# Patient Record
Sex: MALE | Race: BLACK OR AFRICAN AMERICAN | NOT HISPANIC OR LATINO | Employment: OTHER | URBAN - METROPOLITAN AREA
[De-identification: names, ages, dates, MRNs, and addresses within clinical notes are randomized per-mention and may not be internally consistent; named-entity substitution may affect disease eponyms.]

---

## 2017-03-23 ENCOUNTER — LAB VISIT (OUTPATIENT)
Dept: LAB | Facility: HOSPITAL | Age: 62
End: 2017-03-23
Attending: FAMILY MEDICINE
Payer: MEDICARE

## 2017-03-23 ENCOUNTER — OFFICE VISIT (OUTPATIENT)
Dept: INTERNAL MEDICINE | Facility: CLINIC | Age: 62
End: 2017-03-23
Payer: MEDICARE

## 2017-03-23 VITALS — DIASTOLIC BLOOD PRESSURE: 66 MMHG | HEART RATE: 70 BPM | SYSTOLIC BLOOD PRESSURE: 105 MMHG

## 2017-03-23 DIAGNOSIS — R91.8 ABNORMAL CT SCAN, LUNG: ICD-10-CM

## 2017-03-23 DIAGNOSIS — D64.9 ANEMIA, UNSPECIFIED TYPE: ICD-10-CM

## 2017-03-23 DIAGNOSIS — R93.5 ABNORMAL CT OF THE ABDOMEN: ICD-10-CM

## 2017-03-23 DIAGNOSIS — Z00.00 PREVENTATIVE HEALTH CARE: ICD-10-CM

## 2017-03-23 DIAGNOSIS — D84.9 IMMUNOSUPPRESSION: Chronic | ICD-10-CM

## 2017-03-23 DIAGNOSIS — M33.20 POLYMYOSITIS: ICD-10-CM

## 2017-03-23 DIAGNOSIS — M33.20 POLYMYOSITIS: Primary | ICD-10-CM

## 2017-03-23 LAB
ALBUMIN SERPL BCP-MCNC: 4.2 G/DL
ALP SERPL-CCNC: 54 U/L
ALT SERPL W/O P-5'-P-CCNC: 12 U/L
ANION GAP SERPL CALC-SCNC: 11 MMOL/L
ANISOCYTOSIS BLD QL SMEAR: SLIGHT
AST SERPL-CCNC: 12 U/L
BASOPHILS NFR BLD: 0 %
BILIRUB SERPL-MCNC: 2.2 MG/DL
BUN SERPL-MCNC: 11 MG/DL
CALCIUM SERPL-MCNC: 9.1 MG/DL
CHLORIDE SERPL-SCNC: 106 MMOL/L
CHOLEST/HDLC SERPL: 4.6 {RATIO}
CO2 SERPL-SCNC: 25 MMOL/L
COMPLEXED PSA SERPL-MCNC: 0.37 NG/ML
CREAT SERPL-MCNC: 0.5 MG/DL
DACRYOCYTES BLD QL SMEAR: ABNORMAL
DIFFERENTIAL METHOD: ABNORMAL
EOSINOPHIL NFR BLD: 1 %
ERYTHROCYTE [DISTWIDTH] IN BLOOD BY AUTOMATED COUNT: 20.5 %
EST. GFR  (AFRICAN AMERICAN): >60 ML/MIN/1.73 M^2
EST. GFR  (NON AFRICAN AMERICAN): >60 ML/MIN/1.73 M^2
GLUCOSE SERPL-MCNC: 68 MG/DL
HCT VFR BLD AUTO: 33.2 %
HDL/CHOLESTEROL RATIO: 21.7 %
HDLC SERPL-MCNC: 138 MG/DL
HDLC SERPL-MCNC: 30 MG/DL
HGB BLD-MCNC: 10.9 G/DL
HYPOCHROMIA BLD QL SMEAR: ABNORMAL
LDLC SERPL CALC-MCNC: 75.4 MG/DL
LYMPHOCYTES NFR BLD: 36 %
MCH RBC QN AUTO: 21.2 PG
MCHC RBC AUTO-ENTMCNC: 32.8 %
MCV RBC AUTO: 65 FL
MONOCYTES NFR BLD: 3 %
NEUTROPHILS NFR BLD: 60 %
NONHDLC SERPL-MCNC: 108 MG/DL
NRBC BLD-RTO: ABNORMAL /100 WBC
OVALOCYTES BLD QL SMEAR: ABNORMAL
PLATELET # BLD AUTO: 142 K/UL
PLATELET BLD QL SMEAR: ABNORMAL
PMV BLD AUTO: ABNORMAL FL
POIKILOCYTOSIS BLD QL SMEAR: ABNORMAL
POLYCHROMASIA BLD QL SMEAR: ABNORMAL
POTASSIUM SERPL-SCNC: 4 MMOL/L
PROT SERPL-MCNC: 7.4 G/DL
RBC # BLD AUTO: 5.13 M/UL
SODIUM SERPL-SCNC: 142 MMOL/L
TARGETS BLD QL SMEAR: ABNORMAL
TRIGL SERPL-MCNC: 163 MG/DL
WBC # BLD AUTO: 8.45 K/UL
WBC NRBC COR # BLD: 7.68 K/UL

## 2017-03-23 PROCEDURE — 36415 COLL VENOUS BLD VENIPUNCTURE: CPT

## 2017-03-23 PROCEDURE — 84153 ASSAY OF PSA TOTAL: CPT

## 2017-03-23 PROCEDURE — 99215 OFFICE O/P EST HI 40 MIN: CPT | Mod: S$PBB,,, | Performed by: FAMILY MEDICINE

## 2017-03-23 PROCEDURE — 80053 COMPREHEN METABOLIC PANEL: CPT

## 2017-03-23 PROCEDURE — 80061 LIPID PANEL: CPT

## 2017-03-23 PROCEDURE — 99999 PR PBB SHADOW E&M-EST. PATIENT-LVL III: CPT | Mod: PBBFAC,,, | Performed by: FAMILY MEDICINE

## 2017-03-23 PROCEDURE — 85007 BL SMEAR W/DIFF WBC COUNT: CPT

## 2017-03-23 PROCEDURE — 85027 COMPLETE CBC AUTOMATED: CPT

## 2017-03-23 NOTE — MR AVS SNAPSHOT
[facility]  [address]  [facility] [address]  Phone: [phone]  Fax: [phone]                  Nura LAN Femi Krishna   3/23/2017 8:00 AM   Office Visit    Description:  Male : 1955   Provider:  Rickie Roe MD   Department:  [facility]           Diagnoses this Visit        Comments    Polymyositis    -  Primary     Immunosuppression         Abnormal CT scan, lung         Anemia, unspecified type         Preventative health care         Abnormal CT of the abdomen                To Do List           Future Appointments        Provider Department Dept Phone    4/10/2017 10:00 AM Nura Huston MD [facility] - Rheumatology [phone]      Goals (5 Years of Data)     None      Follow-Up and Disposition     Return in about 3 months (around 2017), or if symptoms worsen or fail to improve.    Follow-up and Disposition History      [facility]     [facility] -  Assistance  Registered nurses in the [facility] provide clinical advisement, health education, appointment booking, and other advisory services.  Call for this free service at [phone].             Medications           Message regarding Medications     Verify the changes and/or additions to your medication regime listed below are the same as discussed with your clinician today.  If any of these changes or additions are incorrect, please notify your healthcare provider.             Verify that the below list of medications is an accurate representation of the medications you are currently taking.  If none reported, the list may be blank. If incorrect, please contact your healthcare provider. Carry this list with you in case of emergency.           Current Medications     azathioprine (IMURAN) 50 mg Tab Take 3 tablets (150 mg total) by mouth once daily.    calcium-vitamin D3 (CALCIUM 500 + D) 500 mg(1,250mg) -200 unit per tablet Take 1 tablet by  mouth 2 (two) times daily with meals.    ergocalciferol (ERGOCALCIFEROL) 50,000 unit Cap Take 1 capsule (50,000 Units total) by mouth every 7 days.    imiquimod (ALDARA) 5 % cream AAA nightly    ketoconazole (NIZORAL) 2 % cream Apply topically 2 (two) times daily.    pantoprazole (PROTONIX) 20 MG tablet Take 1 tablet (20 mg total) by mouth once daily.    predniSONE (DELTASONE) 10 MG tablet Take 3 tablets (30 mg total) by mouth once daily.    senna-docusate 8.6-50 mg (PERICOLACE) 8.6-50 mg per tablet Take 1 tablet by mouth 2 (two) times daily.           Clinical Reference Information           Your Vitals Were     BP Pulse                105/66 70          Blood Pressure          Most Recent Value    BP  105/66      Allergies as of 3/23/2017     No Known Allergies      Immunizations Administered on Date of Encounter - 3/23/2017     None      Orders Placed During Today's Visit      Normal Orders This Visit    Ambulatory Referral to Hepatology     Ambulatory referral to Home Health     Ambulatory Referral to Pulmonology     Future Labs/Procedures Expected by Expires    CBC auto differential  3/23/2017 3/23/2018    Comprehensive metabolic panel  3/23/2017 3/23/2018    Lipid panel  3/23/2017 3/23/2018    PSA, Screening  3/23/2017 6/21/2017      Smoking Cessation     If you would like to quit smoking:   You may be eligible for free services if you are a [place] resident and started smoking cigarettes before September 1, 1988.  Call the Smoking Cessation Trust ([facility]) toll free at (165) 945-0507 or (974) 564-7535.   Call 1-800-QUIT-NOW if you do not meet the above criteria.            Language Assistance Services     ATTENTION: Language assistance services are available, free of charge. Please call [phone].      ATENCIÓN: Si habla español, tiene a oconnor disposición servicios gratuitos de asistencia lingüística. Llame al [phone].     CHÚ Ý: N?u b?n nói Ti?ng Vi?t, có các d?ch v? h? tr? ngôn ng? mi?n [facility]  shiva garcia?n. G?i s? [phone].         [facility] complies with applicable Federal civil rights laws and does not discriminate on the basis of race, color, national origin, age, disability, or sex.

## 2017-03-23 NOTE — PROGRESS NOTES
"Subjective:       Patient ID: Nura Kahn Jr. is a 61 y.o. male.    Chief Complaint: No chief complaint on file.  Nura Kahn Jr. 61 y.o. male is here for office visit to review care and physical exam, feels about baseline.  Has on and off LE weakness, not sure if immunosuppressive med working, doesn't use at times.  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 Last Rhum note plan:  "Given chronic inflammation on muscle biopsy, subjective improvement with prednisone- he will continue to need immunosuppression. He has failed imuran and we will start getting him ready to start cellcept (will need pulmonary appointment, GI appointment for vaccinations first). He got information on cellcept to read over. Paraseptal emphysema.  CT showed:    Stable 1.2 cm AP window lymph node. 4.  Hepatosplenomegaly with an enlarged portal vein and associated splenic collateral vessels at the splenic hilum suggesting sequela of portal venous hypertension.   Ill-defined ground glass density measuring 0.8 cm in the anterior segment of the right upper lobe       RTC after pulmonary/GI evaluation to start Cellcept.     Seen and d/w Dr. Huston      HPI  Review of Systems   Constitutional: Negative for activity change, appetite change, fatigue, fever and unexpected weight change.   HENT: Negative for congestion, hearing loss, postnasal drip and rhinorrhea.    Eyes: Negative for pain, discharge and visual disturbance.   Respiratory: Negative for cough, choking and shortness of breath.    Cardiovascular: Negative for chest pain, palpitations and leg swelling.   Gastrointestinal: Negative for abdominal pain, diarrhea and vomiting.   Genitourinary: Negative for dysuria, flank pain, hematuria and urgency.   Musculoskeletal: Negative for arthralgias, back pain, joint swelling and neck pain.   Skin: Negative for color change and rash.   Neurological: Positive for weakness. Negative for dizziness, " tremors, syncope, numbness and headaches.   Psychiatric/Behavioral: Negative for agitation and confusion. The patient is not hyperactive.        Objective:      Physical Exam   Constitutional: He is oriented to person, place, and time. He appears well-developed and well-nourished.   HENT:   Head: Normocephalic.   Eyes: EOM are normal. Pupils are equal, round, and reactive to light.   Neck: Normal range of motion. Neck supple. No thyromegaly present.   Cardiovascular: Normal rate.  Exam reveals no gallop and no friction rub.    No murmur heard.  Pulmonary/Chest: Effort normal. No respiratory distress. He has no wheezes.   Abdominal: Soft. Bowel sounds are normal. He exhibits no mass. There is no tenderness.   Musculoskeletal: He exhibits no edema or tenderness.   Lymphadenopathy:     He has no cervical adenopathy.   Neurological: He is alert and oriented to person, place, and time. He has normal reflexes. No cranial nerve deficit.   Skin: Skin is warm. No rash noted.   Psychiatric: He has a normal mood and affect. His behavior is normal.       Assessment:       No diagnosis found.    Plan:       Diagnoses and all orders for this visit:    Polymyositis  -     Comprehensive metabolic panel; Future  -     Ambulatory referral to Home Health    Immunosuppression  -     Comprehensive metabolic panel; Future  -     CBC auto differential; Future    Abnormal CT scan, lung  -     Ambulatory Referral to Pulmonology    Anemia, unspecified type  -     CBC auto differential; Future    Preventative health care  -     Comprehensive metabolic panel; Future  -     Lipid panel; Future  -     CBC auto differential; Future  -     PSA, Screening; Future    Abnormal CT of the abdomen  -     Ambulatory Referral to Hepatology

## 2017-04-19 ENCOUNTER — DOCUMENTATION ONLY (OUTPATIENT)
Dept: TRANSPLANT | Facility: CLINIC | Age: 62
End: 2017-04-19

## 2017-04-19 NOTE — NURSING
Pt records reviewed.   Pt will be referred to Hepatology.    Initial referral received  from the [facility]que.   Referring Provider/diagnosis  Rickie Roe MD        Enlarged liver, appt made by by central scheduling, pt missed appt. Please schedule in hepatology, last seen  2015   Referral letter sent to provider and patient.

## 2017-04-19 NOTE — LETTER
April 19, 2017    Nura Kahn  [address] [facility] [address]      Dear Nura Kahn:    Your doctor has referred you to the [facility]. You will be contacted by our office and an initial appointment will then be scheduled for you.    We look forward to seeing you soon. If you have any further questions, please contact us at [phone].       Sincerely,        [facility]   [facility] [place], [place] [id number] [phone]

## 2017-04-19 NOTE — LETTER
April 19, 2017    Rickie Roe MD  [address]  [place] [address]      Dear Dr. Roe    Patient: Nura Kahn Jr.   MR Number: [id number]   YOB: 1955     Thank you for the referral of Nura Kahn Jr. to the [facility]. An initial appointment will be scheduled for your patient with one of our Hepatologists.      Thank you again for your trust in our program.  If there is anything we can do for you or your staff, please feel free to contact us.        Sincerely,        [facility]  [facility] [place] [address]  [phone]

## 2017-05-08 ENCOUNTER — TELEPHONE (OUTPATIENT)
Dept: HEPATOLOGY | Facility: CLINIC | Age: 62
End: 2017-05-08

## 2017-05-08 NOTE — TELEPHONE ENCOUNTER
Spoke with pt's wife, Mitali Kahn,     Follow up appt reschedule. She also asked me to reschedule other appt that pt missed. I was able to give pt the next available. Explained to Mrs. Kahn, if her  need to be seen sooner, call MD's office. Pt's wife verbalized understanding. Appts letter mailed.

## 2017-05-08 NOTE — TELEPHONE ENCOUNTER
----- Message from Farrah Mcneal sent at 5/8/2017  1:32 PM CDT -----  Calling to reschedule the patient appt from today. Please call

## 2017-06-06 ENCOUNTER — HOSPITAL ENCOUNTER (EMERGENCY)
Facility: HOSPITAL | Age: 62
Discharge: HOME | End: 2017-06-06
Attending: EMERGENCY MEDICINE
Payer: MEDICARE

## 2017-06-06 VITALS
HEIGHT: 70 IN | DIASTOLIC BLOOD PRESSURE: 64 MMHG | OXYGEN SATURATION: 99 % | WEIGHT: 155 LBS | TEMPERATURE: 98 F | HEART RATE: 72 BPM | SYSTOLIC BLOOD PRESSURE: 132 MMHG | BODY MASS INDEX: 22.19 KG/M2 | RESPIRATION RATE: 18 BRPM
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
## 2017-06-06 DIAGNOSIS — S70.01XA CONTUSION OF RIGHT HIP, INITIAL ENCOUNTER: Primary | ICD-10-CM

## 2017-06-06 PROCEDURE — 99284 EMERGENCY DEPT VISIT MOD MDM: CPT | Mod: ,,, | Performed by: EMERGENCY MEDICINE

## 2017-06-06 PROCEDURE — 63600175 PHARM REV CODE 636 W HCPCS: Performed by: STUDENT IN AN ORGANIZED HEALTH CARE EDUCATION/TRAINING PROGRAM

## 2017-06-06 PROCEDURE — 99284 EMERGENCY DEPT VISIT MOD MDM: CPT | Mod: 25

## 2017-06-06 PROCEDURE — 96374 THER/PROPH/DIAG INJ IV PUSH: CPT

## 2017-06-06 RX ORDER — OXYCODONE AND ACETAMINOPHEN 5; 325 MG/1; MG/1
1 TABLET ORAL EVERY 6 HOURS PRN
Qty: 15 TABLET | Refills: 0 | Status: SHIPPED | OUTPATIENT
Start: 2017-06-06 | End: 2019-02-13

## 2017-06-06 RX ORDER — MORPHINE SULFATE 2 MG/ML
4 INJECTION, SOLUTION INTRAMUSCULAR; INTRAVENOUS
Status: COMPLETED | OUTPATIENT
Start: 2017-06-06 | End: 2017-06-06

## 2017-06-06 RX ORDER — PREDNISONE 10 MG/1
10 TABLET ORAL ONCE
Refills: 2 | COMMUNITY
Start: 2017-04-05 | End: 2017-09-20 | Stop reason: SDUPTHER

## 2017-06-06 RX ADMIN — MORPHINE SULFATE 4 MG: 2 INJECTION, SOLUTION INTRAMUSCULAR; INTRAVENOUS at 10:06

## 2017-06-06 NOTE — ED PROVIDER NOTES
Encounter Date: 6/6/2017    SCRIBE #1 NOTE: I, Norma Mcgee, am scribing for, and in the presence of,  Dr. Sanz . I have scribed the following portions of the note - the Resident attestation.       History     Chief Complaint   Patient presents with    Hip Pain     right hip pain after fall on Sunday     Review of patient's allergies indicates:  No Known Allergies  Mr. Kahn is a 61 year old male with a past medical history significant for polymyositis (on chronic immunosuppression), microcytic anemia, atrial fibrillation, DM, HTN, and depression who presents after having a mechanical fall. He reports that he fell trying to ascend a wooden ramp with his walker. He states that the ramp gave and he fell on concrete directly on his right hip. He was down for about 20-30 minutes before he was helped up. He states that at baseline he ambulates with a walker. However, since the fall he has been unable to ambulate 2/2 significant right hip pain. He denies loss of bowel or bladder function, paraesthesias, numbness, or tingling.             Past Medical History:   Diagnosis Date    Atrial fibrillation 3/2/2015    Depression     Microcytic anemia 9/25/2014    Neuromuscular disorder     Polymyositis 2009    Tobacco abuse      History reviewed. No pertinent surgical history.  Family History   Problem Relation Age of Onset    Diabetes Mother     Hypertension Mother     Diabetes Father     Hypertension Father     Heart attack Neg Hx     Heart disease Neg Hx     Melanoma Neg Hx      Social History   Substance Use Topics    Smoking status: Light Tobacco Smoker     Packs/day: 0.25     Years: 20.00     Types: Cigarettes    Smokeless tobacco: Never Used    Alcohol use No     Review of Systems   Constitutional: Negative for chills, fatigue, fever and unexpected weight change.   HENT: Negative for congestion, rhinorrhea, sneezing and sore throat.    Eyes: Negative for visual disturbance.   Respiratory: Negative  for cough, shortness of breath and wheezing.    Cardiovascular: Negative for chest pain and palpitations.   Gastrointestinal: Negative for abdominal pain, blood in stool, constipation, diarrhea, nausea and vomiting.   Endocrine: Negative for polyuria.   Genitourinary: Negative for decreased urine volume, difficulty urinating, dysuria, flank pain, frequency, hematuria and urgency.   Musculoskeletal: Positive for back pain and gait problem. Negative for neck pain and neck stiffness.   Skin: Negative for pallor, rash and wound.   Allergic/Immunologic: Negative for immunocompromised state.   Neurological: Negative for dizziness, tremors, syncope, weakness, light-headedness, numbness and headaches.   Hematological: Does not bruise/bleed easily.   Psychiatric/Behavioral: Negative for agitation and behavioral problems.       Physical Exam     Initial Vitals [06/06/17 0817]   BP Pulse Resp Temp SpO2   (!) 144/80 74 16 98.8 °F (37.1 °C) 100 %     Physical Exam    Nursing note and vitals reviewed.  Constitutional: He appears well-developed and well-nourished. He is not diaphoretic. No distress.   HENT:   Head: Normocephalic.   Eyes: Conjunctivae and EOM are normal. Pupils are equal, round, and reactive to light. No scleral icterus.   Neck: Normal range of motion. Neck supple. No JVD present.   Cardiovascular: Normal rate, normal heart sounds and intact distal pulses. An irregularly irregular rhythm present. Exam reveals no gallop and no friction rub.    No murmur heard.  Pulmonary/Chest: Breath sounds normal. No respiratory distress. He has no wheezes. He has no rhonchi. He has no rales. He exhibits no tenderness.   Abdominal: Soft. Bowel sounds are normal. He exhibits no distension and no mass. There is no tenderness. There is no rebound and no guarding.   Musculoskeletal:        Right hip: He exhibits decreased range of motion, tenderness and bony tenderness. He exhibits no deformity and no laceration.        Lumbar back:  He exhibits tenderness, bony tenderness and pain. He exhibits no laceration.   Neurological: He is alert and oriented to person, place, and time. He displays atrophy. He displays no tremor. No cranial nerve deficit or sensory deficit. He exhibits abnormal muscle tone.   Skin: Skin is warm and dry. No rash noted. No erythema. No pallor.   Psychiatric: He has a normal mood and affect. Thought content normal.         ED Course   Procedures  Labs Reviewed - No data to display          Medical Decision Making:   History:   Old Medical Records: I decided to obtain old medical records.  Initial Assessment:   Mr. Kahn is a 61 year old male w/ past medical history significant for polymyositis on chronic prednisone who presents after having a mechanical fall. He reports no pain at rest. He has significant pain on internal rotation of his right hip. Passive extension and flexion do not cause pain. No focal sensory deficits appreciated. He is tender to palpation along his right hip and lumbar spine. Significant concern for fracture given his history. Will order xray series for evaluation.    Differential Diagnosis:   Differential diagnosis include hip/lumbar fracture, muscular strain, disc herniation, femoral fracture, and progression of his polymyositis.   Clinical Tests:   Radiological Study: Ordered and Reviewed  ED Management:  He was seen at 0800.  His case was discussed with staff at 0830.  Xray's of his pelvis, femur, and lumbar spine were ordered.   Xray imaging without evidence of an acute fracture.   CT A/p bone study ordered for further evaluation given high suspicion of a fracture given his history.   CT A/p was negative for a fracture. Likely contusion. Rest/ice/pain management instructions given.   He was discharged home with instructions to follow up with his PCP. In addition, concern/return symptoms were discussed with the patient. He verbalized understanding.             Scribe Attestation:   Scribe #1: I  performed the above scribed service and the documentation accurately describes the services I performed. I attest to the accuracy of the note.    Attending Attestation:   Physician Attestation Statement for Resident:  As the supervising MD   Physician Attestation Statement: I have personally seen and examined this patient.   I agree with the above history. -: Patient fell on right side 2 days ago. On exam, no tenderness to palpation to lateral hip, pain with range of motion, external rotation tenderness of iliac crest, no tenderness to lumbar spine no step-offs or deformities. Plan per resident including XR of hip and lumbar spine.     Update: X-ray returned unremarkable, but given patient's age and persistent pain and difficulty walking, will order CT of lumbar spine and pelvis.    Update: Imaging of hip negative for fracture, noted soft tissue contusion. My overall impression is hip contusion. Pt feels improved, will continue percocet at home prn pain. Advised ice, rest. Using assistance to get around house, pt has resources at home and family who can assist. Return if any concerns.    As the supervising MD I agree with the above PE.    As the supervising MD I agree with the above treatment, course, plan, and disposition.          Physician Attestation for Scribe:  Physician Attestation Statement for Scribe #1: I, Dr. Sanz , reviewed documentation, as scribed by Norma Mgcee  in my presence, and it is both accurate and complete.                 ED Course     Clinical Impression:   The primary encounter diagnosis was Contusion of right hip, initial encounter. A diagnosis of Fall was also pertinent to this visit.          Kang Ortiz MD  Resident  06/06/17 8702

## 2017-06-06 NOTE — ED NOTES
Pt identifiers Nura Kahn Jr. was checked and is correct  LOC: The patient is awake, alert, aware of environment with an appropriate affect.   APPEARANCE: pt  in no acute distress pt fell transferring to car with walker.  Pt fell on right hip.   SKIN: Skin warm, dry and intact, normal skin turgor, moist mucus membranes  RESPIRATORY: Airway is open and patent, respirations are spontaneous, even and unlabored, normal effort and rate  CARDIAC: Normal rate and rhythm, no peripheral edema noted, capillary refill < 3 seconds, bilateral radial pulses 2+  ABDOMEN: Soft, nontender, nondistended. Bowel sounds present   NEUROLOGIC:  normal sensation in all extremities when touched with a finger.  Follows all commands appropriately  MUSCULOSKELETAL: pt has generalized weaknessto all extremities with hx of polymyositis.   Pt walks with walker.   Pain in right hip with palpation.

## 2017-06-08 ENCOUNTER — TELEPHONE (OUTPATIENT)
Dept: RHEUMATOLOGY | Facility: CLINIC | Age: 62
End: 2017-06-08

## 2017-06-08 DIAGNOSIS — Z79.624 ENCOUNTER FOR LONG TERM CURRENT USE OF AZATHIOPRINE: ICD-10-CM

## 2017-06-08 DIAGNOSIS — M33.20 POLYMYOSITIS: ICD-10-CM

## 2017-06-08 DIAGNOSIS — M33.20 POLYMYOSITIS: Primary | ICD-10-CM

## 2017-06-08 RX ORDER — AZATHIOPRINE 50 MG/1
150 TABLET ORAL DAILY
Qty: 90 TABLET | Refills: 0 | Status: SHIPPED | OUTPATIENT
Start: 2017-06-08 | End: 2017-07-24 | Stop reason: SDUPTHER

## 2017-06-08 NOTE — TELEPHONE ENCOUNTER
Please schedule standing labs for 6/23 and needs f/u appt with fellow and myself in next few weeks. ALEXSANDER

## 2017-06-13 ENCOUNTER — TELEPHONE (OUTPATIENT)
Dept: INTERNAL MEDICINE | Facility: CLINIC | Age: 62
End: 2017-06-13

## 2017-06-13 NOTE — TELEPHONE ENCOUNTER
----- Message from Oleg Fagan sent at 6/13/2017 10:58 AM CDT -----  Contact: self/ [phone] cell  Type: Rx    Name of medication(s): oxycodone-acetaminophen (PERCOCET) 5-325 mg per tablet    Is this a refill? New rx? New    Who prescribed medication? ER doctor    Pharmacy Name, Phone, & Location: [facility] pharmacy on file    Comments:  Pt would like to speak with someone in the office to discuss some symptoms he is having.  The pt fell on last week and was rushed to ER for hip pain.  He was released with some pain medications, but he is now out.  He would like to request that Dr. Roe send in some medication for the pain.  Please call and advise.    Thank you

## 2017-06-28 DIAGNOSIS — Z12.11 COLON CANCER SCREENING: ICD-10-CM

## 2017-07-24 ENCOUNTER — TELEPHONE (OUTPATIENT)
Dept: RHEUMATOLOGY | Facility: CLINIC | Age: 62
End: 2017-07-24

## 2017-07-24 DIAGNOSIS — M33.20 POLYMYOSITIS: ICD-10-CM

## 2017-07-24 RX ORDER — AZATHIOPRINE 50 MG/1
150 TABLET ORAL DAILY
Qty: 90 TABLET | Refills: 0 | Status: SHIPPED | OUTPATIENT
Start: 2017-07-24 | End: 2017-09-20 | Stop reason: SDUPTHER

## 2017-07-24 NOTE — TELEPHONE ENCOUNTER
Deb, please schedule overdue standing labs this week so I can refill azathioprine. Thanks ALEXSANDER

## 2017-08-03 ENCOUNTER — TELEPHONE (OUTPATIENT)
Dept: RHEUMATOLOGY | Facility: CLINIC | Age: 62
End: 2017-08-03

## 2017-08-03 DIAGNOSIS — R13.10 DYSPHAGIA, UNSPECIFIED TYPE: ICD-10-CM

## 2017-08-03 DIAGNOSIS — R91.1 PULMONARY NODULE: ICD-10-CM

## 2017-08-03 DIAGNOSIS — R91.8 GROUND GLASS OPACITY PRESENT ON IMAGING OF LUNG: ICD-10-CM

## 2017-08-03 DIAGNOSIS — M33.20 POLYMYOSITIS: Primary | ICD-10-CM

## 2017-08-03 NOTE — TELEPHONE ENCOUNTER
Patient again did not show for appt. Pre-charting as per below:        Results for RAQUEL EDWARDS JR. (MRN [id number]) as of 8/3/2017 07:47   Ref. Range 3/23/2017 09:23   WBC Latest Ref Range: 3.90 - 12.70 K/uL 8.45   WBC-Corrected for NRBC's Latest Ref Range: 3.90 - 12.70 K/uL 7.68   RBC Latest Ref Range: 4.60 - 6.20 M/uL 5.13   Hemoglobin Latest Ref Range: 14.0 - 18.0 g/dL 10.9 (L)   Hematocrit Latest Ref Range: 40.0 - 54.0 % 33.2 (L)   MCV Latest Ref Range: 82 - 98 fL 65 (L)   MCH Latest Ref Range: 27.0 - 31.0 pg 21.2 (L)   MCHC Latest Ref Range: 32.0 - 36.0 % 32.8   RDW Latest Ref Range: 11.5 - 14.5 % 20.5 (H)   Platelets Latest Ref Range: 150 - 350 K/uL 142 (L)   MPV Latest Ref Range: 9.2 - 12.9 fL SEE COMMENT   Gran% Latest Ref Range: 38.0 - 73.0 % 60.0   Lymph% Latest Ref Range: 18.0 - 48.0 % 36.0   Mono% Latest Ref Range: 4.0 - 15.0 % 3.0 (L)   Eosinophil% Latest Ref Range: 0.0 - 8.0 % 1.0   Basophil% Latest Ref Range: 0.0 - 1.9 % 0.0   nRBC Latest Ref Range: 0 /100 WBC 10@L=100 (A)   Ovalocytes Unknown Occasional   Aniso Unknown Slight   Poik Unknown Moderate   Poly Unknown Occasional   Hypo Unknown Moderate   Platelet Estimate Unknown Appears normal   Target Cells Unknown Marked   Tear Drop Cells Unknown Occasional   Sodium Latest Ref Range: 136 - 145 mmol/L 142   Potassium Latest Ref Range: 3.5 - 5.1 mmol/L 4.0   Chloride Latest Ref Range: 95 - 110 mmol/L 106   CO2 Latest Ref Range: 23 - 29 mmol/L 25   Anion Gap Latest Ref Range: 8 - 16 mmol/L 11   BUN, Bld Latest Ref Range: 6 - 20 mg/dL 11   Creatinine Latest Ref Range: 0.5 - 1.4 mg/dL 0.5   eGFR if non African American Latest Ref Range: >60 mL/min/1.73 m^2 >60.0   eGFR if African American Latest Ref Range: >60 mL/min/1.73 m^2 >60.0   Glucose Latest Ref Range: 70 - 110 mg/dL 68 (L)   Calcium Latest Ref Range: 8.7 - 10.5 mg/dL 9.1   Alkaline Phosphatase Latest Ref Range: 55 - 135 U/L 54 (L)   Total Protein Latest Ref Range: 6.0 - 8.4 g/dL 7.4   Albumin  Latest Ref Range: 3.5 - 5.2 g/dL 4.2   Total Bilirubin Latest Ref Range: 0.1 - 1.0 mg/dL 2.2 (H)   AST Latest Ref Range: 10 - 40 U/L 12   ALT Latest Ref Range: 10 - 44 U/L 12   Triglycerides Latest Ref Range: 30 - 150 mg/dL 163 (H)   Cholesterol Latest Ref Range: 120 - 199 mg/dL 138   HDL Latest Ref Range: 40 - 75 mg/dL 30 (L)   LDL Cholesterol Latest Ref Range: 63.0 - 159.0 mg/dL 75.4   Total Cholesterol/HDL Ratio Latest Ref Range: 2.0 - 5.0  4.6   PSA, SCREEN Latest Ref Range: 0.00 - 4.00 ng/mL 0.37       Results for RAQUEL EDWRADS JR. (MRN [id number]) as of 8/3/2017 07:47   Ref. Range 8/4/2014 10:08 10/2/2014 15:17 2/11/2015 11:05 4/8/2015 09:41 7/28/2015 09:01 3/3/2016 10:12 5/9/2016 15:03   CPK Latest Ref Range: 20 - 200 U/L 487 (H) 109 417 (H) 109 213 (H) 142 92   Results for RAQUEL EDWARDS JR. (MRN [id number]) as of 8/3/2017 07:47   Ref. Range 5/9/2016 15:03   Aldolase Latest Ref Range: 1.2 - 7.6 U/L 3.9     Results for RAQUEL EDWARDS JR. (MRN [id number]) as of 8/3/2017 07:47   Ref. Range 8/4/2014 10:08 2/11/2015 11:05 2/26/2015 09:24 3/3/2015 11:15 3/31/2015 09:10   Source Unknown    Test Not Performed    Hep B Core Total Ab Unknown Negative       Hepatitis B Surface Ag Unknown Negative       Alpha 2-Macroglobulins, Qn Latest Ref Range: 110 - 276 mg/dL     133   ALT (SGPT) Latest Ref Range: 0 - 55 IU/L     31   Apolipoprotein A-1 Latest Ref Range: 110 - 180 mg/dL     97 (L)   BILIRUBIN, TOTAL Latest Units: mg/dL     0.9   Fibrosis Score Latest Ref Range: 0.00 - 0.21      0.28 (H)   Fibrosis Stage Unknown     F1-Portal fibrosis   GGT Latest Ref Range: 0 - 65 IU/L     16   Haptoglobin Latest Ref Range: 34 - 200 mg/dL   65  100   HCV FibroSURE Results: Unknown     Result Below   Hepatitis C Ab Unknown Negative       Necroinflammat Activity Grade Unknown     A0-No activity   Necroinflammat Activity Score Latest Ref Range: 0.00 - 0.17      0.15   Specimen Type Unknown  PLASMA      Quantiferon Gold TB Latest Ref  Range: Negative  Negative       HIV 1/2 Ag/Ab Latest Ref Range: Negative  Negative       Interpretation Unknown    SEE BELOW            EMG 5/18/16:  Impression: TECHNICALLY DIFFICULT STUDY.   1 MODERATE ULNAR ENTRAPMENT AT THE ELBOW, PERHAPS ON A BACKGROUND OF A MILD POLYNEUROPATHY.   2 NEEDLE EXAM IS CONSISTENT WITH A CHRONIC ASYMMETRIC MYOPATHY WITH SECONDARY DENERVATION.   _____________________________   Miguel Schmidt III, MD [facility], please review MRI and let pt know results. As suspected, largely atrophic muscle but with mild residual activity which does require ongoing Rx. RJQ          PACS Images     Show images for MRI Lower Extremity Joint W WO Contrast Left   Reviewed By Iván Tucker MD on 5/15/2015 08:30   External Result Report     External Result Report   Narrative     Indication: Evaluate for active myositis    Comparison: None    Technique: Multiplanar, multi-sequence imaging of the left thigh was performed before and to the administration of 8 cc Gadavist IV contrast.    Findings:    There is severe atrophy with fatty replacement of the majority of the left thigh musculature involving the anterior, medial, and posterior compartments.  There is some preservation of the semimembranosus and biceps femoris musculature. The partially   visualized pelvic musculature appear somewhat atrophic as well. There is mild increased edema signal noted within the left iliopsoas muscle and remaining posterior compartment thigh musculature without associated enhancement which is nonspecific but   could reflect a mild degree of myositis.  Limited coronal images of the right thigh also demonstrate severe atrophy with fatty replacement of the majority of the musculature.  There is no evidence for soft tissue mass.  The osseous structures demonstrate   no evidence of diffuse marrow placement process or fracture.  Incidentally visualized pelvic structures are unremarkable.   Impression          Severe atrophy with fatty replacement of the thigh musculature bilaterally.  There is mild increased edema signal within the remaining thigh musculature and left iliopsoas muscle without associated enhancement which could reflect a mild degree of   residual myositis.  ______________________________________     Electronically signed by resident: Brandan Hess MD  Date: 04/22/15  Time: 14:52          As the supervising and teaching physician, I personally reviewed the images and resident's interpretation and I agree with the findings.          Electronically signed by: Yo Talbot MD  Date: 04/22/15  Time: 15:57     Encounter     View Encounter            Time of Procedure: 04/15/16 13:16:02  Accession # [id number]    Comparison: CT chest 02/19/2015, 08/07/2014    Technique:   The chest was surveyed from the apices through the costophrenic angles.  Data was reconstructed at 5-mm increments for contiguous 5-mm images in the axial, sagittal and coronal planes and post processed for extraction of 1.25-mm images at 10-mm increments and for 8 mm maximal-intensity (MIP) images at 2 mm increments confined to the axial plane.  No additional radiation was employed.      Findings:  Structures the base of the neck demonstrate a small calcified thyroid nodule within the left thyroid lobe.  There is left-sided arch with 3 branch vessels.  Aorta maintains normal caliber, contour and course with atherosclerotic calcification along its course.    Pulmonary arteries have normal caliber, contour and distribution.  There are four pulmonary veins.  Systemic and pulmonary veno atrial connections are concordant.      There is no pleural or pericardial fluid.  Enlarged AP window lymph node measures 1.2 cm in short axis, previously 1.3 cm on 02/19/2015 and 1.4 cm on 08/07/2014.    Esophagus maintains normal caliber and course.  I detect no bowel distension, free air, biliary dilatation.  There is hepatosplenomegaly with an enlarged  portal vein and associated splenic collateral vessels at the splenic hilum suggesting sequela of portal venous hypertension.  Osseous structures demonstrate degenerative change of the thoracic spine.    Tracheobronchial tree reveals no significant abnormality.    Lungs are symmetrically expanded.  There is paraseptal emphysema. We do not identify any evidence of interstitial lung disease such as NSIP or UIP.  There is a mobile calcification measuring 0.8 cm within the pleural space of doubtful clinical significance.  Previously this calcification was identified more medial and cephalad along the right lower lobe on 02/19/2015 but in a similar position to today when compared to the study from 08/07/2014.  There is an ill-defined ground glass density measuring 0.8 cm in the anterior segment of the right upper lobe (axial series 2 image 30).  This finding is nonspecific and may represent infection, inflammation, malignancy not excluded as adenocarcinoma can present similarly. Per Fleischner society guidelines for the management of sub-solid nodules, we recommend repeat noncontrast CT chest in 3 months (07/2016).   Impression         1.  No evidence of parenchymal lung abnormalities characteristic of polymyositis or interstitial lung disease such as NSIP or UIP. In light of this patient's history of chronic cough we recommend consultation with pulmonary medicine.    2. Paraseptal emphysema.    3.  Stable 1.2 cm AP window lymph node.  No new mediastinal or hilar lymph node enlargement.    4.  Hepatosplenomegaly with an enlarged portal vein and associated splenic collateral vessels at the splenic hilum suggesting sequela of portal venous hypertension.    5.  Cholelithiasis.    6.  Ill-defined ground glass density measuring 0.8 cm in the anterior segment of the right upper lobe (axial series 2 image 30).  This finding is nonspecific and may represent infection, inflammation, with malignancy not excluded as adenocarcinoma  can present similarly. Per Fleischner society guidelines for the management of sub-solid nodules, we recommend repeat noncontrast CT chest in 3 months (07/2016) to assess for stability.    This report has been flagged in the [facility] medical record.    ______________________________________     Electronically signed by resident: RAMAKRISHNA MEADOWS MD  Date: 04/15/16  Time: 15:05       ______________________________________     Electronically signed by: Melanie Ramirez MD  Date: 04/15/16  Time: 17:11              1. Polymyositis    2. Dysphagia, unspecified dysphagia    3. Abnormal CT scan, chest    4. Preventative health care    5. Immunosuppression    6. Medication monitoring encounter           PM- (by history) +weak Ku- today, he continues to have weakness proximal LE>UE. He had worsening subjective weakness when prednisone tapered from 20mg-->15mg so prednisone increased up to 30mg daily.   He had L thigh biopsy demonstrating chronic inflammation (unfortunately not enough sample to send for special stains).  He continues to have dysphagia with choking episodes- normal esophagram 4/22/2015. He is not taking PPI.   PFTs and HRCT without evidence of ILD 4/2016. PASP elevated >29 but with normal TAPSE. No respiratory symptoms. Cough resolved.    Osteopenia- with low risk frax which requires treatment if prednisone >7.5mg. We have been holding off due to his poor dentition and inability to get teeth pulled.      Overdue standing labs today   Continue prednisone 30mg daily, imuran 150mg daily    protonix daily   GI evaluation for dysphagia   Pulmonary evaluation for GGO, pulmonary nodule after repeat CT chest   Given chronic inflammation on muscle biopsy, subjective improvement with prednisone- he will continue to need immunosuppression. He has failed imuran and we will start getting him ready to start cellcept (will need pulmonary appointment, GI appointment ). He got information on cellcept to read over.    *Pneumovax   Will  discuss dentition and reclast again at next visit. Continue vitamin D 50K weekly.    RTC after pulmonary/GI evaluation to start mycophenolate   charting as per below:          Deb, please reschedule with me and fellow,  standing labs, appt, Gastro consult for dysphagia, Pulmonary consult to f/u pulmonary nodule. GGO after repeat HRCT chest. Thanks ALEXSANDER

## 2017-09-11 ENCOUNTER — TELEPHONE (OUTPATIENT)
Dept: RHEUMATOLOGY | Facility: CLINIC | Age: 62
End: 2017-09-11

## 2017-09-11 NOTE — TELEPHONE ENCOUNTER
I called the patient back and ask him to me back. I have been leaving messages for him he needs lab and f/u b-4 any reills has not been seen since may 2016

## 2017-09-12 ENCOUNTER — LAB VISIT (OUTPATIENT)
Dept: LAB | Facility: HOSPITAL | Age: 62
End: 2017-09-12
Attending: INTERNAL MEDICINE
Payer: MEDICARE

## 2017-09-12 DIAGNOSIS — Z79.624 ENCOUNTER FOR LONG TERM CURRENT USE OF AZATHIOPRINE: ICD-10-CM

## 2017-09-12 DIAGNOSIS — M33.20 POLYMYOSITIS: ICD-10-CM

## 2017-09-12 LAB
ALBUMIN SERPL BCP-MCNC: 3.8 G/DL
ALP SERPL-CCNC: 67 U/L
ALT SERPL W/O P-5'-P-CCNC: 7 U/L
ANION GAP SERPL CALC-SCNC: 9 MMOL/L
ANISOCYTOSIS BLD QL SMEAR: SLIGHT
AST SERPL-CCNC: 8 U/L
BASO STIPL BLD QL SMEAR: ABNORMAL
BASOPHILS NFR BLD: 3 %
BILIRUB SERPL-MCNC: 1.5 MG/DL
BUN SERPL-MCNC: 11 MG/DL
CALCIUM SERPL-MCNC: 8.9 MG/DL
CHLORIDE SERPL-SCNC: 108 MMOL/L
CK SERPL-CCNC: 73 U/L
CO2 SERPL-SCNC: 29 MMOL/L
CREAT SERPL-MCNC: 0.5 MG/DL
CRP SERPL-MCNC: 0.5 MG/L
DIFFERENTIAL METHOD: ABNORMAL
EOSINOPHIL NFR BLD: 0 %
ERYTHROCYTE [DISTWIDTH] IN BLOOD BY AUTOMATED COUNT: 21.4 %
ERYTHROCYTE [SEDIMENTATION RATE] IN BLOOD BY WESTERGREN METHOD: 0 MM/HR
EST. GFR  (AFRICAN AMERICAN): >60 ML/MIN/1.73 M^2
EST. GFR  (NON AFRICAN AMERICAN): >60 ML/MIN/1.73 M^2
GLUCOSE SERPL-MCNC: 85 MG/DL
HCT VFR BLD AUTO: 33.5 %
HGB BLD-MCNC: 11.2 G/DL
HYPOCHROMIA BLD QL SMEAR: ABNORMAL
LYMPHOCYTES NFR BLD: 41 %
MCH RBC QN AUTO: 20.9 PG
MCHC RBC AUTO-ENTMCNC: 33.4 G/DL
MCV RBC AUTO: 62 FL
MONOCYTES NFR BLD: 1 %
NEUTROPHILS NFR BLD: 55 %
NRBC BLD-RTO: ABNORMAL /100 WBC
OVALOCYTES BLD QL SMEAR: ABNORMAL
PLATELET # BLD AUTO: 141 K/UL
PLATELET BLD QL SMEAR: ABNORMAL
PMV BLD AUTO: ABNORMAL FL
POIKILOCYTOSIS BLD QL SMEAR: ABNORMAL
POLYCHROMASIA BLD QL SMEAR: ABNORMAL
POTASSIUM SERPL-SCNC: 3.8 MMOL/L
PROT SERPL-MCNC: 7.1 G/DL
RBC # BLD AUTO: 5.37 M/UL
SODIUM SERPL-SCNC: 146 MMOL/L
TARGETS BLD QL SMEAR: ABNORMAL
WBC # BLD AUTO: 7.18 K/UL
WBC NRBC COR # BLD: 5.84 K/UL

## 2017-09-12 PROCEDURE — 82550 ASSAY OF CK (CPK): CPT

## 2017-09-12 PROCEDURE — 86140 C-REACTIVE PROTEIN: CPT

## 2017-09-12 PROCEDURE — 36415 COLL VENOUS BLD VENIPUNCTURE: CPT

## 2017-09-12 PROCEDURE — 85651 RBC SED RATE NONAUTOMATED: CPT

## 2017-09-12 PROCEDURE — 85025 COMPLETE CBC W/AUTO DIFF WBC: CPT

## 2017-09-12 PROCEDURE — 82085 ASSAY OF ALDOLASE: CPT

## 2017-09-12 PROCEDURE — 80053 COMPREHEN METABOLIC PANEL: CPT

## 2017-09-13 ENCOUNTER — TELEPHONE (OUTPATIENT)
Dept: RHEUMATOLOGY | Facility: CLINIC | Age: 62
End: 2017-09-13

## 2017-09-13 DIAGNOSIS — D72.824 BASOPHILIA: Primary | ICD-10-CM

## 2017-09-13 DIAGNOSIS — D64.9 ANEMIA, UNSPECIFIED TYPE: ICD-10-CM

## 2017-09-13 DIAGNOSIS — D69.6 THROMBOCYTOPENIA: ICD-10-CM

## 2017-09-13 LAB — ALDOLASE SERPL-CCNC: 4.2 U/L

## 2017-09-13 NOTE — TELEPHONE ENCOUNTER
GI evaluation for dysphagia   Pulmonary evaluation for GGO, pulmonary nodule after repeat CT chest    Deb, please schedule the above prior to his upcoming visit with us. Thanks ALEXSANDER

## 2017-09-13 NOTE — TELEPHONE ENCOUNTER
Deb, please tell pt the cbc is very abnormal with basophils and nucleated red cells which likely indicate bone marrow disorder. Please schedule Hematology consult this week or early next. Thanks ALEXSANDER

## 2017-09-20 DIAGNOSIS — M33.20 POLYMYOSITIS: ICD-10-CM

## 2017-09-20 RX ORDER — AZATHIOPRINE 50 MG/1
150 TABLET ORAL DAILY
Qty: 90 TABLET | Refills: 0 | Status: SHIPPED | OUTPATIENT
Start: 2017-09-20 | End: 2017-10-27 | Stop reason: SDUPTHER

## 2017-09-20 RX ORDER — PREDNISONE 5 MG/1
10 TABLET ORAL DAILY
Qty: 60 TABLET | Refills: 1 | Status: SHIPPED | OUTPATIENT
Start: 2017-09-20 | End: 2017-12-05 | Stop reason: SDUPTHER

## 2017-10-27 DIAGNOSIS — M33.20 POLYMYOSITIS: ICD-10-CM

## 2017-10-27 RX ORDER — AZATHIOPRINE 50 MG/1
150 TABLET ORAL DAILY
Qty: 90 TABLET | Refills: 0 | Status: SHIPPED | OUTPATIENT
Start: 2017-10-27 | End: 2018-01-05 | Stop reason: SDUPTHER

## 2017-12-05 ENCOUNTER — TELEPHONE (OUTPATIENT)
Dept: RHEUMATOLOGY | Facility: CLINIC | Age: 62
End: 2017-12-05

## 2017-12-05 RX ORDER — PREDNISONE 5 MG/1
10 TABLET ORAL DAILY
Qty: 60 TABLET | Status: SHIPPED | OUTPATIENT
Start: 2017-12-05 | End: 2018-01-05 | Stop reason: SDUPTHER

## 2017-12-19 ENCOUNTER — TELEPHONE (OUTPATIENT)
Dept: HEMATOLOGY/ONCOLOGY | Facility: CLINIC | Age: 62
End: 2017-12-19

## 2017-12-21 ENCOUNTER — OFFICE VISIT (OUTPATIENT)
Dept: INTERNAL MEDICINE | Facility: CLINIC | Age: 62
End: 2017-12-21
Payer: MEDICARE

## 2017-12-21 ENCOUNTER — INITIAL CONSULT (OUTPATIENT)
Dept: HEMATOLOGY/ONCOLOGY | Facility: CLINIC | Age: 62
End: 2017-12-21
Payer: MEDICARE

## 2017-12-21 ENCOUNTER — LAB VISIT (OUTPATIENT)
Dept: LAB | Facility: HOSPITAL | Age: 62
End: 2017-12-21
Attending: INTERNAL MEDICINE
Payer: MEDICARE

## 2017-12-21 ENCOUNTER — TELEPHONE (OUTPATIENT)
Dept: RHEUMATOLOGY | Facility: CLINIC | Age: 62
End: 2017-12-21

## 2017-12-21 VITALS
HEART RATE: 80 BPM | SYSTOLIC BLOOD PRESSURE: 114 MMHG | WEIGHT: 154.31 LBS | HEIGHT: 70 IN | BODY MASS INDEX: 22.09 KG/M2 | DIASTOLIC BLOOD PRESSURE: 72 MMHG

## 2017-12-21 DIAGNOSIS — M33.20 POLYMYOSITIS: ICD-10-CM

## 2017-12-21 DIAGNOSIS — R91.8 ABNORMAL CT SCAN, LUNG: ICD-10-CM

## 2017-12-21 DIAGNOSIS — K76.6 PORTAL VENOUS HYPERTENSION: ICD-10-CM

## 2017-12-21 DIAGNOSIS — D58.2 HEMOGLOBIN C DISEASE: ICD-10-CM

## 2017-12-21 DIAGNOSIS — R16.1 SPLENOMEGALY: ICD-10-CM

## 2017-12-21 DIAGNOSIS — D69.6 THROMBOCYTOPENIA: Primary | ICD-10-CM

## 2017-12-21 DIAGNOSIS — D84.9 IMMUNOSUPPRESSION: Chronic | ICD-10-CM

## 2017-12-21 DIAGNOSIS — D56.8 BETA 0 THALASSEMIA: Primary | ICD-10-CM

## 2017-12-21 DIAGNOSIS — R05.9 COUGH: ICD-10-CM

## 2017-12-21 DIAGNOSIS — R93.2 ABNORMAL CT OF LIVER: ICD-10-CM

## 2017-12-21 LAB
ALBUMIN SERPL BCP-MCNC: 3.9 G/DL
ALP SERPL-CCNC: 67 U/L
ALT SERPL W/O P-5'-P-CCNC: 10 U/L
ANION GAP SERPL CALC-SCNC: 6 MMOL/L
AST SERPL-CCNC: 14 U/L
BASOPHILS # BLD AUTO: 0.02 K/UL
BASOPHILS NFR BLD: 0.4 %
BILIRUB SERPL-MCNC: 1.3 MG/DL
BUN SERPL-MCNC: 7 MG/DL
CALCIUM SERPL-MCNC: 9 MG/DL
CHLORIDE SERPL-SCNC: 106 MMOL/L
CK SERPL-CCNC: 318 U/L
CO2 SERPL-SCNC: 28 MMOL/L
CREAT SERPL-MCNC: 0.5 MG/DL
CRP SERPL-MCNC: 1.8 MG/L
DIFFERENTIAL METHOD: ABNORMAL
EOSINOPHIL # BLD AUTO: 0 K/UL
EOSINOPHIL NFR BLD: 0.9 %
ERYTHROCYTE [DISTWIDTH] IN BLOOD BY AUTOMATED COUNT: 20.3 %
ERYTHROCYTE [SEDIMENTATION RATE] IN BLOOD BY WESTERGREN METHOD: 0 MM/HR
EST. GFR  (AFRICAN AMERICAN): >60 ML/MIN/1.73 M^2
EST. GFR  (NON AFRICAN AMERICAN): >60 ML/MIN/1.73 M^2
GLUCOSE SERPL-MCNC: 90 MG/DL
HAPTOGLOB SERPL-MCNC: 91 MG/DL
HCT VFR BLD AUTO: 30.9 %
HGB BLD-MCNC: 10.4 G/DL
IMM GRANULOCYTES # BLD AUTO: 0.03 K/UL
IMM GRANULOCYTES NFR BLD AUTO: 0.7 %
LYMPHOCYTES # BLD AUTO: 0.6 K/UL
LYMPHOCYTES NFR BLD: 12.9 %
MCH RBC QN AUTO: 20.2 PG
MCHC RBC AUTO-ENTMCNC: 33.7 G/DL
MCV RBC AUTO: 60 FL
MONOCYTES # BLD AUTO: 0.2 K/UL
MONOCYTES NFR BLD: 4.8 %
NEUTROPHILS # BLD AUTO: 3.7 K/UL
NEUTROPHILS NFR BLD: 80.3 %
NRBC BLD-RTO: 5 /100 WBC
PLATELET # BLD AUTO: 162 K/UL
PMV BLD AUTO: ABNORMAL FL
POTASSIUM SERPL-SCNC: 4.1 MMOL/L
PROT SERPL-MCNC: 7.3 G/DL
RBC # BLD AUTO: 5.14 M/UL
RETICS/RBC NFR AUTO: 2 %
SODIUM SERPL-SCNC: 140 MMOL/L
WBC # BLD AUTO: 4.56 K/UL

## 2017-12-21 PROCEDURE — 85025 COMPLETE CBC W/AUTO DIFF WBC: CPT

## 2017-12-21 PROCEDURE — 85045 AUTOMATED RETICULOCYTE COUNT: CPT

## 2017-12-21 PROCEDURE — 99215 OFFICE O/P EST HI 40 MIN: CPT | Mod: S$PBB,,, | Performed by: INTERNAL MEDICINE

## 2017-12-21 PROCEDURE — 80053 COMPREHEN METABOLIC PANEL: CPT

## 2017-12-21 PROCEDURE — 99214 OFFICE O/P EST MOD 30 MIN: CPT | Mod: PBBFAC,27 | Performed by: FAMILY MEDICINE

## 2017-12-21 PROCEDURE — 99999 PR PBB SHADOW E&M-EST. PATIENT-LVL IV: CPT | Mod: PBBFAC,,, | Performed by: FAMILY MEDICINE

## 2017-12-21 PROCEDURE — 86140 C-REACTIVE PROTEIN: CPT

## 2017-12-21 PROCEDURE — 99215 OFFICE O/P EST HI 40 MIN: CPT | Mod: S$PBB,,, | Performed by: FAMILY MEDICINE

## 2017-12-21 PROCEDURE — 81450 HL NEO GSAP 5-50DNA/DNA&RNA: CPT

## 2017-12-21 PROCEDURE — 99213 OFFICE O/P EST LOW 20 MIN: CPT | Mod: PBBFAC | Performed by: INTERNAL MEDICINE

## 2017-12-21 PROCEDURE — 82085 ASSAY OF ALDOLASE: CPT

## 2017-12-21 PROCEDURE — 36415 COLL VENOUS BLD VENIPUNCTURE: CPT

## 2017-12-21 PROCEDURE — 99999 PR PBB SHADOW E&M-EST. PATIENT-LVL III: CPT | Mod: PBBFAC,,, | Performed by: INTERNAL MEDICINE

## 2017-12-21 PROCEDURE — 85651 RBC SED RATE NONAUTOMATED: CPT

## 2017-12-21 PROCEDURE — 82550 ASSAY OF CK (CPK): CPT

## 2017-12-21 PROCEDURE — 83010 ASSAY OF HAPTOGLOBIN QUANT: CPT

## 2017-12-21 RX ORDER — ALBUTEROL SULFATE 90 UG/1
2 AEROSOL, METERED RESPIRATORY (INHALATION) EVERY 6 HOURS PRN
Qty: 1 EACH | Refills: 11 | Status: SHIPPED | OUTPATIENT
Start: 2017-12-21 | End: 2019-02-27 | Stop reason: SDUPTHER

## 2017-12-21 RX ORDER — FERROUS SULFATE, DRIED 160(50) MG
1 TABLET, EXTENDED RELEASE ORAL 2 TIMES DAILY WITH MEALS
Status: ON HOLD | COMMUNITY
Start: 2017-12-21 | End: 2019-04-02

## 2017-12-21 NOTE — PROGRESS NOTES
Subjective:       Patient ID: Nura Kahn Jr. is a 62 y.o. male.    Chief Complaint: Cough  Nura Kahn Jr. 62 y.o. male is here for office visit to review care and physical exam, reports doing baseline with weakness progressing.  Seeing Rheum.  Has chronic cough, no pulm appt though discussed in past.  Has hepatic congestion noted with imaging in past as well.        HPI  Review of Systems   Constitutional: Negative for activity change, appetite change, fatigue, fever and unexpected weight change.   HENT: Negative for congestion, hearing loss, postnasal drip and rhinorrhea.    Eyes: Negative for pain, discharge and visual disturbance.   Respiratory: Positive for cough and shortness of breath. Negative for choking.    Cardiovascular: Negative for chest pain, palpitations and leg swelling.   Gastrointestinal: Negative for abdominal pain, diarrhea and vomiting.   Genitourinary: Negative for dysuria, flank pain, hematuria and urgency.   Musculoskeletal: Negative for arthralgias, back pain, joint swelling and neck pain.   Skin: Negative for color change and rash.   Neurological: Negative for dizziness, tremors, syncope, weakness, numbness and headaches.   Psychiatric/Behavioral: Negative for agitation and confusion. The patient is not hyperactive.        Objective:      Physical Exam   Constitutional: He is oriented to person, place, and time. He appears well-developed and well-nourished.   HENT:   Head: Normocephalic.   Eyes: EOM are normal. Pupils are equal, round, and reactive to light.   Neck: Normal range of motion. Neck supple. No thyromegaly present.   Cardiovascular: Normal rate.  Exam reveals no gallop and no friction rub.    No murmur heard.  Pulmonary/Chest: Effort normal. No respiratory distress. He has no wheezes.   Abdominal: Soft. Bowel sounds are normal. He exhibits no mass. There is no tenderness.   Musculoskeletal: He exhibits no edema or tenderness.   Lymphadenopathy:     He has no cervical  adenopathy.   Neurological: He is alert and oriented to person, place, and time. He has normal reflexes. No cranial nerve deficit.   Skin: Skin is warm. No rash noted.   Psychiatric: He has a normal mood and affect. His behavior is normal.       Assessment:       No diagnosis found.    Plan:       Nura was seen today for cough.    Diagnoses and all orders for this visit:    Thrombocytopenia  - seeing hematoogy  Splenomegaly  - chart reviewed, radiology reviewed  Portal venous hypertension  - See Hepatology  Abnormal CT scan, lung  -     Ambulatory Referral to Pulmonology    Abnormal CT of liver  -     Ambulatory Referral to Hepatology    Cough  -     albuterol 90 mcg/actuation inhaler; Inhale 2 puffs into the lungs every 6 (six) hours as needed for Wheezing.    Other orders  -     calcium-vitamin D3 (CALCIUM 500 + D) 500 mg(1,250mg) -200 unit per tablet; Take 1 tablet by mouth 2 (two) times daily with meals.

## 2017-12-21 NOTE — PROGRESS NOTES
HISTORY OF PRESENT ILLNESS:  Mr. Kahn is a 62-year-old man whom I saw in 2015   at the request of Dr. Nura Huston.  I evaluated him at that time for   abnormalities in his peripheral blood smear, chronic microcytic anemia and mild   chronic thrombocytosis.    The extensive hematologic evaluation eventually led to a diagnosis of HEMOGLOBIN C/   BETA-ZERO THALASSEMIA.  This was confirmed at the [facility].  He   has 90% hemoglobin C, 4.6% hemoglobin A2 and 5.4% hemoglobin F.  His peripheral   blood smear had microcytosis, many target cells and frequent nucleated red   cells.  Haptoglobin was normal and the reticulocyte count was 3%.  The typical   findings in hemoglobin C/beta-zero thalassemia are moderate microcytic anemia   and splenomegaly.    Because of the many nucleated red blood cells, I also performed a bone marrow   examination.  This revealed increased cellularity of 80% with erythroid   hyperplasia.  There was some dyserythropoiesis and there were a few hypolobated   megakaryocytes.  A reticulin stain showed some focal slightly increased   reticulin fibrosis.  The bone marrow cytogenetic study was normal, although 4 of   the 20 metaphases had loss of the Y chromosome, which is likely age related.    Mr. Kahn has polymyositis, which was diagnosed about 2009.  He was initially   treated with methotrexate and prednisone, but he does not recall any improvement   while taking those drugs and they were eventually discontinued because of   concern about liver disease.  He had imaging studies suggestive of cirrhosis   with portal hypertension.  A CT scan showed hepatosplenomegaly with prominent   collateral vessels around the spleen.  He was later seen in the Department of   Hepatology where it was concluded that he had no serious liver disease.  He also   had a very extensive evaluation for occult malignancies and none was found.    After he stopped methotrexate, he was placed on Imuran and is  currently taking   150 mg daily along with 10 mg of prednisone daily.  He is no longer losing   weight.  He is not having pain.  His strength has deteriorated since I saw him   in 2015.  He now uses a motorized chair to move about compared to the past when   he was able to walk slowly around his home.  He is noting some problems with   diminished strength in the muscles of his trunk.    He is not having fevers or sweats.  He occasionally has some mild nonproductive   cough.  He has some difficulty swallowing solid foods.    He is not drinking alcohol.  He has not smoked cigarettes since 2014.  He now   rarely smokes marijuana.  He previously did construction work.  He is    and is here today with his wife.    No family members have known hematologic diseases and no family members have had   malignancies.    ADDITIONAL PAST HISTORY, SYSTEM REVIEW, SOCIAL HISTORY AND FAMILY HISTORY:  Have   been reviewed with the patient and his wife and updated in the electronic   record.    PHYSICAL EXAMINATION:  GENERAL APPEARANCE:  A very weak man who can very slowly move from his motorized   chair to a table, which raises him with a hydraulic lift.  He has profound   weakness of his legs.  EYES:  No jaundice or pallor.  EARS:  Clear canals and membranes.  NOSE:  Clear nares.  SINUSES:  No tenderness.  MOUTH:  Very poor dentition with numerous missing and decayed teeth.  No mucosal   lesions.  NECK:  No masses or bruits.  No thyroid abnormalities.  Prominent submandibular   glands.  LYMPH NODES:  No enlarged cervical, axillary or inguinal nodes.  CHEST:  Clear to auscultation and percussion.  Normal respiratory effort.  HEART:  Regular rate and rhythm.  No murmur or gallop.  (When I saw him in 2015,   he had atrial fibrillation with a rapid response; he subsequently had a   cardioversion and atrial fibrillation has not recurred.)  ABDOMEN:  Soft without masses.  The spleen is palpable about 3-4 cm below the   left costal  margin.  EXTREMITIES:  1+ pretibial and ankle edema bilaterally.  Marked muscle atrophy   in the legs.  PERIPHERAL VASCULATURE:  Adequate pulses in the feet and ankles.  NEUROLOGIC:  He is oriented.  Memory is fair.  Marked weakness that is most   prominent in the lower extremities.    LABORATORY STUDIES:  The last CBC on 09/12/2017 included hemoglobin 11.2, MCV   62, platelets 141,000 and WBC 7180; 23 nucleated red cells per 100 white cells   were seen.  Sedimentation rate was 0.  Comprehensive metabolic profile was   normal with the exception of total bilirubin 1.5.  CPK was 73.    IMPRESSION:  1.  Hemoglobin C/beta-zero thalassemia.  2.  Polymyositis with progression over the last three years.  3.  History of atrial fibrillation.    RECOMMENDATIONS:  1.  I believe that the peripheral blood findings can be explained by his   hemoglobinopathy and probably some mild effect of Imuran.  Today, I will repeat   his CBC and obtain reticulocyte count, haptoglobin, MIGUEL-2 mutation and a next   generation sequencing for hematologic neoplasms.  2.  The patient would like to have his blood drawn for Dr. Huston today, so I   will also order aldolase, CK, C-reactive protein and sedimentation rate.  3.  Unless the genetic mutation evaluation today show some unexpected finding, I   do not think any additional hematologic evaluation is required.  I expect that   he will continue to have nucleated red cells, microcytosis, moderate anemia and   intermittent thrombocytopenia.    Mr. Kahn and Mrs. Kahn had many questions not only about his blood   disease, but also about a number of other medical issues and most of his 45-minute   visit today was devoted to counseling them about these matters.  Having seen today how weak he is, I better understand why he re-scheduled or  did not arrive for several earlier appointments.    ADDENDUM: Today, lab reports only 5 nRBCs and no increase in basophils.   No evidence of intravascular  seth      AWB/MARTINEZ  dd: 12/21/2017 13:55:46 (CST)  td: 12/22/2017 04:00:56 (CST)  Doc ID   #[id number]  Job ID #[id number]    CC: RAQUEL FUENTES M.D.

## 2017-12-21 NOTE — TELEPHONE ENCOUNTER
Deb, please schedule:     GI evaluation for dysphagia   Pulmonary evaluation for GGO, pulmonary nodule after repeat CT chest  F/u with fellow and myself, Krysta's former patient ALEXSANDER

## 2017-12-21 NOTE — LETTER
December 21, 2017      Nura Huston MD  [address]  [facility] [address]           [facility]ology On[facility]  [address]  [facility] [address]  Phone: [phone]          Patient: Nura Kahn Jr.   MR Number: [id number]   YOB: 1955   Date of Visit: 12/21/2017       Dear Dr. Nura Huston:    Thank you for referring Nura Kahn to me for evaluation. Attached you will find relevant portions of my assessment and plan of care.    If you have questions, please do not hesitate to call me. I look forward to following Nura Kahn along with you.    Sincerely,    Nick Palma Jr., MD    Enclosure  CC:  No Recipients    If you would like to receive this communication electronically, please contact [email or web address] or [phone] to request more information on [employer] access.    For providers and/or their staff who would like to refer a patient to [facility], please contact us through our one-stop-shop provider referral line, [facility] , at [phone].    If you feel you have received this communication in error or would no longer like to receive these types of communications, please e-mail [email or web address]

## 2017-12-22 ENCOUNTER — DOCUMENTATION ONLY (OUTPATIENT)
Dept: TRANSPLANT | Facility: CLINIC | Age: 62
End: 2017-12-22

## 2017-12-22 NOTE — NURSING
Pt records reviewed.   Pt will be referred to Hepatology.    Initial referral received  from the [facility]que.   Referring Provider/diagnosis  Rickie Roe MD    Portal venous hypertension  - See Hepatology      Referral letter sent to provider and patient.

## 2017-12-22 NOTE — LETTER
December 22, 2017    Nura Kahn  [address] [facility] [address]      Dear Nura Kahn:    Your doctor has referred you to the [facility]. You will be contacted by our office and an initial appointment will then be scheduled for you.    We look forward to seeing you soon. If you have any further questions, please contact us at [phone].       Sincerely,        [facility]   [facility] [place], [place] [id number] [phone]

## 2017-12-24 LAB — ALDOLASE SERPL-CCNC: 9.1 U/L

## 2017-12-26 ENCOUNTER — OUTPATIENT CASE MANAGEMENT (OUTPATIENT)
Dept: ADMINISTRATIVE | Facility: OTHER | Age: 62
End: 2017-12-26

## 2017-12-26 LAB
JAK2 P.V617F BLD/T QL: NORMAL
JAK2 V617F MUTATION DETECTION BLD RESULT: NORMAL

## 2017-12-26 NOTE — PROGRESS NOTES
Thank you for the referral.  Patient has been assigned to Eusebia Appiah LMSW  for low risk screening for Outpatient Case Management.     Reason for referral: Low risk     Polymyositis    Please contact OPCM at ext. [phone] with any questions.    Thank you,    Tawana Tijerina, SSC

## 2017-12-27 ENCOUNTER — OUTPATIENT CASE MANAGEMENT (OUTPATIENT)
Dept: ADMINISTRATIVE | Facility: OTHER | Age: 62
End: 2017-12-27

## 2017-12-27 ENCOUNTER — HOSPITAL ENCOUNTER (OUTPATIENT)
Dept: RADIOLOGY | Facility: HOSPITAL | Age: 62
Discharge: HOME | End: 2017-12-27
Attending: INTERNAL MEDICINE
Payer: MEDICARE

## 2017-12-27 DIAGNOSIS — M33.20 POLYMYOSITIS: ICD-10-CM

## 2017-12-27 DIAGNOSIS — R91.8 GROUND GLASS OPACITY PRESENT ON IMAGING OF LUNG: ICD-10-CM

## 2017-12-27 PROCEDURE — 71250 CT THORAX DX C-: CPT | Mod: TC

## 2017-12-27 PROCEDURE — 71250 CT THORAX DX C-: CPT | Mod: 26,GC,, | Performed by: RADIOLOGY

## 2017-12-27 NOTE — PROGRESS NOTES
This SW received a referral on the above patient.   Reason for referral: Low risk, Polymyositis  Name of the community resource that was provided: List of Affordable housing, [employer]-Dental Assistance, [address] [employer], Advance directive/living will forms, Senior Resource Guide  Resource given to: Patient via [place] mail/email.    This LMSW completed assessment with Pt. Pt reports living with his fiance and reports needing assistance with ADLs. Pt reports that his fiance assist him with dressing and personal care. Pt reports that he has required a lot of assistance recently due to his medical condition. Pt reports that he has also had financial difficulties and needs resources for affordable housing. Pt reports that he receives approximately 1200 each month and his rent is close to the amount he receives each month. Pt reports that he has applied for Medicaid several times and has been declined benefit. Patient did deny having outstanding bills with [facility]. Pt reports that he is in need of a hospital bed due to his worsening physical condition. Pt states that he did inform his PCP during his last office visit. LMSW advised patient that outpatient case management would not be able to write order for a hospital bed and his PCP would be need to put order for hospital bed, if he was in agreement that patient requiring this DME. SW advised patient that [facility] would send message to his PCP notifying him of Pt's request. Pt also reported needing dental coverage and not having ability to pay for dental services on his own. [facility] offered to provide patient with list of affordable housing, daughter of [employer] dental program, and a senior resource guide. Pt was agreeable to receiving these resources and requested affordable housing list be emailed [email or web address]) to him. Referral source notified.

## 2017-12-28 ENCOUNTER — TELEPHONE (OUTPATIENT)
Dept: RHEUMATOLOGY | Facility: CLINIC | Age: 62
End: 2017-12-28

## 2018-01-03 ENCOUNTER — TELEPHONE (OUTPATIENT)
Dept: HEPATOLOGY | Facility: CLINIC | Age: 63
End: 2018-01-03

## 2018-01-03 NOTE — TELEPHONE ENCOUNTER
MA called patient to schedule liver specialist patient accepted 2/5/18 mailed appt reminder to patient.ARUN

## 2018-01-03 NOTE — TELEPHONE ENCOUNTER
----- Message from Shy Zepeda sent at 12/22/2017 11:47 AM CST -----  Dr. Lucero's patient.  ----- Message -----  From: Eliza Price  Sent: 12/22/2017  10:44 AM  To: Hepatology Scheduling    Pt records reviewed.   Pt will be referred to Hepatology.    Initial referral received  from the workque.   Referring Provider/diagnosis  Rickie Roe MD    Portal venous hypertension  - See Hepatology      Referral letter sent to provider and patient.

## 2018-01-04 ENCOUNTER — TELEPHONE (OUTPATIENT)
Dept: HEMATOLOGY/ONCOLOGY | Facility: CLINIC | Age: 63
End: 2018-01-04

## 2018-01-04 ENCOUNTER — TELEPHONE (OUTPATIENT)
Dept: RHEUMATOLOGY | Facility: CLINIC | Age: 63
End: 2018-01-04

## 2018-01-04 VITALS
HEART RATE: 70 BPM | SYSTOLIC BLOOD PRESSURE: 112 MMHG | HEIGHT: 70 IN | DIASTOLIC BLOOD PRESSURE: 66 MMHG | BODY MASS INDEX: 22.13 KG/M2 | WEIGHT: 154.56 LBS

## 2018-01-04 NOTE — TELEPHONE ENCOUNTER
"Left message.  THIS IS REALLY COMPLICATED.    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    This makes his hematologic status much more complicated.   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 The distinction between these is whether one thinks a low blood count is or is not explained by any other diagnosis. Until today, I thought his anemia was explained by his hemoglobinopathy and his mild thrombocytopenia is intermittent.    His cbc should be monitored about every 4 months.  He does have an increased risk of developing an overt hematologic neoplasm (estimated by some as 1% per year).    I suspect that drugs such as Imuran may increase this risk, but the degree is unknown.  Dr. Huston will need to weight the risk benefit issue in regard to this, along with whether there is any other useful therapy for his polymyositis that does not alter DNA or RNA metabolism.  "

## 2018-01-05 DIAGNOSIS — M33.20 POLYMYOSITIS: ICD-10-CM

## 2018-01-05 RX ORDER — PREDNISONE 5 MG/1
10 TABLET ORAL DAILY
Qty: 60 TABLET | Refills: 2 | Status: SHIPPED | OUTPATIENT
Start: 2018-01-05 | End: 2018-06-20 | Stop reason: SDUPTHER

## 2018-01-05 RX ORDER — AZATHIOPRINE 50 MG/1
150 TABLET ORAL DAILY
Qty: 90 TABLET | Refills: 2 | Status: SHIPPED | OUTPATIENT
Start: 2018-01-05 | End: 2018-06-20 | Stop reason: SDUPTHER

## 2018-01-05 RX ORDER — PREDNISONE 5 MG/1
10 TABLET ORAL DAILY
Qty: 60 TABLET | OUTPATIENT
Start: 2018-01-05

## 2018-01-05 RX ORDER — PREDNISONE 5 MG/1
10 TABLET ORAL DAILY
Qty: 60 TABLET | Refills: 2 | Status: SHIPPED | OUTPATIENT
Start: 2018-01-05 | End: 2018-12-12 | Stop reason: SDUPTHER

## 2018-01-09 LAB
ANNOTATION COMMENT IMP: NORMAL
DX: NORMAL
NGS CLINCIAL TRIALS: NORMAL
NGS INTERPRETATION: NORMAL
NGS ONCOHEME PANEL GENE LIST: NORMAL
NGS PATHOGENIC MUTATIONS DETECTED: NORMAL
NGS REVIEWED BY:: NORMAL
NGS VARIANTS OF UNKNOWN SIGNIFICANCE: NORMAL
REF LAB TEST METHOD: NORMAL
SPECIMEN SOURCE: NORMAL
TEST PERFORMANCE INFO SPEC: NORMAL

## 2018-01-15 ENCOUNTER — HOSPITAL ENCOUNTER (EMERGENCY)
Facility: HOSPITAL | Age: 63
Discharge: HOME | End: 2018-01-15
Attending: EMERGENCY MEDICINE
Payer: MEDICARE

## 2018-01-15 VITALS
SYSTOLIC BLOOD PRESSURE: 132 MMHG | HEIGHT: 72 IN | TEMPERATURE: 101 F | RESPIRATION RATE: 20 BRPM | OXYGEN SATURATION: 94 % | WEIGHT: 170 LBS | HEART RATE: 85 BPM | BODY MASS INDEX: 23.03 KG/M2 | DIASTOLIC BLOOD PRESSURE: 67 MMHG

## 2018-01-15 DIAGNOSIS — R05.9 COUGH: ICD-10-CM

## 2018-01-15 DIAGNOSIS — J69.0 ASPIRATION PNEUMONIA OF RIGHT LOWER LOBE, UNSPECIFIED ASPIRATION PNEUMONIA TYPE: Primary | ICD-10-CM

## 2018-01-15 LAB
ALBUMIN SERPL BCP-MCNC: 3.8 G/DL
ALP SERPL-CCNC: 59 U/L
ALT SERPL W/O P-5'-P-CCNC: 13 U/L
ANION GAP SERPL CALC-SCNC: 7 MMOL/L
AST SERPL-CCNC: 21 U/L
BASOPHILS # BLD AUTO: 0.02 K/UL
BASOPHILS NFR BLD: 0.2 %
BILIRUB SERPL-MCNC: 1.8 MG/DL
BILIRUB UR QL STRIP: NEGATIVE
BUN SERPL-MCNC: 7 MG/DL
CALCIUM SERPL-MCNC: 8.7 MG/DL
CHLORIDE SERPL-SCNC: 105 MMOL/L
CLARITY UR REFRACT.AUTO: CLEAR
CO2 SERPL-SCNC: 23 MMOL/L
COLOR UR AUTO: YELLOW
CREAT SERPL-MCNC: 0.5 MG/DL
DIFFERENTIAL METHOD: ABNORMAL
EOSINOPHIL # BLD AUTO: 0 K/UL
EOSINOPHIL NFR BLD: 0.4 %
ERYTHROCYTE [DISTWIDTH] IN BLOOD BY AUTOMATED COUNT: 19.9 %
EST. GFR  (AFRICAN AMERICAN): >60 ML/MIN/1.73 M^2
EST. GFR  (NON AFRICAN AMERICAN): >60 ML/MIN/1.73 M^2
FLUAV AG SPEC QL IA: NEGATIVE
FLUBV AG SPEC QL IA: NEGATIVE
GLUCOSE SERPL-MCNC: 76 MG/DL
GLUCOSE UR QL STRIP: NEGATIVE
HCT VFR BLD AUTO: 28.6 %
HGB BLD-MCNC: 9.2 G/DL
HGB UR QL STRIP: NEGATIVE
IMM GRANULOCYTES # BLD AUTO: 0.06 K/UL
IMM GRANULOCYTES NFR BLD AUTO: 0.6 %
KETONES UR QL STRIP: NEGATIVE
LACTATE SERPL-SCNC: 1.1 MMOL/L
LEUKOCYTE ESTERASE UR QL STRIP: NEGATIVE
LYMPHOCYTES # BLD AUTO: 0.8 K/UL
LYMPHOCYTES NFR BLD: 7.6 %
MCH RBC QN AUTO: 20.1 PG
MCHC RBC AUTO-ENTMCNC: 32.2 G/DL
MCV RBC AUTO: 62 FL
MONOCYTES # BLD AUTO: 0.7 K/UL
MONOCYTES NFR BLD: 6.3 %
NEUTROPHILS # BLD AUTO: 9.1 K/UL
NEUTROPHILS NFR BLD: 84.9 %
NITRITE UR QL STRIP: NEGATIVE
NRBC BLD-RTO: 2 /100 WBC
PH UR STRIP: 8 [PH] (ref 5–8)
PLATELET # BLD AUTO: 122 K/UL
PMV BLD AUTO: ABNORMAL FL
POTASSIUM SERPL-SCNC: 4 MMOL/L
PROT SERPL-MCNC: 7.3 G/DL
PROT UR QL STRIP: NEGATIVE
RBC # BLD AUTO: 4.58 M/UL
SODIUM SERPL-SCNC: 135 MMOL/L
SP GR UR STRIP: 1.01 (ref 1–1.03)
SPECIMEN SOURCE: NORMAL
URN SPEC COLLECT METH UR: ABNORMAL
UROBILINOGEN UR STRIP-ACNC: ABNORMAL EU/DL
WBC # BLD AUTO: 10.72 K/UL

## 2018-01-15 PROCEDURE — 81003 URINALYSIS AUTO W/O SCOPE: CPT

## 2018-01-15 PROCEDURE — 87400 INFLUENZA A/B EACH AG IA: CPT | Mod: 59

## 2018-01-15 PROCEDURE — 25000003 PHARM REV CODE 250

## 2018-01-15 PROCEDURE — 87040 BLOOD CULTURE FOR BACTERIA: CPT | Mod: 59

## 2018-01-15 PROCEDURE — 96365 THER/PROPH/DIAG IV INF INIT: CPT

## 2018-01-15 PROCEDURE — 96361 HYDRATE IV INFUSION ADD-ON: CPT

## 2018-01-15 PROCEDURE — 85025 COMPLETE CBC W/AUTO DIFF WBC: CPT

## 2018-01-15 PROCEDURE — 80053 COMPREHEN METABOLIC PANEL: CPT

## 2018-01-15 PROCEDURE — 63600175 PHARM REV CODE 636 W HCPCS

## 2018-01-15 PROCEDURE — 99284 EMERGENCY DEPT VISIT MOD MDM: CPT | Mod: 25

## 2018-01-15 PROCEDURE — 83605 ASSAY OF LACTIC ACID: CPT

## 2018-01-15 RX ORDER — AMPICILLIN AND SULBACTAM 2; 1 G/1; G/1
3 INJECTION, POWDER, FOR SOLUTION INTRAMUSCULAR; INTRAVENOUS
Status: DISCONTINUED | OUTPATIENT
Start: 2018-01-15 | End: 2018-01-15

## 2018-01-15 RX ORDER — AMOXICILLIN AND CLAVULANATE POTASSIUM 875; 125 MG/1; MG/1
1 TABLET, FILM COATED ORAL 2 TIMES DAILY
Qty: 14 TABLET | Refills: 0 | Status: SHIPPED | OUTPATIENT
Start: 2018-01-15 | End: 2018-12-12 | Stop reason: ALTCHOICE

## 2018-01-15 RX ORDER — ACETAMINOPHEN 325 MG/1
650 TABLET ORAL ONCE
Status: COMPLETED | OUTPATIENT
Start: 2018-01-15 | End: 2018-01-15

## 2018-01-15 RX ADMIN — ACETAMINOPHEN 650 MG: 325 TABLET ORAL at 05:01

## 2018-01-15 RX ADMIN — SODIUM CHLORIDE 1000 ML: 0.9 INJECTION, SOLUTION INTRAVENOUS at 03:01

## 2018-01-15 RX ADMIN — SODIUM CHLORIDE 3 G: 9 INJECTION, SOLUTION INTRAVENOUS at 04:01

## 2018-01-15 NOTE — ED PROVIDER NOTES
"Encounter Date: 1/15/2018    SCRIBE #1 NOTE: I, Alli aY, am scribing for, and in the presence of,  Dr. Traore. I have scribed the following portions of the note - the Resident attestation.       History     Chief Complaint   Patient presents with    Influenza     flu symptoms started last night     Mr. Kahn is a 63 yo M with PMHx significant for polymyositis who presents to the ED with the CC of "flu symptoms" since last night. Patient reports that he had sudden onset of fever (100.7 F per EMS), chills, myalgias, abdominal cramping and non-productive cough. Patient has weakness 2/2 to his PM at baseline, but has felt an acute worsening of his fatigue and weakness since the onset of symptoms. Denies diarrhea, n/v, CP. Chronic dysphagia with frequent coughing/choking with eating 2/2 PM. Does not recall if he had seasonal flu shot. Denies sick contacts. Of note, patient takes Prednisone 10 mg qD for his PM.          Review of patient's allergies indicates:  No Known Allergies  Past Medical History:   Diagnosis Date    Atrial fibrillation 3/2/2015    Depression     Microcytic anemia 9/25/2014    Neuromuscular disorder     Polymyositis 2009    Tobacco abuse      History reviewed. No pertinent surgical history.  Family History   Problem Relation Age of Onset    Diabetes Mother     Hypertension Mother     Diabetes Father     Hypertension Father     Heart attack Neg Hx     Heart disease Neg Hx     Melanoma Neg Hx      Social History   Substance Use Topics    Smoking status: Light Tobacco Smoker     Packs/day: 0.25     Years: 20.00     Types: Cigarettes    Smokeless tobacco: Never Used    Alcohol use No     Review of Systems   Constitutional: Positive for activity change, appetite change, chills, fatigue and fever.   HENT: Positive for trouble swallowing. Negative for congestion and sore throat.    Respiratory: Positive for cough. Negative for shortness of breath.    Cardiovascular: Negative for " chest pain.   Gastrointestinal: Positive for abdominal pain and constipation. Negative for abdominal distention, diarrhea, nausea and vomiting.   Genitourinary: Negative for dysuria.   Musculoskeletal: Positive for myalgias.   Skin: Negative for rash.   Neurological: Positive for weakness and headaches.   Psychiatric/Behavioral: Positive for dysphoric mood.       Physical Exam     Initial Vitals [01/15/18 1335]   BP Pulse Resp Temp SpO2   (!) 143/81 86 16 (!) 100.7 °F (38.2 °C) 98 %      MAP       101.67         Physical Exam    Nursing note and vitals reviewed.  Constitutional: No distress.   Chronically ill-appearing, thin man.   HENT:   Head: Normocephalic and atraumatic.   Poor dentition.   Eyes: EOM are normal. Pupils are equal, round, and reactive to light. No scleral icterus.   Neck: Normal range of motion. Neck supple.   Cardiovascular: Normal rate, regular rhythm, normal heart sounds and intact distal pulses.   Pulmonary/Chest: Breath sounds normal. No respiratory distress.   Abdominal: Soft. He exhibits no distension. There is no tenderness.   Musculoskeletal: Normal range of motion. He exhibits no edema.   Neurological: He is alert and oriented to person, place, and time. No cranial nerve deficit.   Skin: Skin is warm and dry. Capillary refill takes less than 2 seconds.   Psychiatric: He has a normal mood and affect.         ED Course   Procedures  Labs Reviewed   CULTURE, BLOOD   CULTURE, BLOOD   CBC W/ AUTO DIFFERENTIAL   COMPREHENSIVE METABOLIC PANEL   LACTIC ACID, PLASMA   URINALYSIS   INFLUENZA A AND B ANTIGEN   POCT INFLUENZA A/B             Medical Decision Making:   ED Management:  Chronically ill, immunosuppressed man. Given 1 L bolus. Blood cx x2 drawn. CBC remarkable for mild anemia that appears chronic in nature. U/a not consistent with infection. Lactate WNL. Chem panel at patient's baseline. CXR with patchy consolidation over RLL, concerning for early infection. Dose of Unasyn given for  possible aspiration PNA, patient discharged with course of Augmentin and instructions to follow up with PCP within 1 week.            Scribe Attestation:   Scribe #1: I performed the above scribed service and the documentation accurately describes the services I performed. I attest to the accuracy of the note.    Attending Attestation:   Physician Attestation Statement for Resident:  As the supervising MD   Physician Attestation Statement: I have personally seen and examined this patient.   I agree with the above history. -: Pt presents with family complaining of body aches, fever, and abdominal cramping, onset this morning. Does not recall if he had a seasonal flu shot.     As the supervising MD I agree with the above PE.   -: Weak appearing. Alert. Mucous membranes are moist. Heart- regular rhythm. Lungs are clear. Abdomen is soft, no peritonitis. No bad rashes    As the supervising MD I agree with the above treatment, course, plan, and disposition.                    ED Course      Clinical Impression:   The encounter diagnosis was Cough.                           Bandar Vernon MD  Resident  01/16/18 7411

## 2018-01-15 NOTE — ED TRIAGE NOTES
"Pt arrives to ED via EMS with CC of flu symptoms. Pt states that the symptoms started about a week ago. Pt complains of weakness and nausea. Pt stated that he can "taste the fever in his mouth". Pt denies vomiting and diarrhea. Pt is alert and oriented and in no acute distress at this time.   "

## 2018-01-15 NOTE — ED NOTES
Pt. Resting in bed in NAD. RR e/u. Continuous BP, and O2 monitoring in progress. VS being monitoring continuously per MD orders. Pt. Offered bathroom assistance and denies need at this time. Explanation of care/wait provided. Bed in low, locked position with rails up and call bell in reach. Will continue to monitor.

## 2018-01-15 NOTE — ED NOTES
APPEARANCE: awake and alert in NAD.  SKIN: warm, dry and intact. No breakdown or bruising.  MUSCULOSKELETAL: Patient moving all extremities spontaneously, no obvious swelling or deformities noted. Ambulates with walker  RESPIRATORY: no shortness of breath. All breath sounds CTA bilaterally.  CARDIAC: heart tones normal. Regular rate and rhythm; 2+ distal pulses; no peripheral edema  ABDOMEN: S/ND/NT, normoactive bowel sounds present in all four quadrants. Normal stool pattern.  : voids spontaneously without difficulty.  Neurologic: AAO x 4; follows commands equal strength in all extremities;No numbness and tingling.

## 2018-01-20 LAB
BACTERIA BLD CULT: NORMAL
BACTERIA BLD CULT: NORMAL

## 2018-02-05 ENCOUNTER — TELEPHONE (OUTPATIENT)
Dept: HEPATOLOGY | Facility: CLINIC | Age: 63
End: 2018-02-05

## 2018-02-05 NOTE — TELEPHONE ENCOUNTER
MA called patient back cancel his appt today. He reschedule it on 2/14. Mailed appt reminder to patient.Travon

## 2018-02-05 NOTE — TELEPHONE ENCOUNTER
----- Message from Kaley Fairbanks sent at 2/5/2018  9:39 AM CST -----  Contact: Pt  Pt would like to reschedule his appt he missed today    Pt contact number [phone]  Thanks

## 2018-06-20 ENCOUNTER — TELEPHONE (OUTPATIENT)
Dept: RHEUMATOLOGY | Facility: CLINIC | Age: 63
End: 2018-06-20

## 2018-06-20 DIAGNOSIS — M33.20 POLYMYOSITIS: ICD-10-CM

## 2018-06-20 RX ORDER — PREDNISONE 5 MG/1
10 TABLET ORAL DAILY
Qty: 60 TABLET | Refills: 1 | Status: SHIPPED | OUTPATIENT
Start: 2018-06-20 | End: 2018-09-24 | Stop reason: SDUPTHER

## 2018-06-20 RX ORDER — AZATHIOPRINE 50 MG/1
150 TABLET ORAL DAILY
Qty: 90 TABLET | Refills: 0 | Status: SHIPPED | OUTPATIENT
Start: 2018-06-20 | End: 2018-08-11 | Stop reason: SDUPTHER

## 2018-06-20 NOTE — TELEPHONE ENCOUNTER
Please schedule way overdue standing labs this week so I can refill meds. Also needs f/u appt with second year fellow and myself in July,  former pt of [facility]. Thank youl. ALEXSANDER

## 2018-06-22 ENCOUNTER — TELEPHONE (OUTPATIENT)
Dept: RHEUMATOLOGY | Facility: CLINIC | Age: 63
End: 2018-06-22

## 2018-08-11 ENCOUNTER — TELEPHONE (OUTPATIENT)
Dept: RHEUMATOLOGY | Facility: CLINIC | Age: 63
End: 2018-08-11

## 2018-08-11 DIAGNOSIS — M33.20 POLYMYOSITIS: ICD-10-CM

## 2018-08-11 RX ORDER — AZATHIOPRINE 50 MG/1
150 TABLET ORAL DAILY
Qty: 90 TABLET | Refills: 0 | Status: SHIPPED | OUTPATIENT
Start: 2018-08-11 | End: 2018-12-13 | Stop reason: SDUPTHER

## 2018-08-11 NOTE — TELEPHONE ENCOUNTER
Please schedule overdue standing labs this week so I can refill azathioprine. Please schedule overdue f/u with me/fellow. Thanks. ALEXSANDER

## 2018-09-24 ENCOUNTER — TELEPHONE (OUTPATIENT)
Dept: RHEUMATOLOGY | Facility: CLINIC | Age: 63
End: 2018-09-24

## 2018-09-24 DIAGNOSIS — M33.20 POLYMYOSITIS: ICD-10-CM

## 2018-09-24 RX ORDER — AZATHIOPRINE 50 MG/1
150 TABLET ORAL DAILY
Qty: 90 TABLET | Refills: 0 | OUTPATIENT
Start: 2018-09-24

## 2018-09-24 RX ORDER — PREDNISONE 5 MG/1
10 TABLET ORAL DAILY
Qty: 60 TABLET | Refills: 1 | Status: SHIPPED | OUTPATIENT
Start: 2018-09-24 | End: 2019-02-19 | Stop reason: SDUPTHER

## 2018-09-24 NOTE — TELEPHONE ENCOUNTER
Deb, please schedule f/u appt in next several weeks with fellow and myself, and needs overdue standing labs this week. Thanks ALEXSANDER

## 2018-10-05 ENCOUNTER — TELEPHONE (OUTPATIENT)
Dept: RHEUMATOLOGY | Facility: CLINIC | Age: 63
End: 2018-10-05

## 2018-10-05 NOTE — TELEPHONE ENCOUNTER
Mr love does not answer his phone  I have left him 2 messages that he can not get his  Medication refill until has blood work. He no showed his lab appt and visit in august

## 2018-11-30 ENCOUNTER — LAB VISIT (OUTPATIENT)
Dept: LAB | Facility: HOSPITAL | Age: 63
End: 2018-11-30
Attending: INTERNAL MEDICINE
Payer: MEDICARE

## 2018-11-30 DIAGNOSIS — M33.20 POLYMYOSITIS: ICD-10-CM

## 2018-11-30 DIAGNOSIS — Z79.624 ENCOUNTER FOR LONG TERM CURRENT USE OF AZATHIOPRINE: ICD-10-CM

## 2018-11-30 LAB
ALBUMIN SERPL BCP-MCNC: 4.3 G/DL
ALP SERPL-CCNC: 59 U/L
ALT SERPL W/O P-5'-P-CCNC: 8 U/L
ANION GAP SERPL CALC-SCNC: 6 MMOL/L
AST SERPL-CCNC: 13 U/L
BASOPHILS # BLD AUTO: 0.03 K/UL
BASOPHILS NFR BLD: 0.6 %
BILIRUB SERPL-MCNC: 1.5 MG/DL
BUN SERPL-MCNC: 12 MG/DL
CALCIUM SERPL-MCNC: 9.1 MG/DL
CHLORIDE SERPL-SCNC: 104 MMOL/L
CK SERPL-CCNC: 174 U/L
CO2 SERPL-SCNC: 27 MMOL/L
CREAT SERPL-MCNC: 0.5 MG/DL
CRP SERPL-MCNC: 0.5 MG/L
DIFFERENTIAL METHOD: ABNORMAL
EOSINOPHIL # BLD AUTO: 0.1 K/UL
EOSINOPHIL NFR BLD: 1.6 %
ERYTHROCYTE [DISTWIDTH] IN BLOOD BY AUTOMATED COUNT: 20.7 %
ERYTHROCYTE [SEDIMENTATION RATE] IN BLOOD BY WESTERGREN METHOD: <2 MM/HR
EST. GFR  (AFRICAN AMERICAN): >60 ML/MIN/1.73 M^2
EST. GFR  (NON AFRICAN AMERICAN): >60 ML/MIN/1.73 M^2
GLUCOSE SERPL-MCNC: 79 MG/DL
HCT VFR BLD AUTO: 35.7 %
HGB BLD-MCNC: 11.7 G/DL
IMM GRANULOCYTES # BLD AUTO: 0.02 K/UL
IMM GRANULOCYTES NFR BLD AUTO: 0.4 %
LYMPHOCYTES # BLD AUTO: 2.1 K/UL
LYMPHOCYTES NFR BLD: 42.1 %
MCH RBC QN AUTO: 19.5 PG
MCHC RBC AUTO-ENTMCNC: 32.8 G/DL
MCV RBC AUTO: 60 FL
MONOCYTES # BLD AUTO: 0.3 K/UL
MONOCYTES NFR BLD: 5.8 %
NEUTROPHILS # BLD AUTO: 2.5 K/UL
NEUTROPHILS NFR BLD: 49.5 %
NRBC BLD-RTO: 2 /100 WBC
PLATELET # BLD AUTO: 116 K/UL
PMV BLD AUTO: ABNORMAL FL
POTASSIUM SERPL-SCNC: 4.1 MMOL/L
PROT SERPL-MCNC: 7.9 G/DL
RBC # BLD AUTO: 5.99 M/UL
SODIUM SERPL-SCNC: 137 MMOL/L
WBC # BLD AUTO: 5.01 K/UL

## 2018-11-30 PROCEDURE — 85025 COMPLETE CBC W/AUTO DIFF WBC: CPT

## 2018-11-30 PROCEDURE — 85652 RBC SED RATE AUTOMATED: CPT

## 2018-11-30 PROCEDURE — 82085 ASSAY OF ALDOLASE: CPT

## 2018-11-30 PROCEDURE — 86140 C-REACTIVE PROTEIN: CPT

## 2018-11-30 PROCEDURE — 80053 COMPREHEN METABOLIC PANEL: CPT

## 2018-11-30 PROCEDURE — 82550 ASSAY OF CK (CPK): CPT

## 2018-11-30 PROCEDURE — 36415 COLL VENOUS BLD VENIPUNCTURE: CPT

## 2018-12-01 ENCOUNTER — TELEPHONE (OUTPATIENT)
Dept: RHEUMATOLOGY | Facility: CLINIC | Age: 63
End: 2018-12-01

## 2018-12-01 LAB — ALDOLASE SERPL-CCNC: 2.6 U/L

## 2018-12-11 ENCOUNTER — TELEPHONE (OUTPATIENT)
Dept: RHEUMATOLOGY | Facility: CLINIC | Age: 63
End: 2018-12-11

## 2018-12-11 NOTE — TELEPHONE ENCOUNTER
LM for pt to call back about labs  ----- Message from Ofe Valente sent at 12/10/2018  1:13 PM CST -----  Contact: Self/ [phone]  Needs Advice    Reason for call: Patient would like a call back to ask questions about his lab work. Patient would also like to ask questions about his medications.         Communication Preference: Cell phone [phone].     Additional Information: Patient said he called a week ago but never got a call back.

## 2018-12-12 ENCOUNTER — OFFICE VISIT (OUTPATIENT)
Dept: INTERNAL MEDICINE | Facility: CLINIC | Age: 63
End: 2018-12-12
Payer: MEDICARE

## 2018-12-12 ENCOUNTER — IMMUNIZATION (OUTPATIENT)
Dept: INTERNAL MEDICINE | Facility: CLINIC | Age: 63
End: 2018-12-12
Payer: MEDICARE

## 2018-12-12 ENCOUNTER — CLINICAL SUPPORT (OUTPATIENT)
Dept: INTERNAL MEDICINE | Facility: CLINIC | Age: 63
End: 2018-12-12
Payer: MEDICARE

## 2018-12-12 ENCOUNTER — OFFICE VISIT (OUTPATIENT)
Dept: PODIATRY | Facility: CLINIC | Age: 63
End: 2018-12-12
Payer: MEDICARE

## 2018-12-12 ENCOUNTER — LAB VISIT (OUTPATIENT)
Dept: LAB | Facility: HOSPITAL | Age: 63
End: 2018-12-12
Payer: MEDICARE

## 2018-12-12 VITALS
DIASTOLIC BLOOD PRESSURE: 86 MMHG | WEIGHT: 170 LBS | BODY MASS INDEX: 23.03 KG/M2 | HEART RATE: 67 BPM | OXYGEN SATURATION: 97 % | TEMPERATURE: 98 F | SYSTOLIC BLOOD PRESSURE: 132 MMHG | HEIGHT: 72 IN

## 2018-12-12 VITALS
WEIGHT: 169 LBS | BODY MASS INDEX: 22.89 KG/M2 | SYSTOLIC BLOOD PRESSURE: 149 MMHG | HEART RATE: 64 BPM | DIASTOLIC BLOOD PRESSURE: 80 MMHG | HEIGHT: 72 IN

## 2018-12-12 DIAGNOSIS — Z12.5 SCREENING FOR PROSTATE CANCER: ICD-10-CM

## 2018-12-12 DIAGNOSIS — D69.6 THROMBOCYTOPENIA: ICD-10-CM

## 2018-12-12 DIAGNOSIS — M79.674 TOE PAIN, BILATERAL: ICD-10-CM

## 2018-12-12 DIAGNOSIS — Z79.899 HIGH RISK MEDICATION USE: ICD-10-CM

## 2018-12-12 DIAGNOSIS — Z23 NEED FOR 23-POLYVALENT PNEUMOCOCCAL POLYSACCHARIDE VACCINE: Primary | ICD-10-CM

## 2018-12-12 DIAGNOSIS — M79.674 PAIN IN TOES OF BOTH FEET: ICD-10-CM

## 2018-12-12 DIAGNOSIS — G82.20 PARAPARESIS: ICD-10-CM

## 2018-12-12 DIAGNOSIS — E55.9 VITAMIN D DEFICIENCY: ICD-10-CM

## 2018-12-12 DIAGNOSIS — K76.6 PORTAL VENOUS HYPERTENSION: ICD-10-CM

## 2018-12-12 DIAGNOSIS — M33.20 POLYMYOSITIS: ICD-10-CM

## 2018-12-12 DIAGNOSIS — B35.1 ONYCHOMYCOSIS DUE TO DERMATOPHYTE: Primary | ICD-10-CM

## 2018-12-12 DIAGNOSIS — D56.8 BETA 0 THALASSEMIA: ICD-10-CM

## 2018-12-12 DIAGNOSIS — D84.9 IMMUNOSUPPRESSION: ICD-10-CM

## 2018-12-12 DIAGNOSIS — Z12.11 SCREENING FOR COLON CANCER: ICD-10-CM

## 2018-12-12 DIAGNOSIS — L57.0 KERATOSIS: ICD-10-CM

## 2018-12-12 DIAGNOSIS — Z79.899 HIGH RISK MEDICATION USE: Primary | ICD-10-CM

## 2018-12-12 DIAGNOSIS — R63.4 WEIGHT LOSS: ICD-10-CM

## 2018-12-12 DIAGNOSIS — M79.675 PAIN IN TOES OF BOTH FEET: ICD-10-CM

## 2018-12-12 DIAGNOSIS — M79.675 TOE PAIN, BILATERAL: ICD-10-CM

## 2018-12-12 LAB
25(OH)D3+25(OH)D2 SERPL-MCNC: 9 NG/ML
BILIRUB UR QL STRIP: NEGATIVE
CHOLEST SERPL-MCNC: 129 MG/DL
CHOLEST/HDLC SERPL: 3.4 {RATIO}
CLARITY UR REFRACT.AUTO: CLEAR
COLOR UR AUTO: YELLOW
COMPLEXED PSA SERPL-MCNC: 0.8 NG/ML
GLUCOSE UR QL STRIP: NEGATIVE
HDLC SERPL-MCNC: 38 MG/DL
HDLC SERPL: 29.5 %
HGB UR QL STRIP: NEGATIVE
KETONES UR QL STRIP: NEGATIVE
LDLC SERPL CALC-MCNC: 74 MG/DL
LEUKOCYTE ESTERASE UR QL STRIP: NEGATIVE
NITRITE UR QL STRIP: NEGATIVE
NONHDLC SERPL-MCNC: 91 MG/DL
PH UR STRIP: 6 [PH] (ref 5–8)
PROT UR QL STRIP: NEGATIVE
SP GR UR STRIP: 1.01 (ref 1–1.03)
TRIGL SERPL-MCNC: 85 MG/DL
URN SPEC COLLECT METH UR: NORMAL

## 2018-12-12 PROCEDURE — 36415 COLL VENOUS BLD VENIPUNCTURE: CPT

## 2018-12-12 PROCEDURE — 99203 OFFICE O/P NEW LOW 30 MIN: CPT | Mod: S$PBB,25,, | Performed by: PODIATRIST

## 2018-12-12 PROCEDURE — 99999 PR PBB SHADOW E&M-EST. PATIENT-LVL V: CPT | Mod: PBBFAC,,, | Performed by: NURSE PRACTITIONER

## 2018-12-12 PROCEDURE — 90686 IIV4 VACC NO PRSV 0.5 ML IM: CPT | Mod: PBBFAC

## 2018-12-12 PROCEDURE — 11721 DEBRIDE NAIL 6 OR MORE: CPT | Mod: S$PBB,,, | Performed by: PODIATRIST

## 2018-12-12 PROCEDURE — 99213 OFFICE O/P EST LOW 20 MIN: CPT | Mod: PBBFAC,27,25 | Performed by: PODIATRIST

## 2018-12-12 PROCEDURE — 81003 URINALYSIS AUTO W/O SCOPE: CPT

## 2018-12-12 PROCEDURE — 80061 LIPID PANEL: CPT

## 2018-12-12 PROCEDURE — 90732 PPSV23 VACC 2 YRS+ SUBQ/IM: CPT | Mod: PBBFAC

## 2018-12-12 PROCEDURE — 99215 OFFICE O/P EST HI 40 MIN: CPT | Mod: PBBFAC | Performed by: NURSE PRACTITIONER

## 2018-12-12 PROCEDURE — 11721 DEBRIDE NAIL 6 OR MORE: CPT | Mod: PBBFAC | Performed by: PODIATRIST

## 2018-12-12 PROCEDURE — 84153 ASSAY OF PSA TOTAL: CPT

## 2018-12-12 PROCEDURE — 82306 VITAMIN D 25 HYDROXY: CPT

## 2018-12-12 PROCEDURE — 99999 PR PBB SHADOW E&M-EST. PATIENT-LVL III: CPT | Mod: PBBFAC,,, | Performed by: PODIATRIST

## 2018-12-12 PROCEDURE — 99214 OFFICE O/P EST MOD 30 MIN: CPT | Mod: S$PBB,,, | Performed by: NURSE PRACTITIONER

## 2018-12-12 RX ORDER — AMMONIUM LACTATE 12 G/100G
CREAM TOPICAL
Qty: 140 G | Refills: 11 | Status: SHIPPED | OUTPATIENT
Start: 2018-12-12 | End: 2021-04-16

## 2018-12-12 RX ORDER — CICLOPIROX 80 MG/ML
SOLUTION TOPICAL NIGHTLY
Qty: 6.6 ML | Refills: 11 | Status: ON HOLD | OUTPATIENT
Start: 2018-12-12 | End: 2019-04-02

## 2018-12-12 NOTE — PATIENT INSTRUCTIONS
Home Health will be contacting you to assist    Continue current medications    Labs and vaccines today        PLEASE CALL [phone] TO DISCUSS SCHEDULING YOUR COLONOSCOPY OR UPPER GI SCOPE (EGD).     They will also discuss the different cleanses to take the night before your colonoscopy, so that the physician can effectively view the lining of your colon.

## 2018-12-12 NOTE — LETTER
December 12, 2018      Nura Huston MD  [address] [facility] [address]           [facility] - Podiatry  [address]  [place] [address]  Phone: [phone]          Patient: Nura Kahn Jr.   MR Number: [id number]   YOB: 1955   Date of Visit: 12/12/2018       Dear Dr. Nura Huston:    Thank you for referring Nura Kahn to me for evaluation. Attached you will find relevant portions of my assessment and plan of care.    If you have questions, please do not hesitate to call me. I look forward to following Nura Kahn along with you.    Sincerely,    Zeb Rosa, DPGRACE    Enclosure  CC:  No Recipients    If you would like to receive this communication electronically, please contact [email or web address] or [phone] to request more information on [employer] access.    For providers and/or their staff who would like to refer a patient to [facility], please contact us through our one-stop-shop provider referral line, [facility] [place], at [phone].    If you feel you have received this communication in error or would no longer like to receive these types of communications, please e-mail [email or web address]

## 2018-12-12 NOTE — PROGRESS NOTES
Subjective:      Patient ID: Nura Kahn Jr. is a 62 y.o. male.    Chief Complaint: Nail Problem    Thick painful discolored misshapen toenails all ten toes exquisitely painful preventing sock and shoe wear.  Gradual onset, worsening over past several weeks, aggravated by , shoe gear, pressure.  No previous medical treatment.  OTC pain med not helping. Patient not ambulatory.     Review of Systems   Constitution: Negative for chills, diaphoresis, fever, malaise/fatigue and night sweats.   Cardiovascular: Negative for claudication, cyanosis, leg swelling and syncope.   Skin: Positive for nail changes. Negative for color change, dry skin, rash, suspicious lesions and unusual hair distribution.   Musculoskeletal: Positive for muscle weakness. Negative for falls, joint pain, joint swelling, muscle cramps and stiffness.   Gastrointestinal: Negative for constipation, diarrhea, nausea and vomiting.   Neurological: Positive for focal weakness. Negative for brief paralysis, disturbances in coordination, numbness, paresthesias, sensory change and tremors.           Objective:      Physical Exam   Constitutional: He is oriented to person, place, and time. He appears well-developed and well-nourished. He is cooperative.   Oriented to time, place, and person.   Cardiovascular:   Pulses:       Dorsalis pedis pulses are 1+ on the right side, and 1+ on the left side.        Posterior tibial pulses are 1+ on the right side, and 1+ on the left side.   Capillary fill time 3-5 seconds.  All toes warm to touch.      Negative lower extremity edema bilateral.    Negative elevational pallor and dependent rubor bilateral.     Musculoskeletal:   Normal angle, base, station of gait. Decreased stride length, early heel off, moderately propulsive toe off bilateral.    All ten toes without clubbing, cyanosis, or signs of ischemia.      No pain to palpation bilateral lower extremities.      Range of motion, stability, muscle strength, and  muscle tone are age and health appropriate diminished bilateral feet and legs.       Lymphadenopathy:   Negative lymphadenopathy bilateral popliteal fossa and tarsal tunnel.  Negative lymphangitic streaking bilateral foot/ankle bilateral.     Neurological: He is alert and oriented to person, place, and time. He has normal strength. He is not disoriented. He displays no atrophy and no tremor. No sensory deficit. He exhibits normal muscle tone.   Reflex Scores:       Patellar reflexes are 2+ on the right side and 2+ on the left side.       Achilles reflexes are 2+ on the right side and 2+ on the left side.    Negative tinel sign to percussion sural, superficial peroneal, deep peroneal, saphenous, and posterior tibial nerves right and left ankles and feet.     Skin: Skin is warm, dry and intact. No abrasion, no bruising, no burn, no ecchymosis, no laceration, no lesion, no petechiae and no rash noted. He is not diaphoretic. No cyanosis or erythema. No pallor. Nails show no clubbing.   Skin thin, atrophic, with marked thick keratosis from lack of skin slough right and left feet to ankle  without ulceration, drainage, pus, tracking, fluctuance, malodor, or cardinal signs infection.       Toenails 1st, 2nd, 3rd, 4th, 5th  bilateral are severely hypertrophic thickened 5-10 mm, dystrophic, discolored tanish brown with tan, gray crumbly subungual debris.  Exquisitely painful to distal nail plate pressure, without periungual skin abnormality of each.               Assessment:       Encounter Diagnoses   Name Primary?    Onychomycosis due to dermatophyte Yes    Toe pain, bilateral     Keratosis     Pain in toes of both feet          Plan:       Nura was seen today for nail problem.    Diagnoses and all orders for this visit:    Onychomycosis due to dermatophyte    Toe pain, bilateral    Keratosis    Pain in toes of both feet    Other orders  -     ciclopirox (PENLAC) 8 % Soln; Apply topically nightly.  -     ammonium  lactate 12 % Crea; Apply twice daily to affected parts both feet as needed.      I counseled the patient on his conditions, their implications and medical management.      - Shoe inspection. Patient instructed on proper foot hygeine. We discussed wearing proper shoe gear, daily foot inspections, never walking without protective shoe gear, never putting sharp instruments to feet, routine podiatric visits as needed.    Discussed conservative treatment with shoes of adequate dimensions, material, and style to alleviate symptoms and delay or prevent surgical intervention.    In my medical opinion this patient has obvious medical necessity for nail debridement by a medical professional to prevent marked physical harm from sequela and during debridement by non medical professional.  Not to do so in his condition would be both dangerous and inhumane.    - With patient's permission, nails were aggressively reduced and debrided x 10 to their soft tissue attachment mechanically and with electric , removing all offending nail and debris. Patient relates relief following the procedure. He will continue to monitor the areas daily, inspect his feet, wear protective shoe gear when ambulatory, moisturizer to maintain skin integrity and follow in this office p.r.n.          Follow-up if symptoms worsen or fail to improve.

## 2018-12-12 NOTE — PROGRESS NOTES
Subjective:       Patient ID: Nura Kahn Jr. is a 62 y.o. male.    Chief Complaint: Annual Exam    Disclaimer: This note has been generated using voice-recognition software. There may be typographical errors that have been missed during proof-reading  For patient of Dr. Daly is here for his annual exam and medical management of his chronic medical conditions.  Patient is asking for home health assistance as he needs help with his ADLs and family is not supportive          Review of Systems   Constitutional: Negative for activity change, appetite change, fatigue and fever.   Respiratory: Negative for cough.    Skin: Negative for rash.   Psychiatric/Behavioral: Negative for sleep disturbance.         Past Medical History:   Diagnosis Date    Atrial fibrillation 3/2/2015    Depression     Microcytic anemia 9/25/2014    Neuromuscular disorder     Polymyositis 2009    Tobacco abuse      Past Surgical History:   Procedure Laterality Date    BIOPSY-MUSCLE Left Thigh Left 4/14/2016    Performed by Jose Mosley MD at [facility] OR 2ND FLR    ESOPHAGOGASTRODUODENOSCOPY (EGD) N/A 7/1/2015    Performed by Codey Laurent MD at [facility] ENDO (4TH FLR)    MANOMETRY-ESOPHAGEAL N/A 7/1/2015    Performed by Codey Laurent MD at [facility] ENDO (4TH FLR)     Social History     Social History Narrative    Not on file     Family History   Problem Relation Age of Onset    Diabetes Mother     Hypertension Mother     Diabetes Father     Hypertension Father     Heart attack Neg Hx     Heart disease Neg Hx     Melanoma Neg Hx      Outpatient Encounter Medications as of 12/12/2018   Medication Sig Dispense Refill    albuterol 90 mcg/actuation inhaler Inhale 2 puffs into the lungs every 6 (six) hours as needed for Wheezing. 1 each 11    azaTHIOprine (IMURAN) 50 mg Tab Take 3 tablets (150 mg total) by mouth once daily. 90 tablet 0    calcium-vitamin D3 (CALCIUM 500 + D) 500 mg(1,250mg) -200 unit per tablet Take 1 tablet by mouth 2  "(two) times daily with meals.      ergocalciferol (ERGOCALCIFEROL) 50,000 unit Cap Take 1 capsule (50,000 Units total) by mouth every 7 days. 12 capsule 3    imiquimod (ALDARA) 5 % cream AAA nightly 12 packet 1    oxycodone-acetaminophen (PERCOCET) 5-325 mg per tablet Take 1 tablet by mouth every 6 (six) hours as needed for Pain. 15 tablet 0    predniSONE (DELTASONE) 5 MG tablet Take 2 tablets (10 mg total) by mouth once daily. 60 tablet 1    senna-docusate 8.6-50 mg (PERICOLACE) 8.6-50 mg per tablet Take 1 tablet by mouth 2 (two) times daily. 30 tablet 0    [DISCONTINUED] amoxicillin-clavulanate 875-125mg (AUGMENTIN) 875-125 mg per tablet Take 1 tablet by mouth 2 (two) times daily. 14 tablet 0    [DISCONTINUED] predniSONE (DELTASONE) 5 MG tablet Take 2 tablets (10 mg total) by mouth once daily. 60 tablet 2    ketoconazole (NIZORAL) 2 % cream Apply topically 2 (two) times daily. 60 g 2    pantoprazole (PROTONIX) 20 MG tablet Take 1 tablet (20 mg total) by mouth once daily. 30 tablet 11     No facility-administered encounter medications on file as of 12/12/2018.      Last 3 sets of Vitals  Vitals - 1 value per visit 12/21/2017 1/15/2018 12/12/2018   SYSTOLIC 114 132 132   DIASTOLIC 72 67 86   PULSE 80 85 67   TEMPERATURE - 100.7 97.7   RESPIRATIONS - 20 -   SPO2 - 94 97   Weight (lb) 154.32 170 169.97   Weight (kg) 70 77.111 77.1   HEIGHT 5' 10" 6' 0" 6' 0"   BODY MASS INDEX 22.14 23.06 23.05   VISIT REPORT - - -   Pain Score  2 - 0   Some recent data might be hidden         Objective:      Physical Exam   Constitutional: He is oriented to person, place, and time. He appears well-developed and well-nourished. No distress.   Uses power scooter to get around     HENT:   Head: Normocephalic and atraumatic.   Nose: Nose normal.   Mouth/Throat: Abnormal dentition. Dental caries present.   Eyes: Pupils are equal, round, and reactive to light.   Neck: Normal range of motion. Neck supple.   Cardiovascular: Normal " rate and regular rhythm.   Pulmonary/Chest: Effort normal and breath sounds normal. No stridor. No respiratory distress. He has no wheezes.   Abdominal: Soft.   Neurological: He is alert and oriented to person, place, and time.   Skin: Skin is warm and dry. Capillary refill takes less than 2 seconds. No rash noted. He is not diaphoretic. No erythema. No pallor.   Psychiatric: He has a normal mood and affect. His behavior is normal. Judgment and thought content normal.   Nursing note and vitals reviewed.          Lab Results   Component Value Date    WBC 5.01 11/30/2018    RBC 5.99 11/30/2018    HGB 11.7 (L) 11/30/2018    HCT 35.7 (L) 11/30/2018    MCV 60 (L) 11/30/2018    MCH 19.5 (L) 11/30/2018    MCHC 32.8 11/30/2018    RDW 20.7 (H) 11/30/2018     (L) 11/30/2018    MPV SEE COMMENT 11/30/2018    GRAN 2.5 11/30/2018    GRAN 49.5 11/30/2018    LYMPH 2.1 11/30/2018    LYMPH 42.1 11/30/2018    MONO 0.3 11/30/2018    MONO 5.8 11/30/2018    EOS 0.1 11/30/2018    BASO 0.03 11/30/2018    EOSINOPHIL 1.6 11/30/2018    BASOPHIL 0.6 11/30/2018     Lab Results   Component Value Date    WBC 5.01 11/30/2018    HGB 11.7 (L) 11/30/2018    HCT 35.7 (L) 11/30/2018     (L) 11/30/2018    CHOL 138 03/23/2017    TRIG 163 (H) 03/23/2017    HDL 30 (L) 03/23/2017    ALT 8 (L) 11/30/2018    AST 13 11/30/2018     11/30/2018    K 4.1 11/30/2018     11/30/2018    CREATININE 0.5 11/30/2018    BUN 12 11/30/2018    CO2 27 11/30/2018    TSH 0.542 08/04/2014    PSA 0.37 03/23/2017       Assessment:       1. High risk medication use    2. Portal venous hypertension    3. Weight loss    4. Screening for colon cancer    5. Screening for prostate cancer    6. Paraparesis    7. Polymyositis    8. Thrombocytopenia    9. Beta 0 thalassemia    10. Immunosuppression    11. Vitamin D deficiency        Plan:       Patient Counseling:  --Nutrition: Stressed importance of moderation in sodium/caffeine intake, saturated fat and  cholesterol, caloric balance, sufficient intake of fresh fruits, vegetables, fiber, calcium, iron, and 1 mg of folate supplement per day (for females capable of pregnancy).  --Exercise: Stressed the importance of regular exercise.   --Substance Abuse: Discussed cessation/primary prevention of tobacco, alcohol, or other drug use; driving or other dangerous activities under the influence; availability of treatment for abuse.   --Sexuality: Discussed sexually transmitted diseases, partner selection, use of condoms, avoidance of unintended pregnancy and contraceptive alternatives.   --Injury prevention: Discussed safety belts, safety helmets, smoke detector.  --Dental health: Discussed importance of regular tooth brushing, flossing, and dental visits.  --Immunizations reviewed.      Nura was seen today for annual exam.    Diagnoses and all orders for this visit:    High risk medication use  -     Lipid panel; Future  -     Urinalysis  -     Ambulatory referral to Home Health    Portal venous hypertension  Comments:  Followed by Hepatology    Orders:  -     Lipid panel; Future  -     Ambulatory referral to Home Health    Weight loss  Comments:  some weight regained weight stable      Screening for colon cancer  -     Case request GI: COLONOSCOPY    Screening for prostate cancer  -     PSA, Screening; Future    Paraparesis  Comments:  uses scooter followed by Rheumatology    Orders:  -     Ambulatory referral to Home Health    Polymyositis  Comments:  followed by Rheumatology    Orders:  -     Ambulatory referral to Home Health    Thrombocytopenia  Comments:  Followed by Heme/Onc    Orders:  -     Ambulatory referral to Home Health    Beta 0 thalassemia  Comments:  Followed by Hematology    Orders:  -     Ambulatory referral to Home Health    Immunosuppression  Comments:  Followed by Rheumatology    Orders:  -     Ambulatory referral to Home Health    Vitamin D deficiency  Comments:  on Vit D 50k units  weekly    Orders:  -     Vitamin D; Future  -     Ambulatory referral to Home Health      Patient Instructions   Home Health will be contacting you to assist    Continue current medications    Labs and vaccines today        PLEASE CALL [phone] TO DISCUSS SCHEDULING YOUR COLONOSCOPY OR UPPER GI SCOPE (EGD).     They will also discuss the different cleanses to take the night before your colonoscopy, so that the physician can effectively view the lining of your colon.

## 2018-12-13 ENCOUNTER — TELEPHONE (OUTPATIENT)
Dept: RHEUMATOLOGY | Facility: CLINIC | Age: 63
End: 2018-12-13

## 2018-12-13 ENCOUNTER — TELEPHONE (OUTPATIENT)
Dept: INTERNAL MEDICINE | Facility: CLINIC | Age: 63
End: 2018-12-13

## 2018-12-13 DIAGNOSIS — K76.6 PORTAL VENOUS HYPERTENSION: ICD-10-CM

## 2018-12-13 DIAGNOSIS — M33.20 POLYMYOSITIS: ICD-10-CM

## 2018-12-13 DIAGNOSIS — R13.10 DYSPHAGIA, UNSPECIFIED TYPE: ICD-10-CM

## 2018-12-13 DIAGNOSIS — R26.81 GAIT INSTABILITY: ICD-10-CM

## 2018-12-13 DIAGNOSIS — R42 DIZZINESS: Primary | ICD-10-CM

## 2018-12-13 DIAGNOSIS — D84.9 IMMUNOSUPPRESSION: Chronic | ICD-10-CM

## 2018-12-13 DIAGNOSIS — J69.0 ASPIRATION PNEUMONIA OF RIGHT LOWER LOBE, UNSPECIFIED ASPIRATION PNEUMONIA TYPE: Primary | ICD-10-CM

## 2018-12-13 RX ORDER — AZATHIOPRINE 50 MG/1
150 TABLET ORAL DAILY
Qty: 270 TABLET | Refills: 0 | Status: ON HOLD | OUTPATIENT
Start: 2018-12-13 | End: 2019-04-09 | Stop reason: HOSPADM

## 2018-12-13 NOTE — TELEPHONE ENCOUNTER
Called DON of [facility]  states that  home is infested and that the pt's hoem was infested with roaches and the environment was not safe for them to send in to the pt's home.  Called pt he does not have the financial means to get an    Social work consutl placed  D/W pt possible placement to assisted living

## 2018-12-13 NOTE — TELEPHONE ENCOUNTER
----- Message from Tierra Chambers sent at 12/13/2018  2:47 PM CST -----  Contact: Mary Daughter) [phone]  Patients daughter is requesting a call from the office. Patient is calling in regards to Home Health assessment.    Please advise, thanks

## 2018-12-13 NOTE — TELEPHONE ENCOUNTER
Dear JEANNE Silvestre and Dr Ambrosio,  On chart review, this patient has numerous healthcare disparities in addition to significant physical limitations. I placed a Palliative Care/Home-based primary care consult for this patient. The [facility] NPs are great about connecting home-bound patients with resources. He may be eligible for home-based primary care, which would provide him with an NP home visit every 3 months.    If you feel he needs HH, please attempt another agency who takes all payers. I suggest [facility] or [facility]. You will have to do this, since it requires a face-to-face.    I also placed an OPCM consult to case management to help patient with community resources. He seems to have limited access to resources.    Thank you for your work to care for this complex patient!    Thanks,  Jessenia Sharma MD/MPH  Internal Medicine  [facility]

## 2018-12-13 NOTE — TELEPHONE ENCOUNTER
----- Message from Beau Yuen sent at 12/13/2018 11:49 AM CST -----  Contact: H/H Nurse Raheel Fermin [phone]  H/H Nurse calling regarding the patient living condition and would like to update about the patient living situation.     Patient will not be admitted to home health due to unsanitary conditions and will not send any one else out to patient home, wanted to inform the NP    H/H Nurse Raheel Fermin [phone]    Please call an advise  Thank you

## 2018-12-14 NOTE — TELEPHONE ENCOUNTER
Spoke with daughter she does not seem willing to go over and help patient clean up his house.  Daughter states that he lives with people and lives in a container house.  Social work consutl placed for home welfare visit

## 2018-12-17 DIAGNOSIS — Z12.11 SPECIAL SCREENING FOR MALIGNANT NEOPLASMS, COLON: Primary | ICD-10-CM

## 2018-12-17 RX ORDER — SODIUM, POTASSIUM,MAG SULFATES 17.5-3.13G
1 SOLUTION, RECONSTITUTED, ORAL ORAL DAILY
Qty: 354 ML | Refills: 0 | Status: SHIPPED | OUTPATIENT
Start: 2018-12-17 | End: 2018-12-19

## 2018-12-18 ENCOUNTER — OUTPATIENT CASE MANAGEMENT (OUTPATIENT)
Dept: ADMINISTRATIVE | Facility: OTHER | Age: 63
End: 2018-12-18

## 2018-12-18 ENCOUNTER — TELEPHONE (OUTPATIENT)
Dept: INTERNAL MEDICINE | Facility: CLINIC | Age: 63
End: 2018-12-18

## 2018-12-18 NOTE — TELEPHONE ENCOUNTER
----- Message from Viky Garcia sent at 12/18/2018  9:40 AM CST -----  Contact: Pt  Pt called to speak to the nurse regarding his care and would like a call back.    Pt can be reached at [phone].    Thanks

## 2018-12-18 NOTE — PROGRESS NOTES
Thank you for the referral.  Patient has been assigned to Eusebia Izaguirre LMSW for low risk screening for Outpatient Case Management.     Reason for referral: Aspiration pneumonia of right lower lobe, unspecified aspiration pneumonia type  Immunosuppression  Polymyositis  Portal venous hypertension  Gait instability  Home-bound, wheelchair-bound patient with limited resources, numerous barriers to care and was refused services by [facility] due to unsanitary conditions. Needs assistance with food access, disease education, transportation, etc    Please contact [facility] at [phone] with any questions.    Thank you,    Tawana Tijerina, [facility]  Outpatient [facility] Mgmt.  [phone]

## 2018-12-20 ENCOUNTER — OUTPATIENT CASE MANAGEMENT (OUTPATIENT)
Dept: ADMINISTRATIVE | Facility: OTHER | Age: 63
End: 2018-12-20

## 2018-12-20 NOTE — PROGRESS NOTES
"This SW received a referral on the above patient.   Reason for referral: Low risk, Home-bound, wheelchair-bound patient with limited resources, numerous barriers to care and was refused services by [facility] due to unsanitary conditions. Needs assistance with food access, disease education, transportation, etc   Name of the community resource that was provided: Local Food Pantries, Affordable Housing in [facility], Senior Resource Guide, Medicaid Long Term Care Program  Resource given to: Patient via [place] mail     LMSW completed SW assessment with patient. Patient is known to [facility] and previous SW assessment was completed on 12/27/17. Patient lives with family and reports needing assistance with ADLs/IADLs. Patient reports his significant other assist with her personal care however he reports S/O needing support to care for him (information for LTC will be provided to patient). Patient reports having trouble affording housing and food cost. Patient reports receiving 18 dollars in SNAP benefits. Patient offered and accepted resources for food pantries. LMSW discussed with patient his housing challenges and informed patient that he will be provided affordable housing list for [place]. Patient reports he is currently wheelchair bound and needs therapy to increase his mobility. Patient reports [facility]  did come to his home to admit him for therapy services and later declined to provide services to him. Patient reports, it's because "I had some critters". Patient reports he had an  come to service his place and states infestation is under control. LMSW informed patient that it appears new orders for HH were sent to [facility] and SW will follow up with [facility] to ensure orders received. Patient verbalized understanding and stated no other concerns.     LMSW contacted [facility] and they reported orders for HH were not received. SW re-sent orders for HH to [facility] and confirmed with Becky " referral received. Per Becky they will contact patient today to schedule intake for tomorrow. LMSW reached out to Lizbeth Silvestre NP to provide update and left message with staff. Patient notified HH agency will be contacting him to scheduled intake. No other needs identified. Case closed. Referral source notified.

## 2018-12-24 ENCOUNTER — TELEPHONE (OUTPATIENT)
Dept: INTERNAL MEDICINE | Facility: CLINIC | Age: 63
End: 2018-12-24

## 2018-12-24 NOTE — TELEPHONE ENCOUNTER
----- Message from Mitali Neal sent at 12/21/2018  1:50 PM CST -----  Contact: [facility]/[phone]/ [phone]/fax  Type:Home Health(orders,updates,clarifications,etc)    Home Health Agency/Nurse: [facility]    Phone number: [phone]    Reason for call:    Comments: Informed Flori that patient will see a new PCP on February, but she request message to be seen to whoever saw patient last.   Flori stated that patient need a , speech therapy - because patient is having trouble swallowing. Patient also will need the following equipment - hospital bed, bed type commode, power wheelchair that also will stand, and sliding board. Please call and advise. Thank you

## 2018-12-27 ENCOUNTER — TELEPHONE (OUTPATIENT)
Dept: INTERNAL MEDICINE | Facility: CLINIC | Age: 63
End: 2018-12-27

## 2018-12-27 DIAGNOSIS — Z79.899 HIGH RISK MEDICATION USE: ICD-10-CM

## 2018-12-27 DIAGNOSIS — D58.2 HEMOGLOBIN C DISEASE: ICD-10-CM

## 2018-12-27 DIAGNOSIS — D56.1 BETA THALASSEMIA: ICD-10-CM

## 2018-12-27 DIAGNOSIS — Z72.0 TOBACCO ABUSE: ICD-10-CM

## 2018-12-27 DIAGNOSIS — K76.6 PORTAL VENOUS HYPERTENSION: ICD-10-CM

## 2018-12-27 DIAGNOSIS — D84.9 IMMUNOSUPPRESSION: Chronic | ICD-10-CM

## 2018-12-27 DIAGNOSIS — M85.80 OSTEOPENIA, UNSPECIFIED LOCATION: ICD-10-CM

## 2018-12-27 DIAGNOSIS — R59.0 THORACIC LYMPHADENOPATHY: ICD-10-CM

## 2018-12-27 DIAGNOSIS — R53.83 FATIGUE, UNSPECIFIED TYPE: ICD-10-CM

## 2018-12-27 DIAGNOSIS — J69.0 ASPIRATION PNEUMONIA OF RIGHT LOWER LOBE, UNSPECIFIED ASPIRATION PNEUMONIA TYPE: ICD-10-CM

## 2018-12-27 DIAGNOSIS — R93.2 ABNORMAL CT OF LIVER: Primary | ICD-10-CM

## 2018-12-27 DIAGNOSIS — R26.81 GAIT INSTABILITY: ICD-10-CM

## 2018-12-27 NOTE — TELEPHONE ENCOUNTER
Per chart review patient's  is Eusebia Rondon.     Pt will need face to face evaluation per Medicare rules for orders listed or await Lizbeth's return to place orders    I will route this to Dr. Sharma as well as referral has also been placed to [facility] for possible home base primary care.

## 2018-12-27 NOTE — TELEPHONE ENCOUNTER
----- Message from David Parra sent at 12/27/2018  9:06 AM CST -----  Contact: [phone]  [facility]/[phone]/ [phone]/fax  Type:Home Health(orders,updates,clarifications,etc)     Home Health Agency/Nurse: [facility]     Phone number: [phone]     Reason for call:     Comments: Informed Flori that patient will see a new PCP on February, but she request message to be seen to whoever saw patient last.   Flori stated that patient need a , speech therapy - because patient is having trouble swallowing. Patient also will need the following equipment - hospital bed, bed type commode, power wheelchair that also will stand, and sliding board. Please call and advise. Thank you

## 2019-01-03 ENCOUNTER — TELEPHONE (OUTPATIENT)
Dept: INTERNAL MEDICINE | Facility: CLINIC | Age: 64
End: 2019-01-03

## 2019-01-03 NOTE — TELEPHONE ENCOUNTER
I am not this patient's primary care physician.  I have never seen this patient.  He is not an established patient of mine.  I am not sure why I have been listed as his PCP; his establish care appointment with me is 2/14/19.  I will not place orders on patients unknown to me.  Once the patient has established with me, I will be happy to be involved in his management.

## 2019-01-03 NOTE — TELEPHONE ENCOUNTER
----- Message from Eusebia Rondon LMSW sent at 1/3/2019  8:58 AM CST -----  Hello:    This message is for MAURO Montero  I received incoming voice message from Helene Pena  nurse requesting verbal order for speech evaluation for patient. Patient having difficulty swallowing. Please contact Becky- [phone] to advise.     Thank you,     Eusebia Rondon LMSW  Outpatient Complex Care Management

## 2019-01-07 ENCOUNTER — TELEPHONE (OUTPATIENT)
Dept: INTERNAL MEDICINE | Facility: CLINIC | Age: 64
End: 2019-01-07

## 2019-01-07 DIAGNOSIS — R26.81 GAIT INSTABILITY: ICD-10-CM

## 2019-01-07 DIAGNOSIS — K76.6 PORTAL VENOUS HYPERTENSION: ICD-10-CM

## 2019-01-07 DIAGNOSIS — R13.10 DYSPHAGIA, UNSPECIFIED TYPE: ICD-10-CM

## 2019-01-07 DIAGNOSIS — J69.0 ASPIRATION PNEUMONIA OF RIGHT LOWER LOBE, UNSPECIFIED ASPIRATION PNEUMONIA TYPE: ICD-10-CM

## 2019-01-07 DIAGNOSIS — D69.6 THROMBOCYTOPENIA: Primary | ICD-10-CM

## 2019-01-07 DIAGNOSIS — Z78.9 IMPAIRED MOBILITY AND ACTIVITIES OF DAILY LIVING: ICD-10-CM

## 2019-01-07 DIAGNOSIS — D84.9 IMMUNOSUPPRESSION: Chronic | ICD-10-CM

## 2019-01-07 DIAGNOSIS — Z74.09 IMPAIRED MOBILITY AND ACTIVITIES OF DAILY LIVING: ICD-10-CM

## 2019-01-07 NOTE — TELEPHONE ENCOUNTER
----- Message from Silva Viera sent at 1/7/2019 12:52 PM CST -----  Contact:  [facility] [phone]  [facility] is calling on above patient, they have been trying for 2 weeks now to get this patient some help, he needs an Electric Bed, Electric Wheel chair and a bedside commode, Order needs to be sent over to [facility] asap. Please call Madai back at [phone] with some update.  Patient has been laying on a recliner.    Thank you

## 2019-01-07 NOTE — TELEPHONE ENCOUNTER
----- Message from Tierra Chambers sent at 1/7/2019  3:18 PM CST -----  Contact: Helene Aj [phone]  Madai is requesting an order for Speech Therapy for patient.    Please advise, thanks

## 2019-01-10 ENCOUNTER — TELEPHONE (OUTPATIENT)
Dept: INTERNAL MEDICINE | Facility: CLINIC | Age: 64
End: 2019-01-10

## 2019-01-10 DIAGNOSIS — R13.10 SWALLOWING DISORDER: Primary | ICD-10-CM

## 2019-01-10 NOTE — TELEPHONE ENCOUNTER
----- Message from Beau Yuen sent at 1/10/2019  1:07 PM CST -----  Contact: Vibha Anderson, [phone]  Type: Orders Request    What orders/ testing are being requested? Modified Swallow study    Is there a future appointment scheduled for the patient with PCP? No    When?    Comments: requesting a call back regarding placing the order.    Vibha Anderson, [phone]    Please call an advise  Thank you

## 2019-01-15 DIAGNOSIS — R26.81 GAIT INSTABILITY: ICD-10-CM

## 2019-01-15 DIAGNOSIS — R16.1 SPLENOMEGALY: ICD-10-CM

## 2019-01-15 DIAGNOSIS — J69.0 ASPIRATION PNEUMONIA OF RIGHT LOWER LOBE, UNSPECIFIED ASPIRATION PNEUMONIA TYPE: Primary | ICD-10-CM

## 2019-01-15 DIAGNOSIS — K76.6 PORTAL VENOUS HYPERTENSION: ICD-10-CM

## 2019-01-15 DIAGNOSIS — R05.9 COUGH: ICD-10-CM

## 2019-01-15 DIAGNOSIS — D69.6 THROMBOCYTOPENIA: ICD-10-CM

## 2019-01-15 DIAGNOSIS — R13.10 DYSPHAGIA, UNSPECIFIED TYPE: ICD-10-CM

## 2019-01-15 DIAGNOSIS — R42 DIZZINESS: ICD-10-CM

## 2019-01-15 DIAGNOSIS — M33.20 POLYMYOSITIS: ICD-10-CM

## 2019-01-21 ENCOUNTER — TELEPHONE (OUTPATIENT)
Dept: RADIOLOGY | Facility: HOSPITAL | Age: 64
End: 2019-01-21

## 2019-01-22 ENCOUNTER — TELEPHONE (OUTPATIENT)
Dept: INTERNAL MEDICINE | Facility: CLINIC | Age: 64
End: 2019-01-22

## 2019-01-22 NOTE — TELEPHONE ENCOUNTER
----- Message from Meenakshi Joyce sent at 1/22/2019  2:41 PM CST -----  Contact: [employer] [facility]/ [phone]  [facility] is calling to speak with someone in regards to the pt. She states that the pt has a missed speech therapy visit last. She also states that this is just for documentation.  Please advise.      Thanks

## 2019-01-25 ENCOUNTER — TELEPHONE (OUTPATIENT)
Dept: ADMINISTRATIVE | Facility: CLINIC | Age: 64
End: 2019-01-25

## 2019-01-25 NOTE — TELEPHONE ENCOUNTER
Home Health SOC 12/21/2018 - 02/18/2019 with [facility] ([place] - Dr. Mica Murcia. Patient received SN, PT, OT and MSW services.

## 2019-01-30 ENCOUNTER — TELEPHONE (OUTPATIENT)
Dept: ENDOSCOPY | Facility: HOSPITAL | Age: 64
End: 2019-01-30

## 2019-01-30 DIAGNOSIS — Z12.11 SPECIAL SCREENING FOR MALIGNANT NEOPLASMS, COLON: Primary | ICD-10-CM

## 2019-01-30 RX ORDER — SODIUM, POTASSIUM,MAG SULFATES 17.5-3.13G
SOLUTION, RECONSTITUTED, ORAL ORAL
Qty: 1 BOTTLE | Refills: 0 | Status: SHIPPED | OUTPATIENT
Start: 2019-01-30 | End: 2019-02-13

## 2019-01-31 ENCOUNTER — EXTERNAL CHRONIC CARE MANAGEMENT (OUTPATIENT)
Dept: PRIMARY CARE CLINIC | Facility: CLINIC | Age: 64
End: 2019-01-31
Payer: MEDICARE

## 2019-01-31 ENCOUNTER — TELEPHONE (OUTPATIENT)
Dept: INTERNAL MEDICINE | Facility: CLINIC | Age: 64
End: 2019-01-31

## 2019-01-31 PROCEDURE — 99490 PR CHRONIC CARE MGMT, 1ST 20 MIN: ICD-10-PCS | Mod: S$PBB,,, | Performed by: FAMILY MEDICINE

## 2019-01-31 PROCEDURE — 99490 CHRNC CARE MGMT STAFF 1ST 20: CPT | Mod: S$PBB,,, | Performed by: FAMILY MEDICINE

## 2019-01-31 PROCEDURE — 99490 CHRNC CARE MGMT STAFF 1ST 20: CPT | Mod: PBBFAC | Performed by: FAMILY MEDICINE

## 2019-01-31 NOTE — TELEPHONE ENCOUNTER
----- Message from Elayne Hopper sent at 1/31/2019 11:34 AM CST -----  Contact: PT  PT is calling requesting a nurse to call him back regarding a knot on his elbow  PT is wondering if he should come in asap or just wait.  PT said it feels sore and getting bigger. Feels like fluid is in it    Callback: [phone]

## 2019-02-01 NOTE — TELEPHONE ENCOUNTER
----- Message from David Parra sent at 2/1/2019  3:30 PM CST -----  Contact: Home health    Type: Home Health (orders, updates, clarifications, etc.)    Home Health Agency/Nurse: [facility]     Phone number: [phone]    Reason for call: want to change patient home health visit from this week to next week . Need an ok      Comments: please call and advise, Thanks

## 2019-02-04 ENCOUNTER — TELEPHONE (OUTPATIENT)
Dept: RADIOLOGY | Facility: HOSPITAL | Age: 64
End: 2019-02-04

## 2019-02-05 ENCOUNTER — TELEPHONE (OUTPATIENT)
Dept: INTERNAL MEDICINE | Facility: CLINIC | Age: 64
End: 2019-02-05

## 2019-02-05 NOTE — TELEPHONE ENCOUNTER
----- Message from Norma Sanon sent at 2/5/2019  1:24 PM CST -----  Contact: Becky nurse with Home Health  Says she wanted to make sure it is okay that Dr Ambrosio get the orders since the physician is different from Mica Murcia    Need to get a signature for the orders     Becky can be reached at [phone] thanks

## 2019-02-05 NOTE — TELEPHONE ENCOUNTER
Returned call to nurse Pena with home health.  Notified her that someone has listed me as this patient's primary care physician for several weeks although patient has never established care with me and has an appointment with me scheduled for 2/14/19 to establish care.  Have attempted to reach out to the patient to get him scheduled earlier which was declined by patient.  Notified nurse that I will not authorize orders on a patient that has not established care with myself.  To sign off on orders stating these services are medically necessary on a person I've never met would be medical fraud.

## 2019-02-13 ENCOUNTER — OFFICE VISIT (OUTPATIENT)
Dept: RHEUMATOLOGY | Facility: CLINIC | Age: 64
End: 2019-02-13
Payer: MEDICARE

## 2019-02-13 VITALS
HEART RATE: 72 BPM | DIASTOLIC BLOOD PRESSURE: 99 MMHG | HEIGHT: 71 IN | BODY MASS INDEX: 21.7 KG/M2 | SYSTOLIC BLOOD PRESSURE: 161 MMHG | WEIGHT: 155 LBS

## 2019-02-13 DIAGNOSIS — M33.20 POLYMYOSITIS: ICD-10-CM

## 2019-02-13 DIAGNOSIS — Z79.624 ENCOUNTER FOR LONG TERM CURRENT USE OF AZATHIOPRINE: Primary | ICD-10-CM

## 2019-02-13 PROCEDURE — 99999 PR PBB SHADOW E&M-EST. PATIENT-LVL III: ICD-10-PCS | Mod: PBBFAC,GC,, | Performed by: STUDENT IN AN ORGANIZED HEALTH CARE EDUCATION/TRAINING PROGRAM

## 2019-02-13 PROCEDURE — 99999 PR PBB SHADOW E&M-EST. PATIENT-LVL III: CPT | Mod: PBBFAC,GC,, | Performed by: STUDENT IN AN ORGANIZED HEALTH CARE EDUCATION/TRAINING PROGRAM

## 2019-02-13 PROCEDURE — 99214 PR OFFICE/OUTPT VISIT, EST, LEVL IV, 30-39 MIN: ICD-10-PCS | Mod: S$PBB,GC,, | Performed by: STUDENT IN AN ORGANIZED HEALTH CARE EDUCATION/TRAINING PROGRAM

## 2019-02-13 PROCEDURE — 99214 OFFICE O/P EST MOD 30 MIN: CPT | Mod: S$PBB,GC,, | Performed by: STUDENT IN AN ORGANIZED HEALTH CARE EDUCATION/TRAINING PROGRAM

## 2019-02-13 PROCEDURE — 99213 OFFICE O/P EST LOW 20 MIN: CPT | Mod: PBBFAC | Performed by: STUDENT IN AN ORGANIZED HEALTH CARE EDUCATION/TRAINING PROGRAM

## 2019-02-13 ASSESSMENT — ROUTINE ASSESSMENT OF PATIENT INDEX DATA (RAPID3)
TOTAL RAPID3 SCORE: 6
PATIENT GLOBAL ASSESSMENT SCORE: 10
PAIN SCORE: 0
AM STIFFNESS SCORE: 0, NO
FATIGUE SCORE: 10
PSYCHOLOGICAL DISTRESS SCORE: 9.9
MDHAQ FUNCTION SCORE: 2.4

## 2019-02-13 NOTE — PROGRESS NOTES
Subjective:       Patient ID: [id number] Kahn Jr. is a 63 y.o. male.    Chief Complaint: No chief complaint on file.  f/u on myositis     HPI   Mr. Kahn is a 63 year old male with past medical history of Polymyositis  diagnosed 2009 (+mildly positive ku, outside biopsy proven, CPK here elevated to 487, aldolase elevated to 10.2 ), osteopenia, vitamin D insufficiency, hemoglobin c/beta-zero thalassema, a fib, dysphagia, portal HTN, emphysema here for follow up for polymyositis after more than 2 years.     His last visit with us was 5/2016. At that time he was on prednisone 30mg daily and Imuran 150mg daily. He was to have GI evaluataion for dysphagia and pulm evaluation for GGO and plum nodules. It was after this that he was lost to follow up.     Per chart review it seems that he first established care here at [facility] in 8/2014. He was diagnosed in [id number] when he was feeling weak, falling. He was a  and was unable to use his legs. He had a biopsy in [id number] in [place] that per notes was consistent with polymyositis. He was on MTX in the past which was stopped due CT showing hepatosplenomegaly with portal venous hypertension and elevated T bili in 8/2014 He was at that time started on Imuran. He has been on Imuran and steroids since then.   In 2016, he was worked up again here at [facility]. MRI left lower ext. Showing Severe atrophy with fatty replacement of the thigh musculature bilaterally.  There is mild increased edema signal within the remaining thigh musculature and left iliopsoas muscle without associated enhancement which could reflect a mild degree of residual myositis.  EMG Left upper Ext 5/2016 was a technically difficult study. Had moderate ulnar entrapment at elbow, perhaps on a background of mild polyneuropathy. 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   Pathology of left thigh showing minute potion of muscle  "with end stage myopathic changes and focal chronic inflammation   Patient remained on Imuran and prednisone.     Current medication/Interval history.   Prednisone 10mg daily and Imuran 150mg daily. He states that he was out of Imuran for one mos. He also does not take his medications daily as "he does not feel like they are benefiting". He feels like he is wasting his money.     He feels like his weakness has gotten worse. Says he cant stand anymore (about 1 year ago he was able to stand himself). He has difficulty getting in and out of his scooter. He cannot hold things either. Difficulty getting dressed in morning. If he does not have help to get to the bathroom he at times urinates and defecates on himself. He is scared to eat cause then he has bowel movement, Since he needs help he tries not to go.      He feels like his swallowing is worse too. He feels like he gets food stuck and coughs/chokes while he eats. Even with water he coughs and chokes at times. Through chart review it seems he has had dysphagia dating back to 8/2014    His social situation is also worsening. He seems to need more help. He has "a wife" at home he states but according to him she is doing less to help him.     Regarding his other specialists:   Last saw Cards 3/2015: diagnosed with paroxysmal a fib, no treatment at that time, he did not follow up with them   Last saw heme in 12/2017:evaluated for microcytic acnemia and mild chronic thrombocytosis. 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 He was discharged from clinic at that time.   Last seen GI 6/2015: EGD/motility study ordered, does not seem like this was done, he did not follow up  Last seen hepatology 5/2015: At that time they found evidence of portal HTN on CT scan with no evidence of cirrhosis or PV thrombosis, Fibrosure with F1 stage (minimal fibrosis). He was to return to clinic PRN   Last seen Pulmonary 2/2015:"lung parenchymal is normal, no evidence of fibrosis and " "there is one mediastinal lymph node marginally enlarged. Non specific"    LABS: 12/2018: showing Vit D low at 9, UA neg 11/2018: sed rate <2, CRP 0.5, CMP with T bili 1.5, LFTs normal, GFR >60, CBC with mild microcytic anemia, plt 116, , aldolase 2.6,     Imaging:  CXR 1/2018:Patchy consolidation projected over the right lower lung zone, concerning for infection, correlation advised.      CT chest 12/2017:Peribronchial cuffing predominantly in the lower lung zones as well as retained secretions in the right middle lobe segmental bronchi consistent with bronchitis; such inflammation can occur with inhaled irritants or aspiration.  There is no evidence of interstitial lung disease such as NSIP or UIP.Upper lung zone predominant paraseptal emphysema and dispersed areas of centrilobular emphysema Stable 1.2 cm AP window lymph node.  Densely calcified granuloma in the posterobasal segment of the right lower lobe.Hepatosplenomegaly with associated splenic collateral vessels at the splenic hilum suggesting sequela of portal venous hypertension.    CT pelvis 6/2017: Obturator internus edema or hematoma. No acute fracture. Stable splenomegaly.    Esophogram 4/2015: Spontaneous gastroesophageal reflux.  Otherwise, unremarkable esophagram.    PET scan 8/2014:AP window lymph node SUV max less than 2.5.  This is most likely benign/reactive.  Surveillance is recommended at 3, 6, 12, and 24 months. Splenomegaly most likely from portal hypertension.          Review of Systems   Constitutional: Positive for chills. Negative for fever.   HENT: Positive for trouble swallowing. Negative for mouth sores.    Eyes: Negative for photophobia, pain and redness.   Respiratory: Negative for cough and shortness of breath.    Cardiovascular: Negative for chest pain and leg swelling.   Gastrointestinal: Negative for abdominal pain, constipation and diarrhea.   Endocrine: Negative for polydipsia and polyphagia.   Genitourinary: Negative for " dysuria.   Musculoskeletal: Negative for arthralgias, back pain and joint swelling.   Skin: Negative for rash.   Neurological: Negative for light-headedness and headaches.   Psychiatric/Behavioral: Negative for agitation.       Past Medical History:   Diagnosis Date    Atrial fibrillation 3/2/2015    Depression     Microcytic anemia 9/25/2014    Neuromuscular disorder     Polymyositis 2009    Tobacco abuse        Past Surgical History:   Procedure Laterality Date    BIOPSY-MUSCLE Left Thigh Left 4/14/2016    Performed by [facility] at [facility] OR 2ND FLR    ESOPHAGOGASTRODUODENOSCOPY (EGD) N/A 7/1/2015    Performed by Codey Laurent MD at [facility] ENDO (4TH FLR)    MANOMETRY-ESOPHAGEAL N/A 7/1/2015    Performed by Codey Laurent [facility] at [facility] ENDO (4TH FLR)       Family History   Problem Relation Age of Onset    Diabetes Mother     Hypertension Mother     Diabetes Father     Hypertension Father     Heart attack Neg Hx     Heart disease Neg Hx     Melanoma Neg Hx        Social History     Socioeconomic History    Marital status:      Spouse name: None    Number of children: None    Years of education: None    Highest education level: None   Social Needs    Financial resource strain: None    Food insecurity - worry: None    Food insecurity - inability: None    Transportation needs - medical: None    Transportation needs - non-medical: None   Occupational History    None   Tobacco Use    Smoking status: Former Smoker     Packs/day: 0.25     Years: 20.00     Pack years: 5.00     Types: Cigarettes    Smokeless tobacco: Never Used   Substance and Sexual Activity    Alcohol use: No     Alcohol/week: 0.0 oz    Drug use: Yes     Types: Marijuana     Comment: daily use    Sexual activity: None   Other Topics Concern    None   Social History Narrative    None       Current Outpatient Medications   Medication Sig Dispense Refill    ammonium lactate 12 % Crea Apply twice daily to affected parts  "both feet as needed. 140 g 11    azaTHIOprine (IMURAN) 50 mg Tab Take 3 tablets (150 mg total) by mouth once daily. 270 tablet 0    calcium-vitamin D3 (CALCIUM 500 + D) 500 mg(1,250mg) -200 unit per tablet Take 1 tablet by mouth 2 (two) times daily with meals.      ciclopirox (PENLAC) 8 % Soln Apply topically nightly. 6.6 mL 11    imiquimod (ALDARA) 5 % cream AAA nightly 12 packet 1    predniSONE (DELTASONE) 5 MG tablet Take 2 tablets (10 mg total) by mouth once daily. 60 tablet 1    albuterol 90 mcg/actuation inhaler Inhale 2 puffs into the lungs every 6 (six) hours as needed for Wheezing. 1 each 11    ketoconazole (NIZORAL) 2 % cream Apply topically 2 (two) times daily. 60 g 2    pantoprazole (PROTONIX) 20 MG tablet Take 1 tablet (20 mg total) by mouth once daily. 30 tablet 11     No current facility-administered medications for this visit.        Review of patient's allergies indicates:  No Known Allergies      Objective:   BP (!) 161/99   Pulse 72   Ht 5' 10.8" (1.798 m)   Wt 70.3 kg (155 lb)   BMI 21.74 kg/m²      Physical Exam   Constitutional: He is oriented to person, place, and time.   Thin man with poor dentition,    HENT:   Head: Normocephalic and atraumatic.   Mouth/Throat: Oropharynx is clear and moist. No oropharyngeal exudate.   Poor dentition    Eyes: Conjunctivae are normal. Pupils are equal, round, and reactive to light. No scleral icterus.   Neck: Normal range of motion. Neck supple.   Cardiovascular: Normal rate, regular rhythm and normal heart sounds.    No murmur heard.  Pulmonary/Chest: Effort normal and breath sounds normal. No respiratory distress. He has no wheezes.   Abdominal: Soft. There is no tenderness.       Right Side Rheumatological Exam     Muscle Strength (0-5 scale):  Neck Flexion:  4  Neck Extension: 4  Deltoid:  4.4  Biceps: 3/5   Triceps:  3  : 2/5   Iliopsoas: 3  Quadriceps:  3   Distal Lower Extremity: 5    Left Side Rheumatological Exam     Muscle Strength " (0-5 scale):  Neck Flexion:  4  Neck Extension: 4  Deltoid:  4.4  Biceps: 3/5   Triceps:  3  :  2/5   Iliopsoas: 3  Quadriceps:  3   Distal Lower Extremity: 5      Neurological: He is alert and oriented to person, place, and time.   Skin: Skin is dry. No rash noted.     Musculoskeletal: He exhibits no edema.   No large or small joint synovitis  Muscle atrophy            Assessment:       1. Encounter for long term current use of azathioprine    2. Polymyositis            Plan:       Mr. Kahn is a 63 year old male with past medical history of Polymyositis  diagnosed 2009 (+mildly positive ku, outside biopsy proven, CPK here elevated to 487, aldolase elevated to 10.2 ), osteopenia, vitamin D insufficiency, hemoglobin c/beta-zero thalassema, a fib, dysphagia, portal HTN, emphysema here for follow up for polymyositis after more than 2 years.       #Polymyositis   -diagnosed in 2009 with symptoms of feeling weak, falling and Biopsy in [place]  -Past treatments have been MTX which was discontinued due to hepatosplenomegaly, portal HTN, and elevated T bili   -Currently on Imuran 150mg daily and prednisone 10mg daily   -2016 he was reworked up with:   -MRI left lower ext. Showing Severe atrophy with fatty replacement of the thigh  musculature bilaterally.  There is mild increased edema signal within the remaining  thigh musculature and left iliopsoas muscle without associated enhancement which  could reflect a mild degree of residual myositis.   -EMG Left upper Ext 5/2016 was a technically difficult study. Had moderate ulnar  entrapment at elbow, perhaps on a background of mild polyneuropathy. Needle exam  consistent with a chronic asymmetry myopathy with secondary denervation.    -Pathology of left thigh showing minute potion of muscle with end stage myopathic  changes and focal chronic inflammation   -patient now with worsening strength and dysphagia   -we will directly admit patient for IVIG therapy        #Dysphagia   -he notes worsening dysphagia   -Esophogram 4/2015: Spontaneous gastroesophageal reflux.  Otherwise, unremarkable esophagram  -He was evaluated by GI in 2015 with plans for EGD which does not seem like it was done   -will need full workup while inpatient    #Portal HTN  -seen on imaging   -Last seen hepatology 5/2015: At that time they found evidence of portal HTN on CT scan with no evidence of cirrhosis or PV thrombosis, Fibrosure with F1 stage (minimal fibrosis).   -will likely need further workup    Due to the complex nature of this patients case, his worsening strength and dysphagia as well as his social/transportation problems we elect to admit patient to get further workup and start IVIG as inpatient. Patient will be directly admitted to hospital on 2/14/19. On arrival please:    -Consult Rheumatology (we will place IVIG orders after evaluation)  -please order CBC, BMP, LFTs, aldolase, CPK, ESR, CRP, Immunoglobulins, HMG CoA reducatase antibody and CN1A antibody (sendout test)  -please order Bilateral MRIs with and without contrast of b/l femurs and b/l humerus    -please order PT/OT and consult PM&R   -He will need swallow evaluation by speech and please consult GI for evaluation of dysphagia   -please consult hepatology for evaluation of hepatosplenomegaly and portal hypertension   -he will need nutrition consult  -Consult social work for assessment of needs.     Please let us know if you have any questions or concerns. Case discussed with Dr. Huston.   Admit office called and case discussion with staff held. Patient is to go to admit office on first floor after his PCP appt tomorrow.         Lisa Durham MD  Rheumatology PGY 4

## 2019-02-14 ENCOUNTER — TELEPHONE (OUTPATIENT)
Dept: RHEUMATOLOGY | Facility: CLINIC | Age: 64
End: 2019-02-14

## 2019-02-14 NOTE — TELEPHONE ENCOUNTER
Called patient. He missed his PCP appt this morning as he arrived late due to transportation issues. He went to admissions office and he states that they will call him when they have a bed available.

## 2019-02-19 RX ORDER — PREDNISONE 5 MG/1
10 TABLET ORAL DAILY
Qty: 60 TABLET | Refills: 1 | Status: SHIPPED | OUTPATIENT
Start: 2019-02-19 | End: 2020-07-07

## 2019-02-20 ENCOUNTER — TELEPHONE (OUTPATIENT)
Dept: RADIOLOGY | Facility: HOSPITAL | Age: 64
End: 2019-02-20

## 2019-02-21 ENCOUNTER — CLINICAL SUPPORT (OUTPATIENT)
Dept: SPEECH THERAPY | Facility: HOSPITAL | Age: 64
End: 2019-02-21
Payer: MEDICARE

## 2019-02-21 ENCOUNTER — PATIENT MESSAGE (OUTPATIENT)
Dept: INTERNAL MEDICINE | Facility: CLINIC | Age: 64
End: 2019-02-21

## 2019-02-21 ENCOUNTER — TELEPHONE (OUTPATIENT)
Dept: INTERNAL MEDICINE | Facility: CLINIC | Age: 64
End: 2019-02-21

## 2019-02-21 ENCOUNTER — HOSPITAL ENCOUNTER (OUTPATIENT)
Dept: RADIOLOGY | Facility: HOSPITAL | Age: 64
Discharge: HOME | End: 2019-02-21
Attending: NURSE PRACTITIONER
Payer: MEDICARE

## 2019-02-21 DIAGNOSIS — R13.10 SWALLOWING DISORDER: ICD-10-CM

## 2019-02-21 DIAGNOSIS — R13.12 OROPHARYNGEAL DYSPHAGIA: ICD-10-CM

## 2019-02-21 DIAGNOSIS — J69.0 ASPIRATION PNEUMONIA OF RIGHT LOWER LOBE, UNSPECIFIED ASPIRATION PNEUMONIA TYPE: ICD-10-CM

## 2019-02-21 PROCEDURE — 74230 X-RAY XM SWLNG FUNCJ C+: CPT | Mod: TC

## 2019-02-21 PROCEDURE — 92611 MOTION FLUOROSCOPY/SWALLOW: CPT | Mod: GN | Performed by: SPEECH-LANGUAGE PATHOLOGIST

## 2019-02-21 PROCEDURE — 74230 FL MODIFIED BARIUM SWALLOW SPEECH STUDY: ICD-10-PCS | Mod: 26,,, | Performed by: RADIOLOGY

## 2019-02-21 PROCEDURE — 74230 X-RAY XM SWLNG FUNCJ C+: CPT | Mod: 26,,, | Performed by: RADIOLOGY

## 2019-02-21 NOTE — PROGRESS NOTES
Referring provider: Lizbeth Silvestre  Reason for visit:  Modified barium swallow study (CPT 04778)    Report Summary   --Date: 02/21/2019  --Purpose: to evaluate anatomy and physiology of the oropharyngeal swallow, to determine effectiveness of rehabilitation strategies, and to determine diet consistency and intervention recommendations.   --Diet recommendations: recommend regular/soft diet as tolerated; thin liquids ok if taken in very small sips; crush all meds; recommend 1:1 assistance with eating/feeding    Subjective / History    Nura Kahn Jr. is a 63 y.o. male referred by Dr. Silvestre for Modified Barium Swallow Study (75399).  He is currently on a regular diet.  He reports a several year history of difficulty swallowing food, liquid, and pills.  He reports the problem comes and goes, and that sometimes he is able to eat/drink w/o issue.  He reports solids often feel stuck in his throat and take a while to go down.  Reports intermittent coughing with liquids.  No difficulty with small pills but reports difficulty swallowing capsules in particular.  +smoking.  Reports sensation of thick mucus in his throat.  Recently underwent swallowing therapy via home health.     Past Medical History:   Diagnosis Date    Atrial fibrillation 3/2/2015    Depression     Microcytic anemia 9/25/2014    Neuromuscular disorder     Polymyositis 2009    Tobacco abuse      Current Outpatient Medications on File Prior to Visit   Medication Sig Dispense Refill    albuterol 90 mcg/actuation inhaler Inhale 2 puffs into the lungs every 6 (six) hours as needed for Wheezing. 1 each 11    ammonium lactate 12 % Crea Apply twice daily to affected parts both feet as needed. 140 g 11    azaTHIOprine (IMURAN) 50 mg Tab Take 3 tablets (150 mg total) by mouth once daily. 270 tablet 0    calcium-vitamin D3 (CALCIUM 500 + D) 500 mg(1,250mg) -200 unit per tablet Take 1 tablet by mouth 2 (two) times daily with meals.      ciclopirox  (PENLAC) 8 % Soln Apply topically nightly. 6.6 mL 11    imiquimod (ALDARA) 5 % cream AAA nightly 12 packet 1    ketoconazole (NIZORAL) 2 % cream Apply topically 2 (two) times daily. 60 g 2    pantoprazole (PROTONIX) 20 MG tablet Take 1 tablet (20 mg total) by mouth once daily. 30 tablet 11    predniSONE (DELTASONE) 5 MG tablet Take 2 tablets (10 mg total) by mouth once daily. 60 tablet 1     No current facility-administered medications on file prior to visit.        Objective   Lateral videofluorographic views were obtained. The radiologist and speech pathologist were present for the entire procedure.     Consistencies assessed / method of administration  Thin liquid/4 mL - clinician administered  Thin liquid/4 mL - patient administered  Thin liquid/sequential cup sips - patient administered  Applesauce/tsp - clinician administered  Cracker - clinician administered  Barium tablet w/ water - clinician assisted    Oral phase  - Adequate lip closure  - Reduced tongue control during bolus hold  - Timely bolus preparation  - Adequate bolus transport/lingual motion  - Mild oral residue     Pharyngeal phase  - Delayed initiation: to pyriforms  - Adequate soft palate elevation  - Reduced laryngeal elevation and anterior hyoid excursion  - Reduced tongue base retraction  - Poor/intermittently absent epiglottic movement  - Reduced laryngeal vestibular closure: overt aspiration on large sips of thin liquids w/ head tilted back; intermittent penetration only with small sips taken with assistance from clinician in order to maintain neutral head posture  - Reduced pharyngeal stripping wave: poor pharyngeal squeeze  - Moderate-significant pharyngeal residue: valleculae, pyriform sinuses.  Eventually cleared with repeat swallows.  Pill residue in valleculae, eventually cleared with repeat sips of warm water.   - Reduced PE segment opening    Strategies/therapeutic interventions attempted  - Chin tuck, head turn R/L.  Strategies  were intermittently effective in decreasing/eliminating risk for pharyngeal residue.   - Small sips of thin liquids.  Strategies were effective in decreasing/eliminating risk for laryngeal penetration/aspiration.     Rosenbek Penetration-Aspiration Scale (Rosenbek et al 1996)  Best Trial: 1. Contrast does not enter airway.   Worst Trial: 7. Contrast passes glottis with visible residue despite patient response.     Assessment / Impressions   The results of this MBSS indicate that patient demonstrates moderate swallowing dysfunction c/b overt aspiration of large (+ cough response), continuous sips of thin liquids after the swallow, as well as residue of all consistencies due to overall pharyngeal weakness.  Prognosis for improvement of swallowing with therapy is guarded.      Recommendations / POC   -Diet: recommend regular/soft diet as tolerated; thin liquids ok if taken in very small sips; crush all meds  -Therapeutic technique: recommend chin tuck, effortful swallow, alternate solid with liquid wash and multiple swallows per bolus; also recommend assistance with eating to help with maintaining small bolus size.  Discussed this with pt and his daughter who was present for the procedure.    -Pt would benefit from continued home health dysphagia therapy in order to increase tongue base retraction, increase pharyngeal contraction, increase laryngeal excursion and airway closure and increase swallow safety by implementing therapeutic maneuvers; however, he was recently d/c from  SLP therapy.  -Contact clinician or referring provider for repeat MBSS if swallowing status changes or worsens  -Encouraged smoking cessation

## 2019-02-21 NOTE — TELEPHONE ENCOUNTER
Preferred Name:   Nura Hernandez Jr.   Male, 63 y.o., 1955  Phone:   [phone] ([phone]  PCP:   Socrates Ambrosio MD  Language:   English  Need Interp:   No  Allergies Last Reviewed:   02/21/19  Allergies:   No Known Allergies  Health Maintenance:   Due  FYI  General  Primary Ins.:   MEDICARE  MRN:   [id number]  Pt Comm Pref:   Patient Portal, Mail  Patient Portal:   Active  Next Appt:   02/27/2019  My Sticky Note:     Specialty Comments:    Fl Modified Barium Swallow Speech   Order: [id number]   Status:  Final result   Visible to patient:  No (Not Released) Dx:  Swallowing disorder   Details     Reading Physician Reading Date Result Priority   Charles Lozoya MD 2/21/2019    Nura Oliva MD 2/21/2019       Narrative     EXAMINATION:  FL MODIFIED BARIUM SWALLOW SPEECH STUDY    CLINICAL HISTORY:  Dysphagia, unspecified    TECHNIQUE:  Video fluoroscopic swallowing examination was performed in conjunction with the speech pathology department.  Barium containing liquid and/or solid food substances were used to assess swallowing.    Fluoroscopy Time: 4 min 38 sec    COMPARISON:  Fluoroscopic modified barium swallow 04/22/2015      Impression       Transit: Satisfactory oral transit time with small quantities of thin liquid.  Large quantities of thin liquid elicited poor nickolas-pharyngeal coordination with spillage to the vallecula and piriform sinuses.  Significant residual with solid and semi-solid food substance.    Penetration/Aspiration: Multiple episodes of penetration and aspiration with large quantities of thin liquid.  No penetration or aspiration with small volume of thin liquid or semi-solid/solid food substance.    Miscellaneous: N/A.    See speech pathology report for further details.    Electronically signed by resident: Nura Oliva  Date: 02/21/2019  Time: 15:01    Electronically signed by: Charles Lozoya MD  Date: 02/21/2019  Time: 15:28             Last Resulted: 02/21/19 15:28         Order Details       View Encounter       Lab and Collection Details       Routing       Result History               Result Image Hyperlink      Show images for Fl Modified Barium Swallow Speech   PACS Images for [employer] [place] Viewer (Includes [employer] Images)      Show images for Fl Modified Barium Swallow Speech   PACS Images for [employer] Impax Viewer      Show images for Fl Modified Barium Swallow Speech   Future Appointments     In 6 days MD Jerrod Wilson - Internal Medicine, [facility]   Recent Visits     Visits with Me      Provider Department Primary Dx   02/21/2019 HERNAN [facility] - Internal Medicine    Other Visits      Provider Department Primary Dx   02/21/2019 02/21/2019 Marlene Mora [facility]   02/20/2019 [facility] De [facility]    02/14/2019 MD Jerrod [facility] - Rheumatology

## 2019-02-27 ENCOUNTER — LAB VISIT (OUTPATIENT)
Dept: LAB | Facility: HOSPITAL | Age: 64
End: 2019-02-27
Payer: MEDICARE

## 2019-02-27 ENCOUNTER — OFFICE VISIT (OUTPATIENT)
Dept: INTERNAL MEDICINE | Facility: CLINIC | Age: 64
End: 2019-02-27
Payer: MEDICARE

## 2019-02-27 ENCOUNTER — TELEPHONE (OUTPATIENT)
Dept: INTERNAL MEDICINE | Facility: CLINIC | Age: 64
End: 2019-02-27

## 2019-02-27 VITALS
BODY MASS INDEX: 21.74 KG/M2 | SYSTOLIC BLOOD PRESSURE: 140 MMHG | TEMPERATURE: 98 F | HEIGHT: 71 IN | OXYGEN SATURATION: 97 % | DIASTOLIC BLOOD PRESSURE: 86 MMHG | HEART RATE: 67 BPM

## 2019-02-27 DIAGNOSIS — E55.9 VITAMIN D DEFICIENCY: ICD-10-CM

## 2019-02-27 DIAGNOSIS — R05.9 COUGH: ICD-10-CM

## 2019-02-27 DIAGNOSIS — D69.6 THROMBOCYTOPENIA: ICD-10-CM

## 2019-02-27 DIAGNOSIS — R53.81 DEBILITY: ICD-10-CM

## 2019-02-27 DIAGNOSIS — E55.9 VITAMIN D DEFICIENCY: Primary | ICD-10-CM

## 2019-02-27 DIAGNOSIS — F32.A DEPRESSION, UNSPECIFIED DEPRESSION TYPE: ICD-10-CM

## 2019-02-27 DIAGNOSIS — M70.21 OLECRANON BURSITIS OF RIGHT ELBOW: ICD-10-CM

## 2019-02-27 DIAGNOSIS — D56.1 BETA THALASSEMIA: ICD-10-CM

## 2019-02-27 DIAGNOSIS — R13.12 OROPHARYNGEAL DYSPHAGIA: ICD-10-CM

## 2019-02-27 DIAGNOSIS — I10 ESSENTIAL HYPERTENSION: ICD-10-CM

## 2019-02-27 DIAGNOSIS — Z72.0 TOBACCO ABUSE: ICD-10-CM

## 2019-02-27 DIAGNOSIS — M33.20 POLYMYOSITIS: ICD-10-CM

## 2019-02-27 DIAGNOSIS — I48.91 ATRIAL FIBRILLATION, UNSPECIFIED TYPE: ICD-10-CM

## 2019-02-27 PROBLEM — M70.20 OLECRANON BURSITIS: Status: ACTIVE | Noted: 2019-02-27

## 2019-02-27 LAB — 25(OH)D3+25(OH)D2 SERPL-MCNC: 8 NG/ML

## 2019-02-27 PROCEDURE — 36415 COLL VENOUS BLD VENIPUNCTURE: CPT

## 2019-02-27 PROCEDURE — 82306 VITAMIN D 25 HYDROXY: CPT

## 2019-02-27 PROCEDURE — 99214 PR OFFICE/OUTPT VISIT, EST, LEVL IV, 30-39 MIN: ICD-10-PCS | Mod: S$PBB,,, | Performed by: FAMILY MEDICINE

## 2019-02-27 PROCEDURE — 99999 PR PBB SHADOW E&M-EST. PATIENT-LVL IV: CPT | Mod: PBBFAC,,, | Performed by: FAMILY MEDICINE

## 2019-02-27 PROCEDURE — 99214 OFFICE O/P EST MOD 30 MIN: CPT | Mod: PBBFAC | Performed by: FAMILY MEDICINE

## 2019-02-27 PROCEDURE — 99214 OFFICE O/P EST MOD 30 MIN: CPT | Mod: S$PBB,,, | Performed by: FAMILY MEDICINE

## 2019-02-27 PROCEDURE — 99999 PR PBB SHADOW E&M-EST. PATIENT-LVL IV: ICD-10-PCS | Mod: PBBFAC,,, | Performed by: FAMILY MEDICINE

## 2019-02-27 RX ORDER — ALBUTEROL SULFATE 90 UG/1
2 AEROSOL, METERED RESPIRATORY (INHALATION) EVERY 6 HOURS PRN
Qty: 1 EACH | Refills: 11 | Status: SHIPPED | OUTPATIENT
Start: 2019-02-27 | End: 2020-02-27 | Stop reason: SDUPTHER

## 2019-02-27 RX ORDER — AMLODIPINE BESYLATE 5 MG/1
5 TABLET ORAL DAILY
Qty: 30 TABLET | Refills: 11 | Status: ON HOLD | OUTPATIENT
Start: 2019-02-27 | End: 2019-04-03 | Stop reason: SDUPTHER

## 2019-02-27 RX ORDER — ERGOCALCIFEROL 1.25 MG/1
50000 CAPSULE ORAL
Qty: 4 CAPSULE | Refills: 1 | Status: SHIPPED | OUTPATIENT
Start: 2019-02-27 | End: 2021-03-04

## 2019-02-27 NOTE — TELEPHONE ENCOUNTER
Vitamin D significantly low at 8.  Start ergocalciferol 50,000 units once weekly for 8 weeks, recheck vitamin D in 8 weeks.  Also want to check phosphorus and PTH; ordered.  Please notify patient.

## 2019-02-27 NOTE — PROGRESS NOTES
"Subjective:      Patient ID: Nura Kahn Jr. is a 63 y.o. male.    Chief Complaint: Establish Care      HPI:  Nura Kahn Jr. Is a 63 year old male with atrial fibrillation, Beta thalassemia, depression, dyslipidemia, dysphagia, hemoglobin C disease, history of aspiration pneumonia of the lung, microcytic anemia, osteopenia, polymyositis, portal venous hypertension, splenomegaly, thrombocytopenia, tobacco abuse, and vitamin D deficiency who presents to clinic today to establish care and requesting evaluation of a lump on his arm.    Subcutaneous mass, arm:  Per report from medical assistant, patient states he was previously evaluated in the emergency department for this and was told to not worry about it unless it was hurting him.  Located to right elbow.  Present for a couple of weeks.  Denies associated pain.  States the lesion is stable in size.  Does not take anything for this.  Patient tells me he has not been evaluated for this in the past.    Complains of a cough currently.  States his previous PCP Dr. Roe gave him a prescription for albuterol inhaler.  States a urbina got in his inhaler and he needs a refill.  Denies any recent fevers/chills.  Cough is largely non-productive.  Denies associated shortness of breath.  Denies associated wheezing.    A. Fib:  States this diagnosis does not sound familiar to him.  States one of his doctors told him his "heart was beating like a 16 year old."  Denies any history of being on medication for heart rate/rhythm or blood thinners.    Beta thalassemia; microcytic anemia:  Hemoglobin 11.7, MCV 60 11/30/18, improved from 9.2 one year prior.  Has seen hematology in the past.    Depression:  Endorses depression sometimes.  States he sometimes has thoughts of wanting to drive his scooter off the bridge; states these thoughts are serious but he does not plan on doing this.  Does not want to take medication for this.  States his depressive symptoms are not daily.  " "States his depression is mainly related to not being able to do the things he'd like to do.    Dyslipidemia:  Last lipid panel December 2018 with mildly low HDL otherwise normal.      Dysphagia with history of aspiration pneumonia:  States he had this evaluated with speech therapy last Thursday; was instructed to take sips and smaller bites.  Reports improvement with his symptoms subsequent to this teaching.  Denies any recent choking or vomiting.    Elevated blood pressure:  150/80 on initial check.  Elevated at 2 previous appointments with podiatry and rheumatology.    Marijuana use:  States he smokes marijuana twice daily.    Osteopenia with vitamin D deficiency:  Vitamin D of 9 noted 12/12/18.  Prescribed calcium-vitamin D3 500-200 mg-unit two times daily with meals.  States he takes this medication "when he can," does not take daily; has difficulty breaking the pills, has his wife break his tablets for him.    Polymyositis:  CK, CRP, ESR WNL 11/30/18.  Prescribed azathioprine 50 mg 3 tablets by mouth daily and prednisone 5 mg two tablets by mouth daily.  Seen by rheumatology for this; was planned to admit patient for IVIG earlier this month, do not see where this occurred.  States he did not go for this as he does not like hospitals.  Per phone note 2/14/19, Dr. Durham contacted the patient and the patient reported going to the admissions office and that he was waiting on a call from them about when a bed would be available.  States he has called his rheumatologists office last Monday for further recommendations since he did not go to the hospital; states he has not heard back from his rheumatologist.  Per chart review, do not see where patient called on last Monday.  States his symptoms have been stable lately.  Uses motorized scooter.  Was previously going to therapy for this but had transportation issues in the past and was not able to continue this; states his transportation issues shouldn't be a problem " anymore and would like to return to therapy.    Thrombocytopenia:  Platelets 116 11/30/18 from 122 one year prior.    Tobacco abuse:  States he quit smoking cigarettes 2-3 years ago.  States he smoked 1 PPD for approximately 1 year prior to quitting 2-3 years ago.  States he blacked out from chain smoking in the past.    Health Care Maintenance:  Influenza vaccination:  12/12/18  Last tetanus booster:  Does not recall.  Would like to get this today.  Pneumovax:  12/12/18  Prevnar:  5/9/16  Colon cancer screening:  Previously ordered by NP last year.  [facility] he rescheduled this as he did not want to do bowel prep.  [facility] he has done a FIT kit approximately 5 years ago.        Past Medical History:   Diagnosis Date    Atrial fibrillation 3/2/2015    Depression     Essential hypertension 2/27/2019    Microcytic anemia 9/25/2014    Neuromuscular disorder     Polymyositis 2009    Tobacco abuse        Past Surgical History:   Procedure Laterality Date    BIOPSY-MUSCLE Left Thigh Left 4/14/2016    [facility]formed [facility] 2ND FLR    ESOPHAGOGASTRODUODENOSCOPY (EGD) N/A 7/1/2015    Performed by Codey Laurent [facility] at [facility] ENDO (4TH FLR)    MANOMETRY-ESOPHAGEAL N/A 7/1/2015    Performed by [facility] at [facility] ENDO (4TH FLR)       Family History   Problem Relation Age of Onset    Diabetes Mother     Hypertension Mother     Diabetes Father     Hypertension Father     Heart attack Neg Hx     Heart disease Neg Hx     Melanoma Neg Hx        Social History     Socioeconomic History    Marital status:      Spouse name: None    Number of children: None    Years of education: None    Highest education level: None   Social Needs    Financial resource strain: None    Food insecurity - worry: None    Food insecurity - inability: None    Transportation needs - medical: None    Transportation needs - non-medical: None   Occupational History    None   Tobacco Use    Smoking status:  "Former Smoker     Packs/day: 0.25     Years: 20.00     Pack years: 5.00     Types: Cigarettes    Smokeless tobacco: Never Used   Substance and Sexual Activity    Alcohol use: No     Alcohol/week: 0.0 oz    Drug use: Yes     Types: Marijuana     Comment: daily use    Sexual activity: None   Other Topics Concern    None   Social History Narrative    None       Review of Systems   Constitutional: Negative for chills, fatigue and fever.   HENT: Negative for congestion, hearing loss, nosebleeds, rhinorrhea, sore throat and trouble swallowing.    Eyes: Negative for pain and visual disturbance.   Respiratory: Positive for cough. Negative for shortness of breath and wheezing.    Cardiovascular: Negative for chest pain and palpitations.   Gastrointestinal: Negative for abdominal distention, abdominal pain, constipation, diarrhea, nausea and vomiting.   Genitourinary: Negative for difficulty urinating, dysuria, frequency, hematuria and urgency.   Musculoskeletal: Negative for arthralgias, back pain and myalgias.   Skin: Negative for color change and rash.   Neurological: Positive for weakness. Negative for dizziness, syncope, speech difficulty, numbness and headaches.   Psychiatric/Behavioral: Positive for dysphoric mood. Negative for agitation, behavioral problems and confusion. The patient is not nervous/anxious.      Objective:     Vitals:    02/27/19 0919 02/27/19 0955   BP: (!) 150/90 (!) 140/86   BP Location: Right arm    Patient Position: Sitting    BP Method: Medium (Manual)    Pulse: 67    Temp: 98 °F (36.7 °C)    TempSrc: Oral    SpO2: 97%    Height: 5' 10.8" (1.798 m)        Physical Exam   Constitutional: He is cooperative. He appears ill. No distress.   HENT:   Head: Normocephalic and atraumatic.   Right Ear: Hearing and external ear normal.   Left Ear: Hearing and external ear normal.   Nose: Nose normal. No rhinorrhea. No epistaxis.   Mouth/Throat: Oropharynx is clear and moist and mucous membranes are " normal. Abnormal dentition.   Eyes: Conjunctivae, EOM and lids are normal. Pupils are equal, round, and reactive to light. Right eye exhibits no discharge. Left eye exhibits no discharge.   Neck: Trachea normal and normal range of motion. Neck supple. No tracheal deviation present.   Cardiovascular: Normal rate, regular rhythm and normal heart sounds. Exam reveals no gallop and no friction rub.   No murmur heard.  Pulmonary/Chest: Effort normal and breath sounds normal. No respiratory distress. He has no wheezes. He has no rales.   Abdominal: Soft. Bowel sounds are normal. He exhibits no distension. There is no tenderness. There is no rebound and no guarding.   Musculoskeletal: Normal range of motion. He exhibits no edema or deformity.        Right elbow: No tenderness found.   Soft mobile mass over right elbow consistent with olecranon bursitis, non tender, no overlying skin changes.   Lymphadenopathy:     He has no cervical adenopathy.   Neurological: He is alert. No cranial nerve deficit. He exhibits normal muscle tone.   Skin: Skin is warm and dry. No rash noted.   Psychiatric: He has a normal mood and affect. His speech is normal and behavior is normal. Judgment and thought content normal. Cognition and memory are normal.   Nursing note and vitals reviewed.     Assessment:      1. Atrial fibrillation, unspecified type    2. Beta thalassemia    3. Cough    4. Debility    5. Depression, unspecified depression type    6. Essential hypertension    7. Olecranon bursitis of right elbow    8. Oropharyngeal dysphagia    9. Polymyositis    10. Thrombocytopenia    11. Tobacco abuse    12. Vitamin D deficiency      Plan:   Nura was seen today for establish care.    Diagnoses and all orders for this visit:    Atrial fibrillation, unspecified type        -    Regular rate and rhythm on auscultation today.  Denies any palpitations.  Call/return to clinic for the development of any palpitations/chest pain.    Beta  thalassemia        -     Reviewed with patient.    Cough  -     Refilled albuterol (PROVENTIL/VENTOLIN HFA) 90 mcg/actuation inhaler; Inhale 2 puffs into the lungs every 6 (six) hours as needed for Wheezing or Shortness of Breath.  Notified patient that this medication may not help with cough particularly but can instead be used as needed for shortness of breath and wheezing.  Recommended OTC regular Mucinex PRN to aide with mucolysis and expectoration.  Call/return to clinic if no improvement or for any worsening.    Debility  -     Ambulatory Referral to Physical/Occupational Therapy at patient's request.    Depression, unspecified depression type  -     Ambulatory Referral to Psychology.  Offered medical therapy; refused by patient at this time.  Instructed patient to call/return to clinic for any worsening.    Essential hypertension  -     Start amLODIPine (NORVASC) 5 MG tablet; Take 1 tablet (5 mg total) by mouth once daily.  Counseled patient on potential adverse effects.  Return to clinic in 1-2 weeks for nursing blood pressure check.  Low sodium diet.    Olecranon bursitis of right elbow        -     Call/return to clinic for any worsening or development of associated symptoms, consider orthopedic referral at that time.    Oropharyngeal dysphagia        -     Improved; continue to monitor; return to clinic for any worsening/recurrence.    Polymyositis  -     Ambulatory Referral to Physical/Occupational Therapy  -     Follow up with rheumatology.  Continue current regimen at present.    Thrombocytopenia        -     Reviewed.    Tobacco abuse        -     States he has quit and denies passive smoke exposure but has strong odor of stale cigarette smoke.  Recommended avoidance of tobacco use.    Vitamin D deficiency  -     Vitamin D; Future

## 2019-02-27 NOTE — TELEPHONE ENCOUNTER
"Vidya Durham,    Mr. Kahn was able to establish care with me today.  He notified me that he did not go to the hospital to be admitted previously as he did not want to be hospitalized at that time.  He states today that he is amenable to hospitalization for treatment if his rheumatologist still felt this was necessary.  He stated he had called your office last Monday to discuss this, was told a message would be sent to you, and never heard back.  He was concerned he was "fired" from the rheumatology practice.  I looked in the chart and I don't see any documentation where he tried to call your office.  I also notified the patient that there was no documentation of him being "fired."  I recommended he give your office a call and told him I would send you a message to let you know of the above.      Socrates Ambrosio MD  "

## 2019-02-28 ENCOUNTER — TELEPHONE (OUTPATIENT)
Dept: INTERNAL MEDICINE | Facility: CLINIC | Age: 64
End: 2019-02-28

## 2019-02-28 NOTE — TELEPHONE ENCOUNTER
----- Message from Socrates Ambrosio MD sent at 2/27/2019  4:51 PM CST -----  Vitamin D significantly low at 8.  Start ergocalciferol 50,000 units once weekly for 8 weeks, recheck vitamin D in 8 weeks.  Also want to check phosphorus and PTH; ordered.  Please notify patient.

## 2019-03-13 ENCOUNTER — TELEPHONE (OUTPATIENT)
Dept: ENDOSCOPY | Facility: HOSPITAL | Age: 64
End: 2019-03-13

## 2019-03-13 ENCOUNTER — TELEPHONE (OUTPATIENT)
Dept: INTERNAL MEDICINE | Facility: CLINIC | Age: 64
End: 2019-03-13

## 2019-03-13 ENCOUNTER — CLINICAL SUPPORT (OUTPATIENT)
Dept: INTERNAL MEDICINE | Facility: CLINIC | Age: 64
End: 2019-03-13
Payer: MEDICARE

## 2019-03-13 NOTE — TELEPHONE ENCOUNTER
Dr. Ambrosio pt here for BP check. Pt stated that he did not take Amlodipine 5mg and also did not  from pharmacy. Pt denied headaches, dizziness, or blurred vision. He did state that he does have chest pain after smoking. This usually last a few minutes then resolves. BP was taken manually in right arm. Reading was 162/80 p72. Pt stated that he will not take bp med. Pt informed that this puts him at risk to have heart attack/stroke. He stated understanding. Advised pt that if he develops headache, dizziness, blurred vision, or chest pain he should report to the nearest ER. Pt understood but stated that he would not go. Dr. Huff advised.    Pt did agree to come in for a follow up visit with Dr. Huff on 3/19/19.

## 2019-03-19 ENCOUNTER — OFFICE VISIT (OUTPATIENT)
Dept: INTERNAL MEDICINE | Facility: CLINIC | Age: 64
End: 2019-03-19
Payer: MEDICARE

## 2019-03-19 VITALS
OXYGEN SATURATION: 99 % | HEIGHT: 71 IN | SYSTOLIC BLOOD PRESSURE: 162 MMHG | HEART RATE: 73 BPM | DIASTOLIC BLOOD PRESSURE: 82 MMHG | BODY MASS INDEX: 21.74 KG/M2 | TEMPERATURE: 98 F

## 2019-03-19 DIAGNOSIS — E55.9 VITAMIN D DEFICIENCY: ICD-10-CM

## 2019-03-19 DIAGNOSIS — M33.20 POLYMYOSITIS: ICD-10-CM

## 2019-03-19 DIAGNOSIS — I10 ESSENTIAL HYPERTENSION: Primary | ICD-10-CM

## 2019-03-19 PROCEDURE — 99213 OFFICE O/P EST LOW 20 MIN: CPT | Mod: PBBFAC | Performed by: FAMILY MEDICINE

## 2019-03-19 PROCEDURE — 99999 PR PBB SHADOW E&M-EST. PATIENT-LVL III: ICD-10-PCS | Mod: PBBFAC,,, | Performed by: FAMILY MEDICINE

## 2019-03-19 PROCEDURE — 99999 PR PBB SHADOW E&M-EST. PATIENT-LVL III: CPT | Mod: PBBFAC,,, | Performed by: FAMILY MEDICINE

## 2019-03-19 PROCEDURE — 99213 PR OFFICE/OUTPT VISIT, EST, LEVL III, 20-29 MIN: ICD-10-PCS | Mod: S$PBB,,, | Performed by: FAMILY MEDICINE

## 2019-03-19 PROCEDURE — 99213 OFFICE O/P EST LOW 20 MIN: CPT | Mod: S$PBB,,, | Performed by: FAMILY MEDICINE

## 2019-03-19 NOTE — PATIENT INSTRUCTIONS
Controlling High Blood Pressure  High blood pressure (hypertension) is often called the silent killer. This is because many people who have it dont know it. High blood pressure is defined as 140/90 mm Hg or higher. Know your blood pressure and remember to check it regularly. Doing so can save your life. Here are some things you can do to help control your blood pressure.    Choose heart-healthy foods  · Select low-salt, low-fat foods. Limit sodium intake to 2,400 mg per day or the amount suggested by your healthcare provider.  · Limit canned, dried, cured, packaged, and fast foods. These can contain a lot of salt.  · Eat 8 to 10 servings of fruits and vegetables every day.  · Choose lean meats, fish, or chicken.  · Eat whole-grain pasta, brown rice, and beans.  · Eat 2 to 3 servings of low-fat or fat-free dairy products.  · Ask your doctor about the DASH eating plan. This plan helps reduce blood pressure.  · When you go to a restaurant, ask that your meal be prepared with no added salt.  Maintain a healthy weight  · Ask your healthcare provider how many calories to eat a day. Then stick to that number.  · Ask your healthcare provider what weight range is healthiest for you. If you are overweight, a weight loss of only 3% to 5% of your body weight can help lower blood pressure. Generally, a good weight loss goal is to lose 10% of your body weight in a year.  · Limit snacks and sweets.  · Get regular exercise.  Get up and get active  · Choose activities you enjoy. Find ones you can do with friends or family. This includes bicycling, dancing, walking, and jogging.  · Park farther away from building entrances.  · Use stairs instead of the elevator.  · When you can, walk or bike instead of driving.  · [place] leaves, garden, or do household repairs.  · Be active at a moderate to vigorous level of physical activity for at least 40 minutes for a minimum of 3 to 4 days a week.   Manage stress  · Make time to relax and enjoy  life. Find time to laugh.  · Communicate your concerns with your loved ones and your healthcare provider.  · Visit with family and friends, and keep up with hobbies.  Limit alcohol and quit smoking  · Men should have no more than 2 drinks per day.  · Women should have no more than 1 drink per day.  · Talk with your healthcare provider about quitting smoking. Smoking significantly increases your risk for heart disease and stroke. Ask your healthcare provider about community smoking cessation programs and other options.  Medicines  If lifestyle changes arent enough, your healthcare provider may prescribe high blood pressure medicine. Take all medicines as prescribed. If you have any questions about your medicines, ask your healthcare provider before stopping or changing them.   Date Last Reviewed: 4/27/2016  © 6810-7074 [employer], [employer]. [address] [place] [address], [place] [address]. All rights reserved. This information is not intended as a substitute for professional medical care. Always follow your healthcare professional's instructions.

## 2019-03-19 NOTE — PROGRESS NOTES
Subjective:      Patient ID: Nura Kahn Jr. is a 63 y.o. male.    Chief Complaint: Follow-up      HPI:  Nura Kahn Jr. Is a 63 year old male with atrial fibrillation, Beta thalassemia, depression, dyslipidemia, dysphagia, hemoglobin C disease, history of aspiration pneumonia of the lung, microcytic anemia, osteopenia, polymyositis, portal venous hypertension, splenomegaly, thrombocytopenia, tobacco abuse, and vitamin D deficiency who presents to clinic today for follow up on hypertension.    HTN:  Seen 2/27/19 to establish care.  Noted to have elevated blood pressure 140/86 at that time.  Also noted to have elevated blood pressure at preceding two appointments with podiatry and rheumatology--149/80 and 161/99 respectively.  Amlodipine 5 mg by mouth daily prescribed at that time.  Patient did present for nursing blood pressure check 3/13/19; blood pressure again elevated.  Notified nursing that he did not start amlodipine and that he refused to start blood pressure medication at that time.  Was informed of risks of untreated hypertension including cardiovascular events such as stroke and heart attack and recommended to start the blood pressure medication as prescribed and schedule a follow up appointment.  Blood pressure significantly elevated today to 172/80 on initial check.  States he thought he had heard some blood pressure medications are related to toe amputations.  States he will start taking the medication tomorrow.  Denies associated head aches or vision changes.  States his wife doesn't use salt when cooking but otherwise does not monitor his sodium intake.      Also instructed to start supplemental vitamin D 50,000 units weekly due to vitamin D deficiency.  Has not started this.  Currently taking OTC Ca+Vit D.    Endorses generalized weakness.  Has not followed up with rheumatology since he declined hospitalization previously.      Past Medical History:   Diagnosis Date    Atrial fibrillation  3/2/2015    Depression     Essential hypertension 2/27/2019    Microcytic anemia 9/25/2014    Neuromuscular disorder     Polymyositis 2009    Tobacco abuse        Past Surgical History:   Procedure Laterality Date    BIOPSY-MUSCLE Left Thigh Left 4/14/2016    [facility]formed [facility] OR 2ND FLR    ESOPHAGOGASTRODUODENOSCOPY (EGD) N/A 7/1/2015    Performed by [facility]y Laurent [facility] at [facility] ENDO (4TH FLR)    MANOMETRY-ESOPHAGEAL N/A 7/1/2015    Performed by [facility] at [facility] ENDO (4TH FLR)       Family History   Problem Relation Age of Onset    Diabetes Mother     Hypertension Mother     Diabetes Father     Hypertension Father     Heart attack Neg Hx     Heart disease Neg Hx     Melanoma Neg Hx        Social History     Socioeconomic History    Marital status:      Spouse name: None    Number of children: None    Years of education: None    Highest education level: None   Social Needs    Financial resource strain: None    Food insecurity - worry: None    Food insecurity - inability: None    Transportation needs - medical: None    Transportation needs - non-medical: None   Occupational History    None   Tobacco Use    Smoking status: Former Smoker     Packs/day: 0.25     Years: 20.00     Pack years: 5.00     Types: Cigarettes    Smokeless tobacco: Never Used   Substance and Sexual Activity    Alcohol use: No     Alcohol/week: 0.0 oz    Drug use: Yes     Types: Marijuana     Comment: daily use    Sexual activity: None   Other Topics Concern    None   Social History Narrative    None       Review of Systems   Constitutional: Negative for chills, fatigue and fever.   HENT: Negative for congestion, hearing loss, nosebleeds, rhinorrhea, sore throat and trouble swallowing.    Eyes: Negative for pain and visual disturbance.   Respiratory: Negative for cough, shortness of breath and wheezing.    Cardiovascular: Negative for chest pain and palpitations.   Gastrointestinal:  "Negative for abdominal distention, abdominal pain, constipation, diarrhea, nausea and vomiting.   Genitourinary: Negative for difficulty urinating, dysuria, frequency, hematuria and urgency.   Musculoskeletal: Negative for arthralgias, back pain and myalgias.   Skin: Negative for color change and rash.   Neurological: Positive for weakness. Negative for dizziness, syncope, speech difficulty, numbness and headaches.   Psychiatric/Behavioral: Negative for agitation, behavioral problems and confusion. The patient is not nervous/anxious.      Objective:     Vitals:    03/19/19 0823 03/19/19 0842   BP: (!) 172/80 (!) 162/82   BP Location: Right arm    Patient Position: Sitting    BP Method: Medium (Manual)    Pulse: 73    Temp: 98.2 °F (36.8 °C)    TempSrc: Oral    SpO2: 99%    Height: 5' 10.8" (1.798 m)        Physical Exam   Constitutional: He is cooperative. No distress.   HENT:   Head: Normocephalic and atraumatic.   Right Ear: Hearing and external ear normal.   Left Ear: Hearing and external ear normal.   Nose: Nose normal. No rhinorrhea. No epistaxis.   Mouth/Throat: Oropharynx is clear and moist and mucous membranes are normal. Abnormal dentition.   Eyes: Conjunctivae, EOM and lids are normal. Pupils are equal, round, and reactive to light. Right eye exhibits no discharge. Left eye exhibits no discharge.   Neck: Trachea normal and normal range of motion. Neck supple. No tracheal deviation present.   Cardiovascular: Normal rate, regular rhythm and normal heart sounds. Exam reveals no gallop and no friction rub.   No murmur heard.  Pulmonary/Chest: Effort normal and breath sounds normal. No respiratory distress. He has no wheezes. He has no rales.   Abdominal: Soft. Bowel sounds are normal. He exhibits no distension. There is no tenderness. There is no rebound and no guarding.   Musculoskeletal: Normal range of motion. He exhibits no edema or deformity.        Right elbow: No tenderness found.   Lymphadenopathy:     " He has no cervical adenopathy.   Neurological: He is alert. No cranial nerve deficit. He exhibits normal muscle tone.   Skin: Skin is warm and dry. No rash noted.   Psychiatric: He has a normal mood and affect. His speech is normal and behavior is normal. Judgment and thought content normal. Cognition and memory are normal.   Nursing note and vitals reviewed.     Assessment:      1. Essential hypertension    2. Vitamin D deficiency    3. Polymyositis      Plan:   Nura was seen today for follow-up.    Diagnoses and all orders for this visit:    Essential hypertension        -     Notified patient that increased risk for toe amputations is not associated with amlodipine.  Blood pressure persistently above goal; start amlodipine 5 mg by mouth daily as previously prescribed and return to clinic in 1-2 weeks for nursing blood pressure check.  Recommended low sodium diet as well.  Hand out provided.    Vitamin D deficiency        -     Notified patient that his OTC vitamin D dosage will likely be insufficient to treat the degree of vitamin D deficiency he has.  Recommended patient start ergocalciferol as previously prescribed and return to clinic in 8 weeks after starting this to have vitamin D level rechecked.    Polymyositis        -     Recommended patient follow up with rheumatology.

## 2019-03-21 ENCOUNTER — OFFICE VISIT (OUTPATIENT)
Dept: PODIATRY | Facility: CLINIC | Age: 64
End: 2019-03-21
Payer: MEDICARE

## 2019-03-21 VITALS — BODY MASS INDEX: 22.19 KG/M2 | WEIGHT: 155 LBS | HEIGHT: 70 IN

## 2019-03-21 DIAGNOSIS — M79.674 TOE PAIN, BILATERAL: ICD-10-CM

## 2019-03-21 DIAGNOSIS — B35.1 ONYCHOMYCOSIS DUE TO DERMATOPHYTE: Primary | ICD-10-CM

## 2019-03-21 DIAGNOSIS — M79.675 TOE PAIN, BILATERAL: ICD-10-CM

## 2019-03-21 PROCEDURE — 11721 PR DEBRIDEMENT OF NAILS, 6 OR MORE: ICD-10-PCS | Mod: S$PBB,,, | Performed by: PODIATRIST

## 2019-03-21 PROCEDURE — 99213 OFFICE O/P EST LOW 20 MIN: CPT | Mod: PBBFAC | Performed by: PODIATRIST

## 2019-03-21 PROCEDURE — 11721 DEBRIDE NAIL 6 OR MORE: CPT | Mod: PBBFAC | Performed by: PODIATRIST

## 2019-03-21 PROCEDURE — 99499 UNLISTED E&M SERVICE: CPT | Mod: S$PBB,,, | Performed by: PODIATRIST

## 2019-03-21 PROCEDURE — 99999 PR PBB SHADOW E&M-EST. PATIENT-LVL III: ICD-10-PCS | Mod: PBBFAC,,, | Performed by: PODIATRIST

## 2019-03-21 PROCEDURE — 11721 DEBRIDE NAIL 6 OR MORE: CPT | Mod: S$PBB,,, | Performed by: PODIATRIST

## 2019-03-21 PROCEDURE — 99999 PR PBB SHADOW E&M-EST. PATIENT-LVL III: CPT | Mod: PBBFAC,,, | Performed by: PODIATRIST

## 2019-03-21 PROCEDURE — 99499 NO LOS: ICD-10-PCS | Mod: S$PBB,,, | Performed by: PODIATRIST

## 2019-03-21 NOTE — PROGRESS NOTES
Subjective:      Patient ID: Nura Kahn Jr. is a 63 y.o. male.    Chief Complaint: Nail Care    Thick painful discolored misshapen toenails all ten toes exquisitely painful preventing sock and shoe wear.  Gradual onset, worsening over past several weeks, aggravated by , shoe gear, pressure.  No previous medical treatment.  OTC pain med not helping. Patient not ambulatory.     Review of Systems   Constitution: Negative for chills, diaphoresis, fever, malaise/fatigue and night sweats.   Cardiovascular: Negative for claudication, cyanosis, leg swelling and syncope.   Skin: Positive for nail changes. Negative for color change, dry skin, rash, suspicious lesions and unusual hair distribution.   Musculoskeletal: Positive for muscle weakness. Negative for falls, joint pain, joint swelling, muscle cramps and stiffness.   Gastrointestinal: Negative for constipation, diarrhea, nausea and vomiting.   Neurological: Positive for focal weakness. Negative for brief paralysis, disturbances in coordination, numbness, paresthesias, sensory change and tremors.           Objective:      Physical Exam   Constitutional: He is oriented to person, place, and time. He appears well-developed and well-nourished. He is cooperative.   Oriented to time, place, and person.   Cardiovascular:   Pulses:       Dorsalis pedis pulses are 1+ on the right side, and 1+ on the left side.        Posterior tibial pulses are 1+ on the right side, and 1+ on the left side.   Capillary fill time 3-5 seconds.  All toes warm to touch.      Negative lower extremity edema bilateral.    Negative elevational pallor and dependent rubor bilateral.     Musculoskeletal:   Normal angle, base, station of gait. Decreased stride length, early heel off, moderately propulsive toe off bilateral.    All ten toes without clubbing, cyanosis, or signs of ischemia.      No pain to palpation bilateral lower extremities.      Range of motion, stability, muscle strength, and  muscle tone are age and health appropriate diminished bilateral feet and legs.       Lymphadenopathy:   Negative lymphadenopathy bilateral popliteal fossa and tarsal tunnel.  Negative lymphangitic streaking bilateral foot/ankle bilateral.     Neurological: He is alert and oriented to person, place, and time. He has normal strength. He is not disoriented. He displays no atrophy and no tremor. No sensory deficit. He exhibits normal muscle tone.   Reflex Scores:       Patellar reflexes are 2+ on the right side and 2+ on the left side.       Achilles reflexes are 2+ on the right side and 2+ on the left side.    Negative tinel sign to percussion sural, superficial peroneal, deep peroneal, saphenous, and posterior tibial nerves right and left ankles and feet.     Skin: Skin is warm, dry and intact. No abrasion, no bruising, no burn, no ecchymosis, no laceration, no lesion, no petechiae and no rash noted. He is not diaphoretic. No cyanosis or erythema. No pallor. Nails show no clubbing.   Skin thin, atrophic, with marked thick keratosis from lack of skin slough right and left feet to ankle  without ulceration, drainage, pus, tracking, fluctuance, malodor, or cardinal signs infection.       Toenails 1st, 2nd, 3rd, 4th, 5th  bilateral are severely hypertrophic thickened 5-10 mm, dystrophic, discolored tanish brown with tan, gray crumbly subungual debris.  Exquisitely painful to distal nail plate pressure, without periungual skin abnormality of each.               Assessment:       Encounter Diagnoses   Name Primary?    Onychomycosis due to dermatophyte Yes    Toe pain, bilateral          Plan:       Nura was seen today for nail care.    Diagnoses and all orders for this visit:    Onychomycosis due to dermatophyte    Toe pain, bilateral      I counseled the patient on his conditions, their implications and medical management.      - Shoe inspection. Patient instructed on proper foot hygeine. We discussed wearing proper  shoe gear, daily foot inspections, never walking without protective shoe gear, never putting sharp instruments to feet, routine podiatric visits as needed.    Discussed conservative treatment with shoes of adequate dimensions, material, and style to alleviate symptoms and delay or prevent surgical intervention.    In my medical opinion this patient has obvious medical necessity for nail debridement by a medical professional to prevent marked physical harm from sequela and during debridement by non medical professional.  Not to do so in his condition would be both dangerous and inhumane.    - With patient's permission, nails were aggressively reduced and debrided x 10 to their soft tissue attachment mechanically and with electric , removing all offending nail and debris. Patient relates relief following the procedure. He will continue to monitor the areas daily, inspect his feet, wear protective shoe gear when ambulatory, moisturizer to maintain skin integrity and follow in this office p.r.n.          Follow-up if symptoms worsen or fail to improve.

## 2019-03-28 ENCOUNTER — CLINICAL SUPPORT (OUTPATIENT)
Dept: REHABILITATION | Facility: OTHER | Age: 64
End: 2019-03-28
Payer: MEDICARE

## 2019-03-28 DIAGNOSIS — R53.81 DEBILITY: ICD-10-CM

## 2019-03-28 DIAGNOSIS — Z74.09 IMPAIRED FUNCTIONAL MOBILITY, BALANCE, GAIT, AND ENDURANCE: ICD-10-CM

## 2019-03-28 DIAGNOSIS — R29.898 WEAKNESS OF BOTH LOWER EXTREMITIES: Primary | ICD-10-CM

## 2019-03-28 DIAGNOSIS — R26.81 GAIT INSTABILITY: ICD-10-CM

## 2019-03-28 PROCEDURE — 97163 PT EVAL HIGH COMPLEX 45 MIN: CPT | Mod: PN

## 2019-03-28 NOTE — PLAN OF CARE
[facility]  Physical Therapy Initial Evaluation    Name: Nura Kahn Jr.  Clinic Number: [id number]    Therapy Diagnosis:   Encounter Diagnoses   Name Primary?    Impaired functional mobility, balance, gait, and endurance     Debility     Gait instability     Weakness of both lower extremities Yes     Physician: Socrates Ambrosio MD    Physician Orders: PT Eval and Treat   Medical Diagnosis:   M33.20 (ICD-10-CM) - Polymyositis  R53.81 (ICD-10-CM) - Debility  Evaluation Date: 3/28/2019  Authorization Period Expiration: 2/27/2020  Plan of Care Certification Period: 5/23/19  Visit # / Visits authorized: 1/ 1    Time In: 9:16 AM  Time Out: 9:55 AM  Total Billable Time: 39 minutes    Precautions: Atrial fibrllation, depression    Subjective   States he had therapy last at [facility] and walked on the treadmill in a harness. States he is to go to the hospital on Monday to try some medication for his neurological condition. States he sleeps in a recliner  Date of onset: Progressive weakness in Legs and arms since onset of disease in 2009  History of current condition - Nura is a 62 yo AAM referred to OP PT secondary debility.       Past Medical History:   Diagnosis Date    Atrial fibrillation 3/2/2015    Depression     Essential hypertension 2/27/2019    Microcytic anemia 9/25/2014    Neuromuscular disorder     Polymyositis 2009    Tobacco abuse      Nura Kahn Jr.  has no past surgical history on file.    Nura has a current medication list which includes the following prescription(s): albuterol, amlodipine, ammonium lactate, azathioprine, calcium-vitamin d3, ciclopirox, ergocalciferol, imiquimod, ketoconazole, pantoprazole, and prednisone.    Review of patient's allergies indicates:  No Known Allergies     Imaging:  No imaging noted    Prior Therapy: OP PT  Social History:  lives with his spouse  Occupation: disabled  Prior Level of Function: when he was in therapy,  "amb with a walker. I with dressing.  Current Level of Function: uses power scooter. "I need help with everything I do. Sleeps in a recliner. Has hospital bed.    Pain:  Current 5/10, worst 10/10, best 0/10   Location: bilateral back and hips   Description: stretching  Aggravating Factors: lying flat  Easing Factors: changing position    Pts goals: I want to try to walk a little bit and lay down flat.    Objective     Arrived to therapy in power scooter      Functional assessment:   - walking: not tested today  - sit to stand: max A   - scooter to mat: sliding with mod I due to increased time.    AROM  LE MMT  R  L    Hip flexion  1/5  1/5    Hip abduction  2-/5  2-/5    Hip extension  2+/5  2+/5    Hip ER  3-/5  3/5    Hip IR  1/5  2-/5    Knee extension  1/5  1/5    Knee flexion  1/5  1/5    Ankle dorsiflexion  4-/5  3/5    Ankle plantar flexion  5/5  5/5    Ankle inversion  3/5  3/5    Ankle eversion  3/5  3/5        Flexibility testing:  - hamstrings:         B: WNL  - gastrocnemius:   B: tight, R: neutral, L: neutral   - piriformis:            B: tight, decreased 50%  - quadriceps:         B: tight, decreased 50%  - hip adductors:    B: WNL  - hip flexors:         B: tight, decreased 50%  - IT bands:           B: tight, decreased 50%        CMS Impairment/Limitation/Restriction for FOTO Survey    Therapist reviewed FOTO scores for Nura Kahn  on 3/28/2019.   FOTO documents entered into retickr - see Media section.    Limitation Score: 84%  Category: Mobility    Current : CM = at least 80% but < 100% impaired limited or restricted  Goal: CL = least 60% but < 80% impaired, limited or restricted       TREATMENT   No treatment separate from evaluation.      Home Exercises Provided and Patient Education Provided     Education provided:   - Discussed the role of the PTA on the Rehab Team. Also discussed the use of the [facility] for communication.    Assessment   Nura is a 63 y.o. male referred to " outpatient Physical Therapy with a medical diagnosis of M33.20 (ICD-10-CM) - Polymyositis, R53.81 (ICD-10-CM) - Debility. Pt presents with decreased B LE strength contributing to decreased performance and safety with bed mobility, transfers, and gait. Will proceed with OP PT to maximize B LE strength to increase transfer performance and safety. WIll assess gait next visit and set gait goal.    Pt prognosis is Guarded.   Pt will benefit from skilled outpatient Physical Therapy to address the deficits stated above and in the chart below, provide pt/family education, and to maximize pt's level of independence.     Plan of care discussed with patient: Yes  Pt's spiritual, cultural and educational needs considered and patient is agreeable to the plan of care and goals as stated below:     Anticipated Barriers for therapy: Polymyositis, Neuromuscular disorder    Medical Necessity is demonstrated by the following  History  Co-morbidities and personal factors that may impact the plan of care Co-morbidities:   depression and polymyositis, Neuromuscular Disorder    Personal Factors:   coping style     high   Examination  Body Structures and Functions, activity limitations and participation restrictions that may impact the plan of care Body Regions:   lower extremities  upper extremities  trunk    Body Systems:    strength  gross coordinated movement  balance  gait  transfers  transitions    Participation Restrictions:   none    Activity limitations:   Learning and applying knowledge  no deficits    General Tasks and Commands  no deficits    Communication  no deficits    Mobility  lifting and carrying objects  fine hand use (grasping/picking up)  walking  moving around using equipment (WC)    Self care  washing oneself (bathing, drying, washing hands)  caring for body parts (brushing teeth, shaving, grooming)  toileting  dressing    Domestic Life  doing house work (cleaning house, washing dishes, laundry)  assisting  others    Interactions/Relationships  no deficits    Life Areas  no deficits    Community and Social Life  no deficits         high   Clinical Presentation unstable clinical presentation with unpredictable characteristics high   Decision Making/ Complexity Score: high     Goals:  Short Term Goals: 4 weeks   1. Independent with HEP.  2. Report decreased low back and B hip pain < or =  5/10 with adls such as lying on his back for 30 minutes.   3. Sit to stand with min A.      Long Term Goals: 8 weeks   4. Patient to sleep in his hospital bed instead of the recliner for the night.  5. Sit to stand with S  6. I with bed mobility.  7. Increased MMT for B LE by 1 muscle grade to promote proper pelvic stability to increase transfer safety.  8. Patient to achieve CL (at least 60% < 80% impaired, limited or restricted) level on the FOTO Outcomes Measurement System.    Plan   Certification Period/Plan of care expiration: 3/28/2019 to 5/23/19.    Outpatient Physical Therapy 2 times weekly for 8 weeks to include the following interventions: Gait Training, Neuromuscular Re-ed, Patient Education and Therapeutic Exercise.     Will assess gait next visit and set goals.    Kenny Thompson, PT

## 2019-03-29 ENCOUNTER — TELEPHONE (OUTPATIENT)
Dept: RHEUMATOLOGY | Facility: CLINIC | Age: 64
End: 2019-03-29

## 2019-03-29 NOTE — TELEPHONE ENCOUNTER
Called patient: He is willing to get admitted for further workup and treatment. Called for direct admission 4/1. Discussed with Hospitalist. Patient will be called on Monday on when to come to hospital.     For Further details please see clinic note from 2/13    Assessment and plan from that note as follows:    Due to the complex nature of this patients case, his worsening strength and dysphagia as well as his social/transportation problems we elect to admit patient to get further workup and start IVIG as inpatient. Patient will be directly admitted to hospital on 2/14/19. On arrival please:     -Consult Rheumatology (we will place IVIG orders after evaluation)  -please order CBC, BMP, LFTs, aldolase, CPK, ESR, CRP, Immunoglobulins, HMG CoA reducatase antibody and CN1A antibody (sendout test)  -please order Bilateral MRIs with and without contrast of b/l femurs and b/l humerus    -please order PT/OT and consult PM&R   -He will need swallow evaluation by speech and please consult GI for evaluation of dysphagia   -please consult hepatology for evaluation of hepatosplenomegaly and portal hypertension   -he will need nutrition consult  -Consult social work for assessment of needs.      Please let us know if you have any questions or concerns. Case discussed with Dr. Huston.   Admit office called and case discussion with staff held. Patient is to go to admit office on first floor after his PCP appt tomorrow.

## 2019-03-31 ENCOUNTER — EXTERNAL CHRONIC CARE MANAGEMENT (OUTPATIENT)
Dept: PRIMARY CARE CLINIC | Facility: CLINIC | Age: 64
DRG: 546 | End: 2019-03-31
Payer: MEDICARE

## 2019-03-31 PROCEDURE — 99490 PR CHRONIC CARE MGMT, 1ST 20 MIN: ICD-10-PCS | Mod: S$PBB,,, | Performed by: FAMILY MEDICINE

## 2019-03-31 PROCEDURE — 99490 CHRNC CARE MGMT STAFF 1ST 20: CPT | Mod: PBBFAC | Performed by: FAMILY MEDICINE

## 2019-03-31 PROCEDURE — 99490 CHRNC CARE MGMT STAFF 1ST 20: CPT | Mod: S$PBB,,, | Performed by: FAMILY MEDICINE

## 2019-04-01 ENCOUNTER — TELEPHONE (OUTPATIENT)
Dept: INTERNAL MEDICINE | Facility: CLINIC | Age: 64
End: 2019-04-01

## 2019-04-01 NOTE — TELEPHONE ENCOUNTER
----- Message from Ellie Lockhart sent at 4/1/2019 10:15 AM CDT -----  Contact: self  Pt called asking for advice about his appt on 4/2/19 and would like the nurse to call him back to see if this visit is truly needed so he can get his transportation set up properly.    Pt can be reached at [phone].

## 2019-04-02 ENCOUNTER — HOSPITAL ENCOUNTER (INPATIENT)
Facility: HOSPITAL | Age: 64
LOS: 8 days | Discharge: HOME HEALTH | DRG: 546 | End: 2019-04-10
Attending: INTERNAL MEDICINE | Admitting: INTERNAL MEDICINE
Payer: MEDICARE

## 2019-04-02 ENCOUNTER — CLINICAL SUPPORT (OUTPATIENT)
Dept: INTERNAL MEDICINE | Facility: CLINIC | Age: 64
End: 2019-04-02
Payer: MEDICARE

## 2019-04-02 ENCOUNTER — TELEPHONE (OUTPATIENT)
Dept: RHEUMATOLOGY | Facility: CLINIC | Age: 64
End: 2019-04-02

## 2019-04-02 ENCOUNTER — TELEPHONE (OUTPATIENT)
Dept: INTERNAL MEDICINE | Facility: CLINIC | Age: 64
End: 2019-04-02

## 2019-04-02 DIAGNOSIS — I48.91 ATRIAL FIBRILLATION: ICD-10-CM

## 2019-04-02 DIAGNOSIS — I10 ESSENTIAL HYPERTENSION: ICD-10-CM

## 2019-04-02 DIAGNOSIS — K76.6 PORTAL VENOUS HYPERTENSION: ICD-10-CM

## 2019-04-02 DIAGNOSIS — Z01.30 BLOOD PRESSURE CHECK: Primary | ICD-10-CM

## 2019-04-02 DIAGNOSIS — Z74.09 IMPAIRED FUNCTIONAL MOBILITY, BALANCE, GAIT, AND ENDURANCE: Chronic | ICD-10-CM

## 2019-04-02 DIAGNOSIS — R16.1 SPLENOMEGALY: ICD-10-CM

## 2019-04-02 DIAGNOSIS — K21.9 GASTROESOPHAGEAL REFLUX DISEASE WITHOUT ESOPHAGITIS: ICD-10-CM

## 2019-04-02 DIAGNOSIS — R13.12 OROPHARYNGEAL DYSPHAGIA: ICD-10-CM

## 2019-04-02 DIAGNOSIS — M33.20 POLYMYOSITIS: Primary | ICD-10-CM

## 2019-04-02 LAB
ALBUMIN SERPL BCP-MCNC: 4.2 G/DL (ref 3.5–5.2)
ALP SERPL-CCNC: 67 U/L (ref 55–135)
ALT SERPL W/O P-5'-P-CCNC: 7 U/L (ref 10–44)
ANION GAP SERPL CALC-SCNC: 8 MMOL/L (ref 8–16)
AST SERPL-CCNC: 9 U/L (ref 10–40)
BASOPHILS # BLD AUTO: 0.04 K/UL (ref 0–0.2)
BASOPHILS NFR BLD: 0.6 % (ref 0–1.9)
BILIRUB SERPL-MCNC: 1.8 MG/DL (ref 0.1–1)
BUN SERPL-MCNC: 11 MG/DL (ref 8–23)
CALCIUM SERPL-MCNC: 9.4 MG/DL (ref 8.7–10.5)
CHLORIDE SERPL-SCNC: 107 MMOL/L (ref 95–110)
CK SERPL-CCNC: 110 U/L (ref 20–200)
CO2 SERPL-SCNC: 25 MMOL/L (ref 23–29)
CREAT SERPL-MCNC: 0.5 MG/DL (ref 0.5–1.4)
CRP SERPL-MCNC: 0.3 MG/L (ref 0–8.2)
DIFFERENTIAL METHOD: ABNORMAL
EOSINOPHIL # BLD AUTO: 0.1 K/UL (ref 0–0.5)
EOSINOPHIL NFR BLD: 1.6 % (ref 0–8)
ERYTHROCYTE [DISTWIDTH] IN BLOOD BY AUTOMATED COUNT: 21.3 % (ref 11.5–14.5)
ERYTHROCYTE [SEDIMENTATION RATE] IN BLOOD BY WESTERGREN METHOD: <2 MM/HR (ref 0–23)
EST. GFR  (AFRICAN AMERICAN): >60 ML/MIN/1.73 M^2
EST. GFR  (NON AFRICAN AMERICAN): >60 ML/MIN/1.73 M^2
ESTIMATED AVG GLUCOSE: ABNORMAL MG/DL (ref 68–131)
GLUCOSE SERPL-MCNC: 75 MG/DL (ref 70–110)
HBA1C MFR BLD HPLC: <4 % (ref 4–5.6)
HCT VFR BLD AUTO: 35.8 % (ref 40–54)
HGB BLD-MCNC: 11.4 G/DL (ref 14–18)
IGA SERPL-MCNC: 210 MG/DL (ref 40–350)
IGG SERPL-MCNC: 1754 MG/DL (ref 650–1600)
IGM SERPL-MCNC: 62 MG/DL (ref 50–300)
IMM GRANULOCYTES # BLD AUTO: 0.05 K/UL (ref 0–0.04)
IMM GRANULOCYTES NFR BLD AUTO: 0.8 % (ref 0–0.5)
INR PPP: 1 (ref 0.8–1.2)
LYMPHOCYTES # BLD AUTO: 2.1 K/UL (ref 1–4.8)
LYMPHOCYTES NFR BLD: 34.5 % (ref 18–48)
MAGNESIUM SERPL-MCNC: 2.2 MG/DL (ref 1.6–2.6)
MCH RBC QN AUTO: 20 PG (ref 27–31)
MCHC RBC AUTO-ENTMCNC: 31.8 G/DL (ref 32–36)
MCV RBC AUTO: 63 FL (ref 82–98)
MONOCYTES # BLD AUTO: 0.5 K/UL (ref 0.3–1)
MONOCYTES NFR BLD: 7.3 % (ref 4–15)
NEUTROPHILS # BLD AUTO: 3.4 K/UL (ref 1.8–7.7)
NEUTROPHILS NFR BLD: 55.2 % (ref 38–73)
NRBC BLD-RTO: 5 /100 WBC
PHOSPHATE SERPL-MCNC: 3.1 MG/DL (ref 2.7–4.5)
PLATELET # BLD AUTO: 149 K/UL (ref 150–350)
PMV BLD AUTO: ABNORMAL FL (ref 9.2–12.9)
POTASSIUM SERPL-SCNC: 3.8 MMOL/L (ref 3.5–5.1)
PROT SERPL-MCNC: 8 G/DL (ref 6–8.4)
PROTHROMBIN TIME: 10.8 SEC (ref 9–12.5)
RBC # BLD AUTO: 5.69 M/UL (ref 4.6–6.2)
SODIUM SERPL-SCNC: 140 MMOL/L (ref 136–145)
TSH SERPL DL<=0.005 MIU/L-ACNC: 1.52 UIU/ML (ref 0.4–4)
WBC # BLD AUTO: 6.18 K/UL (ref 3.9–12.7)

## 2019-04-02 PROCEDURE — 36415 COLL VENOUS BLD VENIPUNCTURE: CPT

## 2019-04-02 PROCEDURE — 25000003 PHARM REV CODE 250: Performed by: HOSPITALIST

## 2019-04-02 PROCEDURE — 99223 1ST HOSP IP/OBS HIGH 75: CPT | Mod: AI,GC,, | Performed by: HOSPITALIST

## 2019-04-02 PROCEDURE — 84443 ASSAY THYROID STIM HORMONE: CPT

## 2019-04-02 PROCEDURE — 83735 ASSAY OF MAGNESIUM: CPT

## 2019-04-02 PROCEDURE — 25000003 PHARM REV CODE 250: Performed by: STUDENT IN AN ORGANIZED HEALTH CARE EDUCATION/TRAINING PROGRAM

## 2019-04-02 PROCEDURE — 84100 ASSAY OF PHOSPHORUS: CPT

## 2019-04-02 PROCEDURE — 83036 HEMOGLOBIN GLYCOSYLATED A1C: CPT

## 2019-04-02 PROCEDURE — 82085 ASSAY OF ALDOLASE: CPT

## 2019-04-02 PROCEDURE — 11000001 HC ACUTE MED/SURG PRIVATE ROOM

## 2019-04-02 PROCEDURE — 80053 COMPREHEN METABOLIC PANEL: CPT

## 2019-04-02 PROCEDURE — 83520 IMMUNOASSAY QUANT NOS NONAB: CPT

## 2019-04-02 PROCEDURE — 25000003 PHARM REV CODE 250: Performed by: INTERNAL MEDICINE

## 2019-04-02 PROCEDURE — 85610 PROTHROMBIN TIME: CPT

## 2019-04-02 PROCEDURE — 82550 ASSAY OF CK (CPK): CPT

## 2019-04-02 PROCEDURE — 83516 IMMUNOASSAY NONANTIBODY: CPT

## 2019-04-02 PROCEDURE — 82784 ASSAY IGA/IGD/IGG/IGM EACH: CPT | Mod: 59

## 2019-04-02 PROCEDURE — 85652 RBC SED RATE AUTOMATED: CPT

## 2019-04-02 PROCEDURE — 85025 COMPLETE CBC W/AUTO DIFF WBC: CPT

## 2019-04-02 PROCEDURE — 86140 C-REACTIVE PROTEIN: CPT

## 2019-04-02 PROCEDURE — 99223 PR INITIAL HOSPITAL CARE,LEVL III: ICD-10-PCS | Mod: AI,GC,, | Performed by: HOSPITALIST

## 2019-04-02 RX ORDER — RAMELTEON 8 MG/1
8 TABLET ORAL NIGHTLY PRN
Status: DISCONTINUED | OUTPATIENT
Start: 2019-04-02 | End: 2019-04-06

## 2019-04-02 RX ORDER — PREDNISONE 10 MG/1
10 TABLET ORAL DAILY
Status: DISCONTINUED | OUTPATIENT
Start: 2019-04-03 | End: 2019-04-10 | Stop reason: HOSPADM

## 2019-04-02 RX ORDER — GLUCAGON 1 MG
1 KIT INJECTION
Status: DISCONTINUED | OUTPATIENT
Start: 2019-04-02 | End: 2019-04-10 | Stop reason: HOSPADM

## 2019-04-02 RX ORDER — AMLODIPINE BESYLATE 5 MG/1
5 TABLET ORAL DAILY
Status: DISCONTINUED | OUTPATIENT
Start: 2019-04-03 | End: 2019-04-03

## 2019-04-02 RX ORDER — IBUPROFEN 200 MG
24 TABLET ORAL
Status: DISCONTINUED | OUTPATIENT
Start: 2019-04-02 | End: 2019-04-10 | Stop reason: HOSPADM

## 2019-04-02 RX ORDER — PANTOPRAZOLE SODIUM 40 MG/1
40 TABLET, DELAYED RELEASE ORAL DAILY
Status: DISCONTINUED | OUTPATIENT
Start: 2019-04-02 | End: 2019-04-05

## 2019-04-02 RX ORDER — ACETAMINOPHEN 325 MG/1
650 TABLET ORAL EVERY 4 HOURS PRN
Status: DISCONTINUED | OUTPATIENT
Start: 2019-04-02 | End: 2019-04-10 | Stop reason: HOSPADM

## 2019-04-02 RX ORDER — FERROUS SULFATE, DRIED 160(50) MG
1 TABLET, EXTENDED RELEASE ORAL 2 TIMES DAILY WITH MEALS
Status: DISCONTINUED | OUTPATIENT
Start: 2019-04-02 | End: 2019-04-05

## 2019-04-02 RX ORDER — ALBUTEROL SULFATE 90 UG/1
2 AEROSOL, METERED RESPIRATORY (INHALATION) EVERY 6 HOURS PRN
Status: DISCONTINUED | OUTPATIENT
Start: 2019-04-02 | End: 2019-04-10 | Stop reason: HOSPADM

## 2019-04-02 RX ORDER — AZATHIOPRINE 50 MG/1
150 TABLET ORAL DAILY
Status: DISCONTINUED | OUTPATIENT
Start: 2019-04-03 | End: 2019-04-08

## 2019-04-02 RX ORDER — IBUPROFEN 200 MG
16 TABLET ORAL
Status: DISCONTINUED | OUTPATIENT
Start: 2019-04-02 | End: 2019-04-10 | Stop reason: HOSPADM

## 2019-04-02 RX ORDER — SODIUM CHLORIDE 0.9 % (FLUSH) 0.9 %
10 SYRINGE (ML) INJECTION
Status: DISCONTINUED | OUTPATIENT
Start: 2019-04-02 | End: 2019-04-09

## 2019-04-02 RX ORDER — MICONAZOLE NITRATE 2 %
POWDER (GRAM) TOPICAL 2 TIMES DAILY
Status: DISCONTINUED | OUTPATIENT
Start: 2019-04-02 | End: 2019-04-10 | Stop reason: HOSPADM

## 2019-04-02 RX ORDER — ERGOCALCIFEROL 1.25 MG/1
50000 CAPSULE ORAL
Status: DISCONTINUED | OUTPATIENT
Start: 2019-04-03 | End: 2019-04-10 | Stop reason: HOSPADM

## 2019-04-02 RX ORDER — ONDANSETRON 8 MG/1
8 TABLET, ORALLY DISINTEGRATING ORAL EVERY 8 HOURS PRN
Status: DISCONTINUED | OUTPATIENT
Start: 2019-04-02 | End: 2019-04-10 | Stop reason: HOSPADM

## 2019-04-02 RX ADMIN — OYSTER SHELL CALCIUM WITH VITAMIN D 1 TABLET: 500; 200 TABLET, FILM COATED ORAL at 07:04

## 2019-04-02 RX ADMIN — RAMELTEON 8 MG: 8 TABLET, FILM COATED ORAL at 08:04

## 2019-04-02 RX ADMIN — MICONAZOLE NITRATE: 20 POWDER TOPICAL at 09:04

## 2019-04-02 RX ADMIN — PANTOPRAZOLE SODIUM 40 MG: 40 TABLET, DELAYED RELEASE ORAL at 07:04

## 2019-04-02 NOTE — NURSING
Notified Dr. Egan of patient's arrival to unit.  Stated that she will find covering team.  Noted IM2/Dr Patricia covering team.  Paged IM2, to notify of patient's arrival.

## 2019-04-02 NOTE — HPI
64yo M with PMH of polymyositis (dx 2009, +mildly +ku, outside biopsy proven with elevated CPK and aldolase), osteopenia, Vit D insufficiency, Hemoglobulin c/beta-zero thalaseema, A-fib, dysphagia, portal HTN, emphysema was a direct admit from rheumatology clinic.     He saw Dr. Durham and Dr. Huston in Feb 2019 - recommendation was for direct admission at that time due to the complex nature of his case with worsening strength and dysphagia.  Recommendation was to start IVIG on admission.  Patient did not go and after many attempts made by Dr. Durham, patient finally presented to the direct admission office yesterday.    Per Dr. Durham's note:    He last saw rheumatology on May 2016.  At that time, he was on prednisone 30mg and Imuran 150mg daily.  He was to have GI evaluation for dysphagia and pulmonary evaluation for GGO with pulmonary nodules.       Per chart review it seems that he first established care here at [facility] in 8/2014. He was diagnosed in [id number] when he was feeling weak, falling. He was a  and was unable to use his legs. He had a biopsy in [id number] in [place] that per notes was consistent with polymyositis. He was on MTX in the past which was stopped due CT showing hepatosplenomegaly with portal venous hypertension and elevated T bili in 8/2014 He was at that time started on Imuran. He has been on Imuran and steroids since then.  In 2016, he was worked up again here at [facility]. MRI of the left lower extremities showed Severe atrophy with fatty replacement of the thigh musculature bilaterally.  There is mild increased edema signal within the remaining thigh musculature and left iliopsoas muscle without associated enhancement which could reflect a mild degree of residual myositis.  EMG Left upper Ext 5/2016 was a technically difficult study. Had moderate ulnar entrapment at elbow, perhaps on a background of mild polyneuropathy. Needle exam consistent with a  "chronic asymmetry myopathy with secondary denervation. Pathology of left thigh showing minute potion of muscle with end stage myopathic changes and focal chronic inflammation   Patient remained on Imuran and prednisone.     In Feb 2019 - patient had been out of Imuran for ~1 month.  He also admits to medication non-compliance because he didn't think he was having any improvement.  He admits that his weakness has gotten worse - stating that he is unable to stand, difficulty with getting in and out of his scooter, normal ADLs (including going to the bathroom - resulting in urination/defecation on himself - resulting in decreased PO intake).  He also feels worsening of his dysphagia - where he gets food stuck and often chokes/coughs when he eats/drinks.      Wife works fulltime.  Daughter (10 yo).  Patient does not have social support/help for transportation to various appt.    Specialist updates:  - Cardiology - Last saw 3/2015 - diagnosed with PAF.  No treatment at that time.  No follow up since  - Hem - Last saw 12/2017 - Evaluated for microcytic anemia and mild chronic thrombocytosis.  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  Discharged from clinic   - GI - Last saw 6/2015 - EGD/motility study ordered - not performed.  No follow up   - Hepatology - Last saw 5/2015 - Found portal HTN on CT with no evidence of cirrhosis or PV thrombosis.  Fibrosure with F1 stage (minimial fibrosis).  RTC PRN (no follow up)  - Pulmonary - Last saw 2/2015 -" Lung parenchymal normal - no evidence of fibrosis and one mediastinal LN marginal enlarged" CT chest showed GGO (2016). Repeat CT chest (2017) - no mention of GGO     Imaging:  CXR 1/2018:Patchy consolidation projected over the right lower lung zone, concerning for infection, correlation advised.       CT chest 12/2017:Peribronchial cuffing predominantly in the lower lung zones as well as retained secretions in the right middle lobe segmental bronchi consistent with " bronchitis; such inflammation can occur with inhaled irritants or aspiration.  There is no evidence of interstitial lung disease such as NSIP or UIP.Upper lung zone predominant paraseptal emphysema and dispersed areas of centrilobular emphysema Stable 1.2 cm AP window lymph node.  Densely calcified granuloma in the posterobasal segment of the right lower lobe.Hepatosplenomegaly with associated splenic collateral vessels at the splenic hilum suggesting sequela of portal venous hypertension.     CT pelvis 6/2017: Obturator internus edema or hematoma. No acute fracture. Stable splenomegaly.     Esophogram 4/2015: Spontaneous gastroesophageal reflux.  Otherwise, unremarkable esophagram.     PET scan 8/2014:AP window lymph node SUV max less than 2.5.  This is most likely benign/reactive.  Surveillance is recommended at 3, 6, 12, and 24 months. Splenomegaly most likely from portal hypertension

## 2019-04-02 NOTE — SUBJECTIVE & OBJECTIVE
Past Medical History:   Diagnosis Date    Atrial fibrillation 3/2/2015    Depression     Essential hypertension 2/27/2019    Microcytic anemia 9/25/2014    Neuromuscular disorder     Polymyositis 2009    Tobacco abuse        Past Surgical History:   Procedure Laterality Date    BIOPSY-MUSCLE Left Thigh Left 4/14/2016    Performed by Jose [facility]ley [facility] at [facility] OR 2ND FLR    ESOPHAGOGASTRODUODENOSCOPY (EGD) N/A 7/1/2015    Performed by Codey Laurent MD at [facility] ENDO (4TH FLR)    MANOMETRY-ESOPHAGEAL N/A 7/1/2015    Performed by Codey Laurent MD at [facility] ENDO (4TH FLR)       Review of patient's allergies indicates:  No Known Allergies    No current facility-administered medications on file prior to encounter.      Current Outpatient Medications on File Prior to Encounter   Medication Sig    ammonium lactate 12 % Crea Apply twice daily to affected parts both feet as needed.    [DISCONTINUED] calcium-vitamin D3 (CALCIUM 500 + D) 500 mg(1,250mg) -200 unit per tablet Take 1 tablet by mouth 2 (two) times daily with meals.    [DISCONTINUED] ciclopirox (PENLAC) 8 % Soln Apply topically nightly.    [DISCONTINUED] imiquimod (ALDARA) 5 % cream AAA nightly    [DISCONTINUED] ketoconazole (NIZORAL) 2 % cream Apply topically 2 (two) times daily.    [DISCONTINUED] pantoprazole (PROTONIX) 20 MG tablet Take 1 tablet (20 mg total) by mouth once daily.     Family History     Problem Relation (Age of Onset)    Diabetes Mother, Father    Hypertension Mother, Father        Tobacco Use    Smoking status: Former Smoker     Packs/day: 0.25     Years: 20.00     Pack years: 5.00     Types: Cigarettes    Smokeless tobacco: Never Used   Substance and Sexual Activity    Alcohol use: No     Alcohol/week: 0.0 oz    Drug use: Yes     Types: Marijuana     Comment: daily use    Sexual activity: Not on file     Review of Systems   Constitutional: Positive for activity change (dec). Negative for appetite change, diaphoresis, fatigue and  fever.   HENT: Positive for trouble swallowing. Negative for congestion, sinus pressure and sinus pain.    Eyes: Negative for photophobia and pain.   Respiratory: Negative for choking, shortness of breath and wheezing.    Cardiovascular: Negative for chest pain, palpitations and leg swelling.   Gastrointestinal: Negative for abdominal pain, blood in stool, constipation, diarrhea, nausea and vomiting.   Genitourinary: Negative for dysuria, hematuria and urgency.   Musculoskeletal: Positive for gait problem. Negative for arthralgias, back pain and myalgias.   Neurological: Positive for weakness. Negative for dizziness, tremors, facial asymmetry, speech difficulty and headaches.   Psychiatric/Behavioral: Negative for agitation, behavioral problems, confusion and decreased concentration.     Objective:     Vital Signs (Most Recent):  Temp: 98.3 °F (36.8 °C) (04/02/19 1532)  Pulse: 67 (04/02/19 1532)  Resp: 15 (04/02/19 1532)  BP: (!) 145/87 (04/02/19 1532)  SpO2: 98 % (04/02/19 1532) Vital Signs (24h Range):  Temp:  [98.3 °F (36.8 °C)] 98.3 °F (36.8 °C)  Pulse:  [67] 67  Resp:  [15] 15  SpO2:  [98 %] 98 %  BP: (145)/(87) 145/87        There is no height or weight on file to calculate BMI.    Physical Exam   Constitutional: He is oriented to person, place, and time. He appears well-developed. No distress.   HENT:   Head: Normocephalic and atraumatic.   Mouth/Throat: No oropharyngeal exudate.   Eyes: Conjunctivae and EOM are normal. No scleral icterus.   Neck: Normal range of motion. Neck supple.   Cardiovascular: Normal rate, regular rhythm, normal heart sounds and intact distal pulses.   No murmur heard.  Pulmonary/Chest: Effort normal and breath sounds normal. No respiratory distress. He has no wheezes.   Abdominal: Soft. Bowel sounds are normal. He exhibits no distension. There is no tenderness. There is no guarding.   Musculoskeletal: He exhibits no tenderness.   Right Side Rheumatological Exam      Muscle Strength  (0-5 scale):  Neck Flexion:  4  Neck Extension: 4  Deltoid:  2  Biceps: 3  Triceps:  4  : 4  Iliopsoas: 3  Quadriceps:  4  Distal Lower Extremity: 2     Left Side Rheumatological Exam      Muscle Strength (0-5 scale):  Neck Flexion:  4  Neck Extension: 4  Deltoid:  3  Biceps: 4   Triceps:  4  :  4  Iliopsoas: 3  Quadriceps:  3   Distal Lower Extremity: 2     Lymphadenopathy:     He has no cervical adenopathy.   Neurological: He is alert and oriented to person, place, and time. No cranial nerve deficit. He exhibits abnormal muscle tone (decreased tone in all extremities).   Skin: Skin is warm and dry. No rash noted. He is not diaphoretic. No pallor.   Psychiatric: He has a normal mood and affect. His behavior is normal. Judgment and thought content normal.   Nursing note and vitals reviewed.        CRANIAL NERVES     CN III, IV, VI   Extraocular motions are normal.        Significant Labs:   CBC:   Recent Labs   Lab 04/02/19  1456   WBC 6.18   HGB 11.4*   HCT 35.8*   *     CMP:   Recent Labs   Lab 04/02/19  1456      K 3.8      CO2 25   GLU 75   BUN 11   CREATININE 0.5   CALCIUM 9.4   PROT 8.0   ALBUMIN 4.2   BILITOT 1.8*   ALKPHOS 67   AST 9*   ALT 7*   ANIONGAP 8   EGFRNONAA >60.0     Magnesium:   Recent Labs   Lab 04/02/19  1456   MG 2.2       Significant Imaging: I have reviewed all pertinent imaging results/findings within the past 24 hours.     Fl Modified Barium Swallow Speech  Narrative: EXAMINATION:  FL MODIFIED BARIUM SWALLOW SPEECH STUDY    CLINICAL HISTORY:  Dysphagia, unspecified    TECHNIQUE:  Video fluoroscopic swallowing examination was performed in conjunction with the speech pathology department.  Barium containing liquid and/or solid food substances were used to assess swallowing.    Fluoroscopy Time: 4 min 38 sec    COMPARISON:  Fluoroscopic modified barium swallow 04/22/2015  Impression: Transit: Satisfactory oral transit time with small quantities of thin liquid.   Large quantities of thin liquid elicited poor nickolas-pharyngeal coordination with spillage to the vallecula and piriform sinuses.  Significant residual with solid and semi-solid food substance.    Penetration/Aspiration: Multiple episodes of penetration and aspiration with large quantities of thin liquid.  No penetration or aspiration with small volume of thin liquid or semi-solid/solid food substance.    Miscellaneous: N/A.    See speech pathology report for further details.    Electronically signed by resident: Nura Oliva  Date:    02/21/2019  Time:    15:01    Electronically signed by: Charles Lozoya MD  Date:    02/21/2019  Time:    15:28

## 2019-04-02 NOTE — TELEPHONE ENCOUNTER
Called patient to tell him to go to direct admission this morning, discussed with hospitalist last night. Patient would have to come to direct admission office to start process. Discussed this with patient. He is currently at primary care office getting BP checked he will go to direct admit office after his visit.

## 2019-04-02 NOTE — LETTER
April 10, 2019         [address]  [facility] [address]  Phone: [phone]       Patient: Nura Kahn   YOB: 1955  Date of Visit: 04/10/2019    To Whom It May Concern:    Cee Kahn's wife was at [facility] on 4/3 - 4/4 and 4/15 - 4/16. She may return to work on 4/11/2019 with no restrictions. If you have any questions or concerns, or if I can be of further assistance, please do not hesitate to contact me.    Sincerely,    Behram Khan, MD

## 2019-04-02 NOTE — HPI
Mr. Kahn is a 63M with polymyositis (Dx 2009, Bx proven), osteopenia, vit D insufficiency, thalassemia, dysphagia, afib, HTN, emphysema who presents as a direct admit for workup of progressive weakness in his extremities. Workup from 2016 showed end-stage myopathic changes and focal chronic inflammation. He was last seen by rheumatology 2/13/2019 with plans for direct admit for IVIG therapy; however, patient did not present as it was Williamsons day. Today, he reports no major changes since February. Muscle weakness has been progressive for the past several years. He currently uses a scooter and requires increasing levels of assistance to move from recliner to scooter. As a result of his immobility, he wears a diaper as he is unable to make it to the restroom for bowel and urinary habits. His support system at home is lacking; he is sliding to move himself from chair to scooter. His significant other has not been supportive with increasing decline in his status. Dysphagia has been stable; patient reports that difficulty swallowing is associated with positioning. When he does have trouble, it usually feels like food is stuck at the top of his esophagus, not that it is stuck on the way down. Sometimes he chokes with water intake. Dysphagia is least notable when sitting upright. He reports no pain or aches in his muscles. Denies fevers, chills, joint pains, blood in his stool or urine. He endorses some depression regarding his condition and inability to partake in activities he enjoys such as fishing.

## 2019-04-03 LAB
ALBUMIN SERPL BCP-MCNC: 3.9 G/DL (ref 3.5–5.2)
ALDOLASE SERPL-CCNC: 6.3 U/L (ref 1.2–7.6)
ALP SERPL-CCNC: 61 U/L (ref 55–135)
ALT SERPL W/O P-5'-P-CCNC: 5 U/L (ref 10–44)
ANION GAP SERPL CALC-SCNC: 7 MMOL/L (ref 8–16)
ANISOCYTOSIS BLD QL SMEAR: ABNORMAL
AST SERPL-CCNC: 8 U/L (ref 10–40)
BASOPHILS # BLD AUTO: 0.04 K/UL (ref 0–0.2)
BASOPHILS NFR BLD: 0.7 % (ref 0–1.9)
BILIRUB SERPL-MCNC: 1.5 MG/DL (ref 0.1–1)
BILIRUB UR QL STRIP: NEGATIVE
BUN SERPL-MCNC: 12 MG/DL (ref 8–23)
CALCIUM SERPL-MCNC: 8.9 MG/DL (ref 8.7–10.5)
CHLORIDE SERPL-SCNC: 104 MMOL/L (ref 95–110)
CLARITY UR REFRACT.AUTO: CLEAR
CO2 SERPL-SCNC: 24 MMOL/L (ref 23–29)
COLOR UR AUTO: YELLOW
CREAT SERPL-MCNC: 0.4 MG/DL (ref 0.5–1.4)
DIFFERENTIAL METHOD: ABNORMAL
EOSINOPHIL # BLD AUTO: 0.1 K/UL (ref 0–0.5)
EOSINOPHIL NFR BLD: 1.8 % (ref 0–8)
ERYTHROCYTE [DISTWIDTH] IN BLOOD BY AUTOMATED COUNT: 20.9 % (ref 11.5–14.5)
EST. GFR  (AFRICAN AMERICAN): >60 ML/MIN/1.73 M^2
EST. GFR  (NON AFRICAN AMERICAN): >60 ML/MIN/1.73 M^2
GLUCOSE SERPL-MCNC: 80 MG/DL (ref 70–110)
GLUCOSE UR QL STRIP: NEGATIVE
HCT VFR BLD AUTO: 30.8 % (ref 40–54)
HGB BLD-MCNC: 10.1 G/DL (ref 14–18)
HGB UR QL STRIP: NEGATIVE
HYPOCHROMIA BLD QL SMEAR: ABNORMAL
IMM GRANULOCYTES # BLD AUTO: 0.03 K/UL (ref 0–0.04)
IMM GRANULOCYTES NFR BLD AUTO: 0.5 % (ref 0–0.5)
KETONES UR QL STRIP: NEGATIVE
LEUKOCYTE ESTERASE UR QL STRIP: NEGATIVE
LYMPHOCYTES # BLD AUTO: 1.7 K/UL (ref 1–4.8)
LYMPHOCYTES NFR BLD: 28.4 % (ref 18–48)
MAGNESIUM SERPL-MCNC: 2.1 MG/DL (ref 1.6–2.6)
MCH RBC QN AUTO: 20.4 PG (ref 27–31)
MCHC RBC AUTO-ENTMCNC: 32.8 G/DL (ref 32–36)
MCV RBC AUTO: 62 FL (ref 82–98)
MONOCYTES # BLD AUTO: 0.4 K/UL (ref 0.3–1)
MONOCYTES NFR BLD: 7 % (ref 4–15)
NEUTROPHILS # BLD AUTO: 3.8 K/UL (ref 1.8–7.7)
NEUTROPHILS NFR BLD: 61.6 % (ref 38–73)
NITRITE UR QL STRIP: NEGATIVE
NRBC BLD-RTO: 5 /100 WBC
PH UR STRIP: 7 [PH] (ref 5–8)
PHOSPHATE SERPL-MCNC: 3.7 MG/DL (ref 2.7–4.5)
PLATELET # BLD AUTO: 136 K/UL (ref 150–350)
PLATELET BLD QL SMEAR: ABNORMAL
PMV BLD AUTO: ABNORMAL FL (ref 9.2–12.9)
POCT GLUCOSE: 141 MG/DL (ref 70–110)
POCT GLUCOSE: 87 MG/DL (ref 70–110)
POCT GLUCOSE: 95 MG/DL (ref 70–110)
POIKILOCYTOSIS BLD QL SMEAR: SLIGHT
POLYCHROMASIA BLD QL SMEAR: ABNORMAL
POTASSIUM SERPL-SCNC: 3.8 MMOL/L (ref 3.5–5.1)
PROT SERPL-MCNC: 6.8 G/DL (ref 6–8.4)
PROT UR QL STRIP: NEGATIVE
RBC # BLD AUTO: 4.94 M/UL (ref 4.6–6.2)
SODIUM SERPL-SCNC: 135 MMOL/L (ref 136–145)
SP GR UR STRIP: 1.01 (ref 1–1.03)
TARGETS BLD QL SMEAR: ABNORMAL
URN SPEC COLLECT METH UR: NORMAL
WBC # BLD AUTO: 6.12 K/UL (ref 3.9–12.7)

## 2019-04-03 PROCEDURE — 25000242 PHARM REV CODE 250 ALT 637 W/ HCPCS: Performed by: INTERNAL MEDICINE

## 2019-04-03 PROCEDURE — 80053 COMPREHEN METABOLIC PANEL: CPT

## 2019-04-03 PROCEDURE — 63600175 PHARM REV CODE 636 W HCPCS: Performed by: STUDENT IN AN ORGANIZED HEALTH CARE EDUCATION/TRAINING PROGRAM

## 2019-04-03 PROCEDURE — 92610 EVALUATE SWALLOWING FUNCTION: CPT

## 2019-04-03 PROCEDURE — 81003 URINALYSIS AUTO W/O SCOPE: CPT

## 2019-04-03 PROCEDURE — 36415 COLL VENOUS BLD VENIPUNCTURE: CPT

## 2019-04-03 PROCEDURE — 63600175 PHARM REV CODE 636 W HCPCS: Performed by: HOSPITALIST

## 2019-04-03 PROCEDURE — 84100 ASSAY OF PHOSPHORUS: CPT

## 2019-04-03 PROCEDURE — 99223 PR INITIAL HOSPITAL CARE,LEVL III: ICD-10-PCS | Mod: GC,,, | Performed by: INTERNAL MEDICINE

## 2019-04-03 PROCEDURE — 25000003 PHARM REV CODE 250: Performed by: STUDENT IN AN ORGANIZED HEALTH CARE EDUCATION/TRAINING PROGRAM

## 2019-04-03 PROCEDURE — 25000003 PHARM REV CODE 250: Performed by: INTERNAL MEDICINE

## 2019-04-03 PROCEDURE — 99231 PR SUBSEQUENT HOSPITAL CARE,LEVL I: ICD-10-PCS | Mod: GC,,, | Performed by: HOSPITALIST

## 2019-04-03 PROCEDURE — 99223 1ST HOSP IP/OBS HIGH 75: CPT | Mod: GC,,, | Performed by: INTERNAL MEDICINE

## 2019-04-03 PROCEDURE — 11000001 HC ACUTE MED/SURG PRIVATE ROOM

## 2019-04-03 PROCEDURE — 85025 COMPLETE CBC W/AUTO DIFF WBC: CPT

## 2019-04-03 PROCEDURE — 97535 SELF CARE MNGMENT TRAINING: CPT

## 2019-04-03 PROCEDURE — 25000003 PHARM REV CODE 250: Performed by: HOSPITALIST

## 2019-04-03 PROCEDURE — 99231 SBSQ HOSP IP/OBS SF/LOW 25: CPT | Mod: GC,,, | Performed by: HOSPITALIST

## 2019-04-03 PROCEDURE — 83735 ASSAY OF MAGNESIUM: CPT

## 2019-04-03 RX ORDER — AMLODIPINE BESYLATE 5 MG/1
5 TABLET ORAL ONCE
Status: COMPLETED | OUTPATIENT
Start: 2019-04-03 | End: 2019-04-03

## 2019-04-03 RX ORDER — AMLODIPINE BESYLATE 10 MG/1
10 TABLET ORAL DAILY
Status: DISCONTINUED | OUTPATIENT
Start: 2019-04-04 | End: 2019-04-10 | Stop reason: HOSPADM

## 2019-04-03 RX ORDER — PANTOPRAZOLE SODIUM 20 MG/1
20 TABLET, DELAYED RELEASE ORAL DAILY
Qty: 30 TABLET | Refills: 11 | Status: SHIPPED | OUTPATIENT
Start: 2019-04-03 | End: 2019-04-05 | Stop reason: HOSPADM

## 2019-04-03 RX ORDER — GUAIFENESIN 100 MG/5ML
200 SOLUTION ORAL EVERY 4 HOURS PRN
Status: DISCONTINUED | OUTPATIENT
Start: 2019-04-03 | End: 2019-04-10 | Stop reason: HOSPADM

## 2019-04-03 RX ORDER — ENOXAPARIN SODIUM 100 MG/ML
40 INJECTION SUBCUTANEOUS EVERY 24 HOURS
Status: DISCONTINUED | OUTPATIENT
Start: 2019-04-03 | End: 2019-04-10 | Stop reason: HOSPADM

## 2019-04-03 RX ORDER — AMLODIPINE BESYLATE 5 MG/1
10 TABLET ORAL DAILY
Qty: 60 TABLET | Refills: 1 | Status: SHIPPED | OUTPATIENT
Start: 2019-04-03 | End: 2019-04-10 | Stop reason: SDUPTHER

## 2019-04-03 RX ADMIN — ENOXAPARIN SODIUM 40 MG: 100 INJECTION SUBCUTANEOUS at 05:04

## 2019-04-03 RX ADMIN — ALBUTEROL SULFATE 2 PUFF: 90 AEROSOL, METERED RESPIRATORY (INHALATION) at 02:04

## 2019-04-03 RX ADMIN — GUAIFENESIN 200 MG: 200 SOLUTION ORAL at 02:04

## 2019-04-03 RX ADMIN — AZATHIOPRINE 150 MG: 50 TABLET ORAL at 10:04

## 2019-04-03 RX ADMIN — AMLODIPINE BESYLATE 5 MG: 5 TABLET ORAL at 12:04

## 2019-04-03 RX ADMIN — MICONAZOLE NITRATE: 20 POWDER TOPICAL at 11:04

## 2019-04-03 RX ADMIN — TRAZODONE HYDROCHLORIDE 25 MG: 50 TABLET ORAL at 02:04

## 2019-04-03 RX ADMIN — PREDNISONE 10 MG: 10 TABLET ORAL at 10:04

## 2019-04-03 RX ADMIN — PANTOPRAZOLE SODIUM 40 MG: 40 TABLET, DELAYED RELEASE ORAL at 10:04

## 2019-04-03 RX ADMIN — ALBUTEROL SULFATE 2 PUFF: 90 AEROSOL, METERED RESPIRATORY (INHALATION) at 03:04

## 2019-04-03 RX ADMIN — AMLODIPINE BESYLATE 5 MG: 5 TABLET ORAL at 10:04

## 2019-04-03 RX ADMIN — OYSTER SHELL CALCIUM WITH VITAMIN D 1 TABLET: 500; 200 TABLET, FILM COATED ORAL at 05:04

## 2019-04-03 RX ADMIN — ERGOCALCIFEROL 50000 UNITS: 1.25 CAPSULE ORAL at 10:04

## 2019-04-03 RX ADMIN — MICONAZOLE NITRATE: 20 POWDER TOPICAL at 10:04

## 2019-04-03 NOTE — ASSESSMENT & PLAN NOTE
Currently on imuran 150mg daily and prednisone 10mg daily.  - Continue both at this point until rheumatology evaluation

## 2019-04-03 NOTE — ASSESSMENT & PLAN NOTE
Reports difficulty with both solids and liquids, but largely dependent on positioning of patient (e.g. Sitting up vs laying in recliner)  Sometimes experiences choking with water  - SLP eval and treat today  - Mech soft diet as tolerated for now - strict aspiration precautions   - Will consider GI eval for dysphagia after SLP eval  - Nutrition consult

## 2019-04-03 NOTE — ASSESSMENT & PLAN NOTE
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  Last full workup in 2016 with evidence of progression of disease  Direct admit for management by rheumatology with IVIG  Weakness in all extremities, uses scooter for mobilization  Does not have a good social support system at home - will require post acute care for this reason  Initial labs negative except IgG elevated at 1754  - Rheumatology consult - appreciate recs  - Defer IVIG orders for rheumatology after evaluation  - Continuing imuran and prednisone currently  - f/u initial labs HMG-CoA reductase antibody, CN1A Ab  - Bilateral MRIs w wo contrast of bilat femurs and bilat humerus (renal function stable)  - PT/OT and PM&R  - Discuss post-acute care options with patient

## 2019-04-03 NOTE — CONSULTS
"  [facility]  Adult Nutrition  Consult Note    SUMMARY     Recommendations    Recommendation/Intervention:   1. Order Boost Plus to aid in nutrient intake.   2. Continue current mechanical soft diet and provide assistance with meal set-up/feeding as needed.   3. RD following.    Goals: Intake >/=85% EEN/EPN  Nutrition Goal Status: new  Communication of RD Recs: (POC)    Reason for Assessment    Reason For Assessment: consult  Diagnosis: (polymyositis)  Relevant Medical History: polymyositis, vit D deficiency, dysphagia, HTN, emphysema    General Information Comments: Pt being seen by MD at time of visit. Diet advanced to mechanical soft per SLP. Pt with loss of mobility and sarcopenia due to polymyositis. Noted weight loss per chart review. Will follow up with [facility] and full malnutrition assessment.    Nutrition Discharge Planning: Adequate PO intake    Nutrition Risk Screen    Nutrition Risk Screen: dysphagia or difficulty swallowing(occasionally, aspiration precautions)    Nutrition/Diet History    Spiritual, Cultural Beliefs, Roman Catholic Practices, Values that Affect Care: no  Factors Affecting Nutritional Intake: difficulty/impaired swallowing    Anthropometrics    Temp: 97.6 °F (36.4 °C)  Height Method: Stated  Height: 5' 9" (175.3 cm)  Height (inches): 69 in  Weight Method: Bed Scale  Weight: 58 kg (127 lb 13.9 oz)  Weight (lb): 127.87 lb  Ideal Body Weight (IBW), Male: 160 lb  % Ideal Body Weight, Male (lb): 79.92 lb  BMI (Calculated): 18.9  BMI Grade: 18.5-24.9 - normal  Usual Body Weight (UBW), k.8 kg(2018 per chart review)  % Usual Body Weight: 75.68  % Weight Change From Usual Weight: -24.48 %       Lab/Procedures/Meds    Pertinent Labs Reviewed: reviewed  Pertinent Labs Comments: Na 135, T.Bili 1.5, HbA1c <4.0  Pertinent Medications Reviewed: reviewed  Pertinent Medications Comments: Ca/vit D3, ergocalciferol, pantoprazole, prednisone    Estimated/Assessed Needs    Weight Used " For Calorie Calculations: 58 kg (127 lb 13.9 oz)  Energy Calorie Requirements (kcal): 8765-4757  Energy Need Method: Kcal/kg(30-35)  Protein Requirements: 70-81 gm (1.2-1.4 gm/kg)  Weight Used For Protein Calculations: 58 kg (127 lb 13.9 oz)  Fluid Requirements (mL): per MD     RDA Method (mL): 1740       Nutrition Prescription Ordered    Current Diet Order: Mechanical soft    Evaluation of Received Nutrient/Fluid Intake    Comments: LBM 4/2  % Intake of Estimated Energy Needs: 0 - 25 %  % Meal Intake: Other: diet just advanced    Nutrition Risk    Level of Risk/Frequency of Follow-up: high     Assessment and Plan    Nutrition Problem  Unintended weight loss    Related to (etiology):   Dysphagia and polymyositis    Signs and Symptoms (as evidenced by):   24% wt loss x 4 months per chart review     Interventions/Recommendations (treatment strategy):  Commercial beverage    Nutrition Diagnosis Status:   New    Monitor and Evaluation    Food and Nutrient Intake: energy intake, food and beverage intake  Food and Nutrient Adminstration: diet order  Physical Activity and Function: nutrition-related ADLs and IADLs  Anthropometric Measurements: weight, weight change, body mass index  Biochemical Data, Medical Tests and Procedures: electrolyte and renal panel, gastrointestinal profile, glucose/endocrine profile, inflammatory profile  Nutrition-Focused Physical Findings: overall appearance     Nutrition Follow-Up    RD Follow-up?: Yes

## 2019-04-03 NOTE — H&P
[facility] Hospital Medicine  History & Physical    Patient Name: Nura Kahn Jr.  MRN: [id number]  Admission Date: 4/2/2019  Attending Physician: Óscar Patricia MD   Primary Care Provider: Socrates Ambrosio MD    [facility] Medicine Team: [facility] 2 Maria L Kelley Katarzynaraf Chambers MD     Patient information was obtained from patient, past medical records and ER records.     Subjective:     Principal Problem:Polymyositis    Chief Complaint: No chief complaint on file.       HPI: Mr. Kahn is a 63M with polymyositis (Dx 2009, Bx proven), osteopenia, vit D insufficiency, thalassemia, dysphagia, afib, HTN, emphysema who presents as a direct admit for workup of progressive weakness in his extremities. Workup from 2016 showed end-stage myopathic changes and focal chronic inflammation. He was last seen by rheumatology 2/13/2019 with plans for direct admit for IVIG therapy; however, patient did not present as it was Williamsons day. Today, he reports no major changes since February. Muscle weakness has been progressive for the past several years. He currently uses a scooter and requires increasing levels of assistance to move from recliner to scooter. As a result of his immobility, he wears a diaper as he is unable to make it to the restroom for bowel and urinary habits. His support system at home is lacking; he is sliding to move himself from chair to scooter. His significant other has not been supportive with increasing decline in his status. Dysphagia has been stable; patient reports that difficulty swallowing is associated with positioning. When he does have trouble, it usually feels like food is stuck at the top of his esophagus, not that it is stuck on the way down. Sometimes he chokes with water intake. Dysphagia is least notable when sitting upright. He reports no pain or aches in his muscles. Denies fevers, chills, joint pains, blood in his stool or urine. He endorses some depression regarding  his condition and inability to partake in activities he enjoys such as fishing.    Past Medical History:   Diagnosis Date    Atrial fibrillation 3/2/2015    Depression     Essential hypertension 2/27/2019    Microcytic anemia 9/25/2014    Neuromuscular disorder     Polymyositis 2009    Tobacco abuse        Past Surgical History:   Procedure Laterality Date    BIOPSY-MUSCLE Left Thigh Left 4/14/2016    Performed by Jose [facility]ley MD at [facility] OR 2ND FLR    ESOPHAGOGASTRODUODENOSCOPY (EGD) N/A 7/1/2015    Performed by Codey Laurent MD at [facility] ENDO (4TH FLR)    MANOMETRY-ESOPHAGEAL N/A 7/1/2015    Performed by Codey Laurent MD at [facility] ENDO (4TH FLR)       Review of patient's allergies indicates:  No Known Allergies    No current facility-administered medications on file prior to encounter.      Current Outpatient Medications on File Prior to Encounter   Medication Sig    ammonium lactate 12 % Crea Apply twice daily to affected parts both feet as needed.    [DISCONTINUED] calcium-vitamin D3 (CALCIUM 500 + D) 500 mg(1,250mg) -200 unit per tablet Take 1 tablet by mouth 2 (two) times daily with meals.    [DISCONTINUED] ciclopirox (PENLAC) 8 % Soln Apply topically nightly.    [DISCONTINUED] imiquimod (ALDARA) 5 % cream AAA nightly    [DISCONTINUED] ketoconazole (NIZORAL) 2 % cream Apply topically 2 (two) times daily.    [DISCONTINUED] pantoprazole (PROTONIX) 20 MG tablet Take 1 tablet (20 mg total) by mouth once daily.     Family History     Problem Relation (Age of Onset)    Diabetes Mother, Father    Hypertension Mother, Father        Tobacco Use    Smoking status: Former Smoker     Packs/day: 0.25     Years: 20.00     Pack years: 5.00     Types: Cigarettes    Smokeless tobacco: Never Used   Substance and Sexual Activity    Alcohol use: No     Alcohol/week: 0.0 oz    Drug use: Yes     Types: Marijuana     Comment: daily use    Sexual activity: Not on file     Review of Systems   Constitutional: Positive  for activity change (dec). Negative for appetite change, diaphoresis, fatigue and fever.   HENT: Positive for trouble swallowing. Negative for congestion, sinus pressure and sinus pain.    Eyes: Negative for photophobia and pain.   Respiratory: Negative for choking, shortness of breath and wheezing.    Cardiovascular: Negative for chest pain, palpitations and leg swelling.   Gastrointestinal: Negative for abdominal pain, blood in stool, constipation, diarrhea, nausea and vomiting.   Genitourinary: Negative for dysuria, hematuria and urgency.   Musculoskeletal: Positive for gait problem. Negative for arthralgias, back pain and myalgias.   Neurological: Positive for weakness. Negative for dizziness, tremors, facial asymmetry, speech difficulty and headaches.   Psychiatric/Behavioral: Negative for agitation, behavioral problems, confusion and decreased concentration.     Objective:     Vital Signs (Most Recent):  Temp: 98.3 °F (36.8 °C) (04/02/19 1532)  Pulse: 67 (04/02/19 1532)  Resp: 15 (04/02/19 1532)  BP: (!) 145/87 (04/02/19 1532)  SpO2: 98 % (04/02/19 1532) Vital Signs (24h Range):  Temp:  [98.3 °F (36.8 °C)] 98.3 °F (36.8 °C)  Pulse:  [67] 67  Resp:  [15] 15  SpO2:  [98 %] 98 %  BP: (145)/(87) 145/87        There is no height or weight on file to calculate BMI.    Physical Exam   Constitutional: He is oriented to person, place, and time. He appears well-developed. No distress.   HENT:   Head: Normocephalic and atraumatic.   Mouth/Throat: No oropharyngeal exudate.   Eyes: Conjunctivae and EOM are normal. No scleral icterus.   Neck: Normal range of motion. Neck supple.   Cardiovascular: Normal rate, regular rhythm, normal heart sounds and intact distal pulses.   No murmur heard.  Pulmonary/Chest: Effort normal and breath sounds normal. No respiratory distress. He has no wheezes.   Abdominal: Soft. Bowel sounds are normal. He exhibits no distension. There is no tenderness. There is no guarding.   Musculoskeletal:  He exhibits no tenderness.   Right Side Rheumatological Exam      Muscle Strength (0-5 scale):  Neck Flexion:  4  Neck Extension: 4  Deltoid:  2  Biceps: 3  Triceps:  4  : 4  Iliopsoas: 3  Quadriceps:  4  Distal Lower Extremity: 2     Left Side Rheumatological Exam      Muscle Strength (0-5 scale):  Neck Flexion:  4  Neck Extension: 4  Deltoid:  3  Biceps: 4   Triceps:  4  :  4  Iliopsoas: 3  Quadriceps:  3   Distal Lower Extremity: 2     Lymphadenopathy:     He has no cervical adenopathy.   Neurological: He is alert and oriented to person, place, and time. No cranial nerve deficit. He exhibits abnormal muscle tone (decreased tone in all extremities).   Skin: Skin is warm and dry. No rash noted. He is not diaphoretic. No pallor.   Psychiatric: He has a normal mood and affect. His behavior is normal. Judgment and thought content normal.   Nursing note and vitals reviewed.        CRANIAL NERVES     CN III, IV, VI   Extraocular motions are normal.        Significant Labs:   CBC:   Recent Labs   Lab 04/02/19  1456   WBC 6.18   HGB 11.4*   HCT 35.8*   *     CMP:   Recent Labs   Lab 04/02/19  1456      K 3.8      CO2 25   GLU 75   BUN 11   CREATININE 0.5   CALCIUM 9.4   PROT 8.0   ALBUMIN 4.2   BILITOT 1.8*   ALKPHOS 67   AST 9*   ALT 7*   ANIONGAP 8   EGFRNONAA >60.0     Magnesium:   Recent Labs   Lab 04/02/19  1456   MG 2.2       Significant Imaging: I have reviewed all pertinent imaging results/findings within the past 24 hours.     Fl Modified Barium Swallow Speech  Narrative: EXAMINATION:  FL MODIFIED BARIUM SWALLOW SPEECH STUDY    CLINICAL HISTORY:  Dysphagia, unspecified    TECHNIQUE:  Video fluoroscopic swallowing examination was performed in conjunction with the speech pathology department.  Barium containing liquid and/or solid food substances were used to assess swallowing.    Fluoroscopy Time: 4 min 38 sec    COMPARISON:  Fluoroscopic modified barium swallow 04/22/2015  Impression:  "Transit: Satisfactory oral transit time with small quantities of thin liquid.  Large quantities of thin liquid elicited poor nickolas-pharyngeal coordination with spillage to the vallecula and piriform sinuses.  Significant residual with solid and semi-solid food substance.    Penetration/Aspiration: Multiple episodes of penetration and aspiration with large quantities of thin liquid.  No penetration or aspiration with small volume of thin liquid or semi-solid/solid food substance.    Miscellaneous: N/A.    See speech pathology report for further details.    Electronically signed by resident: Nura Oliva  Date:    02/21/2019  Time:    15:01    Electronically signed by: Charles Lozoya MD  Date:    02/21/2019  Time:    15:28        Assessment/Plan:     * Polymyositis  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  Last full workup in 2016 with evidence of progression of disease  Direct admit for management by rheumatology with IVIG  Weakness in all extremities, uses scooter for mobilization  Does not have a good social support system at home  - Rheumatology consult - appreciate recs  - Defer IVIG orders for rheumatology after evaluation  - Continuing imuran and prednisone currently  - f/u initial labs CBC, BMP, LFTs, aldolase, CPK, ESR, CRP, Igs, HMG-CoA reductase antibody, CN1A Ab  - Bilateral MRIs w wo contrast of bilat femurs and bilat humerus (renal function stable)  - PT/OT and PM&R      Essential hypertension  - Continue norvasc 5mg daily  - Monitor BP      Hemoglobin C disease  - Monitor CBC - currently stable      Immunosuppression  Currently on imuran 150mg daily and prednisone 10mg daily.  - Continue both at this point until rheumatology evaluation      Atrial fibrillation  Currently rate and rhythm controlled      Osteopenia  Likely 2/2 long term prednisone treatment  - f/u MRI humerus and femur      Splenomegaly  - F/u US abdo      Weakness of both lower extremities  See plan for "polymyositis"      Vitamin D " insufficiency  - Continue ergocalciferol qweekly      Dysphagia  Reports difficulty with both solids and liquids, but largely dependent on positioning of patient (e.g. Sitting up vs laying in recliner)  Sometimes experiences choking with water  - SLP eval and treat tomorrow  - Mech soft diet as tolerated for now - strict aspiration precautions   - Will consider GI eval for dysphagia after SLP eval  - Nutrition consult        VTE Risk Mitigation (From admission, onward)        Ordered     Place sequential compression device  Until discontinued      04/02/19 4788             Maria L Chambers MD  Department of Hospital Medicine   [facility]

## 2019-04-03 NOTE — ASSESSMENT & PLAN NOTE
Mr. Kahn is a 63 year old male with past medical history of Polymyositis  diagnosed 2009 (+mildly positive ku, outside biopsy proven, CPK here elevated to 487, aldolase elevated to 10.2 ), osteopenia, vitamin D insufficiency, hemoglobin c/beta-zero thalassema, a fib, dysphagia, portal HTN, emphysema was a direct admission from rheumatology clinic for worsening generalized muscle weakness and dysphagia.  Patient has a history of non-compliance and was told to be a direct admit since Feb 13 but did not come until April 2 after many attempts/contact.      Previous imaging significant for:  CXR (12/2018) - patchy consolidation over RLL   CT chest (12/2017) - bronchitis of the lower lung zones in the RML (concern for inhaled irritant vs aspiration).  UL - centrilobular emphysema with stable 1.2cm LN.  +densely calcified granuloma in the posterobasal of the RLL.  +hepatosplenomegaly with questionable portal venous hypertension.      Labs at this admission:  Normal and stable CBC and CMP.  Normal CK/aldolase.  Normal inflammatory markers.  Elevated IgG (1754)    Plan:  - Pending HMG-CoA reducatse and CN1A antibody  - Pending MRI w and w/o contrast of all extremities  - Pending swallow evaluation - may need GI for further evaluation of dysphagia  - pending nutrition consult  - PT/OT assessment  - continue prednisone 10mg and Imuran 150mg at this time - consideration for IVIG (0.4g/kg/day x 5 days) followed by monthly 3-day course   - pending abdominal U/S - evaluation of hepatosplenomegaly and portal HTN (seen in CT chest 12/2017)  - repeat CT chest - evaluation of GGO seen 2015

## 2019-04-03 NOTE — ASSESSMENT & PLAN NOTE
Mr. Kahn is a 63 year old male with past medical history of Polymyositis  diagnosed 2009 (+mildly positive ku, outside biopsy proven, CPK here elevated to 487, aldolase elevated to 10.2 ), osteopenia, vitamin D insufficiency, hemoglobin c/beta-zero thalassema, a fib, dysphagia, portal HTN, emphysema was a direct admission from rheumatology clinic for worsening generalized muscle weakness and dysphagia.  Patient has a history of non-compliance and was told to be a direct admit since Feb 13 but did not come until April 2 after many attempts/contact.      Previous imaging significant for:  CXR (12/2018) - patchy consolidation over RLL   CT chest (12/2017) - bronchitis of the lower lung zones in the RML (concern for inhaled irritant vs aspiration).  UL - centrilobular emphysema with stable 1.2cm LN.  +densely calcified granuloma in the posterobasal of the RLL.  +hepatosplenomegaly with questionable portal venous hypertension.      Labs at this admission:  Normal and stable CBC and CMP.  Normal CK/aldolase.  Normal inflammatory markers.  Elevated IgG (1754)    Patient continues to have progressive dysphagia and worsening muscle weakness - suggestive of severe progressive polymyositis.  Patient needs to be workup with repeat imaging to look for muscle edema (if positive, repeat muscle biopsy).  Given severe polymyositis, IVIG is warranted at this time.      Patient will also benefit from PT/OT and rehab placement for muscle strengthening after completion of IVIG.     Plan:  - Pending HMG-CoA reducatse and CN1A antibody  - Pending MRI w and w/o contrast of all extremities - repeat muscle biopsy.  Primary has put in another order to get this done  - IVIG (0.4g/kg/day x 5 days) followed by monthly course.  Therapy order in.  Patient needs to be transfer to another floor to get this administer.  Primary aware and order for transfer in.   -  nutrition consult  - PT/OT assessment  - continue prednisone 10mg and Imuran 150mg  at this time  - pending abdominal U/S - evaluation of hepatosplenomegaly and portal HTN (seen in CT chest 12/2017)  - repeat CT chest - evaluation of GGO seen 2015 - no GGO seen   - recommendation for rehab consult - patient agreeable to rehab

## 2019-04-03 NOTE — PLAN OF CARE
Problem: Adult Inpatient Plan of Care  Goal: Plan of Care Review  Outcome: Ongoing (interventions implemented as appropriate)  Patient turned and repositioned q2h and PRN. Patient pain and safety monitored q 1-2 hrs this shift. Blood glucose monitored Qshift. Bedrest maintained. Bed locked and in lowest position. Bed side rails elevated x 2. Call light and personal belongings in reach. NPO since midnight, pending ultrasound of abd this AM. Will cont. to monitor.   04/03/19 0629   Plan of Care Review   Plan of Care Reviewed With patient

## 2019-04-03 NOTE — SUBJECTIVE & OBJECTIVE
Interval History: Patient resting comfortably in bed, concerned about his jewelry. He is not complaining of pain or body aches. Tolerated mechanical soft diet yesterday. Awaiting rheumatology directions.    Review of Systems   Constitutional: Positive for activity change (dec). Negative for appetite change, diaphoresis, fatigue and fever.   HENT: Negative for congestion, sinus pressure, sinus pain and trouble swallowing.    Eyes: Negative for photophobia and pain.   Respiratory: Negative for choking, shortness of breath and wheezing.    Cardiovascular: Negative for chest pain, palpitations and leg swelling.   Gastrointestinal: Negative for abdominal pain, blood in stool, constipation, diarrhea, nausea and vomiting.   Genitourinary: Negative for dysuria, hematuria and urgency.   Musculoskeletal: Positive for gait problem. Negative for arthralgias, back pain and myalgias.   Neurological: Positive for weakness. Negative for dizziness, tremors, facial asymmetry, speech difficulty and headaches.   Psychiatric/Behavioral: Negative for agitation, behavioral problems, confusion and decreased concentration.     Objective:     Vital Signs (Most Recent):  Temp: 97.6 °F (36.4 °C) (04/03/19 1102)  Pulse: 68 (04/03/19 1102)  Resp: 20 (04/03/19 1102)  BP: (!) 160/63 (04/03/19 1102)  SpO2: 96 % (04/03/19 1102) Vital Signs (24h Range):  Temp:  [97.6 °F (36.4 °C)-98.8 °F (37.1 °C)] 97.6 °F (36.4 °C)  Pulse:  [67-89] 68  Resp:  [15-20] 20  SpO2:  [95 %-99 %] 96 %  BP: (139-167)/(63-87) 160/63     Weight: 58 kg (127 lb 13.9 oz)  Body mass index is 18.88 kg/m².    Intake/Output Summary (Last 24 hours) at 4/3/2019 1331  Last data filed at 4/3/2019 1200  Gross per 24 hour   Intake --   Output 550 ml   Net -550 ml      Physical Exam   Constitutional: He is oriented to person, place, and time. He appears well-developed. No distress.   HENT:   Head: Normocephalic and atraumatic.   Mouth/Throat: No oropharyngeal exudate.   Eyes: Conjunctivae  and EOM are normal. No scleral icterus.   Neck: Normal range of motion. Neck supple.   Cardiovascular: Normal rate, regular rhythm, normal heart sounds and intact distal pulses.   No murmur heard.  Pulmonary/Chest: Effort normal and breath sounds normal. No respiratory distress. He has no wheezes.   Abdominal: Soft. Bowel sounds are normal. He exhibits no distension. There is no tenderness. There is no guarding.   Musculoskeletal: He exhibits no tenderness.   Right Side Rheumatological Exam      Muscle Strength (0-5 scale):  Neck Flexion:  4  Neck Extension: 4  Deltoid:  2  Biceps: 3  Triceps:  4  : 4  Iliopsoas: 3  Quadriceps:  4  Distal Lower Extremity: 2     Left Side Rheumatological Exam      Muscle Strength (0-5 scale):  Neck Flexion:  4  Neck Extension: 4  Deltoid:  3  Biceps: 4   Triceps:  4  :  4  Iliopsoas: 3  Quadriceps:  3   Distal Lower Extremity: 2     Lymphadenopathy:     He has no cervical adenopathy.   Neurological: He is alert and oriented to person, place, and time. No cranial nerve deficit. He exhibits abnormal muscle tone (decreased tone in all extremities).   Skin: Skin is warm and dry. No rash noted. He is not diaphoretic. No pallor.   Psychiatric: He has a normal mood and affect. His behavior is normal. Judgment and thought content normal.   Nursing note and vitals reviewed.      Significant Labs:   CBC:   Recent Labs   Lab 04/02/19  1456 04/03/19  0503   WBC 6.18 6.12   HGB 11.4* 10.1*   HCT 35.8* 30.8*   * 136*     CMP:   Recent Labs   Lab 04/02/19  1456 04/03/19  0503    135*   K 3.8 3.8    104   CO2 25 24   GLU 75 80   BUN 11 12   CREATININE 0.5 0.4*   CALCIUM 9.4 8.9   PROT 8.0 6.8   ALBUMIN 4.2 3.9   BILITOT 1.8* 1.5*   ALKPHOS 67 61   AST 9* 8*   ALT 7* 5*   ANIONGAP 8 7*   EGFRNONAA >60.0 >60.0     Magnesium:   Recent Labs   Lab 04/02/19  1456 04/03/19  0503   MG 2.2 2.1       Significant Imaging: I have reviewed all pertinent imaging results/findings within  the past 24 hours.     US Abdomen Limited with Doppler (xpd)  Narrative: EXAMINATION:  US ABDOMEN LIMITED WITH DOPPLER (XPD)    CLINICAL HISTORY:  hepatosplenomegaly;    TECHNIQUE:  Limited abdominal ultrasound with doppler.  Color and spectral Doppler were performed.    COMPARISON:  Ultrasound abdomen complete with Doppler 03/31/2015    FINDINGS:  The visualized portion of the pancreas is unremarkable.    The liver is normal in size measuring 16.4 cm.  The liver demonstrates homogeneous echotexture.  No focal hepatic lesions are seen.  HRI: 1.07.  There is no evidence of ascites.    Several gallstones in the gallbladder lumen measuring up to 0.9 cm.  The common duct is not dilated, measuring 2 mm.  The gallbladder wall is not thickened.  There is no sonographic Ruvalcaba sign.  No dilated intrahepatic radicles are seen.    The spleen is markedly enlarged in size measuring 20.1 x 7.8 cm with a homogeneous echotexture.  Splenule noted.    The main portal vein, right portal vein, left portal vein, middle hepatic vein, right hepatic vein, left hepatic vein, and IVC are patent with proper directional flow.  The main hepatic artery is patent. The umbilical vein is not patent.  The Celiac artery and superior mesenteric vein are not visualized.  Impression: Cholelithiasis with no evidence of cholecystitis.    Splenomegaly.    Electronically signed by resident: Ralph Olsen MD  Date:    04/03/2019  Time:    10:45    Electronically signed by: Kory Antoine MD  Date:    04/03/2019  Time:    11:42

## 2019-04-03 NOTE — ASSESSMENT & PLAN NOTE
Reports difficulty with both solids and liquids, but largely dependent on positioning of patient (e.g. Sitting up vs laying in recliner)  Sometimes experiences choking with water  - SLP eval and treat tomorrow  - Mech soft diet as tolerated for now - strict aspiration precautions   - Will consider GI eval for dysphagia after SLP eval  - Nutrition consult

## 2019-04-03 NOTE — ASSESSMENT & PLAN NOTE
"US abdo 4/3 shows "cholelithiasis with no evidence of cholecystitis. Splenomegaly."  Not currently causing complications      "

## 2019-04-03 NOTE — PT/OT/SLP EVAL
Speech-Language Pathology Evaluation  Bedside Swallow  Discharge Summary    Patient Name:  Nura Kahn Jr.   MRN:  [id number]  Admitting Diagnosis: Polymyositis    Recommendations:                 General Recommendations:  Follow-up not indicated  Diet recommendations:  Dental soft, Thin   Aspiration Precautions: Alternating bites/sips, Chin tuck, HOB to 90 degrees, Small bites/sips and Strict aspiration precautions   General Precautions: Standard, fall  Communication strategies:  none    History:     Past Medical History:   Diagnosis Date    Atrial fibrillation 3/2/2015    Depression     Essential hypertension 2/27/2019    Microcytic anemia 9/25/2014    Neuromuscular disorder     Polymyositis 2009    Tobacco abuse        Past Surgical History:   Procedure Laterality Date    BIOPSY-MUSCLE Left Thigh Left 4/14/2016    Performed by Jose Mosley MD at [facility] OR 2ND FLR    ESOPHAGOGASTRODUODENOSCOPY (EGD) N/A 7/1/2015    Performed by Codey Laurent MD at [facility] ENDO (4TH FLR)    MANOMETRY-ESOPHAGEAL N/A 7/1/2015    Performed by Codey Laurent MD at [facility] ENDO (4TH FLR)       Social History: Patient lives with wife    Modified Barium Swallow: 2/21/19   Assessment / Impressions   The results of this MBSS indicate that patient demonstrates moderate swallowing dysfunction c/b overt aspiration of large (+ cough response), continuous sips of thin liquids after the swallow, as well as residue of all consistencies due to overall pharyngeal weakness.  Prognosis for improvement of swallowing with therapy is guarded.       Recommendations / POC   -Diet: recommend regular/soft diet as tolerated; thin liquids ok if taken in very small sips; crush all meds  -Therapeutic technique: recommend chin tuck, effortful swallow, alternate solid with liquid wash and multiple swallows per bolus; also recommend assistance with eating to help with maintaining small bolus size.  Discussed this with pt and his daughter who was present for  "the procedure.    -Pt would benefit from continued home health dysphagia therapy in order to increase tongue base retraction, increase pharyngeal contraction, increase laryngeal excursion and airway closure and increase swallow safety by implementing therapeutic maneuvers; however, he was recently d/c from  SLP therapy.    Chest X-Rays: no recent    Prior diet: regular/soft & thin liquids despite scattered/minimal dentition    Subjective   Awake & alert. NSG reports pt tolerating current diet of dental soft solids (all of breakfast) & meds whole without difficulty    Pain/Comfort:  · Pain Rating 1: 0/10  · Pain Rating Post-Intervention 2: 0/10    Objective:     Oral Musculature Evaluation  · Oral Musculature: general weakness  · Dentition: scattered dentition  · Mucosal Quality: adequate  · Oral Labial Strength and Mobility: WFL  · Lingual Strength and Mobility: impaired strength  · Volitional Cough: adequate  · Volitional Swallow: adequate  · Voice Prior to PO Intake: clear    Bedside Swallow Eval:   Consistencies Assessed:  · Thin liquids cup sips x5  · Solids 1/2 maynor cracker     Oral Phase:   · WFL    Pharyngeal Phase:   · no overt clinical signs/symptoms of aspiration    Compensatory Strategies  · Chin tuck & alternating bites/sips     Pt reports dysphagia at baseline but that he's been doing well swallowing since he's been in the hospital. He reports that sometimes big pills get "stuck at the top of his throat but that it's not happening as much since he's been hospitalized "it only happened once last night & it was only for a minute." Pt reports that when he is "sitting up straight like this it's much easier & I'm all good swallowing." Pt reports that he uses the strategies of small bites & sips, tucking his chin when swallowing & alternating bites/sips & that this helps him when he needs it. He was able to recall & utilize all of these strategies IND'ly. Pt requested a list of swallowing exercises to " practice IND'ly. SLP provided list of exercises & reviewed each. SLP reviewed all recs, precautions & swallowing strategies. Pt & spouse verbalized understanding of all.     Assessment:     Nura Kahn Jr. is a 63 y.o. male with an SLP diagnosis of Dysphagia at baseline which pt is currently managing.  He presents with the ability to tolerate current recommended diet & to utilize all precautions & strategies IND'ly. Pt/spouse also aware to notify NSG if any new concerns regarding swallowing arise.     Goals:   Multidisciplinary Problems     SLP Goals        Problem: SLP Goal    Goal Priority Disciplines Outcome   SLP Goal     SLP                    Plan:     · Patient to be seen:      · Plan of Care expires:     · Plan of Care reviewed with:  patient, spouse   · SLP Follow-Up:  No       Discharge recommendations:  (tbd)     Time Tracking:     SLP Treatment Date:   04/03/19  Speech Start Time:  1146  Speech Stop Time:  1207     Speech Total Time (min):  21 min    Billable Minutes: Eval Swallow and Oral Function 13 and Seld Care/Home Management Training 8    Martha Castellanos CCC-SLP  04/03/2019

## 2019-04-03 NOTE — PLAN OF CARE
Problem: SLP Goal  Goal: SLP Goal  SLP Clinical Swallow Evaluation completed. See note for details.

## 2019-04-03 NOTE — CONSULTS
[facility]  Rheumatology  Consult Note    Patient Name: Nura Kahn Jr.  MRN: [id number]  Admission Date: 4/2/2019  Hospital Length of Stay: 1 days  Code Status: Full Code   Attending Provider: Óscar Patricia MD  Primary Care Physician: Socrates Ambrosio MD  Principal Problem:Polymyositis    Inpatient consult to Rheumatology  Consult performed by: Janessa Monae MD  Consult ordered by: Socrates Pimentel MD  Reason for consult: progressive polymyositis        Subjective:     HPI: 64yo M with PMH of polymyositis (dx 2009, +mildly +ku, outside biopsy proven with elevated CPK and aldolase), osteopenia, Vit D insufficiency, Hemoglobulin c/beta-zero thalaseema, A-fib, dysphagia, portal HTN, emphysema was a direct admit from rheumatology clinic.     He saw Dr. Durham and Dr. Huston in Feb 2019 - recommendation was for direct admission at that time due to the complex nature of his case with worsening strength and dysphagia.  Recommendation was to start IVIG on admission.  Patient did not go and after many attempts made by Dr. Durham, patient finally presented to the direct admission office yesterday.    Per Dr. Durham's note:    He last saw rheumatology on May 2016.  At that time, he was on prednisone 30mg and Imuran 150mg daily.  He was to have GI evaluation for dysphagia and pulmonary evaluation for GGO with pulmonary nodules.       Per chart review it seems that he first established care here at [facility] in 8/2014. He was diagnosed in [id number] when he was feeling weak, falling. He was a  and was unable to use his legs. He had a biopsy in [id number] in [place] that per notes was consistent with polymyositis. He was on MTX in the past which was stopped due CT showing hepatosplenomegaly with portal venous hypertension and elevated T bili in 8/2014 He was at that time started on Imuran. He has been on Imuran and steroids since then.  In 2016, he was worked  up again here at main campus. MRI of the left lower extremities showed Severe atrophy with fatty replacement of the thigh musculature bilaterally.  There is mild increased edema signal within the remaining thigh musculature and left iliopsoas muscle without associated enhancement which could reflect a mild degree of residual myositis.  EMG Left upper Ext 5/2016 was a technically difficult study. Had moderate ulnar entrapment at elbow, perhaps on a background of mild polyneuropathy. 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 Pathology of left thigh showing minute potion of muscle with end stage myopathic changes and focal chronic inflammation   Patient remained on Imuran and prednisone.     In Feb 2019 - patient had been out of Imuran for ~1 month.  He also admits to medication non-compliance because he didn't think he was having any improvement.  He admits that his weakness has gotten worse - stating that he is unable to stand, difficulty with getting in and out of his scooter, normal ADLs (including going to the bathroom - resulting in urination/defecation on himself - resulting in decreased PO intake).  He also feels worsening of his dysphagia - where he gets food stuck and often chokes/coughs when he eats/drinks.      Wife works fulltime.  Daughter (10 yo).  Patient does not have social support/help for transportation to various appt.    Specialist updates:  - Cardiology - Last saw 3/2015 - diagnosed with PAF.  No treatment at that time.  No follow up since  - Hem - Last saw 12/2017 - Evaluated for microcytic anemia and mild chronic thrombocytosis.  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  Discharged from clinic   - GI - Last saw 6/2015 - EGD/motility study ordered - not performed.  No follow up   - Hepatology - Last saw 5/2015 - Found portal HTN on CT with no evidence of cirrhosis or PV thrombosis.  Fibrosure with F1 stage (minimial fibrosis).  RTC PRN (no  "follow up)  - Pulmonary - Last saw 2/2015 -" Lung parenchymal normal - no evidence of fibrosis and one mediastinal LN marginal enlarged" CT chest showed GGO (2016). Repeat CT chest (2017) - no mention of GGO     Imaging:  CXR 1/2018:Patchy consolidation projected over the right lower lung zone, concerning for infection, correlation advised.       CT chest 12/2017:Peribronchial cuffing predominantly in the lower lung zones as well as retained secretions in the right middle lobe segmental bronchi consistent with bronchitis; such inflammation can occur with inhaled irritants or aspiration.  There is no evidence of interstitial lung disease such as NSIP or UIP.Upper lung zone predominant paraseptal emphysema and dispersed areas of centrilobular emphysema Stable 1.2 cm AP window lymph node.  Densely calcified granuloma in the posterobasal segment of the right lower lobe.Hepatosplenomegaly with associated splenic collateral vessels at the splenic hilum suggesting sequela of portal venous hypertension.     CT pelvis 6/2017: Obturator internus edema or hematoma. No acute fracture. Stable splenomegaly.     Esophogram 4/2015: Spontaneous gastroesophageal reflux.  Otherwise, unremarkable esophagram.     PET scan 8/2014:AP window lymph node SUV max less than 2.5.  This is most likely benign/reactive.  Surveillance is recommended at 3, 6, 12, and 24 months. Splenomegaly most likely from portal hypertension        No new subjective & objective note has been filed under this hospital service since the last note was generated.    Assessment/Plan:     * Polymyositis  Mr. Kahn is a 63 year old male with past medical history of Polymyositis  diagnosed 2009 (+mildly positive ku, outside biopsy proven, CPK here elevated to 487, aldolase elevated to 10.2 ), osteopenia, vitamin D insufficiency, hemoglobin c/beta-zero thalassema, a fib, dysphagia, portal HTN, emphysema was a direct admission from rheumatology clinic for worsening " generalized muscle weakness and dysphagia.  Patient has a history of non-compliance and was told to be a direct admit since Feb 13 but did not come until April 2 after many attempts/contact.      Previous imaging significant for:  CXR (12/2018) - patchy consolidation over RLL   CT chest (12/2017) - bronchitis of the lower lung zones in the RML (concern for inhaled irritant vs aspiration).  UL - centrilobular emphysema with stable 1.2cm LN.  +densely calcified granuloma in the posterobasal of the RLL.  +hepatosplenomegaly with questionable portal venous hypertension.      Labs at this admission:  Normal and stable CBC and CMP.  Normal CK/aldolase.  Normal inflammatory markers.  Elevated IgG (1754)    Patient continues to have progressive dysphagia and worsening muscle weakness - suggestive of severe progressive polymyositis.  Patient needs to be workup with repeat imaging to look for muscle edema (if positive, repeat muscle biopsy).  Given severe polymyositis, IVIG is warranted at this time.      Patient will also benefit from PT/OT and rehab placement for muscle strengthening after completion of IVIG.     Plan:  - Pending HMG-CoA reducatse and CN1A antibody  - Pending MRI w and w/o contrast of all extremities - if +edema, repeat muscle biopsy may be warranted   - Pending swallow evaluation - may need GI for further evaluation of dysphagia  - pending nutrition consult  - PT/OT assessment  - continue prednisone 10mg and Imuran 150mg at this time - consideration for IVIG (0.4g/kg/day x 5 days) followed by monthly 3-day course   - pending abdominal U/S - evaluation of hepatosplenomegaly and portal HTN (seen in CT chest 12/2017)  - repeat CT chest - evaluation of GGO seen 2015  - recommendation for rehab consult       Thank you for your consult. I will follow-up with patient. Please contact us if you have any additional questions.    Janessa Monae MD  Rheumatology  [facility]

## 2019-04-03 NOTE — SUBJECTIVE & OBJECTIVE
Past Medical History:   Diagnosis Date    Atrial fibrillation 3/2/2015    Depression     Essential hypertension 2/27/2019    Microcytic anemia 9/25/2014    Neuromuscular disorder     Polymyositis 2009    Tobacco abuse        Past Surgical History:   Procedure Laterality Date    BIOPSY-MUSCLE Left Thigh Left 4/14/2016    Performed by [facility] at [facility] OR 2ND FLR    ESOPHAGOGASTRODUODENOSCOPY (EGD) N/A 7/1/2015    Performed by Codey Laurent MD at [facility] ENDO (4TH FLR)    MANOMETRY-ESOPHAGEAL N/A 7/1/2015    Performed by Codey Laurent MD at [facility] ENDO (4TH FLR)       Immunization History   Administered Date(s) Administered    Influenza 10/06/2011    Influenza - Quadrivalent - PF 12/12/2018    Pneumococcal Conjugate - 13 Valent 05/09/2016    Pneumococcal Polysaccharide - 23 Valent 12/12/2018       Review of patient's allergies indicates:  No Known Allergies  Current Facility-Administered Medications   Medication Frequency    acetaminophen tablet 650 mg Q4H PRN    albuterol inhaler 2 puff Q6H PRN    amLODIPine tablet 5 mg Daily    azaTHIOprine tablet 150 mg Daily    calcium-vitamin D3 500 mg(1,250mg) -200 unit per tablet 1 tablet BID WM    dextrose 50% injection 12.5 g PRN    dextrose 50% injection 25 g PRN    ergocalciferol capsule 50,000 Units Q7 Days    glucagon (human recombinant) injection 1 mg PRN    glucose chewable tablet 16 g PRN    glucose chewable tablet 24 g PRN    guaifenesin 100 mg/5 ml syrup 200 mg Q4H PRN    miconazole NITRATE 2 % top powder BID    ondansetron disintegrating tablet 8 mg Q8H PRN    pantoprazole EC tablet 40 mg Daily    predniSONE tablet 10 mg Daily    ramelteon tablet 8 mg Nightly PRN    sodium chloride 0.9% flush 10 mL PRN    trazodone split tablet 25 mg Nightly PRN     Family History     Problem Relation (Age of Onset)    Diabetes Mother, Father    Hypertension Mother, Father        Tobacco Use    Smoking status: Former Smoker     Packs/day: 0.25      Years: 20.00     Pack years: 5.00     Types: Cigarettes    Smokeless tobacco: Never Used   Substance and Sexual Activity    Alcohol use: No     Alcohol/week: 0.0 oz    Drug use: Yes     Types: Marijuana     Comment: daily use    Sexual activity: Not on file     Review of Systems   Constitutional: Positive for activity change and fatigue. Negative for appetite change, diaphoresis, fever and unexpected weight change.   HENT: Negative for congestion, mouth sores and sinus pain.    Respiratory: Positive for cough. Negative for shortness of breath.    Cardiovascular: Negative for chest pain and leg swelling.   Gastrointestinal: Negative for abdominal pain, constipation, diarrhea, nausea and vomiting.   Genitourinary: Negative for dysuria.   Musculoskeletal: Negative for arthralgias, back pain, gait problem, joint swelling and myalgias.   Skin: Negative for color change and rash.   Neurological: Positive for weakness. Negative for dizziness, tremors, syncope, numbness and headaches.   Psychiatric/Behavioral: The patient is not nervous/anxious.      Objective:     Vital Signs (Most Recent):  Temp: 98.1 °F (36.7 °C) (04/03/19 0759)  Pulse: 69 (04/03/19 0759)  Resp: 16 (04/03/19 0759)  BP: (!) 167/84 (04/03/19 0759)  SpO2: 98 % (04/03/19 0759)  O2 Device (Oxygen Therapy): room air (04/03/19 0759) Vital Signs (24h Range):  Temp:  [97.8 °F (36.6 °C)-98.8 °F (37.1 °C)] 98.1 °F (36.7 °C)  Pulse:  [67-89] 69  Resp:  [15-20] 16  SpO2:  [95 %-99 %] 98 %  BP: (139-167)/(84-87) 167/84     Weight: 58 kg (127 lb 13.9 oz) (04/02/19 1758)  Body mass index is 18.88 kg/m².  Body surface area is 1.68 meters squared.      Intake/Output Summary (Last 24 hours) at 4/3/2019 0836  Last data filed at 4/3/2019 0200  Gross per 24 hour   Intake --   Output 350 ml   Net -350 ml       Physical Exam   Constitutional: He is oriented to person, place, and time and well-developed, well-nourished, and in no distress.   Severe muscle wasting and  deconditioning   HENT:   Head: Normocephalic and atraumatic.   Eyes: Conjunctivae and EOM are normal. Pupils are equal, round, and reactive to light.   Neck: Normal range of motion. Neck supple.   Cardiovascular: Normal rate and regular rhythm.    Pulmonary/Chest: Effort normal and breath sounds normal.   Abdominal: Soft. Bowel sounds are normal.   Neurological: He is alert and oriented to person, place, and time.   Decreased gait due to instability    Skin: Skin is warm and dry.     No rashes    Psychiatric: Mood, memory, affect and judgment normal.   Musculoskeletal: Normal range of motion. He exhibits no edema, tenderness or deformity.   Decreased muscle strength in all major muscle groups 2/5  ROM intact  Bilateral decreased  strength   Severe muscle wasting         Significant Labs:  Recent Results (from the past 48 hour(s))   Hemoglobin A1c    Collection Time: 04/02/19  2:56 PM   Result Value Ref Range    Hemoglobin A1C <4.0 (A) 4.0 - 5.6 %    Estimated Avg Glucose Unable to calculate 68 - 131 mg/dL   Aldolase    Collection Time: 04/02/19  2:56 PM   Result Value Ref Range    Aldolase 6.3 1.2 - 7.6 U/L   CK    Collection Time: 04/02/19  2:56 PM   Result Value Ref Range     20 - 200 U/L   Sedimentation rate    Collection Time: 04/02/19  2:56 PM   Result Value Ref Range    Sed Rate <2 0 - 23 mm/Hr   C-reactive protein    Collection Time: 04/02/19  2:56 PM   Result Value Ref Range    CRP 0.3 0.0 - 8.2 mg/L   Immunoglobulins (IgG, IgA, IgM) Quantitative    Collection Time: 04/02/19  2:56 PM   Result Value Ref Range    IgG - Serum 1754 (H) 650 - 1600 mg/dL    IgA 210 40 - 350 mg/dL    IgM 62 50 - 300 mg/dL   TSH    Collection Time: 04/02/19  2:56 PM   Result Value Ref Range    TSH 1.523 0.400 - 4.000 uIU/mL   PT/INR    Collection Time: 04/02/19  2:56 PM   Result Value Ref Range    Prothrombin Time 10.8 9.0 - 12.5 sec    INR 1.0 0.8 - 1.2   Comprehensive Metabolic Panel (CMP)    Collection Time: 04/02/19   2:56 PM   Result Value Ref Range    Sodium 140 136 - 145 mmol/L    Potassium 3.8 3.5 - 5.1 mmol/L    Chloride 107 95 - 110 mmol/L    CO2 25 23 - 29 mmol/L    Glucose 75 70 - 110 mg/dL    BUN, Bld 11 8 - 23 mg/dL    Creatinine 0.5 0.5 - 1.4 mg/dL    Calcium 9.4 8.7 - 10.5 mg/dL    Total Protein 8.0 6.0 - 8.4 g/dL    Albumin 4.2 3.5 - 5.2 g/dL    Total Bilirubin 1.8 (H) 0.1 - 1.0 mg/dL    Alkaline Phosphatase 67 55 - 135 U/L    AST 9 (L) 10 - 40 U/L    ALT 7 (L) 10 - 44 U/L    Anion Gap 8 8 - 16 mmol/L    eGFR if African American >60.0 >60 mL/min/1.73 m^2    eGFR if non African American >60.0 >60 mL/min/1.73 m^2   Magnesium    Collection Time: 04/02/19  2:56 PM   Result Value Ref Range    Magnesium 2.2 1.6 - 2.6 mg/dL   Phosphorus    Collection Time: 04/02/19  2:56 PM   Result Value Ref Range    Phosphorus 3.1 2.7 - 4.5 mg/dL   CBC auto differential    Collection Time: 04/02/19  2:56 PM   Result Value Ref Range    WBC 6.18 3.90 - 12.70 K/uL    RBC 5.69 4.60 - 6.20 M/uL    Hemoglobin 11.4 (L) 14.0 - 18.0 g/dL    Hematocrit 35.8 (L) 40.0 - 54.0 %    MCV 63 (L) 82 - 98 fL    MCH 20.0 (L) 27.0 - 31.0 pg    MCHC 31.8 (L) 32.0 - 36.0 g/dL    RDW 21.3 (H) 11.5 - 14.5 %    Platelets 149 (L) 150 - 350 K/uL    MPV SEE COMMENT 9.2 - 12.9 fL    Immature Granulocytes 0.8 (H) 0.0 - 0.5 %    Gran # (ANC) 3.4 1.8 - 7.7 K/uL    Immature Grans (Abs) 0.05 (H) 0.00 - 0.04 K/uL    Lymph # 2.1 1.0 - 4.8 K/uL    Mono # 0.5 0.3 - 1.0 K/uL    Eos # 0.1 0.0 - 0.5 K/uL    Baso # 0.04 0.00 - 0.20 K/uL    nRBC 5 (A) 0 /100 WBC    Gran% 55.2 38.0 - 73.0 %    Lymph% 34.5 18.0 - 48.0 %    Mono% 7.3 4.0 - 15.0 %    Eosinophil% 1.6 0.0 - 8.0 %    Basophil% 0.6 0.0 - 1.9 %    Differential Method Automated    POCT glucose    Collection Time: 04/03/19 12:47 AM   Result Value Ref Range    POCT Glucose 141 (H) 70 - 110 mg/dL   Comprehensive Metabolic Panel (CMP)    Collection Time: 04/03/19  5:03 AM   Result Value Ref Range    Sodium 135 (L) 136 - 145  mmol/L    Potassium 3.8 3.5 - 5.1 mmol/L    Chloride 104 95 - 110 mmol/L    CO2 24 23 - 29 mmol/L    Glucose 80 70 - 110 mg/dL    BUN, Bld 12 8 - 23 mg/dL    Creatinine 0.4 (L) 0.5 - 1.4 mg/dL    Calcium 8.9 8.7 - 10.5 mg/dL    Total Protein 6.8 6.0 - 8.4 g/dL    Albumin 3.9 3.5 - 5.2 g/dL    Total Bilirubin 1.5 (H) 0.1 - 1.0 mg/dL    Alkaline Phosphatase 61 55 - 135 U/L    AST 8 (L) 10 - 40 U/L    ALT 5 (L) 10 - 44 U/L    Anion Gap 7 (L) 8 - 16 mmol/L    eGFR if African American >60.0 >60 mL/min/1.73 m^2    eGFR if non African American >60.0 >60 mL/min/1.73 m^2   Magnesium    Collection Time: 04/03/19  5:03 AM   Result Value Ref Range    Magnesium 2.1 1.6 - 2.6 mg/dL   Phosphorus    Collection Time: 04/03/19  5:03 AM   Result Value Ref Range    Phosphorus 3.7 2.7 - 4.5 mg/dL   CBC auto differential    Collection Time: 04/03/19  5:03 AM   Result Value Ref Range    WBC 6.12 3.90 - 12.70 K/uL    RBC 4.94 4.60 - 6.20 M/uL    Hemoglobin 10.1 (L) 14.0 - 18.0 g/dL    Hematocrit 30.8 (L) 40.0 - 54.0 %    MCV 62 (L) 82 - 98 fL    MCH 20.4 (L) 27.0 - 31.0 pg    MCHC 32.8 32.0 - 36.0 g/dL    RDW 20.9 (H) 11.5 - 14.5 %    Platelets 136 (L) 150 - 350 K/uL    MPV SEE COMMENT 9.2 - 12.9 fL    Immature Granulocytes 0.5 0.0 - 0.5 %    Gran # (ANC) 3.8 1.8 - 7.7 K/uL    Immature Grans (Abs) 0.03 0.00 - 0.04 K/uL    Lymph # 1.7 1.0 - 4.8 K/uL    Mono # 0.4 0.3 - 1.0 K/uL    Eos # 0.1 0.0 - 0.5 K/uL    Baso # 0.04 0.00 - 0.20 K/uL    nRBC 5 (A) 0 /100 WBC    Gran% 61.6 38.0 - 73.0 %    Lymph% 28.4 18.0 - 48.0 %    Mono% 7.0 4.0 - 15.0 %    Eosinophil% 1.8 0.0 - 8.0 %    Basophil% 0.7 0.0 - 1.9 %    Platelet Estimate Decreased (A)     Aniso Moderate     Poik Slight     Poly Occasional     Hypo Occasional     Target Cells Moderate     Differential Method Automated    POCT glucose    Collection Time: 04/03/19  8:11 AM   Result Value Ref Range    POCT Glucose 87 70 - 110 mg/dL         Significant Imaging:  Imaging results within the past  24 hours have been reviewed.

## 2019-04-03 NOTE — PLAN OF CARE
On Discharge planning assessment patient is in bed, resting quietly,  Introduced myself and CM role in patients care plan, patient verbalized understanding.     Pt lives in a single story home with his daughter and wife, he uses [employer] for transportation. Patient denies HD, H/H and coumadin. Pt has a W/C, BSC, Power Chair, and Hospital Bed for home use. Verified Pts Address, Emergency Contact, Pharmacy and PCP.     Pt denies any concerns for this visit, CM will continue to follow. CM name and number placed on patients white board .        04/03/19 1119   Discharge Assessment   Assessment Type Discharge Planning Assessment   Confirmed/corrected address and phone number on facesheet? Yes   Assessment information obtained from? Patient   Expected Length of Stay (days) 3   Communicated expected length of stay with patient/caregiver yes   Prior to hospitilization cognitive status: Alert/Oriented   Prior to hospitalization functional status: Assistive Equipment   Current cognitive status: Alert/Oriented   Current Functional Status: Assistive Equipment   Lives With spouse   Is patient able to care for self after discharge? Unable to determine at this time (comments)   Who are your caregiver(s) and their phone number(s)? Mami Riddle-[phone]   Patient's perception of discharge disposition home health   Readmission Within the Last 30 Days no previous admission in last 30 days   Patient currently being followed by outpatient case management? No   Patient currently receives any other outside agency services? No   Equipment Currently Used at Home power chair;wheelchair;bedside commode;hospital bed   Do you have any problems affording any of your prescribed medications? No   Is the patient taking medications as prescribed? yes   Does the patient have transportation home? Yes   Transportation Anticipated public transportation   Does the patient receive services at the [facility]? No   Discharge Plan A Home  Health   Discharge Plan B Skilled Nursing Facility   DME Needed Upon Discharge  none   Patient/Family in Agreement with Plan yes     Socrates Ambrosio MD  [address] [facility] [id number] [phone]    Extended Emergency Contact Information  Primary Emergency Contact: FahadMami   [facility]  Home Phone: [phone]  Relation: Daughter  Secondary Emergency Contact: Mitali Mata   [place]  Mobile Phone: [phone]  Relation: Spouse      [facility] [place], [place] - [address] [facility]  [address] [facility] [address]  Phone: [phone] Fax: [phone]    [facility] [address] [place] [address] AT [address] [address]  Phone: [phone] Fax: [phone]

## 2019-04-03 NOTE — PROGRESS NOTES
[facility] Hospital Medicine  Progress Note    Patient Name: Nura Kahn Jr.  MRN: [id number]  Patient Class: IP- Inpatient   Admission Date: 4/2/2019  Length of Stay: 1 days  Attending Physician: Óscar Patricia MD  Primary Care Provider: Socrates Ambrosio MD    [facility] 2 Maria L Chambers MD    Subjective:     Principal Problem:Polymyositis    HPI:  Mr. Kahn is a 63M with polymyositis (Dx 2009, Bx proven), osteopenia, vit D insufficiency, thalassemia, dysphagia, afib, HTN, emphysema who presents as a direct admit for workup of progressive weakness in his extremities. Workup from 2016 showed end-stage myopathic changes and focal chronic inflammation. He was last seen by rheumatology 2/13/2019 with plans for direct admit for IVIG therapy; however, patient did not present as it was Williamsons day. Today, he reports no major changes since February. Muscle weakness has been progressive for the past several years. He currently uses a scooter and requires increasing levels of assistance to move from recliner to scooter. As a result of his immobility, he wears a diaper as he is unable to make it to the restroom for bowel and urinary habits. His support system at home is lacking; he is sliding to move himself from chair to scooter. His significant other has not been supportive with increasing decline in his status. Dysphagia has been stable; patient reports that difficulty swallowing is associated with positioning. When he does have trouble, it usually feels like food is stuck at the top of his esophagus, not that it is stuck on the way down. Sometimes he chokes with water intake. Dysphagia is least notable when sitting upright. He reports no pain or aches in his muscles. Denies fevers, chills, joint pains, blood in his stool or urine. He endorses some depression regarding his condition and inability to partake in activities he enjoys such as  fishing.    Hospital Course:  No notes on file    Interval History: Patient resting comfortably in bed, concerned about his jewelry. He is not complaining of pain or body aches. Tolerated mechanical soft diet yesterday. Awaiting rheumatology directions.    Review of Systems   Constitutional: Positive for activity change (dec). Negative for appetite change, diaphoresis, fatigue and fever.   HENT: Negative for congestion, sinus pressure, sinus pain and trouble swallowing.    Eyes: Negative for photophobia and pain.   Respiratory: Negative for choking, shortness of breath and wheezing.    Cardiovascular: Negative for chest pain, palpitations and leg swelling.   Gastrointestinal: Negative for abdominal pain, blood in stool, constipation, diarrhea, nausea and vomiting.   Genitourinary: Negative for dysuria, hematuria and urgency.   Musculoskeletal: Positive for gait problem. Negative for arthralgias, back pain and myalgias.   Neurological: Positive for weakness. Negative for dizziness, tremors, facial asymmetry, speech difficulty and headaches.   Psychiatric/Behavioral: Negative for agitation, behavioral problems, confusion and decreased concentration.     Objective:     Vital Signs (Most Recent):  Temp: 97.6 °F (36.4 °C) (04/03/19 1102)  Pulse: 68 (04/03/19 1102)  Resp: 20 (04/03/19 1102)  BP: (!) 160/63 (04/03/19 1102)  SpO2: 96 % (04/03/19 1102) Vital Signs (24h Range):  Temp:  [97.6 °F (36.4 °C)-98.8 °F (37.1 °C)] 97.6 °F (36.4 °C)  Pulse:  [67-89] 68  Resp:  [15-20] 20  SpO2:  [95 %-99 %] 96 %  BP: (139-167)/(63-87) 160/63     Weight: 58 kg (127 lb 13.9 oz)  Body mass index is 18.88 kg/m².    Intake/Output Summary (Last 24 hours) at 4/3/2019 1331  Last data filed at 4/3/2019 1200  Gross per 24 hour   Intake --   Output 550 ml   Net -550 ml      Physical Exam   Constitutional: He is oriented to person, place, and time. He appears well-developed. No distress.   HENT:   Head: Normocephalic and atraumatic.    Mouth/Throat: No oropharyngeal exudate.   Eyes: Conjunctivae and EOM are normal. No scleral icterus.   Neck: Normal range of motion. Neck supple.   Cardiovascular: Normal rate, regular rhythm, normal heart sounds and intact distal pulses.   No murmur heard.  Pulmonary/Chest: Effort normal and breath sounds normal. No respiratory distress. He has no wheezes.   Abdominal: Soft. Bowel sounds are normal. He exhibits no distension. There is no tenderness. There is no guarding.   Musculoskeletal: He exhibits no tenderness.   Right Side Rheumatological Exam      Muscle Strength (0-5 scale):  Neck Flexion:  4  Neck Extension: 4  Deltoid:  2  Biceps: 3  Triceps:  4  : 4  Iliopsoas: 3  Quadriceps:  4  Distal Lower Extremity: 2     Left Side Rheumatological Exam      Muscle Strength (0-5 scale):  Neck Flexion:  4  Neck Extension: 4  Deltoid:  3  Biceps: 4   Triceps:  4  :  4  Iliopsoas: 3  Quadriceps:  3   Distal Lower Extremity: 2     Lymphadenopathy:     He has no cervical adenopathy.   Neurological: He is alert and oriented to person, place, and time. No cranial nerve deficit. He exhibits abnormal muscle tone (decreased tone in all extremities).   Skin: Skin is warm and dry. No rash noted. He is not diaphoretic. No pallor.   Psychiatric: He has a normal mood and affect. His behavior is normal. Judgment and thought content normal.   Nursing note and vitals reviewed.      Significant Labs:   CBC:   Recent Labs   Lab 04/02/19  1456 04/03/19  0503   WBC 6.18 6.12   HGB 11.4* 10.1*   HCT 35.8* 30.8*   * 136*     CMP:   Recent Labs   Lab 04/02/19  1456 04/03/19  0503    135*   K 3.8 3.8    104   CO2 25 24   GLU 75 80   BUN 11 12   CREATININE 0.5 0.4*   CALCIUM 9.4 8.9   PROT 8.0 6.8   ALBUMIN 4.2 3.9   BILITOT 1.8* 1.5*   ALKPHOS 67 61   AST 9* 8*   ALT 7* 5*   ANIONGAP 8 7*   EGFRNONAA >60.0 >60.0     Magnesium:   Recent Labs   Lab 04/02/19  1456 04/03/19  0503   MG 2.2 2.1       Significant Imaging:  I have reviewed all pertinent imaging results/findings within the past 24 hours.     US Abdomen Limited with Doppler (xpd)  Narrative: EXAMINATION:  US ABDOMEN LIMITED WITH DOPPLER (XPD)    CLINICAL HISTORY:  hepatosplenomegaly;    TECHNIQUE:  Limited abdominal ultrasound with doppler.  Color and spectral Doppler were performed.    COMPARISON:  Ultrasound abdomen complete with Doppler 03/31/2015    FINDINGS:  The visualized portion of the pancreas is unremarkable.    The liver is normal in size measuring 16.4 cm.  The liver demonstrates homogeneous echotexture.  No focal hepatic lesions are seen.  HRI: 1.07.  There is no evidence of ascites.    Several gallstones in the gallbladder lumen measuring up to 0.9 cm.  The common duct is not dilated, measuring 2 mm.  The gallbladder wall is not thickened.  There is no sonographic Ruvalcaba sign.  No dilated intrahepatic radicles are seen.    The spleen is markedly enlarged in size measuring 20.1 x 7.8 cm with a homogeneous echotexture.  Splenule noted.    The main portal vein, right portal vein, left portal vein, middle hepatic vein, right hepatic vein, left hepatic vein, and IVC are patent with proper directional flow.  The main hepatic artery is patent. The umbilical vein is not patent.  The Celiac artery and superior mesenteric vein are not visualized.  Impression: Cholelithiasis with no evidence of cholecystitis.    Splenomegaly.    Electronically signed by resident: Ralph Olsen MD  Date:    04/03/2019  Time:    10:45    Electronically signed by: Kory Antoine MD  Date:    04/03/2019  Time:    11:42        Assessment/Plan:      * Polymyositis  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  Last full workup in 2016 with evidence of progression of disease  Direct admit for management by rheumatology with IVIG  Weakness in all extremities, uses scooter for mobilization  Does not have a good social support system at home - will require post acute care for this  "reason  Initial labs negative except IgG elevated at 1754  - Rheumatology consult - appreciate recs  - Defer IVIG orders for rheumatology after evaluation  - Continuing imuran and prednisone currently  - f/u initial labs HMG-CoA reductase antibody, CN1A Ab  - Bilateral MRIs w wo contrast of bilat femurs and bilat humerus (renal function stable)  - PT/OT and PM&R  - Discuss post-acute care options with patient      Essential hypertension  - Continue norvasc 5mg daily  - Monitor BP      Hemoglobin C disease  - Monitor CBC - currently stable      Immunosuppression  Currently on imuran 150mg daily and prednisone 10mg daily.  - Continue both at this point until rheumatology evaluation      Atrial fibrillation  Currently rate and rhythm controlled      Osteopenia  Likely 2/2 long term prednisone treatment  - f/u MRI femur      Splenomegaly  US abdo 4/3 shows "cholelithiasis with no evidence of cholecystitis. Splenomegaly."  Not currently causing complications        Weakness of both lower extremities  See plan for "polymyositis"      Vitamin D insufficiency  - Continue ergocalciferol qweekly      Dysphagia  Reports difficulty with both solids and liquids, but largely dependent on positioning of patient (e.g. Sitting up vs laying in recliner)  Sometimes experiences choking with water  - SLP eval and treat today  - [facility] soft diet as tolerated for now - strict aspiration precautions   - Will consider GI eval for dysphagia after SLP eval  - Nutrition consult        VTE Risk Mitigation (From admission, onward)        Ordered     enoxaparin injection 40 mg  Daily      04/03/19 1258     Place sequential compression device  Until discontinued      04/02/19 1408              Maria L Chambers MD  Department of Hospital Medicine   [facility]  "

## 2019-04-03 NOTE — ASSESSMENT & PLAN NOTE
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  Last full workup in 2016 with evidence of progression of disease  Direct admit for management by rheumatology with IVIG  Weakness in all extremities, uses scooter for mobilization  Does not have a good social support system at home  - Rheumatology consult - appreciate recs  - Defer IVIG orders for rheumatology after evaluation  - Continuing imuran and prednisone currently  - f/u initial labs CBC, BMP, LFTs, aldolase, CPK, ESR, CRP, Igs, HMG-CoA reductase antibody, CN1A Ab  - Bilateral MRIs w wo contrast of bilat femurs and bilat humerus (renal function stable)  - PT/OT and PM&R

## 2019-04-03 NOTE — PLAN OF CARE
Problem: Adult Inpatient Plan of Care  Goal: Plan of Care Review  Outcome: Ongoing (interventions implemented as appropriate)  POC reviewed with patient. VSS. Accucheck Qshift. Tolerating diet. Assisting with repositioning throughout shift. Patient remains free from falls and trauma. Call light in reach. WCTM.

## 2019-04-04 PROBLEM — Z74.09 IMPAIRED FUNCTIONAL MOBILITY AND ENDURANCE: Status: ACTIVE | Noted: 2019-04-04

## 2019-04-04 LAB
ALBUMIN SERPL BCP-MCNC: 4.1 G/DL (ref 3.5–5.2)
ALP SERPL-CCNC: 62 U/L (ref 55–135)
ALT SERPL W/O P-5'-P-CCNC: 6 U/L (ref 10–44)
ANION GAP SERPL CALC-SCNC: 8 MMOL/L (ref 8–16)
AST SERPL-CCNC: 8 U/L (ref 10–40)
BASOPHILS # BLD AUTO: 0.02 K/UL (ref 0–0.2)
BASOPHILS NFR BLD: 0.3 % (ref 0–1.9)
BILIRUB SERPL-MCNC: 1.8 MG/DL (ref 0.1–1)
BUN SERPL-MCNC: 11 MG/DL (ref 8–23)
CALCIUM SERPL-MCNC: 9.3 MG/DL (ref 8.7–10.5)
CHLORIDE SERPL-SCNC: 105 MMOL/L (ref 95–110)
CO2 SERPL-SCNC: 24 MMOL/L (ref 23–29)
CREAT SERPL-MCNC: 0.5 MG/DL (ref 0.5–1.4)
DIFFERENTIAL METHOD: ABNORMAL
EOSINOPHIL # BLD AUTO: 0.1 K/UL (ref 0–0.5)
EOSINOPHIL NFR BLD: 1.3 % (ref 0–8)
ERYTHROCYTE [DISTWIDTH] IN BLOOD BY AUTOMATED COUNT: 21 % (ref 11.5–14.5)
EST. GFR  (AFRICAN AMERICAN): >60 ML/MIN/1.73 M^2
EST. GFR  (NON AFRICAN AMERICAN): >60 ML/MIN/1.73 M^2
GLUCOSE SERPL-MCNC: 79 MG/DL (ref 70–110)
HCT VFR BLD AUTO: 31.8 % (ref 40–54)
HGB BLD-MCNC: 10.5 G/DL (ref 14–18)
IMM GRANULOCYTES # BLD AUTO: 0.04 K/UL (ref 0–0.04)
IMM GRANULOCYTES NFR BLD AUTO: 0.7 % (ref 0–0.5)
LYMPHOCYTES # BLD AUTO: 1.7 K/UL (ref 1–4.8)
LYMPHOCYTES NFR BLD: 28.5 % (ref 18–48)
MAGNESIUM SERPL-MCNC: 2.2 MG/DL (ref 1.6–2.6)
MCH RBC QN AUTO: 20.3 PG (ref 27–31)
MCHC RBC AUTO-ENTMCNC: 33 G/DL (ref 32–36)
MCV RBC AUTO: 62 FL (ref 82–98)
MONOCYTES # BLD AUTO: 0.4 K/UL (ref 0.3–1)
MONOCYTES NFR BLD: 7.2 % (ref 4–15)
NEUTROPHILS # BLD AUTO: 3.7 K/UL (ref 1.8–7.7)
NEUTROPHILS NFR BLD: 62 % (ref 38–73)
NRBC BLD-RTO: 5 /100 WBC
PHOSPHATE SERPL-MCNC: 3.6 MG/DL (ref 2.7–4.5)
PLATELET # BLD AUTO: 144 K/UL (ref 150–350)
PMV BLD AUTO: ABNORMAL FL (ref 9.2–12.9)
POCT GLUCOSE: 95 MG/DL (ref 70–110)
POCT GLUCOSE: 96 MG/DL (ref 70–110)
POTASSIUM SERPL-SCNC: 3.6 MMOL/L (ref 3.5–5.1)
PROT SERPL-MCNC: 7.4 G/DL (ref 6–8.4)
RBC # BLD AUTO: 5.17 M/UL (ref 4.6–6.2)
SODIUM SERPL-SCNC: 137 MMOL/L (ref 136–145)
WBC # BLD AUTO: 5.94 K/UL (ref 3.9–12.7)

## 2019-04-04 PROCEDURE — 99222 1ST HOSP IP/OBS MODERATE 55: CPT | Mod: ,,, | Performed by: NURSE PRACTITIONER

## 2019-04-04 PROCEDURE — 63600175 PHARM REV CODE 636 W HCPCS: Performed by: STUDENT IN AN ORGANIZED HEALTH CARE EDUCATION/TRAINING PROGRAM

## 2019-04-04 PROCEDURE — 93005 ELECTROCARDIOGRAM TRACING: CPT

## 2019-04-04 PROCEDURE — 99231 SBSQ HOSP IP/OBS SF/LOW 25: CPT | Mod: GC,,, | Performed by: HOSPITALIST

## 2019-04-04 PROCEDURE — 93010 ELECTROCARDIOGRAM REPORT: CPT | Mod: ,,, | Performed by: INTERNAL MEDICINE

## 2019-04-04 PROCEDURE — 99222 PR INITIAL HOSPITAL CARE,LEVL II: ICD-10-PCS | Mod: ,,, | Performed by: NURSE PRACTITIONER

## 2019-04-04 PROCEDURE — 11000001 HC ACUTE MED/SURG PRIVATE ROOM

## 2019-04-04 PROCEDURE — 97165 OT EVAL LOW COMPLEX 30 MIN: CPT

## 2019-04-04 PROCEDURE — 25000003 PHARM REV CODE 250: Performed by: HOSPITALIST

## 2019-04-04 PROCEDURE — 93010 EKG 12-LEAD: ICD-10-PCS | Mod: ,,, | Performed by: INTERNAL MEDICINE

## 2019-04-04 PROCEDURE — 63600175 PHARM REV CODE 636 W HCPCS: Performed by: HOSPITALIST

## 2019-04-04 PROCEDURE — 36415 COLL VENOUS BLD VENIPUNCTURE: CPT

## 2019-04-04 PROCEDURE — 84100 ASSAY OF PHOSPHORUS: CPT

## 2019-04-04 PROCEDURE — 97530 THERAPEUTIC ACTIVITIES: CPT

## 2019-04-04 PROCEDURE — 83735 ASSAY OF MAGNESIUM: CPT

## 2019-04-04 PROCEDURE — 80053 COMPREHEN METABOLIC PANEL: CPT

## 2019-04-04 PROCEDURE — 25000003 PHARM REV CODE 250: Performed by: INTERNAL MEDICINE

## 2019-04-04 PROCEDURE — 97161 PT EVAL LOW COMPLEX 20 MIN: CPT

## 2019-04-04 PROCEDURE — 85025 COMPLETE CBC W/AUTO DIFF WBC: CPT

## 2019-04-04 PROCEDURE — 99231 PR SUBSEQUENT HOSPITAL CARE,LEVL I: ICD-10-PCS | Mod: GC,,, | Performed by: HOSPITALIST

## 2019-04-04 RX ORDER — AMOXICILLIN 250 MG
1 CAPSULE ORAL DAILY
Status: DISCONTINUED | OUTPATIENT
Start: 2019-04-04 | End: 2019-04-10 | Stop reason: HOSPADM

## 2019-04-04 RX ADMIN — PREDNISONE 10 MG: 10 TABLET ORAL at 10:04

## 2019-04-04 RX ADMIN — AMLODIPINE BESYLATE 10 MG: 10 TABLET ORAL at 10:04

## 2019-04-04 RX ADMIN — STANDARDIZED SENNA CONCENTRATE AND DOCUSATE SODIUM 1 TABLET: 8.6; 5 TABLET, FILM COATED ORAL at 10:04

## 2019-04-04 RX ADMIN — MICONAZOLE NITRATE: 20 POWDER TOPICAL at 09:04

## 2019-04-04 RX ADMIN — OYSTER SHELL CALCIUM WITH VITAMIN D 1 TABLET: 500; 200 TABLET, FILM COATED ORAL at 04:04

## 2019-04-04 RX ADMIN — PANTOPRAZOLE SODIUM 40 MG: 40 TABLET, DELAYED RELEASE ORAL at 10:04

## 2019-04-04 RX ADMIN — OYSTER SHELL CALCIUM WITH VITAMIN D 1 TABLET: 500; 200 TABLET, FILM COATED ORAL at 10:04

## 2019-04-04 RX ADMIN — AZATHIOPRINE 150 MG: 50 TABLET ORAL at 10:04

## 2019-04-04 RX ADMIN — MICONAZOLE NITRATE: 20 POWDER TOPICAL at 10:04

## 2019-04-04 RX ADMIN — ENOXAPARIN SODIUM 40 MG: 100 INJECTION SUBCUTANEOUS at 04:04

## 2019-04-04 NOTE — PT/OT/SLP EVAL
Occupational Therapy   Evaluation    Name: Nura Kahn Jr.  MRN: [id number]  Admitting Diagnosis:  Polymyositis      Recommendations:     Discharge Recommendations: nursing facility, skilled  Discharge Equipment Recommendations:  none  Barriers to discharge:  Decreased caregiver support    Assessment:     Nura Kahn Jr. is a 63 y.o. male with a medical diagnosis of Polymyositis.  He presents with deficits in all ADL's & transfers.  Pt is at high fall risk. Performance deficits affecting function: weakness, impaired endurance, impaired self care skills, impaired balance, impaired functional mobilty, decreased upper extremity function, decreased lower extremity function, decreased coordination.      Rehab Prognosis: Fair; patient would benefit from acute skilled OT services to address these deficits and reach maximum level of function.       Plan:     Patient to be seen 3 x/week to address the above listed problems via self-care/home management, therapeutic activities, therapeutic exercises  · Plan of Care Expires: 05/04/19  · Plan of Care Reviewed with: patient    Subjective     Chief Complaint: weakness  Patient/Family Comments/goals: to get stronger    Occupational Profile:  Living Environment & PLOF: Pt resides with spouse in 1 story house with no steps.  Pt sleeps in recliner.  Pt has assistance from spouse for wash-offs while seated & requires (A) with all ADL's and standing transfers.  Pt is able to perform scooting transfers from recliner to w/c without (A).  Pt reported that his mobility has been worse in the last year.  Pt did report that at times he is able to don pull over shirts without (A) & is independent with grooming & eating.  Equipment Used at Home:  (w/c, scooter, drop-arm 3 in 1 commode, hospital bed, old RW)  Assistance upon Discharge: decreased due to pt's spouse works & pt is home alone during day    Pain/Comfort:  · Pain Rating 1: 0/10  · Pain Rating Post-Intervention 1:  0/10    Patients cultural, spiritual, Shinto conflicts given the current situation: no    Objective:     Communicated with: RN prior to session.  Patient found supine with telemetry upon OT entry to room.    General Precautions: Standard, fall, aspiration     Occupational Performance:    Bed Mobility:    · Patient completed Supine to Sit with stand by assistance    Functional Mobility/Transfers:  · Patient completed Bed <> Chair Transfer using Stand Pivot technique with maximal assistance with no assistive device    Activities of Daily Living:  · Upper Body Dressing: maximal assistance seated EOB  · Lower Body Dressing: total assistance donning socks seated EOB    Cognitive/Visual Perceptual:  Cognitive/Psychosocial Skills:     -       Oriented to: Person, Place, Time and Situation   -       Follows Commands/attention:Follows multistep  commands  -       Communication: clear/fluent  -       Memory: No Deficits noted  -       Safety awareness/insight to disability: impaired   -       Mood/Affect/Coping skills/emotional control: Appropriate to situation    Physical Exam:  Postural examination/scapula alignment:    -       Rounded shoulders  -       Forward head  -       Posterior pelvic tilt  Sensation:    -       Intact  Dominant hand:    -       left  Upper Extremity Range of Motion:     -       Right Upper Extremity: decreased in shoulder flexion & elbow flexion, all others WFL however noted compensatory movements & substitutions throughout   -       Left Upper Extremity: decreased in shoulder flexion & elbow flexion, all others WFL however noted compensatory movements & substitutions throughout  Upper Extremity Strength:  BUE 2/5 proximally, 3-/5 elbow flexion   Strength: BUE WFL    AMPAC 6 Click ADL:  AMPAC Total Score: 13    Treatment & Education:  Provided education regarding role of OT, POC, & discharge recommendations with pt verbalizing understanding.  Pt had no further questions & when asked whether  there were any concerns pt reported none.      Education:    Patient left seated on bedside commode with RN in agreement to assist pt off when pt finished with all lines intact, call button in reach, RN notified and white board updated.    GOALS:   Multidisciplinary Problems     Occupational Therapy Goals        Problem: Occupational Therapy Goal    Goal Priority Disciplines Outcome Interventions   Occupational Therapy Goal     OT, PT/OT Ongoing (interventions implemented as appropriate)    Description:  Goals to be met by: 4/15     Patient will increase functional independence with ADLs by performing:    UE Dressing with Moderate Assistance.  LE Dressing with Moderate Assistance.  Grooming while seated with Set-up Assistance.  Toileting from bedside commode with Moderate Assistance for hygiene and clothing management.   Rolling to Bilateral with Modified [place].   Supine to sit with Supervision.  Stand pivot transfers with Moderate Assistance.                      History:     Past Medical History:   Diagnosis Date    Atrial fibrillation 3/2/2015    Depression     Essential hypertension 2/27/2019    Microcytic anemia 9/25/2014    Neuromuscular disorder     Polymyositis 2009    Tobacco abuse        Past Surgical History:   Procedure Laterality Date    BIOPSY-MUSCLE Left Thigh Left 4/14/2016    Performed by [facility] at [facility] 2ND FLR    ESOPHAGOGASTRODUODENOSCOPY (EGD) N/A 7/1/2015    Performed by Codey Laurent MD at [facility] ENDO (4TH FLR)    MANOMETRY-ESOPHAGEAL N/A 7/1/2015    Performed by Codey Laurent MD at [facility] ENDO (4TH FLR)       Time Tracking:     OT Date of Treatment: 04/04/19  OT Start Time: 0944  OT Stop Time: 1010  OT Total Time (min): 26 min    Billable Minutes:Evaluation 26    DOMINGO Borrero  4/4/2019

## 2019-04-04 NOTE — SUBJECTIVE & OBJECTIVE
Past Medical History:   Diagnosis Date    Atrial fibrillation 3/2/2015    Depression     Essential hypertension 2/27/2019    Microcytic anemia 9/25/2014    Neuromuscular disorder     Polymyositis 2009    Tobacco abuse      Past Surgical History:   Procedure Laterality Date    BIOPSY-MUSCLE Left Thigh Left 4/14/2016    [facility] 2ND FLR    ESOPHAGOGASTRODUODENOSCOPY (EGD) N/A 7/1/2015    Performed by Codey Laurent MD at [facility] ENDO (4TH FLR)    MANOMETRY-ESOPHAGEAL N/A 7/1/2015    Performed by [facility]y Laurent MD at [facility] ENDO (4TH FLR)     Review of patient's allergies indicates:  No Known Allergies    Scheduled Medications:    amLODIPine  10 mg Oral Daily    azaTHIOprine  150 mg Oral Daily    calcium-vitamin D3  1 tablet Oral BID WM    enoxaparin  40 mg Subcutaneous Daily    ergocalciferol  50,000 Units Oral Q7 Days    miconazole NITRATE 2 %   Topical (Top) BID    pantoprazole  40 mg Oral Daily    predniSONE  10 mg Oral Daily    senna-docusate 8.6-50 mg  1 tablet Oral Daily       PRN Medications: acetaminophen, albuterol, dextrose 50%, dextrose 50%, glucagon (human recombinant), glucose, glucose, guaifenesin 100 mg/5 ml, ondansetron, ramelteon, sodium chloride 0.9%    Family History     Problem Relation (Age of Onset)    Diabetes Mother, Father    Hypertension Mother, Father        Tobacco Use    Smoking status: Former Smoker     Packs/day: 0.25     Years: 20.00     Pack years: 5.00     Types: Cigarettes    Smokeless tobacco: Never Used   Substance and Sexual Activity    Alcohol use: No     Alcohol/week: 0.0 oz    Drug use: Yes     Types: Marijuana     Comment: daily use    Sexual activity: Not on file     Review of Systems   Constitutional: Positive for activity change and fatigue.   HENT: Positive for trouble swallowing. Negative for voice change.    Eyes: Negative for photophobia and visual disturbance.   Respiratory: Negative for cough and shortness of breath.     Cardiovascular: Negative for chest pain and palpitations.   Gastrointestinal: Negative for abdominal distention, nausea and vomiting.   Genitourinary: Negative for difficulty urinating and flank pain.   Musculoskeletal: Positive for gait problem and myalgias.   Skin: Negative for color change and rash.   Neurological: Positive for weakness. Negative for speech difficulty and numbness.   Psychiatric/Behavioral: Negative for agitation and confusion.     Objective:     Vital Signs (Most Recent):  Temp: 98.3 °F (36.8 °C) (04/04/19 0333)  Pulse: 69 (04/04/19 0333)  Resp: (!) 21 (04/04/19 0333)  BP: (!) 141/79 (04/04/19 0333)  SpO2: 97 % (04/04/19 0333)    Vital Signs (24h Range):  Temp:  [97.1 °F (36.2 °C)-98.3 °F (36.8 °C)] 98.3 °F (36.8 °C)  Pulse:  [64-78] 69  Resp:  [14-21] 21  SpO2:  [95 %-97 %] 97 %  BP: (132-160)/(63-86) 141/79     Body mass index is 18.88 kg/m².    Physical Exam   Constitutional: He is oriented to person, place, and time. He appears well-developed and well-nourished.   Generalized muscle atrophy noted    HENT:   Head: Normocephalic and atraumatic.   Eyes: Right eye exhibits no discharge. Left eye exhibits no discharge.   Neck: Neck supple.   Cardiovascular: Normal rate and intact distal pulses.   Pulmonary/Chest: Effort normal. No respiratory distress.   Abdominal: Soft. He exhibits no distension. There is no tenderness.   Musculoskeletal: He exhibits no edema or deformity.   Generalized muscle atrophy noted   RUE: 4/5.  LUE: 4/5.  RLE: 2/5.  LLE: 2/5.         Neurological: He is alert and oriented to person, place, and time. No sensory deficit. He exhibits abnormal muscle tone.   Skin: Skin is warm and dry.   Psychiatric: He has a normal mood and affect. His behavior is normal.     NEUROLOGICAL EXAMINATION:     MENTAL STATUS   Oriented to person, place, and time.       Diagnostic Results:   Labs: Reviewed  ECG: Reviewed  X-Ray: Reviewed  US: Reviewed

## 2019-04-04 NOTE — HOSPITAL COURSE
04/03/2019: Passed bedside swallow evaluation.  SLP recommending dental soft diet and thin liquids. SLP diagnosis of Dysphagia at baseline which pt is currently managing. PT/OT evals pending.

## 2019-04-04 NOTE — HPI
Nura Kahn is a 63-year-old male with PMHx of polymyositis (dx 2009), chronic dysphagia, osteopenia, Vit D insufficiency, Hemoglobulin c/beta-zero thalaseema, A-fib, portal HTN, gallstones, & emphysema. Patient has a history of non-compliance and was told to be a direct admit from Rheum clinic due to the complex nature of his polymyositis with worsening strength and dysphagia since Feb 13 but did not come until April 2 after many attempts/contact.  Patient admitted to [facility] on 4/2.  MRI of all extremities, CT chest (  if +edema, repeat muscle biopsy may be warranted) and Abd US (eval for hepatosplenomegaly and portal HTN) are pending. Receiving prednisone 10mg and Imuran 150mg at this time - consideration for IVIG (0.4g/kg/day x 5 days) followed by monthly 3-day course per Rheum.    Functional History: Patient lives in [place] with wife in a single story home with no steps to enter.  Prior to admission, (A) with ADLs from wife except for eating and grooming.  Requires (A) for scooter trx and s/p to toilet recently with significant weakness. DME: scooter.

## 2019-04-04 NOTE — CONSULTS
[facility]  Physical Medicine & Rehab  Consult Note    Patient Name: Nura Kahn Jr.  MRN: [id number]  Admission Date: 4/2/2019  Hospital Length of Stay: 2 days  Attending Physician: Óscar Patricia MD     Inpatient consult to Physical Medicine & Rehabilitation  Consult performed by: Amada Delvalle NP  Consult requested by:  Óscar Patricia MD    Reason for Consult:  assess rehabilitation needs  Consults  Subjective:     Principal Problem: Polymyositis     HPI: Nura Kahn is a 63-year-old male with PMHx of polymyositis (dx 2009), chronic dysphagia, osteopenia, Vit D insufficiency, Hemoglobulin c/beta-zero thalaseema, A-fib, portal HTN, gallstones, & emphysema. Patient has a history of non-compliance and was told to be a direct admit from Rheum clinic due to the complex nature of his polymyositis with worsening strength and dysphagia since Feb 13 but did not come until April 2 after many attempts/contact.   Patient admitted to [facility] on 4/2.  MRI of all extremities, CT chest (  if +edema, repeat muscle biopsy may be warranted) and Abd US (eval for hepatosplenomegaly and portal HTN) are pending. Receiving prednisone 10mg and Imuran 150mg at this time - consideration for IVIG (0.4g/kg/day x 5 days) followed by monthly 3-day course per Rheum.    Functional History: Patient lives in [place] with wife in a single story home with no steps to enter.  Prior to admission, (A) with ADLs from wife except for eating and grooming.  Requires (A) for scooter trx and s/p to toilet recently with significant weakness. DME: scooter.     Hospital Course: 04/03/2019: Passed bedside swallow evaluation.  SLP recommending dental soft diet and thin liquids. SLP diagnosis of Dysphagia at baseline which pt is currently managing. PT/OT evals pending.     Past Medical History:   Diagnosis Date    Atrial fibrillation 3/2/2015    Depression     Essential hypertension 2/27/2019    Microcytic anemia 9/25/2014     Neuromuscular disorder     Polymyositis 2009    Tobacco abuse      Past Surgical History:   Procedure Laterality Date    BIOPSY-MUSCLE Left Thigh Left 4/14/2016    Performed by Jose [facility]ley [facility] at [facility] OR 2ND FLR    ESOPHAGOGASTRODUODENOSCOPY (EGD) N/A 7/1/2015    Performed by Codey Laurent MD at [facility] ENDO (4TH FLR)    MANOMETRY-ESOPHAGEAL N/A 7/1/2015    Performed by Codey Laurent MD at [facility] ENDO (4TH FLR)     Review of patient's allergies indicates:  No Known Allergies    Scheduled Medications:    amLODIPine  10 mg Oral Daily    azaTHIOprine  150 mg Oral Daily    calcium-vitamin D3  1 tablet Oral BID WM    enoxaparin  40 mg Subcutaneous Daily    ergocalciferol  50,000 Units Oral Q7 Days    miconazole NITRATE 2 %   Topical (Top) BID    pantoprazole  40 mg Oral Daily    predniSONE  10 mg Oral Daily    senna-docusate 8.6-50 mg  1 tablet Oral Daily       PRN Medications: acetaminophen, albuterol, dextrose 50%, dextrose 50%, glucagon (human recombinant), glucose, glucose, guaifenesin 100 mg/5 ml, ondansetron, ramelteon, sodium chloride 0.9%    Family History     Problem Relation (Age of Onset)    Diabetes Mother, Father    Hypertension Mother, Father        Tobacco Use    Smoking status: Former Smoker     Packs/day: 0.25     Years: 20.00     Pack years: 5.00     Types: Cigarettes    Smokeless tobacco: Never Used   Substance and Sexual Activity    Alcohol use: No     Alcohol/week: 0.0 oz    Drug use: Yes     Types: Marijuana     Comment: daily use    Sexual activity: Not on file     Review of Systems   Constitutional: Positive for activity change and fatigue.   HENT: Positive for trouble swallowing. Negative for voice change.    Eyes: Negative for photophobia and visual disturbance.   Respiratory: Negative for cough and shortness of breath.    Cardiovascular: Negative for chest pain and palpitations.   Gastrointestinal: Negative for abdominal distention, nausea and vomiting.   Genitourinary: Negative  for difficulty urinating and flank pain.   Musculoskeletal: Positive for gait problem and myalgias.   Skin: Negative for color change and rash.   Neurological: Positive for weakness. Negative for speech difficulty and numbness.   Psychiatric/Behavioral: Negative for agitation and confusion.     Objective:     Vital Signs (Most Recent):  Temp: 98.3 °F (36.8 °C) (04/04/19 0333)  Pulse: 69 (04/04/19 0333)  Resp: (!) 21 (04/04/19 0333)  BP: (!) 141/79 (04/04/19 0333)  SpO2: 97 % (04/04/19 0333)    Vital Signs (24h Range):  Temp:  [97.1 °F (36.2 °C)-98.3 °F (36.8 °C)] 98.3 °F (36.8 °C)  Pulse:  [64-78] 69  Resp:  [14-21] 21  SpO2:  [95 %-97 %] 97 %  BP: (132-160)/(63-86) 141/79     Body mass index is 18.88 kg/m².    Physical Exam   Constitutional: He is oriented to person, place, and time. He appears in no distress.  Generalized muscle atrophy noted    HENT:   Head: Normocephalic and atraumatic.   Eyes: Right eye exhibits no discharge. Left eye exhibits no discharge.   Neck: Neck supple.   Cardiovascular: Normal rate and intact distal pulses.   Pulmonary/Chest: Effort normal. No respiratory distress.   Abdominal: Soft. He exhibits no distension. There is no tenderness.   Musculoskeletal: He exhibits no edema or deformity.   Generalized muscle atrophy noted   RUE: 4/5.  LUE: 4/5.  RLE: 2/5.  LLE: 2/5.  Neurological: He is alert and oriented to person, place, and time. No sensory deficit. He exhibits abnormal muscle tone.   Skin: Skin is warm and dry.   Psychiatric: He has a normal mood and affect. His behavior is normal.       Diagnostic Results:   Labs: Reviewed  ECG: Reviewed  X-Ray: Reviewed  US: Reviewed    Assessment/Plan:     * Polymyositis  -Followed by Rheum otp, dx in 2009  -admitted from clinic 2/2 worsening muscle weakness and dysphagia  -MRI of all extremities, CT chest, and Abd US are pending  -Receiving prednisone 10mg and Imuran 150mg at this time with consideration for IVIG (0.4g/kg/day x 5 days) followed  by monthly 3-day course per Rheum      Impaired functional mobility and endurance  -2/2 polymyositis     Recommendations  -  Encourage mobility, OOB in chair, and early ambulation as appropriate  -  PT/OT evaluate and treat  -  Pain management  -  DVT prophylaxis  -  Monitor for and prevent skin breakdown and pressure ulcers  · Early mobility, repositioning/weight shifting every 20-30 minutes when sitting, turn patient every 2 hours, proper mattress/overlay and chair cushioning, pressure relief/heel protector boots        Splenomegaly  -Abd US pending    Quadriparesis (muscle weakness)  -PT/OT following     Oropharyngeal dysphagia  -SLP following  -passed for dental soft and thin 4/3/19    Work up in progress. Therapy evaluations pending. Will follow for progress and discuss with rehab team for post acute care/rehab recommendation.      Thank you for your consult.     Amada Delvalle NP  Department of Physical Medicine & Rehab  [facility]

## 2019-04-04 NOTE — CONSULTS
Inpatient consult to Physical Medicine Rehab  Consult performed by: Amada Delvalle NP  Consult ordered by: Socrates Pimentel MD  Reason for consult: assess rehab needs        Reviewed patient history and current admission.  Rehab team following.  Full consult to follow.    CHRISTOPHER Robin, FNP-C  Physical Medicine & Rehabilitation   04/04/2019  Spectralink: [id number]

## 2019-04-04 NOTE — PLAN OF CARE
Problem: Occupational Therapy Goal  Goal: Occupational Therapy Goal  Goals to be met by: 4/15     Patient will increase functional independence with ADLs by performing:    UE Dressing with Moderate Assistance.  LE Dressing with Moderate Assistance.  Grooming while seated with Set-up Assistance.  Toileting from bedside commode with Moderate Assistance for hygiene and clothing management.   Rolling to Bilateral with Modified [place].   Supine to sit with Supervision.  Stand pivot transfers with Moderate Assistance.    Outcome: Ongoing (interventions implemented as appropriate)  OT eval completed.

## 2019-04-04 NOTE — ASSESSMENT & PLAN NOTE
-Followed by Rheum otpadam in 2009  -admitted from clinic 2/2 worsening muscle weakness and dysphagia  -MRI of all extremities, CT chest, and Abd US are pending  -Receiving prednisone 10mg and Imuran 150mg at this time with consideration for IVIG (0.4g/kg/day x 5 days) followed by monthly 3-day course per Rheum

## 2019-04-04 NOTE — ASSESSMENT & PLAN NOTE
-2/2 polymyositis     Recommendations  -  Encourage mobility, OOB in chair, and early ambulation as appropriate  -  PT/OT evaluate and treat  -  Pain management  -  DVT prophylaxis  -  Monitor for and prevent skin breakdown and pressure ulcers  · Early mobility, repositioning/weight shifting every 20-30 minutes when sitting, turn patient every 2 hours, proper mattress/overlay and chair cushioning, pressure relief/heel protector boots

## 2019-04-04 NOTE — PROGRESS NOTES
[facility] Hospital Medicine  Progress Note    Patient Name: Nura Kahn Jr.  MRN: [id number]  Patient Class: IP- Inpatient   Admission Date: 4/2/2019   Length of Stay: 2 days  Attending Physician: Óscar Patricia MD  Primary Care Provider: Socrates Ambrosio MD    [facility] Medicine Team: [facility] HOSP MED 2 Behram Khan, MD    Subjective:     Principal Problem:Polymyositis    HPI:  Mr. Kahn is a 63M with polymyositis (Dx 2009, Bx proven), osteopenia, vit D insufficiency, thalassemia, dysphagia, afib, HTN, emphysema who presents as a direct admit for workup of progressive weakness in his extremities. Workup from 2016 showed end-stage myopathic changes and focal chronic inflammation. He was last seen by rheumatology 2/13/2019 with plans for direct admit for IVIG therapy; however, patient did not present as it was Williamsons day. Today, he reports no major changes since February. Muscle weakness has been progressive for the past several years. He currently uses a scooter and requires increasing levels of assistance to move from recliner to scooter. As a result of his immobility, he wears a diaper as he is unable to make it to the restroom for bowel and urinary habits. His support system at home is lacking; he is sliding to move himself from chair to scooter. His significant other has not been supportive with increasing decline in his status. Dysphagia has been stable; patient reports that difficulty swallowing is associated with positioning. When he does have trouble, it usually feels like food is stuck at the top of his esophagus, not that it is stuck on the way down. Sometimes he chokes with water intake. Dysphagia is least notable when sitting upright. He reports no pain or aches in his muscles. Denies fevers, chills, joint pains, blood in his stool or urine. He endorses some depression regarding his condition and inability to partake in activities he enjoys such as fishing.    Hospital  Course:  No notes on file    Interval History: Pt doing well. Tolerated increase in amlodipine overnight.     Review of Systems   Constitutional: Positive for activity change (dec). Negative for appetite change, diaphoresis, fatigue and fever.   HENT: Negative for congestion, sinus pressure, sinus pain and trouble swallowing.    Eyes: Negative for photophobia and pain.   Respiratory: Negative for choking, shortness of breath and wheezing.    Cardiovascular: Negative for chest pain, palpitations and leg swelling.   Gastrointestinal: Negative for abdominal pain, blood in stool, constipation, diarrhea, nausea and vomiting.   Genitourinary: Negative for dysuria, hematuria and urgency.   Musculoskeletal: Positive for gait problem. Negative for arthralgias, back pain and myalgias.   Neurological: Positive for weakness. Negative for dizziness, tremors, facial asymmetry, speech difficulty and headaches.   Psychiatric/Behavioral: Negative for agitation, behavioral problems, confusion and decreased concentration.     Objective:     Vital Signs (Most Recent):  Temp: 98.2 °F (36.8 °C) (04/04/19 1050)  Pulse: 74 (04/04/19 1050)  Resp: 20 (04/04/19 1050)  BP: (!) 143/92 (04/04/19 1050)  SpO2: 95 % (04/04/19 1050) Vital Signs (24h Range):  Temp:  [98 °F (36.7 °C)-98.3 °F (36.8 °C)] 98.2 °F (36.8 °C)  Pulse:  [64-77] 74  Resp:  [14-21] 20  SpO2:  [95 %-97 %] 95 %  BP: (141-158)/(73-92) 143/92     Weight: 58 kg (127 lb 13.9 oz)  Body mass index is 18.88 kg/m².    Intake/Output Summary (Last 24 hours) at 4/4/2019 1544  Last data filed at 4/4/2019 1453  Gross per 24 hour   Intake --   Output 1525 ml   Net -1525 ml      Physical Exam   Constitutional: He is oriented to person, place, and time. He appears well-developed. No distress.   HENT:   Head: Normocephalic and atraumatic.   Mouth/Throat: No oropharyngeal exudate.   Eyes: Conjunctivae and EOM are normal. No scleral icterus.   Neck: Normal range of motion. Neck supple.    Cardiovascular: Normal rate, regular rhythm, normal heart sounds and intact distal pulses.   No murmur heard.  Pulmonary/Chest: Effort normal and breath sounds normal. No respiratory distress. He has no wheezes.   Abdominal: Soft. Bowel sounds are normal. He exhibits no distension. There is no tenderness. There is no guarding.   Musculoskeletal: He exhibits no tenderness.   Right Side Rheumatological Exam      Muscle Strength (0-5 scale):  Neck Flexion:  4  Neck Extension: 4  Deltoid:  2  Biceps: 3  Triceps:  4  : 4  Iliopsoas: 3  Quadriceps:  4  Distal Lower Extremity: 2     Left Side Rheumatological Exam      Muscle Strength (0-5 scale):  Neck Flexion:  4  Neck Extension: 4  Deltoid:  3  Biceps: 4   Triceps:  4  :  4  Iliopsoas: 3  Quadriceps:  3   Distal Lower Extremity: 2     Lymphadenopathy:     He has no cervical adenopathy.   Neurological: He is alert and oriented to person, place, and time. No cranial nerve deficit. He exhibits abnormal muscle tone (decreased tone in all extremities).   Skin: Skin is warm and dry. No rash noted. He is not diaphoretic. No pallor.   Psychiatric: He has a normal mood and affect. His behavior is normal. Judgment and thought content normal.   Nursing note and vitals reviewed.      Significant Labs:   CBC:   Recent Labs   Lab 04/03/19  0503 04/04/19  0658   WBC 6.12 5.94   HGB 10.1* 10.5*   HCT 30.8* 31.8*   * 144*     CMP:   Recent Labs   Lab 04/03/19  0503 04/04/19  0658   * 137   K 3.8 3.6    105   CO2 24 24   GLU 80 79   BUN 12 11   CREATININE 0.4* 0.5   CALCIUM 8.9 9.3   PROT 6.8 7.4   ALBUMIN 3.9 4.1   BILITOT 1.5* 1.8*   ALKPHOS 61 62   AST 8* 8*   ALT 5* 6*   ANIONGAP 7* 8   EGFRNONAA >60.0 >60.0     Magnesium:   Recent Labs   Lab 04/03/19  0503 04/04/19  0658   MG 2.1 2.2       Significant Imaging: I have reviewed all pertinent imaging results/findings within the past 24 hours.     CT Chest Without Contrast  Narrative: EXAMINATION:  CT CHEST  WITHOUT CONTRAST    CLINICAL HISTORY:  myositis, dysphagia    TECHNIQUE:  Low dose axial images, sagittal and coronal reformations were obtained from the thoracic inlet to the lung bases. Contrast was not administered.    COMPARISON:  12/27/2017.    FINDINGS:  Evaluation is limited by extensive streak artifact due to the patient's arms overlying the field of view.    Stable densely calcified left thyroid lobe nodule.  Remainder of the base of neck is unremarkable.    Thoracic soft tissues demonstrate diffuse muscular atrophy.    Left-sided aortic arch with mild calcific atherosclerosis.  Thoracic aorta is normal in course and caliber.    Heart is normal in size without pericardial effusion.    No mediastinal or hilar adenopathy.    Airways are patent.    Centrilobular and paraseptal emphysema with an upper lung zone predominance.  Mild peribronchial cuffing.  Right lung base demonstrates bandlike opacification consistent with subsegmental atelectasis versus pulmonary scarring.  Within this area of the right lower lobe there is mild tubular bronchiectasis with several distal bronchi demonstrate retained secretions.  Minor subsegmental atelectasis versus pulmonary scarring of the right left lung base.  No large focal consolidation.  No pleural effusion or pneumothorax.    Stable splenomegaly.  Impression: 1. Mild peribronchial cuffing which may be seen with bronchitis.  Mild tubular bronchiectasis of the right lower lobe with several distal bronchi demonstrating retained secretions, finding may be seen with small airways infectious/noninfectious inflammation or aspiration.  2. Centrilobular and paraseptal emphysema with an upper lung zone predominance.  3. Diffuse muscular atrophy.  4. Splenomegaly.    Electronically signed by resident: Eduardo You  Date:    04/03/2019  Time:    20:46    Electronically signed by: Edilson Murillo MD  Date:    04/03/2019  Time:    21:19  US Abdomen Limited with Doppler  (xpd)  Narrative: EXAMINATION:  US ABDOMEN LIMITED WITH DOPPLER (XPD)    CLINICAL HISTORY:  hepatosplenomegaly;    TECHNIQUE:  Limited abdominal ultrasound with doppler.  Color and spectral Doppler were performed.    COMPARISON:  Ultrasound abdomen complete with Doppler 03/31/2015    FINDINGS:  The visualized portion of the pancreas is unremarkable.    The liver is normal in size measuring 16.4 cm.  The liver demonstrates homogeneous echotexture.  No focal hepatic lesions are seen.  HRI: 1.07.  There is no evidence of ascites.    Several gallstones in the gallbladder lumen measuring up to 0.9 cm.  The common duct is not dilated, measuring 2 mm.  The gallbladder wall is not thickened.  There is no sonographic [place] sign.  No dilated intrahepatic radicles are seen.    The spleen is markedly enlarged in size measuring 20.1 x 7.8 cm with a homogeneous echotexture.  Splenule noted.    The main portal vein, right portal vein, left portal vein, middle hepatic vein, right hepatic vein, left hepatic vein, and IVC are patent with proper directional flow.  The main hepatic artery is patent. The umbilical vein is not patent.  The Celiac artery and superior mesenteric vein are not visualized.  Impression: Cholelithiasis with no evidence of cholecystitis.    Splenomegaly.    Electronically signed by resident: Ralph Olsen MD  Date:    04/03/2019  Time:    10:45    Electronically signed by: Kory Antoine MD  Date:    04/03/2019  Time:    11:42        Assessment/Plan:      * Polymyositis  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  Last full workup in 2016 with evidence of progression of disease  Direct admit for management by rheumatology with IVIG  Weakness in all extremities, uses scooter for mobilization  Does not have a good social support system at home - will require post acute care for this reason  Initial labs negative except IgG elevated at 1754  - Rheumatology consult - appreciate recs  - Defer IVIG orders for  "rheumatology after evaluation  - Continuing imuran and prednisone currently  - f/u initial labs HMG-CoA reductase antibody, CN1A Ab  - Bilateral MRIs w wo contrast of bilat femurs and bilat humerus (renal function stable)  - PT/OT and PM&R  - Discuss post-acute care options with patient      Immunosuppression  Currently on imuran 150mg daily and prednisone 10mg daily.  - Continue both at this point until rheumatology evaluation      Oropharyngeal dysphagia  Reports difficulty with both solids and liquids, but largely dependent on positioning of patient (e.g. Sitting up vs laying in recliner)  Sometimes experiences choking with water  - SLP eval and treat today  - [facility] soft diet as tolerated for now - strict aspiration precautions   - Will consider GI eval for dysphagia after SLP eval  - Nutrition consult      Essential hypertension  - Continue norvasc 5mg daily  - Monitor BP      Hemoglobin C disease  - Monitor CBC - currently stable      Atrial fibrillation  Currently rate and rhythm controlled      Osteopenia  Likely 2/2 long term prednisone treatment  - f/u MRI femur      Splenomegaly  US abdo 4/3 shows "cholelithiasis with no evidence of cholecystitis. Splenomegaly."  Not currently causing complications        Vitamin D deficiency  - Continue ergocalciferol qweekly      Quadriparesis (muscle weakness)  See plan for "polymyositis"        VTE Risk Mitigation (From admission, onward)        Ordered     enoxaparin injection 40 mg  Daily      04/03/19 1258     Place sequential compression device  Until discontinued      04/02/19 1408              Behram Khan, MD  Department of Hospital Medicine   [facility]  "

## 2019-04-04 NOTE — PHARMACY MED REC
"Admission Medication Reconciliation - Pharmacy Consult Note    The home medication history was taken by .Edilia Branham, Pharmacy Technician.    Based on information gathered and subsequent review by the clinical pharmacist, the items below may need attention.     You may go to "Admission" then "Reconcile Home Medications" tabs to review and/or act upon these items.     Potentially problematic discrepancies with current MAR    o Patient IS NOT taking the following which was ordered upon admit  o Calcium/Vit D supplement  o Pantoprazole    Potential issues to be addressed PRIOR TO DISCHARGE  o Reasonable to resume low dose PPI for GI protection while on chronic steroids, but consider d/c    Please address this information as you see fit.  Feel free to contact us if you have any questions or require assistance.    Seun Garibay, Pharm.D., BCPS  [id number]                    .    .            "

## 2019-04-04 NOTE — PLAN OF CARE
Problem: Physical Therapy Goal  Goal: Physical Therapy Goal  Goals to be met by: 2019     Patient will increase functional independence with mobility by performin. Supine to sit with Modified [place]  2. Sit to supine with Modified [place]  3. Sit to stand transfer with supervision  4. Bed to chair transfer with supervision    Outcome: Ongoing (interventions implemented as appropriate)  Eval completed and POC established    Sam Coy PT,DPT  2019

## 2019-04-04 NOTE — PLAN OF CARE
04/04/19 1430   Post-Acute Status   Post-Acute Authorization Placement   Post-Acute Placement Status Referrals Sent     Pt with SNF recommendations, Referrals initiated per pt preference.

## 2019-04-04 NOTE — PT/OT/SLP EVAL
Physical Therapy Evaluation    Patient Name:  Nura Kahn Jr.   MRN:  [id number]    Recommendations:     Discharge Recommendations:  nursing facility, skilled   Discharge Equipment Recommendations: none   Barriers to discharge: Inaccessible home and Decreased caregiver support    Assessment:     Nura Kahn Jr. is a 63 y.o. male admitted with a medical diagnosis of Polymyositis.  He presents with the following impairments/functional limitations:  weakness, gait instability, impaired balance, impaired endurance, impaired self care skills, impaired functional mobilty, decreased coordination, decreased lower extremity function, decreased upper extremity function, decreased safety awareness.  Pt demo decreased functional mobility secondary to weakness.  Pt using compensatory muscles for all movements on this date d/t BLE/UE atrophy and weakness.  Performed bed mobility c SBA and transfer c max A.  Pt demo BLE weakness c proximal muscles > distal muscles (B hip flex 1/5, B knee flex 1+/5, B knee ext 1/5, B ankle DF 1/5, B ankle PF 4/5).  Pt demo fair sitting balance requiring S and increased use of BUE for support.  Pt c 1x episode of near fall while standing from BSC to assist RN c vera-hygiene d/t BSC sliding from under pt.  PT and RN able to catch pt prior to hitting floor and safely transferred back to bed.  Upon inspection of BSC - all 4 legs did not have rubber stoppers attached therefore increasing likelihood of BSC slipping when in use.  Pt and RN educated on changing out BSC or performing voiding/BM c urinal/bedpan for safety of pt and staff.  Pt very motivated to improve and would benefit from continued skilled acute PT 3x/wk to improve functional mobility.  Recommending pt receive PT services in SNF setting following d/c from hospital once medically cleared.      Rehab Prognosis: Good; patient would benefit from acute skilled PT services to address these deficits and reach maximum level of function.   "  Recent Surgery: Procedure(s) (LRB):  COLONOSCOPY (N/A)      Plan:     During this hospitalization, patient to be seen 3 x/week to address the identified rehab impairments via gait training, therapeutic activities, therapeutic exercises, neuromuscular re-education and progress toward the following goals:    · Plan of Care Expires:  04/29/19    Subjective     Chief Complaint: weakness  Patient/Family Comments/goals: "I'm ready to throw some punches at this weakness." "It slipped back on me." - referring to BSC during transfer  Pain/Comfort:  · Pain Rating 1: 0/10    Patients cultural, spiritual, Rastafarian conflicts given the current situation: no    Living Environment:  Pt lives c wife in [facility] c [facility]STE.  PTA no falls.  Reports sleeping in recliner and last therapy session ~1year ago.  Wife works - pt home alone majority of day  Prior to admission, patients level of function was requiring assistance c ADLs and transfer to/from scooter.  Equipment used at home: wheelchair, hospital bed, walker, rolling, bedside commode, power chair.  DME owned (not currently used): none.  Upon discharge, patient will have assistance from wife (limited).    Objective:     Communicated with RN and OT prior to session.  Patient found HOB elevated with peripheral IV, telemetry  upon PT entry to room.    General Precautions: Standard, fall   Orthopedic Precautions:N/A   Braces: N/A     Exams:  · Cognitive Exam:  Patient is oriented to Person, Place, Time and Situation  · RLE ROM: WFL  · LLE ROM: WFL  · BLE strength:  · B hip flex 1/5   · B knee flex 1+/5  · B knee ext 1/5  · B ankle DF 1/5  · B ankle PF 4/5    Functional Mobility:  · Bed Mobility:     · Rolling Right: stand by assistance  · Scooting: stand by assistance  · Supine to Sit: stand by assistance  · Sit to Supine: minimum assistance for BLE support  · Transfers:     · Sit to Stand:  maximal assistance with hand-held assist  · Unable to complete full stand d/t BLE weakness  · Demo " excessing B hip and knee flexion  · Unsteady in standing requiring max-total A for balance  · Bed to BSC: maximal assistance with  hand-held assist  using  Squat Pivot  · Balance: sitting (S)      Therapeutic Activities and Exercises:  Pt educated on: PT role/POC; safety c mobility; benefits of OOB activities; performing therex; d/c recs - v/u  -Sat EOB x5mins  -Sit<>Stand 2x from bed in lowest position  -Transfer to BSC for voiding - RN notified and call button in range    [phone]: RN request therapy's assistance for vera-hygiene and transfer from BSC  -Pt able to transfer off BSC c min A to assist RN c vera-hygiene  -BSC slid from under pt and pt c near fall  -PT and RN able to catch pt prior to hitting ground and safely transferred back to bed  -Assisted pt back in bed for hygiene and educated on safer to perform in bed  -Ensure pt and RN safe prior to exiting room    AM-PAC 6 CLICK MOBILITY  Total Score:12     Patient left left sidelying with all lines intact, call button in reach and RN present.    GOALS:   Multidisciplinary Problems     Physical Therapy Goals        Problem: Physical Therapy Goal    Goal Priority Disciplines Outcome Goal Variances Interventions   Physical Therapy Goal     PT, PT/OT Ongoing (interventions implemented as appropriate)     Description:  Goals to be met by: 2019     Patient will increase functional independence with mobility by performin. Supine to sit with Modified [place]  2. Sit to supine with Modified [place]  3. Sit to stand transfer with supervision  4. Bed to chair transfer with supervision                      History:     Past Medical History:   Diagnosis Date    Atrial fibrillation 3/2/2015    Depression     Essential hypertension 2019    Microcytic anemia 2014    Neuromuscular disorder     Polymyositis 2009    Tobacco abuse        Past Surgical History:   Procedure Laterality Date    BIOPSY-MUSCLE Left Thigh Left 2016     Performed by Jose [facility]ley [facility] at [facility] OR 2ND FLR    ESOPHAGOGASTRODUODENOSCOPY (EGD) N/A 7/1/2015    Performed by Codey Laurent MD at [facility] ENDO (4TH FLR)    MANOMETRY-ESOPHAGEAL N/A 7/1/2015    Performed by Codey Laurent MD at [facility] ENDO (4TH FLR)       Time Tracking:     PT Received On: 04/04/19  PT Start Time: 0943     PT Stop Time: 1010  PT Total Time (min): 27 min + [phone] (10 min)    Billable Minutes: Evaluation 15 min and Therapeutic Activity 10 min      Sam Coy, PT  04/04/2019

## 2019-04-04 NOTE — SUBJECTIVE & OBJECTIVE
Interval History: Pt doing well. Tolerated increase in amlodipine overnight.     Review of Systems   Constitutional: Positive for activity change (dec). Negative for appetite change, diaphoresis, fatigue and fever.   HENT: Negative for congestion, sinus pressure, sinus pain and trouble swallowing.    Eyes: Negative for photophobia and pain.   Respiratory: Negative for choking, shortness of breath and wheezing.    Cardiovascular: Negative for chest pain, palpitations and leg swelling.   Gastrointestinal: Negative for abdominal pain, blood in stool, constipation, diarrhea, nausea and vomiting.   Genitourinary: Negative for dysuria, hematuria and urgency.   Musculoskeletal: Positive for gait problem. Negative for arthralgias, back pain and myalgias.   Neurological: Positive for weakness. Negative for dizziness, tremors, facial asymmetry, speech difficulty and headaches.   Psychiatric/Behavioral: Negative for agitation, behavioral problems, confusion and decreased concentration.     Objective:     Vital Signs (Most Recent):  Temp: 98.2 °F (36.8 °C) (04/04/19 1050)  Pulse: 74 (04/04/19 1050)  Resp: 20 (04/04/19 1050)  BP: (!) 143/92 (04/04/19 1050)  SpO2: 95 % (04/04/19 1050) Vital Signs (24h Range):  Temp:  [98 °F (36.7 °C)-98.3 °F (36.8 °C)] 98.2 °F (36.8 °C)  Pulse:  [64-77] 74  Resp:  [14-21] 20  SpO2:  [95 %-97 %] 95 %  BP: (141-158)/(73-92) 143/92     Weight: 58 kg (127 lb 13.9 oz)  Body mass index is 18.88 kg/m².    Intake/Output Summary (Last 24 hours) at 4/4/2019 1544  Last data filed at 4/4/2019 1453  Gross per 24 hour   Intake --   Output 1525 ml   Net -1525 ml      Physical Exam   Constitutional: He is oriented to person, place, and time. He appears well-developed. No distress.   HENT:   Head: Normocephalic and atraumatic.   Mouth/Throat: No oropharyngeal exudate.   Eyes: Conjunctivae and EOM are normal. No scleral icterus.   Neck: Normal range of motion. Neck supple.   Cardiovascular: Normal rate, regular  rhythm, normal heart sounds and intact distal pulses.   No murmur heard.  Pulmonary/Chest: Effort normal and breath sounds normal. No respiratory distress. He has no wheezes.   Abdominal: Soft. Bowel sounds are normal. He exhibits no distension. There is no tenderness. There is no guarding.   Musculoskeletal: He exhibits no tenderness.   Right Side Rheumatological Exam      Muscle Strength (0-5 scale):  Neck Flexion:  4  Neck Extension: 4  Deltoid:  2  Biceps: 3  Triceps:  4  : 4  Iliopsoas: 3  Quadriceps:  4  Distal Lower Extremity: 2     Left Side Rheumatological Exam      Muscle Strength (0-5 scale):  Neck Flexion:  4  Neck Extension: 4  Deltoid:  3  Biceps: 4   Triceps:  4  :  4  Iliopsoas: 3  Quadriceps:  3   Distal Lower Extremity: 2     Lymphadenopathy:     He has no cervical adenopathy.   Neurological: He is alert and oriented to person, place, and time. No cranial nerve deficit. He exhibits abnormal muscle tone (decreased tone in all extremities).   Skin: Skin is warm and dry. No rash noted. He is not diaphoretic. No pallor.   Psychiatric: He has a normal mood and affect. His behavior is normal. Judgment and thought content normal.   Nursing note and vitals reviewed.      Significant Labs:   CBC:   Recent Labs   Lab 04/03/19  0503 04/04/19  0658   WBC 6.12 5.94   HGB 10.1* 10.5*   HCT 30.8* 31.8*   * 144*     CMP:   Recent Labs   Lab 04/03/19  0503 04/04/19  0658   * 137   K 3.8 3.6    105   CO2 24 24   GLU 80 79   BUN 12 11   CREATININE 0.4* 0.5   CALCIUM 8.9 9.3   PROT 6.8 7.4   ALBUMIN 3.9 4.1   BILITOT 1.5* 1.8*   ALKPHOS 61 62   AST 8* 8*   ALT 5* 6*   ANIONGAP 7* 8   EGFRNONAA >60.0 >60.0     Magnesium:   Recent Labs   Lab 04/03/19  0503 04/04/19  0658   MG 2.1 2.2       Significant Imaging: I have reviewed all pertinent imaging results/findings within the past 24 hours.     CT Chest Without Contrast  Narrative: EXAMINATION:  CT CHEST WITHOUT CONTRAST    CLINICAL  HISTORY:  myositis, dysphagia    TECHNIQUE:  Low dose axial images, sagittal and coronal reformations were obtained from the thoracic inlet to the lung bases. Contrast was not administered.    COMPARISON:  12/27/2017.    FINDINGS:  Evaluation is limited by extensive streak artifact due to the patient's arms overlying the field of view.    Stable densely calcified left thyroid lobe nodule.  Remainder of the base of neck is unremarkable.    Thoracic soft tissues demonstrate diffuse muscular atrophy.    Left-sided aortic arch with mild calcific atherosclerosis.  Thoracic aorta is normal in course and caliber.    Heart is normal in size without pericardial effusion.    No mediastinal or hilar adenopathy.    Airways are patent.    Centrilobular and paraseptal emphysema with an upper lung zone predominance.  Mild peribronchial cuffing.  Right lung base demonstrates bandlike opacification consistent with subsegmental atelectasis versus pulmonary scarring.  Within this area of the right lower lobe there is mild tubular bronchiectasis with several distal bronchi demonstrate retained secretions.  Minor subsegmental atelectasis versus pulmonary scarring of the right left lung base.  No large focal consolidation.  No pleural effusion or pneumothorax.    Stable splenomegaly.  Impression: 1. Mild peribronchial cuffing which may be seen with bronchitis.  Mild tubular bronchiectasis of the right lower lobe with several distal bronchi demonstrating retained secretions, finding may be seen with small airways infectious/noninfectious inflammation or aspiration.  2. Centrilobular and paraseptal emphysema with an upper lung zone predominance.  3. Diffuse muscular atrophy.  4. Splenomegaly.    Electronically signed by resident: Eduardo You  Date:    04/03/2019  Time:    20:46    Electronically signed by: Edilson Murillo MD  Date:    04/03/2019  Time:    21:19  US Abdomen Limited with Doppler (xpd)  Narrative: EXAMINATION:  US ABDOMEN  LIMITED WITH DOPPLER (XPD)    CLINICAL HISTORY:  hepatosplenomegaly;    TECHNIQUE:  Limited abdominal ultrasound with doppler.  Color and spectral Doppler were performed.    COMPARISON:  Ultrasound abdomen complete with Doppler 03/31/2015    FINDINGS:  The visualized portion of the pancreas is unremarkable.    The liver is normal in size measuring 16.4 cm.  The liver demonstrates homogeneous echotexture.  No focal hepatic lesions are seen.  HRI: 1.07.  There is no evidence of ascites.    Several gallstones in the gallbladder lumen measuring up to 0.9 cm.  The common duct is not dilated, measuring 2 mm.  The gallbladder wall is not thickened.  There is no sonographic Ruvalcaba sign.  No dilated intrahepatic radicles are seen.    The spleen is markedly enlarged in size measuring 20.1 x 7.8 cm with a homogeneous echotexture.  Splenule noted.    The main portal vein, right portal vein, left portal vein, middle hepatic vein, right hepatic vein, left hepatic vein, and IVC are patent with proper directional flow.  The main hepatic artery is patent. The umbilical vein is not patent.  The Celiac artery and superior mesenteric vein are not visualized.  Impression: Cholelithiasis with no evidence of cholecystitis.    Splenomegaly.    Electronically signed by resident: Ralph Olsen MD  Date:    04/03/2019  Time:    10:45    Electronically signed by: Kory Antoine MD  Date:    04/03/2019  Time:    11:42

## 2019-04-05 LAB
25(OH)D3+25(OH)D2 SERPL-MCNC: 15 NG/ML (ref 30–96)
ALBUMIN SERPL BCP-MCNC: 4.1 G/DL (ref 3.5–5.2)
ALP SERPL-CCNC: 65 U/L (ref 55–135)
ALT SERPL W/O P-5'-P-CCNC: 5 U/L (ref 10–44)
ANION GAP SERPL CALC-SCNC: 11 MMOL/L (ref 8–16)
AST SERPL-CCNC: 8 U/L (ref 10–40)
BASOPHILS # BLD AUTO: 0.03 K/UL (ref 0–0.2)
BASOPHILS NFR BLD: 0.6 % (ref 0–1.9)
BILIRUB SERPL-MCNC: 1.8 MG/DL (ref 0.1–1)
BUN SERPL-MCNC: 18 MG/DL (ref 8–23)
CALCIUM SERPL-MCNC: 9.2 MG/DL (ref 8.7–10.5)
CHLORIDE SERPL-SCNC: 106 MMOL/L (ref 95–110)
CO2 SERPL-SCNC: 20 MMOL/L (ref 23–29)
CREAT SERPL-MCNC: 0.4 MG/DL (ref 0.5–1.4)
DIFFERENTIAL METHOD: ABNORMAL
EOSINOPHIL # BLD AUTO: 0.1 K/UL (ref 0–0.5)
EOSINOPHIL NFR BLD: 1.2 % (ref 0–8)
ERYTHROCYTE [DISTWIDTH] IN BLOOD BY AUTOMATED COUNT: 20.7 % (ref 11.5–14.5)
EST. GFR  (AFRICAN AMERICAN): >60 ML/MIN/1.73 M^2
EST. GFR  (NON AFRICAN AMERICAN): >60 ML/MIN/1.73 M^2
GLUCOSE SERPL-MCNC: 71 MG/DL (ref 70–110)
HCT VFR BLD AUTO: 32 % (ref 40–54)
HGB BLD-MCNC: 10.6 G/DL (ref 14–18)
HMGCR ANTIBODY: <3 UNITS (ref 0–19)
IMM GRANULOCYTES # BLD AUTO: 0.03 K/UL (ref 0–0.04)
IMM GRANULOCYTES NFR BLD AUTO: 0.6 % (ref 0–0.5)
LYMPHOCYTES # BLD AUTO: 1.7 K/UL (ref 1–4.8)
LYMPHOCYTES NFR BLD: 34.3 % (ref 18–48)
MAGNESIUM SERPL-MCNC: 2.2 MG/DL (ref 1.6–2.6)
MCH RBC QN AUTO: 20.3 PG (ref 27–31)
MCHC RBC AUTO-ENTMCNC: 33.1 G/DL (ref 32–36)
MCV RBC AUTO: 61 FL (ref 82–98)
MONOCYTES # BLD AUTO: 0.5 K/UL (ref 0.3–1)
MONOCYTES NFR BLD: 9 % (ref 4–15)
NEUTROPHILS # BLD AUTO: 2.7 K/UL (ref 1.8–7.7)
NEUTROPHILS NFR BLD: 54.3 % (ref 38–73)
NRBC BLD-RTO: 5 /100 WBC
PHOSPHATE SERPL-MCNC: 3.5 MG/DL (ref 2.7–4.5)
PLATELET # BLD AUTO: 137 K/UL (ref 150–350)
PMV BLD AUTO: ABNORMAL FL (ref 9.2–12.9)
POCT GLUCOSE: 156 MG/DL (ref 70–110)
POTASSIUM SERPL-SCNC: 3.8 MMOL/L (ref 3.5–5.1)
PROT SERPL-MCNC: 7.4 G/DL (ref 6–8.4)
RBC # BLD AUTO: 5.23 M/UL (ref 4.6–6.2)
SODIUM SERPL-SCNC: 137 MMOL/L (ref 136–145)
WBC # BLD AUTO: 4.99 K/UL (ref 3.9–12.7)

## 2019-04-05 PROCEDURE — 25000003 PHARM REV CODE 250: Performed by: HOSPITALIST

## 2019-04-05 PROCEDURE — 63600175 PHARM REV CODE 636 W HCPCS: Performed by: HOSPITALIST

## 2019-04-05 PROCEDURE — 99231 PR SUBSEQUENT HOSPITAL CARE,LEVL I: ICD-10-PCS | Mod: GC,,, | Performed by: HOSPITALIST

## 2019-04-05 PROCEDURE — 63600175 PHARM REV CODE 636 W HCPCS: Performed by: STUDENT IN AN ORGANIZED HEALTH CARE EDUCATION/TRAINING PROGRAM

## 2019-04-05 PROCEDURE — 99233 SBSQ HOSP IP/OBS HIGH 50: CPT | Mod: GC,,, | Performed by: INTERNAL MEDICINE

## 2019-04-05 PROCEDURE — 25000003 PHARM REV CODE 250: Performed by: INTERNAL MEDICINE

## 2019-04-05 PROCEDURE — 82306 VITAMIN D 25 HYDROXY: CPT

## 2019-04-05 PROCEDURE — 99233 PR SUBSEQUENT HOSPITAL CARE,LEVL III: ICD-10-PCS | Mod: GC,,, | Performed by: INTERNAL MEDICINE

## 2019-04-05 PROCEDURE — 99231 SBSQ HOSP IP/OBS SF/LOW 25: CPT | Mod: GC,,, | Performed by: HOSPITALIST

## 2019-04-05 PROCEDURE — 80053 COMPREHEN METABOLIC PANEL: CPT

## 2019-04-05 PROCEDURE — 84100 ASSAY OF PHOSPHORUS: CPT

## 2019-04-05 PROCEDURE — 85025 COMPLETE CBC W/AUTO DIFF WBC: CPT

## 2019-04-05 PROCEDURE — 20600001 HC STEP DOWN PRIVATE ROOM

## 2019-04-05 PROCEDURE — 36415 COLL VENOUS BLD VENIPUNCTURE: CPT

## 2019-04-05 PROCEDURE — 25000003 PHARM REV CODE 250: Performed by: STUDENT IN AN ORGANIZED HEALTH CARE EDUCATION/TRAINING PROGRAM

## 2019-04-05 PROCEDURE — 83735 ASSAY OF MAGNESIUM: CPT

## 2019-04-05 RX ORDER — FAMOTIDINE 20 MG/1
20 TABLET, FILM COATED ORAL 2 TIMES DAILY
Status: DISCONTINUED | OUTPATIENT
Start: 2019-04-06 | End: 2019-04-10 | Stop reason: HOSPADM

## 2019-04-05 RX ADMIN — MICONAZOLE NITRATE: 20 POWDER TOPICAL at 08:04

## 2019-04-05 RX ADMIN — OYSTER SHELL CALCIUM WITH VITAMIN D 1 TABLET: 500; 200 TABLET, FILM COATED ORAL at 10:04

## 2019-04-05 RX ADMIN — AMLODIPINE BESYLATE 10 MG: 10 TABLET ORAL at 10:04

## 2019-04-05 RX ADMIN — MICONAZOLE NITRATE: 20 POWDER TOPICAL at 10:04

## 2019-04-05 RX ADMIN — ENOXAPARIN SODIUM 40 MG: 100 INJECTION SUBCUTANEOUS at 04:04

## 2019-04-05 RX ADMIN — AZATHIOPRINE 150 MG: 50 TABLET ORAL at 10:04

## 2019-04-05 RX ADMIN — STANDARDIZED SENNA CONCENTRATE AND DOCUSATE SODIUM 1 TABLET: 8.6; 5 TABLET, FILM COATED ORAL at 10:04

## 2019-04-05 RX ADMIN — PANTOPRAZOLE SODIUM 40 MG: 40 TABLET, DELAYED RELEASE ORAL at 10:04

## 2019-04-05 RX ADMIN — RAMELTEON 8 MG: 8 TABLET, FILM COATED ORAL at 11:04

## 2019-04-05 RX ADMIN — PREDNISONE 10 MG: 10 TABLET ORAL at 10:04

## 2019-04-05 NOTE — PLAN OF CARE
Problem: Adult Inpatient Plan of Care  Goal: Plan of Care Review  Pt free of falls and injury. Pt AAOx4. Miconazole to groin and skin folds applied, blood glucose WNL,bed low, breaks locked, SR up x2, call light within reach, will continue to monitor.

## 2019-04-05 NOTE — PROGRESS NOTES
[facility]  Rheumatology  Progress Note    Patient Name: Nura Kahn Jr.  MRN: [id number]  Admission Date: 4/2/2019  Hospital Length of Stay: 3 days  Code Status: Full Code   Attending Provider: Óscar Patricia MD  Primary Care Physician: Socrates Ambrosio MD  Principal Problem: Polymyositis    Subjective:     HPI: 64yo M with PMH of polymyositis (dx 2009, +mildly +ku, outside biopsy proven with elevated CPK and aldolase), osteopenia, Vit D insufficiency, Hemoglobulin c/beta-zero thalaseema, A-fib, dysphagia, portal HTN, emphysema was a direct admit from rheumatology clinic.     He saw Dr. Durham and Dr. Huston in Feb 2019 - recommendation was for direct admission at that time due to the complex nature of his case with worsening strength and dysphagia.  Recommendation was to start IVIG on admission.  Patient did not go and after many attempts made by Dr. Durham, patient finally presented to the direct admission office yesterday.    Per Dr. Durham's note:    He last saw rheumatology on May 2016.  At that time, he was on prednisone 30mg and Imuran 150mg daily.  He was to have GI evaluation for dysphagia and pulmonary evaluation for GGO with pulmonary nodules.       Per chart review it seems that he first established care here at [facility] in 8/2014. He was diagnosed in [id number] when he was feeling weak, falling. He was a  and was unable to use his legs. He had a biopsy in [id number] in [place] that per notes was consistent with polymyositis. He was on MTX in the past which was stopped due CT showing hepatosplenomegaly with portal venous hypertension and elevated T bili in 8/2014 He was at that time started on Imuran. He has been on Imuran and steroids since then.  In 2016, he was worked up again here at [facility]. MRI of the left lower extremities showed Severe atrophy with fatty replacement of the thigh musculature bilaterally.  There is mild increased edema  "signal within the remaining thigh musculature and left iliopsoas muscle without associated enhancement which could reflect a mild degree of residual myositis.  EMG Left upper Ext 5/2016 was a technically difficult study. Had moderate ulnar entrapment at elbow, perhaps on a background of mild polyneuropathy. 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 Pathology of left thigh showing minute potion of muscle with end stage myopathic changes and focal chronic inflammation   Patient remained on Imuran and prednisone.     In Feb 2019 - patient had been out of Imuran for ~1 month.  He also admits to medication non-compliance because he didn't think he was having any improvement.  He admits that his weakness has gotten worse - stating that he is unable to stand, difficulty with getting in and out of his scooter, normal ADLs (including going to the bathroom - resulting in urination/defecation on himself - resulting in decreased PO intake).  He also feels worsening of his dysphagia - where he gets food stuck and often chokes/coughs when he eats/drinks.      Wife works fulltime.  Daughter (10 yo).  Patient does not have social support/help for transportation to various appt.    Specialist updates:  - Cardiology - Last saw 3/2015 - diagnosed with PAF.  No treatment at that time.  No follow up since  - Hem - Last saw 12/2017 - Evaluated for microcytic anemia and mild chronic thrombocytosis.  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  Discharged from clinic   - GI - Last saw 6/2015 - EGD/motility study ordered - not performed.  No follow up   - Hepatology - Last saw 5/2015 - Found portal HTN on CT with no evidence of cirrhosis or PV thrombosis.  Fibrosure with F1 stage (minimial fibrosis).  RTC PRN (no follow up)  - Pulmonary - Last saw 2/2015 -" Lung parenchymal normal - no evidence of fibrosis and one mediastinal LN marginal enlarged" CT chest showed GGO (2016). Repeat CT chest " (2017) - no mention of GGO     Imaging:  CXR 1/2018:Patchy consolidation projected over the right lower lung zone, concerning for infection, correlation advised.       CT chest 12/2017:Peribronchial cuffing predominantly in the lower lung zones as well as retained secretions in the right middle lobe segmental bronchi consistent with bronchitis; such inflammation can occur with inhaled irritants or aspiration.  There is no evidence of interstitial lung disease such as NSIP or UIP.Upper lung zone predominant paraseptal emphysema and dispersed areas of centrilobular emphysema Stable 1.2 cm AP window lymph node.  Densely calcified granuloma in the posterobasal segment of the right lower lobe.Hepatosplenomegaly with associated splenic collateral vessels at the splenic hilum suggesting sequela of portal venous hypertension.     CT pelvis 6/2017: Obturator internus edema or hematoma. No acute fracture. Stable splenomegaly.     Esophogram 4/2015: Spontaneous gastroesophageal reflux.  Otherwise, unremarkable esophagram.     PET scan 8/2014:AP window lymph node SUV max less than 2.5.  This is most likely benign/reactive.  Surveillance is recommended at 3, 6, 12, and 24 months. Splenomegaly most likely from portal hypertension        Interval History: Patient seen resting comfortably in bed.  States that he is getting stronger - able to get up from bed to portable potty at bedside with mild assistance.  He is tolerating soft/small food without difficulty.  Working with PT/OT/ST everyday.     Current Facility-Administered Medications   Medication Frequency    acetaminophen tablet 650 mg Q4H PRN    albuterol inhaler 2 puff Q6H PRN    amLODIPine tablet 10 mg Daily    azaTHIOprine tablet 150 mg Daily    dextrose 50% injection 12.5 g PRN    dextrose 50% injection 25 g PRN    enoxaparin injection 40 mg Daily    ergocalciferol capsule 50,000 Units Q7 Days    [START ON 4/6/2019] famotidine tablet 20 mg BID    glucagon (human  recombinant) injection 1 mg PRN    glucose chewable tablet 16 g PRN    glucose chewable tablet 24 g PRN    guaifenesin 100 mg/5 ml syrup 200 mg Q4H PRN    miconazole NITRATE 2 % top powder BID    ondansetron disintegrating tablet 8 mg Q8H PRN    predniSONE tablet 10 mg Daily    ramelteon tablet 8 mg Nightly PRN    senna-docusate 8.6-50 mg per tablet 1 tablet Daily    sodium chloride 0.9% flush 10 mL PRN     Objective:     Vital Signs (Most Recent):  Temp: 96.9 °F (36.1 °C) (04/05/19 1648)  Pulse: 78 (04/05/19 1648)  Resp: 16 (04/05/19 1648)  BP: (!) 152/88 (04/05/19 1648)  SpO2: 95 % (04/05/19 1648)  O2 Device (Oxygen Therapy): room air (04/05/19 1648) Vital Signs (24h Range):  Temp:  [96.9 °F (36.1 °C)-98.2 °F (36.8 °C)] 96.9 °F (36.1 °C)  Pulse:  [74-88] 78  Resp:  [16-23] 16  SpO2:  [95 %-99 %] 95 %  BP: (140-159)/(80-91) 152/88     Weight: 58 kg (127 lb 13.9 oz) (04/02/19 1758)  Body mass index is 18.88 kg/m².  Body surface area is 1.68 meters squared.      Intake/Output Summary (Last 24 hours) at 4/5/2019 7065  Last data filed at 4/5/2019 1053  Gross per 24 hour   Intake --   Output 150 ml   Net -150 ml       Physical Exam   Constitutional: He is oriented to person, place, and time and well-developed, well-nourished, and in no distress.   Severe muscle wasting and deconditioning   HENT:   Head: Normocephalic and atraumatic.   Eyes: Conjunctivae and EOM are normal. Pupils are equal, round, and reactive to light.   Neck: Normal range of motion. Neck supple.   Cardiovascular: Normal rate and regular rhythm.    Pulmonary/Chest: Effort normal and breath sounds normal.   Abdominal: Soft. Bowel sounds are normal.   Neurological: He is alert and oriented to person, place, and time.   Decreased gait due to instability    Skin: Skin is warm and dry.     No rashes    Psychiatric: Mood, memory, affect and judgment normal.   Musculoskeletal: Normal range of motion. He exhibits no edema, tenderness or deformity.    Decreased muscle strength in all major muscle groups 2/5  ROM intact  Bilateral decreased  strength   Severe muscle wasting         Significant Labs:  Recent Results (from the past 48 hour(s))   POCT glucose    Collection Time: 04/03/19 11:17 PM   Result Value Ref Range    POCT Glucose 95 70 - 110 mg/dL   Comprehensive Metabolic Panel (CMP)    Collection Time: 04/04/19  6:58 AM   Result Value Ref Range    Sodium 137 136 - 145 mmol/L    Potassium 3.6 3.5 - 5.1 mmol/L    Chloride 105 95 - 110 mmol/L    CO2 24 23 - 29 mmol/L    Glucose 79 70 - 110 mg/dL    BUN, Bld 11 8 - 23 mg/dL    Creatinine 0.5 0.5 - 1.4 mg/dL    Calcium 9.3 8.7 - 10.5 mg/dL    Total Protein 7.4 6.0 - 8.4 g/dL    Albumin 4.1 3.5 - 5.2 g/dL    Total Bilirubin 1.8 (H) 0.1 - 1.0 mg/dL    Alkaline Phosphatase 62 55 - 135 U/L    AST 8 (L) 10 - 40 U/L    ALT 6 (L) 10 - 44 U/L    Anion Gap 8 8 - 16 mmol/L    eGFR if African American >60.0 >60 mL/min/1.73 m^2    eGFR if non African American >60.0 >60 mL/min/1.73 m^2   Magnesium    Collection Time: 04/04/19  6:58 AM   Result Value Ref Range    Magnesium 2.2 1.6 - 2.6 mg/dL   Phosphorus    Collection Time: 04/04/19  6:58 AM   Result Value Ref Range    Phosphorus 3.6 2.7 - 4.5 mg/dL   CBC auto differential    Collection Time: 04/04/19  6:58 AM   Result Value Ref Range    WBC 5.94 3.90 - 12.70 K/uL    RBC 5.17 4.60 - 6.20 M/uL    Hemoglobin 10.5 (L) 14.0 - 18.0 g/dL    Hematocrit 31.8 (L) 40.0 - 54.0 %    MCV 62 (L) 82 - 98 fL    MCH 20.3 (L) 27.0 - 31.0 pg    MCHC 33.0 32.0 - 36.0 g/dL    RDW 21.0 (H) 11.5 - 14.5 %    Platelets 144 (L) 150 - 350 K/uL    MPV SEE COMMENT 9.2 - 12.9 fL    Immature Granulocytes 0.7 (H) 0.0 - 0.5 %    Gran # (ANC) 3.7 1.8 - 7.7 K/uL    Immature Grans (Abs) 0.04 0.00 - 0.04 K/uL    Lymph # 1.7 1.0 - 4.8 K/uL    Mono # 0.4 0.3 - 1.0 K/uL    Eos # 0.1 0.0 - 0.5 K/uL    Baso # 0.02 0.00 - 0.20 K/uL    nRBC 5 (A) 0 /100 WBC    Gran% 62.0 38.0 - 73.0 %    Lymph% 28.5 18.0 - 48.0 %     Mono% 7.2 4.0 - 15.0 %    Eosinophil% 1.3 0.0 - 8.0 %    Basophil% 0.3 0.0 - 1.9 %    Differential Method Automated    POCT glucose    Collection Time: 04/04/19  3:11 PM   Result Value Ref Range    POCT Glucose 96 70 - 110 mg/dL   POCT glucose    Collection Time: 04/04/19  9:23 PM   Result Value Ref Range    POCT Glucose 95 70 - 110 mg/dL   Comprehensive Metabolic Panel (CMP)    Collection Time: 04/05/19  4:45 AM   Result Value Ref Range    Sodium 137 136 - 145 mmol/L    Potassium 3.8 3.5 - 5.1 mmol/L    Chloride 106 95 - 110 mmol/L    CO2 20 (L) 23 - 29 mmol/L    Glucose 71 70 - 110 mg/dL    BUN, Bld 18 8 - 23 mg/dL    Creatinine 0.4 (L) 0.5 - 1.4 mg/dL    Calcium 9.2 8.7 - 10.5 mg/dL    Total Protein 7.4 6.0 - 8.4 g/dL    Albumin 4.1 3.5 - 5.2 g/dL    Total Bilirubin 1.8 (H) 0.1 - 1.0 mg/dL    Alkaline Phosphatase 65 55 - 135 U/L    AST 8 (L) 10 - 40 U/L    ALT 5 (L) 10 - 44 U/L    Anion Gap 11 8 - 16 mmol/L    eGFR if African American >60.0 >60 mL/min/1.73 m^2    eGFR if non African American >60.0 >60 mL/min/1.73 m^2   Magnesium    Collection Time: 04/05/19  4:45 AM   Result Value Ref Range    Magnesium 2.2 1.6 - 2.6 mg/dL   Phosphorus    Collection Time: 04/05/19  4:45 AM   Result Value Ref Range    Phosphorus 3.5 2.7 - 4.5 mg/dL   CBC auto differential    Collection Time: 04/05/19  4:45 AM   Result Value Ref Range    WBC 4.99 3.90 - 12.70 K/uL    RBC 5.23 4.60 - 6.20 M/uL    Hemoglobin 10.6 (L) 14.0 - 18.0 g/dL    Hematocrit 32.0 (L) 40.0 - 54.0 %    MCV 61 (L) 82 - 98 fL    MCH 20.3 (L) 27.0 - 31.0 pg    MCHC 33.1 32.0 - 36.0 g/dL    RDW 20.7 (H) 11.5 - 14.5 %    Platelets 137 (L) 150 - 350 K/uL    MPV SEE COMMENT 9.2 - 12.9 fL    Immature Granulocytes 0.6 (H) 0.0 - 0.5 %    Gran # (ANC) 2.7 1.8 - 7.7 K/uL    Immature Grans (Abs) 0.03 0.00 - 0.04 K/uL    Lymph # 1.7 1.0 - 4.8 K/uL    Mono # 0.5 0.3 - 1.0 K/uL    Eos # 0.1 0.0 - 0.5 K/uL    Baso # 0.03 0.00 - 0.20 K/uL    nRBC 5 (A) 0 /100 WBC    Gran% 54.3  38.0 - 73.0 %    Lymph% 34.3 18.0 - 48.0 %    Mono% 9.0 4.0 - 15.0 %    Eosinophil% 1.2 0.0 - 8.0 %    Basophil% 0.6 0.0 - 1.9 %    Differential Method Automated    Vitamin D    Collection Time: 04/05/19 12:59 PM   Result Value Ref Range    Vit D, 25-Hydroxy 15 (L) 30 - 96 ng/mL         Significant Imaging:  Imaging results within the past 24 hours have been reviewed.    Assessment/Plan:     * Polymyositis  Mr. Kahn is a 63 year old male with past medical history of Polymyositis  diagnosed 2009 (+mildly positive ku, outside biopsy proven, CPK here elevated to 487, aldolase elevated to 10.2 ), osteopenia, vitamin D insufficiency, hemoglobin c/beta-zero thalassema, a fib, dysphagia, portal HTN, emphysema was a direct admission from rheumatology clinic for worsening generalized muscle weakness and dysphagia.  Patient has a history of non-compliance and was told to be a direct admit since Feb 13 but did not come until April 2 after many attempts/contact.      Previous imaging significant for:  CXR (12/2018) - patchy consolidation over RLL   CT chest (12/2017) - bronchitis of the lower lung zones in the RML (concern for inhaled irritant vs aspiration).  UL - centrilobular emphysema with stable 1.2cm LN.  +densely calcified granuloma in the posterobasal of the RLL.  +hepatosplenomegaly with questionable portal venous hypertension.      Labs at this admission:  Normal and stable CBC and CMP.  Normal CK/aldolase.  Normal inflammatory markers.  Elevated IgG (1754)    Patient continues to have progressive dysphagia and worsening muscle weakness - suggestive of severe progressive polymyositis.  Patient needs to be workup with repeat imaging to look for muscle edema (if positive, repeat muscle biopsy).  Given severe polymyositis, IVIG is warranted at this time.      Patient will also benefit from PT/OT and rehab placement for muscle strengthening after completion of IVIG.     Plan:  - Pending HMG-CoA reducatse and CN1A  antibody  - Pending MRI w and w/o contrast of all extremities - repeat muscle biopsy.  Primary has put in another order to get this done  - IVIG (0.4g/kg/day x 5 days) followed by monthly course.  Therapy order in.  Patient needs to be transfer to another floor to get this administer.  Primary aware and order for transfer in.   -  nutrition consult  - PT/OT assessment  - continue prednisone 10mg and Imuran 150mg at this time  - pending abdominal U/S - evaluation of hepatosplenomegaly and portal HTN (seen in CT chest 12/2017)  - repeat CT chest - evaluation of GGO seen 2015 - no GGO seen   - recommendation for rehab consult - patient agreeable to rehab           Janessa Monae MD  Rheumatology  [facility]

## 2019-04-05 NOTE — ASSESSMENT & PLAN NOTE
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  Last full workup in 2016 with evidence of progression of disease  Direct admit for management by rheumatology with IVIG  Weakness in all extremities, uses scooter for mobilization  Does not have a good social support system at home - will require post acute care for this reason  Initial labs negative except IgG elevated at 1754  - Rheumatology consult - appreciate recs  - Plan to start IVIG today - needs appropriate room  - Continuing imuran and prednisone currently  - f/u initial labs HMG-CoA reductase antibody, CN1A Ab  - Bilateral MRIs w wo contrast of bilat femurs (renal function stable) - bilat humerus imaging one day later  - PT/OT and PM&R  - Current plan for SNF on DC

## 2019-04-05 NOTE — PROGRESS NOTES
"Cardiac monitor was rechecked with new battery and everything connected and connected right but pt telemetry is still not showing up. Tech at telemetry department notified to get a new box and she said "there's no box available at the moment and she will send a box up when she get one" also asking me to get charge nurse to trouble shoot the current telemetry box.  "

## 2019-04-05 NOTE — PLAN OF CARE
Problem: Adult Inpatient Plan of Care  Goal: Plan of Care Review  Outcome: Ongoing (interventions implemented as appropriate)  Pt free of fall this shift. No skin breakdown noted. Pt verbalized no pain. No s/s of infection noted. Pt aaox4. Afebrile. Vss. Pt transferred to 8R floor.

## 2019-04-05 NOTE — PLAN OF CARE
Problem: Adult Inpatient Plan of Care  Goal: Plan of Care Review  Outcome: Ongoing (interventions implemented as appropriate)  Pt free of fall this shift. No skin breakdown noted. Pt verbalized no pain. No s/s of infection noted. Pt aaox4. Afebrile. Vss. Will continue to monitor pt.

## 2019-04-05 NOTE — SUBJECTIVE & OBJECTIVE
Interval History: Patient resting comfortably in bed. Got up to use bedside commode with some assistance this am. Feels good. Plan to transfer bed to start IVIG today.    Review of Systems   Constitutional: Positive for activity change (dec). Negative for appetite change, diaphoresis, fatigue and fever.   HENT: Negative for congestion, sinus pressure, sinus pain and trouble swallowing.    Eyes: Negative for photophobia and pain.   Respiratory: Negative for choking, shortness of breath and wheezing.    Cardiovascular: Negative for chest pain, palpitations and leg swelling.   Gastrointestinal: Negative for abdominal pain, blood in stool, constipation, diarrhea, nausea and vomiting.   Genitourinary: Negative for dysuria, hematuria and urgency.   Musculoskeletal: Positive for gait problem. Negative for arthralgias, back pain and myalgias.   Neurological: Positive for weakness. Negative for dizziness, tremors, facial asymmetry, speech difficulty and headaches.   Psychiatric/Behavioral: Negative for agitation, behavioral problems, confusion and decreased concentration.     Objective:     Vital Signs (Most Recent):  Temp: 98.1 °F (36.7 °C) (04/05/19 1306)  Pulse: 76 (04/05/19 1306)  Resp: 16 (04/05/19 1306)  BP: (!) 147/91 (04/05/19 1306)  SpO2: 96 % (04/05/19 1306) Vital Signs (24h Range):  Temp:  [97.5 °F (36.4 °C)-98.2 °F (36.8 °C)] 98.1 °F (36.7 °C)  Pulse:  [74-88] 76  Resp:  [16-23] 16  SpO2:  [96 %-99 %] 96 %  BP: (140-159)/(80-91) 147/91     Weight: 58 kg (127 lb 13.9 oz)  Body mass index is 18.88 kg/m².    Intake/Output Summary (Last 24 hours) at 4/5/2019 1334  Last data filed at 4/5/2019 1053  Gross per 24 hour   Intake --   Output 400 ml   Net -400 ml      Physical Exam   Constitutional: He is oriented to person, place, and time. He appears well-developed. No distress.   HENT:   Head: Normocephalic and atraumatic.   Mouth/Throat: No oropharyngeal exudate.   Eyes: Conjunctivae and EOM are normal. No scleral  icterus.   Neck: Normal range of motion. Neck supple.   Cardiovascular: Normal rate, regular rhythm, normal heart sounds and intact distal pulses.   No murmur heard.  Pulmonary/Chest: Effort normal and breath sounds normal. No respiratory distress. He has no wheezes.   Abdominal: Soft. Bowel sounds are normal. He exhibits no distension. There is no tenderness. There is no guarding.   Musculoskeletal: He exhibits no tenderness.   Right Side Rheumatological Exam      Muscle Strength (0-5 scale):  Neck Flexion:  4  Neck Extension: 4  Deltoid:  2  Biceps: 3  Triceps:  4  : 4  Iliopsoas: 3  Quadriceps:  4  Distal Lower Extremity: 2     Left Side Rheumatological Exam      Muscle Strength (0-5 scale):  Neck Flexion:  4  Neck Extension: 4  Deltoid:  3  Biceps: 4   Triceps:  4  :  4  Iliopsoas: 3  Quadriceps:  3   Distal Lower Extremity: 2     Lymphadenopathy:     He has no cervical adenopathy.   Neurological: He is alert and oriented to person, place, and time. No cranial nerve deficit. He exhibits abnormal muscle tone (decreased tone in all extremities).   Skin: Skin is warm and dry. No rash noted. He is not diaphoretic. No pallor.   Psychiatric: He has a normal mood and affect. His behavior is normal. Judgment and thought content normal.   Nursing note and vitals reviewed.      Significant Labs:   CBC:   Recent Labs   Lab 04/04/19 0658 04/05/19  0445   WBC 5.94 4.99   HGB 10.5* 10.6*   HCT 31.8* 32.0*   * 137*     CMP:   Recent Labs   Lab 04/04/19 0658 04/05/19  0445    137   K 3.6 3.8    106   CO2 24 20*   GLU 79 71   BUN 11 18   CREATININE 0.5 0.4*   CALCIUM 9.3 9.2   PROT 7.4 7.4   ALBUMIN 4.1 4.1   BILITOT 1.8* 1.8*   ALKPHOS 62 65   AST 8* 8*   ALT 6* 5*   ANIONGAP 8 11   EGFRNONAA >60.0 >60.0     Magnesium:   Recent Labs   Lab 04/04/19 0658 04/05/19  0445   MG 2.2 2.2       Significant Imaging: I have reviewed all pertinent imaging results/findings within the past 24 hours.     CT Chest  Without Contrast  Narrative: EXAMINATION:  CT CHEST WITHOUT CONTRAST    CLINICAL HISTORY:  myositis, dysphagia    TECHNIQUE:  Low dose axial images, sagittal and coronal reformations were obtained from the thoracic inlet to the lung bases. Contrast was not administered.    COMPARISON:  12/27/2017.    FINDINGS:  Evaluation is limited by extensive streak artifact due to the patient's arms overlying the field of view.    Stable densely calcified left thyroid lobe nodule.  Remainder of the base of neck is unremarkable.    Thoracic soft tissues demonstrate diffuse muscular atrophy.    Left-sided aortic arch with mild calcific atherosclerosis.  Thoracic aorta is normal in course and caliber.    Heart is normal in size without pericardial effusion.    No mediastinal or hilar adenopathy.    Airways are patent.    Centrilobular and paraseptal emphysema with an upper lung zone predominance.  Mild peribronchial cuffing.  Right lung base demonstrates bandlike opacification consistent with subsegmental atelectasis versus pulmonary scarring.  Within this area of the right lower lobe there is mild tubular bronchiectasis with several distal bronchi demonstrate retained secretions.  Minor subsegmental atelectasis versus pulmonary scarring of the right left lung base.  No large focal consolidation.  No pleural effusion or pneumothorax.    Stable splenomegaly.  Impression: 1. Mild peribronchial cuffing which may be seen with bronchitis.  Mild tubular bronchiectasis of the right lower lobe with several distal bronchi demonstrating retained secretions, finding may be seen with small airways infectious/noninfectious inflammation or aspiration.  2. Centrilobular and paraseptal emphysema with an upper lung zone predominance.  3. Diffuse muscular atrophy.  4. Splenomegaly.    Electronically signed by resident: Eduardo You  Date:    04/03/2019  Time:    20:46    Electronically signed by: Edilson Murillo  MD  Date:    04/03/2019  Time:    21:19  US Abdomen Limited with Doppler (xpd)  Narrative: EXAMINATION:  US ABDOMEN LIMITED WITH DOPPLER (XPD)    CLINICAL HISTORY:  hepatosplenomegaly;    TECHNIQUE:  Limited abdominal ultrasound with doppler.  Color and spectral Doppler were performed.    COMPARISON:  Ultrasound abdomen complete with Doppler 03/31/2015    FINDINGS:  The visualized portion of the pancreas is unremarkable.    The liver is normal in size measuring 16.4 cm.  The liver demonstrates homogeneous echotexture.  No focal hepatic lesions are seen.  HRI: 1.07.  There is no evidence of ascites.    Several gallstones in the gallbladder lumen measuring up to 0.9 cm.  The common duct is not dilated, measuring 2 mm.  The gallbladder wall is not thickened.  There is no sonographic Ruvalcaba sign.  No dilated intrahepatic radicles are seen.    The spleen is markedly enlarged in size measuring 20.1 x 7.8 cm with a homogeneous echotexture.  Splenule noted.    The main portal vein, right portal vein, left portal vein, middle hepatic vein, right hepatic vein, left hepatic vein, and IVC are patent with proper directional flow.  The main hepatic artery is patent. The umbilical vein is not patent.  The Celiac artery and superior mesenteric vein are not visualized.  Impression: Cholelithiasis with no evidence of cholecystitis.    Splenomegaly.    Electronically signed by resident: Ralph Olsen MD  Date:    04/03/2019  Time:    10:45    Electronically signed by: Kory Antoine MD  Date:    04/03/2019  Time:    11:42

## 2019-04-05 NOTE — PROGRESS NOTES
[facility] Hospital Medicine  Progress Note    Patient Name: Nura Kahn Jr.  MRN: [id number]  Patient Class: IP- Inpatient   Admission Date: 4/2/2019  Length of Stay: 3 days  Attending Physician: Óscar Patricia MD  Primary Care Provider: Socrates Ambrosio MD    [facility] Medicine Team: [facility] 2 Maria L Chambers MD    Subjective:     Principal Problem:Polymyositis    HPI:  Mr. Kahn is a 63M with polymyositis (Dx 2009, Bx proven), osteopenia, vit D insufficiency, thalassemia, dysphagia, afib, HTN, emphysema who presents as a direct admit for workup of progressive weakness in his extremities. Workup from 2016 showed end-stage myopathic changes and focal chronic inflammation. He was last seen by rheumatology 2/13/2019 with plans for direct admit for IVIG therapy; however, patient did not present as it was Williamsons day. Today, he reports no major changes since February. Muscle weakness has been progressive for the past several years. He currently uses a scooter and requires increasing levels of assistance to move from recliner to scooter. As a result of his immobility, he wears a diaper as he is unable to make it to the restroom for bowel and urinary habits. His support system at home is lacking; he is sliding to move himself from chair to scooter. His significant other has not been supportive with increasing decline in his status. Dysphagia has been stable; patient reports that difficulty swallowing is associated with positioning. When he does have trouble, it usually feels like food is stuck at the top of his esophagus, not that it is stuck on the way down. Sometimes he chokes with water intake. Dysphagia is least notable when sitting upright. He reports no pain or aches in his muscles. Denies fevers, chills, joint pains, blood in his stool or urine. He endorses some depression regarding his condition and inability to partake in activities he enjoys such as  sawyer.    Hospital Course:  No notes on file    Interval History: Patient resting comfortably in bed. Got up to use bedside commode with some assistance this am. Feels good. Plan to transfer bed to start IVIG today.    Review of Systems   Constitutional: Positive for activity change (dec). Negative for appetite change, diaphoresis, fatigue and fever.   HENT: Negative for congestion, sinus pressure, sinus pain and trouble swallowing.    Eyes: Negative for photophobia and pain.   Respiratory: Negative for choking, shortness of breath and wheezing.    Cardiovascular: Negative for chest pain, palpitations and leg swelling.   Gastrointestinal: Negative for abdominal pain, blood in stool, constipation, diarrhea, nausea and vomiting.   Genitourinary: Negative for dysuria, hematuria and urgency.   Musculoskeletal: Positive for gait problem. Negative for arthralgias, back pain and myalgias.   Neurological: Positive for weakness. Negative for dizziness, tremors, facial asymmetry, speech difficulty and headaches.   Psychiatric/Behavioral: Negative for agitation, behavioral problems, confusion and decreased concentration.     Objective:     Vital Signs (Most Recent):  Temp: 98.1 °F (36.7 °C) (04/05/19 1306)  Pulse: 76 (04/05/19 1306)  Resp: 16 (04/05/19 1306)  BP: (!) 147/91 (04/05/19 1306)  SpO2: 96 % (04/05/19 1306) Vital Signs (24h Range):  Temp:  [97.5 °F (36.4 °C)-98.2 °F (36.8 °C)] 98.1 °F (36.7 °C)  Pulse:  [74-88] 76  Resp:  [16-23] 16  SpO2:  [96 %-99 %] 96 %  BP: (140-159)/(80-91) 147/91     Weight: 58 kg (127 lb 13.9 oz)  Body mass index is 18.88 kg/m².    Intake/Output Summary (Last 24 hours) at 4/5/2019 1334  Last data filed at 4/5/2019 1053  Gross per 24 hour   Intake --   Output 400 ml   Net -400 ml      Physical Exam   Constitutional: He is oriented to person, place, and time. He appears well-developed. No distress.   HENT:   Head: Normocephalic and atraumatic.   Mouth/Throat: No oropharyngeal exudate.   Eyes:  Conjunctivae and EOM are normal. No scleral icterus.   Neck: Normal range of motion. Neck supple.   Cardiovascular: Normal rate, regular rhythm, normal heart sounds and intact distal pulses.   No murmur heard.  Pulmonary/Chest: Effort normal and breath sounds normal. No respiratory distress. He has no wheezes.   Abdominal: Soft. Bowel sounds are normal. He exhibits no distension. There is no tenderness. There is no guarding.   Musculoskeletal: He exhibits no tenderness.   Right Side Rheumatological Exam      Muscle Strength (0-5 scale):  Neck Flexion:  4  Neck Extension: 4  Deltoid:  2  Biceps: 3  Triceps:  4  : 4  Iliopsoas: 3  Quadriceps:  4  Distal Lower Extremity: 2     Left Side Rheumatological Exam      Muscle Strength (0-5 scale):  Neck Flexion:  4  Neck Extension: 4  Deltoid:  3  Biceps: 4   Triceps:  4  :  4  Iliopsoas: 3  Quadriceps:  3   Distal Lower Extremity: 2     Lymphadenopathy:     He has no cervical adenopathy.   Neurological: He is alert and oriented to person, place, and time. No cranial nerve deficit. He exhibits abnormal muscle tone (decreased tone in all extremities).   Skin: Skin is warm and dry. No rash noted. He is not diaphoretic. No pallor.   Psychiatric: He has a normal mood and affect. His behavior is normal. Judgment and thought content normal.   Nursing note and vitals reviewed.      Significant Labs:   CBC:   Recent Labs   Lab 04/04/19  0658 04/05/19  0445   WBC 5.94 4.99   HGB 10.5* 10.6*   HCT 31.8* 32.0*   * 137*     CMP:   Recent Labs   Lab 04/04/19  0658 04/05/19  0445    137   K 3.6 3.8    106   CO2 24 20*   GLU 79 71   BUN 11 18   CREATININE 0.5 0.4*   CALCIUM 9.3 9.2   PROT 7.4 7.4   ALBUMIN 4.1 4.1   BILITOT 1.8* 1.8*   ALKPHOS 62 65   AST 8* 8*   ALT 6* 5*   ANIONGAP 8 11   EGFRNONAA >60.0 >60.0     Magnesium:   Recent Labs   Lab 04/04/19  0658 04/05/19  0445   MG 2.2 2.2       Significant Imaging: I have reviewed all pertinent imaging  results/findings within the past 24 hours.     CT Chest Without Contrast  Narrative: EXAMINATION:  CT CHEST WITHOUT CONTRAST    CLINICAL HISTORY:  myositis, dysphagia    TECHNIQUE:  Low dose axial images, sagittal and coronal reformations were obtained from the thoracic inlet to the lung bases. Contrast was not administered.    COMPARISON:  12/27/2017.    FINDINGS:  Evaluation is limited by extensive streak artifact due to the patient's arms overlying the field of view.    Stable densely calcified left thyroid lobe nodule.  Remainder of the base of neck is unremarkable.    Thoracic soft tissues demonstrate diffuse muscular atrophy.    Left-sided aortic arch with mild calcific atherosclerosis.  Thoracic aorta is normal in course and caliber.    Heart is normal in size without pericardial effusion.    No mediastinal or hilar adenopathy.    Airways are patent.    Centrilobular and paraseptal emphysema with an upper lung zone predominance.  Mild peribronchial cuffing.  Right lung base demonstrates bandlike opacification consistent with subsegmental atelectasis versus pulmonary scarring.  Within this area of the right lower lobe there is mild tubular bronchiectasis with several distal bronchi demonstrate retained secretions.  Minor subsegmental atelectasis versus pulmonary scarring of the right left lung base.  No large focal consolidation.  No pleural effusion or pneumothorax.    Stable splenomegaly.  Impression: 1. Mild peribronchial cuffing which may be seen with bronchitis.  Mild tubular bronchiectasis of the right lower lobe with several distal bronchi demonstrating retained secretions, finding may be seen with small airways infectious/noninfectious inflammation or aspiration.  2. Centrilobular and paraseptal emphysema with an upper lung zone predominance.  3. Diffuse muscular atrophy.  4. Splenomegaly.    Electronically signed by resident: Eduardo You  Date:    04/03/2019  Time:    20:46    Electronically signed  by: Edilson Murillo MD  Date:    04/03/2019  Time:    21:19  US Abdomen Limited with Doppler (xpd)  Narrative: EXAMINATION:  US ABDOMEN LIMITED WITH DOPPLER (XPD)    CLINICAL HISTORY:  hepatosplenomegaly;    TECHNIQUE:  Limited abdominal ultrasound with doppler.  Color and spectral Doppler were performed.    COMPARISON:  Ultrasound abdomen complete with Doppler 03/31/2015    FINDINGS:  The visualized portion of the pancreas is unremarkable.    The liver is normal in size measuring 16.4 cm.  The liver demonstrates homogeneous echotexture.  No focal hepatic lesions are seen.  HRI: 1.07.  There is no evidence of ascites.    Several gallstones in the gallbladder lumen measuring up to 0.9 cm.  The common duct is not dilated, measuring 2 mm.  The gallbladder wall is not thickened.  There is no sonographic Ruvalcaba sign.  No dilated intrahepatic radicles are seen.    The spleen is markedly enlarged in size measuring 20.1 x 7.8 cm with a homogeneous echotexture.  Splenule noted.    The main portal vein, right portal vein, left portal vein, middle hepatic vein, right hepatic vein, left hepatic vein, and IVC are patent with proper directional flow.  The main hepatic artery is patent. The umbilical vein is not patent.  The Celiac artery and superior mesenteric vein are not visualized.  Impression: Cholelithiasis with no evidence of cholecystitis.    Splenomegaly.    Electronically signed by resident: Ralph Olsen MD  Date:    04/03/2019  Time:    10:45    Electronically signed by: Kory Antoine MD  Date:    04/03/2019  Time:    11:42        Assessment/Plan:      * Polymyositis  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  Last full workup in 2016 with evidence of progression of disease  Direct admit for management by rheumatology with IVIG  Weakness in all extremities, uses scooter for mobilization  Does not have a good social support system at home - will require post acute care for this reason  Initial labs negative  "except IgG elevated at 1754  - Rheumatology consult - appreciate recs  - Plan to start IVIG today - needs appropriate room  - Continuing imuran and prednisone currently  - f/u initial labs HMG-CoA reductase antibody, CN1A Ab  - Bilateral MRIs w wo contrast of bilat femurs (renal function stable) - bilat humerus imaging one day later  - PT/OT and PM&R  - Current plan for SNF on DC      Essential hypertension  - Continue norvasc 10mg daily  - Monitor BP      Hemoglobin C disease  - Monitor CBC - currently stable      Immunosuppression  Currently on imuran 150mg daily and prednisone 10mg daily.  - Continue both meds      Atrial fibrillation  Currently rate and rhythm controlled      Osteopenia  Likely 2/2 long term prednisone treatment  - f/u MRI femur      Splenomegaly  US abdo 4/3 shows "cholelithiasis with no evidence of cholecystitis. Splenomegaly."  Not currently causing complications        Quadriparesis (muscle weakness)  See plan for "polymyositis"      Vitamin D deficiency  - Continue ergocalciferol qweekly      Oropharyngeal dysphagia  Reports difficulty with both solids and liquids, but largely dependent on positioning of patient (e.g. Sitting up vs laying in recliner)  Sometimes experiences choking with water  - Per SLP recs - soft diet with thin liquids  - Will consider GI consult if dysphagia worsens  - Nutrition consult - add boost plus        VTE Risk Mitigation (From admission, onward)        Ordered     enoxaparin injection 40 mg  Daily      04/03/19 1258     Place sequential compression device  Until discontinued      04/02/19 1408              Maria L Chambers MD  Department of Hospital Medicine   [facility]  "

## 2019-04-05 NOTE — ASSESSMENT & PLAN NOTE
Reports difficulty with both solids and liquids, but largely dependent on positioning of patient (e.g. Sitting up vs laying in recliner)  Sometimes experiences choking with water  - Per SLP recs - soft diet with thin liquids  - Will consider GI consult if dysphagia worsens  - Nutrition consult - add boost plus

## 2019-04-05 NOTE — SUBJECTIVE & OBJECTIVE
Interval History: Patient seen resting comfortably in bed.  States that he is getting stronger - able to get up from bed to portable potty at bedside with mild assistance.  He is tolerating soft/small food without difficulty.  Working with PT/OT/ST everyday.     Current Facility-Administered Medications   Medication Frequency    acetaminophen tablet 650 mg Q4H PRN    albuterol inhaler 2 puff Q6H PRN    amLODIPine tablet 10 mg Daily    azaTHIOprine tablet 150 mg Daily    dextrose 50% injection 12.5 g PRN    dextrose 50% injection 25 g PRN    enoxaparin injection 40 mg Daily    ergocalciferol capsule 50,000 Units Q7 Days    [START ON 4/6/2019] famotidine tablet 20 mg BID    glucagon (human recombinant) injection 1 mg PRN    glucose chewable tablet 16 g PRN    glucose chewable tablet 24 g PRN    guaifenesin 100 mg/5 ml syrup 200 mg Q4H PRN    miconazole NITRATE 2 % top powder BID    ondansetron disintegrating tablet 8 mg Q8H PRN    predniSONE tablet 10 mg Daily    ramelteon tablet 8 mg Nightly PRN    senna-docusate 8.6-50 mg per tablet 1 tablet Daily    sodium chloride 0.9% flush 10 mL PRN     Objective:     Vital Signs (Most Recent):  Temp: 96.9 °F (36.1 °C) (04/05/19 1648)  Pulse: 78 (04/05/19 1648)  Resp: 16 (04/05/19 1648)  BP: (!) 152/88 (04/05/19 1648)  SpO2: 95 % (04/05/19 1648)  O2 Device (Oxygen Therapy): room air (04/05/19 1648) Vital Signs (24h Range):  Temp:  [96.9 °F (36.1 °C)-98.2 °F (36.8 °C)] 96.9 °F (36.1 °C)  Pulse:  [74-88] 78  Resp:  [16-23] 16  SpO2:  [95 %-99 %] 95 %  BP: (140-159)/(80-91) 152/88     Weight: 58 kg (127 lb 13.9 oz) (04/02/19 1758)  Body mass index is 18.88 kg/m².  Body surface area is 1.68 meters squared.      Intake/Output Summary (Last 24 hours) at 4/5/2019 175  Last data filed at 4/5/2019 1053  Gross per 24 hour   Intake --   Output 150 ml   Net -150 ml       Physical Exam   Constitutional: He is oriented to person, place, and time and well-developed,  well-nourished, and in no distress.   Severe muscle wasting and deconditioning   HENT:   Head: Normocephalic and atraumatic.   Eyes: Conjunctivae and EOM are normal. Pupils are equal, round, and reactive to light.   Neck: Normal range of motion. Neck supple.   Cardiovascular: Normal rate and regular rhythm.    Pulmonary/Chest: Effort normal and breath sounds normal.   Abdominal: Soft. Bowel sounds are normal.   Neurological: He is alert and oriented to person, place, and time.   Decreased gait due to instability    Skin: Skin is warm and dry.     No rashes    Psychiatric: Mood, memory, affect and judgment normal.   Musculoskeletal: Normal range of motion. He exhibits no edema, tenderness or deformity.   Decreased muscle strength in all major muscle groups 2/5  ROM intact  Bilateral decreased  strength   Severe muscle wasting         Significant Labs:  Recent Results (from the past 48 hour(s))   POCT glucose    Collection Time: 04/03/19 11:17 PM   Result Value Ref Range    POCT Glucose 95 70 - 110 mg/dL   Comprehensive Metabolic Panel (CMP)    Collection Time: 04/04/19  6:58 AM   Result Value Ref Range    Sodium 137 136 - 145 mmol/L    Potassium 3.6 3.5 - 5.1 mmol/L    Chloride 105 95 - 110 mmol/L    CO2 24 23 - 29 mmol/L    Glucose 79 70 - 110 mg/dL    BUN, Bld 11 8 - 23 mg/dL    Creatinine 0.5 0.5 - 1.4 mg/dL    Calcium 9.3 8.7 - 10.5 mg/dL    Total Protein 7.4 6.0 - 8.4 g/dL    Albumin 4.1 3.5 - 5.2 g/dL    Total Bilirubin 1.8 (H) 0.1 - 1.0 mg/dL    Alkaline Phosphatase 62 55 - 135 U/L    AST 8 (L) 10 - 40 U/L    ALT 6 (L) 10 - 44 U/L    Anion Gap 8 8 - 16 mmol/L    eGFR if African American >60.0 >60 mL/min/1.73 m^2    eGFR if non African American >60.0 >60 mL/min/1.73 m^2   Magnesium    Collection Time: 04/04/19  6:58 AM   Result Value Ref Range    Magnesium 2.2 1.6 - 2.6 mg/dL   Phosphorus    Collection Time: 04/04/19  6:58 AM   Result Value Ref Range    Phosphorus 3.6 2.7 - 4.5 mg/dL   CBC auto differential     Collection Time: 04/04/19  6:58 AM   Result Value Ref Range    WBC 5.94 3.90 - 12.70 K/uL    RBC 5.17 4.60 - 6.20 M/uL    Hemoglobin 10.5 (L) 14.0 - 18.0 g/dL    Hematocrit 31.8 (L) 40.0 - 54.0 %    MCV 62 (L) 82 - 98 fL    MCH 20.3 (L) 27.0 - 31.0 pg    MCHC 33.0 32.0 - 36.0 g/dL    RDW 21.0 (H) 11.5 - 14.5 %    Platelets 144 (L) 150 - 350 K/uL    MPV SEE COMMENT 9.2 - 12.9 fL    Immature Granulocytes 0.7 (H) 0.0 - 0.5 %    Gran # (ANC) 3.7 1.8 - 7.7 K/uL    Immature Grans (Abs) 0.04 0.00 - 0.04 K/uL    Lymph # 1.7 1.0 - 4.8 K/uL    Mono # 0.4 0.3 - 1.0 K/uL    Eos # 0.1 0.0 - 0.5 K/uL    Baso # 0.02 0.00 - 0.20 K/uL    nRBC 5 (A) 0 /100 WBC    Gran% 62.0 38.0 - 73.0 %    Lymph% 28.5 18.0 - 48.0 %    Mono% 7.2 4.0 - 15.0 %    Eosinophil% 1.3 0.0 - 8.0 %    Basophil% 0.3 0.0 - 1.9 %    Differential Method Automated    POCT glucose    Collection Time: 04/04/19  3:11 PM   Result Value Ref Range    POCT Glucose 96 70 - 110 mg/dL   POCT glucose    Collection Time: 04/04/19  9:23 PM   Result Value Ref Range    POCT Glucose 95 70 - 110 mg/dL   Comprehensive Metabolic Panel (CMP)    Collection Time: 04/05/19  4:45 AM   Result Value Ref Range    Sodium 137 136 - 145 mmol/L    Potassium 3.8 3.5 - 5.1 mmol/L    Chloride 106 95 - 110 mmol/L    CO2 20 (L) 23 - 29 mmol/L    Glucose 71 70 - 110 mg/dL    BUN, Bld 18 8 - 23 mg/dL    Creatinine 0.4 (L) 0.5 - 1.4 mg/dL    Calcium 9.2 8.7 - 10.5 mg/dL    Total Protein 7.4 6.0 - 8.4 g/dL    Albumin 4.1 3.5 - 5.2 g/dL    Total Bilirubin 1.8 (H) 0.1 - 1.0 mg/dL    Alkaline Phosphatase 65 55 - 135 U/L    AST 8 (L) 10 - 40 U/L    ALT 5 (L) 10 - 44 U/L    Anion Gap 11 8 - 16 mmol/L    eGFR if African American >60.0 >60 mL/min/1.73 m^2    eGFR if non African American >60.0 >60 mL/min/1.73 m^2   Magnesium    Collection Time: 04/05/19  4:45 AM   Result Value Ref Range    Magnesium 2.2 1.6 - 2.6 mg/dL   Phosphorus    Collection Time: 04/05/19  4:45 AM   Result Value Ref Range    Phosphorus  3.5 2.7 - 4.5 mg/dL   CBC auto differential    Collection Time: 04/05/19  4:45 AM   Result Value Ref Range    WBC 4.99 3.90 - 12.70 K/uL    RBC 5.23 4.60 - 6.20 M/uL    Hemoglobin 10.6 (L) 14.0 - 18.0 g/dL    Hematocrit 32.0 (L) 40.0 - 54.0 %    MCV 61 (L) 82 - 98 fL    MCH 20.3 (L) 27.0 - 31.0 pg    MCHC 33.1 32.0 - 36.0 g/dL    RDW 20.7 (H) 11.5 - 14.5 %    Platelets 137 (L) 150 - 350 K/uL    MPV SEE COMMENT 9.2 - 12.9 fL    Immature Granulocytes 0.6 (H) 0.0 - 0.5 %    Gran # (ANC) 2.7 1.8 - 7.7 K/uL    Immature Grans (Abs) 0.03 0.00 - 0.04 K/uL    Lymph # 1.7 1.0 - 4.8 K/uL    Mono # 0.5 0.3 - 1.0 K/uL    Eos # 0.1 0.0 - 0.5 K/uL    Baso # 0.03 0.00 - 0.20 K/uL    nRBC 5 (A) 0 /100 WBC    Gran% 54.3 38.0 - 73.0 %    Lymph% 34.3 18.0 - 48.0 %    Mono% 9.0 4.0 - 15.0 %    Eosinophil% 1.2 0.0 - 8.0 %    Basophil% 0.6 0.0 - 1.9 %    Differential Method Automated    Vitamin D    Collection Time: 04/05/19 12:59 PM   Result Value Ref Range    Vit D, 25-Hydroxy 15 (L) 30 - 96 ng/mL         Significant Imaging:  Imaging results within the past 24 hours have been reviewed.

## 2019-04-06 LAB
ALBUMIN SERPL BCP-MCNC: 4.3 G/DL (ref 3.5–5.2)
ALP SERPL-CCNC: 60 U/L (ref 55–135)
ALT SERPL W/O P-5'-P-CCNC: 5 U/L (ref 10–44)
ANION GAP SERPL CALC-SCNC: 9 MMOL/L (ref 8–16)
AST SERPL-CCNC: 9 U/L (ref 10–40)
BASOPHILS # BLD AUTO: 0.02 K/UL (ref 0–0.2)
BASOPHILS NFR BLD: 0.4 % (ref 0–1.9)
BILIRUB SERPL-MCNC: 1.8 MG/DL (ref 0.1–1)
BUN SERPL-MCNC: 18 MG/DL (ref 8–23)
CALCIUM SERPL-MCNC: 9.2 MG/DL (ref 8.7–10.5)
CHLORIDE SERPL-SCNC: 103 MMOL/L (ref 95–110)
CO2 SERPL-SCNC: 24 MMOL/L (ref 23–29)
CREAT SERPL-MCNC: 0.5 MG/DL (ref 0.5–1.4)
DIFFERENTIAL METHOD: ABNORMAL
EOSINOPHIL # BLD AUTO: 0.1 K/UL (ref 0–0.5)
EOSINOPHIL NFR BLD: 1.7 % (ref 0–8)
ERYTHROCYTE [DISTWIDTH] IN BLOOD BY AUTOMATED COUNT: 20.5 % (ref 11.5–14.5)
EST. GFR  (AFRICAN AMERICAN): >60 ML/MIN/1.73 M^2
EST. GFR  (NON AFRICAN AMERICAN): >60 ML/MIN/1.73 M^2
GLUCOSE SERPL-MCNC: 75 MG/DL (ref 70–110)
HCT VFR BLD AUTO: 32 % (ref 40–54)
HGB BLD-MCNC: 10.4 G/DL (ref 14–18)
IMM GRANULOCYTES # BLD AUTO: 0.03 K/UL (ref 0–0.04)
IMM GRANULOCYTES NFR BLD AUTO: 0.6 % (ref 0–0.5)
LYMPHOCYTES # BLD AUTO: 1.7 K/UL (ref 1–4.8)
LYMPHOCYTES NFR BLD: 31.6 % (ref 18–48)
MAGNESIUM SERPL-MCNC: 2.2 MG/DL (ref 1.6–2.6)
MCH RBC QN AUTO: 20 PG (ref 27–31)
MCHC RBC AUTO-ENTMCNC: 32.5 G/DL (ref 32–36)
MCV RBC AUTO: 62 FL (ref 82–98)
MONOCYTES # BLD AUTO: 0.5 K/UL (ref 0.3–1)
MONOCYTES NFR BLD: 8.6 % (ref 4–15)
NEUTROPHILS # BLD AUTO: 3 K/UL (ref 1.8–7.7)
NEUTROPHILS NFR BLD: 57.1 % (ref 38–73)
NRBC BLD-RTO: 4 /100 WBC
PHOSPHATE SERPL-MCNC: 3.5 MG/DL (ref 2.7–4.5)
PLATELET # BLD AUTO: 137 K/UL (ref 150–350)
PMV BLD AUTO: ABNORMAL FL (ref 9.2–12.9)
POTASSIUM SERPL-SCNC: 3.8 MMOL/L (ref 3.5–5.1)
PROT SERPL-MCNC: 7.6 G/DL (ref 6–8.4)
RBC # BLD AUTO: 5.19 M/UL (ref 4.6–6.2)
SODIUM SERPL-SCNC: 136 MMOL/L (ref 136–145)
WBC # BLD AUTO: 5.32 K/UL (ref 3.9–12.7)

## 2019-04-06 PROCEDURE — 63600175 PHARM REV CODE 636 W HCPCS: Performed by: STUDENT IN AN ORGANIZED HEALTH CARE EDUCATION/TRAINING PROGRAM

## 2019-04-06 PROCEDURE — 84100 ASSAY OF PHOSPHORUS: CPT

## 2019-04-06 PROCEDURE — A9585 GADOBUTROL INJECTION: HCPCS | Performed by: HOSPITALIST

## 2019-04-06 PROCEDURE — 63600175 PHARM REV CODE 636 W HCPCS: Performed by: HOSPITALIST

## 2019-04-06 PROCEDURE — 83735 ASSAY OF MAGNESIUM: CPT

## 2019-04-06 PROCEDURE — S0028 INJECTION, FAMOTIDINE, 20 MG: HCPCS | Performed by: INTERNAL MEDICINE

## 2019-04-06 PROCEDURE — 25000003 PHARM REV CODE 250: Performed by: INTERNAL MEDICINE

## 2019-04-06 PROCEDURE — 80053 COMPREHEN METABOLIC PANEL: CPT

## 2019-04-06 PROCEDURE — 99231 SBSQ HOSP IP/OBS SF/LOW 25: CPT | Mod: GC,,, | Performed by: HOSPITALIST

## 2019-04-06 PROCEDURE — 63600175 PHARM REV CODE 636 W HCPCS: Mod: JG | Performed by: INTERNAL MEDICINE

## 2019-04-06 PROCEDURE — 25500020 PHARM REV CODE 255: Performed by: HOSPITALIST

## 2019-04-06 PROCEDURE — 20600001 HC STEP DOWN PRIVATE ROOM

## 2019-04-06 PROCEDURE — 99231 PR SUBSEQUENT HOSPITAL CARE,LEVL I: ICD-10-PCS | Mod: GC,,, | Performed by: HOSPITALIST

## 2019-04-06 PROCEDURE — 36415 COLL VENOUS BLD VENIPUNCTURE: CPT

## 2019-04-06 PROCEDURE — 25000003 PHARM REV CODE 250: Performed by: HOSPITALIST

## 2019-04-06 PROCEDURE — 85025 COMPLETE CBC W/AUTO DIFF WBC: CPT

## 2019-04-06 RX ORDER — SODIUM CHLORIDE 0.9 % (FLUSH) 0.9 %
10 SYRINGE (ML) INJECTION
Status: DISCONTINUED | OUTPATIENT
Start: 2019-04-06 | End: 2019-04-10 | Stop reason: HOSPADM

## 2019-04-06 RX ORDER — HEPARIN 100 UNIT/ML
500 SYRINGE INTRAVENOUS
Status: DISCONTINUED | OUTPATIENT
Start: 2019-04-06 | End: 2019-04-10 | Stop reason: HOSPADM

## 2019-04-06 RX ORDER — HEPARIN 100 UNIT/ML
500 SYRINGE INTRAVENOUS
Status: CANCELLED | OUTPATIENT
Start: 2019-05-01

## 2019-04-06 RX ORDER — DIPHENHYDRAMINE HYDROCHLORIDE 50 MG/ML
50 INJECTION INTRAMUSCULAR; INTRAVENOUS
Status: COMPLETED | OUTPATIENT
Start: 2019-04-06 | End: 2019-04-06

## 2019-04-06 RX ORDER — FAMOTIDINE 10 MG/ML
20 INJECTION INTRAVENOUS
Status: COMPLETED | OUTPATIENT
Start: 2019-04-06 | End: 2019-04-06

## 2019-04-06 RX ORDER — SODIUM CHLORIDE 0.9 % (FLUSH) 0.9 %
10 SYRINGE (ML) INJECTION
Status: CANCELLED | OUTPATIENT
Start: 2019-05-01

## 2019-04-06 RX ORDER — ACETAMINOPHEN 325 MG/1
650 TABLET ORAL
Status: CANCELLED | OUTPATIENT
Start: 2019-05-01

## 2019-04-06 RX ORDER — FAMOTIDINE 10 MG/ML
20 INJECTION INTRAVENOUS
Status: CANCELLED | OUTPATIENT
Start: 2019-05-01

## 2019-04-06 RX ORDER — GADOBUTROL 604.72 MG/ML
6 INJECTION INTRAVENOUS
Status: COMPLETED | OUTPATIENT
Start: 2019-04-06 | End: 2019-04-06

## 2019-04-06 RX ORDER — ACETAMINOPHEN 325 MG/1
650 TABLET ORAL
Status: COMPLETED | OUTPATIENT
Start: 2019-04-06 | End: 2019-04-06

## 2019-04-06 RX ORDER — ZOLPIDEM TARTRATE 5 MG/1
5 TABLET ORAL NIGHTLY PRN
Status: DISCONTINUED | OUTPATIENT
Start: 2019-04-06 | End: 2019-04-09

## 2019-04-06 RX ADMIN — FAMOTIDINE 20 MG: 20 TABLET ORAL at 08:04

## 2019-04-06 RX ADMIN — ACETAMINOPHEN 650 MG: 325 TABLET ORAL at 10:04

## 2019-04-06 RX ADMIN — AZATHIOPRINE 150 MG: 50 TABLET ORAL at 08:04

## 2019-04-06 RX ADMIN — MICONAZOLE NITRATE: 20 POWDER TOPICAL at 10:04

## 2019-04-06 RX ADMIN — HUMAN IMMUNOGLOBULIN G 25 G: 20 LIQUID INTRAVENOUS at 12:04

## 2019-04-06 RX ADMIN — STANDARDIZED SENNA CONCENTRATE AND DOCUSATE SODIUM 1 TABLET: 8.6; 5 TABLET, FILM COATED ORAL at 08:04

## 2019-04-06 RX ADMIN — ENOXAPARIN SODIUM 40 MG: 100 INJECTION SUBCUTANEOUS at 07:04

## 2019-04-06 RX ADMIN — MICONAZOLE NITRATE: 20 POWDER TOPICAL at 08:04

## 2019-04-06 RX ADMIN — ZOLPIDEM TARTRATE 5 MG: 5 TABLET ORAL at 10:04

## 2019-04-06 RX ADMIN — FAMOTIDINE 20 MG: 10 INJECTION, SOLUTION INTRAVENOUS at 10:04

## 2019-04-06 RX ADMIN — AMLODIPINE BESYLATE 10 MG: 10 TABLET ORAL at 08:04

## 2019-04-06 RX ADMIN — PREDNISONE 10 MG: 10 TABLET ORAL at 08:04

## 2019-04-06 RX ADMIN — GADOBUTROL 6 ML: 604.72 INJECTION INTRAVENOUS at 06:04

## 2019-04-06 RX ADMIN — DIPHENHYDRAMINE HYDROCHLORIDE 50 MG: 50 INJECTION, SOLUTION INTRAMUSCULAR; INTRAVENOUS at 10:04

## 2019-04-06 NOTE — PROGRESS NOTES
[facility] Hospital Medicine  Progress Note    Patient Name: Nura Kahn Jr.  MRN: [id number]  Patient Class: IP- Inpatient   Admission Date: 4/2/2019  Length of Stay: 4 days  Attending Physician: Óscar Patricia MD  Primary Care Provider: Socrates Ambrosio MD    [facility] Medicine Team: [facility] 2 Maria L Chambers MD    Subjective:     Principal Problem:Polymyositis    HPI:  Mr. Kahn is a 63M with polymyositis (Dx 2009, Bx proven), osteopenia, vit D insufficiency, thalassemia, dysphagia, afib, HTN, emphysema who presents as a direct admit for workup of progressive weakness in his extremities. Workup from 2016 showed end-stage myopathic changes and focal chronic inflammation. He was last seen by rheumatology 2/13/2019 with plans for direct admit for IVIG therapy; however, patient did not present as it was Williamsons day. Today, he reports no major changes since February. Muscle weakness has been progressive for the past several years. He currently uses a scooter and requires increasing levels of assistance to move from recliner to scooter. As a result of his immobility, he wears a diaper as he is unable to make it to the restroom for bowel and urinary habits. His support system at home is lacking; he is sliding to move himself from chair to scooter. His significant other has not been supportive with increasing decline in his status. Dysphagia has been stable; patient reports that difficulty swallowing is associated with positioning. When he does have trouble, it usually feels like food is stuck at the top of his esophagus, not that it is stuck on the way down. Sometimes he chokes with water intake. Dysphagia is least notable when sitting upright. He reports no pain or aches in his muscles. Denies fevers, chills, joint pains, blood in his stool or urine. He endorses some depression regarding his condition and inability to partake in activities he enjoys such as  fishing.    Hospital Course:  Patient admitted to hospital medicine for initiation of IVIG. He has agreed to be placed in SNF after completion of therapy 2/2 poor social support at home. Rheumatology has placed IVIG orders. MRI femurs pending.     Interval History: Patient resting comfortably in bed this morning, transferred to step-down bed for initiation of IVIG. No complaints at this time.    Review of Systems   Constitutional: Positive for activity change (dec). Negative for appetite change, diaphoresis, fatigue and fever.   HENT: Negative for congestion, sinus pressure, sinus pain and trouble swallowing.    Eyes: Negative for photophobia and pain.   Respiratory: Negative for choking, shortness of breath and wheezing.    Cardiovascular: Negative for chest pain, palpitations and leg swelling.   Gastrointestinal: Negative for abdominal pain, blood in stool, constipation, diarrhea, nausea and vomiting.   Genitourinary: Negative for dysuria, hematuria and urgency.   Musculoskeletal: Positive for gait problem. Negative for arthralgias, back pain and myalgias.   Neurological: Positive for weakness. Negative for dizziness, tremors, facial asymmetry, speech difficulty and headaches.   Psychiatric/Behavioral: Negative for agitation, behavioral problems, confusion and decreased concentration.     Objective:     Vital Signs (Most Recent):  Temp: 98.4 °F (36.9 °C) (04/06/19 1341)  Pulse: 75 (04/06/19 1341)  Resp: 16 (04/06/19 1341)  BP: (!) 150/85 (04/06/19 1341)  SpO2: 97 % (04/06/19 1341) Vital Signs (24h Range):  Temp:  [96.9 °F (36.1 °C)-98.4 °F (36.9 °C)] 98.4 °F (36.9 °C)  Pulse:  [64-91] 75  Resp:  [16-18] 16  SpO2:  [94 %-100 %] 97 %  BP: (132-170)/(81-93) 150/85     Weight: 58.6 kg (129 lb 1.6 oz)  Body mass index is 19.06 kg/m².    Intake/Output Summary (Last 24 hours) at 4/6/2019 1504  Last data filed at 4/6/2019 1100  Gross per 24 hour   Intake --   Output 2155 ml   Net -2155 ml      Physical Exam    Constitutional: He is oriented to person, place, and time. He appears well-developed. No distress.   HENT:   Head: Normocephalic and atraumatic.   Mouth/Throat: No oropharyngeal exudate.   Eyes: Conjunctivae and EOM are normal. No scleral icterus.   Neck: Normal range of motion. Neck supple.   Cardiovascular: Normal rate, regular rhythm, normal heart sounds and intact distal pulses.   No murmur heard.  Pulmonary/Chest: Effort normal and breath sounds normal. No respiratory distress. He has no wheezes.   Abdominal: Soft. Bowel sounds are normal. He exhibits no distension. There is no tenderness. There is no guarding.   Musculoskeletal: He exhibits no tenderness.   Right Side Rheumatological Exam      Muscle Strength (0-5 scale):  Neck Flexion:  4  Neck Extension: 4  Deltoid:  2  Biceps: 3  Triceps:  4  : 4  Iliopsoas: 3  Quadriceps:  4  Distal Lower Extremity: 2     Left Side Rheumatological Exam      Muscle Strength (0-5 scale):  Neck Flexion:  4  Neck Extension: 4  Deltoid:  3  Biceps: 4   Triceps:  4  :  4  Iliopsoas: 3  Quadriceps:  3   Distal Lower Extremity: 2     Lymphadenopathy:     He has no cervical adenopathy.   Neurological: He is alert and oriented to person, place, and time. No cranial nerve deficit. He exhibits abnormal muscle tone (decreased tone in all extremities).   Skin: Skin is warm and dry. No rash noted. He is not diaphoretic. No pallor.   Psychiatric: He has a normal mood and affect. His behavior is normal. Judgment and thought content normal.   Nursing note and vitals reviewed.      Significant Labs:   CBC:   Recent Labs   Lab 04/05/19  0445 04/06/19  0523   WBC 4.99 5.32   HGB 10.6* 10.4*   HCT 32.0* 32.0*   * 137*     CMP:   Recent Labs   Lab 04/05/19 0445 04/06/19  0523    136   K 3.8 3.8    103   CO2 20* 24   GLU 71 75   BUN 18 18   CREATININE 0.4* 0.5   CALCIUM 9.2 9.2   PROT 7.4 7.6   ALBUMIN 4.1 4.3   BILITOT 1.8* 1.8*   ALKPHOS 65 60   AST 8* 9*   ALT 5*  "5*   ANIONGAP 11 9   EGFRNONAA >60.0 >60.0     Magnesium:   Recent Labs   Lab 04/05/19  0445 04/06/19  0523   MG 2.2 2.2       Significant Imaging: I have reviewed all pertinent imaging results/findings within the past 24 hours.    Assessment/Plan:      * Polymyositis  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  Last full workup in 2016 with evidence of progression of disease  Direct admit for management by rheumatology with IVIG  Weakness in all extremities, uses scooter for mobilization  Does not have a good social support system at home - will require post acute care for this reason  Initial labs negative except IgG elevated at 1754  - Rheumatology consult - appreciate recs  - Plan to start IVIG today - pharmacy and rheum assisting with orders  - Continuing imuran and prednisone currently  - f/u initial labs HMG-CoA reductase antibody, CN1A Ab  - Bilateral MRIs w wo contrast of bilat femurs (renal function stable) - bilat humerus imaging one day later  - PT/OT and PM&R  - Current plan for SNF on DC      Essential hypertension  - Continue norvasc 10mg daily  - Monitor BP      Hemoglobin C disease  - Monitor CBC - currently stable      Immunosuppression  Currently on imuran 150mg daily and prednisone 10mg daily.  - Continue both meds      Atrial fibrillation  Currently rate and rhythm controlled      Osteopenia  Likely 2/2 long term prednisone treatment  - f/u MRI femur      Splenomegaly  US abdo 4/3 shows "cholelithiasis with no evidence of cholecystitis. Splenomegaly."  Not currently causing complications        Quadriparesis (muscle weakness)  See plan for "polymyositis"      Vitamin D deficiency  - Continue ergocalciferol qweekly      Oropharyngeal dysphagia  Reports difficulty with both solids and liquids, but largely dependent on positioning of patient (e.g. Sitting up vs laying in recliner)  Sometimes experiences choking with water  - Per SLP recs - soft diet with thin liquids  - Will consider GI consult if " dysphagia worsens  - Nutrition consult - add boost plus        VTE Risk Mitigation (From admission, onward)        Ordered     heparin, porcine (PF) 100 unit/mL injection flush 500 Units  As needed (PRN)      04/06/19 0654     enoxaparin injection 40 mg  Daily      04/03/19 1258     Place sequential compression device  Until discontinued      04/02/19 1408              Maria L Chambers MD  Department of Hospital Medicine   [facility]

## 2019-04-06 NOTE — PLAN OF CARE
Problem: Adult Inpatient Plan of Care  Goal: Plan of Care Review  Outcome: Ongoing (interventions implemented as appropriate)     04/06/19 0967   Plan of Care Review   Plan of Care Reviewed With patient       Pt in MRI, IVIG initiated, pt tolerated well, see flowsheets for VS, pt denies pain, BSC at bedside per pt request, voiding per urinal without difficulty, IOs per flowsheet, POC reviewed, will continue to monitor.

## 2019-04-06 NOTE — ASSESSMENT & PLAN NOTE
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  Last full workup in 2016 with evidence of progression of disease  Direct admit for management by rheumatology with IVIG  Weakness in all extremities, uses scooter for mobilization  Does not have a good social support system at home - will require post acute care for this reason  Initial labs negative except IgG elevated at 1754  - Rheumatology consult - appreciate recs  - Plan to start IVIG today - pharmacy and rheum assisting with orders  - Continuing imuran and prednisone currently  - f/u initial labs HMG-CoA reductase antibody, CN1A Ab  - Bilateral MRIs w wo contrast of bilat femurs (renal function stable) - bilat humerus imaging one day later  - PT/OT and PM&R  - Current plan for SNF on DC

## 2019-04-06 NOTE — PLAN OF CARE
Problem: Adult Inpatient Plan of Care  Goal: Plan of Care Review  Outcome: Ongoing (interventions implemented as appropriate)  Pt AAOx4, VSS, free of falls. CBG completed during shift. Miconazole powder applied to perineal area. Aspiration and fall precautions maintained. Pt turned with +1 assist. No movements to LE noted. Denies pain or discomfort at this time. Frequent rounding maintained. WCTM.

## 2019-04-06 NOTE — SUBJECTIVE & OBJECTIVE
Interval History: Patient resting comfortably in bed this morning, transferred to step-down bed for initiation of IVIG. No complaints at this time.    Review of Systems   Constitutional: Positive for activity change (dec). Negative for appetite change, diaphoresis, fatigue and fever.   HENT: Negative for congestion, sinus pressure, sinus pain and trouble swallowing.    Eyes: Negative for photophobia and pain.   Respiratory: Negative for choking, shortness of breath and wheezing.    Cardiovascular: Negative for chest pain, palpitations and leg swelling.   Gastrointestinal: Negative for abdominal pain, blood in stool, constipation, diarrhea, nausea and vomiting.   Genitourinary: Negative for dysuria, hematuria and urgency.   Musculoskeletal: Positive for gait problem. Negative for arthralgias, back pain and myalgias.   Neurological: Positive for weakness. Negative for dizziness, tremors, facial asymmetry, speech difficulty and headaches.   Psychiatric/Behavioral: Negative for agitation, behavioral problems, confusion and decreased concentration.     Objective:     Vital Signs (Most Recent):  Temp: 98.4 °F (36.9 °C) (04/06/19 1341)  Pulse: 75 (04/06/19 1341)  Resp: 16 (04/06/19 1341)  BP: (!) 150/85 (04/06/19 1341)  SpO2: 97 % (04/06/19 1341) Vital Signs (24h Range):  Temp:  [96.9 °F (36.1 °C)-98.4 °F (36.9 °C)] 98.4 °F (36.9 °C)  Pulse:  [64-91] 75  Resp:  [16-18] 16  SpO2:  [94 %-100 %] 97 %  BP: (132-170)/(81-93) 150/85     Weight: 58.6 kg (129 lb 1.6 oz)  Body mass index is 19.06 kg/m².    Intake/Output Summary (Last 24 hours) at 4/6/2019 1504  Last data filed at 4/6/2019 1100  Gross per 24 hour   Intake --   Output 2155 ml   Net -2155 ml      Physical Exam   Constitutional: He is oriented to person, place, and time. He appears well-developed. No distress.   HENT:   Head: Normocephalic and atraumatic.   Mouth/Throat: No oropharyngeal exudate.   Eyes: Conjunctivae and EOM are normal. No scleral icterus.   Neck:  Normal range of motion. Neck supple.   Cardiovascular: Normal rate, regular rhythm, normal heart sounds and intact distal pulses.   No murmur heard.  Pulmonary/Chest: Effort normal and breath sounds normal. No respiratory distress. He has no wheezes.   Abdominal: Soft. Bowel sounds are normal. He exhibits no distension. There is no tenderness. There is no guarding.   Musculoskeletal: He exhibits no tenderness.   Right Side Rheumatological Exam      Muscle Strength (0-5 scale):  Neck Flexion:  4  Neck Extension: 4  Deltoid:  2  Biceps: 3  Triceps:  4  : 4  Iliopsoas: 3  Quadriceps:  4  Distal Lower Extremity: 2     Left Side Rheumatological Exam      Muscle Strength (0-5 scale):  Neck Flexion:  4  Neck Extension: 4  Deltoid:  3  Biceps: 4   Triceps:  4  :  4  Iliopsoas: 3  Quadriceps:  3   Distal Lower Extremity: 2     Lymphadenopathy:     He has no cervical adenopathy.   Neurological: He is alert and oriented to person, place, and time. No cranial nerve deficit. He exhibits abnormal muscle tone (decreased tone in all extremities).   Skin: Skin is warm and dry. No rash noted. He is not diaphoretic. No pallor.   Psychiatric: He has a normal mood and affect. His behavior is normal. Judgment and thought content normal.   Nursing note and vitals reviewed.      Significant Labs:   CBC:   Recent Labs   Lab 04/05/19 0445 04/06/19  0523   WBC 4.99 5.32   HGB 10.6* 10.4*   HCT 32.0* 32.0*   * 137*     CMP:   Recent Labs   Lab 04/05/19 0445 04/06/19  0523    136   K 3.8 3.8    103   CO2 20* 24   GLU 71 75   BUN 18 18   CREATININE 0.4* 0.5   CALCIUM 9.2 9.2   PROT 7.4 7.6   ALBUMIN 4.1 4.3   BILITOT 1.8* 1.8*   ALKPHOS 65 60   AST 8* 9*   ALT 5* 5*   ANIONGAP 11 9   EGFRNONAA >60.0 >60.0     Magnesium:   Recent Labs   Lab 04/05/19 0445 04/06/19  0523   MG 2.2 2.2       Significant Imaging: I have reviewed all pertinent imaging results/findings within the past 24 hours.

## 2019-04-06 NOTE — HOSPITAL COURSE
Patient admitted to hospital medicine for initiation of IVIG. He has agreed to be placed in SNF after completion of therapy 2/2 poor social support at home. Rheumatology has placed IVIG orders. MRI femur and humerus with signs of diffuse atrophy consistent with disease process. Patient tolerating IVIG well with improvement in strength after first two doses. Tolerated remaining 3 doses fine. Discharged after 5/5 IVIG treatments with overall improvement. Patient refused SNF placement and was discharged home with home health.

## 2019-04-07 LAB
ALBUMIN SERPL BCP-MCNC: 4.2 G/DL (ref 3.5–5.2)
ALP SERPL-CCNC: 59 U/L (ref 55–135)
ALT SERPL W/O P-5'-P-CCNC: 6 U/L (ref 10–44)
ANION GAP SERPL CALC-SCNC: 10 MMOL/L (ref 8–16)
AST SERPL-CCNC: 10 U/L (ref 10–40)
BASOPHILS # BLD AUTO: 0.03 K/UL (ref 0–0.2)
BASOPHILS NFR BLD: 0.6 % (ref 0–1.9)
BILIRUB SERPL-MCNC: 1.9 MG/DL (ref 0.1–1)
BUN SERPL-MCNC: 19 MG/DL (ref 8–23)
CALCIUM SERPL-MCNC: 9.2 MG/DL (ref 8.7–10.5)
CHLORIDE SERPL-SCNC: 104 MMOL/L (ref 95–110)
CO2 SERPL-SCNC: 22 MMOL/L (ref 23–29)
CREAT SERPL-MCNC: 0.5 MG/DL (ref 0.5–1.4)
DIFFERENTIAL METHOD: ABNORMAL
EOSINOPHIL # BLD AUTO: 0.1 K/UL (ref 0–0.5)
EOSINOPHIL NFR BLD: 1 % (ref 0–8)
ERYTHROCYTE [DISTWIDTH] IN BLOOD BY AUTOMATED COUNT: 20.6 % (ref 11.5–14.5)
EST. GFR  (AFRICAN AMERICAN): >60 ML/MIN/1.73 M^2
EST. GFR  (NON AFRICAN AMERICAN): >60 ML/MIN/1.73 M^2
GLUCOSE SERPL-MCNC: 60 MG/DL (ref 70–110)
HCT VFR BLD AUTO: 32.3 % (ref 40–54)
HGB BLD-MCNC: 10.6 G/DL (ref 14–18)
IMM GRANULOCYTES # BLD AUTO: 0.03 K/UL (ref 0–0.04)
IMM GRANULOCYTES NFR BLD AUTO: 0.6 % (ref 0–0.5)
LYMPHOCYTES # BLD AUTO: 1.7 K/UL (ref 1–4.8)
LYMPHOCYTES NFR BLD: 34.7 % (ref 18–48)
MAGNESIUM SERPL-MCNC: 2.4 MG/DL (ref 1.6–2.6)
MCH RBC QN AUTO: 20.2 PG (ref 27–31)
MCHC RBC AUTO-ENTMCNC: 32.8 G/DL (ref 32–36)
MCV RBC AUTO: 62 FL (ref 82–98)
MONOCYTES # BLD AUTO: 0.4 K/UL (ref 0.3–1)
MONOCYTES NFR BLD: 8.6 % (ref 4–15)
NEUTROPHILS # BLD AUTO: 2.7 K/UL (ref 1.8–7.7)
NEUTROPHILS NFR BLD: 54.5 % (ref 38–73)
NRBC BLD-RTO: 4 /100 WBC
PHOSPHATE SERPL-MCNC: 3.3 MG/DL (ref 2.7–4.5)
PLATELET # BLD AUTO: 136 K/UL (ref 150–350)
PMV BLD AUTO: ABNORMAL FL (ref 9.2–12.9)
POTASSIUM SERPL-SCNC: 3.8 MMOL/L (ref 3.5–5.1)
PROT SERPL-MCNC: 8.1 G/DL (ref 6–8.4)
RBC # BLD AUTO: 5.25 M/UL (ref 4.6–6.2)
SODIUM SERPL-SCNC: 136 MMOL/L (ref 136–145)
WBC # BLD AUTO: 4.9 K/UL (ref 3.9–12.7)

## 2019-04-07 PROCEDURE — 63600175 PHARM REV CODE 636 W HCPCS: Mod: JG | Performed by: INTERNAL MEDICINE

## 2019-04-07 PROCEDURE — 25000003 PHARM REV CODE 250: Performed by: HOSPITALIST

## 2019-04-07 PROCEDURE — 80053 COMPREHEN METABOLIC PANEL: CPT

## 2019-04-07 PROCEDURE — 63600175 PHARM REV CODE 636 W HCPCS: Performed by: HOSPITALIST

## 2019-04-07 PROCEDURE — 83735 ASSAY OF MAGNESIUM: CPT

## 2019-04-07 PROCEDURE — 20600001 HC STEP DOWN PRIVATE ROOM

## 2019-04-07 PROCEDURE — 99232 SBSQ HOSP IP/OBS MODERATE 35: CPT | Mod: GC,,, | Performed by: HOSPITALIST

## 2019-04-07 PROCEDURE — 99232 PR SUBSEQUENT HOSPITAL CARE,LEVL II: ICD-10-PCS | Mod: GC,,, | Performed by: HOSPITALIST

## 2019-04-07 PROCEDURE — 25000003 PHARM REV CODE 250: Performed by: INTERNAL MEDICINE

## 2019-04-07 PROCEDURE — 84100 ASSAY OF PHOSPHORUS: CPT

## 2019-04-07 PROCEDURE — 63600175 PHARM REV CODE 636 W HCPCS: Performed by: STUDENT IN AN ORGANIZED HEALTH CARE EDUCATION/TRAINING PROGRAM

## 2019-04-07 PROCEDURE — 85025 COMPLETE CBC W/AUTO DIFF WBC: CPT

## 2019-04-07 PROCEDURE — 36415 COLL VENOUS BLD VENIPUNCTURE: CPT

## 2019-04-07 RX ORDER — LABETALOL 100 MG/1
100 TABLET, FILM COATED ORAL EVERY 12 HOURS
Status: DISCONTINUED | OUTPATIENT
Start: 2019-04-07 | End: 2019-04-08

## 2019-04-07 RX ADMIN — MICONAZOLE NITRATE: 20 POWDER TOPICAL at 09:04

## 2019-04-07 RX ADMIN — HUMAN IMMUNOGLOBULIN G 25 G: 20 LIQUID INTRAVENOUS at 09:04

## 2019-04-07 RX ADMIN — PREDNISONE 10 MG: 10 TABLET ORAL at 09:04

## 2019-04-07 RX ADMIN — ZOLPIDEM TARTRATE 5 MG: 5 TABLET ORAL at 09:04

## 2019-04-07 RX ADMIN — FAMOTIDINE 20 MG: 20 TABLET ORAL at 09:04

## 2019-04-07 RX ADMIN — LABETALOL HYDROCHLORIDE 100 MG: 100 TABLET, FILM COATED ORAL at 05:04

## 2019-04-07 RX ADMIN — STANDARDIZED SENNA CONCENTRATE AND DOCUSATE SODIUM 1 TABLET: 8.6; 5 TABLET, FILM COATED ORAL at 09:04

## 2019-04-07 RX ADMIN — AMLODIPINE BESYLATE 10 MG: 10 TABLET ORAL at 09:04

## 2019-04-07 RX ADMIN — ENOXAPARIN SODIUM 40 MG: 100 INJECTION SUBCUTANEOUS at 05:04

## 2019-04-07 RX ADMIN — AZATHIOPRINE 150 MG: 50 TABLET ORAL at 09:04

## 2019-04-07 NOTE — PLAN OF CARE
Problem: Adult Inpatient Plan of Care  Goal: Plan of Care Review  Outcome: Ongoing (interventions implemented as appropriate)     04/07/19 3013   Plan of Care Review   Plan of Care Reviewed With patient       Pt resting in bed, AOx 4 BLE weakness stable, pt reports increased mobility of back and BUE, 2/5 IVIG dose given, pt tolerated well, pt hypertensive today, MDs updated, new orders received, MRI BUE completed, POC reviewed, pt denies pain or further needs at this time, will continue to monitor.

## 2019-04-07 NOTE — PROGRESS NOTES
[facility] Hospital Medicine  Progress Note    Patient Name: Nura Kahn Jr.  MRN: [id number]  Patient Class: IP- Inpatient   Admission Date: 4/2/2019  Length of Stay: 5 days  Attending Physician: Óscar Patricia MD  Primary Care Provider: Socrates Ambrosio MD    [facility] Medicine Team: [facility] HOSP MED 2 Maria L Chambers MD    Subjective:     Principal Problem:Polymyositis    HPI:  Mr. Kahn is a 63M with polymyositis (Dx 2009, Bx proven), osteopenia, vit D insufficiency, thalassemia, dysphagia, afib, HTN, emphysema who presents as a direct admit for workup of progressive weakness in his extremities. Workup from 2016 showed end-stage myopathic changes and focal chronic inflammation. He was last seen by rheumatology 2/13/2019 with plans for direct admit for IVIG therapy; however, patient did not present as it was Williamsons day. Today, he reports no major changes since February. Muscle weakness has been progressive for the past several years. He currently uses a scooter and requires increasing levels of assistance to move from recliner to scooter. As a result of his immobility, he wears a diaper as he is unable to make it to the restroom for bowel and urinary habits. His support system at home is lacking; he is sliding to move himself from chair to scooter. His significant other has not been supportive with increasing decline in his status. Dysphagia has been stable; patient reports that difficulty swallowing is associated with positioning. When he does have trouble, it usually feels like food is stuck at the top of his esophagus, not that it is stuck on the way down. Sometimes he chokes with water intake. Dysphagia is least notable when sitting upright. He reports no pain or aches in his muscles. Denies fevers, chills, joint pains, blood in his stool or urine. He endorses some depression regarding his condition and inability to partake in activities he enjoys such as  sawyer.    Hospital Course:  Patient admitted to hospital medicine for initiation of IVIG. He has agreed to be placed in SNF after completion of therapy 2/2 poor social support at home. Rheumatology has placed IVIG orders. MRI femurs pending.     Interval History: Patient resting comfortably in bed, remarks feeling improved after first dose of IVIG. Tolerating treatment well. MRI bilateral femur completed. No complaints.    Review of Systems   Constitutional: Positive for activity change (dec). Negative for appetite change, diaphoresis, fatigue and fever.   HENT: Negative for congestion, sinus pressure, sinus pain and trouble swallowing.    Eyes: Negative for photophobia and pain.   Respiratory: Negative for choking, shortness of breath and wheezing.    Cardiovascular: Negative for chest pain, palpitations and leg swelling.   Gastrointestinal: Negative for abdominal pain, blood in stool, constipation, diarrhea, nausea and vomiting.   Genitourinary: Negative for dysuria, hematuria and urgency.   Musculoskeletal: Positive for gait problem. Negative for arthralgias, back pain and myalgias.   Neurological: Positive for weakness (improved). Negative for dizziness, tremors, facial asymmetry, speech difficulty and headaches.   Psychiatric/Behavioral: Negative for agitation, behavioral problems, confusion and decreased concentration.     Objective:     Vital Signs (Most Recent):  Temp: 97.9 °F (36.6 °C) (04/07/19 1704)  Pulse: 75 (04/07/19 1704)  Resp: 16 (04/07/19 1704)  BP: (!) 172/91 (04/07/19 1704)  SpO2: 98 % (04/07/19 1704) Vital Signs (24h Range):  Temp:  [97.5 °F (36.4 °C)-99.2 °F (37.3 °C)] 97.9 °F (36.6 °C)  Pulse:  [69-88] 75  Resp:  [16-18] 16  SpO2:  [98 %-100 %] 98 %  BP: (134-179)/(79-99) 172/91     Weight: 59.6 kg (131 lb 8 oz)  Body mass index is 19.42 kg/m².    Intake/Output Summary (Last 24 hours) at 4/7/2019 1906  Last data filed at 4/7/2019 0400  Gross per 24 hour   Intake --   Output 550 ml   Net -550  ml      Physical Exam   Constitutional: He is oriented to person, place, and time. He appears well-developed. No distress.   HENT:   Head: Normocephalic and atraumatic.   Mouth/Throat: No oropharyngeal exudate.   Eyes: Conjunctivae and EOM are normal. No scleral icterus.   Neck: Normal range of motion. Neck supple.   Cardiovascular: Normal rate, regular rhythm, normal heart sounds and intact distal pulses.   No murmur heard.  Pulmonary/Chest: Effort normal and breath sounds normal. No respiratory distress. He has no wheezes.   Abdominal: Soft. Bowel sounds are normal. He exhibits no distension. There is no tenderness. There is no guarding.   Musculoskeletal: He exhibits no tenderness.   Right Side Rheumatological Exam      Muscle Strength (0-5 scale):  Neck Flexion:  4  Neck Extension: 4  Deltoid:  2  Biceps: 3  Triceps:  4  : 4  Iliopsoas: 3  Quadriceps:  4  Distal Lower Extremity: 2     Left Side Rheumatological Exam      Muscle Strength (0-5 scale):  Neck Flexion:  4  Neck Extension: 4  Deltoid:  3  Biceps: 4   Triceps:  4  :  4  Iliopsoas: 3  Quadriceps:  3   Distal Lower Extremity: 2     Lymphadenopathy:     He has no cervical adenopathy.   Neurological: He is alert and oriented to person, place, and time. No cranial nerve deficit. He exhibits abnormal muscle tone (decreased tone in all extremities).   Skin: Skin is warm and dry. No rash noted. He is not diaphoretic. No pallor.   Psychiatric: He has a normal mood and affect. His behavior is normal. Judgment and thought content normal.   Nursing note and vitals reviewed.      Significant Labs:   CBC:   Recent Labs   Lab 04/06/19  0523 04/07/19 0418   WBC 5.32 4.90   HGB 10.4* 10.6*   HCT 32.0* 32.3*   * 136*     CMP:   Recent Labs   Lab 04/06/19  0523 04/07/19  0418    136   K 3.8 3.8    104   CO2 24 22*   GLU 75 60*   BUN 18 19   CREATININE 0.5 0.5   CALCIUM 9.2 9.2   PROT 7.6 8.1   ALBUMIN 4.3 4.2   BILITOT 1.8* 1.9*   ALKPHOS 60 59    AST 9* 10   ALT 5* 6*   ANIONGAP 9 10   EGFRNONAA >60.0 >60.0     Magnesium:   Recent Labs   Lab 04/06/19  0523 04/07/19  0418   MG 2.2 2.4       Significant Imaging: I have reviewed all pertinent imaging results/findings within the past 24 hours.     MRI Femur W WO Contrast Right  Narrative: EXAMINATION:  MRI FEMUR W WO CONTRAST LEFT; MRI FEMUR W WO CONTRAST RIGHT    CLINICAL HISTORY:  myositis;    TECHNIQUE:  Multiplanar, multisequence imaging of the right and left femur were performed before and after the administration of 6 cc gadolinium based IV contrast    COMPARISON:  MRI 04/22/2015    FINDINGS:  There is advanced atrophy and fatty replacement of the bilateral thigh musculature involving all compartments.  There is mild atrophy of the visualized proximal pelvic musculature as well.  There is mild edema again identified within the bilateral obturator musculature as well as the residual posterior compartment musculature.  This is most pronounced within the right biceps femoris muscle.  No associated postcontrast enhancement is appreciated.    There is no evidence of soft tissue mass.  Osseous structures demonstrate mild heterogeneity of the visualized bone marrow which may relate to red marrow reconversion.  There are small simple appearing bilateral knee joint effusions.  The bilateral femoral heads are well seated within the acetabula without evidence of osteonecrosis.  Limited views of the pelvis demonstrate no acute abnormalities.  Impression: 1.  Redemonstration of advanced diffuse atrophy and fatty replacement of the bilateral thigh musculature involving all compartments.  There is mild residual edema signal present within the bilateral obturator musculature and residual posterior compartment musculature, similar to prior MRI examination.    2.  Mild heterogeneity of the visualized bone marrow signal which may relate to red marrow reconversion.  Clinical correlation advised.    Electronically signed  by: Brandan Hess MD  Date:    04/06/2019  Time:    23:51  MRI Femur W WO Contrast Left  Narrative: EXAMINATION:  MRI FEMUR W WO CONTRAST LEFT; MRI FEMUR W WO CONTRAST RIGHT    CLINICAL HISTORY:  myositis;    TECHNIQUE:  Multiplanar, multisequence imaging of the right and left femur were performed before and after the administration of 6 cc gadolinium based IV contrast    COMPARISON:  MRI 04/22/2015    FINDINGS:  There is advanced atrophy and fatty replacement of the bilateral thigh musculature involving all compartments.  There is mild atrophy of the visualized proximal pelvic musculature as well.  There is mild edema again identified within the bilateral obturator musculature as well as the residual posterior compartment musculature.  This is most pronounced within the right biceps femoris muscle.  No associated postcontrast enhancement is appreciated.    There is no evidence of soft tissue mass.  Osseous structures demonstrate mild heterogeneity of the visualized bone marrow which may relate to red marrow reconversion.  There are small simple appearing bilateral knee joint effusions.  The bilateral femoral heads are well seated within the acetabula without evidence of osteonecrosis.  Limited views of the pelvis demonstrate no acute abnormalities.  Impression: 1.  Redemonstration of advanced diffuse atrophy and fatty replacement of the bilateral thigh musculature involving all compartments.  There is mild residual edema signal present within the bilateral obturator musculature and residual posterior compartment musculature, similar to prior MRI examination.    2.  Mild heterogeneity of the visualized bone marrow signal which may relate to red marrow reconversion.  Clinical correlation advised.    Electronically signed by: Brandan Hess MD  Date:    04/06/2019  Time:    23:51        Assessment/Plan:      * Polymyositis  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  Last full workup in 2016 with evidence of  "progression of disease  Direct admit for management by rheumatology with IVIG  Weakness in all extremities, uses scooter for mobilization  Does not have a good social support system at home - will require post acute care for this reason  Initial labs negative except IgG elevated at 1754  MRI femur do not show progression of disease compared to prior study 2016  - Rheumatology consult - appreciate recs  - s/p initiation of IVIG treatment - day 2 of 5 today  - Continuing imuran and prednisone currently  - f/u initial labs HMG-CoA reductase antibody, CN1A Ab  - Bilateral MRIs w wo contrast of bilat femurs (renal function stable) - bilat humerus imaging one day later  - PT/OT and PM&R  - Current plan for SNF on DC      Essential hypertension  - Continue norvasc 10mg daily  - Monitor BP      Hemoglobin C disease  - Monitor CBC - currently stable      Immunosuppression  Currently on imuran 150mg daily and prednisone 10mg daily.  - Continue both meds      Atrial fibrillation  Currently rate and rhythm controlled      Osteopenia  Likely 2/2 long term prednisone treatment  - f/u MRI femur      Splenomegaly  US abdo 4/3 shows "cholelithiasis with no evidence of cholecystitis. Splenomegaly."  Not currently causing complications        Quadriparesis (muscle weakness)  See plan for "polymyositis"      Vitamin D deficiency  - Continue ergocalciferol qweekly      Oropharyngeal dysphagia  Reports difficulty with both solids and liquids, but largely dependent on positioning of patient (e.g. Sitting up vs laying in recliner)  Sometimes experiences choking with water  - Per SLP recs - soft diet with thin liquids  - Will consider GI consult if dysphagia worsens  - Nutrition consult - add boost plus        VTE Risk Mitigation (From admission, onward)        Ordered     heparin, porcine (PF) 100 unit/mL injection flush 500 Units  As needed (PRN)      04/06/19 0654     enoxaparin injection 40 mg  Daily      04/03/19 1258     Place " sequential compression device  Until discontinued      04/02/19 1522              Maria L Chambers MD  Department of Hospital Medicine   [facility]

## 2019-04-07 NOTE — ASSESSMENT & PLAN NOTE
Mr. Kahn is a 63 year old male with past medical history of Polymyositis  diagnosed 2009 (+mildly positive ku, outside biopsy proven, CPK here elevated to 487, aldolase elevated to 10.2 ), osteopenia, vitamin D insufficiency, hemoglobin c/beta-zero thalassema, a fib, dysphagia, portal HTN, emphysema was a direct admission from rheumatology clinic for worsening generalized muscle weakness and dysphagia.  Patient has a history of non-compliance and was told to be a direct admit since Feb 13 but did not come until April 2 after many attempts/contact.      CT chest (12/2017) -   1. Mild peribronchial cuffing which may be seen with bronchitis.  Mild tubular bronchiectasis of the right lower lobe with several distal bronchi demonstrating retained secretions, finding may be seen with small airways infectious/noninfectious inflammation or aspiration.  2. Centrilobular and paraseptal emphysema with an upper lung zone predominance.  3. Diffuse muscular atrophy.  4. Splenomegaly.    Labs at this admission:  Normal and stable CBC and CMP.  Normal CK/aldolase.  Normal inflammatory markers.  Elevated IgG (1754). HMGCoA ab negative. Vit D: 15.    Patient continues to have progressive dysphagia and worsening muscle weakness - suggestive of severe progressive polymyositis.  Patient needs to be workup with repeat imaging to look for muscle edema (if positive, repeat muscle biopsy).  Given severe polymyositis, IVIG is warranted at this time.      MRI femurs 4/6/19:  1.  Redemonstration of advanced diffuse atrophy and fatty replacement of the bilateral thigh musculature involving all compartments.  There is mild residual edema signal present within the bilateral obturator musculature and residual posterior compartment musculature, similar to prior MRI examination.  2.  Mild heterogeneity of the visualized bone marrow signal which may relate to red marrow reconversion.  Clinical correlation advised.  Patient will also benefit from  PT/OT and rehab placement for muscle strengthening after completion of IVIG.     MRI humerus b/l 4/7:  Significant diffuse atrophy and fatty replacement of the bilateral proximal upper extremity musculature, including the rotator cuff musculature, right greater than left, in this patient with reported history of polymyositis.  There is some degree of sparing of the bilateral deltoid and triceps musculature with greater residual muscle bulk on the left compared to the right.  There is mild residual edema present within the remaining musculature, most pronounced within the triceps.    4/8/19: patient with improvement in strength involving deltoids,  strength, and distal lower extremities. Also reports improvement in swallowing. Per SLP- soft diet with thin liquids.     Plan:  - Pending CN1A antibody  - consider repeat muscle biopsy.  - IVIG (0.4g/kg/day x 5 days) followed by monthly course. Day 3/5 today.   - nutrition consult   - daily PT/OT  - continue prednisone 10mg and Imuran 150mg at this time  - continue Vit D2 50,000 units weekly  - continue H2 blocker  - pending abdominal U/S - evaluation of hepatosplenomegaly and portal HTN (seen in CT chest 12/2017)  - recommendation for rehab consult - patient agreeable to rehab   - DEXA as outpatient

## 2019-04-07 NOTE — ASSESSMENT & PLAN NOTE
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  Last full workup in 2016 with evidence of progression of disease  Direct admit for management by rheumatology with IVIG  Weakness in all extremities, uses scooter for mobilization  Does not have a good social support system at home - will require post acute care for this reason  Initial labs negative except IgG elevated at 1754  MRI femur do not show progression of disease compared to prior study 2016  - Rheumatology consult - appreciate recs  - s/p initiation of IVIG treatment - day 2 of 5 today  - Continuing imuran and prednisone currently  - f/u initial labs HMG-CoA reductase antibody, CN1A Ab  - Bilateral MRIs w wo contrast of bilat femurs (renal function stable) - bilat humerus imaging one day later  - PT/OT and PM&R  - Current plan for SNF on DC

## 2019-04-07 NOTE — PLAN OF CARE
Problem: Adult Inpatient Plan of Care  Goal: Plan of Care Review  Outcome: Ongoing (interventions implemented as appropriate)  VSS. A/Ox4.  No complaints of pain.  Patient was able to flex ankles tonight.  Patient stated he was not able to do that before.  No acute events overnight. Safety maintained.  All questions answered. Patient updated on plan of care.  Will continue to monitor.

## 2019-04-08 LAB
ALBUMIN SERPL BCP-MCNC: 4.1 G/DL (ref 3.5–5.2)
ALP SERPL-CCNC: 57 U/L (ref 55–135)
ALT SERPL W/O P-5'-P-CCNC: 9 U/L (ref 10–44)
ANION GAP SERPL CALC-SCNC: 8 MMOL/L (ref 8–16)
AST SERPL-CCNC: 10 U/L (ref 10–40)
BASOPHILS # BLD AUTO: 0.02 K/UL (ref 0–0.2)
BASOPHILS NFR BLD: 0.5 % (ref 0–1.9)
BILIRUB SERPL-MCNC: 1.5 MG/DL (ref 0.1–1)
BUN SERPL-MCNC: 21 MG/DL (ref 8–23)
CALCIUM SERPL-MCNC: 9 MG/DL (ref 8.7–10.5)
CHLORIDE SERPL-SCNC: 104 MMOL/L (ref 95–110)
CO2 SERPL-SCNC: 21 MMOL/L (ref 23–29)
CREAT SERPL-MCNC: 0.5 MG/DL (ref 0.5–1.4)
DIFFERENTIAL METHOD: ABNORMAL
EOSINOPHIL # BLD AUTO: 0 K/UL (ref 0–0.5)
EOSINOPHIL NFR BLD: 0.9 % (ref 0–8)
ERYTHROCYTE [DISTWIDTH] IN BLOOD BY AUTOMATED COUNT: 20.6 % (ref 11.5–14.5)
EST. GFR  (AFRICAN AMERICAN): >60 ML/MIN/1.73 M^2
EST. GFR  (NON AFRICAN AMERICAN): >60 ML/MIN/1.73 M^2
GLUCOSE SERPL-MCNC: 72 MG/DL (ref 70–110)
HBV CORE AB SERPL QL IA: POSITIVE
HBV SURFACE AB SER-ACNC: POSITIVE M[IU]/ML
HBV SURFACE AG SERPL QL IA: NEGATIVE
HCT VFR BLD AUTO: 30.9 % (ref 40–54)
HCV AB SERPL QL IA: NEGATIVE
HEPATITIS A ANTIBODY, IGG: POSITIVE
HGB BLD-MCNC: 9.9 G/DL (ref 14–18)
HIV 1+2 AB+HIV1 P24 AG SERPL QL IA: NEGATIVE
IMM GRANULOCYTES # BLD AUTO: 0.02 K/UL (ref 0–0.04)
IMM GRANULOCYTES NFR BLD AUTO: 0.5 % (ref 0–0.5)
LYMPHOCYTES # BLD AUTO: 1.7 K/UL (ref 1–4.8)
LYMPHOCYTES NFR BLD: 40.6 % (ref 18–48)
MAGNESIUM SERPL-MCNC: 2.3 MG/DL (ref 1.6–2.6)
MCH RBC QN AUTO: 20.1 PG (ref 27–31)
MCHC RBC AUTO-ENTMCNC: 32 G/DL (ref 32–36)
MCV RBC AUTO: 63 FL (ref 82–98)
MONOCYTES # BLD AUTO: 0.4 K/UL (ref 0.3–1)
MONOCYTES NFR BLD: 9 % (ref 4–15)
NEUTROPHILS # BLD AUTO: 2.1 K/UL (ref 1.8–7.7)
NEUTROPHILS NFR BLD: 48.5 % (ref 38–73)
NRBC BLD-RTO: 3 /100 WBC
PHOSPHATE SERPL-MCNC: 3.4 MG/DL (ref 2.7–4.5)
PLATELET # BLD AUTO: 135 K/UL (ref 150–350)
PMV BLD AUTO: ABNORMAL FL (ref 9.2–12.9)
POCT GLUCOSE: 132 MG/DL (ref 70–110)
POCT GLUCOSE: 81 MG/DL (ref 70–110)
POCT GLUCOSE: 98 MG/DL (ref 70–110)
POTASSIUM SERPL-SCNC: 3.9 MMOL/L (ref 3.5–5.1)
PROT SERPL-MCNC: 8.3 G/DL (ref 6–8.4)
RBC # BLD AUTO: 4.93 M/UL (ref 4.6–6.2)
SODIUM SERPL-SCNC: 133 MMOL/L (ref 136–145)
WBC # BLD AUTO: 4.24 K/UL (ref 3.9–12.7)

## 2019-04-08 PROCEDURE — 63600175 PHARM REV CODE 636 W HCPCS: Performed by: HOSPITALIST

## 2019-04-08 PROCEDURE — 63600175 PHARM REV CODE 636 W HCPCS: Performed by: STUDENT IN AN ORGANIZED HEALTH CARE EDUCATION/TRAINING PROGRAM

## 2019-04-08 PROCEDURE — 86790 VIRUS ANTIBODY NOS: CPT

## 2019-04-08 PROCEDURE — 25000003 PHARM REV CODE 250: Performed by: HOSPITALIST

## 2019-04-08 PROCEDURE — 86703 HIV-1/HIV-2 1 RESULT ANTBDY: CPT

## 2019-04-08 PROCEDURE — 99232 PR SUBSEQUENT HOSPITAL CARE,LEVL II: ICD-10-PCS | Mod: GC,,, | Performed by: INTERNAL MEDICINE

## 2019-04-08 PROCEDURE — 86803 HEPATITIS C AB TEST: CPT

## 2019-04-08 PROCEDURE — 86787 VARICELLA-ZOSTER ANTIBODY: CPT

## 2019-04-08 PROCEDURE — 86704 HEP B CORE ANTIBODY TOTAL: CPT

## 2019-04-08 PROCEDURE — 86592 SYPHILIS TEST NON-TREP QUAL: CPT

## 2019-04-08 PROCEDURE — 83735 ASSAY OF MAGNESIUM: CPT

## 2019-04-08 PROCEDURE — 36415 COLL VENOUS BLD VENIPUNCTURE: CPT

## 2019-04-08 PROCEDURE — 99232 SBSQ HOSP IP/OBS MODERATE 35: CPT | Mod: GC,,, | Performed by: HOSPITALIST

## 2019-04-08 PROCEDURE — 85025 COMPLETE CBC W/AUTO DIFF WBC: CPT

## 2019-04-08 PROCEDURE — 97110 THERAPEUTIC EXERCISES: CPT

## 2019-04-08 PROCEDURE — 63600175 PHARM REV CODE 636 W HCPCS: Mod: JG | Performed by: INTERNAL MEDICINE

## 2019-04-08 PROCEDURE — 86706 HEP B SURFACE ANTIBODY: CPT

## 2019-04-08 PROCEDURE — 99232 PR SUBSEQUENT HOSPITAL CARE,LEVL II: ICD-10-PCS | Mod: GC,,, | Performed by: HOSPITALIST

## 2019-04-08 PROCEDURE — 84100 ASSAY OF PHOSPHORUS: CPT

## 2019-04-08 PROCEDURE — 25000003 PHARM REV CODE 250: Performed by: STUDENT IN AN ORGANIZED HEALTH CARE EDUCATION/TRAINING PROGRAM

## 2019-04-08 PROCEDURE — 87340 HEPATITIS B SURFACE AG IA: CPT

## 2019-04-08 PROCEDURE — 80053 COMPREHEN METABOLIC PANEL: CPT

## 2019-04-08 PROCEDURE — 25000003 PHARM REV CODE 250: Performed by: INTERNAL MEDICINE

## 2019-04-08 PROCEDURE — 86480 TB TEST CELL IMMUN MEASURE: CPT

## 2019-04-08 PROCEDURE — 20600001 HC STEP DOWN PRIVATE ROOM

## 2019-04-08 PROCEDURE — 86682 HELMINTH ANTIBODY: CPT

## 2019-04-08 PROCEDURE — 99232 SBSQ HOSP IP/OBS MODERATE 35: CPT | Mod: GC,,, | Performed by: INTERNAL MEDICINE

## 2019-04-08 RX ORDER — LABETALOL 100 MG/1
100 TABLET, FILM COATED ORAL EVERY 8 HOURS
Status: DISCONTINUED | OUTPATIENT
Start: 2019-04-08 | End: 2019-04-10 | Stop reason: HOSPADM

## 2019-04-08 RX ORDER — DIPHENHYDRAMINE HCL 25 MG
50 CAPSULE ORAL DAILY
Status: COMPLETED | OUTPATIENT
Start: 2019-04-09 | End: 2019-04-10

## 2019-04-08 RX ORDER — ACETAMINOPHEN 325 MG/1
650 TABLET ORAL DAILY
Status: COMPLETED | OUTPATIENT
Start: 2019-04-09 | End: 2019-04-10

## 2019-04-08 RX ORDER — MYCOPHENOLATE MOFETIL 250 MG/1
500 CAPSULE ORAL 2 TIMES DAILY
Status: DISCONTINUED | OUTPATIENT
Start: 2019-04-09 | End: 2019-04-10 | Stop reason: HOSPADM

## 2019-04-08 RX ADMIN — FAMOTIDINE 20 MG: 20 TABLET ORAL at 08:04

## 2019-04-08 RX ADMIN — HUMAN IMMUNOGLOBULIN G 25 G: 20 LIQUID INTRAVENOUS at 09:04

## 2019-04-08 RX ADMIN — LABETALOL HYDROCHLORIDE 100 MG: 100 TABLET, FILM COATED ORAL at 03:04

## 2019-04-08 RX ADMIN — AMLODIPINE BESYLATE 10 MG: 10 TABLET ORAL at 09:04

## 2019-04-08 RX ADMIN — MICONAZOLE NITRATE: 20 POWDER TOPICAL at 08:04

## 2019-04-08 RX ADMIN — LABETALOL HYDROCHLORIDE 100 MG: 100 TABLET, FILM COATED ORAL at 09:04

## 2019-04-08 RX ADMIN — FAMOTIDINE 20 MG: 20 TABLET ORAL at 09:04

## 2019-04-08 RX ADMIN — ENOXAPARIN SODIUM 40 MG: 100 INJECTION SUBCUTANEOUS at 05:04

## 2019-04-08 RX ADMIN — AZATHIOPRINE 150 MG: 50 TABLET ORAL at 09:04

## 2019-04-08 RX ADMIN — PREDNISONE 10 MG: 10 TABLET ORAL at 09:04

## 2019-04-08 RX ADMIN — SODIUM CHLORIDE: 0.9 INJECTION, SOLUTION INTRAVENOUS at 09:04

## 2019-04-08 RX ADMIN — ZOLPIDEM TARTRATE 5 MG: 5 TABLET ORAL at 11:04

## 2019-04-08 RX ADMIN — MICONAZOLE NITRATE: 20 POWDER TOPICAL at 09:04

## 2019-04-08 RX ADMIN — STANDARDIZED SENNA CONCENTRATE AND DOCUSATE SODIUM 1 TABLET: 8.6; 5 TABLET, FILM COATED ORAL at 09:04

## 2019-04-08 RX ADMIN — LABETALOL HYDROCHLORIDE 100 MG: 100 TABLET, FILM COATED ORAL at 10:04

## 2019-04-08 NOTE — ASSESSMENT & PLAN NOTE
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  Last full workup in 2016 with evidence of progression of disease  Direct admit for management by rheumatology with IVIG  Weakness in all extremities, uses scooter for mobilization  Does not have a good social support system at home - will require post acute care for this reason  Initial labs negative except IgG elevated at 1754  MRI femur do not show progression of disease compared to prior study 2016; MRI humerus with signs of diffuse atrophy and fatty replacement  - Rheumatology consulted - appreciate recs and guidance of therapy  - s/p initiation of IVIG treatment - day 3 of 5 today  - Continuing imuran and prednisone currently  - f/u initial labs HMG-CoA reductase antibody, CN1A Ab  - PT/OT and PM&R  - Current plan for SNF on DC

## 2019-04-08 NOTE — PLAN OF CARE
Problem: Adult Inpatient Plan of Care  Goal: Plan of Care Review      Recommendations    Recommendation/Intervention:     1. Continue current mechanical soft diet and provide assistance with meal set-up/feeding as needed.    Pt with good appetite and intake.   3. RD following.    Goals: Intake >/=85% EEN/EPN  Nutrition Goal Status: goal met

## 2019-04-08 NOTE — PLAN OF CARE
Problem: Occupational Therapy Goal  Goal: Occupational Therapy Goal  Goals to be met by: 4/15     Patient will increase functional independence with ADLs by performing:    UE Dressing with Moderate Assistance.  LE Dressing with Moderate Assistance.  Grooming while seated with Set-up Assistance.  Toileting from bedside commode with Moderate Assistance for hygiene and clothing management.   Rolling to Bilateral with Modified [place].   Supine to sit with Supervision.  Stand pivot transfers with Moderate Assistance.     Outcome: Ongoing (interventions implemented as appropriate)  Goals remain appropriate

## 2019-04-08 NOTE — SUBJECTIVE & OBJECTIVE
Interval History: Patient resting comfortably in bed, remarks feeling continued improvement with IVIG treatment. MRI humerus complete. No acute events overnight. No complaints from patient.    Review of Systems   Constitutional: Positive for activity change (dec). Negative for appetite change, diaphoresis, fatigue and fever.   HENT: Negative for congestion, sinus pressure, sinus pain and trouble swallowing.    Eyes: Negative for photophobia and pain.   Respiratory: Negative for choking, shortness of breath and wheezing.    Cardiovascular: Negative for chest pain, palpitations and leg swelling.   Gastrointestinal: Negative for abdominal pain, blood in stool, constipation, diarrhea, nausea and vomiting.   Genitourinary: Negative for dysuria, hematuria and urgency.   Musculoskeletal: Positive for gait problem. Negative for arthralgias, back pain and myalgias.   Neurological: Positive for weakness (improved). Negative for dizziness, tremors, facial asymmetry, speech difficulty and headaches.   Psychiatric/Behavioral: Negative for agitation, behavioral problems, confusion and decreased concentration.     Objective:     Vital Signs (Most Recent):  Temp: 97.9 °F (36.6 °C) (04/07/19 1704)  Pulse: 75 (04/07/19 1704)  Resp: 16 (04/07/19 1704)  BP: (!) 172/91 (04/07/19 1704)  SpO2: 98 % (04/07/19 1704) Vital Signs (24h Range):  Temp:  [97.5 °F (36.4 °C)-99.2 °F (37.3 °C)] 97.9 °F (36.6 °C)  Pulse:  [69-88] 75  Resp:  [16-18] 16  SpO2:  [98 %-100 %] 98 %  BP: (134-179)/(79-99) 172/91     Weight: 59.6 kg (131 lb 8 oz)  Body mass index is 19.42 kg/m².    Intake/Output Summary (Last 24 hours) at 4/7/2019 1906  Last data filed at 4/7/2019 0400  Gross per 24 hour   Intake --   Output 550 ml   Net -550 ml      Physical Exam   Constitutional: He is oriented to person, place, and time. He appears well-developed. No distress.   HENT:   Head: Normocephalic and atraumatic.   Mouth/Throat: No oropharyngeal exudate.   Eyes: Conjunctivae and  EOM are normal. No scleral icterus.   Neck: Normal range of motion. Neck supple.   Cardiovascular: Normal rate, regular rhythm, normal heart sounds and intact distal pulses.   No murmur heard.  Pulmonary/Chest: Effort normal and breath sounds normal. No respiratory distress. He has no wheezes.   Abdominal: Soft. Bowel sounds are normal. He exhibits no distension. There is no tenderness. There is no guarding.   Musculoskeletal: He exhibits no tenderness.   Right Side Rheumatological Exam      Muscle Strength (0-5 scale):  Neck Flexion:  4  Neck Extension: 4  Deltoid:  3  Biceps: 4  Triceps:  4  : 4  Iliopsoas: 3  Quadriceps:  4  Distal Lower Extremity: 2     Left Side Rheumatological Exam      Muscle Strength (0-5 scale):  Neck Flexion:  4  Neck Extension: 4  Deltoid:  5  Biceps: 4   Triceps:  4  :  4  Iliopsoas: 3  Quadriceps:  3   Distal Lower Extremity: 2     Lymphadenopathy:     He has no cervical adenopathy.   Neurological: He is alert and oriented to person, place, and time. No cranial nerve deficit. He exhibits abnormal muscle tone (decreased tone in all extremities).   Skin: Skin is warm and dry. No rash noted. He is not diaphoretic. No pallor.   Psychiatric: He has a normal mood and affect. His behavior is normal. Judgment and thought content normal.   Nursing note and vitals reviewed.      Significant Labs:   CBC:   Recent Labs   Lab 04/07/19  0418 04/08/19  0516   WBC 4.90 4.24   HGB 10.6* 9.9*   HCT 32.3* 30.9*   * 135*     CMP:   Recent Labs   Lab 04/07/19 0418 04/08/19  0516    133*   K 3.8 3.9    104   CO2 22* 21*   GLU 60* 72   BUN 19 21   CREATININE 0.5 0.5   CALCIUM 9.2 9.0   PROT 8.1 8.3   ALBUMIN 4.2 4.1   BILITOT 1.9* 1.5*   ALKPHOS 59 57   AST 10 10   ALT 6* 9*   ANIONGAP 10 8   EGFRNONAA >60.0 >60.0     Magnesium:   Recent Labs   Lab 04/07/19 0418 04/08/19  0516   MG 2.4 2.3       Significant Imaging: I have reviewed all pertinent imaging results/findings within the  past 24 hours.     MRI Humerus Without Contrast Left  Narrative: EXAMINATION:  MRI HUMERUS WITHOUT CONTRAST LEFT; MRI HUMERUS WITHOUT CONTRAST RIGHT    CLINICAL HISTORY:  myositis;; polymyositis;    TECHNIQUE:  Multiplanar, multisequence imaging of the right and left humerus was performed without IV contrast.    COMPARISON:  None    FINDINGS:  There is significant diffuse atrophy and fatty replacement of the bilateral proximal upper extremity musculature.  There is some degree of sparing of the bilateral deltoid musculature and triceps musculature, with greater residual muscle bulk on the left compared to the right.  There is mild faint edema present within the residual triceps musculature and posterior deltoid musculature.    The osseous structures demonstrate mild diffuse heterogeneity of the visualized bone marrow signal.  No evidence of acute fracture.  Evaluation of the rotator cuff tendons are limited by large field of view.  No focal fluid collections appreciated within the soft tissues.  Impression: 1.  Significant diffuse atrophy and fatty replacement of the bilateral proximal upper extremity musculature, including the rotator cuff musculature, right greater than left, in this patient with reported history of polymyositis.  There is some degree of sparing of the bilateral deltoid and triceps musculature with greater residual muscle bulk on the left compared to the right.  There is mild residual edema present within the remaining musculature, most pronounced within the triceps.    2.  Mild diffuse heterogeneity of the visualized bone marrow signal which is nonspecific and may relate to red marrow reconversion.  Clinical correlation advised.    Electronically signed by: Brandan Hess MD  Date:    04/08/2019  Time:    01:07  MRI Humerus Without Contrast Right  Narrative: EXAMINATION:  MRI HUMERUS WITHOUT CONTRAST LEFT; MRI HUMERUS WITHOUT CONTRAST RIGHT    CLINICAL HISTORY:  myositis;;  polymyositis;    TECHNIQUE:  Multiplanar, multisequence imaging of the right and left humerus was performed without IV contrast.    COMPARISON:  None    FINDINGS:  There is significant diffuse atrophy and fatty replacement of the bilateral proximal upper extremity musculature.  There is some degree of sparing of the bilateral deltoid musculature and triceps musculature, with greater residual muscle bulk on the left compared to the right.  There is mild faint edema present within the residual triceps musculature and posterior deltoid musculature.    The osseous structures demonstrate mild diffuse heterogeneity of the visualized bone marrow signal.  No evidence of acute fracture.  Evaluation of the rotator cuff tendons are limited by large field of view.  No focal fluid collections appreciated within the soft tissues.  Impression: 1.  Significant diffuse atrophy and fatty replacement of the bilateral proximal upper extremity musculature, including the rotator cuff musculature, right greater than left, in this patient with reported history of polymyositis.  There is some degree of sparing of the bilateral deltoid and triceps musculature with greater residual muscle bulk on the left compared to the right.  There is mild residual edema present within the remaining musculature, most pronounced within the triceps.    2.  Mild diffuse heterogeneity of the visualized bone marrow signal which is nonspecific and may relate to red marrow reconversion.  Clinical correlation advised.    Electronically signed by: Brandan Hess MD  Date:    04/08/2019  Time:    01:07

## 2019-04-08 NOTE — PROGRESS NOTES
[facility] Hospital Medicine  Progress Note    Patient Name: Nura Kahn Jr.  MRN: [id number]  Patient Class: IP- Inpatient   Admission Date: 4/2/2019  Length of Stay: 6 days  Attending Physician: Óscar Patricia MD  Primary Care Provider: Socrates Ambrosio MD    [facility]: [facility] MED 2 Maria L Chambers MD    Subjective:     Principal Problem:Polymyositis    HPI:  Mr. Kahn is a 63M with polymyositis (Dx 2009, Bx proven), osteopenia, vit D insufficiency, thalassemia, dysphagia, afib, HTN, emphysema who presents as a direct admit for workup of progressive weakness in his extremities. Workup from 2016 showed end-stage myopathic changes and focal chronic inflammation. He was last seen by rheumatology 2/13/2019 with plans for direct admit for IVIG therapy; however, patient did not present as it was Williamsons day. Today, he reports no major changes since February. Muscle weakness has been progressive for the past several years. He currently uses a scooter and requires increasing levels of assistance to move from recliner to scooter. As a result of his immobility, he wears a diaper as he is unable to make it to the restroom for bowel and urinary habits. His support system at home is lacking; he is sliding to move himself from chair to scooter. His significant other has not been supportive with increasing decline in his status. Dysphagia has been stable; patient reports that difficulty swallowing is associated with positioning. When he does have trouble, it usually feels like food is stuck at the top of his esophagus, not that it is stuck on the way down. Sometimes he chokes with water intake. Dysphagia is least notable when sitting upright. He reports no pain or aches in his muscles. Denies fevers, chills, joint pains, blood in his stool or urine. He endorses some depression regarding his condition and inability to partake in activities he enjoys such as  sawyer.    Hospital Course:  Patient admitted to hospital medicine for initiation of IVIG. He has agreed to be placed in SNF after completion of therapy 2/2 poor social support at home. Rheumatology has placed IVIG orders. MRI femur and humerus with signs of diffuse atrophy consistent with disease process. Patient tolerating IVIG well with improvement in strength after first two doses.     Interval History: Patient resting comfortably in bed, remarks feeling continued improvement with IVIG treatment. MRI humerus complete. No acute events overnight. No complaints from patient.    Review of Systems   Constitutional: Positive for activity change (dec). Negative for appetite change, diaphoresis, fatigue and fever.   HENT: Negative for congestion, sinus pressure, sinus pain and trouble swallowing.    Eyes: Negative for photophobia and pain.   Respiratory: Negative for choking, shortness of breath and wheezing.    Cardiovascular: Negative for chest pain, palpitations and leg swelling.   Gastrointestinal: Negative for abdominal pain, blood in stool, constipation, diarrhea, nausea and vomiting.   Genitourinary: Negative for dysuria, hematuria and urgency.   Musculoskeletal: Positive for gait problem. Negative for arthralgias, back pain and myalgias.   Neurological: Positive for weakness (improved). Negative for dizziness, tremors, facial asymmetry, speech difficulty and headaches.   Psychiatric/Behavioral: Negative for agitation, behavioral problems, confusion and decreased concentration.     Objective:     Vital Signs (Most Recent):  Temp: 97.9 °F (36.6 °C) (04/07/19 1704)  Pulse: 75 (04/07/19 1704)  Resp: 16 (04/07/19 1704)  BP: (!) 172/91 (04/07/19 1704)  SpO2: 98 % (04/07/19 1704) Vital Signs (24h Range):  Temp:  [97.5 °F (36.4 °C)-99.2 °F (37.3 °C)] 97.9 °F (36.6 °C)  Pulse:  [69-88] 75  Resp:  [16-18] 16  SpO2:  [98 %-100 %] 98 %  BP: (134-179)/(79-99) 172/91     Weight: 59.6 kg (131 lb 8 oz)  Body mass index is  19.42 kg/m².    Intake/Output Summary (Last 24 hours) at 4/7/2019 1906  Last data filed at 4/7/2019 0400  Gross per 24 hour   Intake --   Output 550 ml   Net -550 ml      Physical Exam   Constitutional: He is oriented to person, place, and time. He appears well-developed. No distress.   HENT:   Head: Normocephalic and atraumatic.   Mouth/Throat: No oropharyngeal exudate.   Eyes: Conjunctivae and EOM are normal. No scleral icterus.   Neck: Normal range of motion. Neck supple.   Cardiovascular: Normal rate, regular rhythm, normal heart sounds and intact distal pulses.   No murmur heard.  Pulmonary/Chest: Effort normal and breath sounds normal. No respiratory distress. He has no wheezes.   Abdominal: Soft. Bowel sounds are normal. He exhibits no distension. There is no tenderness. There is no guarding.   Musculoskeletal: He exhibits no tenderness.   Right Side Rheumatological Exam      Muscle Strength (0-5 scale):  Neck Flexion:  4  Neck Extension: 4  Deltoid:  3  Biceps: 4  Triceps:  4  : 4  Iliopsoas: 3  Quadriceps:  4  Distal Lower Extremity: 2     Left Side Rheumatological Exam      Muscle Strength (0-5 scale):  Neck Flexion:  4  Neck Extension: 4  Deltoid:  5  Biceps: 4   Triceps:  4  :  4  Iliopsoas: 3  Quadriceps:  3   Distal Lower Extremity: 2     Lymphadenopathy:     He has no cervical adenopathy.   Neurological: He is alert and oriented to person, place, and time. No cranial nerve deficit. He exhibits abnormal muscle tone (decreased tone in all extremities).   Skin: Skin is warm and dry. No rash noted. He is not diaphoretic. No pallor.   Psychiatric: He has a normal mood and affect. His behavior is normal. Judgment and thought content normal.   Nursing note and vitals reviewed.      Significant Labs:   CBC:   Recent Labs   Lab 04/07/19  0418 04/08/19  0516   WBC 4.90 4.24   HGB 10.6* 9.9*   HCT 32.3* 30.9*   * 135*     CMP:   Recent Labs   Lab 04/07/19 0418 04/08/19 0516    133*   K 3.8  3.9    104   CO2 22* 21*   GLU 60* 72   BUN 19 21   CREATININE 0.5 0.5   CALCIUM 9.2 9.0   PROT 8.1 8.3   ALBUMIN 4.2 4.1   BILITOT 1.9* 1.5*   ALKPHOS 59 57   AST 10 10   ALT 6* 9*   ANIONGAP 10 8   EGFRNONAA >60.0 >60.0     Magnesium:   Recent Labs   Lab 04/07/19  0418 04/08/19  0516   MG 2.4 2.3       Significant Imaging: I have reviewed all pertinent imaging results/findings within the past 24 hours.     MRI Humerus Without Contrast Left  Narrative: EXAMINATION:  MRI HUMERUS WITHOUT CONTRAST LEFT; MRI HUMERUS WITHOUT CONTRAST RIGHT    CLINICAL HISTORY:  myositis;; polymyositis;    TECHNIQUE:  Multiplanar, multisequence imaging of the right and left humerus was performed without IV contrast.    COMPARISON:  None    FINDINGS:  There is significant diffuse atrophy and fatty replacement of the bilateral proximal upper extremity musculature.  There is some degree of sparing of the bilateral deltoid musculature and triceps musculature, with greater residual muscle bulk on the left compared to the right.  There is mild faint edema present within the residual triceps musculature and posterior deltoid musculature.    The osseous structures demonstrate mild diffuse heterogeneity of the visualized bone marrow signal.  No evidence of acute fracture.  Evaluation of the rotator cuff tendons are limited by large field of view.  No focal fluid collections appreciated within the soft tissues.  Impression: 1.  Significant diffuse atrophy and fatty replacement of the bilateral proximal upper extremity musculature, including the rotator cuff musculature, right greater than left, in this patient with reported history of polymyositis.  There is some degree of sparing of the bilateral deltoid and triceps musculature with greater residual muscle bulk on the left compared to the right.  There is mild residual edema present within the remaining musculature, most pronounced within the triceps.    2.  Mild diffuse heterogeneity of  the visualized bone marrow signal which is nonspecific and may relate to red marrow reconversion.  Clinical correlation advised.    Electronically signed by: Brandan Hess MD  Date:    04/08/2019  Time:    01:07  MRI Humerus Without Contrast Right  Narrative: EXAMINATION:  MRI HUMERUS WITHOUT CONTRAST LEFT; MRI HUMERUS WITHOUT CONTRAST RIGHT    CLINICAL HISTORY:  myositis;; polymyositis;    TECHNIQUE:  Multiplanar, multisequence imaging of the right and left humerus was performed without IV contrast.    COMPARISON:  None    FINDINGS:  There is significant diffuse atrophy and fatty replacement of the bilateral proximal upper extremity musculature.  There is some degree of sparing of the bilateral deltoid musculature and triceps musculature, with greater residual muscle bulk on the left compared to the right.  There is mild faint edema present within the residual triceps musculature and posterior deltoid musculature.    The osseous structures demonstrate mild diffuse heterogeneity of the visualized bone marrow signal.  No evidence of acute fracture.  Evaluation of the rotator cuff tendons are limited by large field of view.  No focal fluid collections appreciated within the soft tissues.  Impression: 1.  Significant diffuse atrophy and fatty replacement of the bilateral proximal upper extremity musculature, including the rotator cuff musculature, right greater than left, in this patient with reported history of polymyositis.  There is some degree of sparing of the bilateral deltoid and triceps musculature with greater residual muscle bulk on the left compared to the right.  There is mild residual edema present within the remaining musculature, most pronounced within the triceps.    2.  Mild diffuse heterogeneity of the visualized bone marrow signal which is nonspecific and may relate to red marrow reconversion.  Clinical correlation advised.    Electronically signed by: Brandan Hess  "MD  Date:    04/08/2019  Time:    01:07        Assessment/Plan:      * Polymyositis  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  Last full workup in 2016 with evidence of progression of disease  Direct admit for management by rheumatology with IVIG  Weakness in all extremities, uses scooter for mobilization  Does not have a good social support system at home - will require post acute care for this reason  Initial labs negative except IgG elevated at 1754  MRI femur do not show progression of disease compared to prior study 2016; MRI humerus with signs of diffuse atrophy and fatty replacement  - Rheumatology consulted - appreciate recs and guidance of therapy  - s/p initiation of IVIG treatment - day 3 of 5 today  - Continuing imuran and prednisone currently  - f/u initial labs HMG-CoA reductase antibody, CN1A Ab  - PT/OT and PM&R  - Current plan for SNF on DC      Essential hypertension  - Continue norvasc 10mg daily  - Monitor BP      Hemoglobin C disease  - Monitor CBC - currently stable      Immunosuppression  Currently on imuran 150mg daily and prednisone 10mg daily.  - Continue both meds      Atrial fibrillation  Currently rate and rhythm controlled      Osteopenia  Likely 2/2 long term prednisone treatment  Stable currently      Splenomegaly  US abdo 4/3 shows "cholelithiasis with no evidence of cholecystitis. Splenomegaly."  Not currently causing complications        Quadriparesis (muscle weakness)  See plan for "polymyositis"      Vitamin D deficiency  - Continue ergocalciferol qweekly      Oropharyngeal dysphagia  Reports difficulty with both solids and liquids, but largely dependent on positioning of patient (e.g. Sitting up vs laying in recliner)  Sometimes experiences choking with water  - Per SLP recs - soft diet with thin liquids  - Will consider GI consult if dysphagia worsens  - Nutrition consult - add boost plus        VTE Risk Mitigation (From admission, onward)        Ordered     heparin, porcine " (PF) 100 unit/mL injection flush 500 Units  As needed (PRN)      04/06/19 0654     enoxaparin injection 40 mg  Daily      04/03/19 1258     Place sequential compression device  Until discontinued      04/02/19 1408              Maria L Chambers MD  Department of Hospital Medicine   [facility]

## 2019-04-08 NOTE — PLAN OF CARE
Problem: Adult Inpatient Plan of Care  Goal: Plan of Care Review  Outcome: Ongoing (interventions implemented as appropriate)  POC review with patient. AAOX4. VVS. Address questions and concerns. CBG completed during this shift. Aspiration and fall precautions maintained. Safety maintained. Urinal at bedside. Patient turn 1 person asst. Bed locked. Call light in reach. No c/o of discomfort/pain. WCTM

## 2019-04-08 NOTE — PROGRESS NOTES
[facility]  Rheumatology  Progress Note    Patient Name: Nura Kahn Jr.  MRN: [id number]  Admission Date: 4/2/2019  Hospital Length of Stay: 6 days  Code Status: Full Code   Attending Provider: Óscar Patricia MD  Primary Care Physician: Socrates Ambrosio MD  Principal Problem: Polymyositis    Subjective:     HPI: 62yo M with PMH of polymyositis (dx 2009, +mildly +Ku, outside biopsy proven with elevated CPK and aldolase), osteopenia, Vit D insufficiency, Hemoglobulin c/beta-zero thalaseema, A-fib, dysphagia, portal HTN, emphysema was a direct admit from rheumatology clinic.     He saw Dr. Durham and Dr. Huston in Feb 2019 - recommendation was for direct admission at that time due to the complex nature of his case with worsening strength and dysphagia.  Recommendation was to start IVIG on admission.  Patient did not go and after many attempts made by Dr. Durham, patient finally presented to the direct admission office yesterday.    Per Dr. Durham's note:    He last saw rheumatology on May 2016.  At that time, he was on prednisone 30mg and Imuran 150mg daily.  He was to have GI evaluation for dysphagia and pulmonary evaluation for GGO with pulmonary nodules.       Per chart review it seems that he first established care here at [facility] Rheumatology in 8/2014. He was diagnosed in [id number] when he was feeling weak, falling. He was a  and was unable to use his legs. He had a biopsy in [id number] in [place] that per notes was consistent with polymyositis. He was on MTX in the past which was stopped due CT showing hepatosplenomegaly with portal venous hypertension and elevated T bili in 8/2014 He was at that time started on Imuran. He has been on Imuran and steroids since then.  In 2016, he was worked up again here at [facility]. MRI of the left lower extremities showed Severe atrophy with fatty replacement of the thigh musculature bilaterally.  There is mild increased edema  "signal within the remaining thigh musculature and left iliopsoas muscle without associated enhancement which could reflect a mild degree of residual myositis.  EMG Left upper Ext 5/2016 was a technically difficult study. Had moderate ulnar entrapment at elbow, perhaps on a background of mild polyneuropathy. 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 Pathology of left thigh showing minute potion of muscle with end stage myopathic changes and focal chronic inflammation   Patient remained on Imuran and prednisone.     In Feb 2019 - patient had been out of Imuran for ~1 month.  He also admits to medication non-compliance because he didn't think he was having any improvement.  He admits that his weakness has gotten worse - stating that he is unable to stand, difficulty with getting in and out of his scooter, normal ADLs (including going to the bathroom - resulting in urination/defecation on himself - resulting in decreased PO intake).  He also feels worsening of his dysphagia - where he gets food stuck and often chokes/coughs when he eats/drinks.      Wife works fulltime.  Daughter (10 yo).  Patient does not have social support/help for transportation to various appt.    Specialist updates:  - Cardiology - Last saw 3/2015 - diagnosed with PAF.  No treatment at that time.  No follow up since  - Hem - Last saw 12/2017 - Evaluated for microcytic anemia and mild chronic thrombocytosis.  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  Discharged from clinic   - GI - Last saw 6/2015 - EGD/motility study ordered - not performed.  No follow up   - Hepatology - Last saw 5/2015 - Found portal HTN on CT with no evidence of cirrhosis or PV thrombosis.  Fibrosure with F1 stage (minimial fibrosis).  RTC PRN (no follow up)  - Pulmonary - Last saw 2/2015 -" Lung parenchymal normal - no evidence of fibrosis and one mediastinal LN marginal enlarged" CT chest showed GGO (2016). Repeat CT chest " (2017) - no mention of GGO     Imaging:  CXR 1/2018:Patchy consolidation projected over the right lower lung zone, concerning for infection, correlation advised.       CT chest 12/2017:Peribronchial cuffing predominantly in the lower lung zones as well as retained secretions in the right middle lobe segmental bronchi consistent with bronchitis; such inflammation can occur with inhaled irritants or aspiration.  There is no evidence of interstitial lung disease such as NSIP or UIP.Upper lung zone predominant paraseptal emphysema and dispersed areas of centrilobular emphysema Stable 1.2 cm AP window lymph node.  Densely calcified granuloma in the posterobasal segment of the right lower lobe.Hepatosplenomegaly with associated splenic collateral vessels at the splenic hilum suggesting sequela of portal venous hypertension.     CT pelvis 6/2017: Obturator internus edema or hematoma. No acute fracture. Stable splenomegaly.     Esophogram 4/2015: Spontaneous gastroesophageal reflux.  Otherwise, unremarkable esophagram.     PET scan 8/2014:AP window lymph node SUV max less than 2.5.  This is most likely benign/reactive.  Surveillance is recommended at 3, 6, 12, and 24 months. Splenomegaly most likely from portal hypertension        Interval History: Patient feels that weakness in bilateral arms has significantly improved since starting IVIG. Feels that he can raise his arms above his head which he was not able to do two days ago. Also able to move feet up and down which is new. Swallowing has also improved. Today is day 3/5 of IVIG. No fevers, chills, chest pain, vision changes, skin rashes.     Current Facility-Administered Medications   Medication Frequency    acetaminophen tablet 650 mg Q4H PRN    albuterol inhaler 2 puff Q6H PRN    alteplase injection 2 mg PRN    amLODIPine tablet 10 mg Daily    azaTHIOprine tablet 150 mg Daily    dextrose 50% injection 12.5 g PRN    dextrose 50% injection 25 g PRN    enoxaparin  injection 40 mg Daily    ergocalciferol capsule 50,000 Units Q7 Days    famotidine tablet 20 mg BID    glucagon (human recombinant) injection 1 mg PRN    glucose chewable tablet 16 g PRN    glucose chewable tablet 24 g PRN    guaifenesin 100 mg/5 ml syrup 200 mg Q4H PRN    heparin, porcine (PF) 100 unit/mL injection flush 500 Units PRN    Immune Globulin G (IGG)-PRO-IGA 10 % injection (Privigen) 10 % injection 25 g Daily    labetalol tablet 100 mg Q12H    miconazole NITRATE 2 % top powder BID    ondansetron disintegrating tablet 8 mg Q8H PRN    predniSONE tablet 10 mg Daily    senna-docusate 8.6-50 mg per tablet 1 tablet Daily    sodium chloride 0.9% flush 10 mL PRN    sodium chloride 0.9% flush 10 mL PRN    zolpidem tablet 5 mg Nightly PRN     Objective:     Vital Signs (Most Recent):  Temp: 98.2 °F (36.8 °C) (04/08/19 0947)  Pulse: 72 (04/08/19 0947)  Resp: 16 (04/08/19 0947)  BP: (!) 142/79 (04/08/19 0947)  SpO2: 99 % (04/08/19 0947)  O2 Device (Oxygen Therapy): room air (04/08/19 0931) Vital Signs (24h Range):  Temp:  [97.5 °F (36.4 °C)-98.5 °F (36.9 °C)] 98.2 °F (36.8 °C)  Pulse:  [72-81] 72  Resp:  [16-18] 16  SpO2:  [97 %-100 %] 99 %  BP: (142-182)/(79-99) 142/79     Weight: 59.6 kg (131 lb 6.3 oz) (04/08/19 0400)  Body mass index is 19.4 kg/m².  Body surface area is 1.7 meters squared.      Intake/Output Summary (Last 24 hours) at 4/8/2019 0953  Last data filed at 4/8/2019 0600  Gross per 24 hour   Intake 120 ml   Output 1150 ml   Net -1030 ml       Physical Exam   Constitutional: He is oriented to person, place, and time and well-developed, well-nourished, and in no distress.   HENT:   Head: Normocephalic and atraumatic.   Eyes: Conjunctivae and EOM are normal. Pupils are equal, round, and reactive to light.   Neck: Normal range of motion. Neck supple.   Cardiovascular: Normal rate and regular rhythm.    Pulmonary/Chest: Effort normal and breath sounds normal.   Abdominal: Soft. Bowel sounds  are normal.       Right Side Rheumatological Exam     Muscle Strength (0-5 scale):  Deltoid:  4.6  Biceps: 3/5   Triceps:  3  : 4.6/5   Iliopsoas: 1  Quadriceps:  1   Distal Lower Extremity: 4.6    Left Side Rheumatological Exam     Muscle Strength (0-5 scale):  Deltoid:  4.6  Biceps: 3/5   Triceps:  3  :  4.6/5   Iliopsoas: 1  Quadriceps:  1   Distal Lower Extremity: 4.6      Neurological: He is alert and oriented to person, place, and time.   Skin: Skin is warm and dry. No erythema.     Psychiatric: Affect normal.   Musculoskeletal:   Diffuse atrophy of all muscle groups in upper and lower extremities          Significant Labs:  Results for RAQUEL EDWARDS RIKY TENORIO (MRN [id number]) as of 4/8/2019 10:02   Ref. Range 4/8/2019 05:16   WBC Latest Ref Range: 3.90 - 12.70 K/uL 4.24   RBC Latest Ref Range: 4.60 - 6.20 M/uL 4.93   Hemoglobin Latest Ref Range: 14.0 - 18.0 g/dL 9.9 (L)   Hematocrit Latest Ref Range: 40.0 - 54.0 % 30.9 (L)   MCV Latest Ref Range: 82 - 98 fL 63 (L)   MCH Latest Ref Range: 27.0 - 31.0 pg 20.1 (L)   MCHC Latest Ref Range: 32.0 - 36.0 g/dL 32.0   RDW Latest Ref Range: 11.5 - 14.5 % 20.6 (H)   Platelets Latest Ref Range: 150 - 350 K/uL 135 (L)   MPV Latest Ref Range: 9.2 - 12.9 fL SEE COMMENT   Gran% Latest Ref Range: 38.0 - 73.0 % 48.5   Gran # (ANC) Latest Ref Range: 1.8 - 7.7 K/uL 2.1   Lymph% Latest Ref Range: 18.0 - 48.0 % 40.6   Lymph # Latest Ref Range: 1.0 - 4.8 K/uL 1.7   Mono% Latest Ref Range: 4.0 - 15.0 % 9.0   Mono # Latest Ref Range: 0.3 - 1.0 K/uL 0.4   Eosinophil% Latest Ref Range: 0.0 - 8.0 % 0.9   Eos # Latest Ref Range: 0.0 - 0.5 K/uL 0.0   Basophil% Latest Ref Range: 0.0 - 1.9 % 0.5   Baso # Latest Ref Range: 0.00 - 0.20 K/uL 0.02   nRBC Latest Ref Range: 0 /100 WBC 3 (A)   Differential Method Unknown Automated   Immature Grans (Abs) Latest Ref Range: 0.00 - 0.04 K/uL 0.02   Immature Granulocytes Latest Ref Range: 0.0 - 0.5 % 0.5   Results for RAQUEL EDWARDS JR. (MRN  [id number]) as of 4/8/2019 10:02   Ref. Range 4/8/2019 05:16   Sodium Latest Ref Range: 136 - 145 mmol/L 133 (L)   Potassium Latest Ref Range: 3.5 - 5.1 mmol/L 3.9   Chloride Latest Ref Range: 95 - 110 mmol/L 104   CO2 Latest Ref Range: 23 - 29 mmol/L 21 (L)   Anion Gap Latest Ref Range: 8 - 16 mmol/L 8   BUN, Bld Latest Ref Range: 8 - 23 mg/dL 21   Creatinine Latest Ref Range: 0.5 - 1.4 mg/dL 0.5   eGFR if non African American Latest Ref Range: >60 mL/min/1.73 m^2 >60.0   eGFR if African American Latest Ref Range: >60 mL/min/1.73 m^2 >60.0   Glucose Latest Ref Range: 70 - 110 mg/dL 72   Calcium Latest Ref Range: 8.7 - 10.5 mg/dL 9.0   Phosphorus Latest Ref Range: 2.7 - 4.5 mg/dL 3.4   Magnesium Latest Ref Range: 1.6 - 2.6 mg/dL 2.3   Alkaline Phosphatase Latest Ref Range: 55 - 135 U/L 57   Total Protein Latest Ref Range: 6.0 - 8.4 g/dL 8.3   Albumin Latest Ref Range: 3.5 - 5.2 g/dL 4.1   Total Bilirubin Latest Ref Range: 0.1 - 1.0 mg/dL 1.5 (H)   AST Latest Ref Range: 10 - 40 U/L 10   ALT Latest Ref Range: 10 - 44 U/L 9 (L)   Results for RAQUEL EDWARDS JR. (MRN [id number]) as of 4/8/2019 10:02   Ref. Range 8/4/2014 10:08   CRISTINA Screen Latest Ref Range: Negative <1:160  Negative <1:160   Results for RAQUEL EDWARDS JR. (MRN [id number]) as of 4/8/2019 10:02   Ref. Range 7/28/2015 09:16   Anti-Belen-1 Antibody Latest Ref Range: <20 Units <20   Anti-PM/Scl Ab Latest Ref Range: <20 Units <20   Anti-SS-A 52 kD Ab, IgG Latest Ref Range: <20 Units <20   Anti-U1-RNP  Ab Latest Ref Range: <20 Units <20   EJ Latest Ref Range: Negative  Negative   Fibrillarin (U3 RNP) Latest Ref Range: Negative  Negative   Ku Latest Ref Range: Negative  Weak Positive (A)   MDA-5 (P140) (CADM-140) Latest Ref Range: <20 Units <20   MI-2 Latest Ref Range: Negative  Negative   NXP-2 (P140) Latest Ref Range: <20 Units <20   OJ Latest Ref Range: Negative  Negative   PL-12 Latest Ref Range: Negative  Negative   PL-7 Latest Ref Range: Negative  Negative   SRP  Latest Ref Range: Negative  Negative   TIF1 GAMMA (P155/140) Latest Ref Range: <20 Units <20   U2 snRNP Latest Ref Range: Negative  Negative   Results for RAQUEL EDWARDS JR. (MRN [id number]) as of 4/8/2019 10:02   Ref. Range 4/2/2019 14:56   HMGCR Antibody Latest Ref Range: 0 - 19 Units <3     Results for RAQUEL EDWARDS JR. (MRN [id number]) as of 4/8/2019 10:02   Ref. Range 4/3/2019 08:43   Specimen UA Unknown Urine, Clean Catch   Color, UA Latest Ref Range: Yellow, Straw, Nella  Yellow   pH, UA Latest Ref Range: 5.0 - 8.0  7.0   Specific Gravity, UA Latest Ref Range: 1.005 - 1.030  1.015   Appearance, UA Latest Ref Range: Clear  Clear   Protein, UA Latest Ref Range: Negative  Negative   Glucose, UA Latest Ref Range: Negative  Negative   Ketones, UA Latest Ref Range: Negative  Negative   Occult Blood UA Latest Ref Range: Negative  Negative   Nitrite, UA Latest Ref Range: Negative  Negative   Bilirubin (UA) Latest Ref Range: Negative  Negative   Leukocytes, UA Latest Ref Range: Negative  Negative     Left thigh muscle biopsy 2016:  FINAL PATHOLOGIC DIAGNOSIS  [facility] DIAGNOSIS:  MUSCLE, LEFT VASTUS LATERALIS, BIOPSY:  MINUTE PORTION OF MUSCLE WITH END-STAGE MYOPATHIC CHANGES AND FOCAL CHRONIC  INFLAMMATION (SEE NOTE).    Significant Imaging:  CT Chest without contrast 4/3/19:  Impression       1. Mild peribronchial cuffing which may be seen with bronchitis.  Mild tubular bronchiectasis of the right lower lobe with several distal bronchi demonstrating retained secretions, finding may be seen with small airways infectious/noninfectious inflammation or aspiration.  2. Centrilobular and paraseptal emphysema with an upper lung zone predominance.  3. Diffuse muscular atrophy.  4. Splenomegaly.     MRI femurs b/l 4/6/19:  Impression       1.  Redemonstration of advanced diffuse atrophy and fatty replacement of the bilateral thigh musculature involving all compartments.  There is mild residual edema signal  present within the bilateral obturator musculature and residual posterior compartment musculature, similar to prior MRI examination.    2.  Mild heterogeneity of the visualized bone marrow signal which may relate to red marrow reconversion.  Clinical correlation advised.     MRI humerus w/ and w/o contrast 4/7/19:  Impression       1.  Significant diffuse atrophy and fatty replacement of the bilateral proximal upper extremity musculature, including the rotator cuff musculature, right greater than left, in this patient with reported history of polymyositis.  There is some degree of sparing of the bilateral deltoid and triceps musculature with greater residual muscle bulk on the left compared to the right.  There is mild residual edema present within the remaining musculature, most pronounced within the triceps.    2.  Mild diffuse heterogeneity of the visualized bone marrow signal which is nonspecific and may relate to red marrow reconversion.  Clinical correlation advised.         Assessment/Plan:     * Polymyositis  Mr. Kahn is a 63 year old male with past medical history of Polymyositis  diagnosed 2009 (+mildly positive ku, outside biopsy proven, CPK here elevated to 487, aldolase elevated to 10.2 ), osteopenia, vitamin D insufficiency, hemoglobin c/beta-zero thalassema, a fib, dysphagia, portal HTN, emphysema was a direct admission from rheumatology clinic for worsening generalized muscle weakness and dysphagia.  Patient has a history of non-compliance and was told to be a direct admit since Feb 13 but did not come until April 2 after many attempts/contact.      CT chest (12/2017) -   1. Mild peribronchial cuffing which may be seen with bronchitis.  Mild tubular bronchiectasis of the right lower lobe with several distal bronchi demonstrating retained secretions, finding may be seen with small airways infectious/noninfectious inflammation or aspiration.  2. Centrilobular and paraseptal emphysema with an upper  lung zone predominance.  3. Diffuse muscular atrophy.  4. Splenomegaly.    Labs at this admission:  Normal and stable CBC and CMP.  Normal CK/aldolase.  Normal inflammatory markers.  Elevated IgG (1754). HMGCoA ab negative. Vit D: 15.    Patient continues to have progressive dysphagia and worsening muscle weakness - suggestive of severe progressive polymyositis.  Patient needs to be workup with repeat imaging to look for muscle edema (if positive, repeat muscle biopsy).  Given severe polymyositis, IVIG is warranted at this time.      MRI femurs 4/6/19:  1.  Redemonstration of advanced diffuse atrophy and fatty replacement of the bilateral thigh musculature involving all compartments.  There is mild residual edema signal present within the bilateral obturator musculature and residual posterior compartment musculature, similar to prior MRI examination.  2.  Mild heterogeneity of the visualized bone marrow signal which may relate to red marrow reconversion.  Clinical correlation advised.  Patient will also benefit from PT/OT and rehab placement for muscle strengthening after completion of IVIG.     MRI humerus b/l 4/7:  Significant diffuse atrophy and fatty replacement of the bilateral proximal upper extremity musculature, including the rotator cuff musculature, right greater than left, in this patient with reported history of polymyositis.  There is some degree of sparing of the bilateral deltoid and triceps musculature with greater residual muscle bulk on the left compared to the right.  There is mild residual edema present within the remaining musculature, most pronounced within the triceps.    4/8/19: patient with improvement in strength involving deltoids,  strength, and distal lower extremities. Also reports improvement in swallowing. Per SLP- soft diet with thin liquids.     Plan:  - Pending cN1A antibody  - IVIG (0.4g/kg/day x 5 days) followed by monthly course. Day 3/5 today.   - would stop Azathioprine at  this time and start on Mycophenolate 500 mg twice daily. Will give ACR handout.  - F/u Hepatology for HBcAB+ HBsAB+ (was negative in 2014) with portal HTN.  - gastro eval for dysphagia  - pulmonary eval for emphysema, bronchiectasis  - will check anemia labs  - nutrition consult   - daily PT/OT  - continue prednisone 10mg and Imuran 150mg at this time  - continue Vit D2 50,000 units weekly  - continue H2 blocker  - plan on IVIg monthly maintenance.   - recommendation for rehab consult - patient agreeable to rehab   - DEXA as outpatient            Agnes Trivedi MD  Rheumatology  [facility]        I  Have personally take the history and examined the patient and agree with fellow's note as stated above.    Pt with long standing Ku+ polymyositis dating to 2009 with MRI of muscles 2016 and 2019 showing marked atrophy and slight residual inflammation. Muscle biopsy Last muscle biopsy 4/14/16 with end stage myopathy and chronic focal inflammation. CK now normal    Await cN1A  Day #3/5 of IVIg 400mg/kg/day  Needs f/u with Hepatology for HBcAB+ HBsAB+ (previously negative 2014) with portal hypertension  F/u Pulmonary for emphysema, bronchiectasis  Gastro to evaluate dysphagia  Continue prednisone 10mg daily  Stop azathioprine  Trial of mycophenolate starting 500mg twice daily ACR handout  Anemia labs  Cont PT  Will plan monthly IVIg maintenance.  Cont PPI  DXA as outpatient  Consult PM & R for Rehab/SNF

## 2019-04-08 NOTE — PT/OT/SLP PROGRESS
Physical Therapy      Patient Name:  Nura Kahn Jr.   MRN:  [id number]    Patient not seen today secondary to (pt. receiving IVIG and working with OT in AM and nursing starting a new IV in PM). Will follow-up tomorrow.    Dani Muñoz, PT   4/8/2019

## 2019-04-08 NOTE — SUBJECTIVE & OBJECTIVE
Interval History: Patient feels that weakness in bilateral arms has significantly improved since starting IVIG. Feels that he can raise his arms above his head which he was not able to do two days ago. Also able to move feet up and down which is new. Swallowing has also improved. Today is day 3/5 of IVIG. No fevers, chills, chest pain, vision changes, skin rashes.     Current Facility-Administered Medications   Medication Frequency    acetaminophen tablet 650 mg Q4H PRN    albuterol inhaler 2 puff Q6H PRN    alteplase injection 2 mg PRN    amLODIPine tablet 10 mg Daily    azaTHIOprine tablet 150 mg Daily    dextrose 50% injection 12.5 g PRN    dextrose 50% injection 25 g PRN    enoxaparin injection 40 mg Daily    ergocalciferol capsule 50,000 Units Q7 Days    famotidine tablet 20 mg BID    glucagon (human recombinant) injection 1 mg PRN    glucose chewable tablet 16 g PRN    glucose chewable tablet 24 g PRN    guaifenesin 100 mg/5 ml syrup 200 mg Q4H PRN    heparin, porcine (PF) 100 unit/mL injection flush 500 Units PRN    Immune Globulin G (IGG)-PRO-IGA 10 % injection (Privigen) 10 % injection 25 g Daily    labetalol tablet 100 mg Q12H    miconazole NITRATE 2 % top powder BID    ondansetron disintegrating tablet 8 mg Q8H PRN    predniSONE tablet 10 mg Daily    senna-docusate 8.6-50 mg per tablet 1 tablet Daily    sodium chloride 0.9% flush 10 mL PRN    sodium chloride 0.9% flush 10 mL PRN    zolpidem tablet 5 mg Nightly PRN     Objective:     Vital Signs (Most Recent):  Temp: 98.2 °F (36.8 °C) (04/08/19 0947)  Pulse: 72 (04/08/19 0947)  Resp: 16 (04/08/19 0947)  BP: (!) 142/79 (04/08/19 0947)  SpO2: 99 % (04/08/19 0947)  O2 Device (Oxygen Therapy): room air (04/08/19 0931) Vital Signs (24h Range):  Temp:  [97.5 °F (36.4 °C)-98.5 °F (36.9 °C)] 98.2 °F (36.8 °C)  Pulse:  [72-81] 72  Resp:  [16-18] 16  SpO2:  [97 %-100 %] 99 %  BP: (142-182)/(79-99) 142/79     Weight: 59.6 kg (131 lb 6.3 oz)  (04/08/19 0400)  Body mass index is 19.4 kg/m².  Body surface area is 1.7 meters squared.      Intake/Output Summary (Last 24 hours) at 4/8/2019 0953  Last data filed at 4/8/2019 0600  Gross per 24 hour   Intake 120 ml   Output 1150 ml   Net -1030 ml       Physical Exam   Constitutional: He is oriented to person, place, and time and well-developed, well-nourished, and in no distress.   HENT:   Head: Normocephalic and atraumatic.   Eyes: Conjunctivae and EOM are normal. Pupils are equal, round, and reactive to light.   Neck: Normal range of motion. Neck supple.   Cardiovascular: Normal rate and regular rhythm.    Pulmonary/Chest: Effort normal and breath sounds normal.   Abdominal: Soft. Bowel sounds are normal.       Right Side Rheumatological Exam     Muscle Strength (0-5 scale):  Deltoid:  4.6  Biceps: 3/5   Triceps:  3  : 4.6/5   Iliopsoas: 1  Quadriceps:  1   Distal Lower Extremity: 4.6    Left Side Rheumatological Exam     Muscle Strength (0-5 scale):  Deltoid:  4.6  Biceps: 3/5   Triceps:  3  :  4.6/5   Iliopsoas: 1  Quadriceps:  1   Distal Lower Extremity: 4.6      Neurological: He is alert and oriented to person, place, and time.   Skin: Skin is warm and dry. No erythema.     Psychiatric: Affect normal.   Musculoskeletal:   Diffuse atrophy of all muscle groups in upper and lower extremities          Significant Labs:  Results for RAQUEL EDWARDS JR. (MRN [id number]) as of 4/8/2019 10:02   Ref. Range 4/8/2019 05:16   WBC Latest Ref Range: 3.90 - 12.70 K/uL 4.24   RBC Latest Ref Range: 4.60 - 6.20 M/uL 4.93   Hemoglobin Latest Ref Range: 14.0 - 18.0 g/dL 9.9 (L)   Hematocrit Latest Ref Range: 40.0 - 54.0 % 30.9 (L)   MCV Latest Ref Range: 82 - 98 fL 63 (L)   MCH Latest Ref Range: 27.0 - 31.0 pg 20.1 (L)   MCHC Latest Ref Range: 32.0 - 36.0 g/dL 32.0   RDW Latest Ref Range: 11.5 - 14.5 % 20.6 (H)   Platelets Latest Ref Range: 150 - 350 K/uL 135 (L)   MPV Latest Ref Range: 9.2 - 12.9 fL SEE COMMENT    Gran% Latest Ref Range: 38.0 - 73.0 % 48.5   Gran # (ANC) Latest Ref Range: 1.8 - 7.7 K/uL 2.1   Lymph% Latest Ref Range: 18.0 - 48.0 % 40.6   Lymph # Latest Ref Range: 1.0 - 4.8 K/uL 1.7   Mono% Latest Ref Range: 4.0 - 15.0 % 9.0   Mono # Latest Ref Range: 0.3 - 1.0 K/uL 0.4   Eosinophil% Latest Ref Range: 0.0 - 8.0 % 0.9   Eos # Latest Ref Range: 0.0 - 0.5 K/uL 0.0   Basophil% Latest Ref Range: 0.0 - 1.9 % 0.5   Baso # Latest Ref Range: 0.00 - 0.20 K/uL 0.02   nRBC Latest Ref Range: 0 /100 WBC 3 (A)   Differential Method Unknown Automated   Immature Grans (Abs) Latest Ref Range: 0.00 - 0.04 K/uL 0.02   Immature Granulocytes Latest Ref Range: 0.0 - 0.5 % 0.5   Results for RAQUEL EDWARDS JR. (MRN [id number]) as of 4/8/2019 10:02   Ref. Range 4/8/2019 05:16   Sodium Latest Ref Range: 136 - 145 mmol/L 133 (L)   Potassium Latest Ref Range: 3.5 - 5.1 mmol/L 3.9   Chloride Latest Ref Range: 95 - 110 mmol/L 104   CO2 Latest Ref Range: 23 - 29 mmol/L 21 (L)   Anion Gap Latest Ref Range: 8 - 16 mmol/L 8   BUN, Bld Latest Ref Range: 8 - 23 mg/dL 21   Creatinine Latest Ref Range: 0.5 - 1.4 mg/dL 0.5   eGFR if non African American Latest Ref Range: >60 mL/min/1.73 m^2 >60.0   eGFR if African American Latest Ref Range: >60 mL/min/1.73 m^2 >60.0   Glucose Latest Ref Range: 70 - 110 mg/dL 72   Calcium Latest Ref Range: 8.7 - 10.5 mg/dL 9.0   Phosphorus Latest Ref Range: 2.7 - 4.5 mg/dL 3.4   Magnesium Latest Ref Range: 1.6 - 2.6 mg/dL 2.3   Alkaline Phosphatase Latest Ref Range: 55 - 135 U/L 57   Total Protein Latest Ref Range: 6.0 - 8.4 g/dL 8.3   Albumin Latest Ref Range: 3.5 - 5.2 g/dL 4.1   Total Bilirubin Latest Ref Range: 0.1 - 1.0 mg/dL 1.5 (H)   AST Latest Ref Range: 10 - 40 U/L 10   ALT Latest Ref Range: 10 - 44 U/L 9 (L)   Results for RAQUEL EDWARDS JR. (MRN [id number]) as of 4/8/2019 10:02   Ref. Range 8/4/2014 10:08   CRISTINA Screen Latest Ref Range: Negative <1:160  Negative <1:160   Results for RAQUEL EDWARDS JR.  (MRN [id number]) as of 4/8/2019 10:02   Ref. Range 7/28/2015 09:16   Anti-Belen-1 Antibody Latest Ref Range: <20 Units <20   Anti-PM/Scl Ab Latest Ref Range: <20 Units <20   Anti-SS-A 52 kD Ab, IgG Latest Ref Range: <20 Units <20   Anti-U1-RNP  Ab Latest Ref Range: <20 Units <20   EJ Latest Ref Range: Negative  Negative   Fibrillarin (U3 RNP) Latest Ref Range: Negative  Negative   Ku Latest Ref Range: Negative  Weak Positive (A)   MDA-5 (P140) (CADM-140) Latest Ref Range: <20 Units <20   MI-2 Latest Ref Range: Negative  Negative   NXP-2 (P140) Latest Ref Range: <20 Units <20   OJ Latest Ref Range: Negative  Negative   PL-12 Latest Ref Range: Negative  Negative   PL-7 Latest Ref Range: Negative  Negative   SRP Latest Ref Range: Negative  Negative   TIF1 GAMMA (P155/140) Latest Ref Range: <20 Units <20   U2 snRNP Latest Ref Range: Negative  Negative   Results for RAQUEL EDWARDS JR. (MRN [id number]) as of 4/8/2019 10:02   Ref. Range 4/2/2019 14:56   HMGCR Antibody Latest Ref Range: 0 - 19 Units <3     Results for RAQUEL EDWARDS JR. (MRN [id number]) as of 4/8/2019 10:02   Ref. Range 4/3/2019 08:43   Specimen UA Unknown Urine, Clean Catch   Color, UA Latest Ref Range: Yellow, Straw, Nella  Yellow   pH, UA Latest Ref Range: 5.0 - 8.0  7.0   Specific Gravity, UA Latest Ref Range: 1.005 - 1.030  1.015   Appearance, UA Latest Ref Range: Clear  Clear   Protein, UA Latest Ref Range: Negative  Negative   Glucose, UA Latest Ref Range: Negative  Negative   Ketones, UA Latest Ref Range: Negative  Negative   Occult Blood UA Latest Ref Range: Negative  Negative   Nitrite, UA Latest Ref Range: Negative  Negative   Bilirubin (UA) Latest Ref Range: Negative  Negative   Leukocytes, UA Latest Ref Range: Negative  Negative     Left thigh muscle biopsy 2016:  FINAL PATHOLOGIC DIAGNOSIS  [facility] DIAGNOSIS:  MUSCLE, LEFT VASTUS LATERALIS, BIOPSY:  MINUTE PORTION OF MUSCLE WITH END-STAGE MYOPATHIC CHANGES AND FOCAL  CHRONIC  INFLAMMATION (SEE NOTE).    Significant Imaging:  CT Chest without contrast 4/3/19:  Impression       1. Mild peribronchial cuffing which may be seen with bronchitis.  Mild tubular bronchiectasis of the right lower lobe with several distal bronchi demonstrating retained secretions, finding may be seen with small airways infectious/noninfectious inflammation or aspiration.  2. Centrilobular and paraseptal emphysema with an upper lung zone predominance.  3. Diffuse muscular atrophy.  4. Splenomegaly.     MRI femurs b/l 4/6/19:  Impression       1.  Redemonstration of advanced diffuse atrophy and fatty replacement of the bilateral thigh musculature involving all compartments.  There is mild residual edema signal present within the bilateral obturator musculature and residual posterior compartment musculature, similar to prior MRI examination.    2.  Mild heterogeneity of the visualized bone marrow signal which may relate to red marrow reconversion.  Clinical correlation advised.     MRI humerus w/ and w/o contrast 4/7/19:  Impression       1.  Significant diffuse atrophy and fatty replacement of the bilateral proximal upper extremity musculature, including the rotator cuff musculature, right greater than left, in this patient with reported history of polymyositis.  There is some degree of sparing of the bilateral deltoid and triceps musculature with greater residual muscle bulk on the left compared to the right.  There is mild residual edema present within the remaining musculature, most pronounced within the triceps.    2.  Mild diffuse heterogeneity of the visualized bone marrow signal which is nonspecific and may relate to red marrow reconversion.  Clinical correlation advised.

## 2019-04-08 NOTE — PLAN OF CARE
PALMIRA completed the LOCET via phone. PALMIRA faxed [facility] to obtain the 142 for NH admission.    PALMIRA sent additional referrals to [facility].     Trey Pena LMSW  [facility]  [phone]

## 2019-04-08 NOTE — PT/OT/SLP PROGRESS
Occupational Therapy   Treatment    Name: Nura Kahn Jr.  MRN: [id number]  Admitting Diagnosis:  Polymyositis       Recommendations:     Discharge Recommendations: nursing facility, skilled  Discharge Equipment Recommendations:  none  Barriers to discharge:  Decreased caregiver support    Assessment:     Nura Kahn Jr. is a 63 y.o. male with a medical diagnosis of Polymyositis.  He presents with good participation and motivation.  Limited session on this date due to receiving transfusion at time of session. Performance deficits affecting function are weakness, impaired endurance, impaired self care skills, impaired functional mobilty, impaired balance, decreased coordination, decreased upper extremity function, decreased lower extremity function, abnormal tone, impaired coordination, impaired fine motor.     Rehab Prognosis:  Fair; patient would benefit from acute skilled OT services to address these deficits and reach maximum level of function.       Plan:     Patient to be seen 3 x/week to address the above listed problems via self-care/home management, therapeutic activities, therapeutic exercises, neuromuscular re-education  · Plan of Care Expires: 05/04/19  · Plan of Care Reviewed with: patient    Subjective     Pain/Comfort:  · Pain Rating 1: 0/10  · Pain Rating Post-Intervention 1: 0/10    Objective:     Communicated with: RN prior to session.  Patient found supine with telemetry, peripheral IV upon OT entry to room.  RN approved in bed therex only on this date due to pt receiving transfusion at time of session.  Pt in agreement with POC for today's session.    General Precautions: Standard, aspiration, fall     Occupational Performance:     AMPAC 6 Click ADL: 13    Treatment & Education:  Provided pt with green theraputty for strengthening of , fine motor coordination, & pinch for BUE.  Provided verbal instruction on therex to perform with theraputty with pt verbalizing understanding.  Pt  performed AROM for shoulder flexion for RUE x 10 reps & LUE x 16 reps, chest press AROM LUE 16 reps & AAROM RUE 10 reps, & elbow flexion BUE AAROM x 10 reps each while supine.  Pt with elevated BP into 180's systolic during session therefore stopped at therex performed.  Pt with not symptomatic during session & RN approved therex.  Pt had no further questions & when asked whether there were any concerns pt reported none.    Patient left supine with all lines intact, call button in reach, RN notified and white board updated.Education:      GOALS:   Multidisciplinary Problems     Occupational Therapy Goals        Problem: Occupational Therapy Goal    Goal Priority Disciplines Outcome Interventions   Occupational Therapy Goal     OT, PT/OT Ongoing (interventions implemented as appropriate)    Description:  Goals to be met by: 4/15     Patient will increase functional independence with ADLs by performing:    UE Dressing with Moderate Assistance.  LE Dressing with Moderate Assistance.  Grooming while seated with Set-up Assistance.  Toileting from bedside commode with Moderate Assistance for hygiene and clothing management.   Rolling to Bilateral with Modified [place].   Supine to sit with Supervision.  Stand pivot transfers with Moderate Assistance.                      Time Tracking:     OT Date of Treatment: 04/08/19  OT Start Time: 0956  OT Stop Time: 1024  OT Total Time (min): 28 min    Billable Minutes:Therapeutic Exercise 24    DOMINGO Borrero  4/8/2019

## 2019-04-08 NOTE — PROGRESS NOTES
"[facility]  Adult Nutrition  Progress Note    SUMMARY       Recommendations    Recommendation/Intervention:     1. Continue current mechanical soft diet and provide assistance with meal set-up/feeding as needed.    Pt with good appetite and intake.   3. RD following.    Goals: Intake >/=85% EEN/EPN  Nutrition Goal Status: goal met  Communication of RD Recs: (POC)    Reason for Assessment    Reason For Assessment: RD follow-up  Diagnosis: (polymyositis)  Relevant Medical History: polymyositis, vit D deficiency, dysphagia, HTN, emphysema  Interdisciplinary Rounds: did not attend  General Information Comments: Pt reports good appetite and eating 100% of meals. Denies N/V/D. C/o constipation 2/2 medications. Reports normal appetite PTA. Unsure of UBW 2/2 unable to stand. Noted wt loss per chart review, but unable to verify. NFPE completed. Pt with muscle/fat wasting 2/2 polymyositis. RD does not feel that pt meets malnutrition criteria at this time 2/2 good appetite and intake now and PTA. Will continue to monitor wt.   Nutrition Discharge Planning: Adequate PO intake    Nutrition Risk Screen    Nutrition Risk Screen: dysphagia or difficulty swallowing    Nutrition/Diet History    Spiritual, Cultural Beliefs, Faith Practices, Values that Affect Care: no  Factors Affecting Nutritional Intake: None identified at this time    Anthropometrics    Temp: 98.8 °F (37.1 °C)  Height Method: Stated  Height: 5' 9" (175.3 cm)  Height (inches): 69 in  Weight Method: Bed Scale  Weight: 59.6 kg (131 lb 6.3 oz)  Weight (lb): 131.4 lb  Ideal Body Weight (IBW), Male: 160 lb  % Ideal Body Weight, Male (lb): 79.92 lb  BMI (Calculated): 18.9  BMI Grade: 18.5-24.9 - normal  Usual Body Weight (UBW), k.8 kg(2018 per chart review)  % Usual Body Weight: 75.68  % Weight Change From Usual Weight: -24.48 %     Lab/Procedures/Meds    Pertinent Labs Reviewed: reviewed  Pertinent Labs Comments: Na 133, T bili 1.5, ALT 9 "   Pertinent Medications Reviewed: reviewed  Pertinent Medications Comments: acetaminophen, amlodipine, ergocalciferol, labetalol, prednisone, docusate    Estimated/Assessed Needs    Weight Used For Calorie Calculations: 58 kg (127 lb 13.9 oz)  Energy Calorie Requirements (kcal): 1740-2030  Energy Need Method: Kcal/kg(30-35)  Protein Requirements: 70-81 gm (1.2-1.4 gm/kg)  Weight Used For Protein Calculations: 58 kg (127 lb 13.9 oz)  Fluid Requirements (mL): per MD     RDA Method (mL): 1740    Nutrition Prescription Ordered    Current Diet Order: Mechanical soft    Evaluation of Received Nutrient/Fluid Intake    I/O: -1L x 24 hrs, -6.4L since admit  Comments: LBM 4/7  Tolerance: tolerating  % Intake of Estimated Energy Needs: 75 - 100 %  % Meal Intake: 75 - 100 %    Nutrition Risk    Level of Risk/Frequency of Follow-up: low(f/u 1 x wk)     Assessment and Plan     Nutrition Problem  Unintended weight loss     Related to (etiology):   Dysphagia and polymyositis     Signs and Symptoms (as evidenced by):   24% wt loss x 4 months per chart review      Interventions/Recommendations (treatment strategy):  Commercial beverage     Nutrition Diagnosis Status:   Continues     Monitor and Evaluation    Food and Nutrient Intake: energy intake, food and beverage intake  Food and Nutrient Adminstration: diet order  Physical Activity and Function: nutrition-related ADLs and IADLs  Anthropometric Measurements: weight, weight change, body mass index  Biochemical Data, Medical Tests and Procedures: electrolyte and renal panel, gastrointestinal profile, glucose/endocrine profile, inflammatory profile  Nutrition-Focused Physical Findings: overall appearance     Nutrition Follow-Up    RD Follow-up?: Yes

## 2019-04-09 LAB
ALBUMIN SERPL BCP-MCNC: 4 G/DL (ref 3.5–5.2)
ALP SERPL-CCNC: 57 U/L (ref 55–135)
ALT SERPL W/O P-5'-P-CCNC: 10 U/L (ref 10–44)
ANION GAP SERPL CALC-SCNC: 7 MMOL/L (ref 8–16)
AST SERPL-CCNC: 10 U/L (ref 10–40)
BASOPHILS # BLD AUTO: 0.02 K/UL (ref 0–0.2)
BASOPHILS NFR BLD: 0.5 % (ref 0–1.9)
BILIRUB SERPL-MCNC: 1.5 MG/DL (ref 0.1–1)
BUN SERPL-MCNC: 20 MG/DL (ref 8–23)
CALCIUM SERPL-MCNC: 9 MG/DL (ref 8.7–10.5)
CHLORIDE SERPL-SCNC: 105 MMOL/L (ref 95–110)
CO2 SERPL-SCNC: 25 MMOL/L (ref 23–29)
CREAT SERPL-MCNC: 0.5 MG/DL (ref 0.5–1.4)
DIFFERENTIAL METHOD: ABNORMAL
EOSINOPHIL # BLD AUTO: 0 K/UL (ref 0–0.5)
EOSINOPHIL NFR BLD: 0.7 % (ref 0–8)
ERYTHROCYTE [DISTWIDTH] IN BLOOD BY AUTOMATED COUNT: 20.7 % (ref 11.5–14.5)
EST. GFR  (AFRICAN AMERICAN): >60 ML/MIN/1.73 M^2
EST. GFR  (NON AFRICAN AMERICAN): >60 ML/MIN/1.73 M^2
FERRITIN SERPL-MCNC: 876 NG/ML (ref 20–300)
FOLATE SERPL-MCNC: 6.8 NG/ML (ref 4–24)
GLUCOSE SERPL-MCNC: 69 MG/DL (ref 70–110)
HCT VFR BLD AUTO: 30.5 % (ref 40–54)
HGB BLD-MCNC: 9.8 G/DL (ref 14–18)
IMM GRANULOCYTES # BLD AUTO: 0.02 K/UL (ref 0–0.04)
IMM GRANULOCYTES NFR BLD AUTO: 0.5 % (ref 0–0.5)
IRON SERPL-MCNC: 113 UG/DL (ref 45–160)
LYMPHOCYTES # BLD AUTO: 1.8 K/UL (ref 1–4.8)
LYMPHOCYTES NFR BLD: 42.7 % (ref 18–48)
M TB IFN-G CD4+ BCKGRND COR BLD-ACNC: 0 IU/ML
MAGNESIUM SERPL-MCNC: 2.3 MG/DL (ref 1.6–2.6)
MCH RBC QN AUTO: 20 PG (ref 27–31)
MCHC RBC AUTO-ENTMCNC: 32.1 G/DL (ref 32–36)
MCV RBC AUTO: 62 FL (ref 82–98)
MITOGEN IGNF BCKGRD COR BLD-ACNC: 6.93 IU/ML
MITOGEN IGNF BCKGRD COR BLD-ACNC: NEGATIVE [IU]/ML
MONOCYTES # BLD AUTO: 0.4 K/UL (ref 0.3–1)
MONOCYTES NFR BLD: 8.4 % (ref 4–15)
NEUTROPHILS # BLD AUTO: 2 K/UL (ref 1.8–7.7)
NEUTROPHILS NFR BLD: 47.2 % (ref 38–73)
NIL: 0.01 IU/ML
NRBC BLD-RTO: 4 /100 WBC
PHOSPHATE SERPL-MCNC: 3.4 MG/DL (ref 2.7–4.5)
PLATELET # BLD AUTO: 140 K/UL (ref 150–350)
PMV BLD AUTO: ABNORMAL FL (ref 9.2–12.9)
POCT GLUCOSE: 84 MG/DL (ref 70–110)
POCT GLUCOSE: 98 MG/DL (ref 70–110)
POTASSIUM SERPL-SCNC: 3.8 MMOL/L (ref 3.5–5.1)
PROT SERPL-MCNC: 8.4 G/DL (ref 6–8.4)
RBC # BLD AUTO: 4.89 M/UL (ref 4.6–6.2)
RPR SER QL: NORMAL
SATURATED IRON: 49 % (ref 20–50)
SODIUM SERPL-SCNC: 137 MMOL/L (ref 136–145)
TB2 - NIL: 0 IU/ML
TOTAL IRON BINDING CAPACITY: 232 UG/DL (ref 250–450)
TRANSFERRIN SERPL-MCNC: 157 MG/DL (ref 200–375)
TRANSFERRIN SERPL-MCNC: 157 MG/DL (ref 200–375)
VIT B12 SERPL-MCNC: 225 PG/ML (ref 210–950)
WBC # BLD AUTO: 4.29 K/UL (ref 3.9–12.7)

## 2019-04-09 PROCEDURE — 84100 ASSAY OF PHOSPHORUS: CPT

## 2019-04-09 PROCEDURE — 99232 SBSQ HOSP IP/OBS MODERATE 35: CPT | Mod: GC,,, | Performed by: HOSPITALIST

## 2019-04-09 PROCEDURE — 25000003 PHARM REV CODE 250: Performed by: INTERNAL MEDICINE

## 2019-04-09 PROCEDURE — 83735 ASSAY OF MAGNESIUM: CPT

## 2019-04-09 PROCEDURE — 99232 PR SUBSEQUENT HOSPITAL CARE,LEVL II: ICD-10-PCS | Mod: GC,,, | Performed by: HOSPITALIST

## 2019-04-09 PROCEDURE — 63600175 PHARM REV CODE 636 W HCPCS: Performed by: HOSPITALIST

## 2019-04-09 PROCEDURE — 82746 ASSAY OF FOLIC ACID SERUM: CPT

## 2019-04-09 PROCEDURE — 82607 VITAMIN B-12: CPT

## 2019-04-09 PROCEDURE — 82728 ASSAY OF FERRITIN: CPT

## 2019-04-09 PROCEDURE — 83540 ASSAY OF IRON: CPT

## 2019-04-09 PROCEDURE — 85025 COMPLETE CBC W/AUTO DIFF WBC: CPT

## 2019-04-09 PROCEDURE — 63600175 PHARM REV CODE 636 W HCPCS: Performed by: STUDENT IN AN ORGANIZED HEALTH CARE EDUCATION/TRAINING PROGRAM

## 2019-04-09 PROCEDURE — 25000003 PHARM REV CODE 250: Performed by: HOSPITALIST

## 2019-04-09 PROCEDURE — 25000003 PHARM REV CODE 250: Performed by: STUDENT IN AN ORGANIZED HEALTH CARE EDUCATION/TRAINING PROGRAM

## 2019-04-09 PROCEDURE — 99231 PR SUBSEQUENT HOSPITAL CARE,LEVL I: ICD-10-PCS | Mod: GC,,, | Performed by: INTERNAL MEDICINE

## 2019-04-09 PROCEDURE — 97530 THERAPEUTIC ACTIVITIES: CPT

## 2019-04-09 PROCEDURE — 20600001 HC STEP DOWN PRIVATE ROOM

## 2019-04-09 PROCEDURE — 63600175 PHARM REV CODE 636 W HCPCS: Mod: JG | Performed by: INTERNAL MEDICINE

## 2019-04-09 PROCEDURE — 80053 COMPREHEN METABOLIC PANEL: CPT

## 2019-04-09 PROCEDURE — 36415 COLL VENOUS BLD VENIPUNCTURE: CPT

## 2019-04-09 PROCEDURE — 99231 SBSQ HOSP IP/OBS SF/LOW 25: CPT | Mod: GC,,, | Performed by: INTERNAL MEDICINE

## 2019-04-09 RX ORDER — MYCOPHENOLATE MOFETIL 250 MG/1
500 CAPSULE ORAL 2 TIMES DAILY
Qty: 120 CAPSULE | Refills: 11 | Status: SHIPPED | OUTPATIENT
Start: 2019-04-09 | End: 2019-04-10

## 2019-04-09 RX ORDER — FAMOTIDINE 20 MG/1
20 TABLET, FILM COATED ORAL 2 TIMES DAILY
Qty: 60 TABLET | Refills: 11 | Status: SHIPPED | OUTPATIENT
Start: 2019-04-09 | End: 2019-04-10

## 2019-04-09 RX ORDER — HYDROCHLOROTHIAZIDE 12.5 MG/1
12.5 TABLET ORAL DAILY
Status: DISCONTINUED | OUTPATIENT
Start: 2019-04-09 | End: 2019-04-10 | Stop reason: HOSPADM

## 2019-04-09 RX ORDER — RAMELTEON 8 MG/1
8 TABLET ORAL NIGHTLY PRN
Status: DISCONTINUED | OUTPATIENT
Start: 2019-04-09 | End: 2019-04-10 | Stop reason: HOSPADM

## 2019-04-09 RX ADMIN — MICONAZOLE NITRATE: 20 POWDER TOPICAL at 08:04

## 2019-04-09 RX ADMIN — MYCOPHENOLATE MOFETIL 500 MG: 250 CAPSULE ORAL at 08:04

## 2019-04-09 RX ADMIN — AMLODIPINE BESYLATE 10 MG: 10 TABLET ORAL at 08:04

## 2019-04-09 RX ADMIN — STANDARDIZED SENNA CONCENTRATE AND DOCUSATE SODIUM 1 TABLET: 8.6; 5 TABLET, FILM COATED ORAL at 08:04

## 2019-04-09 RX ADMIN — PREDNISONE 10 MG: 10 TABLET ORAL at 08:04

## 2019-04-09 RX ADMIN — HUMAN IMMUNOGLOBULIN G 25 G: 20 LIQUID INTRAVENOUS at 09:04

## 2019-04-09 RX ADMIN — ACETAMINOPHEN 650 MG: 325 TABLET ORAL at 08:04

## 2019-04-09 RX ADMIN — FAMOTIDINE 20 MG: 20 TABLET ORAL at 08:04

## 2019-04-09 RX ADMIN — DIPHENHYDRAMINE HYDROCHLORIDE 50 MG: 25 CAPSULE ORAL at 08:04

## 2019-04-09 RX ADMIN — ENOXAPARIN SODIUM 40 MG: 100 INJECTION SUBCUTANEOUS at 04:04

## 2019-04-09 RX ADMIN — LABETALOL HYDROCHLORIDE 100 MG: 100 TABLET, FILM COATED ORAL at 09:04

## 2019-04-09 RX ADMIN — LABETALOL HYDROCHLORIDE 100 MG: 100 TABLET, FILM COATED ORAL at 01:04

## 2019-04-09 RX ADMIN — LABETALOL HYDROCHLORIDE 100 MG: 100 TABLET, FILM COATED ORAL at 06:04

## 2019-04-09 RX ADMIN — HYDROCHLOROTHIAZIDE 12.5 MG: 12.5 TABLET ORAL at 02:04

## 2019-04-09 NOTE — PLAN OF CARE
Problem: Adult Inpatient Plan of Care  Goal: Plan of Care Review  Outcome: Ongoing (interventions implemented as appropriate)         04/09/19 1812   Plan of Care Review   Plan of Care Reviewed With patient;spouse   Pt remains free from falls/injury. No acute events during shift. VSS, NAD. Bed in lowest position, wheels locked, side rails up times 2, call light w/in reach, bed alarm on. Room clear of obstacles.  No new skin breakdown noticed. Pt repositioned frequently. Pressure points protected. Pt remains afibrile during shift. No other S&S of infection noticed. Will continue to monitor.

## 2019-04-09 NOTE — PLAN OF CARE
Problem: Adult Inpatient Plan of Care  Goal: Plan of Care Review  Outcome: Ongoing (interventions implemented as appropriate)     04/08/19 1920   Plan of Care Review   Plan of Care Reviewed With patient     Pt remains free from falls/injury. No acute events during shift. VSS, NAD. Bed in lowest position, wheels locked, side rails up times 2, call light w/in reach, bed alarm on. Room clear of obstacles.  No new skin breakdown noticed. Pt repositioned frequently. Pt remains afibrile during shift. No other S&S of infection noticed. Will continue to monitor.

## 2019-04-09 NOTE — PROGRESS NOTES
[facility] Hospital Medicine  Progress Note    Patient Name: Nura Kahn Jr.  MRN: [id number]  Patient Class: IP- Inpatient   Admission Date: 4/2/2019  Length of Stay: 7 days  Attending Physician: Óscar Patricia MD  Primary Care Provider: Socrates Ambrosio MD    [facility] Medicine Team: [facility] 2 Behram Khan, MD    Subjective:     Principal Problem:Polymyositis    HPI:  Mr. Kahn is a 63M with polymyositis (Dx 2009, Bx proven), osteopenia, vit D insufficiency, thalassemia, dysphagia, afib, HTN, emphysema who presents as a direct admit for workup of progressive weakness in his extremities. Workup from 2016 showed end-stage myopathic changes and focal chronic inflammation. He was last seen by rheumatology 2/13/2019 with plans for direct admit for IVIG therapy; however, patient did not present as it was Williamsons day. Today, he reports no major changes since February. Muscle weakness has been progressive for the past several years. He currently uses a scooter and requires increasing levels of assistance to move from recliner to scooter. As a result of his immobility, he wears a diaper as he is unable to make it to the restroom for bowel and urinary habits. His support system at home is lacking; he is sliding to move himself from chair to scooter. His significant other has not been supportive with increasing decline in his status. Dysphagia has been stable; patient reports that difficulty swallowing is associated with positioning. When he does have trouble, it usually feels like food is stuck at the top of his esophagus, not that it is stuck on the way down. Sometimes he chokes with water intake. Dysphagia is least notable when sitting upright. He reports no pain or aches in his muscles. Denies fevers, chills, joint pains, blood in his stool or urine. He endorses some depression regarding his condition and inability to partake in activities he enjoys such as fishing.    Hospital  Course:  Patient admitted to hospital medicine for initiation of IVIG. He has agreed to be placed in SNF after completion of therapy 2/2 poor social support at home. Rheumatology has placed IVIG orders. MRI femur and humerus with signs of diffuse atrophy consistent with disease process. Patient tolerating IVIG well with improvement in strength after first two doses.     COMPLETED 4/5 DAYS OF IVIG    Interval History: Pt doing well.     Review of Systems   Constitutional: Positive for activity change (dec). Negative for appetite change, diaphoresis, fatigue and fever.   HENT: Negative for congestion, sinus pressure, sinus pain and trouble swallowing.    Eyes: Negative for photophobia and pain.   Respiratory: Negative for choking, shortness of breath and wheezing.    Cardiovascular: Negative for chest pain, palpitations and leg swelling.   Gastrointestinal: Negative for abdominal pain, blood in stool, constipation, diarrhea, nausea and vomiting.   Genitourinary: Negative for dysuria, hematuria and urgency.   Musculoskeletal: Positive for gait problem. Negative for arthralgias, back pain and myalgias.   Neurological: Positive for weakness. Negative for dizziness, tremors, facial asymmetry, speech difficulty and headaches.   Psychiatric/Behavioral: Negative for agitation, behavioral problems, confusion and decreased concentration.     Objective:     Vital Signs (Most Recent):  Temp: 98 °F (36.7 °C) (04/09/19 1607)  Pulse: 76 (04/09/19 1607)  Resp: 16 (04/09/19 1227)  BP: (!) 153/81 (04/09/19 1607)  SpO2: 97 % (04/09/19 1607) Vital Signs (24h Range):  Temp:  [97.5 °F (36.4 °C)-98.3 °F (36.8 °C)] 98 °F (36.7 °C)  Pulse:  [66-84] 76  Resp:  [16-18] 16  SpO2:  [94 %-100 %] 97 %  BP: (125-179)/(77-91) 153/81     Weight: 63 kg (138 lb 14.2 oz)  Body mass index is 20.51 kg/m².    Intake/Output Summary (Last 24 hours) at 4/9/2019 0524  Last data filed at 4/9/2019 1340  Gross per 24 hour   Intake 300 ml   Output 2400 ml   Net  -2100 ml      Physical Exam   Constitutional: He is oriented to person, place, and time. He appears well-developed. No distress.   HENT:   Head: Normocephalic and atraumatic.   Mouth/Throat: No oropharyngeal exudate.   Eyes: Conjunctivae and EOM are normal. No scleral icterus.   Neck: Normal range of motion. Neck supple.   Cardiovascular: Normal rate, regular rhythm, normal heart sounds and intact distal pulses.   No murmur heard.  Pulmonary/Chest: Effort normal and breath sounds normal. No respiratory distress. He has no wheezes.   Abdominal: Soft. Bowel sounds are normal. He exhibits no distension. There is no tenderness. There is no guarding.   Musculoskeletal: He exhibits no tenderness.   Right Side Rheumatological Exam      Muscle Strength (0-5 scale):  Neck Flexion:  4  Neck Extension: 4  Deltoid:  2  Biceps: 3  Triceps:  4  : 4  Iliopsoas: 3  Quadriceps:  4  Distal Lower Extremity: 2     Left Side Rheumatological Exam      Muscle Strength (0-5 scale):  Neck Flexion:  4  Neck Extension: 4  Deltoid:  3  Biceps: 4   Triceps:  4  :  4  Iliopsoas: 3  Quadriceps:  3   Distal Lower Extremity: 2     Lymphadenopathy:     He has no cervical adenopathy.   Neurological: He is alert and oriented to person, place, and time. No cranial nerve deficit. He exhibits abnormal muscle tone (decreased tone in all extremities).   Skin: Skin is warm and dry. No rash noted. He is not diaphoretic. No pallor.   Psychiatric: He has a normal mood and affect. His behavior is normal. Judgment and thought content normal.   Nursing note and vitals reviewed.      Significant Labs:   CBC:   Recent Labs   Lab 04/08/19  0516 04/09/19  0527   WBC 4.24 4.29   HGB 9.9* 9.8*   HCT 30.9* 30.5*   * 140*     CMP:   Recent Labs   Lab 04/08/19 0516 04/09/19  0527   * 137   K 3.9 3.8    105   CO2 21* 25   GLU 72 69*   BUN 21 20   CREATININE 0.5 0.5   CALCIUM 9.0 9.0   PROT 8.3 8.4   ALBUMIN 4.1 4.0   BILITOT 1.5* 1.5*   ALKPHOS  57 57   AST 10 10   ALT 9* 10   ANIONGAP 8 7*   EGFRNONAA >60.0 >60.0     Magnesium:   Recent Labs   Lab 04/08/19  0516 04/09/19  0527   MG 2.3 2.3       Significant Imaging: I have reviewed all pertinent imaging results/findings within the past 24 hours.     MRI Humerus Without Contrast Left  Narrative: EXAMINATION:  MRI HUMERUS WITHOUT CONTRAST LEFT; MRI HUMERUS WITHOUT CONTRAST RIGHT    CLINICAL HISTORY:  myositis;; polymyositis;    TECHNIQUE:  Multiplanar, multisequence imaging of the right and left humerus was performed without IV contrast.    COMPARISON:  None    FINDINGS:  There is significant diffuse atrophy and fatty replacement of the bilateral proximal upper extremity musculature.  There is some degree of sparing of the bilateral deltoid musculature and triceps musculature, with greater residual muscle bulk on the left compared to the right.  There is mild faint edema present within the residual triceps musculature and posterior deltoid musculature.    The osseous structures demonstrate mild diffuse heterogeneity of the visualized bone marrow signal.  No evidence of acute fracture.  Evaluation of the rotator cuff tendons are limited by large field of view.  No focal fluid collections appreciated within the soft tissues.  Impression: 1.  Significant diffuse atrophy and fatty replacement of the bilateral proximal upper extremity musculature, including the rotator cuff musculature, right greater than left, in this patient with reported history of polymyositis.  There is some degree of sparing of the bilateral deltoid and triceps musculature with greater residual muscle bulk on the left compared to the right.  There is mild residual edema present within the remaining musculature, most pronounced within the triceps.    2.  Mild diffuse heterogeneity of the visualized bone marrow signal which is nonspecific and may relate to red marrow reconversion.  Clinical correlation advised.    Electronically signed  by: Brandan Hess MD  Date:    04/08/2019  Time:    01:07  MRI Humerus Without Contrast Right  Narrative: EXAMINATION:  MRI HUMERUS WITHOUT CONTRAST LEFT; MRI HUMERUS WITHOUT CONTRAST RIGHT    CLINICAL HISTORY:  myositis;; polymyositis;    TECHNIQUE:  Multiplanar, multisequence imaging of the right and left humerus was performed without IV contrast.    COMPARISON:  None    FINDINGS:  There is significant diffuse atrophy and fatty replacement of the bilateral proximal upper extremity musculature.  There is some degree of sparing of the bilateral deltoid musculature and triceps musculature, with greater residual muscle bulk on the left compared to the right.  There is mild faint edema present within the residual triceps musculature and posterior deltoid musculature.    The osseous structures demonstrate mild diffuse heterogeneity of the visualized bone marrow signal.  No evidence of acute fracture.  Evaluation of the rotator cuff tendons are limited by large field of view.  No focal fluid collections appreciated within the soft tissues.  Impression: 1.  Significant diffuse atrophy and fatty replacement of the bilateral proximal upper extremity musculature, including the rotator cuff musculature, right greater than left, in this patient with reported history of polymyositis.  There is some degree of sparing of the bilateral deltoid and triceps musculature with greater residual muscle bulk on the left compared to the right.  There is mild residual edema present within the remaining musculature, most pronounced within the triceps.    2.  Mild diffuse heterogeneity of the visualized bone marrow signal which is nonspecific and may relate to red marrow reconversion.  Clinical correlation advised.    Electronically signed by: Brandan Hess MD  Date:    04/08/2019  Time:    01:07        Assessment/Plan:      * Polymyositis  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  Last full workup in 2016 with evidence of progression  "of disease  Direct admit for management by rheumatology with IVIG  Weakness in all extremities, uses scooter for mobilization  Does not have a good social support system at home - will require post acute care for this reason  Initial labs negative except IgG elevated at 1754  MRI femur do not show progression of disease compared to prior study 2016; MRI humerus with signs of diffuse atrophy and fatty replacement  - Rheumatology consulted - appreciate recs and guidance of therapy  - s/p initiation of IVIG treatment - day 3 of 5 today  - Continuing imuran and prednisone currently  - f/u initial labs HMG-CoA reductase antibody, CN1A Ab  - PT/OT and PM&R  - Current plan for SNF on DC      Immunosuppression  Currently on imuran 150mg daily and prednisone 10mg daily.  - Continue both meds      Oropharyngeal dysphagia  Reports difficulty with both solids and liquids, but largely dependent on positioning of patient (e.g. Sitting up vs laying in recliner)  Sometimes experiences choking with water  - Per SLP recs - soft diet with thin liquids  - Will consider GI consult if dysphagia worsens  - Nutrition consult - add boost plus      Essential hypertension  - Continue norvasc 10mg daily  - Monitor BP      Hemoglobin C disease  - Monitor CBC - currently stable      Atrial fibrillation  Currently rate and rhythm controlled      Osteopenia  Likely 2/2 long term prednisone treatment  Stable currently      Splenomegaly  US abdo 4/3 shows "cholelithiasis with no evidence of cholecystitis. Splenomegaly."  Not currently causing complications        Vitamin D deficiency  - Continue ergocalciferol qweekly      Quadriparesis (muscle weakness)  See plan for "polymyositis"        VTE Risk Mitigation (From admission, onward)        Ordered     heparin, porcine (PF) 100 unit/mL injection flush 500 Units  As needed (PRN)      04/06/19 0654     enoxaparin injection 40 mg  Daily      04/03/19 1258     Place sequential compression device  Until " discontinued      04/02/19 1408              Behram Khan, MD  Department of Hospital Medicine   [facility]

## 2019-04-09 NOTE — PT/OT/SLP PROGRESS
Physical Therapy Treatment    Patient Name:  Nura Kahn Jr.   MRN:  [id number]    Recommendations:     Discharge Recommendations:  nursing facility, skilled   Discharge Equipment Recommendations: none   Barriers to discharge: None    Assessment:     Nura Kahn Jr. is a 63 y.o. male admitted with a medical diagnosis of Polymyositis.  He presents with the following impairments/functional limitations:  weakness, impaired endurance, impaired functional mobilty, gait instability, impaired balance, decreased lower extremity function, abnormal tone, decreased coordination, decreased upper extremity function, impaired fine motor Pt. cooperative and tolerated treatment well. Pt. progressing with mobility.    Rehab Prognosis: Fair; patient would benefit from acute skilled PT services to address these deficits and reach maximum level of function.    Recent Surgery: Procedure(s) (LRB):  COLONOSCOPY (N/A)      Plan:     During this hospitalization, patient to be seen 3 x/week to address the identified rehab impairments via gait training, therapeutic activities, therapeutic exercises, neuromuscular re-education and progress toward the following goals:    · Plan of Care Expires:  04/29/19    Subjective     Chief Complaint: weakness  Patient/Family Comments/goals: to get stronger  Pain/Comfort:  · Pain Rating 1: 0/10  · Pain Rating Post-Intervention 1: 0/10      Objective:     Communicated with nursing prior to session.  Patient found supine with peripheral IV, telemetry upon PT entry to room.     General Precautions: Standard, aspiration, fall   Orthopedic Precautions:N/A   Braces:       Functional Mobility:  · Bed Mobility:     · Rolling Right: minimum assistance  · Scooting: minimum assistance  · Supine to Sit: minimum assistance  · Transfers:     · Bed to Chair: moderate assistance with  hand-held assist  using  Stand Pivot  · Balance: fair-poor sitting and poor standing      AM-PAC 6 CLICK MOBILITY  Turning over in  bed (including adjusting bedclothes, sheets and blankets)?: 3  Sitting down on and standing up from a chair with arms (e.g., wheelchair, bedside commode, etc.): 2  Moving from lying on back to sitting on the side of the bed?: 3  Moving to and from a bed to a chair (including a wheelchair)?: 2  Need to walk in hospital room?: 1  Climbing 3-5 steps with a railing?: 1  Basic Mobility Total Score: 12       Therapeutic Activities and Exercises:   Pt. Positioned for comfort in chair and instructed on LE therex.    Patient left up in chair with all lines intact and call button in reach..    GOALS:   Multidisciplinary Problems     Physical Therapy Goals        Problem: Physical Therapy Goal    Goal Priority Disciplines Outcome Goal Variances Interventions   Physical Therapy Goal     PT, PT/OT Ongoing (interventions implemented as appropriate)     Description:  Goals to be met by: 2019     Patient will increase functional independence with mobility by performin. Supine to sit with Modified [place]  2. Sit to supine with Modified [place]  3. Sit to stand transfer with supervision  4. Bed to chair transfer with supervision                      Time Tracking:     PT Received On: 19  PT Start Time: 1357     PT Stop Time: 1407  PT Total Time (min): 10 min     Billable Minutes: Therapeutic Activity 10    Treatment Type: Treatment  PT/PTA: PT           Dani Muñoz, PT  2019

## 2019-04-09 NOTE — SUBJECTIVE & OBJECTIVE
Interval History: Pt doing well.     Review of Systems   Constitutional: Positive for activity change (dec). Negative for appetite change, diaphoresis, fatigue and fever.   HENT: Negative for congestion, sinus pressure, sinus pain and trouble swallowing.    Eyes: Negative for photophobia and pain.   Respiratory: Negative for choking, shortness of breath and wheezing.    Cardiovascular: Negative for chest pain, palpitations and leg swelling.   Gastrointestinal: Negative for abdominal pain, blood in stool, constipation, diarrhea, nausea and vomiting.   Genitourinary: Negative for dysuria, hematuria and urgency.   Musculoskeletal: Positive for gait problem. Negative for arthralgias, back pain and myalgias.   Neurological: Positive for weakness. Negative for dizziness, tremors, facial asymmetry, speech difficulty and headaches.   Psychiatric/Behavioral: Negative for agitation, behavioral problems, confusion and decreased concentration.     Objective:     Vital Signs (Most Recent):  Temp: 98 °F (36.7 °C) (04/09/19 1607)  Pulse: 76 (04/09/19 1607)  Resp: 16 (04/09/19 1227)  BP: (!) 153/81 (04/09/19 1607)  SpO2: 97 % (04/09/19 1607) Vital Signs (24h Range):  Temp:  [97.5 °F (36.4 °C)-98.3 °F (36.8 °C)] 98 °F (36.7 °C)  Pulse:  [66-84] 76  Resp:  [16-18] 16  SpO2:  [94 %-100 %] 97 %  BP: (125-179)/(77-91) 153/81     Weight: 63 kg (138 lb 14.2 oz)  Body mass index is 20.51 kg/m².    Intake/Output Summary (Last 24 hours) at 4/9/2019 5418  Last data filed at 4/9/2019 1340  Gross per 24 hour   Intake 300 ml   Output 2400 ml   Net -2100 ml      Physical Exam   Constitutional: He is oriented to person, place, and time. He appears well-developed. No distress.   HENT:   Head: Normocephalic and atraumatic.   Mouth/Throat: No oropharyngeal exudate.   Eyes: Conjunctivae and EOM are normal. No scleral icterus.   Neck: Normal range of motion. Neck supple.   Cardiovascular: Normal rate, regular rhythm, normal heart sounds and intact  distal pulses.   No murmur heard.  Pulmonary/Chest: Effort normal and breath sounds normal. No respiratory distress. He has no wheezes.   Abdominal: Soft. Bowel sounds are normal. He exhibits no distension. There is no tenderness. There is no guarding.   Musculoskeletal: He exhibits no tenderness.   Right Side Rheumatological Exam      Muscle Strength (0-5 scale):  Neck Flexion:  4  Neck Extension: 4  Deltoid:  2  Biceps: 3  Triceps:  4  : 4  Iliopsoas: 3  Quadriceps:  4  Distal Lower Extremity: 2     Left Side Rheumatological Exam      Muscle Strength (0-5 scale):  Neck Flexion:  4  Neck Extension: 4  Deltoid:  3  Biceps: 4   Triceps:  4  :  4  Iliopsoas: 3  Quadriceps:  3   Distal Lower Extremity: 2     Lymphadenopathy:     He has no cervical adenopathy.   Neurological: He is alert and oriented to person, place, and time. No cranial nerve deficit. He exhibits abnormal muscle tone (decreased tone in all extremities).   Skin: Skin is warm and dry. No rash noted. He is not diaphoretic. No pallor.   Psychiatric: He has a normal mood and affect. His behavior is normal. Judgment and thought content normal.   Nursing note and vitals reviewed.      Significant Labs:   CBC:   Recent Labs   Lab 04/08/19  0516 04/09/19 0527   WBC 4.24 4.29   HGB 9.9* 9.8*   HCT 30.9* 30.5*   * 140*     CMP:   Recent Labs   Lab 04/08/19 0516 04/09/19  0527   * 137   K 3.9 3.8    105   CO2 21* 25   GLU 72 69*   BUN 21 20   CREATININE 0.5 0.5   CALCIUM 9.0 9.0   PROT 8.3 8.4   ALBUMIN 4.1 4.0   BILITOT 1.5* 1.5*   ALKPHOS 57 57   AST 10 10   ALT 9* 10   ANIONGAP 8 7*   EGFRNONAA >60.0 >60.0     Magnesium:   Recent Labs   Lab 04/08/19 0516 04/09/19  0527   MG 2.3 2.3       Significant Imaging: I have reviewed all pertinent imaging results/findings within the past 24 hours.     MRI Humerus Without Contrast Left  Narrative: EXAMINATION:  MRI HUMERUS WITHOUT CONTRAST LEFT; MRI HUMERUS WITHOUT CONTRAST RIGHT    CLINICAL  HISTORY:  myositis;; polymyositis;    TECHNIQUE:  Multiplanar, multisequence imaging of the right and left humerus was performed without IV contrast.    COMPARISON:  None    FINDINGS:  There is significant diffuse atrophy and fatty replacement of the bilateral proximal upper extremity musculature.  There is some degree of sparing of the bilateral deltoid musculature and triceps musculature, with greater residual muscle bulk on the left compared to the right.  There is mild faint edema present within the residual triceps musculature and posterior deltoid musculature.    The osseous structures demonstrate mild diffuse heterogeneity of the visualized bone marrow signal.  No evidence of acute fracture.  Evaluation of the rotator cuff tendons are limited by large field of view.  No focal fluid collections appreciated within the soft tissues.  Impression: 1.  Significant diffuse atrophy and fatty replacement of the bilateral proximal upper extremity musculature, including the rotator cuff musculature, right greater than left, in this patient with reported history of polymyositis.  There is some degree of sparing of the bilateral deltoid and triceps musculature with greater residual muscle bulk on the left compared to the right.  There is mild residual edema present within the remaining musculature, most pronounced within the triceps.    2.  Mild diffuse heterogeneity of the visualized bone marrow signal which is nonspecific and may relate to red marrow reconversion.  Clinical correlation advised.    Electronically signed by: Brandan Hess MD  Date:    04/08/2019  Time:    01:07  MRI Humerus Without Contrast Right  Narrative: EXAMINATION:  MRI HUMERUS WITHOUT CONTRAST LEFT; MRI HUMERUS WITHOUT CONTRAST RIGHT    CLINICAL HISTORY:  myositis;; polymyositis;    TECHNIQUE:  Multiplanar, multisequence imaging of the right and left humerus was performed without IV contrast.    COMPARISON:  None    FINDINGS:  There is  significant diffuse atrophy and fatty replacement of the bilateral proximal upper extremity musculature.  There is some degree of sparing of the bilateral deltoid musculature and triceps musculature, with greater residual muscle bulk on the left compared to the right.  There is mild faint edema present within the residual triceps musculature and posterior deltoid musculature.    The osseous structures demonstrate mild diffuse heterogeneity of the visualized bone marrow signal.  No evidence of acute fracture.  Evaluation of the rotator cuff tendons are limited by large field of view.  No focal fluid collections appreciated within the soft tissues.  Impression: 1.  Significant diffuse atrophy and fatty replacement of the bilateral proximal upper extremity musculature, including the rotator cuff musculature, right greater than left, in this patient with reported history of polymyositis.  There is some degree of sparing of the bilateral deltoid and triceps musculature with greater residual muscle bulk on the left compared to the right.  There is mild residual edema present within the remaining musculature, most pronounced within the triceps.    2.  Mild diffuse heterogeneity of the visualized bone marrow signal which is nonspecific and may relate to red marrow reconversion.  Clinical correlation advised.    Electronically signed by: Brandan Hess MD  Date:    04/08/2019  Time:    01:07

## 2019-04-09 NOTE — PLAN OF CARE
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]  Yes     Priority: 7     Atrial fibrillation [I48.91]  Yes     Priority: 7     Splenomegaly [R16.1]  Yes     Priority: 7     Osteopenia [M85.80]  Yes     Priority: 7     Vitamin D deficiency [E55.9]  Yes     Priority: 10     Quadriparesis (muscle weakness) [G82.50]  Yes     Priority: 25 - Low     Chronic    Impaired functional mobility and endurance [Z74.09]  Unknown    Thrombocytopenia [D69.6]  Yes    Thoracic lymphadenopathy [R59.0]  Yes      Resolved Hospital Problems    Diagnosis Date Resolved POA    Impaired functional mobility, balance, gait, and endurance [Z74.09] 04/02/2019 Yes     Chronic       Patient is homebound due to:  Polymyositis    Allergies:Review of patient's allergies indicates:  No Known Allergies    Vitals:          Every shift (Skilled Nursing patients)    Diet:  Dysphagia diet    Acitivities:      - Up in a chair each morning as tolerated   - Ambulate with assistance to bathroom   - Scheduled walks once each shift (every 8 hours)   - May ambulate independently   - May use walker, cane, or self-propelled wheelchair   - Weight bearing: as tolerated     LABS:  Per facility protocol   CMP, CBC each month for 3 months   PT-INR each week for 1 month then monthly   Pre-albumin each month for 3 months   Digoxin level in 1 month and every 6 months   TSH every  year   Dilantin level in 1 month and every 3 months   Tegretol level in 1 month and every 3 months    Phenobarbital level in 1 month and every 3 months    Nursing Precautions:     - Aspiration precautions:             - Total assistance with meals            -  Upright 90 degrees befor during and after meals             -  Suction at bedside          - Fall precautions per nursing home protocol   - Seizure precaution per longterm protocol   - Decubitus precautions:        -  for positioning   - Pressure reducing foam mattress   - Turn patient every two hours. Use wedge pillows to anchor patient    CONSULTS:      Physical Therapy to evaluate and treat     Occupational Therapy to evaluate and treat     Speech Therapy  to evaluate and treat     Nutrition to evaluate and recommend diet      Medications: Discontinue all previous medication orders, if any. See new list below.      Nura Kahn Jr.   Home Medication Instructions AURELIANO:[id number]    Printed on:04/09/19 1432   Medication Information                      albuterol (PROVENTIL/VENTOLIN HFA) 90 mcg/actuation inhaler  Inhale 2 puffs into the lungs every 6 (six) hours as needed for Wheezing or Shortness of Breath.             amLODIPine (NORVASC) 5 MG tablet  Take 2 tablets (10 mg total) by mouth once daily.             ammonium lactate 12 % Crea  Apply twice daily to affected parts both feet as needed.             ergocalciferol (ERGOCALCIFEROL) 50,000 unit Cap  Take 1 capsule (50,000 Units total) by mouth every 7 days.             famotidine (PEPCID) 20 MG tablet  Take 1 tablet (20 mg total) by mouth 2 (two) times daily.             mycophenolate (CELLCEPT) 250 mg Cap  Take 2 capsules (500 mg total) by mouth 2 (two) times daily.             predniSONE (DELTASONE) 5 MG tablet  Take 2 tablets (10 mg total) by mouth once daily.                       _________________________________  Behram Khan, MD  04/09/2019

## 2019-04-09 NOTE — PLAN OF CARE
[facility]    FACILITY TRANSFER ORDERS      Patient Name: Nura Kahn Jr.  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]  Yes     Priority: 7     Atrial fibrillation [I48.91]  Yes     Priority: 7     Splenomegaly [R16.1]  Yes     Priority: 7     Osteopenia [M85.80]  Yes     Priority: 7     Vitamin D deficiency [E55.9]  Yes     Priority: 10     Quadriparesis (muscle weakness) [G82.50]  Yes     Priority: 25 - Low     Chronic    Impaired functional mobility and endurance [Z74.09]  Unknown    Thrombocytopenia [D69.6]  Yes    Thoracic lymphadenopathy [R59.0]  Yes      Resolved Hospital Problems    Diagnosis Date Resolved POA    Impaired functional mobility, balance, gait, and endurance [Z74.09] 04/02/2019 Yes     Chronic       Allergies:Review of patient's allergies indicates:  No Known Allergies    Diet: regular diet    Activities: Activity as tolerated    Nursing: Pt requires physical therapy and lab draws.      Labs: CBC and CMP Once     CONSULTS:    Physical Therapy to evaluate and treat.  and Occupational Therapy to evaluate and treat.    MISCELLANEOUS CARE:  n/a    WOUND CARE ORDERS  None    Medications: Review discharge medications with patient and family and provide education.      Current Discharge Medication List      START taking these medications    Details   famotidine (PEPCID) 20 MG tablet Take 1 tablet (20 mg total) by mouth 2 (two) times  daily.  Qty: 60 tablet, Refills: 11      mycophenolate (CELLCEPT) 250 mg Cap Take 2 capsules (500 mg total) by mouth 2 (two) times daily.  Qty: 120 capsule, Refills: 11         CONTINUE these medications which have CHANGED    Details   amLODIPine (NORVASC) 5 MG tablet Take 2 tablets (10 mg total) by mouth once daily.  Qty: 60 tablet, Refills: 1    Associated Diagnoses: Essential hypertension         CONTINUE these medications which have NOT CHANGED    Details   albuterol (PROVENTIL/VENTOLIN HFA) 90 mcg/actuation inhaler Inhale 2 puffs into the lungs every 6 (six) hours as needed for Wheezing or Shortness of Breath.  Qty: 1 each, Refills: 11    Associated Diagnoses: Cough      ammonium lactate 12 % Crea Apply twice daily to affected parts both feet as needed.  Qty: 140 g, Refills: 11      ergocalciferol (ERGOCALCIFEROL) 50,000 unit Cap Take 1 capsule (50,000 Units total) by mouth every 7 days.  Qty: 4 capsule, Refills: 1    Associated Diagnoses: Vitamin D deficiency      predniSONE (DELTASONE) 5 MG tablet Take 2 tablets (10 mg total) by mouth once daily.  Qty: 60 tablet, Refills: 1         STOP taking these medications       azaTHIOprine (IMURAN) 50 mg Tab Comments:   Reason for Stopping:                    _________________________________  Behram Khan, MD  04/09/2019

## 2019-04-09 NOTE — PROGRESS NOTES
[facility]  Rheumatology  Progress Note    Patient Name: Nura Kahn Jr.  MRN: [id number]  Admission Date: 4/2/2019  Hospital Length of Stay: 7 days  Code Status: Full Code   Attending Provider: Óscar Patricia MD  Primary Care Physician: Socrates Ambrosio MD  Principal Problem: Polymyositis    Subjective:     HPI: 64yo M with PMH of polymyositis (dx 2009, +mildly +ku, outside biopsy proven with elevated CPK and aldolase), osteopenia, Vit D insufficiency, Hemoglobulin c/beta-zero thalaseema, A-fib, dysphagia, portal HTN, emphysema was a direct admit from rheumatology clinic.     He saw Dr. Durham and Dr. Huston in Feb 2019 - recommendation was for direct admission at that time due to the complex nature of his case with worsening strength and dysphagia.  Recommendation was to start IVIG on admission.  Patient did not go and after many attempts made by Dr. Durham, patient finally presented to the direct admission office yesterday.    Per Dr. Durham's note:    He last saw rheumatology on May 2016.  At that time, he was on prednisone 30mg and Imuran 150mg daily.  He was to have GI evaluation for dysphagia and pulmonary evaluation for GGO with pulmonary nodules.       Per chart review it seems that he first established care here at [facility] Rheumatology in 8/2014. He was diagnosed in [id number] when he was feeling weak, falling. He was a  and was unable to use his legs. He had a biopsy in [id number] in [place] that per notes was consistent with polymyositis. He was on MTX in the past which was stopped due CT showing hepatosplenomegaly with portal venous hypertension and elevated T bili in 8/2014 He was at that time started on Imuran. He has been on Imuran and steroids since then.  In 2016, he was worked up again here at [facility]. MRI of the left lower extremities showed Severe atrophy with fatty replacement of the thigh musculature bilaterally.  There is mild increased edema  "signal within the remaining thigh musculature and left iliopsoas muscle without associated enhancement which could reflect a mild degree of residual myositis.  EMG Left upper Ext 5/2016 was a technically difficult study. Had moderate ulnar entrapment at elbow, perhaps on a background of mild polyneuropathy. 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 Pathology of left thigh showing minute potion of muscle with end stage myopathic changes and focal chronic inflammation   Patient remained on Imuran and prednisone.     In Feb 2019 - patient had been out of Imuran for ~1 month.  He also admits to medication non-compliance because he didn't think he was having any improvement.  He admits that his weakness has gotten worse - stating that he is unable to stand, difficulty with getting in and out of his scooter, normal ADLs (including going to the bathroom - resulting in urination/defecation on himself - resulting in decreased PO intake).  He also feels worsening of his dysphagia - where he gets food stuck and often chokes/coughs when he eats/drinks.      Wife works fulltime.  Daughter (10 yo).  Patient does not have social support/help for transportation to various appt.    Specialist updates:  - Cardiology - Last saw 3/2015 - diagnosed with PAF.  No treatment at that time.  No follow up since  - Hem - Last saw 12/2017 - Evaluated for microcytic anemia and mild chronic thrombocytosis.  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  Discharged from clinic   - GI - Last saw 6/2015 - EGD/motility study ordered - not performed.  No follow up   - Hepatology - Last saw 5/2015 - Found portal HTN on CT with no evidence of cirrhosis or PV thrombosis.  Fibrosure with F1 stage (minimial fibrosis).  RTC PRN (no follow up)  - Pulmonary - Last saw 2/2015 -" Lung parenchymal normal - no evidence of fibrosis and one mediastinal LN marginal enlarged" CT chest showed GGO (2016). Repeat CT chest " (2017) - no mention of GGO     Imaging:  CXR 1/2018:Patchy consolidation projected over the right lower lung zone, concerning for infection, correlation advised.       CT chest 12/2017:Peribronchial cuffing predominantly in the lower lung zones as well as retained secretions in the right middle lobe segmental bronchi consistent with bronchitis; such inflammation can occur with inhaled irritants or aspiration.  There is no evidence of interstitial lung disease such as NSIP or UIP.Upper lung zone predominant paraseptal emphysema and dispersed areas of centrilobular emphysema Stable 1.2 cm AP window lymph node.  Densely calcified granuloma in the posterobasal segment of the right lower lobe.Hepatosplenomegaly with associated splenic collateral vessels at the splenic hilum suggesting sequela of portal venous hypertension.     CT pelvis 6/2017: Obturator internus edema or hematoma. No acute fracture. Stable splenomegaly.     Esophogram 4/2015: Spontaneous gastroesophageal reflux.  Otherwise, unremarkable esophagram.     PET scan 8/2014:AP window lymph node SUV max less than 2.5.  This is most likely benign/reactive.  Surveillance is recommended at 3, 6, 12, and 24 months. Splenomegaly most likely from portal hypertension        Interval History: Patient feels that weakness in bilateral arms has significantly improved since starting IVIG. Feels that he can raise his arms above his head which he was not able to do two days ago. Also able to move feet up and down which is new. Swallowing has also improved. Today is day 4/5 of IVIG. No fevers, chills, chest pain, vision changes, skin rashes.     Current Facility-Administered Medications   Medication Frequency    acetaminophen tablet 650 mg Q4H PRN    acetaminophen tablet 650 mg Daily    albuterol inhaler 2 puff Q6H PRN    alteplase injection 2 mg PRN    amLODIPine tablet 10 mg Daily    dextrose 50% injection 12.5 g PRN    dextrose 50% injection 25 g PRN     diphenhydrAMINE capsule 50 mg Daily    enoxaparin injection 40 mg Daily    ergocalciferol capsule 50,000 Units Q7 Days    famotidine tablet 20 mg BID    glucagon (human recombinant) injection 1 mg PRN    glucose chewable tablet 16 g PRN    glucose chewable tablet 24 g PRN    guaifenesin 100 mg/5 ml syrup 200 mg Q4H PRN    heparin, porcine (PF) 100 unit/mL injection flush 500 Units PRN    Immune Globulin G (IGG)-PRO-IGA 10 % injection (Privigen) 10 % injection 25 g Daily    labetalol tablet 100 mg Q8H    miconazole NITRATE 2 % top powder BID    mycophenolate capsule 500 mg BID    ondansetron disintegrating tablet 8 mg Q8H PRN    predniSONE tablet 10 mg Daily    senna-docusate 8.6-50 mg per tablet 1 tablet Daily    sodium chloride 0.9% flush 10 mL PRN    sodium chloride 0.9% flush 10 mL PRN    zolpidem tablet 5 mg Nightly PRN     Objective:     Vital Signs (Most Recent):  Temp: 97.9 °F (36.6 °C) (04/09/19 1031)  Pulse: 67 (04/09/19 1031)  Resp: 16 (04/09/19 1031)  BP: (!) 179/87 (04/09/19 1031)  SpO2: 98 % (04/09/19 1031)  O2 Device (Oxygen Therapy): room air (04/09/19 1031) Vital Signs (24h Range):  Temp:  [97.5 °F (36.4 °C)-98.8 °F (37.1 °C)] 97.9 °F (36.6 °C)  Pulse:  [67-84] 67  Resp:  [16-18] 16  SpO2:  [95 %-100 %] 98 %  BP: (125-180)/(77-94) 179/87     Weight: 63 kg (138 lb 14.2 oz) (04/09/19 0607)  Body mass index is 20.51 kg/m².  Body surface area is 1.75 meters squared.      Intake/Output Summary (Last 24 hours) at 4/9/2019 1041  Last data filed at 4/9/2019 0600  Gross per 24 hour   Intake 300 ml   Output 1900 ml   Net -1600 ml       Physical Exam   Constitutional: He is oriented to person, place, and time and well-developed, well-nourished, and in no distress.   HENT:   Head: Normocephalic and atraumatic.   Eyes: Conjunctivae and EOM are normal. Pupils are equal, round, and reactive to light.   Neck: Normal range of motion. Neck supple.   Cardiovascular: Normal rate and regular rhythm.     Pulmonary/Chest: Effort normal and breath sounds normal.   Abdominal: Soft. Bowel sounds are normal.       Right Side Rheumatological Exam     Muscle Strength (0-5 scale):  Deltoid:  4.6  Biceps: 3/5   Triceps:  3  : 4.6/5   Iliopsoas: 1  Quadriceps:  1   Distal Lower Extremity: 4.6    Left Side Rheumatological Exam     Muscle Strength (0-5 scale):  Deltoid:  4.6  Biceps: 3/5   Triceps:  3  :  4.6/5   Iliopsoas: 1  Quadriceps:  1   Distal Lower Extremity: 4.6      Neurological: He is alert and oriented to person, place, and time.   Skin: Skin is warm and dry. No erythema.     Psychiatric: Affect normal.   Musculoskeletal:   Diffuse atrophy of all muscle groups in upper and lower extremities          Significant Labs:  Results for RAQUEL EDWARDS  (MRN [id number]) as of 4/9/2019 10:42   Ref. Range 4/9/2019 05:27   WBC Latest Ref Range: 3.90 - 12.70 K/uL 4.29   RBC Latest Ref Range: 4.60 - 6.20 M/uL 4.89   Hemoglobin Latest Ref Range: 14.0 - 18.0 g/dL 9.8 (L)   Hematocrit Latest Ref Range: 40.0 - 54.0 % 30.5 (L)   MCV Latest Ref Range: 82 - 98 fL 62 (L)   MCH Latest Ref Range: 27.0 - 31.0 pg 20.0 (L)   MCHC Latest Ref Range: 32.0 - 36.0 g/dL 32.1   RDW Latest Ref Range: 11.5 - 14.5 % 20.7 (H)   Platelets Latest Ref Range: 150 - 350 K/uL 140 (L)   MPV Latest Ref Range: 9.2 - 12.9 fL SEE COMMENT   Gran% Latest Ref Range: 38.0 - 73.0 % 47.2   Gran # (ANC) Latest Ref Range: 1.8 - 7.7 K/uL 2.0   Lymph% Latest Ref Range: 18.0 - 48.0 % 42.7   Lymph # Latest Ref Range: 1.0 - 4.8 K/uL 1.8   Mono% Latest Ref Range: 4.0 - 15.0 % 8.4   Mono # Latest Ref Range: 0.3 - 1.0 K/uL 0.4   Eosinophil% Latest Ref Range: 0.0 - 8.0 % 0.7   Eos # Latest Ref Range: 0.0 - 0.5 K/uL 0.0   Basophil% Latest Ref Range: 0.0 - 1.9 % 0.5   Baso # Latest Ref Range: 0.00 - 0.20 K/uL 0.02   nRBC Latest Ref Range: 0 /100 WBC 4 (A)   Differential Method Unknown Automated   Immature Grans (Abs) Latest Ref Range: 0.00 - 0.04 K/uL 0.02   Immature  Granulocytes Latest Ref Range: 0.0 - 0.5 % 0.5   Iron Latest Ref Range: 45 - 160 ug/dL 113   TIBC Latest Ref Range: 250 - 450 ug/dL 232 (L)   Saturated Iron Latest Ref Range: 20 - 50 % 49   Transferrin Latest Ref Range: 200 - 375 mg/dL 157 (L)   Ferritin Latest Ref Range: 20.0 - 300.0 ng/mL 876 (H)   Folate Latest Ref Range: 4.0 - 24.0 ng/mL 6.8   Vitamin B-12 Latest Ref Range: 210 - 950 pg/mL 225   Results for RAQUEL EDWARDS JR. (MRN [id number]) as of 4/9/2019 10:42   Ref. Range 4/9/2019 05:27   Sodium Latest Ref Range: 136 - 145 mmol/L 137   Potassium Latest Ref Range: 3.5 - 5.1 mmol/L 3.8   Chloride Latest Ref Range: 95 - 110 mmol/L 105   CO2 Latest Ref Range: 23 - 29 mmol/L 25   Anion Gap Latest Ref Range: 8 - 16 mmol/L 7 (L)   BUN, Bld Latest Ref Range: 8 - 23 mg/dL 20   Creatinine Latest Ref Range: 0.5 - 1.4 mg/dL 0.5   eGFR if non African American Latest Ref Range: >60 mL/min/1.73 m^2 >60.0   eGFR if African American Latest Ref Range: >60 mL/min/1.73 m^2 >60.0   Glucose Latest Ref Range: 70 - 110 mg/dL 69 (L)   Calcium Latest Ref Range: 8.7 - 10.5 mg/dL 9.0   Phosphorus Latest Ref Range: 2.7 - 4.5 mg/dL 3.4   Magnesium Latest Ref Range: 1.6 - 2.6 mg/dL 2.3   Alkaline Phosphatase Latest Ref Range: 55 - 135 U/L 57   Total Protein Latest Ref Range: 6.0 - 8.4 g/dL 8.4   Albumin Latest Ref Range: 3.5 - 5.2 g/dL 4.0   Total Bilirubin Latest Ref Range: 0.1 - 1.0 mg/dL 1.5 (H)   AST Latest Ref Range: 10 - 40 U/L 10   ALT Latest Ref Range: 10 - 44 U/L 10   Vitamin B-12 Latest Ref Range: 210 - 950 pg/mL 225       Results for RAQUEL EDWARDS JR. (MRN [id number]) as of 4/8/2019 10:02   Ref. Range 8/4/2014 10:08   CRISTINA Screen Latest Ref Range: Negative <1:160  Negative <1:160   Results for RAQUEL EDWARDS JR. (MRN [id number]) as of 4/8/2019 10:02   Ref. Range 7/28/2015 09:16   Anti-Belen-1 Antibody Latest Ref Range: <20 Units <20   Anti-PM/Scl Ab Latest Ref Range: <20 Units <20   Anti-SS-A 52 kD Ab, IgG Latest Ref Range: <20  Units <20   Anti-U1-RNP  Ab Latest Ref Range: <20 Units <20   EJ Latest Ref Range: Negative  Negative   Fibrillarin (U3 RNP) Latest Ref Range: Negative  Negative   Ku Latest Ref Range: Negative  Weak Positive (A)   MDA-5 (P140) (CADM-140) Latest Ref Range: <20 Units <20   MI-2 Latest Ref Range: Negative  Negative   NXP-2 (P140) Latest Ref Range: <20 Units <20   OJ Latest Ref Range: Negative  Negative   PL-12 Latest Ref Range: Negative  Negative   PL-7 Latest Ref Range: Negative  Negative   SRP Latest Ref Range: Negative  Negative   TIF1 GAMMA (P155/140) Latest Ref Range: <20 Units <20   U2 snRNP Latest Ref Range: Negative  Negative   Results for RAQUEL EDWARDS JR. (MRN [id number]) as of 4/8/2019 10:02   Ref. Range 4/2/2019 14:56   HMGCR Antibody Latest Ref Range: 0 - 19 Units <3     Results for RAQUEL EDWARDS JR. (MRN [id number]) as of 4/8/2019 10:02   Ref. Range 4/3/2019 08:43   Specimen UA Unknown Urine, Clean Catch   Color, UA Latest Ref Range: Yellow, Straw, Nella  Yellow   pH, UA Latest Ref Range: 5.0 - 8.0  7.0   Specific Gravity, UA Latest Ref Range: 1.005 - 1.030  1.015   Appearance, UA Latest Ref Range: Clear  Clear   Protein, UA Latest Ref Range: Negative  Negative   Glucose, UA Latest Ref Range: Negative  Negative   Ketones, UA Latest Ref Range: Negative  Negative   Occult Blood UA Latest Ref Range: Negative  Negative   Nitrite, UA Latest Ref Range: Negative  Negative   Bilirubin (UA) Latest Ref Range: Negative  Negative   Leukocytes, UA Latest Ref Range: Negative  Negative     Left thigh muscle biopsy 2016:  FINAL PATHOLOGIC DIAGNOSIS  [facility] DIAGNOSIS:  MUSCLE, LEFT VASTUS LATERALIS, BIOPSY:  MINUTE PORTION OF MUSCLE WITH END-STAGE MYOPATHIC CHANGES AND FOCAL CHRONIC  INFLAMMATION (SEE NOTE).    Significant Imaging:  CT Chest without contrast 4/3/19:  Impression       1. Mild peribronchial cuffing which may be seen with bronchitis.  Mild tubular bronchiectasis of the right lower lobe  with several distal bronchi demonstrating retained secretions, finding may be seen with small airways infectious/noninfectious inflammation or aspiration.  2. Centrilobular and paraseptal emphysema with an upper lung zone predominance.  3. Diffuse muscular atrophy.  4. Splenomegaly.     MRI femurs b/l 4/6/19:  Impression       1.  Redemonstration of advanced diffuse atrophy and fatty replacement of the bilateral thigh musculature involving all compartments.  There is mild residual edema signal present within the bilateral obturator musculature and residual posterior compartment musculature, similar to prior MRI examination.    2.  Mild heterogeneity of the visualized bone marrow signal which may relate to red marrow reconversion.  Clinical correlation advised.     MRI humerus w/ and w/o contrast 4/7/19:  Impression       1.  Significant diffuse atrophy and fatty replacement of the bilateral proximal upper extremity musculature, including the rotator cuff musculature, right greater than left, in this patient with reported history of polymyositis.  There is some degree of sparing of the bilateral deltoid and triceps musculature with greater residual muscle bulk on the left compared to the right.  There is mild residual edema present within the remaining musculature, most pronounced within the triceps.    2.  Mild diffuse heterogeneity of the visualized bone marrow signal which is nonspecific and may relate to red marrow reconversion.  Clinical correlation advised.         Assessment/Plan:     * Polymyositis  Mr. Kahn is a 63 year old male with past medical history of Polymyositis  diagnosed 2009 (+mildly positive ku, outside biopsy proven, CPK here elevated to 487, aldolase elevated to 10.2 ), osteopenia, vitamin D insufficiency, hemoglobin c/beta-zero thalassema, a fib, dysphagia, portal HTN, emphysema was a direct admission from rheumatology clinic for worsening generalized muscle weakness and dysphagia.  Patient  has a history of non-compliance and was told to be a direct admit since Feb 13 but did not come until April 2 after many attempts/contact.      CT chest (12/2017) -   1. Mild peribronchial cuffing which may be seen with bronchitis.  Mild tubular bronchiectasis of the right lower lobe with several distal bronchi demonstrating retained secretions, finding may be seen with small airways infectious/noninfectious inflammation or aspiration.  2. Centrilobular and paraseptal emphysema with an upper lung zone predominance.  3. Diffuse muscular atrophy.  4. Splenomegaly.    Labs at this admission:  Normal and stable CBC and CMP.  Normal CK/aldolase.  Normal inflammatory markers.  Elevated IgG (1754). HMGCoA ab negative. Vit D: 15.    Patient continues to have progressive dysphagia and worsening muscle weakness - suggestive of severe progressive polymyositis.  Patient needs to be workup with repeat imaging to look for muscle edema (if positive, repeat muscle biopsy).  Given severe polymyositis, IVIG is warranted at this time.      MRI femurs 4/6/19:  1.  Redemonstration of advanced diffuse atrophy and fatty replacement of the bilateral thigh musculature involving all compartments.  There is mild residual edema signal present within the bilateral obturator musculature and residual posterior compartment musculature, similar to prior MRI examination.  2.  Mild heterogeneity of the visualized bone marrow signal which may relate to red marrow reconversion.  Clinical correlation advised.  Patient will also benefit from PT/OT and rehab placement for muscle strengthening after completion of IVIG.     MRI humerus b/l 4/7:  Significant diffuse atrophy and fatty replacement of the bilateral proximal upper extremity musculature, including the rotator cuff musculature, right greater than left, in this patient with reported history of polymyositis.  There is some degree of sparing of the bilateral deltoid and triceps musculature with  greater residual muscle bulk on the left compared to the right.  There is mild residual edema present within the remaining musculature, most pronounced within the triceps.    4/8/19: patient with improvement in strength involving deltoids,  strength, and distal lower extremities. Also reports improvement in swallowing. Per SLP- soft diet with thin liquids.   4/9/19: patient accepted at rehab. Started on cellcept yesterday, stopped azathioprine.     Plan:  - Pending cN1A antibody  - IVIG (0.4g/kg/day x 5 days) followed by monthly course. Day 4/5 today.   - continue Cellcept 500 mg BID  X 1 week. Please increase dose after one week to Cellcept 1000 mg twice daily. He will need to have CBC weekly x 4 and CMP monthly x 1.   - okay to f/u with Hepatology, GI and Pulmonary as outpatient. Recommend appointments for these specialities be made prior to discharge.    - continue prednisone 10 mg daily   - will check anemia labs  - nutrition consult   - daily PT/OT  - continue Vit D2 50,000 units weekly  - continue H2 blocker  - plan on IVIg monthly maintenance.   - recommendation for rehab consult - patient agreeable to rehab   - DEXA as outpatient   - will f/u with Dr. Huston and Dr. Herminio Trivedi MD  Rheumatology  [facility]      I  Have personally take the history and examined the patient and agree with fellow's note as stated above. Day #4/5 IVIg today. Started mycophenolate 500mg twice daily in place of azathioprine. Continues with PT    [facility]  Will need cbc weekly x4, cmp monthly x1  Plan to increase mycophenolate to 1000mg twice daily after one week and continue that dose  Will need monthly IVIg 1g/kg once a month  Cont prednisone 10mg daily  Will need Hepatology/ Pulmonology as outpatient  DXA as outpatient  Cont vitamin D2 50,000 units once a week

## 2019-04-09 NOTE — PLAN OF CARE
Pt has been accepted at [facility]. Pt needs to provided voided check to [facility] admissions tomorrow before he can transfer to facility. SW will continue to follow.    Trey Pena, DANIEL  [facility]  [id number]

## 2019-04-09 NOTE — PLAN OF CARE
Problem: Physical Therapy Goal  Goal: Physical Therapy Goal  Goals to be met by: 2019     Patient will increase functional independence with mobility by performin. Supine to sit with Modified [place]  2. Sit to supine with Modified [place]  3. Sit to stand transfer with supervision  4. Bed to chair transfer with supervision     Outcome: Ongoing (interventions implemented as appropriate)  Pt. Progressing towards goals

## 2019-04-09 NOTE — SUBJECTIVE & OBJECTIVE
Interval History: Patient feels that weakness in bilateral arms has significantly improved since starting IVIG. Feels that he can raise his arms above his head which he was not able to do two days ago. Also able to move feet up and down which is new. Swallowing has also improved. Today is day 4/5 of IVIG. No fevers, chills, chest pain, vision changes, skin rashes.     Current Facility-Administered Medications   Medication Frequency    acetaminophen tablet 650 mg Q4H PRN    acetaminophen tablet 650 mg Daily    albuterol inhaler 2 puff Q6H PRN    alteplase injection 2 mg PRN    amLODIPine tablet 10 mg Daily    dextrose 50% injection 12.5 g PRN    dextrose 50% injection 25 g PRN    diphenhydrAMINE capsule 50 mg Daily    enoxaparin injection 40 mg Daily    ergocalciferol capsule 50,000 Units Q7 Days    famotidine tablet 20 mg BID    glucagon (human recombinant) injection 1 mg PRN    glucose chewable tablet 16 g PRN    glucose chewable tablet 24 g PRN    guaifenesin 100 mg/5 ml syrup 200 mg Q4H PRN    heparin, porcine (PF) 100 unit/mL injection flush 500 Units PRN    Immune Globulin G (IGG)-PRO-IGA 10 % injection (Privigen) 10 % injection 25 g Daily    labetalol tablet 100 mg Q8H    miconazole NITRATE 2 % top powder BID    mycophenolate capsule 500 mg BID    ondansetron disintegrating tablet 8 mg Q8H PRN    predniSONE tablet 10 mg Daily    senna-docusate 8.6-50 mg per tablet 1 tablet Daily    sodium chloride 0.9% flush 10 mL PRN    sodium chloride 0.9% flush 10 mL PRN    zolpidem tablet 5 mg Nightly PRN     Objective:     Vital Signs (Most Recent):  Temp: 97.9 °F (36.6 °C) (04/09/19 1031)  Pulse: 67 (04/09/19 1031)  Resp: 16 (04/09/19 1031)  BP: (!) 179/87 (04/09/19 1031)  SpO2: 98 % (04/09/19 1031)  O2 Device (Oxygen Therapy): room air (04/09/19 1031) Vital Signs (24h Range):  Temp:  [97.5 °F (36.4 °C)-98.8 °F (37.1 °C)] 97.9 °F (36.6 °C)  Pulse:  [67-84] 67  Resp:  [16-18] 16  SpO2:  [95 %-100  %] 98 %  BP: (125-180)/(77-94) 179/87     Weight: 63 kg (138 lb 14.2 oz) (04/09/19 0607)  Body mass index is 20.51 kg/m².  Body surface area is 1.75 meters squared.      Intake/Output Summary (Last 24 hours) at 4/9/2019 1041  Last data filed at 4/9/2019 0600  Gross per 24 hour   Intake 300 ml   Output 1900 ml   Net -1600 ml       Physical Exam   Constitutional: He is oriented to person, place, and time and well-developed, well-nourished, and in no distress.   HENT:   Head: Normocephalic and atraumatic.   Eyes: Conjunctivae and EOM are normal. Pupils are equal, round, and reactive to light.   Neck: Normal range of motion. Neck supple.   Cardiovascular: Normal rate and regular rhythm.    Pulmonary/Chest: Effort normal and breath sounds normal.   Abdominal: Soft. Bowel sounds are normal.       Right Side Rheumatological Exam     Muscle Strength (0-5 scale):  Deltoid:  4.6  Biceps: 3/5   Triceps:  3  : 4.6/5   Iliopsoas: 1  Quadriceps:  1   Distal Lower Extremity: 4.6    Left Side Rheumatological Exam     Muscle Strength (0-5 scale):  Deltoid:  4.6  Biceps: 3/5   Triceps:  3  :  4.6/5   Iliopsoas: 1  Quadriceps:  1   Distal Lower Extremity: 4.6      Neurological: He is alert and oriented to person, place, and time.   Skin: Skin is warm and dry. No erythema.     Psychiatric: Affect normal.   Musculoskeletal:   Diffuse atrophy of all muscle groups in upper and lower extremities          Significant Labs:  Results for RAQUEL EDWARDS JR. (MRN [id number]) as of 4/9/2019 10:42   Ref. Range 4/9/2019 05:27   WBC Latest Ref Range: 3.90 - 12.70 K/uL 4.29   RBC Latest Ref Range: 4.60 - 6.20 M/uL 4.89   Hemoglobin Latest Ref Range: 14.0 - 18.0 g/dL 9.8 (L)   Hematocrit Latest Ref Range: 40.0 - 54.0 % 30.5 (L)   MCV Latest Ref Range: 82 - 98 fL 62 (L)   MCH Latest Ref Range: 27.0 - 31.0 pg 20.0 (L)   MCHC Latest Ref Range: 32.0 - 36.0 g/dL 32.1   RDW Latest Ref Range: 11.5 - 14.5 % 20.7 (H)   Platelets Latest Ref Range: 150  - 350 K/uL 140 (L)   MPV Latest Ref Range: 9.2 - 12.9 fL SEE COMMENT   Gran% Latest Ref Range: 38.0 - 73.0 % 47.2   Gran # (ANC) Latest Ref Range: 1.8 - 7.7 K/uL 2.0   Lymph% Latest Ref Range: 18.0 - 48.0 % 42.7   Lymph # Latest Ref Range: 1.0 - 4.8 K/uL 1.8   Mono% Latest Ref Range: 4.0 - 15.0 % 8.4   Mono # Latest Ref Range: 0.3 - 1.0 K/uL 0.4   Eosinophil% Latest Ref Range: 0.0 - 8.0 % 0.7   Eos # Latest Ref Range: 0.0 - 0.5 K/uL 0.0   Basophil% Latest Ref Range: 0.0 - 1.9 % 0.5   Baso # Latest Ref Range: 0.00 - 0.20 K/uL 0.02   nRBC Latest Ref Range: 0 /100 WBC 4 (A)   Differential Method Unknown Automated   Immature Grans (Abs) Latest Ref Range: 0.00 - 0.04 K/uL 0.02   Immature Granulocytes Latest Ref Range: 0.0 - 0.5 % 0.5   Iron Latest Ref Range: 45 - 160 ug/dL 113   TIBC Latest Ref Range: 250 - 450 ug/dL 232 (L)   Saturated Iron Latest Ref Range: 20 - 50 % 49   Transferrin Latest Ref Range: 200 - 375 mg/dL 157 (L)   Ferritin Latest Ref Range: 20.0 - 300.0 ng/mL 876 (H)   Folate Latest Ref Range: 4.0 - 24.0 ng/mL 6.8   Vitamin B-12 Latest Ref Range: 210 - 950 pg/mL 225   Results for RAQUEL EDWARDS JR. (MRN [id number]) as of 4/9/2019 10:42   Ref. Range 4/9/2019 05:27   Sodium Latest Ref Range: 136 - 145 mmol/L 137   Potassium Latest Ref Range: 3.5 - 5.1 mmol/L 3.8   Chloride Latest Ref Range: 95 - 110 mmol/L 105   CO2 Latest Ref Range: 23 - 29 mmol/L 25   Anion Gap Latest Ref Range: 8 - 16 mmol/L 7 (L)   BUN, Bld Latest Ref Range: 8 - 23 mg/dL 20   Creatinine Latest Ref Range: 0.5 - 1.4 mg/dL 0.5   eGFR if non African American Latest Ref Range: >60 mL/min/1.73 m^2 >60.0   eGFR if African American Latest Ref Range: >60 mL/min/1.73 m^2 >60.0   Glucose Latest Ref Range: 70 - 110 mg/dL 69 (L)   Calcium Latest Ref Range: 8.7 - 10.5 mg/dL 9.0   Phosphorus Latest Ref Range: 2.7 - 4.5 mg/dL 3.4   Magnesium Latest Ref Range: 1.6 - 2.6 mg/dL 2.3   Alkaline Phosphatase Latest Ref Range: 55 - 135 U/L 57   Total Protein  Latest Ref Range: 6.0 - 8.4 g/dL 8.4   Albumin Latest Ref Range: 3.5 - 5.2 g/dL 4.0   Total Bilirubin Latest Ref Range: 0.1 - 1.0 mg/dL 1.5 (H)   AST Latest Ref Range: 10 - 40 U/L 10   ALT Latest Ref Range: 10 - 44 U/L 10   Vitamin B-12 Latest Ref Range: 210 - 950 pg/mL 225       Results for RAQUEL EDWARDS JRNas (MRN [id number]) as of 4/8/2019 10:02   Ref. Range 8/4/2014 10:08   CRISTINA Screen Latest Ref Range: Negative <1:160  Negative <1:160   Results for JERYRRAQUEL JRNas (MRN [id number]) as of 4/8/2019 10:02   Ref. Range 7/28/2015 09:16   Anti-Belen-1 Antibody Latest Ref Range: <20 Units <20   Anti-PM/Scl Ab Latest Ref Range: <20 Units <20   Anti-SS-A 52 kD Ab, IgG Latest Ref Range: <20 Units <20   Anti-U1-RNP  Ab Latest Ref Range: <20 Units <20   EJ Latest Ref Range: Negative  Negative   Fibrillarin (U3 RNP) Latest Ref Range: Negative  Negative   Ku Latest Ref Range: Negative  Weak Positive (A)   MDA-5 (P140) (CADM-140) Latest Ref Range: <20 Units <20   MI-2 Latest Ref Range: Negative  Negative   NXP-2 (P140) Latest Ref Range: <20 Units <20   OJ Latest Ref Range: Negative  Negative   PL-12 Latest Ref Range: Negative  Negative   PL-7 Latest Ref Range: Negative  Negative   SRP Latest Ref Range: Negative  Negative   TIF1 GAMMA (P155/140) Latest Ref Range: <20 Units <20   U2 snRNP Latest Ref Range: Negative  Negative   Results for JERRY RAQUEL RIKY TENORIO (MRN [id number]) as of 4/8/2019 10:02   Ref. Range 4/2/2019 14:56   HMGCR Antibody Latest Ref Range: 0 - 19 Units <3     Results for JERRYRAQUEL RIKY TENORIO (MRN [id number]) as of 4/8/2019 10:02   Ref. Range 4/3/2019 08:43   Specimen UA Unknown Urine, Clean Catch   Color, UA Latest Ref Range: Yellow, Straw, Nella  Yellow   pH, UA Latest Ref Range: 5.0 - 8.0  7.0   Specific Gravity, UA Latest Ref Range: 1.005 - 1.030  1.015   Appearance, UA Latest Ref Range: Clear  Clear   Protein, UA Latest Ref Range: Negative  Negative   Glucose, UA Latest Ref Range: Negative  Negative   Ketones,  UA Latest Ref Range: Negative  Negative   Occult Blood UA Latest Ref Range: Negative  Negative   Nitrite, UA Latest Ref Range: Negative  Negative   Bilirubin (UA) Latest Ref Range: Negative  Negative   Leukocytes, UA Latest Ref Range: Negative  Negative     Left thigh muscle biopsy 2016:  FINAL PATHOLOGIC DIAGNOSIS  [facility] DIAGNOSIS:  MUSCLE, LEFT VASTUS LATERALIS, BIOPSY:  MINUTE PORTION OF MUSCLE WITH END-STAGE MYOPATHIC CHANGES AND FOCAL CHRONIC  INFLAMMATION (SEE NOTE).    Significant Imaging:  CT Chest without contrast 4/3/19:  Impression       1. Mild peribronchial cuffing which may be seen with bronchitis.  Mild tubular bronchiectasis of the right lower lobe with several distal bronchi demonstrating retained secretions, finding may be seen with small airways infectious/noninfectious inflammation or aspiration.  2. Centrilobular and paraseptal emphysema with an upper lung zone predominance.  3. Diffuse muscular atrophy.  4. Splenomegaly.     MRI femurs b/l 4/6/19:  Impression       1.  Redemonstration of advanced diffuse atrophy and fatty replacement of the bilateral thigh musculature involving all compartments.  There is mild residual edema signal present within the bilateral obturator musculature and residual posterior compartment musculature, similar to prior MRI examination.    2.  Mild heterogeneity of the visualized bone marrow signal which may relate to red marrow reconversion.  Clinical correlation advised.     MRI humerus w/ and w/o contrast 4/7/19:  Impression       1.  Significant diffuse atrophy and fatty replacement of the bilateral proximal upper extremity musculature, including the rotator cuff musculature, right greater than left, in this patient with reported history of polymyositis.  There is some degree of sparing of the bilateral deltoid and triceps musculature with greater residual muscle bulk on the left compared to the right.  There is mild residual edema present within  the remaining musculature, most pronounced within the triceps.    2.  Mild diffuse heterogeneity of the visualized bone marrow signal which is nonspecific and may relate to red marrow reconversion.  Clinical correlation advised.

## 2019-04-09 NOTE — ASSESSMENT & PLAN NOTE
Mr. Kahn is a 63 year old male with past medical history of Polymyositis  diagnosed 2009 (+mildly positive ku, outside biopsy proven, CPK here elevated to 487, aldolase elevated to 10.2 ), osteopenia, vitamin D insufficiency, hemoglobin c/beta-zero thalassema, a fib, dysphagia, portal HTN, emphysema was a direct admission from rheumatology clinic for worsening generalized muscle weakness and dysphagia.  Patient has a history of non-compliance and was told to be a direct admit since Feb 13 but did not come until April 2 after many attempts/contact.      CT chest (12/2017) -   1. Mild peribronchial cuffing which may be seen with bronchitis.  Mild tubular bronchiectasis of the right lower lobe with several distal bronchi demonstrating retained secretions, finding may be seen with small airways infectious/noninfectious inflammation or aspiration.  2. Centrilobular and paraseptal emphysema with an upper lung zone predominance.  3. Diffuse muscular atrophy.  4. Splenomegaly.    Labs at this admission:  Normal and stable CBC and CMP.  Normal CK/aldolase.  Normal inflammatory markers.  Elevated IgG (1754). HMGCoA ab negative. Vit D: 15.    Patient continues to have progressive dysphagia and worsening muscle weakness - suggestive of severe progressive polymyositis.  Patient needs to be workup with repeat imaging to look for muscle edema (if positive, repeat muscle biopsy).  Given severe polymyositis, IVIG is warranted at this time.      MRI femurs 4/6/19:  1.  Redemonstration of advanced diffuse atrophy and fatty replacement of the bilateral thigh musculature involving all compartments.  There is mild residual edema signal present within the bilateral obturator musculature and residual posterior compartment musculature, similar to prior MRI examination.  2.  Mild heterogeneity of the visualized bone marrow signal which may relate to red marrow reconversion.  Clinical correlation advised.  Patient will also benefit from  PT/OT and rehab placement for muscle strengthening after completion of IVIG.     MRI humerus b/l 4/7:  Significant diffuse atrophy and fatty replacement of the bilateral proximal upper extremity musculature, including the rotator cuff musculature, right greater than left, in this patient with reported history of polymyositis.  There is some degree of sparing of the bilateral deltoid and triceps musculature with greater residual muscle bulk on the left compared to the right.  There is mild residual edema present within the remaining musculature, most pronounced within the triceps.    4/8/19: patient with improvement in strength involving deltoids,  strength, and distal lower extremities. Also reports improvement in swallowing. Per SLP- soft diet with thin liquids.   4/9/19: patient accepted at rehab    Plan:  - Pending cN1A antibody  - IVIG (0.4g/kg/day x 5 days) followed by monthly course. Day 4/5 today.   - would stop Azathioprine at this time and start on Mycophenolate 500 mg twice daily. Will give ACR handout.  - okay to f/u with Hepatology, GI and Pulmonary as outpatient. Recommend appointments for these specialities be made prior to discharge.  - will check anemia labs  - nutrition consult   - daily PT/OT  - continue prednisone 10 mg daily   - continue Vit D2 50,000 units weekly  - continue H2 blocker  - plan on IVIg monthly maintenance.   - recommendation for rehab consult - patient agreeable to rehab   - DEXA as outpatient

## 2019-04-10 VITALS
DIASTOLIC BLOOD PRESSURE: 83 MMHG | TEMPERATURE: 98 F | HEIGHT: 69 IN | WEIGHT: 138.88 LBS | BODY MASS INDEX: 20.57 KG/M2 | RESPIRATION RATE: 16 BRPM | SYSTOLIC BLOOD PRESSURE: 164 MMHG | OXYGEN SATURATION: 100 % | HEART RATE: 77 BPM

## 2019-04-10 LAB
ALBUMIN SERPL BCP-MCNC: 4 G/DL (ref 3.5–5.2)
ALP SERPL-CCNC: 56 U/L (ref 55–135)
ALT SERPL W/O P-5'-P-CCNC: 12 U/L (ref 10–44)
ANION GAP SERPL CALC-SCNC: 7 MMOL/L (ref 8–16)
AST SERPL-CCNC: 12 U/L (ref 10–40)
BASOPHILS # BLD AUTO: 0.01 K/UL (ref 0–0.2)
BASOPHILS NFR BLD: 0.2 % (ref 0–1.9)
BILIRUB SERPL-MCNC: 1.4 MG/DL (ref 0.1–1)
BUN SERPL-MCNC: 18 MG/DL (ref 8–23)
CALCIUM SERPL-MCNC: 9.2 MG/DL (ref 8.7–10.5)
CHLORIDE SERPL-SCNC: 102 MMOL/L (ref 95–110)
CO2 SERPL-SCNC: 25 MMOL/L (ref 23–29)
CREAT SERPL-MCNC: 0.4 MG/DL (ref 0.5–1.4)
DIFFERENTIAL METHOD: ABNORMAL
EOSINOPHIL # BLD AUTO: 0 K/UL (ref 0–0.5)
EOSINOPHIL NFR BLD: 0.5 % (ref 0–8)
ERYTHROCYTE [DISTWIDTH] IN BLOOD BY AUTOMATED COUNT: 20.4 % (ref 11.5–14.5)
EST. GFR  (AFRICAN AMERICAN): >60 ML/MIN/1.73 M^2
EST. GFR  (NON AFRICAN AMERICAN): >60 ML/MIN/1.73 M^2
GLUCOSE SERPL-MCNC: 68 MG/DL (ref 70–110)
HCT VFR BLD AUTO: 28.8 % (ref 40–54)
HGB BLD-MCNC: 9.4 G/DL (ref 14–18)
IMM GRANULOCYTES # BLD AUTO: 0.01 K/UL (ref 0–0.04)
IMM GRANULOCYTES NFR BLD AUTO: 0.2 % (ref 0–0.5)
LYMPHOCYTES # BLD AUTO: 1.8 K/UL (ref 1–4.8)
LYMPHOCYTES NFR BLD: 44.4 % (ref 18–48)
MAGNESIUM SERPL-MCNC: 2.2 MG/DL (ref 1.6–2.6)
MCH RBC QN AUTO: 19.9 PG (ref 27–31)
MCHC RBC AUTO-ENTMCNC: 32.6 G/DL (ref 32–36)
MCV RBC AUTO: 61 FL (ref 82–98)
MONOCYTES # BLD AUTO: 0.4 K/UL (ref 0.3–1)
MONOCYTES NFR BLD: 9 % (ref 4–15)
NEUTROPHILS # BLD AUTO: 1.9 K/UL (ref 1.8–7.7)
NEUTROPHILS NFR BLD: 45.7 % (ref 38–73)
NRBC BLD-RTO: 5 /100 WBC
PHOSPHATE SERPL-MCNC: 3.3 MG/DL (ref 2.7–4.5)
PLATELET # BLD AUTO: 138 K/UL (ref 150–350)
PMV BLD AUTO: ABNORMAL FL (ref 9.2–12.9)
POCT GLUCOSE: 150 MG/DL (ref 70–110)
POTASSIUM SERPL-SCNC: 3.7 MMOL/L (ref 3.5–5.1)
PROT SERPL-MCNC: 8.5 G/DL (ref 6–8.4)
RBC # BLD AUTO: 4.73 M/UL (ref 4.6–6.2)
SODIUM SERPL-SCNC: 134 MMOL/L (ref 136–145)
STRONGYLOIDES ANTIBODY IGG: NEGATIVE
VARICELLA INTERPRETATION: POSITIVE
VARICELLA ZOSTER IGG: 2.91 ISR (ref 0–0.9)
WBC # BLD AUTO: 4.1 K/UL (ref 3.9–12.7)

## 2019-04-10 PROCEDURE — 63600175 PHARM REV CODE 636 W HCPCS: Performed by: STUDENT IN AN ORGANIZED HEALTH CARE EDUCATION/TRAINING PROGRAM

## 2019-04-10 PROCEDURE — 99239 HOSP IP/OBS DSCHRG MGMT >30: CPT | Mod: GC,,, | Performed by: HOSPITALIST

## 2019-04-10 PROCEDURE — 82784 ASSAY IGA/IGD/IGG/IGM EACH: CPT

## 2019-04-10 PROCEDURE — 97110 THERAPEUTIC EXERCISES: CPT

## 2019-04-10 PROCEDURE — 63600175 PHARM REV CODE 636 W HCPCS: Mod: JG | Performed by: INTERNAL MEDICINE

## 2019-04-10 PROCEDURE — 99231 SBSQ HOSP IP/OBS SF/LOW 25: CPT | Mod: GC,,, | Performed by: INTERNAL MEDICINE

## 2019-04-10 PROCEDURE — 83735 ASSAY OF MAGNESIUM: CPT

## 2019-04-10 PROCEDURE — 99231 PR SUBSEQUENT HOSPITAL CARE,LEVL I: ICD-10-PCS | Mod: GC,,, | Performed by: INTERNAL MEDICINE

## 2019-04-10 PROCEDURE — 36415 COLL VENOUS BLD VENIPUNCTURE: CPT

## 2019-04-10 PROCEDURE — 25000003 PHARM REV CODE 250: Performed by: STUDENT IN AN ORGANIZED HEALTH CARE EDUCATION/TRAINING PROGRAM

## 2019-04-10 PROCEDURE — 80053 COMPREHEN METABOLIC PANEL: CPT

## 2019-04-10 PROCEDURE — 97530 THERAPEUTIC ACTIVITIES: CPT

## 2019-04-10 PROCEDURE — 85025 COMPLETE CBC W/AUTO DIFF WBC: CPT

## 2019-04-10 PROCEDURE — 63600175 PHARM REV CODE 636 W HCPCS: Performed by: HOSPITALIST

## 2019-04-10 PROCEDURE — 84100 ASSAY OF PHOSPHORUS: CPT

## 2019-04-10 PROCEDURE — 25000003 PHARM REV CODE 250: Performed by: INTERNAL MEDICINE

## 2019-04-10 PROCEDURE — 99239 PR HOSPITAL DISCHARGE DAY,>30 MIN: ICD-10-PCS | Mod: GC,,, | Performed by: HOSPITALIST

## 2019-04-10 PROCEDURE — 25000003 PHARM REV CODE 250: Performed by: HOSPITALIST

## 2019-04-10 RX ORDER — FAMOTIDINE 20 MG/1
20 TABLET, FILM COATED ORAL 2 TIMES DAILY
Qty: 60 TABLET | Refills: 11
Start: 2019-04-10 | End: 2020-07-07

## 2019-04-10 RX ORDER — LABETALOL 100 MG/1
100 TABLET, FILM COATED ORAL EVERY 12 HOURS
Qty: 60 TABLET | Refills: 2
Start: 2019-04-10 | End: 2019-08-07 | Stop reason: SDUPTHER

## 2019-04-10 RX ORDER — MYCOPHENOLATE MOFETIL 250 MG/1
CAPSULE ORAL
Qty: 500 CAPSULE | Refills: 0
Start: 2019-04-10 | End: 2019-04-18 | Stop reason: SDUPTHER

## 2019-04-10 RX ORDER — AMLODIPINE BESYLATE 5 MG/1
10 TABLET ORAL DAILY
Qty: 60 TABLET | Refills: 1
Start: 2019-04-10 | End: 2019-08-07 | Stop reason: SDUPTHER

## 2019-04-10 RX ADMIN — LABETALOL HYDROCHLORIDE 100 MG: 100 TABLET, FILM COATED ORAL at 01:04

## 2019-04-10 RX ADMIN — STANDARDIZED SENNA CONCENTRATE AND DOCUSATE SODIUM 1 TABLET: 8.6; 5 TABLET, FILM COATED ORAL at 09:04

## 2019-04-10 RX ADMIN — MICONAZOLE NITRATE: 20 POWDER TOPICAL at 09:04

## 2019-04-10 RX ADMIN — AMLODIPINE BESYLATE 10 MG: 10 TABLET ORAL at 09:04

## 2019-04-10 RX ADMIN — FAMOTIDINE 20 MG: 20 TABLET ORAL at 09:04

## 2019-04-10 RX ADMIN — PREDNISONE 10 MG: 10 TABLET ORAL at 09:04

## 2019-04-10 RX ADMIN — HYDROCHLOROTHIAZIDE 12.5 MG: 12.5 TABLET ORAL at 09:04

## 2019-04-10 RX ADMIN — HUMAN IMMUNOGLOBULIN G 25 G: 20 LIQUID INTRAVENOUS at 09:04

## 2019-04-10 RX ADMIN — MYCOPHENOLATE MOFETIL 500 MG: 250 CAPSULE ORAL at 09:04

## 2019-04-10 RX ADMIN — DIPHENHYDRAMINE HYDROCHLORIDE 50 MG: 25 CAPSULE ORAL at 09:04

## 2019-04-10 RX ADMIN — LABETALOL HYDROCHLORIDE 100 MG: 100 TABLET, FILM COATED ORAL at 05:04

## 2019-04-10 RX ADMIN — ERGOCALCIFEROL 50000 UNITS: 1.25 CAPSULE ORAL at 09:04

## 2019-04-10 RX ADMIN — ACETAMINOPHEN 650 MG: 325 TABLET ORAL at 09:04

## 2019-04-10 NOTE — PLAN OF CARE
04/10/19 1609   Final Note   Assessment Type Final Discharge Note   Anticipated Discharge Disposition Home-Health   What phone number can be called within the next 1-3 days to see how you are doing after discharge? [phone]   Hospital Follow Up  Appt(s) scheduled? Yes   Discharge plans and expectations educations in teach back method with documentation complete? Yes   Right Care Referral Info   Post Acute Recommendation Home-care   Referral Type Home Health    Facility Name [facility]      Future Appointments   Date Time Provider Department Center   4/22/2019  2:00 PM Jesse Bosch MD [facility]   5/27/2019  9:30 AM Socrates Ambrosio MD [facility]

## 2019-04-10 NOTE — PLAN OF CARE
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]  Yes    Immunosuppression [D89.9]  Yes     Chronic    Thrombocytopenia [D69.6]  Yes    Atrial fibrillation [I48.91]  Yes    Splenomegaly [R16.1]  Yes    Osteopenia [M85.80]  Yes    Quadriparesis (muscle weakness) [G82.50]  Yes     Chronic    Vitamin D deficiency [E55.9]  Yes    Thoracic lymphadenopathy [R59.0]  Yes    Oropharyngeal dysphagia [R13.12]  Yes      Resolved Hospital Problems    Diagnosis Date Resolved POA    Impaired functional mobility, balance, gait, and endurance [Z74.09] 04/02/2019 Yes     Chronic       Patient is homebound due to:  Polymyositis    Allergies:Review of patient's allergies indicates:  No Known Allergies    Vitals:      Every shift (Skilled Nursing patients)    Diet:   Supplement:  1 can every three times a day with meals                         Type:  House          Acitivities:     - Up in a chair each morning as tolerated   - Ambulate with assistance to bathroom   - May use walker, cane, or self-propelled wheelchair    LABS: CBC every week   CMP every month       Nursing Precautions:    - Aspiration precautions:             - Total assistance with meals            -  Upright 90 degrees befor during and after meals             -  Suction at bedside          - Fall precautions per nursing home protocol   - Seizure precaution per assisted protocol   - Decubitus precautions:        -  for positioning   - Pressure reducing foam  mattress   - Turn patient every two hours. Use wedge pillows to anchor patient    CONSULTS:     Physical Therapy to evaluate and treat     Occupational Therapy to evaluate and treat     Speech Therapy  to evaluate and treat     Nutrition to evaluate and recommend diet    MISCELLANEOUS CARE:      Routine Skin for Bedridden Patients:  Apply moisture barrier cream to all    skin folds and wet areas in perineal area daily and after baths and                           all bowel movements.        Medications: Discontinue all previous medication orders, if any. See new list below.     Nura Kahn Jr.   Home Medication Instructions AURELIANO:[id number]    Printed on:04/10/19 1119   Medication Information                      albuterol (PROVENTIL/VENTOLIN HFA) 90 mcg/actuation inhaler  Inhale 2 puffs into the lungs every 6 (six) hours as needed for Wheezing or Shortness of Breath.             amLODIPine (NORVASC) 5 MG tablet  Take 2 tablets (10 mg total) by mouth once daily.             ammonium lactate 12 % Crea  Apply twice daily to affected parts both feet as needed.             ergocalciferol (ERGOCALCIFEROL) 50,000 unit Cap  Take 1 capsule (50,000 Units total) by mouth every 7 days.             famotidine (PEPCID) 20 MG tablet  Take 1 tablet (20 mg total) by mouth 2 (two) times daily.             labetalol (NORMODYNE) 100 MG tablet  Take 1 tablet (100 mg total) by mouth every 12 (twelve) hours.             mycophenolate (CELLCEPT) 250 mg Cap  Take 2 capsules (500 mg total) by mouth 2 (two) times daily for 5 days, THEN 4 capsules (1,000 mg total) 2 (two) times daily.             predniSONE (DELTASONE) 5 MG tablet  Take 2 tablets (10 mg total) by mouth once daily.                       _________________________________  Maria L Chambers MD  04/10/2019

## 2019-04-10 NOTE — ASSESSMENT & PLAN NOTE
Mr. Kahn is a 63 year old male with past medical history of Polymyositis  diagnosed 2009 (+mildly positive ku, outside biopsy proven, CPK here elevated to 487, aldolase elevated to 10.2 ), osteopenia, vitamin D insufficiency, hemoglobin c/beta-zero thalassema, a fib, dysphagia, portal HTN, emphysema was a direct admission from rheumatology clinic for worsening generalized muscle weakness and dysphagia.  Patient has a history of non-compliance and was told to be a direct admit since Feb 13 but did not come until April 2 after many attempts/contact.      CT chest (12/2017) -   1. Mild peribronchial cuffing which may be seen with bronchitis.  Mild tubular bronchiectasis of the right lower lobe with several distal bronchi demonstrating retained secretions, finding may be seen with small airways infectious/noninfectious inflammation or aspiration.  2. Centrilobular and paraseptal emphysema with an upper lung zone predominance.  3. Diffuse muscular atrophy.  4. Splenomegaly.    Labs at this admission:  Normal and stable CBC and CMP.  Normal CK/aldolase.  Normal inflammatory markers.  Elevated IgG (1754). HMGCoA ab negative. Vit D: 15.    Patient continues to have progressive dysphagia and worsening muscle weakness - suggestive of severe progressive polymyositis.  Patient needs to be workup with repeat imaging to look for muscle edema (if positive, repeat muscle biopsy).  Given severe polymyositis, IVIG is warranted at this time.      MRI femurs 4/6/19:  1.  Redemonstration of advanced diffuse atrophy and fatty replacement of the bilateral thigh musculature involving all compartments.  There is mild residual edema signal present within the bilateral obturator musculature and residual posterior compartment musculature, similar to prior MRI examination.  2.  Mild heterogeneity of the visualized bone marrow signal which may relate to red marrow reconversion.  Clinical correlation advised.  Patient will also benefit from  PT/OT and rehab placement for muscle strengthening after completion of IVIG.     MRI humerus b/l 4/7:  Significant diffuse atrophy and fatty replacement of the bilateral proximal upper extremity musculature, including the rotator cuff musculature, right greater than left, in this patient with reported history of polymyositis.  There is some degree of sparing of the bilateral deltoid and triceps musculature with greater residual muscle bulk on the left compared to the right.  There is mild residual edema present within the remaining musculature, most pronounced within the triceps.    4/8/19: patient with improvement in strength involving deltoids,  strength, and distal lower extremities. Also reports improvement in swallowing. Per SLP- soft diet with thin liquids.   4/9/19: patient accepted at rehab. Started on cellcept yesterday, stopped azathioprine.      Plan:  - Pending cN1A antibody  - IVIG (0.4g/kg/day x 5 days) followed by monthly course. Day 5/5 today.   - continue Cellcept 500 mg BID  X 1 week. Please increase dose after one week to Cellcept 1000 mg twice daily. He will need to have CBC weekly x 4 and CMP monthly x 1.   - okay to f/u with Hepatology, GI and Pulmonary as outpatient. Recommend appointments for these specialities be made prior to discharge.             - continue prednisone 10 mg daily   - nutrition consult   - daily PT/OT  - continue Vit D2 50,000 units weekly  - continue H2 blocker   - plan on IVIg monthly maintenance.   - DEXA as outpatient   - will f/u with Dr. Huston and Dr. Durham in Rheum clinic on 5/8/19 at 1:30PM

## 2019-04-10 NOTE — DISCHARGE SUMMARY
[facility] Hospital Medicine  Discharge Summary      Patient Name: Nura Kahn Jr.  MRN: [id number]  Admission Date: 4/2/2019  Hospital Length of Stay: 8 days  Discharge Date and Time:  04/10/2019 6:02 PM  Attending Physician: Annita att. providers found   Discharging Provider: Maria L Chambers MD  Primary Care Provider: Socrates Ambrosio MD  [facility] Medicine Team: [facility] HOSP MED 2 Maria L Chambers MD    HPI:   Mr. Kahn is a 63M with polymyositis (Dx 2009, Bx proven), osteopenia, vit D insufficiency, thalassemia, dysphagia, afib, HTN, emphysema who presents as a direct admit for workup of progressive weakness in his extremities. Workup from 2016 showed end-stage myopathic changes and focal chronic inflammation. He was last seen by rheumatology 2/13/2019 with plans for direct admit for IVIG therapy; however, patient did not present as it was Williamsons day. Today, he reports no major changes since February. Muscle weakness has been progressive for the past several years. He currently uses a scooter and requires increasing levels of assistance to move from recliner to scooter. As a result of his immobility, he wears a diaper as he is unable to make it to the restroom for bowel and urinary habits. His support system at home is lacking; he is sliding to move himself from chair to scooter. His significant other has not been supportive with increasing decline in his status. Dysphagia has been stable; patient reports that difficulty swallowing is associated with positioning. When he does have trouble, it usually feels like food is stuck at the top of his esophagus, not that it is stuck on the way down. Sometimes he chokes with water intake. Dysphagia is least notable when sitting upright. He reports no pain or aches in his muscles. Denies fevers, chills, joint pains, blood in his stool or urine. He endorses some depression regarding his condition and inability to partake in activities he  enjoys such as fishing.    Procedure(s) (LRB):  COLONOSCOPY (N/A)      Hospital Course:   Patient admitted to hospital medicine for initiation of IVIG. He has agreed to be placed in SNF after completion of therapy 2/2 poor social support at home. Rheumatology has placed IVIG orders. MRI femur and humerus with signs of diffuse atrophy consistent with disease process. Patient tolerating IVIG well with improvement in strength after first two doses. Tolerated remaining 3 doses fine. Discharged after 5/5 IVIG treatments with overall improvement. Patient refused SNF placement and was discharged home with home health.     Physical Exam   Constitutional: He is oriented to person, place, and time. He appears well-developed. No distress.   HENT:   Head: Normocephalic and atraumatic.   Mouth/Throat: No oropharyngeal exudate.   Eyes: Conjunctivae and EOM are normal. No scleral icterus.   Neck: Normal range of motion. Neck supple.   Cardiovascular: Normal rate, regular rhythm, normal heart sounds and intact distal pulses.   No murmur heard.  Pulmonary/Chest: Effort normal and breath sounds normal. No respiratory distress. He has no wheezes.   Abdominal: Soft. Bowel sounds are normal. He exhibits no distension. There is no tenderness. There is no guarding.   Musculoskeletal: He exhibits no tenderness.   Right Side Rheumatological Exam      Muscle Strength (0-5 scale):  Neck Flexion:  4  Neck Extension: 4  Deltoid:  2  Biceps: 3  Triceps:  4  : 4  Iliopsoas: 3  Quadriceps:  4  Distal Lower Extremity: 2     Left Side Rheumatological Exam      Muscle Strength (0-5 scale):  Neck Flexion:  4  Neck Extension: 4  Deltoid:  3  Biceps: 4   Triceps:  4  :  4  Iliopsoas: 3  Quadriceps:  3   Distal Lower Extremity: 2     Lymphadenopathy:     He has no cervical adenopathy.   Neurological: He is alert and oriented to person, place, and time. No cranial nerve deficit. He exhibits abnormal muscle tone (decreased tone in all extremities).    Skin: Skin is warm and dry. No rash noted. He is not diaphoretic. No pallor.   Psychiatric: He has a normal mood and affect. His behavior is normal. Judgment and thought content normal.   Nursing note and vitals reviewed.        Consults:   Consults (From admission, onward)        Status Ordering Provider     Inpatient consult to Physical Medicine Rehab  Once     Provider:  (Not yet assigned)    Completed BRYCE GAYTAN     Inpatient consult to Registered Dietitian/Nutritionist  Once     Provider:  (Not yet assigned)    Completed BRYCE GAYTAN     Inpatient consult to Rheumatology  Once     Provider:  (Not yet assigned)    Completed BRYCE GAYTAN     Inpatient consult to Social Work  Once     Provider:  (Not yet assigned)    Acknowledged BRYCE GAYTAN     Inpatient consult to Spiritual Care  Once     Provider:  (Not yet assigned)    Completed PARI CARBALLO [facility]     Inpatient consult to Spiritual Care  Once     Provider:  (Not yet assigned)    Completed TAE BISWAS          * Polymyositis  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  Last full workup in 2016 with evidence of progression of disease  Direct admit for management by rheumatology with IVIG  Weakness in all extremities, uses scooter for mobilization  Does not have a good social support system at home - will require post acute care for this reason  Initial labs negative except IgG elevated at 1754  MRI femur do not show progression of disease compared to prior study 2016; MRI humerus with signs of diffuse atrophy and fatty replacement  - Rheumatology consulted - appreciate recs and guidance of therapy  - s/p initiation of IVIG treatment - day 3 of 5 today  - Continuing imuran and prednisone currently  - f/u initial labs HMG-CoA reductase antibody, CN1A Ab  - PT/OT and PM&R  - Completed 5/5 IVIG treatments      Essential hypertension  - Continue norvasc 10mg daily  - Monitor BP      Hemoglobin C disease  -  "Monitor CBC - currently stable      Immunosuppression  Currently on imuran 150mg daily and prednisone 10mg daily.  - Continue both meds      Atrial fibrillation  Currently rate and rhythm controlled      Osteopenia  Likely 2/2 long term prednisone treatment  Stable currently      Splenomegaly  US abdo 4/3 shows "cholelithiasis with no evidence of cholecystitis. Splenomegaly."  Not currently causing complications        Quadriparesis (muscle weakness)  See plan for "polymyositis"      Vitamin D deficiency  - Continue ergocalciferol qweekly      Oropharyngeal dysphagia  Reports difficulty with both solids and liquids, but largely dependent on positioning of patient (e.g. Sitting up vs laying in recliner)  Sometimes experiences choking with water  - Per SLP recs - soft diet with thin liquids  - Will consider GI consult if dysphagia worsens  - Nutrition consult - add boost plus        Final Active Diagnoses:    Diagnosis Date Noted POA    PRINCIPAL PROBLEM:  Polymyositis [M33.20] 08/01/2014 Yes     Chronic    Impaired functional mobility and endurance [Z74.09] 04/04/2019 Unknown    Essential hypertension [I10] 02/27/2019 Yes    Hemoglobin C disease [D58.2] 12/21/2017 Yes    Immunosuppression [D89.9] 03/23/2016 Yes     Chronic    Thrombocytopenia [D69.6] 03/03/2015 Yes    Atrial fibrillation [I48.91] 03/02/2015 Yes    Splenomegaly [R16.1] 02/11/2015 Yes    Osteopenia [M85.80] 02/11/2015 Yes    Quadriparesis (muscle weakness) [G82.50] 09/25/2014 Yes     Chronic    Vitamin D deficiency [E55.9] 09/25/2014 Yes    Thoracic lymphadenopathy [R59.0] 09/25/2014 Yes    Oropharyngeal dysphagia [R13.12] 08/01/2014 Yes      Problems Resolved During this Admission:    Diagnosis Date Noted Date Resolved POA    Impaired functional mobility, balance, gait, and endurance [Z74.09] 03/28/2019 04/02/2019 Yes     Chronic       Discharged Condition: good    Disposition: Skilled Nursing Facility    Follow Up:  Follow-up " Information     Socrates Ambrosio MD In 1 week.    Specialty:  Family Medicine  Contact information:  [address] [facility] [facility] [id number] [phone]             Nura Huston MD In 3 days.    Specialty:  Rheumatology  Contact information:  [address] [facility] [id number] [phone]             [facility] - Gastroenterology.    Specialty:  Gastroenterology  Contact information:  [address] [facility] [id number] [phone]  Additional information:  [facility] - 4th Floor           [facility] - Rheumatology.    Specialty:  Rheumatology  Contact information:  [address] [address] [facility] [id number] [phone]  Additional information:  [facility] - 5th Floor           [facility].    Specialty:  Speech Therapy  Contact information:  [address] [facility] [id number] [phone]  Additional information:  [facility] [place] - 4th Floor               Patient Instructions:      MRI Femur W WO Contrast Left   Standing Status: Future Standing Exp. Date: 04/03/20     Order Specific Question Answer Comments   Does the patient have a pacemaker or a defibrilator? No    Does the patient have a cerebral aneurysm or surgical clip, pump, nerve or brain stimulator, middle or inner ear prosthesis, or other metal implant or  been injured by a metal object(i.e. bullet, bb, shrapnel)? No    Is the patient claustrophobic? No    Will the patient require sedation? No    Does the patient have any of the following conditions? Diabetes, History of Renal Disease or Hypertension requiring medical therapy? No    May the Radiologist modify the order per protocol to meet the clinical needs of the patient? Yes    Is this part of a Research Study? No    Recist criteria? No    Will this service be billed to a Worker's Comp policy? No    Does the patient have on a skin patch for medication with aluminized backing? No      MRI Femur W WO Contrast Right   Standing Status:  Future Standing Exp. Date: 04/03/20     Order Specific Question Answer Comments   Does the patient have a pacemaker or a defibrilator? No    Does the patient have a cerebral aneurysm or surgical clip, pump, nerve or brain stimulator, middle or inner ear prosthesis, or other metal implant or  been injured by a metal object(i.e. bullet, bb, shrapnel)? No    Is the patient claustrophobic? No    Will the patient require sedation? No    Does the patient have any of the following conditions? Diabetes, History of Renal Disease or Hypertension requiring medical therapy? No    May the Radiologist modify the order per protocol to meet the clinical needs of the patient? Yes    Is this part of a Research Study? No    Recist criteria? No    Will this service be billed to a Worker's Comp policy? No    Does the patient have on a skin patch for medication with aluminized backing? No      CT Chest Without Contrast   Standing Status: Future Standing Exp. Date: 04/03/20     Order Specific Question Answer Comments   May the Radiologist modify the order per protocol to meet the clinical needs of the patient? Yes      CBC auto differential   Standing Status: Standing Number of Occurrences: 8 Standing Exp. Date: 06/08/20     Comprehensive metabolic panel   Standing Status: Standing Number of Occurrences: 2 Standing Exp. Date: 06/08/20     Ambulatory consult to Gastroenterology   Referral Priority: Routine Referral Type: Consultation   Referral Reason: Specialty Services Required   Requested Specialty: Gastroenterology   Number of Visits Requested: 1     Ambulatory Referral to Rheumatology   Referral Priority: Routine Referral Type: Consultation   Referral Reason: Specialty Services Required   Requested Specialty: Rheumatology   Number of Visits Requested: 1     Ambulatory Referral to Pulmonology   Referral Priority: Routine Referral Type: Consultation   Referral Reason: Specialty Services Required   Requested Specialty: Pulmonary Disease    Number of Visits Requested: 1     Ambulatory consult to Gastroenterology   Referral Priority: Routine Referral Type: Consultation   Referral Reason: Specialty Services Required   Requested Specialty: Gastroenterology   Number of Visits Requested: 1     Diet Adult Regular     Notify your health care provider if you experience any of the following:  temperature >100.4     Notify your health care provider if you experience any of the following:  persistent nausea and vomiting or diarrhea     Notify your health care provider if you experience any of the following:  severe uncontrolled pain     Notify your health care provider if you experience any of the following:  difficulty breathing or increased cough     Notify your health care provider if you experience any of the following:  severe persistent headache     Notify your health care provider if you experience any of the following:  worsening rash     Notify your health care provider if you experience any of the following:  persistent dizziness, light-headedness, or visual disturbances     Notify your health care provider if you experience any of the following:  increased confusion or weakness     SLP eval of swallow & oral function   Standing Status: Future Standing Exp. Date: 04/03/20     Activity as tolerated       Significant Diagnostic Studies: Labs:   CMP   Recent Labs   Lab 04/09/19  0527 04/10/19  0353    134*   K 3.8 3.7    102   CO2 25 25   GLU 69* 68*   BUN 20 18   CREATININE 0.5 0.4*   CALCIUM 9.0 9.2   PROT 8.4 8.5*   ALBUMIN 4.0 4.0   BILITOT 1.5* 1.4*   ALKPHOS 57 56   AST 10 12   ALT 10 12   ANIONGAP 7* 7*   ESTGFRAFRICA >60.0 >60.0   EGFRNONAA >60.0 >60.0    and CBC   Recent Labs   Lab 04/09/19  0527 04/10/19  0353   WBC 4.29 4.10   HGB 9.8* 9.4*   HCT 30.5* 28.8*   * 138*     MRI Humerus Without Contrast Left  Narrative: EXAMINATION:  MRI HUMERUS WITHOUT CONTRAST LEFT; MRI HUMERUS WITHOUT CONTRAST RIGHT    CLINICAL  HISTORY:  myositis;; polymyositis;    TECHNIQUE:  Multiplanar, multisequence imaging of the right and left humerus was performed without IV contrast.    COMPARISON:  None    FINDINGS:  There is significant diffuse atrophy and fatty replacement of the bilateral proximal upper extremity musculature.  There is some degree of sparing of the bilateral deltoid musculature and triceps musculature, with greater residual muscle bulk on the left compared to the right.  There is mild faint edema present within the residual triceps musculature and posterior deltoid musculature.    The osseous structures demonstrate mild diffuse heterogeneity of the visualized bone marrow signal.  No evidence of acute fracture.  Evaluation of the rotator cuff tendons are limited by large field of view.  No focal fluid collections appreciated within the soft tissues.  Impression: 1.  Significant diffuse atrophy and fatty replacement of the bilateral proximal upper extremity musculature, including the rotator cuff musculature, right greater than left, in this patient with reported history of polymyositis.  There is some degree of sparing of the bilateral deltoid and triceps musculature with greater residual muscle bulk on the left compared to the right.  There is mild residual edema present within the remaining musculature, most pronounced within the triceps.    2.  Mild diffuse heterogeneity of the visualized bone marrow signal which is nonspecific and may relate to red marrow reconversion.  Clinical correlation advised.    Electronically signed by: Brandan Hess MD  Date:    04/08/2019  Time:    01:07  MRI Humerus Without Contrast Right  Narrative: EXAMINATION:  MRI HUMERUS WITHOUT CONTRAST LEFT; MRI HUMERUS WITHOUT CONTRAST RIGHT    CLINICAL HISTORY:  myositis;; polymyositis;    TECHNIQUE:  Multiplanar, multisequence imaging of the right and left humerus was performed without IV contrast.    COMPARISON:  None    FINDINGS:  There is  significant diffuse atrophy and fatty replacement of the bilateral proximal upper extremity musculature.  There is some degree of sparing of the bilateral deltoid musculature and triceps musculature, with greater residual muscle bulk on the left compared to the right.  There is mild faint edema present within the residual triceps musculature and posterior deltoid musculature.    The osseous structures demonstrate mild diffuse heterogeneity of the visualized bone marrow signal.  No evidence of acute fracture.  Evaluation of the rotator cuff tendons are limited by large field of view.  No focal fluid collections appreciated within the soft tissues.  Impression: 1.  Significant diffuse atrophy and fatty replacement of the bilateral proximal upper extremity musculature, including the rotator cuff musculature, right greater than left, in this patient with reported history of polymyositis.  There is some degree of sparing of the bilateral deltoid and triceps musculature with greater residual muscle bulk on the left compared to the right.  There is mild residual edema present within the remaining musculature, most pronounced within the triceps.    2.  Mild diffuse heterogeneity of the visualized bone marrow signal which is nonspecific and may relate to red marrow reconversion.  Clinical correlation advised.    Electronically signed by: Brandan Hess MD  Date:    04/08/2019  Time:    01:07        Pending Diagnostic Studies:     Procedure Component Value Units Date/Time    Anti-IBM Antibody (Anti-cN1A) [[id number]] Collected:  04/02/19 1456    Order Status:  Sent Lab Status:  In process Updated:  04/02/19 1500    Specimen:  Blood     Narrative:       Collection has been rescheduled by TS2 at 04/02/2019 14:20 Reason: pt   just admitted . doesn't have arm band on. notified olinda Newman  Collection has been rescheduled by TS2 at 04/02/2019 14:20 Reason: pt   just assigned to room    Immunoglobulin D,(IGD), serum [[id number]]  Collected:  04/10/19 0353    Order Status:  Sent Lab Status:  In process Updated:  04/10/19 0501    Specimen:  Blood     Strongyloides IgG Antibodies [[id number]] Collected:  04/08/19 1237    Order Status:  Sent Lab Status:  In process Updated:  04/08/19 1314    Specimen:  Blood          Medications:  Reconciled Home Medications:      Medication List      START taking these medications    amLODIPine 5 MG tablet  Commonly known as:  NORVASC  Take 2 tablets (10 mg total) by mouth once daily.     famotidine 20 MG tablet  Commonly known as:  PEPCID  Take 1 tablet (20 mg total) by mouth 2 (two) times daily.     labetalol 100 MG tablet  Commonly known as:  NORMODYNE  Take 1 tablet (100 mg total) by mouth every 12 (twelve) hours.     mycophenolate 250 mg Cap  Commonly known as:  CELLCEPT  Take 2 capsules (500 mg total) by mouth 2 (two) times daily for 5 days, THEN 4 capsules (1,000 mg total) 2 (two) times daily.  Start taking on:  4/10/2019        CHANGE how you take these medications    ergocalciferol 50,000 unit Cap  Commonly known as:  ERGOCALCIFEROL  Take 1 capsule (50,000 Units total) by mouth every 7 days.  What changed:  additional instructions        CONTINUE taking these medications    albuterol 90 mcg/actuation inhaler  Commonly known as:  PROVENTIL/VENTOLIN HFA  Inhale 2 puffs into the lungs every 6 (six) hours as needed for Wheezing or Shortness of Breath.     ammonium lactate 12 % Crea  Apply twice daily to affected parts both feet as needed.     predniSONE 5 MG tablet  Commonly known as:  DELTASONE  Take 2 tablets (10 mg total) by mouth once daily.        STOP taking these medications    azaTHIOprine 50 mg Tab  Commonly known as:  IMURAN     pantoprazole 20 MG tablet  Commonly known as:  PROTONIX            Indwelling Lines/Drains at time of discharge:   Lines/Drains/Airways          None          Time spent on the discharge of patient: 40 minutes  Patient was seen and examined on the date of discharge and  determined to be suitable for discharge.         Maria L Chambers MD  Department of Hospital Medicine  [facility]

## 2019-04-10 NOTE — NURSING
Discharge instructions given. IV discontinued. Family at bedside. Pt has decided to go home.  at bedside speaking with patient.

## 2019-04-10 NOTE — PT/OT/SLP PROGRESS
Physical Therapy      Patient Name:  Nura Kahn Jr.   MRN:  [id number]    Patient not seen today secondary to (pt. seen by OT in AM and awaiting hospital discharge home in PM). Will follow-up tomorrow, if not discharged today.    Dani Muñoz, PT   4/10/2019

## 2019-04-10 NOTE — PLAN OF CARE
Problem: Occupational Therapy Goal  Goal: Occupational Therapy Goal  Goals to be met by: 4/15     Patient will increase functional independence with ADLs by performing:    UE Dressing with Moderate Assistance.  LE Dressing with Moderate Assistance.  Grooming while seated with Set-up Assistance.  Toileting from bedside commode with Moderate Assistance for hygiene and clothing management.   Rolling to Bilateral with Modified [place].   Supine to sit with Supervision.  Stand pivot transfers with Moderate Assistance.     Outcome: Ongoing (interventions implemented as appropriate)  Goals remain appropriate

## 2019-04-10 NOTE — PLAN OF CARE
Problem: Adult Inpatient Plan of Care  Goal: Plan of Care Review  Outcome: Ongoing (interventions implemented as appropriate)  Patient AAOx4, calm and  cooperative. No acute event overnight. IVIG #5 treatment to be continued today. No complaints of  Pain during shift. No falls and injuries overnight. VSS, will continue to monitor.     Problem: Fall Injury Risk  Goal: Absence of Fall and Fall-Related Injury  Outcome: Ongoing (interventions implemented as appropriate)  Intervention: Identify and Manage Contributors to Fall Injury Risk     04/06/19 2207 04/10/19 0337   Manage Acute Allergic Reaction   Medication Review/Management  --  medications reviewed   Identify and Manage Contributors to Fall Injury Risk   Self-Care Promotion independence encouraged;BADL personal objects within reach  --

## 2019-04-10 NOTE — PT/OT/SLP PROGRESS
Occupational Therapy   Treatment    Name: Nura Kahn Jr.  MRN: [id number]  Admitting Diagnosis:  Polymyositis       Recommendations:     Discharge Recommendations: nursing facility, skilled  Discharge Equipment Recommendations:  none  Barriers to discharge:  Decreased caregiver support    Assessment:     Nura Kahn Jr. is a 63 y.o. male with a medical diagnosis of Polymyositis.  He presents with fair participation and motivation. Pt continues to be at risk for falls. Performance deficits affecting function are weakness, impaired endurance, impaired self care skills, impaired functional mobilty, impaired balance, decreased upper extremity function, decreased lower extremity function, decreased coordination, abnormal tone, impaired coordination, impaired fine motor.     Rehab Prognosis:  Fair; patient would benefit from acute skilled OT services to address these deficits and reach maximum level of function.       Plan:     Patient to be seen 3 x/week to address the above listed problems via self-care/home management, therapeutic activities, therapeutic exercises  · Plan of Care Expires: 05/04/19  · Plan of Care Reviewed with: patient    Subjective     Pain/Comfort:  · Pain Rating 1: 0/10  · Pain Rating Post-Intervention 1: 0/10    Objective:     Communicated with: RN prior to session.  Patient found supine with telemetry upon OT entry to room.  No family present in room    General Precautions: Standard, fall, aspiration     Occupational Performance:     Bed Mobility:    · Patient completed Scooting/Bridging with CGA scooting forward on EOB; SBA scooting up HOB while supine  · Patient completed Supine to Sit with moderate assistance  · Patient completed Sit to Supine with moderate assistance     Functional Mobility/Transfers:  · Patient completed Sit <> squatTransfer with minimum assistance  with  no assistive device x 2 trials from EOB; provided education on placement of BLE and potential risk if performed  without (A) due to feet too far forward of body during transfers pt verbalized understanding (pt noted to lock legs into extension for transfers).    [facility] 6 Click ADL: 13    Treatment & Education:  Pt performed AROM exercises with BUE for shoulder flexion 10 reps RUE & 12 reps LUE, A/AAROM RUE chest press x 10 reps, AROM LUE chest press x 12 reps, & AAROM BUE elbow flexion x 10 reps each while seated EOB.  SBA with postural control while seated EOB.  Pt had no further questions & when asked whether there were any concerns pt reported none.      Patient left supine with all lines intact, call button in reach, bed alarm on, RN notified and white board updated.Education:      GOALS:   Multidisciplinary Problems     Occupational Therapy Goals        Problem: Occupational Therapy Goal    Goal Priority Disciplines Outcome Interventions   Occupational Therapy Goal     OT, PT/OT Ongoing (interventions implemented as appropriate)    Description:  Goals to be met by: 4/15     Patient will increase functional independence with ADLs by performing:    UE Dressing with Moderate Assistance.  LE Dressing with Moderate Assistance.  Grooming while seated with Set-up Assistance.  Toileting from bedside commode with Moderate Assistance for hygiene and clothing management.   Rolling to Bilateral with Modified [place].   Supine to sit with Supervision.  Stand pivot transfers with Moderate Assistance.                      Time Tracking:     OT Date of Treatment: 04/10/19  OT Start Time: 0910  OT Stop Time: 0933  OT Total Time (min): 23 min    Billable Minutes:Therapeutic Activity 10  Therapeutic Exercise 13    DOMINGO Borrero  4/10/2019

## 2019-04-10 NOTE — SUBJECTIVE & OBJECTIVE
Interval History: Continues to have improvements in strength. Today is day 5/5 of IVIG infusion. Will be discharged to rehab today. No fevers, chills, chest pain, vision changes, skin rashes.     Current Facility-Administered Medications   Medication Frequency    acetaminophen tablet 650 mg Q4H PRN    albuterol inhaler 2 puff Q6H PRN    alteplase injection 2 mg PRN    amLODIPine tablet 10 mg Daily    dextrose 50% injection 12.5 g PRN    dextrose 50% injection 25 g PRN    enoxaparin injection 40 mg Daily    ergocalciferol capsule 50,000 Units Q7 Days    famotidine tablet 20 mg BID    glucagon (human recombinant) injection 1 mg PRN    glucose chewable tablet 16 g PRN    glucose chewable tablet 24 g PRN    guaifenesin 100 mg/5 ml syrup 200 mg Q4H PRN    heparin, porcine (PF) 100 unit/mL injection flush 500 Units PRN    hydroCHLOROthiazide tablet 12.5 mg Daily    Immune Globulin G (IGG)-PRO-IGA 10 % injection (Privigen) 10 % injection 25 g Daily    labetalol tablet 100 mg Q8H    miconazole NITRATE 2 % top powder BID    mycophenolate capsule 500 mg BID    ondansetron disintegrating tablet 8 mg Q8H PRN    predniSONE tablet 10 mg Daily    ramelteon tablet 8 mg Nightly PRN    senna-docusate 8.6-50 mg per tablet 1 tablet Daily    sodium chloride 0.9% flush 10 mL PRN     Objective:     Vital Signs (Most Recent):  Temp: 98 °F (36.7 °C) (04/10/19 1240)  Pulse: 77 (04/10/19 1240)  Resp: 16 (04/10/19 1240)  BP: (!) 164/83 (04/10/19 1240)  SpO2: 100 % (04/10/19 1240)  O2 Device (Oxygen Therapy): room air (04/10/19 1240) Vital Signs (24h Range):  Temp:  [97.2 °F (36.2 °C)-98.4 °F (36.9 °C)] 98 °F (36.7 °C)  Pulse:  [64-81] 77  Resp:  [16-20] 16  SpO2:  [97 %-100 %] 100 %  BP: (125-167)/(77-93) 164/83     Weight: 63 kg (138 lb 14.2 oz) (04/09/19 0607)  Body mass index is 20.51 kg/m².  Body surface area is 1.75 meters squared.      Intake/Output Summary (Last 24 hours) at 4/10/2019 1324  Last data filed at  4/10/2019 1145  Gross per 24 hour   Intake --   Output 2245 ml   Net -2245 ml       Physical Exam   Constitutional: He is oriented to person, place, and time and well-developed, well-nourished, and in no distress.   HENT:   Head: Normocephalic and atraumatic.   Eyes: Conjunctivae and EOM are normal. Pupils are equal, round, and reactive to light.   Neck: Normal range of motion. Neck supple.   Cardiovascular: Normal rate and regular rhythm.    Pulmonary/Chest: Effort normal and breath sounds normal.   Abdominal: Soft. Bowel sounds are normal.       Right Side Rheumatological Exam     Muscle Strength (0-5 scale):  Deltoid:  4.6  Biceps: 3/5   Triceps:  3  : 4.6/5   Iliopsoas: 1  Quadriceps:  1   Distal Lower Extremity: 4.6    Left Side Rheumatological Exam     Muscle Strength (0-5 scale):  Deltoid:  4.6  Biceps: 3/5   Triceps:  3  :  4.6/5   Iliopsoas: 1  Quadriceps:  1   Distal Lower Extremity: 4.6      Neurological: He is alert and oriented to person, place, and time.   Skin: Skin is warm and dry. No erythema.     Psychiatric: Affect normal.   Musculoskeletal:   Diffuse atrophy of all muscle groups in upper and lower extremities          Significant Labs:  Results for RAQUEL EDWARDS JR. (MRN [id number]) as of 4/10/2019 13:30   Ref. Range 4/10/2019 03:53   WBC Latest Ref Range: 3.90 - 12.70 K/uL 4.10   RBC Latest Ref Range: 4.60 - 6.20 M/uL 4.73   Hemoglobin Latest Ref Range: 14.0 - 18.0 g/dL 9.4 (L)   Hematocrit Latest Ref Range: 40.0 - 54.0 % 28.8 (L)   MCV Latest Ref Range: 82 - 98 fL 61 (L)   MCH Latest Ref Range: 27.0 - 31.0 pg 19.9 (L)   MCHC Latest Ref Range: 32.0 - 36.0 g/dL 32.6   RDW Latest Ref Range: 11.5 - 14.5 % 20.4 (H)   Platelets Latest Ref Range: 150 - 350 K/uL 138 (L)   MPV Latest Ref Range: 9.2 - 12.9 fL SEE COMMENT   Gran% Latest Ref Range: 38.0 - 73.0 % 45.7   Gran # (ANC) Latest Ref Range: 1.8 - 7.7 K/uL 1.9   Lymph% Latest Ref Range: 18.0 - 48.0 % 44.4   Lymph # Latest Ref Range: 1.0 -  4.8 K/uL 1.8   Mono% Latest Ref Range: 4.0 - 15.0 % 9.0   Mono # Latest Ref Range: 0.3 - 1.0 K/uL 0.4   Eosinophil% Latest Ref Range: 0.0 - 8.0 % 0.5   Eos # Latest Ref Range: 0.0 - 0.5 K/uL 0.0   Basophil% Latest Ref Range: 0.0 - 1.9 % 0.2   Baso # Latest Ref Range: 0.00 - 0.20 K/uL 0.01   nRBC Latest Ref Range: 0 /100 WBC 5 (A)   Differential Method Unknown Automated   Immature Grans (Abs) Latest Ref Range: 0.00 - 0.04 K/uL 0.01   Immature Granulocytes Latest Ref Range: 0.0 - 0.5 % 0.2   Results for RAQUEL EDWARDS JR. (MRN [id number]) as of 4/10/2019 13:30   Ref. Range 4/10/2019 03:53   Sodium Latest Ref Range: 136 - 145 mmol/L 134 (L)   Potassium Latest Ref Range: 3.5 - 5.1 mmol/L 3.7   Chloride Latest Ref Range: 95 - 110 mmol/L 102   CO2 Latest Ref Range: 23 - 29 mmol/L 25   Anion Gap Latest Ref Range: 8 - 16 mmol/L 7 (L)   BUN, Bld Latest Ref Range: 8 - 23 mg/dL 18   Creatinine Latest Ref Range: 0.5 - 1.4 mg/dL 0.4 (L)   eGFR if non African American Latest Ref Range: >60 mL/min/1.73 m^2 >60.0   eGFR if African American Latest Ref Range: >60 mL/min/1.73 m^2 >60.0   Glucose Latest Ref Range: 70 - 110 mg/dL 68 (L)   Calcium Latest Ref Range: 8.7 - 10.5 mg/dL 9.2   Phosphorus Latest Ref Range: 2.7 - 4.5 mg/dL 3.3   Magnesium Latest Ref Range: 1.6 - 2.6 mg/dL 2.2   Alkaline Phosphatase Latest Ref Range: 55 - 135 U/L 56   Total Protein Latest Ref Range: 6.0 - 8.4 g/dL 8.5 (H)   Albumin Latest Ref Range: 3.5 - 5.2 g/dL 4.0   Total Bilirubin Latest Ref Range: 0.1 - 1.0 mg/dL 1.4 (H)   AST Latest Ref Range: 10 - 40 U/L 12   ALT Latest Ref Range: 10 - 44 U/L 12       Vitamin B-12 Latest Ref Range: 210 - 950 pg/mL 225       Results for RAQUEL EDWARDS JR. (MRN [id number]) as of 4/8/2019 10:02   Ref. Range 8/4/2014 10:08   CRISTINA Screen Latest Ref Range: Negative <1:160  Negative <1:160   Results for RAQUEL EDWARDS JR. (MRN [id number]) as of 4/8/2019 10:02   Ref. Range 7/28/2015 09:16   Anti-Belen-1 Antibody Latest Ref Range: <20  Units <20   Anti-PM/Scl Ab Latest Ref Range: <20 Units <20   Anti-SS-A 52 kD Ab, IgG Latest Ref Range: <20 Units <20   Anti-U1-RNP  Ab Latest Ref Range: <20 Units <20   EJ Latest Ref Range: Negative  Negative   Fibrillarin (U3 RNP) Latest Ref Range: Negative  Negative   Ku Latest Ref Range: Negative  Weak Positive (A)   MDA-5 (P140) (CADM-140) Latest Ref Range: <20 Units <20   MI-2 Latest Ref Range: Negative  Negative   NXP-2 (P140) Latest Ref Range: <20 Units <20   OJ Latest Ref Range: Negative  Negative   PL-12 Latest Ref Range: Negative  Negative   PL-7 Latest Ref Range: Negative  Negative   SRP Latest Ref Range: Negative  Negative   TIF1 GAMMA (P155/140) Latest Ref Range: <20 Units <20   U2 snRNP Latest Ref Range: Negative  Negative   Results for RAQUEL EDWARDS JR. (MRN [id number]) as of 4/8/2019 10:02   Ref. Range 4/2/2019 14:56   HMGCR Antibody Latest Ref Range: 0 - 19 Units <3     Results for RAQUEL EDWARDS JR. (MRN [id number]) as of 4/8/2019 10:02   Ref. Range 4/3/2019 08:43   Specimen UA Unknown Urine, Clean Catch   Color, UA Latest Ref Range: Yellow, Straw, Nella  Yellow   pH, UA Latest Ref Range: 5.0 - 8.0  7.0   Specific Gravity, UA Latest Ref Range: 1.005 - 1.030  1.015   Appearance, UA Latest Ref Range: Clear  Clear   Protein, UA Latest Ref Range: Negative  Negative   Glucose, UA Latest Ref Range: Negative  Negative   Ketones, UA Latest Ref Range: Negative  Negative   Occult Blood UA Latest Ref Range: Negative  Negative   Nitrite, UA Latest Ref Range: Negative  Negative   Bilirubin (UA) Latest Ref Range: Negative  Negative   Leukocytes, UA Latest Ref Range: Negative  Negative     Left thigh muscle biopsy 2016:  FINAL PATHOLOGIC DIAGNOSIS  [facility] DIAGNOSIS:  MUSCLE, LEFT VASTUS LATERALIS, BIOPSY:  MINUTE PORTION OF MUSCLE WITH END-STAGE MYOPATHIC CHANGES AND FOCAL CHRONIC  INFLAMMATION (SEE NOTE).    Significant Imaging:  CT Chest without contrast 4/3/19:  Impression       1. Mild  peribronchial cuffing which may be seen with bronchitis.  Mild tubular bronchiectasis of the right lower lobe with several distal bronchi demonstrating retained secretions, finding may be seen with small airways infectious/noninfectious inflammation or aspiration.  2. Centrilobular and paraseptal emphysema with an upper lung zone predominance.  3. Diffuse muscular atrophy.  4. Splenomegaly.     MRI femurs b/l 4/6/19:  Impression       1.  Redemonstration of advanced diffuse atrophy and fatty replacement of the bilateral thigh musculature involving all compartments.  There is mild residual edema signal present within the bilateral obturator musculature and residual posterior compartment musculature, similar to prior MRI examination.    2.  Mild heterogeneity of the visualized bone marrow signal which may relate to red marrow reconversion.  Clinical correlation advised.     MRI humerus w/ and w/o contrast 4/7/19:  Impression       1.  Significant diffuse atrophy and fatty replacement of the bilateral proximal upper extremity musculature, including the rotator cuff musculature, right greater than left, in this patient with reported history of polymyositis.  There is some degree of sparing of the bilateral deltoid and triceps musculature with greater residual muscle bulk on the left compared to the right.  There is mild residual edema present within the remaining musculature, most pronounced within the triceps.    2.  Mild diffuse heterogeneity of the visualized bone marrow signal which is nonspecific and may relate to red marrow reconversion.  Clinical correlation advised.

## 2019-04-10 NOTE — PLAN OF CARE
[facility]    HOME HEALTH ORDERS  FACE TO FACE ENCOUNTER    Patient Name: Nura Kahn Jr.  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]  Yes     Priority: 7     Atrial fibrillation [I48.91]  Yes     Priority: 7     Splenomegaly [R16.1]  Yes     Priority: 7     Osteopenia [M85.80]  Yes     Priority: 7     Vitamin D deficiency [E55.9]  Yes     Priority: 10     Quadriparesis (muscle weakness) [G82.50]  Yes     Priority: 25 - Low     Chronic    Impaired functional mobility and endurance [Z74.09]  Unknown    Thrombocytopenia [D69.6]  Yes    Thoracic lymphadenopathy [R59.0]  Yes      Resolved Hospital Problems    Diagnosis Date Resolved POA    Impaired functional mobility, balance, gait, and endurance [Z74.09] 04/02/2019 Yes     Chronic       Future Appointments   Date Time Provider Department Center   4/22/2019  2:00 PM Jesse Bosch MD [facility]   5/27/2019  9:30 AM Socrates Ambrosio MD [facility] PC[facility]     Follow-up Information     Socrates Ambrosio MD In 1 week.    Specialty:  Family Medicine  Contact information:  [address]  [facility] [address]  [phone]             Nura Huston MD In 3 days.    Specialty:  Rheumatology  Contact information:  [address]  [facility] [address]  [phone]             Jerrod Vietjassi - Gastroenterology.    Specialty:  Gastroenterology  Contact information:  [address]  [facility]  [address] [phone]  Additional information:  [facility] - 4th Floor           Jerrod Barrera - Rheumatology.    Specialty:  Rheumatology  Contact information:  [address]  [facility] [address] [phone]  Additional information:  [facility] - 5th Floor           [facility].    Specialty:  Speech Therapy  Contact information:  [address]  [facility] [address] [phone]  Additional information:  [facility] - 4th Floor                   I have seen and examined this patient face to face today. My clinical findings that support the need for the home health skilled services and home bound status are the following:  Weakness/numbness causing balance and gait disturbance due to Polymyositis making it taxing to leave home.    Allergies:Review of patient's allergies indicates:  No Known Allergies    Diet: cardiac diet    Activities: activity as tolerated    Nursing:   SN to complete comprehensive assessment including routine vital signs. Instruct on disease process and s/s of complications to report to MD. Review/verify medication list sent home with the patient at time of discharge  and instruct patient/caregiver as needed. Frequency may be adjusted depending on start of care date.    Notify MD if SBP > 160 or < 90; DBP > 90 or < 50; HR > 120 or < 50; Temp > 101;       CONSULTS:    Physical Therapy to evaluate and treat. Evaluate for home safety and equipment needs; Establish/upgrade home exercise program. Perform / instruct on therapeutic exercises, gait training, transfer training, and Range of Motion.  Occupational Therapy to evaluate and treat. Evaluate home environment for safety and equipment needs. Perform/Instruct on transfers, ADL training, ROM, and therapeutic exercises.    MISCELLANEOUS CARE:  n/a    WOUND CARE ORDERS  n/a      Medications: Review discharge medications with patient and family and provide education.      Current Discharge Medication List       START taking these medications    Details   famotidine (PEPCID) 20 MG tablet Take 1 tablet (20 mg total) by mouth 2 (two) times daily.  Qty: 60 tablet, Refills: 11      labetalol (NORMODYNE) 100 MG tablet Take 1 tablet (100 mg total) by mouth every 12 (twelve) hours.  Qty: 60 tablet, Refills: 2      mycophenolate (CELLCEPT) 250 mg Cap Take 2 capsules (500 mg total) by mouth 2 (two) times daily for 5 days, THEN 4 capsules (1,000 mg total) 2 (two) times daily.  Qty: 500 capsule, Refills: 0         CONTINUE these medications which have CHANGED    Details   amLODIPine (NORVASC) 5 MG tablet Take 2 tablets (10 mg total) by mouth once daily.  Qty: 60 tablet, Refills: 1    Associated Diagnoses: Essential hypertension         CONTINUE these medications which have NOT CHANGED    Details   albuterol (PROVENTIL/VENTOLIN HFA) 90 mcg/actuation inhaler Inhale 2 puffs into the lungs every 6 (six) hours as needed for Wheezing or Shortness of Breath.  Qty: 1 each, Refills: 11    Associated Diagnoses: Cough      ammonium lactate 12 % Crea Apply twice daily to affected parts both feet as needed.  Qty: 140 g, Refills: 11      ergocalciferol (ERGOCALCIFEROL) 50,000 unit Cap Take 1 capsule (50,000 Units total) by mouth every 7 days.  Qty: 4 capsule, Refills: 1    Associated Diagnoses: Vitamin D deficiency      predniSONE (DELTASONE) 5 MG tablet Take 2 tablets (10 mg total) by mouth once daily.  Qty: 60 tablet, Refills: 1         STOP taking these medications       azaTHIOprine (IMURAN) 50 mg Tab Comments:   Reason for Stopping:               I certify that this patient is confined to his home and needs intermittent skilled nursing care, physical therapy and occupational therapy.

## 2019-04-11 ENCOUNTER — TELEPHONE (OUTPATIENT)
Dept: PRIMARY CARE CLINIC | Facility: CLINIC | Age: 64
End: 2019-04-11

## 2019-04-11 NOTE — TELEPHONE ENCOUNTER
Brenda from [facility] called to say pt refused Home Health. Please, advise. Thanks.

## 2019-04-11 NOTE — TELEPHONE ENCOUNTER
Noted.  Was ordered after recent hospital discharge.  If patient does not want home health that is his right.  Would recommend hospital follow up appointment to discuss further.

## 2019-04-12 ENCOUNTER — TELEPHONE (OUTPATIENT)
Dept: RHEUMATOLOGY | Facility: CLINIC | Age: 64
End: 2019-04-12

## 2019-04-12 DIAGNOSIS — M33.20 POLYMYOSITIS: Primary | ICD-10-CM

## 2019-04-12 RX ORDER — ACETAMINOPHEN 325 MG/1
650 TABLET ORAL
Status: CANCELLED | OUTPATIENT
Start: 2019-05-01

## 2019-04-12 RX ORDER — FAMOTIDINE 10 MG/ML
20 INJECTION INTRAVENOUS
Status: CANCELLED | OUTPATIENT
Start: 2019-05-01

## 2019-04-12 RX ORDER — HEPARIN 100 UNIT/ML
500 SYRINGE INTRAVENOUS
Status: CANCELLED | OUTPATIENT
Start: 2019-05-01

## 2019-04-12 RX ORDER — SODIUM CHLORIDE 0.9 % (FLUSH) 0.9 %
10 SYRINGE (ML) INJECTION
Status: CANCELLED | OUTPATIENT
Start: 2019-05-01

## 2019-04-12 NOTE — PT/OT/SLP DISCHARGE
Physical Therapy Discharge Summary    Name: Nura Kahn Jr.  MRN: [id number]   Principal Problem: Polymyositis     Patient Discharged from acute Physical Therapy on 4/10/2019.  Please refer to prior PT noted date on 2019 for functional status.     Assessment:     Patient has not met goals.    Objective:     GOALS:   Multidisciplinary Problems     Physical Therapy Goals        Problem: Physical Therapy Goal    Goal Priority Disciplines Outcome Goal Variances Interventions   Physical Therapy Goal     PT, PT/OT Ongoing (interventions implemented as appropriate)     Description:  Goals to be met by: 2019     Patient will increase functional independence with mobility by performin. Supine to sit with Modified [place]  2. Sit to supine with Modified [place]  3. Sit to stand transfer with supervision  4. Bed to chair transfer with supervision                      Reasons for Discontinuation of Therapy Services  Transfer to alternate level of care.      Plan:     Patient Discharged to: Home with Home Health Service.    Dani Muñoz, PT  2019

## 2019-04-13 LAB — IGD SER-MCNC: 2 MG/DL

## 2019-04-18 ENCOUNTER — TELEPHONE (OUTPATIENT)
Dept: RHEUMATOLOGY | Facility: CLINIC | Age: 64
End: 2019-04-18

## 2019-04-18 ENCOUNTER — DOCUMENTATION ONLY (OUTPATIENT)
Dept: TRANSPLANT | Facility: CLINIC | Age: 64
End: 2019-04-18

## 2019-04-18 ENCOUNTER — LAB VISIT (OUTPATIENT)
Dept: LAB | Facility: HOSPITAL | Age: 64
End: 2019-04-18
Payer: MEDICARE

## 2019-04-18 DIAGNOSIS — R16.0 HEPATOMEGALY: ICD-10-CM

## 2019-04-18 DIAGNOSIS — E80.6 BILIRUBINEMIA: Primary | ICD-10-CM

## 2019-04-18 DIAGNOSIS — M33.20 POLYMYOSITIS: ICD-10-CM

## 2019-04-18 LAB
ALBUMIN SERPL BCP-MCNC: 4.4 G/DL (ref 3.5–5.2)
ALP SERPL-CCNC: 67 U/L (ref 55–135)
ALT SERPL W/O P-5'-P-CCNC: 9 U/L (ref 10–44)
ANION GAP SERPL CALC-SCNC: 8 MMOL/L (ref 8–16)
ANTI-IBM ANTIBODY (ANTI-CN1A): NORMAL
AST SERPL-CCNC: 9 U/L (ref 10–40)
BASOPHILS # BLD AUTO: 0.01 K/UL (ref 0–0.2)
BASOPHILS NFR BLD: 0.2 % (ref 0–1.9)
BILIRUB SERPL-MCNC: 1.5 MG/DL (ref 0.1–1)
BUN SERPL-MCNC: 9 MG/DL (ref 8–23)
CALCIUM SERPL-MCNC: 9.6 MG/DL (ref 8.7–10.5)
CHLORIDE SERPL-SCNC: 103 MMOL/L (ref 95–110)
CO2 SERPL-SCNC: 27 MMOL/L (ref 23–29)
CREAT SERPL-MCNC: 0.5 MG/DL (ref 0.5–1.4)
DIFFERENTIAL METHOD: ABNORMAL
EOSINOPHIL # BLD AUTO: 0 K/UL (ref 0–0.5)
EOSINOPHIL NFR BLD: 0 % (ref 0–8)
ERYTHROCYTE [DISTWIDTH] IN BLOOD BY AUTOMATED COUNT: 21.5 % (ref 11.5–14.5)
EST. GFR  (AFRICAN AMERICAN): >60 ML/MIN/1.73 M^2
EST. GFR  (NON AFRICAN AMERICAN): >60 ML/MIN/1.73 M^2
GLUCOSE SERPL-MCNC: 133 MG/DL (ref 70–110)
HCT VFR BLD AUTO: 34.9 % (ref 40–54)
HGB BLD-MCNC: 11.2 G/DL (ref 14–18)
IMM GRANULOCYTES # BLD AUTO: 0.05 K/UL (ref 0–0.04)
IMM GRANULOCYTES NFR BLD AUTO: 0.9 % (ref 0–0.5)
LYMPHOCYTES # BLD AUTO: 0.6 K/UL (ref 1–4.8)
LYMPHOCYTES NFR BLD: 11.1 % (ref 18–48)
MCH RBC QN AUTO: 19.6 PG (ref 27–31)
MCHC RBC AUTO-ENTMCNC: 32.1 G/DL (ref 32–36)
MCV RBC AUTO: 61 FL (ref 82–98)
MONOCYTES # BLD AUTO: 0.1 K/UL (ref 0.3–1)
MONOCYTES NFR BLD: 2.5 % (ref 4–15)
NEUTROPHILS # BLD AUTO: 4.8 K/UL (ref 1.8–7.7)
NEUTROPHILS NFR BLD: 85.3 % (ref 38–73)
NRBC BLD-RTO: 6 /100 WBC
PLATELET # BLD AUTO: 168 K/UL (ref 150–350)
PMV BLD AUTO: ABNORMAL FL (ref 9.2–12.9)
POTASSIUM SERPL-SCNC: 3.8 MMOL/L (ref 3.5–5.1)
PROT SERPL-MCNC: 8.8 G/DL (ref 6–8.4)
RBC # BLD AUTO: 5.7 M/UL (ref 4.6–6.2)
SODIUM SERPL-SCNC: 138 MMOL/L (ref 136–145)
WBC # BLD AUTO: 5.59 K/UL (ref 3.9–12.7)

## 2019-04-18 PROCEDURE — 36415 COLL VENOUS BLD VENIPUNCTURE: CPT

## 2019-04-18 PROCEDURE — 80053 COMPREHEN METABOLIC PANEL: CPT

## 2019-04-18 PROCEDURE — 85025 COMPLETE CBC W/AUTO DIFF WBC: CPT

## 2019-04-18 RX ORDER — MYCOPHENOLATE MOFETIL 250 MG/1
CAPSULE ORAL
Qty: 500 CAPSULE | Refills: 0 | Status: SHIPPED | OUTPATIENT
Start: 2019-04-18 | End: 2019-06-24 | Stop reason: SDUPTHER

## 2019-04-18 NOTE — TELEPHONE ENCOUNTER
Pleases schedule f/u in Hepatology with Dr. Lucero for hepatosplenomegaly, hyperbilirubinemia. Thanks ALEXSANDER

## 2019-04-18 NOTE — TELEPHONE ENCOUNTER
----- Message from Linda Mccann sent at 4/18/2019 12:48 PM CDT -----  Contact: Self   Pt stated that the cancer medication prescribed is not covered by the insurance and needs to     be changed to something that is covered.     Contact pt @[phone]

## 2019-04-18 NOTE — NURSING
Pt records reviewed.   Pt will be referred to Hepatology.  Bilirubinemia  Hepatomegaly  Initial referral received  from the [facility]que.   Referring Provider/diagnosis  Nura Huston MD      Referral letter sent to patient.

## 2019-04-18 NOTE — LETTER
April 18, 2019    Nura Kahn  [address] [facility] [address]      Dear Nura Kahn:    Your doctor has referred you to the [facility]. We are sending this letter to remind you to make an appointment with us to complete the referral process.     Please call us at [phone] to schedule an appointment. We look forward to seeing you soon.     If you received a call and have been scheduled, please disregard this letter.       Sincerely,        [facility]   [facility] [place], [place] [id number] [phone]

## 2019-04-22 ENCOUNTER — TELEPHONE (OUTPATIENT)
Dept: PHARMACY | Facility: CLINIC | Age: 64
End: 2019-04-22

## 2019-04-22 ENCOUNTER — TELEPHONE (OUTPATIENT)
Dept: INTERNAL MEDICINE | Facility: CLINIC | Age: 64
End: 2019-04-22

## 2019-04-22 ENCOUNTER — TELEPHONE (OUTPATIENT)
Dept: RHEUMATOLOGY | Facility: CLINIC | Age: 64
End: 2019-04-22

## 2019-04-22 ENCOUNTER — TELEPHONE (OUTPATIENT)
Dept: RHEUMATOLOGY | Facility: HOSPITAL | Age: 64
End: 2019-04-22

## 2019-04-22 ENCOUNTER — TELEPHONE (OUTPATIENT)
Dept: PRIMARY CARE CLINIC | Facility: CLINIC | Age: 64
End: 2019-04-22

## 2019-04-22 DIAGNOSIS — M33.20 POLYMYOSITIS: Primary | Chronic | ICD-10-CM

## 2019-04-22 DIAGNOSIS — Z74.09 IMPAIRED FUNCTIONAL MOBILITY AND ENDURANCE: ICD-10-CM

## 2019-04-22 DIAGNOSIS — R53.81 DEBILITY: ICD-10-CM

## 2019-04-22 DIAGNOSIS — R26.81 GAIT INSTABILITY: Chronic | ICD-10-CM

## 2019-04-22 DIAGNOSIS — R63.4 WEIGHT LOSS: ICD-10-CM

## 2019-04-22 NOTE — TELEPHONE ENCOUNTER
Called patient. He states that he has not received the cellcept. Called specialty pharmacy they are still processing the medication. They will try to put it an urgent note in the chart.
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
## 2019-04-23 NOTE — TELEPHONE ENCOUNTER
DOCUMENTATION ONLY:  Prior Authorization for Mycophenolate submitted to patient's insurance company.

## 2019-04-23 NOTE — TELEPHONE ENCOUNTER
Prior authorization denied by patients insurance - off label use.    Appeal faxed as urgent with supporting documents to [phone]. Will then need to look into assistance if not approved.

## 2019-04-29 ENCOUNTER — TELEPHONE (OUTPATIENT)
Dept: HEPATOLOGY | Facility: CLINIC | Age: 64
End: 2019-04-29

## 2019-04-29 ENCOUNTER — LAB VISIT (OUTPATIENT)
Dept: LAB | Facility: HOSPITAL | Age: 64
End: 2019-04-29
Payer: MEDICARE

## 2019-04-29 DIAGNOSIS — M33.20 POLYMYOSITIS: ICD-10-CM

## 2019-04-29 LAB
ALBUMIN SERPL BCP-MCNC: 4 G/DL (ref 3.5–5.2)
ALP SERPL-CCNC: 59 U/L (ref 55–135)
ALT SERPL W/O P-5'-P-CCNC: 6 U/L (ref 10–44)
ANION GAP SERPL CALC-SCNC: 7 MMOL/L (ref 8–16)
AST SERPL-CCNC: 8 U/L (ref 10–40)
BASOPHILS # BLD AUTO: 0.02 K/UL (ref 0–0.2)
BASOPHILS NFR BLD: 0.4 % (ref 0–1.9)
BILIRUB SERPL-MCNC: 1.7 MG/DL (ref 0.1–1)
BUN SERPL-MCNC: 15 MG/DL (ref 8–23)
CALCIUM SERPL-MCNC: 9 MG/DL (ref 8.7–10.5)
CHLORIDE SERPL-SCNC: 107 MMOL/L (ref 95–110)
CO2 SERPL-SCNC: 25 MMOL/L (ref 23–29)
CREAT SERPL-MCNC: 0.5 MG/DL (ref 0.5–1.4)
DIFFERENTIAL METHOD: ABNORMAL
EOSINOPHIL # BLD AUTO: 0.1 K/UL (ref 0–0.5)
EOSINOPHIL NFR BLD: 1.2 % (ref 0–8)
ERYTHROCYTE [DISTWIDTH] IN BLOOD BY AUTOMATED COUNT: 21 % (ref 11.5–14.5)
EST. GFR  (AFRICAN AMERICAN): >60 ML/MIN/1.73 M^2
EST. GFR  (NON AFRICAN AMERICAN): >60 ML/MIN/1.73 M^2
GLUCOSE SERPL-MCNC: 89 MG/DL (ref 70–110)
HCT VFR BLD AUTO: 31.3 % (ref 40–54)
HGB BLD-MCNC: 9.9 G/DL (ref 14–18)
IMM GRANULOCYTES # BLD AUTO: 0.03 K/UL (ref 0–0.04)
IMM GRANULOCYTES NFR BLD AUTO: 0.6 % (ref 0–0.5)
LYMPHOCYTES # BLD AUTO: 1.5 K/UL (ref 1–4.8)
LYMPHOCYTES NFR BLD: 30.7 % (ref 18–48)
MCH RBC QN AUTO: 20 PG (ref 27–31)
MCHC RBC AUTO-ENTMCNC: 31.6 G/DL (ref 32–36)
MCV RBC AUTO: 63 FL (ref 82–98)
MONOCYTES # BLD AUTO: 0.3 K/UL (ref 0.3–1)
MONOCYTES NFR BLD: 6.6 % (ref 4–15)
NEUTROPHILS # BLD AUTO: 3 K/UL (ref 1.8–7.7)
NEUTROPHILS NFR BLD: 60.5 % (ref 38–73)
NRBC BLD-RTO: 5 /100 WBC
PLATELET # BLD AUTO: 156 K/UL (ref 150–350)
PMV BLD AUTO: ABNORMAL FL (ref 9.2–12.9)
POTASSIUM SERPL-SCNC: 3.4 MMOL/L (ref 3.5–5.1)
PROT SERPL-MCNC: 7.5 G/DL (ref 6–8.4)
RBC # BLD AUTO: 4.95 M/UL (ref 4.6–6.2)
SODIUM SERPL-SCNC: 139 MMOL/L (ref 136–145)
WBC # BLD AUTO: 4.98 K/UL (ref 3.9–12.7)

## 2019-04-29 PROCEDURE — 80053 COMPREHEN METABOLIC PANEL: CPT

## 2019-04-29 PROCEDURE — 36415 COLL VENOUS BLD VENIPUNCTURE: CPT

## 2019-04-29 PROCEDURE — 85025 COMPLETE CBC W/AUTO DIFF WBC: CPT

## 2019-04-29 NOTE — TELEPHONE ENCOUNTER
MA called patient to schedule his appt to see liver specialist again. He was last seen by Dr. Lucero back on 2015. He accepted 5/30 to see Dr. Palma. Offered him sooner appt but he said he have trasnportation problem he cannot come in in the afternoon with LINDA's.     Mailed appt reminder to patient.

## 2019-04-29 NOTE — TELEPHONE ENCOUNTER
Patient informed that all insurance options have been exhausted in attempt to have Cellcept covered. Patient agrees to proceed with free drug application through [facility] for financial assistance. Patient gives permission for application to be completed and submitted on his behalf. Will continue to follow up.

## 2019-04-30 ENCOUNTER — EXTERNAL CHRONIC CARE MANAGEMENT (OUTPATIENT)
Dept: PRIMARY CARE CLINIC | Facility: CLINIC | Age: 64
End: 2019-04-30
Payer: MEDICARE

## 2019-04-30 PROCEDURE — 99490 PR CHRONIC CARE MGMT, 1ST 20 MIN: ICD-10-PCS | Mod: S$PBB,,, | Performed by: FAMILY MEDICINE

## 2019-04-30 PROCEDURE — 99490 CHRNC CARE MGMT STAFF 1ST 20: CPT | Mod: S$PBB,,, | Performed by: FAMILY MEDICINE

## 2019-04-30 PROCEDURE — 99490 CHRNC CARE MGMT STAFF 1ST 20: CPT | Mod: PBBFAC | Performed by: FAMILY MEDICINE

## 2019-05-01 ENCOUNTER — TELEPHONE (OUTPATIENT)
Dept: INTERNAL MEDICINE | Facility: CLINIC | Age: 64
End: 2019-05-01

## 2019-05-01 DIAGNOSIS — Z74.09 IMPAIRED FUNCTIONAL MOBILITY AND ENDURANCE: ICD-10-CM

## 2019-05-01 DIAGNOSIS — R26.81 GAIT INSTABILITY: Chronic | ICD-10-CM

## 2019-05-01 DIAGNOSIS — M33.20 POLYMYOSITIS: Chronic | ICD-10-CM

## 2019-05-01 DIAGNOSIS — R53.81 DEBILITY: ICD-10-CM

## 2019-05-01 NOTE — TELEPHONE ENCOUNTER
Placed 2nd order for home health and referred to case management; please process and notify patient once complete.

## 2019-05-02 ENCOUNTER — OUTPATIENT CASE MANAGEMENT (OUTPATIENT)
Dept: ADMINISTRATIVE | Facility: OTHER | Age: 64
End: 2019-05-02

## 2019-05-02 NOTE — PROGRESS NOTES
This SW received a referral on the above patient.   Reason for referral: Low risk, Quadriparesis (muscle weakness), Polymyositis, Gait instability, Debility,  Impaired functional mobility and endurance  Name of the community resource that was provided: Local Food Pantries, Affordable Housing in [facility], Senior Resource Guide, Medicaid Long Term Care Program, [employer] 2-1-1  Resource given to: Patient via [place] mail     SW contacted Pt regarding referral placed by PCP. Patient is well known to [facility] and previous assessments were completed last year. Pt confirms information from previous assessment on 12/20/18 is still correct. Pt denies receiving resources mailed to him by  in December. Pt reports he does not recall previous conversations were . Pt reports he continues to have difficulty with finances and reports needing resources for food. Pt does not qualify for SNAP benefits. SW offered to re-send resources for food and affordable housing. Pt reports he needs help around at home. Pt reports he cannot afford private hire caregivers.  [facility] informed Pt about Medicaid LTC Program and encouraged Pt to complete application for LTC services. Pt verbalized understanding and stated no other needs. Pt advised to contact [facility] directly if resources not received in the mail within 7 days. Case closed. Referral source notified.

## 2019-05-06 ENCOUNTER — TELEPHONE (OUTPATIENT)
Dept: INFECTIOUS DISEASES | Facility: CLINIC | Age: 64
End: 2019-05-06

## 2019-05-06 NOTE — TELEPHONE ENCOUNTER
Called patient to schedule initial appointment for [facility] . No answer. Left message on voice mail. Will contact patient again.

## 2019-05-08 ENCOUNTER — OFFICE VISIT (OUTPATIENT)
Dept: RHEUMATOLOGY | Facility: CLINIC | Age: 64
End: 2019-05-08
Payer: MEDICARE

## 2019-05-08 VITALS
SYSTOLIC BLOOD PRESSURE: 141 MMHG | HEIGHT: 69 IN | BODY MASS INDEX: 20.51 KG/M2 | DIASTOLIC BLOOD PRESSURE: 92 MMHG | HEART RATE: 76 BPM

## 2019-05-08 DIAGNOSIS — M33.20 POLYMYOSITIS: Primary | ICD-10-CM

## 2019-05-08 PROCEDURE — 99213 OFFICE O/P EST LOW 20 MIN: CPT | Mod: PBBFAC | Performed by: STUDENT IN AN ORGANIZED HEALTH CARE EDUCATION/TRAINING PROGRAM

## 2019-05-08 PROCEDURE — 99999 PR PBB SHADOW E&M-EST. PATIENT-LVL III: CPT | Mod: PBBFAC,GC,, | Performed by: STUDENT IN AN ORGANIZED HEALTH CARE EDUCATION/TRAINING PROGRAM

## 2019-05-08 PROCEDURE — 99213 OFFICE O/P EST LOW 20 MIN: CPT | Mod: S$PBB,GC,, | Performed by: STUDENT IN AN ORGANIZED HEALTH CARE EDUCATION/TRAINING PROGRAM

## 2019-05-08 PROCEDURE — 99213 PR OFFICE/OUTPT VISIT, EST, LEVL III, 20-29 MIN: ICD-10-PCS | Mod: S$PBB,GC,, | Performed by: STUDENT IN AN ORGANIZED HEALTH CARE EDUCATION/TRAINING PROGRAM

## 2019-05-08 PROCEDURE — 99999 PR PBB SHADOW E&M-EST. PATIENT-LVL III: ICD-10-PCS | Mod: PBBFAC,GC,, | Performed by: STUDENT IN AN ORGANIZED HEALTH CARE EDUCATION/TRAINING PROGRAM

## 2019-05-08 RX ORDER — PREDNISONE 10 MG/1
30 TABLET ORAL DAILY
Qty: 180 TABLET | Refills: 0 | Status: SHIPPED | OUTPATIENT
Start: 2019-05-08 | End: 2019-07-07

## 2019-05-08 RX ORDER — ERGOCALCIFEROL 1.25 MG/1
50000 CAPSULE ORAL
Qty: 12 CAPSULE | Refills: 0 | Status: SHIPPED | OUTPATIENT
Start: 2019-05-08 | End: 2020-07-07

## 2019-05-08 ASSESSMENT — ROUTINE ASSESSMENT OF PATIENT INDEX DATA (RAPID3)
AM STIFFNESS SCORE: 0, NO
TOTAL RAPID3 SCORE: 5.33
PATIENT GLOBAL ASSESSMENT SCORE: 9
PAIN SCORE: 0
MDHAQ FUNCTION SCORE: 2.1
PSYCHOLOGICAL DISTRESS SCORE: 4.4
FATIGUE SCORE: 0

## 2019-05-08 NOTE — PROGRESS NOTES
Subjective:       Patient ID: Nura Kahn Jr. is a 63 y.o. male.    Chief Complaint: Disease Management (Pt would like to be admitted)    HPI   64yo M with PMH of polymyositis (dx 2009, +mildly +ku, outside biopsy proven with elevated CPK and aldolase), osteopenia, Vit D insufficiency, Hemoglobulin c/beta-zero thalaseema, A-fib, dysphagia, portal HTN, emphysema here for follow up of polymyositis.     Per chart review it seems that he first established care here at [facility] in 8/2014. He was diagnosed in [id number] when he was feeling weak, falling. He was a  and was unable to use his legs. He had a biopsy in [id number] in [place] that per notes was consistent with polymyositis. He was on MTX in the past which was stopped due CT showing hepatosplenomegaly with portal venous hypertension and elevated T bili in 8/2014 He was at that time started on Imuran. He has been on Imuran and steroids since then.  In 2016, he was worked up again here at [facility]. MRI of the left lower extremities showed Severe atrophy with fatty replacement of the thigh musculature bilaterally.  There is mild increased edema signal within the remaining thigh musculature and left iliopsoas muscle without associated enhancement which could reflect a mild degree of residual myositis.  EMG Left upper Ext 5/2016 was a technically difficult study. Had moderate ulnar entrapment at elbow, perhaps on a background of mild polyneuropathy. 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 Pathology of left thigh showing minute potion of muscle with end stage myopathic changes and focal chronic inflammation   Patient remained on Imuran and prednisone.      In Feb 2019 - patient had been out of Imuran for ~1 month.  He also admits to medication non-compliance because he didn't think he was having any improvement.  He admits that his weakness has gotten worse - stating that he is  "unable to stand, difficulty with getting in and out of his scooter, normal ADLs (including going to the bathroom - resulting in urination/defecation on himself - resulting in decreased PO intake).  He also feels worsening of his dysphagia - where he gets food stuck and often chokes/coughs when he eats/drinks.       Wife works fulltime.  Daughter (10 yo).  Patient does not have social support/help for transportation to various appt.     Specialist updates:  - Cardiology - Last saw 3/2015 - diagnosed with PAF.  No treatment at that time.  No follow up since  - Hem - Last saw 12/2017 - Evaluated for microcytic anemia and mild chronic thrombocytosis.  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  Discharged from clinic   - GI - Last saw 6/2015 - EGD/motility study ordered - not performed.  No follow up   - Hepatology - Last saw 5/2015 - Found portal HTN on CT with no evidence of cirrhosis or PV thrombosis.  Fibrosure with F1 stage (minimial fibrosis).  RTC PRN (no follow up)  - Pulmonary - Last saw 2/2015 -" Lung parenchymal normal - no evidence of fibrosis and one mediastinal LN marginal enlarged" CT chest showed GGO (2016). Repeat CT chest (2017) - no mention of GGO      Imaging:  CXR 1/2018:Patchy consolidation projected over the right lower lung zone, concerning for infection, correlation advised.       CT chest 12/2017:Peribronchial cuffing predominantly in the lower lung zones as well as retained secretions in the right middle lobe segmental bronchi consistent with bronchitis; such inflammation can occur with inhaled irritants or aspiration.  There is no evidence of interstitial lung disease such as NSIP or UIP.Upper lung zone predominant paraseptal emphysema and dispersed areas of centrilobular emphysema Stable 1.2 cm AP window lymph node.  Densely calcified granuloma in the posterobasal segment of the right lower lobe.Hepatosplenomegaly with associated splenic collateral vessels at the splenic hilum " suggesting sequela of portal venous hypertension.     CT pelvis 6/2017: Obturator internus edema or hematoma. No acute fracture. Stable splenomegaly.     Esophogram 4/2015: Spontaneous gastroesophageal reflux.  Otherwise, unremarkable esophagram.     PET scan 8/2014:AP window lymph node SUV max less than 2.5.  This is most likely benign/reactive.  Surveillance is recommended at 3, 6, 12, and 24 months. Splenomegaly most likely from portal hypertension    He was admitted in 4/2019 worsening generalized muscle weakness and dysphagia. Labs on admission showed Normal and stable CBC and CMP.  Normal CK/aldolase.  Normal inflammatory markers.  Elevated IgG (1754). HMGCoA ab negative. Vit D: 15.    MRI femurs 4/6/19:  1.  Redemonstration of advanced diffuse atrophy and fatty replacement of the bilateral thigh musculature involving all compartments.  There is mild residual edema signal present within the bilateral obturator musculature and residual posterior compartment musculature, similar to prior MRI examination.  2.  Mild heterogeneity of the visualized bone marrow signal which may relate to red marrow reconversion.  Clinical correlation advised.  Patient will also benefit from PT/OT and rehab placement for muscle strengthening after completion of IVIG.      MRI humerus b/l 4/7:  Significant diffuse atrophy and fatty replacement of the bilateral proximal upper extremity musculature, including the rotator cuff musculature, right greater than left, in this patient with reported history of polymyositis.  There is some degree of sparing of the bilateral deltoid and triceps musculature with greater residual muscle bulk on the left compared to the right.  There is mild residual edema present within the remaining musculature, most pronounced within the triceps.    During that hospitalization he received IVIG x 5 days, He was started on cellcept 1g BID and prednisone 10mg daily.       Interval history:  Back at home. He is not  "eating well. Is eating meat bread peanu butter.   Usually he is eating one meal today. Ocasionally the sandwiches get stuck in back of throat.     Has not had physical therapy since he left.   Vit D 50,000 misplaced by people at home  Currently on Prednisone 10mg   Feels weaker in upper and lower ext     Review of Systems   Constitutional: Positive for activity change and fatigue. Negative for appetite change and fever.   HENT: Negative for mouth sores.         Negative hair loss    Eyes: Negative for pain and redness.   Respiratory: Negative for chest tightness and shortness of breath.    Cardiovascular: Negative for chest pain and leg swelling.   Gastrointestinal: Negative for abdominal pain, blood in stool, constipation and diarrhea.   Endocrine: Negative for cold intolerance.   Genitourinary: Negative for dysuria.   Musculoskeletal: Negative for arthralgias, back pain and neck stiffness.   Skin: Negative for rash.   Neurological: Negative for numbness and headaches.         Objective:   BP (!) 141/92   Pulse 76   Ht 5' 9" (1.753 m)   BMI 20.51 kg/m²      Physical Exam   Constitutional: He is oriented to person, place, and time.   Thin man with poor dentition,    HENT:   Head: Normocephalic and atraumatic.   Mouth/Throat: Oropharynx is clear and moist. No oropharyngeal exudate.   Poor dentition    Eyes: Conjunctivae are normal. Pupils are equal, round, and reactive to light. No scleral icterus.   Neck: Normal range of motion. Neck supple.   Cardiovascular: Normal rate, regular rhythm and normal heart sounds.    No murmur heard.  Pulmonary/Chest: Effort normal and breath sounds normal. No respiratory distress. He has no wheezes.   Abdominal: Soft. There is no tenderness.       Right Side Rheumatological Exam     Muscle Strength (0-5 scale):  Neck Flexion:  4  Neck Extension: 4  Deltoid:  4.4  Biceps: 3/5   Triceps:  3  : 2/5   Iliopsoas: 3  Quadriceps:  3   Distal Lower Extremity: 5    Left Side " Rheumatological Exam     Muscle Strength (0-5 scale):  Neck Flexion:  4  Neck Extension: 4  Deltoid:  4.4  Biceps: 3/5   Triceps:  3  :  2/5   Iliopsoas: 3  Quadriceps:  3   Distal Lower Extremity: 5      Neurological: He is alert and oriented to person, place, and time.   Skin: Skin is dry. No rash noted.     Musculoskeletal: He exhibits no edema.   No large or small joint synovitis  Muscle atrophy            Assessment:       1. Polymyositis        62yo M with PMH of polymyositis (dx 2009, +mildly +ku, outside biopsy proven with elevated CPK and aldolase), osteopenia, Vit D insufficiency, Hemoglobulin c/beta-zero thalaseema, A-fib, dysphagia, portal HTN, emphysema here for follow up of polymyositis. Patient with many social issues that are impeding his health care at this time.     Plan:       -IVIG scheduled to start Friday at 10:30 AM, this will be his second dose. He will need doses monthly   -will reach out to [facility] about cellcept   -will increase prednisone to 30mg daily in interim   -will re-prescribe vit D 50,000 units weekly   -will re order home health, needs evaluation for safety and evaluation of criteria of inpatient rehab   - patient is discussing with his brother in [place], he may move there so they can help with his care   -will get labs in one mos      Patient seen and discussed with Dr. Huston    RTC in  4-6 weeks     Lisa Durham MD  Rheumatology PGY 4

## 2019-05-09 NOTE — PROGRESS NOTES
I  Have personally take the history and examined the patient and agree with fellow's note as stated above. Multiple psychosocial issues and so far Home Health, Social Work not offering any help/solutions. After hospital discharge was to have Home Health with PT(not scheduled), IVIg(not scheduled), mycophenolate( not approved yet). Dr. Ambrosio his primary also has had no success. All of these issues being re-addressed. ALEXSANDER

## 2019-05-10 ENCOUNTER — TELEPHONE (OUTPATIENT)
Dept: INTERNAL MEDICINE | Facility: CLINIC | Age: 64
End: 2019-05-10

## 2019-05-10 ENCOUNTER — INFUSION (OUTPATIENT)
Dept: INFUSION THERAPY | Facility: HOSPITAL | Age: 64
End: 2019-05-10
Attending: INTERNAL MEDICINE
Payer: MEDICARE

## 2019-05-10 ENCOUNTER — TELEPHONE (OUTPATIENT)
Dept: RHEUMATOLOGY | Facility: CLINIC | Age: 64
End: 2019-05-10

## 2019-05-10 VITALS
WEIGHT: 138.88 LBS | BODY MASS INDEX: 20.57 KG/M2 | SYSTOLIC BLOOD PRESSURE: 145 MMHG | DIASTOLIC BLOOD PRESSURE: 77 MMHG | HEART RATE: 74 BPM | TEMPERATURE: 98 F | HEIGHT: 69 IN | RESPIRATION RATE: 18 BRPM

## 2019-05-10 DIAGNOSIS — M33.20 POLYMYOSITIS: Primary | ICD-10-CM

## 2019-05-10 PROCEDURE — 25000003 PHARM REV CODE 250: Performed by: INTERNAL MEDICINE

## 2019-05-10 PROCEDURE — S0028 INJECTION, FAMOTIDINE, 20 MG: HCPCS | Performed by: INTERNAL MEDICINE

## 2019-05-10 PROCEDURE — 96365 THER/PROPH/DIAG IV INF INIT: CPT

## 2019-05-10 PROCEDURE — 96367 TX/PROPH/DG ADDL SEQ IV INF: CPT

## 2019-05-10 PROCEDURE — 63600175 PHARM REV CODE 636 W HCPCS: Mod: JG | Performed by: INTERNAL MEDICINE

## 2019-05-10 PROCEDURE — 96366 THER/PROPH/DIAG IV INF ADDON: CPT

## 2019-05-10 PROCEDURE — 96375 TX/PRO/DX INJ NEW DRUG ADDON: CPT

## 2019-05-10 RX ORDER — HEPARIN 100 UNIT/ML
500 SYRINGE INTRAVENOUS
Status: DISCONTINUED | OUTPATIENT
Start: 2019-05-10 | End: 2019-05-10 | Stop reason: HOSPADM

## 2019-05-10 RX ORDER — FAMOTIDINE 10 MG/ML
20 INJECTION INTRAVENOUS
Status: CANCELLED | OUTPATIENT
Start: 2019-06-01

## 2019-05-10 RX ORDER — SODIUM CHLORIDE 0.9 % (FLUSH) 0.9 %
10 SYRINGE (ML) INJECTION
Status: CANCELLED | OUTPATIENT
Start: 2019-06-01

## 2019-05-10 RX ORDER — ACETAMINOPHEN 325 MG/1
650 TABLET ORAL
Status: CANCELLED | OUTPATIENT
Start: 2019-06-01

## 2019-05-10 RX ORDER — HEPARIN 100 UNIT/ML
500 SYRINGE INTRAVENOUS
Status: CANCELLED | OUTPATIENT
Start: 2019-06-01

## 2019-05-10 RX ORDER — SODIUM CHLORIDE 0.9 % (FLUSH) 0.9 %
10 SYRINGE (ML) INJECTION
Status: DISCONTINUED | OUTPATIENT
Start: 2019-05-10 | End: 2019-05-10 | Stop reason: HOSPADM

## 2019-05-10 RX ORDER — FAMOTIDINE 10 MG/ML
20 INJECTION INTRAVENOUS
Status: COMPLETED | OUTPATIENT
Start: 2019-05-10 | End: 2019-05-10

## 2019-05-10 RX ORDER — ACETAMINOPHEN 325 MG/1
650 TABLET ORAL
Status: COMPLETED | OUTPATIENT
Start: 2019-05-10 | End: 2019-05-10

## 2019-05-10 RX ADMIN — HUMAN IMMUNOGLOBULIN G 25 G: 20 LIQUID INTRAVENOUS at 11:05

## 2019-05-10 RX ADMIN — FAMOTIDINE 20 MG: 10 INJECTION, SOLUTION INTRAVENOUS at 10:05

## 2019-05-10 RX ADMIN — DIPHENHYDRAMINE HYDROCHLORIDE 50 MG: 50 INJECTION, SOLUTION INTRAMUSCULAR; INTRAVENOUS at 10:05

## 2019-05-10 RX ADMIN — ACETAMINOPHEN 650 MG: 325 TABLET ORAL at 10:05

## 2019-05-10 NOTE — TELEPHONE ENCOUNTER
Called patient and let him know that his IVIG infusion is scheduled for 5/13/19 at 1:30PM. Patient reported that he will be there.

## 2019-05-10 NOTE — TELEPHONE ENCOUNTER
Received message from patient's rheumatologist regarding patient's social situation.  Still no home health, has not completed physical therapy, does not have electricity at home.  Rheumatologist placed another order for home health though we were previously told either the patient refused or that his house was infested and they were not able to provide services in that environment.  Has case management been able to reach out to the patient?  If not, would recommend patient come in to clinic to discuss and see what services we might be able to get for him.

## 2019-05-10 NOTE — PLAN OF CARE
Problem: Adult Inpatient Plan of Care  Goal: Patient-Specific Goal (Individualization)  Outcome: Ongoing (interventions implemented as appropriate)  Patient's labs, history, allergies, and medication reviewed.  Assessment complete.  Vital signs stable.  Patient to receive IVIG.  Discussed plan of care with patient.  Patient in agreement. Patient assisted to chair by 2 nurses.  Chair reclined.  Warm blanket and snack offered.  Will monitor.

## 2019-05-10 NOTE — PLAN OF CARE
Problem: Adult Inpatient Plan of Care  Goal: Plan of Care Review  Outcome: Ongoing (interventions implemented as appropriate)  Patient tolerated IVIG treatment well.  Vital signs stable through infusion.  No apparent distress noted.  Discharged without S/S of adverse effects.  AVS given.  Patient instructed to call provider with any questions or concerns.

## 2019-05-13 ENCOUNTER — INFUSION (OUTPATIENT)
Dept: INFUSION THERAPY | Facility: HOSPITAL | Age: 64
End: 2019-05-13
Attending: INTERNAL MEDICINE
Payer: MEDICARE

## 2019-05-13 ENCOUNTER — TELEPHONE (OUTPATIENT)
Dept: RHEUMATOLOGY | Facility: CLINIC | Age: 64
End: 2019-05-13

## 2019-05-13 VITALS
HEART RATE: 69 BPM | WEIGHT: 138.88 LBS | DIASTOLIC BLOOD PRESSURE: 78 MMHG | RESPIRATION RATE: 18 BRPM | TEMPERATURE: 98 F | SYSTOLIC BLOOD PRESSURE: 143 MMHG | HEIGHT: 69 IN | BODY MASS INDEX: 20.57 KG/M2

## 2019-05-13 DIAGNOSIS — M33.20 POLYMYOSITIS: Primary | ICD-10-CM

## 2019-05-13 PROCEDURE — 96375 TX/PRO/DX INJ NEW DRUG ADDON: CPT

## 2019-05-13 PROCEDURE — 96366 THER/PROPH/DIAG IV INF ADDON: CPT

## 2019-05-13 PROCEDURE — 63600175 PHARM REV CODE 636 W HCPCS: Mod: JG | Performed by: INTERNAL MEDICINE

## 2019-05-13 PROCEDURE — 96367 TX/PROPH/DG ADDL SEQ IV INF: CPT

## 2019-05-13 PROCEDURE — S0028 INJECTION, FAMOTIDINE, 20 MG: HCPCS | Performed by: INTERNAL MEDICINE

## 2019-05-13 PROCEDURE — 25000003 PHARM REV CODE 250: Performed by: INTERNAL MEDICINE

## 2019-05-13 PROCEDURE — 96365 THER/PROPH/DIAG IV INF INIT: CPT

## 2019-05-13 RX ORDER — HEPARIN 100 UNIT/ML
500 SYRINGE INTRAVENOUS
Status: CANCELLED | OUTPATIENT
Start: 2019-06-01

## 2019-05-13 RX ORDER — SODIUM CHLORIDE 0.9 % (FLUSH) 0.9 %
10 SYRINGE (ML) INJECTION
Status: DISCONTINUED | OUTPATIENT
Start: 2019-05-13 | End: 2019-05-13 | Stop reason: HOSPADM

## 2019-05-13 RX ORDER — FAMOTIDINE 10 MG/ML
20 INJECTION INTRAVENOUS
Status: COMPLETED | OUTPATIENT
Start: 2019-05-13 | End: 2019-05-13

## 2019-05-13 RX ORDER — ACETAMINOPHEN 325 MG/1
650 TABLET ORAL
Status: CANCELLED | OUTPATIENT
Start: 2019-06-01

## 2019-05-13 RX ORDER — SODIUM CHLORIDE 0.9 % (FLUSH) 0.9 %
10 SYRINGE (ML) INJECTION
Status: CANCELLED | OUTPATIENT
Start: 2019-06-01

## 2019-05-13 RX ORDER — HEPARIN 100 UNIT/ML
500 SYRINGE INTRAVENOUS
Status: DISCONTINUED | OUTPATIENT
Start: 2019-05-13 | End: 2019-05-13 | Stop reason: HOSPADM

## 2019-05-13 RX ORDER — ACETAMINOPHEN 325 MG/1
650 TABLET ORAL
Status: COMPLETED | OUTPATIENT
Start: 2019-05-13 | End: 2019-05-13

## 2019-05-13 RX ORDER — FAMOTIDINE 10 MG/ML
20 INJECTION INTRAVENOUS
Status: CANCELLED | OUTPATIENT
Start: 2019-06-01

## 2019-05-13 RX ADMIN — SODIUM CHLORIDE: 0.9 INJECTION, SOLUTION INTRAVENOUS at 01:05

## 2019-05-13 RX ADMIN — FAMOTIDINE 20 MG: 10 INJECTION, SOLUTION INTRAVENOUS at 02:05

## 2019-05-13 RX ADMIN — DIPHENHYDRAMINE HYDROCHLORIDE 50 MG: 50 INJECTION, SOLUTION INTRAMUSCULAR; INTRAVENOUS at 01:05

## 2019-05-13 RX ADMIN — HUMAN IMMUNOGLOBULIN G 25 G: 5 LIQUID INTRAVENOUS at 02:05

## 2019-05-13 RX ADMIN — ACETAMINOPHEN 650 MG: 325 TABLET ORAL at 01:05

## 2019-05-13 NOTE — PROGRESS NOTES
Discussed with Jonatan at [facility]. We have faxed over information to them and they will look into if patient qualifies or not. They will contact with more information.

## 2019-05-13 NOTE — PLAN OF CARE
Problem: Adult Inpatient Plan of Care  Goal: Plan of Care Review  Outcome: Ongoing (interventions implemented as appropriate)  Patient tolerated treatment well.  Vital signs stable.  No apparent distress noted.  Discharged without S/S of adverse effects.  AVS given.  Patient instructed to call provider with any questions or concerns.  RTC tomorrow for IVIG D3.

## 2019-05-13 NOTE — TELEPHONE ENCOUNTER
----- Message from Eusebia Rondon LMSW sent at 5/10/2019  3:14 PM CDT -----  This  received most recent referral on the above patient 5/2/19. I have provided this patient or caregiver with resources for affordable/low income housing, food pantries, personal care services, Medicaid Long Term Care Program, Financial Assistance (Please see notes from 12/20/18 and 5/02/19). This patient or caregiver has not accessed these community resources, services or is currently on a waiting list. If we can assist this patient or caregiver in the future with a different barrier, please send a new referral.     Thank you,    Eusebia Rondon LMSW          ----- Message -----  From: Lisa Durham MD  Sent: 5/10/2019   1:36 PM  To: DANIEL Haddad Ms. Laws,   I am one of the physicians taking care of Mr. Kahn. Can you pleas help us with his care. He was discharged from the hospital on 4/11 for his polymyositis. Due to his disease he is unable to walk and is wheelchair bound. He has not had any physical therapy. He is only able to eat one meal a day which is effecting his nutritional status. He has no electricity. I have placed another home health order for nursing help and PT.   I did message Dr. Ambrosio his PCP who told me that someone did go out but that his house was bug and trash infested so that there was no room for physical therapy.   It may be helpful to find placement for him so that he can be in an environment to get physical therapy, nutrition and care.   Would you be able to help us. Thank you so much.     Dr. Durham

## 2019-05-13 NOTE — PLAN OF CARE
Problem: Adult Inpatient Plan of Care  Goal: Patient-Specific Goal (Individualization)  Outcome: Ongoing (interventions implemented as appropriate)  Patient presents for Day 2 IVIG.  VSS.  Assessment complete.  Reviewed plan of care with patient.  Patient in agreement.  Patient assisted to chair by 2 nurses.  Chair reclined.  Snack and warm blanket offered.  Will monitor.

## 2019-05-14 ENCOUNTER — INFUSION (OUTPATIENT)
Dept: INFUSION THERAPY | Facility: HOSPITAL | Age: 64
End: 2019-05-14
Attending: INTERNAL MEDICINE
Payer: MEDICARE

## 2019-05-14 ENCOUNTER — TELEPHONE (OUTPATIENT)
Dept: RHEUMATOLOGY | Facility: CLINIC | Age: 64
End: 2019-05-14

## 2019-05-14 VITALS
SYSTOLIC BLOOD PRESSURE: 158 MMHG | DIASTOLIC BLOOD PRESSURE: 86 MMHG | RESPIRATION RATE: 18 BRPM | HEART RATE: 71 BPM | OXYGEN SATURATION: 95 % | TEMPERATURE: 99 F

## 2019-05-14 DIAGNOSIS — M33.20 POLYMYOSITIS: Primary | ICD-10-CM

## 2019-05-14 PROCEDURE — 96375 TX/PRO/DX INJ NEW DRUG ADDON: CPT

## 2019-05-14 PROCEDURE — S0028 INJECTION, FAMOTIDINE, 20 MG: HCPCS | Performed by: INTERNAL MEDICINE

## 2019-05-14 PROCEDURE — 25000003 PHARM REV CODE 250: Performed by: INTERNAL MEDICINE

## 2019-05-14 PROCEDURE — 63600175 PHARM REV CODE 636 W HCPCS: Mod: JG | Performed by: INTERNAL MEDICINE

## 2019-05-14 PROCEDURE — 96366 THER/PROPH/DIAG IV INF ADDON: CPT

## 2019-05-14 PROCEDURE — 96367 TX/PROPH/DG ADDL SEQ IV INF: CPT

## 2019-05-14 PROCEDURE — 96365 THER/PROPH/DIAG IV INF INIT: CPT

## 2019-05-14 RX ORDER — HEPARIN 100 UNIT/ML
500 SYRINGE INTRAVENOUS
Status: CANCELLED | OUTPATIENT
Start: 2019-06-01

## 2019-05-14 RX ORDER — SODIUM CHLORIDE 0.9 % (FLUSH) 0.9 %
10 SYRINGE (ML) INJECTION
Status: DISCONTINUED | OUTPATIENT
Start: 2019-05-14 | End: 2019-05-14 | Stop reason: HOSPADM

## 2019-05-14 RX ORDER — ACETAMINOPHEN 325 MG/1
650 TABLET ORAL
Status: COMPLETED | OUTPATIENT
Start: 2019-05-14 | End: 2019-05-14

## 2019-05-14 RX ORDER — ACETAMINOPHEN 325 MG/1
650 TABLET ORAL
Status: CANCELLED | OUTPATIENT
Start: 2019-06-01

## 2019-05-14 RX ORDER — HEPARIN 100 UNIT/ML
500 SYRINGE INTRAVENOUS
Status: DISCONTINUED | OUTPATIENT
Start: 2019-05-14 | End: 2019-05-14 | Stop reason: HOSPADM

## 2019-05-14 RX ORDER — FAMOTIDINE 10 MG/ML
20 INJECTION INTRAVENOUS
Status: COMPLETED | OUTPATIENT
Start: 2019-05-14 | End: 2019-05-14

## 2019-05-14 RX ORDER — FAMOTIDINE 10 MG/ML
20 INJECTION INTRAVENOUS
Status: CANCELLED | OUTPATIENT
Start: 2019-06-01

## 2019-05-14 RX ORDER — SODIUM CHLORIDE 0.9 % (FLUSH) 0.9 %
10 SYRINGE (ML) INJECTION
Status: CANCELLED | OUTPATIENT
Start: 2019-06-01

## 2019-05-14 RX ADMIN — ACETAMINOPHEN 650 MG: 325 TABLET ORAL at 01:05

## 2019-05-14 RX ADMIN — FAMOTIDINE 20 MG: 10 INJECTION, SOLUTION INTRAVENOUS at 01:05

## 2019-05-14 RX ADMIN — DIPHENHYDRAMINE HYDROCHLORIDE 50 MG: 50 INJECTION, SOLUTION INTRAMUSCULAR; INTRAVENOUS at 01:05

## 2019-05-14 RX ADMIN — HUMAN IMMUNOGLOBULIN G 25 G: 5 LIQUID INTRAVENOUS at 01:05

## 2019-05-14 RX ADMIN — SODIUM CHLORIDE: 9 INJECTION, SOLUTION INTRAVENOUS at 01:05

## 2019-05-14 NOTE — PLAN OF CARE
Problem: Adult Inpatient Plan of Care  Goal: Plan of Care Review  Outcome: Ongoing (interventions implemented as appropriate)  Pt received IVIG; tolerated well. VSS and NAD. Pt instructed to call MD with any concerns. Pt discharged home independently.

## 2019-05-14 NOTE — TELEPHONE ENCOUNTER
Called [facility]. They will place orders for the patient. We have faxed over the information that they needed. Discussed care with Dr. Inocencio Osborn. He will be overlooking the case and will inform us of a decision.     Message sent to [facility] about cellcept approval     Appt with Dr. Ambrosio 5/28    Today is day 3/5 for this second dose of IVIG

## 2019-05-15 ENCOUNTER — TELEPHONE (OUTPATIENT)
Dept: RHEUMATOLOGY | Facility: CLINIC | Age: 64
End: 2019-05-15

## 2019-05-15 ENCOUNTER — INFUSION (OUTPATIENT)
Dept: INFUSION THERAPY | Facility: OTHER | Age: 64
End: 2019-05-15
Attending: INTERNAL MEDICINE
Payer: MEDICARE

## 2019-05-15 VITALS
OXYGEN SATURATION: 100 % | BODY MASS INDEX: 20.57 KG/M2 | WEIGHT: 138.88 LBS | RESPIRATION RATE: 20 BRPM | DIASTOLIC BLOOD PRESSURE: 83 MMHG | HEIGHT: 69 IN | HEART RATE: 68 BPM | SYSTOLIC BLOOD PRESSURE: 156 MMHG | TEMPERATURE: 98 F

## 2019-05-15 DIAGNOSIS — M33.20 POLYMYOSITIS: Primary | ICD-10-CM

## 2019-05-15 PROCEDURE — 96367 TX/PROPH/DG ADDL SEQ IV INF: CPT

## 2019-05-15 PROCEDURE — 25000003 PHARM REV CODE 250: Performed by: INTERNAL MEDICINE

## 2019-05-15 PROCEDURE — 96366 THER/PROPH/DIAG IV INF ADDON: CPT

## 2019-05-15 PROCEDURE — 96365 THER/PROPH/DIAG IV INF INIT: CPT

## 2019-05-15 PROCEDURE — S0028 INJECTION, FAMOTIDINE, 20 MG: HCPCS | Performed by: INTERNAL MEDICINE

## 2019-05-15 PROCEDURE — 96375 TX/PRO/DX INJ NEW DRUG ADDON: CPT

## 2019-05-15 PROCEDURE — 63600175 PHARM REV CODE 636 W HCPCS: Mod: JG | Performed by: INTERNAL MEDICINE

## 2019-05-15 RX ORDER — SODIUM CHLORIDE 0.9 % (FLUSH) 0.9 %
10 SYRINGE (ML) INJECTION
Status: DISCONTINUED | OUTPATIENT
Start: 2019-05-15 | End: 2019-05-15 | Stop reason: HOSPADM

## 2019-05-15 RX ORDER — ACETAMINOPHEN 325 MG/1
650 TABLET ORAL
Status: COMPLETED | OUTPATIENT
Start: 2019-05-15 | End: 2019-05-15

## 2019-05-15 RX ORDER — ACETAMINOPHEN 325 MG/1
650 TABLET ORAL
Status: CANCELLED | OUTPATIENT
Start: 2019-06-01

## 2019-05-15 RX ORDER — HEPARIN 100 UNIT/ML
500 SYRINGE INTRAVENOUS
Status: DISCONTINUED | OUTPATIENT
Start: 2019-05-15 | End: 2019-05-15 | Stop reason: HOSPADM

## 2019-05-15 RX ORDER — FAMOTIDINE 10 MG/ML
20 INJECTION INTRAVENOUS
Status: COMPLETED | OUTPATIENT
Start: 2019-05-15 | End: 2019-05-15

## 2019-05-15 RX ORDER — HEPARIN 100 UNIT/ML
500 SYRINGE INTRAVENOUS
Status: CANCELLED | OUTPATIENT
Start: 2019-06-01

## 2019-05-15 RX ORDER — FAMOTIDINE 10 MG/ML
20 INJECTION INTRAVENOUS
Status: CANCELLED | OUTPATIENT
Start: 2019-06-01

## 2019-05-15 RX ORDER — SODIUM CHLORIDE 0.9 % (FLUSH) 0.9 %
10 SYRINGE (ML) INJECTION
Status: CANCELLED | OUTPATIENT
Start: 2019-06-01

## 2019-05-15 RX ADMIN — ACETAMINOPHEN 650 MG: 325 TABLET ORAL at 02:05

## 2019-05-15 RX ADMIN — SODIUM CHLORIDE: 0.9 INJECTION, SOLUTION INTRAVENOUS at 02:05

## 2019-05-15 RX ADMIN — FAMOTIDINE 20 MG: 10 INJECTION, SOLUTION INTRAVENOUS at 02:05

## 2019-05-15 RX ADMIN — Medication 50 MG: at 02:05

## 2019-05-15 RX ADMIN — HUMAN IMMUNOGLOBULIN G 25 G: 20 LIQUID INTRAVENOUS at 02:05

## 2019-05-15 NOTE — TELEPHONE ENCOUNTER
I called and spoke with patient he has an infusion today for 100, he said he will be about hour late I  Called no one would  phone I sent a IM to jameel castro

## 2019-05-16 ENCOUNTER — INFUSION (OUTPATIENT)
Dept: INFUSION THERAPY | Facility: OTHER | Age: 64
End: 2019-05-16
Attending: INTERNAL MEDICINE
Payer: MEDICARE

## 2019-05-16 VITALS
TEMPERATURE: 98 F | HEART RATE: 86 BPM | SYSTOLIC BLOOD PRESSURE: 153 MMHG | RESPIRATION RATE: 20 BRPM | DIASTOLIC BLOOD PRESSURE: 85 MMHG | OXYGEN SATURATION: 99 %

## 2019-05-16 DIAGNOSIS — M33.20 POLYMYOSITIS: Primary | ICD-10-CM

## 2019-05-16 PROCEDURE — 96365 THER/PROPH/DIAG IV INF INIT: CPT

## 2019-05-16 PROCEDURE — 63600175 PHARM REV CODE 636 W HCPCS: Mod: JG | Performed by: INTERNAL MEDICINE

## 2019-05-16 PROCEDURE — 96367 TX/PROPH/DG ADDL SEQ IV INF: CPT

## 2019-05-16 PROCEDURE — S0028 INJECTION, FAMOTIDINE, 20 MG: HCPCS | Performed by: INTERNAL MEDICINE

## 2019-05-16 PROCEDURE — 96366 THER/PROPH/DIAG IV INF ADDON: CPT

## 2019-05-16 PROCEDURE — 96375 TX/PRO/DX INJ NEW DRUG ADDON: CPT

## 2019-05-16 PROCEDURE — 25000003 PHARM REV CODE 250: Performed by: INTERNAL MEDICINE

## 2019-05-16 RX ORDER — SODIUM CHLORIDE 0.9 % (FLUSH) 0.9 %
10 SYRINGE (ML) INJECTION
Status: CANCELLED | OUTPATIENT
Start: 2019-06-01

## 2019-05-16 RX ORDER — ACETAMINOPHEN 325 MG/1
650 TABLET ORAL
Status: COMPLETED | OUTPATIENT
Start: 2019-05-16 | End: 2019-05-16

## 2019-05-16 RX ORDER — HEPARIN 100 UNIT/ML
500 SYRINGE INTRAVENOUS
Status: CANCELLED | OUTPATIENT
Start: 2019-06-01

## 2019-05-16 RX ORDER — FAMOTIDINE 10 MG/ML
20 INJECTION INTRAVENOUS
Status: COMPLETED | OUTPATIENT
Start: 2019-05-16 | End: 2019-05-16

## 2019-05-16 RX ORDER — FAMOTIDINE 10 MG/ML
20 INJECTION INTRAVENOUS
Status: CANCELLED | OUTPATIENT
Start: 2019-06-01

## 2019-05-16 RX ORDER — HEPARIN 100 UNIT/ML
500 SYRINGE INTRAVENOUS
Status: DISCONTINUED | OUTPATIENT
Start: 2019-05-16 | End: 2019-05-16 | Stop reason: HOSPADM

## 2019-05-16 RX ORDER — SODIUM CHLORIDE 0.9 % (FLUSH) 0.9 %
10 SYRINGE (ML) INJECTION
Status: DISCONTINUED | OUTPATIENT
Start: 2019-05-16 | End: 2019-05-16 | Stop reason: HOSPADM

## 2019-05-16 RX ORDER — ACETAMINOPHEN 325 MG/1
650 TABLET ORAL
Status: CANCELLED | OUTPATIENT
Start: 2019-06-01

## 2019-05-16 RX ADMIN — SODIUM CHLORIDE: 9 INJECTION, SOLUTION INTRAVENOUS at 09:05

## 2019-05-16 RX ADMIN — FAMOTIDINE 20 MG: 10 INJECTION, SOLUTION INTRAVENOUS at 09:05

## 2019-05-16 RX ADMIN — HUMAN IMMUNOGLOBULIN G 25 G: 20 LIQUID INTRAVENOUS at 10:05

## 2019-05-16 RX ADMIN — ACETAMINOPHEN 650 MG: 325 TABLET ORAL at 09:05

## 2019-05-16 RX ADMIN — Medication 50 MG: at 09:05

## 2019-05-16 NOTE — PLAN OF CARE
Problem: Adult Inpatient Plan of Care  Goal: Plan of Care Review  Outcome: Ongoing (interventions implemented as appropriate)  IVIG administered, patient tolerated well. VSS, NAD. D/C'd home with self via motorized scooter.

## 2019-05-17 ENCOUNTER — TELEPHONE (OUTPATIENT)
Dept: RHEUMATOLOGY | Facility: CLINIC | Age: 64
End: 2019-05-17

## 2019-05-17 NOTE — TELEPHONE ENCOUNTER
Received telephone message from Anne at [facility]. Mr. Kahn has been accepted for inpatient rehab.

## 2019-05-20 NOTE — TELEPHONE ENCOUNTER
LVM to notify patient of Cellcept assistance approval. He will need to call to schedule delivery, and notify MD of start date so follow up labs may be scheduled. Will make patient aware to call [facility] or MD if renewal assistance is needed.

## 2019-05-20 NOTE — TELEPHONE ENCOUNTER
FOR DOCUMENTATION ONLY:  Financial Assistance for Cellcept approved from 5/16/19 to 5/15/20  Source: [facility] Patient Access to [facility]  Phone: [phone]  Fax: [phone]

## 2019-05-22 DIAGNOSIS — R13.10 DYSPHAGIA: ICD-10-CM

## 2019-05-22 DIAGNOSIS — Z91.89 ASPIRATION PRECAUTIONS: Primary | ICD-10-CM

## 2019-05-24 ENCOUNTER — HOSPITAL ENCOUNTER (OUTPATIENT)
Dept: RADIOLOGY | Facility: HOSPITAL | Age: 64
Discharge: HOME | End: 2019-05-24
Attending: PHYSICAL MEDICINE & REHABILITATION
Payer: MEDICARE

## 2019-05-24 ENCOUNTER — CLINICAL SUPPORT (OUTPATIENT)
Dept: SPEECH THERAPY | Facility: HOSPITAL | Age: 64
End: 2019-05-24
Attending: PHYSICAL MEDICINE & REHABILITATION
Payer: MEDICARE

## 2019-05-24 DIAGNOSIS — Z91.89 ASPIRATION PRECAUTIONS: ICD-10-CM

## 2019-05-24 DIAGNOSIS — R13.12 OROPHARYNGEAL DYSPHAGIA: ICD-10-CM

## 2019-05-24 DIAGNOSIS — R13.10 DYSPHAGIA: ICD-10-CM

## 2019-05-24 PROCEDURE — 92611 MOTION FLUOROSCOPY/SWALLOW: CPT | Mod: GN | Performed by: SPEECH-LANGUAGE PATHOLOGIST

## 2019-05-24 PROCEDURE — 74230 FL MODIFIED BARIUM SWALLOW SPEECH STUDY: ICD-10-PCS | Mod: 26,,, | Performed by: RADIOLOGY

## 2019-05-24 PROCEDURE — 74230 X-RAY XM SWLNG FUNCJ C+: CPT | Mod: 26,,, | Performed by: RADIOLOGY

## 2019-05-24 PROCEDURE — 74230 X-RAY XM SWLNG FUNCJ C+: CPT | Mod: TC

## 2019-05-24 NOTE — PROGRESS NOTES
"Referring provider: Dr. Meg Hamlin  Reason for visit:  Modified barium swallow study (CPT 61285)    Report Summary   --Date: 05/24/2019  --Purpose: to evaluate anatomy and physiology of the oropharyngeal swallow, to determine effectiveness of rehabilitation strategies, and to determine diet consistency and intervention recommendations.   --Diet recommendations: recommend mechanical soft diet as tolerated; thin liquids ok if taken in very small sips; crush all meds; recommend 1:1 assistance with eating/feeding    Subjective / History    Nura Kahn Jr. is a 63 y.o. male referred by Dr. Hamlin for Modified Barium Swallow Study (36463).  He is currently on a mechanical soft diet with thin liquids.  He underwent an MBSS in February, but at this time reports that since he has undergone several IVIG treatments that his swallowing is better.  He reports he is coughing less and feeling better.  He reports he has been tolerating soft foods.  He has been trying to take small sips but reports it is hard for him to remember.  He is currently in inpatient rehab as he had been home previously and was having a difficult time taking care of himself.  He is undergoing SLP therapy at inpatient rehab.   Subjective from previous MBSS on 2/21/19: He reports solids often feel stuck in his throat and take a while to go down.  Reports intermittent coughing with liquids.  No difficulty with small pills but reports difficulty swallowing capsules in particular.  +smoking.  Reports sensation of thick mucus in his throat.  Recently underwent swallowing therapy via home health.     Diet modification as a result of this problem: yes  Weight loss: unsure  History of aspiration pneumonia: unknown, but not documented in EMR; last CXR in April 2019 noted "Mild peribronchial cuffing which may be seen with bronchitis.  Mild tubular bronchiectasis of the right lower lobe with several distal bronchi demonstrating retained secretions, finding may " "be seen with small airways infectious/noninfectious inflammation or aspiration."    Past Medical History:   Diagnosis Date    Atrial fibrillation 3/2/2015    Depression     Essential hypertension 2/27/2019    Microcytic anemia 9/25/2014    Neuromuscular disorder     Polymyositis 2009    Tobacco abuse      Current Outpatient Medications on File Prior to Visit   Medication Sig Dispense Refill    albuterol (PROVENTIL/VENTOLIN HFA) 90 mcg/actuation inhaler Inhale 2 puffs into the lungs every 6 (six) hours as needed for Wheezing or Shortness of Breath. 1 each 11    amLODIPine (NORVASC) 5 MG tablet Take 2 tablets (10 mg total) by mouth once daily. 60 tablet 1    ammonium lactate 12 % Crea Apply twice daily to affected parts both feet as needed. 140 g 11    ergocalciferol (ERGOCALCIFEROL) 50,000 unit Cap Take 1 capsule (50,000 Units total) by mouth every 7 days. (Patient taking differently: Take 50,000 Units by mouth every 7 days. Saturday) 4 capsule 1    ergocalciferol (ERGOCALCIFEROL) 50,000 unit Cap Take 1 capsule (50,000 Units total) by mouth every 7 days. 12 capsule 0    famotidine (PEPCID) 20 MG tablet Take 1 tablet (20 mg total) by mouth 2 (two) times daily. 60 tablet 11    labetalol (NORMODYNE) 100 MG tablet Take 1 tablet (100 mg total) by mouth every 12 (twelve) hours. 60 tablet 2    mycophenolate (CELLCEPT) 250 mg Cap Take 2 capsules (500 mg total) by mouth 2 (two) times daily for 5 days, THEN 4 capsules (1,000 mg total) 2 (two) times daily. 500 capsule 0    predniSONE (DELTASONE) 10 MG tablet Take 3 tablets (30 mg total) by mouth once daily. 180 tablet 0    predniSONE (DELTASONE) 5 MG tablet Take 2 tablets (10 mg total) by mouth once daily. 60 tablet 1     Current Facility-Administered Medications on File Prior to Visit   Medication Dose Route Frequency Provider Last Rate Last Dose    [COMPLETED] barium sulfate (VARIBAR PUDDING) oral paste 4 mL  4 mL Oral ONCE PRN Meg Hamlin MD   4 mL at " 05/24/19 1150    [COMPLETED] barium sulfate (VARIBAR THIN LIQUID) oral powder 60 mL  60 mL Oral ONCE PRN Meg Hamlin MD   60 mL at 05/24/19 1140       Objective   Lateral videofluorographic views were obtained. The radiologist and speech pathologist were present for the entire procedure.     Consistencies assessed / method of administration: pt self-administered all trials.   Thin liquid/8 mL via straw  Thin liquid/5 mL via straw  Applesauce/tsp  Pudding/tsp  Cracker  Barium tablet w/ applesauce    Oral phase  - Adequate lip closure  - Reduced tongue control during bolus hold  - Timely bolus preparation  - Disorganized bolus transport/lingual motion  - Mild oral residue     Pharyngeal phase  -Delayed initiation: to valleculae  - Adequate soft palate elevation  - Reduced laryngeal elevation and anterior hyoid excursion  - Reduced tongue base retraction  - Reduced/absent epiglottic movement  - Reduced laryngeal vestibular closure: silent aspiration during/after the swallow noted with pt's large self-administered sips of thin liquid via straw.  Improved with cues to reduce bolus size.    - Reduced pharyngeal stripping wave  - Moderate-severe pharyngeal residue: pyriforms.  Improved clearance with several dry swallows and R/L head turns with repeat swallow.  Minimal improvement with chin tuck today.   - Reduced PE segment opening: all trials with slow clearance through UES with pyriform pocketing, but most notable with pill, which stuck in region of UES for several dry/water swallows prior to clearing    Strategies/therapeutic interventions attempted  - Effortful swallow/multiple swallows per bolus.  Strategies were effective in decreasing/eliminating risk for pharyngeal residue.   - Chin tuck.  Strategies were minimally effective in decreasing/eliminating risk for pharyngeal residue.   - Head turn to right & head turn to left.  Strategies were effective in decreasing/eliminating risk for pyriform residue.     -  Ongoing treatment recommendations and rationale (if treatment is recommended): CTAR (improve UES opening by strengthening sprahyoid muscles); effortful swallow (lingual force, tongue base movement); shaker head lift while laying flat (improve UES opening)    Rosenbek Penetration-Aspiration Scale (Rosenbek et al 1996)  Best Trial: 1. Contrast does not enter airway.   Worst Trial: 8. Contrast passes glottis with visible residue and absent response.  - large sips of thin liquids only    Functional goals  Length Status Goal   Long term In progress  Patient will maintain adequate hydration/nutrition with optimum safety and efficiency of swallowing function on least restrictive PO diet level without overt signs and symptoms of aspiration.    Long term In progress  Patient will utilize compensatory strategies with optimum safety and efficiency of swallowing function on least restrictive PO diet level without overt signs and symptoms of aspiration.    Short term In progress  Patient will independently demonstrate adequate use of repeat swallow technique to eliminate s/s of aspiration with consistency on 8/10 trials.   Short term In progress  Patient will correctly demonstrate pharyngeal swallow strengthening exercises on 10/10 trials.   Short term In progress   Patient will independently demonstrate adequate use of head turn compensatory strategy to eliminate s/s of aspiration with consistency on 8/10 trials.    Short term In progress   Patient will demonstrate the ability to adequately self-monitor swallowing skills and perform appropriate compensatory techniques to reduce s/s of aspiration.    Short term In progress  Patient will independently alternate liquids and solids to clear any oral cavity residue on 8/10 trials.       Assessment / Impressions   The results of this MBSS indicate that patient demonstrates moderate swallowing dysfunction c/b silent aspiration of thin liquid consistencies during/after the swallow with  boluses 8mL+ in size or consecutive sips; also with moderate-severe pharyngeal residue in pyriforms.  Prognosis for improvement of swallowing with therapy is fair.      Recommendations / POC   -Diet: recommend soft/mechanical soft diet as tolerated; thin liquids ok if taken in very small sips; crush all meds (no whole meds); recommend 1:1 assistance with eating/feeding  -Therapeutic technique: recommend effortful swallow, head turn to right, head turn to left and multiple swallows per bolus   -Dysphagia therapy, for 8-12 sessions over the course of 1-4 weeks, in order to increase oral control of bolus, increase tongue base retraction, increase pharyngeal contraction, increase laryngeal excursion and airway closure and increase swallow safety by implementing therapeutic maneuvers  -Contact clinician or referring provider for repeat MBSS if swallowing status changes or worsens

## 2019-05-27 ENCOUNTER — TELEPHONE (OUTPATIENT)
Dept: ENDOSCOPY | Facility: HOSPITAL | Age: 64
End: 2019-05-27

## 2019-05-31 ENCOUNTER — EXTERNAL CHRONIC CARE MANAGEMENT (OUTPATIENT)
Dept: PRIMARY CARE CLINIC | Facility: CLINIC | Age: 64
End: 2019-05-31
Payer: MEDICARE

## 2019-05-31 PROCEDURE — 99490 PR CHRONIC CARE MGMT, 1ST 20 MIN: ICD-10-PCS | Mod: S$PBB,,, | Performed by: FAMILY MEDICINE

## 2019-05-31 PROCEDURE — 99490 CHRNC CARE MGMT STAFF 1ST 20: CPT | Mod: PBBFAC | Performed by: FAMILY MEDICINE

## 2019-05-31 PROCEDURE — 99490 CHRNC CARE MGMT STAFF 1ST 20: CPT | Mod: S$PBB,,, | Performed by: FAMILY MEDICINE

## 2019-06-03 ENCOUNTER — DOCUMENTATION ONLY (OUTPATIENT)
Dept: TRANSPLANT | Facility: CLINIC | Age: 64
End: 2019-06-03

## 2019-06-03 NOTE — LETTER
Shanta 3, 2019    Nura Kahn  [address] [facility] [address] [id number]      Dear Nura Kahn:    Your doctor has referred you to the [facility]. We are sending this letter to remind you to make an appointment with us to complete the referral process.     Please call us at [phone] to schedule an appointment. We look forward to seeing you soon.     If you received a call and have been scheduled, please disregard this letter.       Sincerely,        [facility]   [facility] [place], [place] [id number] [phone]

## 2019-06-05 PROCEDURE — G0180 PR HOME HEALTH MD CERTIFICATION: ICD-10-PCS | Mod: ,,, | Performed by: PHYSICAL MEDICINE & REHABILITATION

## 2019-06-05 PROCEDURE — G0180 MD CERTIFICATION HHA PATIENT: HCPCS | Mod: ,,, | Performed by: PHYSICAL MEDICINE & REHABILITATION

## 2019-06-10 ENCOUNTER — INFUSION (OUTPATIENT)
Dept: INFUSION THERAPY | Facility: HOSPITAL | Age: 64
End: 2019-06-10
Attending: INTERNAL MEDICINE
Payer: MEDICARE

## 2019-06-10 VITALS
HEART RATE: 64 BPM | SYSTOLIC BLOOD PRESSURE: 125 MMHG | TEMPERATURE: 98 F | WEIGHT: 132 LBS | RESPIRATION RATE: 18 BRPM | BODY MASS INDEX: 17.49 KG/M2 | DIASTOLIC BLOOD PRESSURE: 69 MMHG | HEIGHT: 73 IN

## 2019-06-10 DIAGNOSIS — M33.20 POLYMYOSITIS: Primary | ICD-10-CM

## 2019-06-10 PROCEDURE — 96375 TX/PRO/DX INJ NEW DRUG ADDON: CPT

## 2019-06-10 PROCEDURE — 96366 THER/PROPH/DIAG IV INF ADDON: CPT

## 2019-06-10 PROCEDURE — 63600175 PHARM REV CODE 636 W HCPCS: Performed by: INTERNAL MEDICINE

## 2019-06-10 PROCEDURE — 96365 THER/PROPH/DIAG IV INF INIT: CPT

## 2019-06-10 PROCEDURE — 96367 TX/PROPH/DG ADDL SEQ IV INF: CPT

## 2019-06-10 PROCEDURE — S0028 INJECTION, FAMOTIDINE, 20 MG: HCPCS | Performed by: INTERNAL MEDICINE

## 2019-06-10 PROCEDURE — 25000003 PHARM REV CODE 250: Performed by: INTERNAL MEDICINE

## 2019-06-10 RX ORDER — SODIUM CHLORIDE 0.9 % (FLUSH) 0.9 %
10 SYRINGE (ML) INJECTION
Status: CANCELLED | OUTPATIENT
Start: 2019-07-01

## 2019-06-10 RX ORDER — ACETAMINOPHEN 325 MG/1
650 TABLET ORAL
Status: CANCELLED | OUTPATIENT
Start: 2019-07-01

## 2019-06-10 RX ORDER — FAMOTIDINE 10 MG/ML
20 INJECTION INTRAVENOUS
Status: COMPLETED | OUTPATIENT
Start: 2019-06-10 | End: 2019-06-10

## 2019-06-10 RX ORDER — HEPARIN 100 UNIT/ML
500 SYRINGE INTRAVENOUS
Status: CANCELLED | OUTPATIENT
Start: 2019-07-01

## 2019-06-10 RX ORDER — FAMOTIDINE 10 MG/ML
20 INJECTION INTRAVENOUS
Status: CANCELLED | OUTPATIENT
Start: 2019-07-01

## 2019-06-10 RX ORDER — ACETAMINOPHEN 325 MG/1
650 TABLET ORAL
Status: COMPLETED | OUTPATIENT
Start: 2019-06-10 | End: 2019-06-10

## 2019-06-10 RX ORDER — SODIUM CHLORIDE 0.9 % (FLUSH) 0.9 %
10 SYRINGE (ML) INJECTION
Status: DISCONTINUED | OUTPATIENT
Start: 2019-06-10 | End: 2019-06-10 | Stop reason: HOSPADM

## 2019-06-10 RX ORDER — HEPARIN 100 UNIT/ML
500 SYRINGE INTRAVENOUS
Status: DISCONTINUED | OUTPATIENT
Start: 2019-06-10 | End: 2019-06-10 | Stop reason: HOSPADM

## 2019-06-10 RX ADMIN — HUMAN IMMUNOGLOBULIN G 25 G: 20 LIQUID INTRAVENOUS at 08:06

## 2019-06-10 RX ADMIN — DIPHENHYDRAMINE HYDROCHLORIDE 50 MG: 50 INJECTION, SOLUTION INTRAMUSCULAR; INTRAVENOUS at 08:06

## 2019-06-10 RX ADMIN — FAMOTIDINE 20 MG: 10 INJECTION, SOLUTION INTRAVENOUS at 08:06

## 2019-06-10 RX ADMIN — ACETAMINOPHEN 650 MG: 325 TABLET ORAL at 08:06

## 2019-06-11 ENCOUNTER — INFUSION (OUTPATIENT)
Dept: INFUSION THERAPY | Facility: HOSPITAL | Age: 64
End: 2019-06-11
Attending: INTERNAL MEDICINE
Payer: MEDICARE

## 2019-06-11 VITALS
RESPIRATION RATE: 18 BRPM | BODY MASS INDEX: 17.39 KG/M2 | TEMPERATURE: 98 F | OXYGEN SATURATION: 99 % | DIASTOLIC BLOOD PRESSURE: 72 MMHG | SYSTOLIC BLOOD PRESSURE: 122 MMHG | HEART RATE: 66 BPM | WEIGHT: 131.81 LBS

## 2019-06-11 DIAGNOSIS — M33.20 POLYMYOSITIS: Primary | ICD-10-CM

## 2019-06-11 PROCEDURE — 25000003 PHARM REV CODE 250: Performed by: INTERNAL MEDICINE

## 2019-06-11 PROCEDURE — 63600175 PHARM REV CODE 636 W HCPCS: Performed by: INTERNAL MEDICINE

## 2019-06-11 PROCEDURE — 96366 THER/PROPH/DIAG IV INF ADDON: CPT

## 2019-06-11 PROCEDURE — S0028 INJECTION, FAMOTIDINE, 20 MG: HCPCS | Performed by: INTERNAL MEDICINE

## 2019-06-11 PROCEDURE — 96365 THER/PROPH/DIAG IV INF INIT: CPT

## 2019-06-11 RX ORDER — SODIUM CHLORIDE 0.9 % (FLUSH) 0.9 %
10 SYRINGE (ML) INJECTION
Status: CANCELLED | OUTPATIENT
Start: 2019-07-01

## 2019-06-11 RX ORDER — HEPARIN 100 UNIT/ML
500 SYRINGE INTRAVENOUS
Status: CANCELLED | OUTPATIENT
Start: 2019-07-01

## 2019-06-11 RX ORDER — FAMOTIDINE 10 MG/ML
20 INJECTION INTRAVENOUS
Status: CANCELLED | OUTPATIENT
Start: 2019-07-01

## 2019-06-11 RX ORDER — ACETAMINOPHEN 325 MG/1
650 TABLET ORAL
Status: COMPLETED | OUTPATIENT
Start: 2019-06-11 | End: 2019-06-11

## 2019-06-11 RX ORDER — SODIUM CHLORIDE 0.9 % (FLUSH) 0.9 %
10 SYRINGE (ML) INJECTION
Status: DISCONTINUED | OUTPATIENT
Start: 2019-06-11 | End: 2019-06-11 | Stop reason: HOSPADM

## 2019-06-11 RX ORDER — FAMOTIDINE 10 MG/ML
20 INJECTION INTRAVENOUS
Status: COMPLETED | OUTPATIENT
Start: 2019-06-11 | End: 2019-06-11

## 2019-06-11 RX ORDER — HEPARIN 100 UNIT/ML
500 SYRINGE INTRAVENOUS
Status: DISCONTINUED | OUTPATIENT
Start: 2019-06-11 | End: 2019-06-11 | Stop reason: HOSPADM

## 2019-06-11 RX ORDER — ACETAMINOPHEN 325 MG/1
650 TABLET ORAL
Status: CANCELLED | OUTPATIENT
Start: 2019-07-01

## 2019-06-11 RX ADMIN — FAMOTIDINE 20 MG: 10 INJECTION, SOLUTION INTRAVENOUS at 02:06

## 2019-06-11 RX ADMIN — HUMAN IMMUNOGLOBULIN G 25 G: 20 LIQUID INTRAVENOUS at 03:06

## 2019-06-11 RX ADMIN — DIPHENHYDRAMINE HYDROCHLORIDE 50 MG: 50 INJECTION, SOLUTION INTRAMUSCULAR; INTRAVENOUS at 02:06

## 2019-06-11 RX ADMIN — ACETAMINOPHEN 650 MG: 325 TABLET ORAL at 02:06

## 2019-06-11 NOTE — PLAN OF CARE
Problem: Adult Inpatient Plan of Care  Goal: Plan of Care Review  Tolerated infusion well. AVS povided, VS stable, Discharged to home.

## 2019-06-12 ENCOUNTER — TELEPHONE (OUTPATIENT)
Dept: RHEUMATOLOGY | Facility: CLINIC | Age: 64
End: 2019-06-12

## 2019-06-12 ENCOUNTER — TELEPHONE (OUTPATIENT)
Dept: INTERNAL MEDICINE | Facility: CLINIC | Age: 64
End: 2019-06-12

## 2019-06-12 ENCOUNTER — INFUSION (OUTPATIENT)
Dept: INFUSION THERAPY | Facility: HOSPITAL | Age: 64
End: 2019-06-12
Attending: INTERNAL MEDICINE
Payer: MEDICARE

## 2019-06-12 ENCOUNTER — EXTERNAL HOME HEALTH (OUTPATIENT)
Dept: HOME HEALTH SERVICES | Facility: HOSPITAL | Age: 64
End: 2019-06-12
Payer: MEDICARE

## 2019-06-12 VITALS — HEART RATE: 78 BPM | RESPIRATION RATE: 18 BRPM | SYSTOLIC BLOOD PRESSURE: 155 MMHG | DIASTOLIC BLOOD PRESSURE: 81 MMHG

## 2019-06-12 DIAGNOSIS — R26.81 GAIT INSTABILITY: Chronic | ICD-10-CM

## 2019-06-12 DIAGNOSIS — M33.20 POLYMYOSITIS: Chronic | ICD-10-CM

## 2019-06-12 DIAGNOSIS — M33.20 POLYMYOSITIS: Primary | ICD-10-CM

## 2019-06-12 PROCEDURE — 25000003 PHARM REV CODE 250: Performed by: INTERNAL MEDICINE

## 2019-06-12 PROCEDURE — 96375 TX/PRO/DX INJ NEW DRUG ADDON: CPT

## 2019-06-12 PROCEDURE — 96367 TX/PROPH/DG ADDL SEQ IV INF: CPT

## 2019-06-12 PROCEDURE — 63600175 PHARM REV CODE 636 W HCPCS: Performed by: INTERNAL MEDICINE

## 2019-06-12 PROCEDURE — S0028 INJECTION, FAMOTIDINE, 20 MG: HCPCS | Performed by: INTERNAL MEDICINE

## 2019-06-12 PROCEDURE — 96366 THER/PROPH/DIAG IV INF ADDON: CPT

## 2019-06-12 PROCEDURE — 96365 THER/PROPH/DIAG IV INF INIT: CPT

## 2019-06-12 RX ORDER — FAMOTIDINE 10 MG/ML
20 INJECTION INTRAVENOUS
Status: CANCELLED | OUTPATIENT
Start: 2019-07-01

## 2019-06-12 RX ORDER — ACETAMINOPHEN 325 MG/1
650 TABLET ORAL
Status: COMPLETED | OUTPATIENT
Start: 2019-06-12 | End: 2019-06-12

## 2019-06-12 RX ORDER — HEPARIN 100 UNIT/ML
500 SYRINGE INTRAVENOUS
Status: DISCONTINUED | OUTPATIENT
Start: 2019-06-12 | End: 2019-06-12 | Stop reason: HOSPADM

## 2019-06-12 RX ORDER — SODIUM CHLORIDE 0.9 % (FLUSH) 0.9 %
10 SYRINGE (ML) INJECTION
Status: DISCONTINUED | OUTPATIENT
Start: 2019-06-12 | End: 2019-06-12 | Stop reason: HOSPADM

## 2019-06-12 RX ORDER — ACETAMINOPHEN 325 MG/1
650 TABLET ORAL
Status: CANCELLED | OUTPATIENT
Start: 2019-07-01

## 2019-06-12 RX ORDER — FAMOTIDINE 10 MG/ML
20 INJECTION INTRAVENOUS
Status: COMPLETED | OUTPATIENT
Start: 2019-06-12 | End: 2019-06-12

## 2019-06-12 RX ORDER — HEPARIN 100 UNIT/ML
500 SYRINGE INTRAVENOUS
Status: CANCELLED | OUTPATIENT
Start: 2019-07-01

## 2019-06-12 RX ORDER — SODIUM CHLORIDE 0.9 % (FLUSH) 0.9 %
10 SYRINGE (ML) INJECTION
Status: CANCELLED | OUTPATIENT
Start: 2019-07-01

## 2019-06-12 RX ADMIN — DIPHENHYDRAMINE HYDROCHLORIDE 50 MG: 50 INJECTION, SOLUTION INTRAMUSCULAR; INTRAVENOUS at 07:06

## 2019-06-12 RX ADMIN — HUMAN IMMUNOGLOBULIN G 25 G: 5 LIQUID INTRAVENOUS at 08:06

## 2019-06-12 RX ADMIN — ACETAMINOPHEN 650 MG: 325 TABLET ORAL at 07:06

## 2019-06-12 RX ADMIN — FAMOTIDINE 20 MG: 10 INJECTION, SOLUTION INTRAVENOUS at 07:06

## 2019-06-12 NOTE — TELEPHONE ENCOUNTER
Patient in need of new motorized scooter. Referral to PM&R placed.   Called patient to discuss. No answer. Voicemail left.

## 2019-06-12 NOTE — TELEPHONE ENCOUNTER
----- Message from Juanis Saeed sent at 6/12/2019  9:59 AM CDT -----  Contact: self/[phone]  Pt called in regards to getting an order to get a new scooter. He said he is due for a new one.       Please advise

## 2019-06-13 ENCOUNTER — TELEPHONE (OUTPATIENT)
Dept: RHEUMATOLOGY | Facility: CLINIC | Age: 64
End: 2019-06-13

## 2019-06-13 ENCOUNTER — INFUSION (OUTPATIENT)
Dept: INFUSION THERAPY | Facility: HOSPITAL | Age: 64
End: 2019-06-13
Attending: INTERNAL MEDICINE
Payer: MEDICARE

## 2019-06-13 ENCOUNTER — TELEPHONE (OUTPATIENT)
Dept: INTERNAL MEDICINE | Facility: CLINIC | Age: 64
End: 2019-06-13

## 2019-06-13 VITALS
HEART RATE: 80 BPM | TEMPERATURE: 98 F | SYSTOLIC BLOOD PRESSURE: 151 MMHG | HEIGHT: 73 IN | DIASTOLIC BLOOD PRESSURE: 75 MMHG | BODY MASS INDEX: 17.47 KG/M2 | RESPIRATION RATE: 18 BRPM | WEIGHT: 131.81 LBS

## 2019-06-13 DIAGNOSIS — F32.A DEPRESSION, UNSPECIFIED DEPRESSION TYPE: ICD-10-CM

## 2019-06-13 DIAGNOSIS — R53.81 DEBILITY: ICD-10-CM

## 2019-06-13 DIAGNOSIS — M33.20 POLYMYOSITIS: Chronic | ICD-10-CM

## 2019-06-13 DIAGNOSIS — R63.4 WEIGHT LOSS: ICD-10-CM

## 2019-06-13 DIAGNOSIS — M33.20 POLYMYOSITIS: Primary | ICD-10-CM

## 2019-06-13 PROCEDURE — 96365 THER/PROPH/DIAG IV INF INIT: CPT

## 2019-06-13 PROCEDURE — 96367 TX/PROPH/DG ADDL SEQ IV INF: CPT

## 2019-06-13 PROCEDURE — S0028 INJECTION, FAMOTIDINE, 20 MG: HCPCS | Performed by: INTERNAL MEDICINE

## 2019-06-13 PROCEDURE — 63600175 PHARM REV CODE 636 W HCPCS: Performed by: INTERNAL MEDICINE

## 2019-06-13 PROCEDURE — 25000003 PHARM REV CODE 250: Performed by: INTERNAL MEDICINE

## 2019-06-13 PROCEDURE — 96366 THER/PROPH/DIAG IV INF ADDON: CPT

## 2019-06-13 PROCEDURE — 96375 TX/PRO/DX INJ NEW DRUG ADDON: CPT

## 2019-06-13 RX ORDER — SODIUM CHLORIDE 0.9 % (FLUSH) 0.9 %
10 SYRINGE (ML) INJECTION
Status: CANCELLED | OUTPATIENT
Start: 2019-07-01

## 2019-06-13 RX ORDER — ACETAMINOPHEN 325 MG/1
650 TABLET ORAL
Status: COMPLETED | OUTPATIENT
Start: 2019-06-13 | End: 2019-06-13

## 2019-06-13 RX ORDER — FAMOTIDINE 10 MG/ML
20 INJECTION INTRAVENOUS
Status: COMPLETED | OUTPATIENT
Start: 2019-06-13 | End: 2019-06-13

## 2019-06-13 RX ORDER — HEPARIN 100 UNIT/ML
500 SYRINGE INTRAVENOUS
Status: DISCONTINUED | OUTPATIENT
Start: 2019-06-13 | End: 2019-06-13 | Stop reason: HOSPADM

## 2019-06-13 RX ORDER — SODIUM CHLORIDE 0.9 % (FLUSH) 0.9 %
10 SYRINGE (ML) INJECTION
Status: DISCONTINUED | OUTPATIENT
Start: 2019-06-13 | End: 2019-06-13 | Stop reason: HOSPADM

## 2019-06-13 RX ORDER — HEPARIN 100 UNIT/ML
500 SYRINGE INTRAVENOUS
Status: CANCELLED | OUTPATIENT
Start: 2019-07-01

## 2019-06-13 RX ORDER — FAMOTIDINE 10 MG/ML
20 INJECTION INTRAVENOUS
Status: CANCELLED | OUTPATIENT
Start: 2019-07-01

## 2019-06-13 RX ORDER — ACETAMINOPHEN 325 MG/1
650 TABLET ORAL
Status: CANCELLED | OUTPATIENT
Start: 2019-07-01

## 2019-06-13 RX ADMIN — SODIUM CHLORIDE: 0.9 INJECTION, SOLUTION INTRAVENOUS at 01:06

## 2019-06-13 RX ADMIN — FAMOTIDINE 20 MG: 10 INJECTION, SOLUTION INTRAVENOUS at 01:06

## 2019-06-13 RX ADMIN — HUMAN IMMUNOGLOBULIN G 25 G: 20 LIQUID INTRAVENOUS at 02:06

## 2019-06-13 RX ADMIN — DIPHENHYDRAMINE HYDROCHLORIDE 50 MG: 50 INJECTION, SOLUTION INTRAMUSCULAR; INTRAVENOUS at 01:06

## 2019-06-13 RX ADMIN — ACETAMINOPHEN 650 MG: 325 TABLET ORAL at 01:06

## 2019-06-13 NOTE — TELEPHONE ENCOUNTER
Pt called to request a referral for a . Pt is almost homeless and out of food. Please, advise. Thanks.

## 2019-06-13 NOTE — NURSING
1300  Pt here for IVIG infusion, reports tolerating treatment; pt reports difficulty living arrangements, does not feel he is able to get care he needs to improve health, having difficulty getting food to eat, would like to change living arrangement, notified MD Huston and staff via [employer] Message; discussed treatment plan for today, all questions answered and pt agrees to proceed

## 2019-06-13 NOTE — TELEPHONE ENCOUNTER
Referred to case management (again) and social work.  Please process and notify patient once complete.

## 2019-06-13 NOTE — TELEPHONE ENCOUNTER
Called patient. No answer. Left voicemail.       ----- Message from Heike Casarez MA sent at 6/13/2019  1:35 PM CDT -----  Contact: patient       ----- Message -----  From: Aristides Starks  Sent: 6/13/2019  12:51 PM  To: Herminio Gonzalez Staff    Please call pt at [phone]    Patient would like to discuss his future plan of care    Thank you

## 2019-06-13 NOTE — PLAN OF CARE
Problem: Adult Inpatient Plan of Care  Goal: Plan of Care Review  Outcome: Ongoing (interventions implemented as appropriate)  Infusion completed, pt tolerated well; pt instructed to remain well hydrated; discussed when to contact MD, when to report to ER; pt declined printed AVS, verbalized understanding of all discussed and when to report next

## 2019-06-14 ENCOUNTER — INFUSION (OUTPATIENT)
Dept: INFUSION THERAPY | Facility: HOSPITAL | Age: 64
End: 2019-06-14
Attending: INTERNAL MEDICINE
Payer: MEDICARE

## 2019-06-14 VITALS
DIASTOLIC BLOOD PRESSURE: 81 MMHG | SYSTOLIC BLOOD PRESSURE: 140 MMHG | RESPIRATION RATE: 18 BRPM | HEART RATE: 85 BPM | TEMPERATURE: 98 F

## 2019-06-14 DIAGNOSIS — M33.20 POLYMYOSITIS: Primary | ICD-10-CM

## 2019-06-14 PROCEDURE — 96367 TX/PROPH/DG ADDL SEQ IV INF: CPT

## 2019-06-14 PROCEDURE — 25000003 PHARM REV CODE 250: Performed by: INTERNAL MEDICINE

## 2019-06-14 PROCEDURE — S0028 INJECTION, FAMOTIDINE, 20 MG: HCPCS | Performed by: INTERNAL MEDICINE

## 2019-06-14 PROCEDURE — 96375 TX/PRO/DX INJ NEW DRUG ADDON: CPT

## 2019-06-14 PROCEDURE — 96365 THER/PROPH/DIAG IV INF INIT: CPT

## 2019-06-14 PROCEDURE — 96366 THER/PROPH/DIAG IV INF ADDON: CPT

## 2019-06-14 PROCEDURE — 63600175 PHARM REV CODE 636 W HCPCS: Mod: JG | Performed by: INTERNAL MEDICINE

## 2019-06-14 RX ORDER — HEPARIN 100 UNIT/ML
500 SYRINGE INTRAVENOUS
Status: CANCELLED | OUTPATIENT
Start: 2019-07-01

## 2019-06-14 RX ORDER — ACETAMINOPHEN 325 MG/1
650 TABLET ORAL
Status: CANCELLED | OUTPATIENT
Start: 2019-07-01

## 2019-06-14 RX ORDER — FAMOTIDINE 10 MG/ML
20 INJECTION INTRAVENOUS
Status: COMPLETED | OUTPATIENT
Start: 2019-06-14 | End: 2019-06-14

## 2019-06-14 RX ORDER — FAMOTIDINE 10 MG/ML
20 INJECTION INTRAVENOUS
Status: CANCELLED | OUTPATIENT
Start: 2019-07-01

## 2019-06-14 RX ORDER — SODIUM CHLORIDE 0.9 % (FLUSH) 0.9 %
10 SYRINGE (ML) INJECTION
Status: CANCELLED | OUTPATIENT
Start: 2019-07-01

## 2019-06-14 RX ORDER — ACETAMINOPHEN 325 MG/1
650 TABLET ORAL
Status: COMPLETED | OUTPATIENT
Start: 2019-06-14 | End: 2019-06-14

## 2019-06-14 RX ADMIN — SODIUM CHLORIDE: 0.9 INJECTION, SOLUTION INTRAVENOUS at 08:06

## 2019-06-14 RX ADMIN — HUMAN IMMUNOGLOBULIN G 25 G: 5 LIQUID INTRAVENOUS at 08:06

## 2019-06-14 RX ADMIN — FAMOTIDINE 20 MG: 10 INJECTION, SOLUTION INTRAVENOUS at 08:06

## 2019-06-14 RX ADMIN — DIPHENHYDRAMINE HYDROCHLORIDE 50 MG: 50 INJECTION, SOLUTION INTRAMUSCULAR; INTRAVENOUS at 08:06

## 2019-06-14 RX ADMIN — ACETAMINOPHEN 650 MG: 325 TABLET ORAL at 07:06

## 2019-06-14 NOTE — PLAN OF CARE
Problem: Adult Inpatient Plan of Care  Goal: Patient-Specific Goal (Individualization)  Outcome: Ongoing (interventions implemented as appropriate)  0730-Labs , hx, and medications reviewed, patient is here for last day of treatment. Assessment completed. Discussed plan of care with patient. Patient in agreement. Chair reclined and warm blanket and snack offered.

## 2019-06-14 NOTE — PLAN OF CARE
Problem: Adult Inpatient Plan of Care  Goal: Plan of Care Review  Outcome: Ongoing (interventions implemented as appropriate)  1122-Patient tolerated treatment well. Discharged without complaints or S/S of adverse event. AVS given.  Instructed to call provider for any questions or concerns.

## 2019-06-18 ENCOUNTER — OUTPATIENT CASE MANAGEMENT (OUTPATIENT)
Dept: ADMINISTRATIVE | Facility: OTHER | Age: 64
End: 2019-06-18

## 2019-06-18 NOTE — PROGRESS NOTES
This SW received a referral on the above patient.   Reason for referral: low risk, Affordable housing, Food Resources, Personal Care  Name of the community resource that was provided: Local Food Pantries, Affordable Senior Housing, [facility], [place] Medicaid Long Term Care Applications (for personal care), [facility]   Resource given to: Patient via [place] mail     This LMSW completed assessment with patient. Patient is well known to [facility] and previous SW assessment was completed last month on 5/02/19. Patient reports he was admitted to an inpatient rehab facility shortly after encounter with . He reports he does not recall speaking to . Pt states a lot has happened since May and he does not remember discussing resources and needs. Pt confirms he received resource packet from  however he states he has not opened packet. LMSW asked patient about his current living situation and patient confirms he is living with significant other and other individuals. LMSW asked if all members in household contribute to paying bills. Pt reports currently he is the only one receiving income in the household. Pt reports other individuals are not working however he states when the others start working they will contribute. Pt reports he is current with his rent and states he is primary interested in finding more affordable housing. LMSW advised Pt to review list of affordable housing that will be provided in resource packet and encouraged him to contact properties he is interested in applying for residence. Pt informed he will need to visit food pantries himself to obtain assistance as some require ID and proof of income for eligibility. LMSW discussed with Pt his recent statements about inadequate care and support at home. Pt reports he is getting more help at home than previously provided and states he may be interested in applying for state help for additional help. LMSW explained the  process for applying for Medicaid LTC Services with patient and informed Pt he may contact this  or Medicaid customer care line if he has difficulty with application. Pt also advised to call this  if he does not receive resource packet within 7 days of this encounter. Pt verbalized understanding and agreement. Pt denied having other concerns or questions during this assessment. LMSW confirmed with patient his current mailing address is correct and resources mailed today. Referral source notified of above communication and resources provided.

## 2019-06-19 ENCOUNTER — OFFICE VISIT (OUTPATIENT)
Dept: RHEUMATOLOGY | Facility: CLINIC | Age: 64
End: 2019-06-19
Payer: MEDICARE

## 2019-06-19 ENCOUNTER — TELEPHONE (OUTPATIENT)
Dept: PHARMACY | Facility: CLINIC | Age: 64
End: 2019-06-19

## 2019-06-19 VITALS
SYSTOLIC BLOOD PRESSURE: 148 MMHG | HEART RATE: 86 BPM | HEIGHT: 70 IN | DIASTOLIC BLOOD PRESSURE: 86 MMHG | WEIGHT: 155 LBS | BODY MASS INDEX: 22.19 KG/M2

## 2019-06-19 DIAGNOSIS — R13.10 DYSPHAGIA, UNSPECIFIED TYPE: ICD-10-CM

## 2019-06-19 DIAGNOSIS — R16.0 HEPATOMEGALY: ICD-10-CM

## 2019-06-19 DIAGNOSIS — M33.20 POLYMYOSITIS: Primary | ICD-10-CM

## 2019-06-19 DIAGNOSIS — D69.6 THROMBOCYTOPENIA: ICD-10-CM

## 2019-06-19 PROCEDURE — 99999 PR PBB SHADOW E&M-EST. PATIENT-LVL IV: CPT | Mod: PBBFAC,GC,, | Performed by: STUDENT IN AN ORGANIZED HEALTH CARE EDUCATION/TRAINING PROGRAM

## 2019-06-19 PROCEDURE — 99214 OFFICE O/P EST MOD 30 MIN: CPT | Mod: PBBFAC | Performed by: STUDENT IN AN ORGANIZED HEALTH CARE EDUCATION/TRAINING PROGRAM

## 2019-06-19 PROCEDURE — 99213 OFFICE O/P EST LOW 20 MIN: CPT | Mod: S$PBB,GC,, | Performed by: STUDENT IN AN ORGANIZED HEALTH CARE EDUCATION/TRAINING PROGRAM

## 2019-06-19 PROCEDURE — 99213 PR OFFICE/OUTPT VISIT, EST, LEVL III, 20-29 MIN: ICD-10-PCS | Mod: S$PBB,GC,, | Performed by: STUDENT IN AN ORGANIZED HEALTH CARE EDUCATION/TRAINING PROGRAM

## 2019-06-19 PROCEDURE — 99999 PR PBB SHADOW E&M-EST. PATIENT-LVL IV: ICD-10-PCS | Mod: PBBFAC,GC,, | Performed by: STUDENT IN AN ORGANIZED HEALTH CARE EDUCATION/TRAINING PROGRAM

## 2019-06-19 RX ORDER — PREDNISONE 5 MG/1
TABLET ORAL
Qty: 90 TABLET | Refills: 1 | Status: SHIPPED | OUTPATIENT
Start: 2019-06-19 | End: 2020-07-07

## 2019-06-19 RX ORDER — MYCOPHENOLATE MOFETIL 500 MG/1
1500 TABLET ORAL 2 TIMES DAILY
Qty: 180 TABLET | Refills: 11 | Status: SHIPPED | OUTPATIENT
Start: 2019-06-19 | End: 2019-06-24 | Stop reason: SDUPTHER

## 2019-06-19 NOTE — PATIENT INSTRUCTIONS
1) Prednisone: Take 25 mg (5 tablets) for 2 weeks, then reduce to 20mg (4 tablets) for 2 weeks, then reduce to 15mg (3 tablets) and continue    2) Increase cellcept 1500mg BID (3 tablets twice a day)

## 2019-06-19 NOTE — PROGRESS NOTES
Subjective:       Patient ID: Nura Kahn Jr. is a 63 y.o. male.    Chief Complaint: follow up polymyositis     HPI   64yo M with PMH of polymyositis (dx 2009, +mildly +ku, outside biopsy proven with elevated CPK and aldolase), osteopenia, Vit D insufficiency, Hemoglobulin c/beta-zero thalaseema, A-fib, dysphagia, portal HTN, emphysema here for follow up of polymyositis.     Per chart review it seems that he first established care here at [facility] in 8/2014. He was diagnosed in 2945-8292 when he was feeling weak, falling. He was a  and was unable to use his legs. He had a biopsy in [id number] in [place] that per notes was consistent with polymyositis. He was on MTX in the past which was stopped due CT showing hepatosplenomegaly with portal venous hypertension and elevated T bili in 8/2014 He was at that time started on Imuran. He has been on Imuran and steroids since then.  In 2016, he was worked up again here at [facility]. MRI of the left lower extremities showed Severe atrophy with fatty replacement of the thigh musculature bilaterally.  There is mild increased edema signal within the remaining thigh musculature and left iliopsoas muscle without associated enhancement which could reflect a mild degree of residual myositis.  EMG Left upper Ext 5/2016 was a technically difficult study. Had moderate ulnar entrapment at elbow, perhaps on a background of mild polyneuropathy. 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 Pathology of left thigh showing minute potion of muscle with end stage myopathic changes and focal chronic inflammation   Patient remained on Imuran and prednisone.      In Feb 2019 - patient had been out of Imuran for ~1 month.  He also admits to medication non-compliance because he didn't think he was having any improvement.  He admits that his weakness has gotten worse - stating that he is unable to stand, difficulty  "with getting in and out of his scooter, normal ADLs (including going to the bathroom - resulting in urination/defecation on himself - resulting in decreased PO intake).  He also feels worsening of his dysphagia - where he gets food stuck and often chokes/coughs when he eats/drinks.       Wife works fulltime.  Daughter (10 yo).  Patient does not have social support/help for transportation to various appt.     Specialist updates:  - Cardiology - Last saw 3/2015 - diagnosed with PAF.  No treatment at that time.  No follow up since  - Hem - Last saw 12/2017 - Evaluated for microcytic anemia and mild chronic thrombocytosis.  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  Discharged from clinic   - GI - Last saw 6/2015 - EGD/motility study ordered - not performed.  No follow up   - Hepatology - Last saw 5/2015 - Found portal HTN on CT with no evidence of cirrhosis or PV thrombosis.  Fibrosure with F1 stage (minimial fibrosis).  RTC PRN (no follow up)  - Pulmonary - Last saw 2/2015 -" Lung parenchymal normal - no evidence of fibrosis and one mediastinal LN marginal enlarged" CT chest showed GGO (2016). Repeat CT chest (2017) - no mention of GGO      Imaging:  CXR 1/2018:Patchy consolidation projected over the right lower lung zone, concerning for infection, correlation advised.       CT chest 12/2017:Peribronchial cuffing predominantly in the lower lung zones as well as retained secretions in the right middle lobe segmental bronchi consistent with bronchitis; such inflammation can occur with inhaled irritants or aspiration.  There is no evidence of interstitial lung disease such as NSIP or UIP.Upper lung zone predominant paraseptal emphysema and dispersed areas of centrilobular emphysema Stable 1.2 cm AP window lymph node.  Densely calcified granuloma in the posterobasal segment of the right lower lobe.Hepatosplenomegaly with associated splenic collateral vessels at the splenic hilum suggesting sequela of portal venous " hypertension    CT chest 4/2019:   1. Mild peribronchial cuffing which may be seen with bronchitis.  Mild tubular bronchiectasis of the right lower lobe with several distal bronchi demonstrating retained secretions, finding may be seen with small airways infectious/noninfectious inflammation or aspiration.  2. Centrilobular and paraseptal emphysema with an upper lung zone predominance.  3. Diffuse muscular atrophy.  4. Splenomegaly..     CT pelvis 6/2017: Obturator internus edema or hematoma. No acute fracture. Stable splenomegaly.     Esophogram 4/2015: Spontaneous gastroesophageal reflux.  Otherwise, unremarkable esophagram.     PET scan 8/2014:AP window lymph node SUV max less than 2.5.  This is most likely benign/reactive.  Surveillance is recommended at 3, 6, 12, and 24 months. Splenomegaly most likely from portal hypertension    He was admitted in 4/2019 worsening generalized muscle weakness and dysphagia. Labs on admission showed Normal and stable CBC and CMP.  Normal CK/aldolase.  Normal inflammatory markers.  Elevated IgG (1754). HMGCoA ab negative. Vit D: 15.    MRI femurs 4/6/19:  1.  Redemonstration of advanced diffuse atrophy and fatty replacement of the bilateral thigh musculature involving all compartments.  There is mild residual edema signal present within the bilateral obturator musculature and residual posterior compartment musculature, similar to prior MRI examination.  2.  Mild heterogeneity of the visualized bone marrow signal which may relate to red marrow reconversion.  Clinical correlation advised.  Patient will also benefit from PT/OT and rehab placement for muscle strengthening after completion of IVIG.      MRI humerus b/l 4/7:  Significant diffuse atrophy and fatty replacement of the bilateral proximal upper extremity musculature, including the rotator cuff musculature, right greater than left, in this patient with reported history of polymyositis.  There is some degree of sparing of the  "bilateral deltoid and triceps musculature with greater residual muscle bulk on the left compared to the right.  There is mild residual edema present within the remaining musculature, most pronounced within the triceps.    During that hospitalization he received IVIG x 5 days, He was started on cellcept 1g BID and prednisone 10mg daily.     Interval history:  He has was admitted to inpatient rehab for 2.5 weeks. Received IVIG 5/10-5/16 and 6/10-6/14. He was started on cellcept in the inpatient rehab setting. Currently on cellcept and prednisone 30mg daily   He is not receiving any PT at home. Overall he feels stronger than last visit.   He had one episode that he was leaning on left arm all day and felt that he could not hold anything with that.  He states that he "tastes his fevers", no shortness of breath, no chest pain, rashes.   He feels his swallowing has improved. He is eating all, had breakfast burrito.   Currently is on Prednisone 30mg daily, cellcept 1000mg BID, and IV monthly         Review of Systems   Constitutional: Positive for activity change and fatigue. Negative for appetite change and fever.   HENT: Negative for mouth sores.         Negative hair loss    Eyes: Negative for pain and redness.   Respiratory: Negative for chest tightness and shortness of breath.    Cardiovascular: Negative for chest pain and leg swelling.   Gastrointestinal: Negative for abdominal pain, blood in stool, constipation and diarrhea.   Endocrine: Negative for cold intolerance.   Genitourinary: Negative for dysuria.   Musculoskeletal: Negative for arthralgias, back pain and neck stiffness.   Skin: Negative for rash.   Neurological: Negative for numbness and headaches.         Objective:   BP (!) 148/86   Pulse 86   Ht 5' 9.6" (1.768 m)   Wt 70.3 kg (155 lb)   BMI 22.50 kg/m²      Physical Exam   Constitutional: He is oriented to person, place, and time.   Thin man with poor dentition,    HENT:   Head: Normocephalic and " atraumatic.   Mouth/Throat: Oropharynx is clear and moist. No oropharyngeal exudate.   Poor dentition    Eyes: Conjunctivae are normal. Pupils are equal, round, and reactive to light. No scleral icterus.   Neck: Normal range of motion. Neck supple.   Cardiovascular: Normal rate, regular rhythm and normal heart sounds.    No murmur heard.  Pulmonary/Chest: Effort normal and breath sounds normal. No respiratory distress. He has no wheezes.   Abdominal: Soft. There is no tenderness.       Right Side Rheumatological Exam     Muscle Strength (0-5 scale):  Neck Flexion:  4  Neck Extension: 4  Deltoid:  4.4  Biceps: 3/5   Triceps:  3  : 3/5   Iliopsoas: 3  Quadriceps:  3   Distal Lower Extremity: 4.8    Left Side Rheumatological Exam     Muscle Strength (0-5 scale):  Neck Flexion:  4  Neck Extension: 4  Deltoid:  4.4  Biceps: 3/5   Triceps:  3  :  3/5   Iliopsoas: 3  Quadriceps:  3   Distal Lower Extremity: 4.8      Neurological: He is alert and oriented to person, place, and time.   Skin: Skin is dry. No rash noted.     Musculoskeletal: He exhibits no edema.   No large or small joint synovitis  Muscle atrophy            Assessment:       1. Polymyositis        64yo M with PMH of polymyositis (dx 2009, +mildly +ku, outside biopsy proven with elevated CPK and aldolase), osteopenia, Vit D insufficiency, Hemoglobulin c/beta-zero thalaseema, A-fib, dysphagia, portal HTN, emphysema here for follow up of polymyositis. Patient with many social issues that are impeding his health care at this time, but this seems to be improving. Overall his condition does seem to be improving. He has been on Imuran in the past, his treatment has been escalated to IVIG monthly and cellcept. Currently on prednisone but weaning.     He first established care here at [facility] in 8/2014. He was diagnosed in [id number] when he was feeling weak, falling. He was a  and was unable to use his legs. He had a biopsy  in [id number] in [place] that per notes was consistent with polymyositis. He was on MTX in the past which was stopped due CT showing hepatosplenomegaly with portal venous hypertension and elevated T bili in 8/2014 He was at that time started on Imuran. He was on Imuran and steroids on and off due to noncompliance. In 2016, he was worked up again here at [facility]. MRI of the left lower extremities showed Severe atrophy with fatty replacement of the thigh musculature bilaterally.  There is mild increased edema signal within the remaining thigh musculature and left iliopsoas muscle without associated enhancement which could reflect a mild degree of residual myositis. EMG Left upper Ext 5/2016 was a technically difficult study. Had moderate ulnar entrapment at elbow, perhaps on a background of mild polyneuropathy. 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 Pathology of left thigh showing minute potion of muscle with end stage myopathic changes and focal chronic inflammation He was on Imuran and steroids on and off with noncompliance up until 4/2019. He was admitted in 4/2019 worsening generalized muscle weakness and dysphagia. Labs on admission showed Normal and stable CBC and CMP.  Normal CK/aldolase.  Normal inflammatory markers.  Elevated IgG (1754). HMGCoA ab negative. Vit D: 15.  During that hospitalization he received IVIG x 5 days, He was started on cellcept 1g BID and prednisone 10mg daily.  Discharged home at that time. Due to inadequate home care his improvement in strength in the hospital deteriorated. He was seen in clinic 5/2019 and his steroids were increased to 30mg daily. He was not on cellcept due to no approval. He has was admitted to inpatient rehab for 2.5 weeks. Received IVIG 5/10-5/16 and 6/10-6/14. He was started on cellcept in the inpatient rehab setting.         Specialist updates:  - Cardiology - Last saw 3/2015 - diagnosed with PAF.  No treatment  "at that time.  No follow up since  - Hem - Last saw 12/2017 - Evaluated for microcytic anemia and mild chronic thrombocytosis.  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  Discharged from clinic   - GI - Last saw 6/2015 - EGD/motility study ordered - not performed.  No follow up   - Hepatology - Last saw 5/2015 - Found portal HTN on CT with no evidence of cirrhosis or PV thrombosis.  Fibrosure with F1 stage (minimial fibrosis).  RTC PRN (no follow up)  - Pulmonary - Last saw 2/2015 -" Lung parenchymal normal - no evidence of fibrosis and one mediastinal LN marginal enlarged" CT chest showed GGO (2016). Repeat CT chest (2017) - no mention of GGO      Imaging:  CXR 1/2018:Patchy consolidation projected over the right lower lung zone, concerning for infection, correlation advised.       CT chest 12/2017:Peribronchial cuffing predominantly in the lower lung zones as well as retained secretions in the right middle lobe segmental bronchi consistent with bronchitis; such inflammation can occur with inhaled irritants or aspiration.  There is no evidence of interstitial lung disease such as NSIP or UIP.Upper lung zone predominant paraseptal emphysema and dispersed areas of centrilobular emphysema Stable 1.2 cm AP window lymph node.  Densely calcified granuloma in the posterobasal segment of the right lower lobe.Hepatosplenomegaly with associated splenic collateral vessels at the splenic hilum suggesting sequela of portal venous hypertension    CT chest 4/2019:   1. Mild peribronchial cuffing which may be seen with bronchitis.  Mild tubular bronchiectasis of the right lower lobe with several distal bronchi demonstrating retained secretions, finding may be seen with small airways infectious/noninfectious inflammation or aspiration.  2. Centrilobular and paraseptal emphysema with an upper lung zone predominance.  3. Diffuse muscular atrophy.  4. Splenomegaly..     CT pelvis 6/2017: Obturator internus edema or hematoma. " No acute fracture. Stable splenomegaly.     Esophogram 4/2015: Spontaneous gastroesophageal reflux.  Otherwise, unremarkable esophagram.     PET scan 8/2014:AP window lymph node SUV max less than 2.5.  This is most likely benign/reactive.  Surveillance is recommended at 3, 6, 12, and 24 months. Splenomegaly most likely from portal hypertension    MRI femurs 4/6/19:  1.  Redemonstration of advanced diffuse atrophy and fatty replacement of the bilateral thigh musculature involving all compartments.  There is mild residual edema signal present within the bilateral obturator musculature and residual posterior compartment musculature, similar to prior MRI examination.  2.  Mild heterogeneity of the visualized bone marrow signal which may relate to red marrow reconversion.  Clinical correlation advised.  Patient will also benefit from PT/OT and rehab placement for muscle strengthening after completion of IVIG.     MRI humerus b/l 4/7:  Significant diffuse atrophy and fatty replacement of the bilateral proximal upper extremity musculature, including the rotator cuff musculature, right greater than left, in this patient with reported history of polymyositis.  There is some degree of sparing of the bilateral deltoid and triceps musculature with greater residual muscle bulk on the left compared to the right.  There is mild residual edema present within the remaining musculature, most pronounced within the triceps.        Plan:       #Polymyositis   -Continue IVIG monthly (dose give over 5 days)   -will increase cellcept to 1500mg BID, labs in one mos   -Currently on prednisone 30mg daily, will decrease prednisone by 5mg every 2 weeks until 15mg daily and then continue   -continue vit D 50,000 units weekly   -will re order PT/OT, as now patient is able to get to PT appts himself   -will refer to nutrition   -discussed with patient his current living situation, at this time he would like to continue to live at home as his  situation is improving.   -referral to GI/hepatology for evaluation of hepatosplenomegaly and portal hypertension     Patient seen and discussed with Dr. Huston    RTAMOR in  6 weeks     Lisa Durham MD  Rheumatology PGY 4

## 2019-06-19 NOTE — TELEPHONE ENCOUNTER
LVM for callback to inform patient that [facility] received prescription for Cellcept and prior authorization is required.  [facility] will be back in touch once insurance determination is received.

## 2019-06-20 NOTE — PROGRESS NOTES
I  Have personally take the history and examined the patient and agree with fellow's note as stated above.Remarkable improvement in swallowing upper> lower extremity strength, nutrition. Agree with assessment and plans. ALEXSANDER

## 2019-06-24 DIAGNOSIS — M33.20 POLYMYOSITIS: Primary | ICD-10-CM

## 2019-06-24 RX ORDER — MYCOPHENOLATE MOFETIL 500 MG/1
1500 TABLET ORAL 2 TIMES DAILY
Qty: 180 TABLET | Refills: 11 | Status: SHIPPED | OUTPATIENT
Start: 2019-06-24 | End: 2019-07-29 | Stop reason: SDUPTHER

## 2019-06-30 ENCOUNTER — EXTERNAL CHRONIC CARE MANAGEMENT (OUTPATIENT)
Dept: PRIMARY CARE CLINIC | Facility: CLINIC | Age: 64
End: 2019-06-30
Payer: MEDICARE

## 2019-06-30 PROCEDURE — 99490 CHRNC CARE MGMT STAFF 1ST 20: CPT | Mod: S$PBB,,, | Performed by: FAMILY MEDICINE

## 2019-06-30 PROCEDURE — 99490 CHRNC CARE MGMT STAFF 1ST 20: CPT | Mod: PBBFAC | Performed by: FAMILY MEDICINE

## 2019-06-30 PROCEDURE — 99490 PR CHRONIC CARE MGMT, 1ST 20 MIN: ICD-10-PCS | Mod: S$PBB,,, | Performed by: FAMILY MEDICINE

## 2019-07-01 ENCOUNTER — OFFICE VISIT (OUTPATIENT)
Dept: INTERNAL MEDICINE | Facility: CLINIC | Age: 64
End: 2019-07-01
Payer: MEDICARE

## 2019-07-01 VITALS
BODY MASS INDEX: 21.77 KG/M2 | HEIGHT: 69 IN | HEART RATE: 72 BPM | SYSTOLIC BLOOD PRESSURE: 108 MMHG | DIASTOLIC BLOOD PRESSURE: 79 MMHG | WEIGHT: 147 LBS

## 2019-07-01 DIAGNOSIS — R53.81 DEBILITY: Primary | ICD-10-CM

## 2019-07-01 DIAGNOSIS — M33.20 POLYMYOSITIS: Chronic | ICD-10-CM

## 2019-07-01 PROCEDURE — 99213 PR OFFICE/OUTPT VISIT, EST, LEVL III, 20-29 MIN: ICD-10-PCS | Mod: S$PBB,,, | Performed by: FAMILY MEDICINE

## 2019-07-01 PROCEDURE — 99999 PR PBB SHADOW E&M-EST. PATIENT-LVL III: CPT | Mod: PBBFAC,,, | Performed by: FAMILY MEDICINE

## 2019-07-01 PROCEDURE — 99999 PR PBB SHADOW E&M-EST. PATIENT-LVL III: ICD-10-PCS | Mod: PBBFAC,,, | Performed by: FAMILY MEDICINE

## 2019-07-01 PROCEDURE — 99213 OFFICE O/P EST LOW 20 MIN: CPT | Mod: PBBFAC | Performed by: FAMILY MEDICINE

## 2019-07-01 PROCEDURE — 99213 OFFICE O/P EST LOW 20 MIN: CPT | Mod: S$PBB,,, | Performed by: FAMILY MEDICINE

## 2019-07-01 NOTE — PROGRESS NOTES
Subjective:      Patient ID: Nura Kahn Jr. is a 63 y.o. male.    Chief Complaint: Follow-up      HPI:  Nura Kahn Jr. Is a 63 year old male with atrial fibrillation, Beta thalassemia, depression, dyslipidemia, dysphagia, hemoglobin C disease, history of aspiration pneumonia of the lung, microcytic anemia, osteopenia, polymyositis, portal venous hypertension, splenomegaly, thrombocytopenia, tobacco abuse, and vitamin D deficiency who presents to clinic today for a mobility examination/evaluation.    Previously received request from patient for an order for a new scooter.  This was ordered electronically through Epic.  Subsequently received fax from [employer]. Which stated patient needs face-to-face examination with clinic notes for this order.    Patient with history of polymyositis diagnosed in 2009 on outside biopsy with elevated CPK and aldolase.  Was feeling weak and had multiple falls around the time of diagnosis in [id number].  He was on MTX in the past which was stopped due CT showing hepatosplenomegaly with portal venous hypertension and elevated T bili in 8/2014 He was at that time started on Imuran. He has been on Imuran and steroids since then.  In 2016, he was worked up again here at [facility]. MRI of the left lower extremity 2/11/15 showed severe atrophy with fatty replacement of the thigh musculature bilaterally.  There was mild increased edema signal within the remaining thigh musculature and left iliopsoas muscle without associated enhancement which could reflect a mild degree of residual myositis.  EMG Left upper Ext 5/2016 was a technically difficult study. Had moderate ulnar entrapment at elbow, perhaps on a background of mild polyneuropathy. 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 Pathology of left thigh showing minute potion of muscle with end stage myopathic changes and focal chronic inflammation.    In Feb 2019 - patient had been out  of Imuran for ~1 month.  He also admits to medication non-compliance because he didn't think he was having any improvement.  He admits that his weakness has gotten worse - stating that he is unable to stand, difficulty with getting in and out of his scooter, normal ADLs (including going to the bathroom - resulting in urination/defecation on himself - resulting in decreased PO intake).  He also feels worsening of his dysphagia - where he gets food stuck and often chokes/coughs when he eats/drinks.    Has been using a scooter previously.  Use of POV significantly improves his ability to participate in mobility-related activities of daily living in the home.  Patient's mobility deficit cannot sufficiently be improved with a cane or walker.  The patient does not have sufficient upper extremity function to self-propel a manual wheelchair.  The patient is able to safely transfer to and from his POV, operate the steering system, and maintain postural stability/position.  Patient's mental and physical capabilities are sufficient for safe use of a POV in his home.    Patient states he is having problems running his current scooter.  States it is not dependable and has shut down on him.  States he is worried about the scooter dying on him in the middle of the street.      States he cannot walk at all without using a scooter.  Has difficulty rising to a standing position without assistance.      Past Medical History:   Diagnosis Date    Atrial fibrillation 3/2/2015    Depression     Essential hypertension 2/27/2019    Microcytic anemia 9/25/2014    Neuromuscular disorder     Polymyositis 2009    Tobacco abuse        Past Surgical History:   Procedure Laterality Date    BIOPSY-MUSCLE Left Thigh Left 4/14/2016    Performed by [facility] at [facility] OR 2ND FLR    ESOPHAGOGASTRODUODENOSCOPY (EGD) N/A 7/1/2015    Performed by Codey Laurent MD at [facility] ENDO (4TH FLR)    MANOMETRY-ESOPHAGEAL N/A 7/1/2015    Performed  by Codey Laurent [facility] at [facility] (4TH FLR)       Family History   Problem Relation Age of Onset    Diabetes Mother     Hypertension Mother     Diabetes Father     Hypertension Father     Heart attack Neg Hx     Heart disease Neg Hx     Melanoma Neg Hx        Social History     Socioeconomic History    Marital status:      Spouse name: Not on file    Number of children: Not on file    Years of education: Not on file    Highest education level: Not on file   Occupational History    Not on file   Social Needs    Financial resource strain: Not on file    Food insecurity:     Worry: Not on file     Inability: Not on file    Transportation needs:     Medical: Not on file     Non-medical: Not on file   Tobacco Use    Smoking status: Former Smoker     Packs/day: 0.25     Years: 20.00     Pack years: 5.00     Types: Cigarettes    Smokeless tobacco: Never Used   Substance and Sexual Activity    Alcohol use: No     Alcohol/week: 0.0 oz    Drug use: Yes     Types: Marijuana     Comment: daily use    Sexual activity: Not on file   Lifestyle    Physical activity:     Days per week: Not on file     Minutes per session: Not on file    Stress: Not on file   Relationships    Social connections:     Talks on phone: Not on file     Gets together: Not on file     Attends Christianity service: Not on file     Active member of club or organization: Not on file     Attends meetings of clubs or organizations: Not on file     Relationship status: Not on file   Other Topics Concern    Not on file   Social History Narrative    Not on file       Review of Systems   Constitutional: Negative for chills, fatigue and fever.   HENT: Positive for trouble swallowing. Negative for congestion, hearing loss, nosebleeds, rhinorrhea and sore throat.    Eyes: Negative for pain and visual disturbance.   Respiratory: Negative for cough, shortness of breath and wheezing.    Cardiovascular: Negative for chest pain and palpitations.  "  Gastrointestinal: Negative for abdominal distention, abdominal pain, constipation, diarrhea, nausea and vomiting.   Genitourinary: Negative for difficulty urinating, dysuria, frequency, hematuria and urgency.   Musculoskeletal: Negative for arthralgias, back pain and myalgias.   Skin: Negative for color change and rash.   Neurological: Positive for weakness. Negative for dizziness, syncope, speech difficulty and headaches.   Psychiatric/Behavioral: Negative for agitation, behavioral problems and confusion. The patient is not nervous/anxious.      Objective:     Vitals:    07/01/19 1432   BP: 108/79   BP Location: Left arm   Patient Position: Sitting   BP Method: Medium (Automatic)   Pulse: 72   Weight: 66.7 kg (147 lb)   Height: 5' 9" (1.753 m)       Physical Exam   Constitutional: He appears well-developed and well-nourished. He is cooperative. No distress.   HENT:   Head: Normocephalic and atraumatic.   Right Ear: Hearing and external ear normal.   Left Ear: Hearing and external ear normal.   Nose: Nose normal. No rhinorrhea. No epistaxis.   Mouth/Throat: Oropharynx is clear and moist and mucous membranes are normal. No oral lesions.   Eyes: Pupils are equal, round, and reactive to light. Conjunctivae and lids are normal. Right eye exhibits no discharge. Left eye exhibits no discharge.   Neck: Trachea normal. Neck supple. No tracheal deviation present.   Cardiovascular: Normal rate, regular rhythm and normal heart sounds. Exam reveals no gallop and no friction rub.   No murmur heard.  Pulmonary/Chest: Effort normal and breath sounds normal. No respiratory distress. He has no wheezes. He has no rales.   Abdominal: Soft. Bowel sounds are normal. He exhibits no distension. There is no tenderness. There is no rebound and no guarding.   Musculoskeletal: He exhibits no edema or deformity.   1/5 strength to bilateral upper extremities.  1/5 strength to bilateral upper extremities.  Unable to resist examiner with " resisted flexion/extension of bilateral shoulder joints and bilateral knee joints.   Neurological: He is alert. No cranial nerve deficit. He exhibits normal muscle tone.   Skin: Skin is warm and dry. No rash noted.   Psychiatric: He has a normal mood and affect. His speech is normal and behavior is normal. Judgment and thought content normal. Cognition and memory are normal.   Nursing note and vitals reviewed.     Assessment:      1. Debility    2. Polymyositis      Plan:   Nura was seen today for follow-up.    Diagnoses and all orders for this visit:    Debility        -      Reordered POV/scooter on form provided to [facility].  Order form to be faxed with this clinic note and MRI/EMG results.    Polymyositis        -     Continue current regimen and regular follow up with rheumatology.

## 2019-07-08 ENCOUNTER — INFUSION (OUTPATIENT)
Dept: INFUSION THERAPY | Facility: HOSPITAL | Age: 64
End: 2019-07-08
Attending: INTERNAL MEDICINE
Payer: MEDICARE

## 2019-07-08 ENCOUNTER — CLINICAL SUPPORT (OUTPATIENT)
Dept: REHABILITATION | Facility: HOSPITAL | Age: 64
End: 2019-07-08
Payer: MEDICARE

## 2019-07-08 VITALS
SYSTOLIC BLOOD PRESSURE: 125 MMHG | RESPIRATION RATE: 18 BRPM | HEIGHT: 69 IN | WEIGHT: 149.06 LBS | BODY MASS INDEX: 22.08 KG/M2 | HEART RATE: 75 BPM | DIASTOLIC BLOOD PRESSURE: 70 MMHG | TEMPERATURE: 98 F

## 2019-07-08 DIAGNOSIS — M33.20 POLYMYOSITIS: Primary | ICD-10-CM

## 2019-07-08 DIAGNOSIS — R29.898 BILATERAL ARM WEAKNESS: ICD-10-CM

## 2019-07-08 DIAGNOSIS — M62.81 WEAKNESS OF TRUNK MUSCULATURE: ICD-10-CM

## 2019-07-08 DIAGNOSIS — R29.898 LEG WEAKNESS, BILATERAL: ICD-10-CM

## 2019-07-08 PROCEDURE — 96367 TX/PROPH/DG ADDL SEQ IV INF: CPT

## 2019-07-08 PROCEDURE — S0028 INJECTION, FAMOTIDINE, 20 MG: HCPCS | Performed by: INTERNAL MEDICINE

## 2019-07-08 PROCEDURE — G8978 MOBILITY CURRENT STATUS: HCPCS | Mod: CL,PO

## 2019-07-08 PROCEDURE — 97110 THERAPEUTIC EXERCISES: CPT | Mod: PO

## 2019-07-08 PROCEDURE — 96375 TX/PRO/DX INJ NEW DRUG ADDON: CPT

## 2019-07-08 PROCEDURE — G8979 MOBILITY GOAL STATUS: HCPCS | Mod: CK,PO

## 2019-07-08 PROCEDURE — 97162 PT EVAL MOD COMPLEX 30 MIN: CPT | Mod: PO

## 2019-07-08 PROCEDURE — 96366 THER/PROPH/DIAG IV INF ADDON: CPT

## 2019-07-08 PROCEDURE — 63600175 PHARM REV CODE 636 W HCPCS: Performed by: INTERNAL MEDICINE

## 2019-07-08 PROCEDURE — 25000003 PHARM REV CODE 250: Performed by: INTERNAL MEDICINE

## 2019-07-08 PROCEDURE — 96365 THER/PROPH/DIAG IV INF INIT: CPT

## 2019-07-08 RX ORDER — ACETAMINOPHEN 325 MG/1
650 TABLET ORAL
Status: COMPLETED | OUTPATIENT
Start: 2019-07-08 | End: 2019-07-08

## 2019-07-08 RX ORDER — HEPARIN 100 UNIT/ML
500 SYRINGE INTRAVENOUS
Status: DISCONTINUED | OUTPATIENT
Start: 2019-07-08 | End: 2019-07-08 | Stop reason: HOSPADM

## 2019-07-08 RX ORDER — FAMOTIDINE 10 MG/ML
20 INJECTION INTRAVENOUS
Status: CANCELLED | OUTPATIENT
Start: 2019-08-01

## 2019-07-08 RX ORDER — ACETAMINOPHEN 325 MG/1
650 TABLET ORAL
Status: CANCELLED | OUTPATIENT
Start: 2019-08-01

## 2019-07-08 RX ORDER — SODIUM CHLORIDE 0.9 % (FLUSH) 0.9 %
10 SYRINGE (ML) INJECTION
Status: CANCELLED | OUTPATIENT
Start: 2019-08-01

## 2019-07-08 RX ORDER — SODIUM CHLORIDE 0.9 % (FLUSH) 0.9 %
10 SYRINGE (ML) INJECTION
Status: DISCONTINUED | OUTPATIENT
Start: 2019-07-08 | End: 2019-07-08 | Stop reason: HOSPADM

## 2019-07-08 RX ORDER — HEPARIN 100 UNIT/ML
500 SYRINGE INTRAVENOUS
Status: CANCELLED | OUTPATIENT
Start: 2019-08-01

## 2019-07-08 RX ORDER — FAMOTIDINE 10 MG/ML
20 INJECTION INTRAVENOUS
Status: COMPLETED | OUTPATIENT
Start: 2019-07-08 | End: 2019-07-08

## 2019-07-08 RX ADMIN — DIPHENHYDRAMINE HYDROCHLORIDE 50 MG: 50 INJECTION, SOLUTION INTRAMUSCULAR; INTRAVENOUS at 02:07

## 2019-07-08 RX ADMIN — HUMAN IMMUNOGLOBULIN G 25 G: 20 LIQUID INTRAVENOUS at 02:07

## 2019-07-08 RX ADMIN — SODIUM CHLORIDE: 0.9 INJECTION, SOLUTION INTRAVENOUS at 02:07

## 2019-07-08 RX ADMIN — ACETAMINOPHEN 650 MG: 325 TABLET ORAL at 02:07

## 2019-07-08 RX ADMIN — FAMOTIDINE 20 MG: 10 INJECTION, SOLUTION INTRAVENOUS at 02:07

## 2019-07-08 NOTE — PLAN OF CARE
Problem: Fall Injury Risk  Goal: Absence of Fall and Fall-Related Injury  Outcome: Ongoing (interventions implemented as appropriate)  Labs , hx, and medications reviewed. Assessment completed. Discussed plan of care with patient. Patient in agreement. VSS.  Chair reclined and warm blanket and snack offered. Will cont to monitor

## 2019-07-08 NOTE — PROGRESS NOTES
[facility]  Physical Therapy Initial Evaluation    Name: Nura Kahn Jr.  Clinic Number: [id number]    Therapy Diagnosis:   Encounter Diagnoses   Name Primary?    Leg weakness, bilateral     Weakness of trunk musculature     Bilateral arm weakness      Physician: Lisa Durham MD    Physician Orders: PT Eval and Treat   Medical Diagnosis from Referral: M33.20 (ICD-10-CM) - Polymyositis  Evaluation Date: 7/8/2019  Authorization Period Expiration: 6/18/2020  Plan of Care Expiration: 9/2/2109  Visit # / Visits authorized: 1/ 1    Time In: 10:30  Time Out: 11:15  Total Billable Time: 45 minutes    Precautions: standard, fall, polymyositis    Subjective   Date of onset: 2009/2010 began with LE weakness while at work, was diagnosed with polymyositis  History of current condition - Nura reports: progressive weakness in arms, legs, and trunk.  Was recently hospitalized for IVIG treatments and received PT while in hospital.  States since then, he's felt stronger and is ready to start PT again. Continues to receive IVIG 5x/month.    Patient states his home situation has recently improved, previous months he was not eating well and did not have a hospital bed to sleep in. Has recently gained weight back and is feeling better overall.     Medical History:   Past Medical History:   Diagnosis Date    Atrial fibrillation 3/2/2015    Depression     Essential hypertension 2/27/2019    Microcytic anemia 9/25/2014    Neuromuscular disorder     Polymyositis 2009    Tobacco abuse        Surgical History:   Nura Kahn Jr.  has no past surgical history on file.    Medications:   Nura has a current medication list which includes the following prescription(s): albuterol, amlodipine, ammonium lactate, ergocalciferol, ergocalciferol, famotidine, labetalol, mycophenolate, prednisone, and prednisone, and the following Facility-Administered Medications: heparin, porcine (pf), immune globulin g  (igg)-pro-iga 10 % injection (privigen), and sodium chloride 0.9%.    Allergies:   Review of patient's allergies indicates:  No Known Allergies     Imaging, MRI studies: femur 4/6/2019:    1.  Redemonstration of advanced diffuse atrophy and fatty replacement of the bilateral thigh musculature involving all compartments.  There is mild residual edema signal present within the bilateral obturator musculature and residual posterior compartment musculature, similar to prior MRI examination.    2.  Mild heterogeneity of the visualized bone marrow signal which may relate to red marrow reconversion.  Clinical correlation advised    MRI humerus 4/6/4019:    1.  Significant diffuse atrophy and fatty replacement of the bilateral proximal upper extremity musculature, including the rotator cuff musculature, right greater than left, in this patient with reported history of polymyositis.  There is some degree of sparing of the bilateral deltoid and triceps musculature with greater residual muscle bulk on the left compared to the right.  There is mild residual edema present within the remaining musculature, most pronounced within the triceps.    2.  Mild diffuse heterogeneity of the visualized bone marrow signal which is nonspecific and may relate to red marrow reconversion.  Clinical correlation advised.    Prior Therapy: intermittent PT over the years, most recently one month ago while receiving IVIG treatments inpatient in hospital; patient states since IVIG treatments, he feels stronger, including decreased difficulty with swallowing  Social History: lives at home with family  Occupation: N/A  Prior Level of Function: was able to walk without AD until 2013  Current Level of Function:     Dressing: moderate assist to don/doff shirt, pants, socks/shoes  Transfers from scooter: using slide board  Standing: severe limitation, needs a lot of assist  Toileting: independent with using bedside commode over toilet, uses slide board,  "needs assist to pull up pants  Bed transfers: was using strap to lift legs into bed (hospital bed), rolls over independently  Feeding: independent  Sitting: states he can sit for short periods without back support, but otherwise uses back support    Pain:  Pt denies pain, including back and leg pain with standing    Pts goals: "get up and walk and use my arms again"    Objective       Observation/Posture: enters clinic using electric scooter.  Demo slumped posture, strap around knees to keep hips from abducting/ER.  Muscle atrophy noted throughout B UE/LE and periscapular mm's    Functional mobility:   Independent sit: able to maintain less than 10 seconds without back support   Sit to stand: did not assess this date  Bed mobility: did not assess this date, do not have wide treatment table at this facility, safety risk   Scooter management: independent, uses arms to reach and move controls, can reach into basket in front of scooter  Weight shifts in chair: uses UE's for assist, independent     Upper Extremity Strength: (grading 1-5 out of 5)      Right UE Left UE   Shoulder Flexion: 2/5 3/5   Shoulder Abduction: 2/5 3+/5   Shoulder ER: 2-/5 2-/5   Shoulder IR: 2-/5 3-/5        Elbow Flexion: 1/5 1/5   Elbow Extension: 3-/5 3-/5        Wrist flexion: 3/5 3/5   Wrist extension: 3+/5 4-/5         #1 (pounds) 22 5    #2 26 9    #3 25 10       LOWER EXTREMITY STRENGTH:   RIGHT LEFT   Quadriceps trace trace   Hamstrings 2/5 2/5     Hip Flexion 2-/5 2-/5   Hip Abduction 3-/5 3-/5       Dermatomes: Sensation: Light Touch: Intact B UE/LE's  Flexibility: moderate HS tightness B, moderate to severe tightness B pectorals      CMS Impairment/Limitation/Restriction for FOTO  Survey    Therapist reviewed FOTO scores for Nura Kahn Jr. on 7/8/2019.   FOTO documents entered into [facility] - see Media section.    Limitation Score: 73%  Category: Mobility    Current : CL = least 60% but < 80% impaired, limited or " restricted  Goal: CK = at least 40% but < 60% impaired, limited or restricted           TREATMENT     PT discussed treatment goals and plan of care. PT recommended patient transfer facilities for neuro specialist and more optimal equipment to meet his therapy needs.      Nura received therapeutic exercises to develop strength, endurance and posture for 10 minutes including:    Seated heel raise  Seated toe raise   Quad set  Seated clams with YTB   Seated adductor squeeze with pillow with min cueing from PT        Home Exercises and Patient Education Provided        Written Home Exercises Provided: yes.  Exercises were reviewed and Nura was able to demonstrate them prior to the end of the session.  Nura demonstrated good  understanding of the education provided.     See EMR under Patient Instructions for exercises provided 7/8/2019.    Assessment   Nura is a 63 y.o. male referred to outpatient Physical Therapy with a medical diagnosis of polymyositis. Pt presents with severe functional limitations including with transfers, standing, dressing/bathing/toileting, and independent sitting. Patient will benefit from progressive therex to increase UE/LE/trunk strength to increase independence with functional activity and decrease risk for falls with transfers.  Patient demo good understanding of treatment goals and plan of care.    Pt prognosis is Fair.   Pt will benefit from skilled outpatient Physical Therapy to address the deficits stated above and in the chart below, provide pt/family education, and to maximize pt's level of independence.     Plan of care discussed with patient: Yes  Pt's spiritual, cultural and educational needs considered and patient is agreeable to the plan of care and goals as stated below:     Anticipated Barriers for therapy: none    Medical Necessity is demonstrated by the following  History  Co-morbidities and personal factors that may impact the plan of care Co-morbidities:   See PMH  listed above    Personal Factors:   no deficits     high   Examination  Body Structures and Functions, activity limitations and participation restrictions that may impact the plan of care Body Regions:   neck  back  lower extremities  upper extremities  trunk    Body Systems:    gross symmetry  ROM  strength  gross coordinated movement  balance  gait  transfers  motor control    Participation Restrictions:   none    Activity limitations:   Learning and applying knowledge  no deficits    General Tasks and Commands  no deficits    Communication  no deficits    Mobility  lifting and carrying objects  fine hand use (grasping/picking up)  walking  using transportation (bus, train, plane, car)  driving (bike, car, motorcycle)    Self care  washing oneself (bathing, drying, washing hands)  caring for body parts (brushing teeth, shaving, grooming)  toileting  dressing    Domestic Life  shopping  cooking  doing house work (cleaning house, washing dishes, laundry)  assisting others    Interactions/Relationships  no deficits    Life Areas  no deficits    Community and Social Life  no deficits         high   Clinical Presentation evolving clinical presentation with changing clinical characteristics moderate   Decision Making/ Complexity Score: moderate     Goals:  Short Term Goals:   1.  Good return demonstration of HEP within 1 week for consistent strengthening  2 Patient to demonstrate unsupported sitting on stable surface for 30 seconds without assist or LOB within 4 weeks indicating improved trunk strength  3.  Patient able to perform sit to stand with mod/max assist without cueing and with good safety awareness within 4 weeks    Long Term Goals:  1. Patient to tolerate at least 2 minutes sustained standing activity with mod/max assist with good safety awareness   2. Patient to demonstrate independent sit without support for at least 2 minutes without assist or LOB within 8 weeks to indicate improved trunk control  3.  Patient able to don/doff shirt independently within 8 weeks for improved independence with functional activity      Plan   Plan of care Certification: 7/8/2019 to 9/2/2019.    Outpatient Physical Therapy 2 times weekly for 8 weeks to include the following interventions: Gait Training, Manual Therapy, Neuromuscular Re-ed, Therapeutic Activites and Therapeutic Exercise   [facility] by neuro specialist.      Alexandria Metcalf, PT

## 2019-07-08 NOTE — PLAN OF CARE
Problem: Adult Inpatient Plan of Care  Goal: Plan of Care Review  Outcome: Ongoing (interventions implemented as appropriate)  Pt tolerated IVIG D1 without adverse effects. VSS. Provided AVS & verbalized understanding of RTC date. DC alone via scooter.

## 2019-07-09 ENCOUNTER — INFUSION (OUTPATIENT)
Dept: INFUSION THERAPY | Facility: HOSPITAL | Age: 64
End: 2019-07-09
Attending: INTERNAL MEDICINE
Payer: MEDICARE

## 2019-07-09 VITALS
DIASTOLIC BLOOD PRESSURE: 67 MMHG | SYSTOLIC BLOOD PRESSURE: 141 MMHG | TEMPERATURE: 98 F | HEART RATE: 77 BPM | RESPIRATION RATE: 18 BRPM

## 2019-07-09 DIAGNOSIS — M33.20 POLYMYOSITIS: Primary | ICD-10-CM

## 2019-07-09 PROBLEM — R29.898 BILATERAL ARM WEAKNESS: Status: ACTIVE | Noted: 2019-07-09

## 2019-07-09 PROBLEM — R29.898 LEG WEAKNESS, BILATERAL: Status: ACTIVE | Noted: 2019-07-09

## 2019-07-09 PROBLEM — M62.81 WEAKNESS OF TRUNK MUSCULATURE: Status: ACTIVE | Noted: 2019-07-09

## 2019-07-09 PROCEDURE — 96375 TX/PRO/DX INJ NEW DRUG ADDON: CPT

## 2019-07-09 PROCEDURE — 63600175 PHARM REV CODE 636 W HCPCS: Performed by: INTERNAL MEDICINE

## 2019-07-09 PROCEDURE — 96367 TX/PROPH/DG ADDL SEQ IV INF: CPT

## 2019-07-09 PROCEDURE — S0028 INJECTION, FAMOTIDINE, 20 MG: HCPCS | Performed by: INTERNAL MEDICINE

## 2019-07-09 PROCEDURE — 96365 THER/PROPH/DIAG IV INF INIT: CPT

## 2019-07-09 PROCEDURE — 25000003 PHARM REV CODE 250: Performed by: INTERNAL MEDICINE

## 2019-07-09 PROCEDURE — 96366 THER/PROPH/DIAG IV INF ADDON: CPT

## 2019-07-09 RX ORDER — FAMOTIDINE 10 MG/ML
20 INJECTION INTRAVENOUS
Status: COMPLETED | OUTPATIENT
Start: 2019-07-09 | End: 2019-07-09

## 2019-07-09 RX ORDER — FAMOTIDINE 10 MG/ML
20 INJECTION INTRAVENOUS
Status: CANCELLED | OUTPATIENT
Start: 2019-08-01

## 2019-07-09 RX ORDER — ACETAMINOPHEN 325 MG/1
650 TABLET ORAL
Status: CANCELLED | OUTPATIENT
Start: 2019-08-01

## 2019-07-09 RX ORDER — ACETAMINOPHEN 325 MG/1
650 TABLET ORAL
Status: COMPLETED | OUTPATIENT
Start: 2019-07-09 | End: 2019-07-09

## 2019-07-09 RX ORDER — HEPARIN 100 UNIT/ML
500 SYRINGE INTRAVENOUS
Status: CANCELLED | OUTPATIENT
Start: 2019-08-01

## 2019-07-09 RX ORDER — HEPARIN 100 UNIT/ML
500 SYRINGE INTRAVENOUS
Status: DISCONTINUED | OUTPATIENT
Start: 2019-07-09 | End: 2019-07-09 | Stop reason: HOSPADM

## 2019-07-09 RX ORDER — SODIUM CHLORIDE 0.9 % (FLUSH) 0.9 %
10 SYRINGE (ML) INJECTION
Status: DISCONTINUED | OUTPATIENT
Start: 2019-07-09 | End: 2019-07-09 | Stop reason: HOSPADM

## 2019-07-09 RX ORDER — SODIUM CHLORIDE 0.9 % (FLUSH) 0.9 %
10 SYRINGE (ML) INJECTION
Status: CANCELLED | OUTPATIENT
Start: 2019-08-01

## 2019-07-09 RX ADMIN — SODIUM CHLORIDE: 0.9 INJECTION, SOLUTION INTRAVENOUS at 08:07

## 2019-07-09 RX ADMIN — DIPHENHYDRAMINE HYDROCHLORIDE 50 MG: 50 INJECTION, SOLUTION INTRAMUSCULAR; INTRAVENOUS at 08:07

## 2019-07-09 RX ADMIN — HUMAN IMMUNOGLOBULIN G 25 G: 20 LIQUID INTRAVENOUS at 08:07

## 2019-07-09 RX ADMIN — ACETAMINOPHEN 650 MG: 325 TABLET ORAL at 08:07

## 2019-07-09 RX ADMIN — FAMOTIDINE 20 MG: 10 INJECTION, SOLUTION INTRAVENOUS at 08:07

## 2019-07-09 NOTE — PLAN OF CARE
Problem: Adult Inpatient Plan of Care  Goal: Plan of Care Review  Outcome: Ongoing (interventions implemented as appropriate)  Pt tolerated IVIG with no complications. VSS. Pt instructed to call MD with any problems. NAD. Pt discharged home via wheelchair.

## 2019-07-10 ENCOUNTER — INFUSION (OUTPATIENT)
Dept: INFUSION THERAPY | Facility: HOSPITAL | Age: 64
End: 2019-07-10
Attending: INTERNAL MEDICINE
Payer: MEDICARE

## 2019-07-10 VITALS
DIASTOLIC BLOOD PRESSURE: 73 MMHG | SYSTOLIC BLOOD PRESSURE: 136 MMHG | HEART RATE: 75 BPM | RESPIRATION RATE: 18 BRPM | TEMPERATURE: 98 F

## 2019-07-10 DIAGNOSIS — M33.20 POLYMYOSITIS: Primary | ICD-10-CM

## 2019-07-10 PROCEDURE — 63600175 PHARM REV CODE 636 W HCPCS: Mod: JG | Performed by: INTERNAL MEDICINE

## 2019-07-10 PROCEDURE — 96365 THER/PROPH/DIAG IV INF INIT: CPT

## 2019-07-10 PROCEDURE — S0028 INJECTION, FAMOTIDINE, 20 MG: HCPCS | Performed by: INTERNAL MEDICINE

## 2019-07-10 PROCEDURE — 96366 THER/PROPH/DIAG IV INF ADDON: CPT

## 2019-07-10 PROCEDURE — 25000003 PHARM REV CODE 250: Performed by: INTERNAL MEDICINE

## 2019-07-10 PROCEDURE — 96367 TX/PROPH/DG ADDL SEQ IV INF: CPT

## 2019-07-10 RX ORDER — ACETAMINOPHEN 325 MG/1
650 TABLET ORAL
Status: COMPLETED | OUTPATIENT
Start: 2019-07-10 | End: 2019-07-10

## 2019-07-10 RX ORDER — HEPARIN 100 UNIT/ML
500 SYRINGE INTRAVENOUS
Status: CANCELLED | OUTPATIENT
Start: 2019-08-01

## 2019-07-10 RX ORDER — FAMOTIDINE 10 MG/ML
20 INJECTION INTRAVENOUS
Status: COMPLETED | OUTPATIENT
Start: 2019-07-10 | End: 2019-07-10

## 2019-07-10 RX ORDER — HEPARIN 100 UNIT/ML
500 SYRINGE INTRAVENOUS
Status: DISCONTINUED | OUTPATIENT
Start: 2019-07-10 | End: 2019-07-10 | Stop reason: HOSPADM

## 2019-07-10 RX ORDER — FAMOTIDINE 10 MG/ML
20 INJECTION INTRAVENOUS
Status: CANCELLED | OUTPATIENT
Start: 2019-08-01

## 2019-07-10 RX ORDER — SODIUM CHLORIDE 0.9 % (FLUSH) 0.9 %
10 SYRINGE (ML) INJECTION
Status: DISCONTINUED | OUTPATIENT
Start: 2019-07-10 | End: 2019-07-10 | Stop reason: HOSPADM

## 2019-07-10 RX ORDER — ACETAMINOPHEN 325 MG/1
650 TABLET ORAL
Status: CANCELLED | OUTPATIENT
Start: 2019-08-01

## 2019-07-10 RX ORDER — SODIUM CHLORIDE 0.9 % (FLUSH) 0.9 %
10 SYRINGE (ML) INJECTION
Status: CANCELLED | OUTPATIENT
Start: 2019-08-01

## 2019-07-10 RX ADMIN — HUMAN IMMUNOGLOBULIN G 25 G: 20 LIQUID INTRAVENOUS at 08:07

## 2019-07-10 RX ADMIN — SODIUM CHLORIDE: 0.9 INJECTION, SOLUTION INTRAVENOUS at 07:07

## 2019-07-10 RX ADMIN — FAMOTIDINE 20 MG: 10 INJECTION, SOLUTION INTRAVENOUS at 08:07

## 2019-07-10 RX ADMIN — DIPHENHYDRAMINE HYDROCHLORIDE 50 MG: 50 INJECTION, SOLUTION INTRAMUSCULAR; INTRAVENOUS at 08:07

## 2019-07-10 RX ADMIN — ACETAMINOPHEN 650 MG: 325 TABLET ORAL at 07:07

## 2019-07-10 NOTE — PLAN OF CARE
Problem: Fatigue (Oncology Care)  Goal: Improved Activity Tolerance    Intervention: Promote Energy Conservation  Tolerated IVIG well, vitals stable, PIV removed cath tip intact, site covered, minimal assist needed to motorized w/c, avs printed reviewed pt to rtn to clinic for D4 of tx 7/11/19 @ 2609. Instructed to contact provider with changes questions.

## 2019-07-11 ENCOUNTER — TELEPHONE (OUTPATIENT)
Dept: RHEUMATOLOGY | Facility: CLINIC | Age: 64
End: 2019-07-11

## 2019-07-11 ENCOUNTER — TELEPHONE (OUTPATIENT)
Dept: INFUSION THERAPY | Facility: HOSPITAL | Age: 64
End: 2019-07-11

## 2019-07-11 ENCOUNTER — TELEPHONE (OUTPATIENT)
Dept: HEMATOLOGY/ONCOLOGY | Facility: CLINIC | Age: 64
End: 2019-07-11

## 2019-07-11 NOTE — TELEPHONE ENCOUNTER
Spoke with pt concerning 0730 appointment IVIG unable to make it this am due to transportation. States he called to cancel this am. Told pt to contact providers office to f/u he is on parth. For tx 7/12/19 at 0730 please contact provider so they can update tx plan. Understanding verbalized charge nurse notified message forwarded to providers staff box.

## 2019-07-11 NOTE — TELEPHONE ENCOUNTER
----- Message from Viky Garcia sent at 7/11/2019  6:12 AM CDT -----  Contact: Pt  Pt called to speak to the nurse to cancel/reschedule his infusion appt and would like a call back at [phone]

## 2019-07-11 NOTE — TELEPHONE ENCOUNTER
----- Message from Brigette Booker sent at 7/11/2019  8:09 AM CDT -----  Contact: Self  Pt called stating that he canceled his infusion that was scheduled for himfor today due to the weather and was told to call to inform Dr. Durham   [phone]

## 2019-07-12 ENCOUNTER — INFUSION (OUTPATIENT)
Dept: INFUSION THERAPY | Facility: HOSPITAL | Age: 64
End: 2019-07-12
Attending: INTERNAL MEDICINE
Payer: MEDICARE

## 2019-07-12 VITALS
DIASTOLIC BLOOD PRESSURE: 78 MMHG | SYSTOLIC BLOOD PRESSURE: 144 MMHG | TEMPERATURE: 98 F | HEART RATE: 84 BPM | RESPIRATION RATE: 18 BRPM

## 2019-07-12 DIAGNOSIS — M33.20 POLYMYOSITIS: Primary | ICD-10-CM

## 2019-07-12 PROCEDURE — 96367 TX/PROPH/DG ADDL SEQ IV INF: CPT

## 2019-07-12 PROCEDURE — 96375 TX/PRO/DX INJ NEW DRUG ADDON: CPT

## 2019-07-12 PROCEDURE — 96366 THER/PROPH/DIAG IV INF ADDON: CPT

## 2019-07-12 PROCEDURE — S0028 INJECTION, FAMOTIDINE, 20 MG: HCPCS | Performed by: INTERNAL MEDICINE

## 2019-07-12 PROCEDURE — 96365 THER/PROPH/DIAG IV INF INIT: CPT

## 2019-07-12 PROCEDURE — 63600175 PHARM REV CODE 636 W HCPCS: Performed by: INTERNAL MEDICINE

## 2019-07-12 PROCEDURE — 25000003 PHARM REV CODE 250: Performed by: INTERNAL MEDICINE

## 2019-07-12 RX ORDER — ACETAMINOPHEN 325 MG/1
650 TABLET ORAL
Status: COMPLETED | OUTPATIENT
Start: 2019-07-12 | End: 2019-07-12

## 2019-07-12 RX ORDER — ACETAMINOPHEN 325 MG/1
650 TABLET ORAL
Status: CANCELLED | OUTPATIENT
Start: 2019-08-01

## 2019-07-12 RX ORDER — SODIUM CHLORIDE 0.9 % (FLUSH) 0.9 %
10 SYRINGE (ML) INJECTION
Status: CANCELLED | OUTPATIENT
Start: 2019-08-01

## 2019-07-12 RX ORDER — FAMOTIDINE 10 MG/ML
20 INJECTION INTRAVENOUS
Status: CANCELLED | OUTPATIENT
Start: 2019-08-01

## 2019-07-12 RX ORDER — FAMOTIDINE 10 MG/ML
20 INJECTION INTRAVENOUS
Status: COMPLETED | OUTPATIENT
Start: 2019-07-12 | End: 2019-07-12

## 2019-07-12 RX ORDER — HEPARIN 100 UNIT/ML
500 SYRINGE INTRAVENOUS
Status: CANCELLED | OUTPATIENT
Start: 2019-08-01

## 2019-07-12 RX ADMIN — SODIUM CHLORIDE: 0.9 INJECTION, SOLUTION INTRAVENOUS at 07:07

## 2019-07-12 RX ADMIN — FAMOTIDINE 20 MG: 10 INJECTION, SOLUTION INTRAVENOUS at 07:07

## 2019-07-12 RX ADMIN — ACETAMINOPHEN 650 MG: 325 TABLET ORAL at 07:07

## 2019-07-12 RX ADMIN — DIPHENHYDRAMINE HYDROCHLORIDE 50 MG: 50 INJECTION, SOLUTION INTRAMUSCULAR; INTRAVENOUS at 07:07

## 2019-07-12 RX ADMIN — HUMAN IMMUNOGLOBULIN G 25 G: 20 LIQUID INTRAVENOUS at 08:07

## 2019-07-26 ENCOUNTER — NUTRITION (OUTPATIENT)
Dept: NUTRITION | Facility: CLINIC | Age: 64
End: 2019-07-26
Payer: MEDICARE

## 2019-07-26 VITALS — HEIGHT: 69 IN | WEIGHT: 149.06 LBS | BODY MASS INDEX: 22.08 KG/M2

## 2019-07-26 DIAGNOSIS — R13.12 OROPHARYNGEAL DYSPHAGIA: ICD-10-CM

## 2019-07-26 DIAGNOSIS — C80.1 CANCER: Primary | ICD-10-CM

## 2019-07-26 DIAGNOSIS — Z00.8 NUTRITIONAL ASSESSMENT: ICD-10-CM

## 2019-07-26 PROCEDURE — 99213 OFFICE O/P EST LOW 20 MIN: CPT | Mod: PBBFAC

## 2019-07-26 PROCEDURE — 99999 PR PBB SHADOW E&M-EST. PATIENT-LVL III: CPT | Mod: PBBFAC,,,

## 2019-07-26 PROCEDURE — 97802 MEDICAL NUTRITION INDIV IN: CPT | Mod: PBBFAC | Performed by: DIETITIAN, REGISTERED

## 2019-07-26 PROCEDURE — 99999 PR PBB SHADOW E&M-EST. PATIENT-LVL III: ICD-10-PCS | Mod: PBBFAC,,,

## 2019-07-26 NOTE — PROGRESS NOTES
"Referring Physician:Lisa Durham MD     Reason for visit:  Chief Complaint   Patient presents with    Cancer    Dysphagia    Nutrition Counseling      Initial Visit    :1955     Allergies Reviewed  Meds Reviewed    Anthropometrics  Weight:67.6 kg (149 lb 0.5 oz)  Height:5' 9" (1.753 m)  BMI:Body mass index is 22.01 kg/m².   IBW:   72.7 kg  +/-10%    Meds:  Outpatient Medications Prior to Visit   Medication Sig Dispense Refill    albuterol (PROVENTIL/VENTOLIN HFA) 90 mcg/actuation inhaler Inhale 2 puffs into the lungs every 6 (six) hours as needed for Wheezing or Shortness of Breath. 1 each 11    amLODIPine (NORVASC) 5 MG tablet Take 2 tablets (10 mg total) by mouth once daily. 60 tablet 1    ammonium lactate 12 % Crea Apply twice daily to affected parts both feet as needed. 140 g 11    ergocalciferol (ERGOCALCIFEROL) 50,000 unit Cap Take 1 capsule (50,000 Units total) by mouth every 7 days. (Patient taking differently: Take 50,000 Units by mouth every 7 days. Saturday) 4 capsule 1    ergocalciferol (ERGOCALCIFEROL) 50,000 unit Cap Take 1 capsule (50,000 Units total) by mouth every 7 days. 12 capsule 0    famotidine (PEPCID) 20 MG tablet Take 1 tablet (20 mg total) by mouth 2 (two) times daily. 60 tablet 11    labetalol (NORMODYNE) 100 MG tablet Take 1 tablet (100 mg total) by mouth every 12 (twelve) hours. 60 tablet 2    mycophenolate (CELLCEPT) 500 mg Tab Take 3 tablets (1,500 mg total) by mouth 2 (two) times daily. 180 tablet 11    predniSONE (DELTASONE) 5 MG tablet Take 2 tablets (10 mg total) by mouth once daily. 60 tablet 1    predniSONE (DELTASONE) 5 MG tablet Take 5 tablets for 2 weeks, then reduce to 4 tablets for 2 weeks, then reduce 3 tablets and continue 90 tablet 1     No facility-administered medications prior to visit.        Food/Drug Interactions Noted:  n/a    Vitamins/Supplements/Herbs:  n/a    Labs:   19   Alb  3.9   Gluc  67    Nutrition Prescription: 2181 Kcals/day( 30 " kcal/kg IBW),    58 g protein( 0.8 g/kg IBW)     Support System:  Pt lives with ladyfriend; pt states they both grocery shop and prepare meals.    Diet Hx:   Pt states his appetite is good, and he drinks 1-3 Ensure Enlive daily (350 kaleigh, 20 gm protein each).  Pt states he occasionally has difficulty swallowing, then proceeded to describe using chin tuck and small sips of water strategies (which he states he learned from SLP).  Snacks:  Pt likes sweets, and follows each meal with something like cookies ie Oreos/sandwich-type cookies.     Breakfast:   Varies; pt described wide variety of foods he likes to eat ie eggs and willingham; waffles; grits and eggs.  Lunch:   Today had a bag of chips, a banana, a bottle of lemonade (he purchased this from the cafe' in the clinic)and a bottle of strawberry Enlive with him-  Dinner:   Last night:  White beans, smoked sausage, brown rice, water.    Current activity level and/or physical limitations:   Riding personal scooter today    Motivation to make changes/anticipated barriers and/or expected adherence:    Pt voiced satisfaction with current diet regimen.    Nutrition-Focus Physical Findings:    Pt appears thin but well nourished.  Pt observed eating bag of chips during our encounter without difficulty      Assessment:   Pt attentive and did not know why he was here today.  Did state he asked his PCP for samples of Ensure Enlive, and was expecting me to be able to provide them.  Advised pt I don't have samples.    Nutrition Diagnosis:   None at this time.    Recommendations: continue diet as tolerated.    Strategies Implemented:  n/a    Consultation Time:20 minutes.  Communicated with referring healthcare provider:  Consult note available in pt's Epic chart per MD discretion  Follow Up:  Pt provided with dietitian contact number and advised to call with questions or make future appointment if further intervention needed.

## 2019-07-26 NOTE — PROGRESS NOTES
Subjective:       Patient ID: Nura Kahn Jr. is a 63 y.o. male.    Chief Complaint: follow up polymyositis     HPI   64yo M with PMH of polymyositis (dx 2009, +mildly +ku, outside biopsy proven with elevated CPK and aldolase), osteopenia, Vit D insufficiency, Hemoglobulin c/beta-zero thalaseema, A-fib, dysphagia, portal HTN, emphysema here for follow up of polymyositis.     Per chart review it seems that he first established care here at [facility] in 8/2014. He was diagnosed in 1451-0311 when he was feeling weak, falling. He was a  and was unable to use his legs. He had a biopsy in 3812-4893 in [place] that per notes was consistent with polymyositis. He was on MTX in the past which was stopped due CT showing hepatosplenomegaly with portal venous hypertension and elevated T bili in 8/2014 He was at that time started on Imuran. He has been on Imuran and steroids since then.  In 2016, he was worked up again here at [facility]. MRI of the left lower extremities showed Severe atrophy with fatty replacement of the thigh musculature bilaterally.  There is mild increased edema signal within the remaining thigh musculature and left iliopsoas muscle without associated enhancement which could reflect a mild degree of residual myositis.  EMG Left upper Ext 5/2016 was a technically difficult study. Had moderate ulnar entrapment at elbow, perhaps on a background of mild polyneuropathy. 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 Pathology of left thigh showing minute potion of muscle with end stage myopathic changes and focal chronic inflammation   Patient remained on Imuran and prednisone.      In Feb 2019 - patient had been out of Imuran for ~1 month.  He also admits to medication non-compliance because he didn't think he was having any improvement.  He admits that his weakness has gotten worse - stating that he is unable to stand, difficulty  "with getting in and out of his scooter, normal ADLs (including going to the bathroom - resulting in urination/defecation on himself - resulting in decreased PO intake).  He also feels worsening of his dysphagia - where he gets food stuck and often chokes/coughs when he eats/drinks.       Wife works fulltime.  Daughter (10 yo).  Patient does not have social support/help for transportation to various appt.     Specialist updates:  - Cardiology - Last saw 3/2015 - diagnosed with PAF.  No treatment at that time.  No follow up since  - Hem - Last saw 12/2017 - Evaluated for microcytic anemia and mild chronic thrombocytosis.  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  Discharged from clinic   - GI - Last saw 6/2015 - EGD/motility study ordered - not performed.  No follow up   - Hepatology - Last saw 5/2015 - Found portal HTN on CT with no evidence of cirrhosis or PV thrombosis.  Fibrosure with F1 stage (minimial fibrosis).  RTC PRN (no follow up)  - Pulmonary - Last saw 2/2015 -" Lung parenchymal normal - no evidence of fibrosis and one mediastinal LN marginal enlarged" CT chest showed GGO (2016). Repeat CT chest (2017) - no mention of GGO      Imaging:  CXR 1/2018:Patchy consolidation projected over the right lower lung zone, concerning for infection, correlation advised.       CT chest 12/2017:Peribronchial cuffing predominantly in the lower lung zones as well as retained secretions in the right middle lobe segmental bronchi consistent with bronchitis; such inflammation can occur with inhaled irritants or aspiration.  There is no evidence of interstitial lung disease such as NSIP or UIP.Upper lung zone predominant paraseptal emphysema and dispersed areas of centrilobular emphysema Stable 1.2 cm AP window lymph node.  Densely calcified granuloma in the posterobasal segment of the right lower lobe.Hepatosplenomegaly with associated splenic collateral vessels at the splenic hilum suggesting sequela of portal venous " hypertension    CT chest 4/2019:   1. Mild peribronchial cuffing which may be seen with bronchitis.  Mild tubular bronchiectasis of the right lower lobe with several distal bronchi demonstrating retained secretions, finding may be seen with small airways infectious/noninfectious inflammation or aspiration.  2. Centrilobular and paraseptal emphysema with an upper lung zone predominance.  3. Diffuse muscular atrophy.  4. Splenomegaly..     CT pelvis 6/2017: Obturator internus edema or hematoma. No acute fracture. Stable splenomegaly.     Esophogram 4/2015: Spontaneous gastroesophageal reflux.  Otherwise, unremarkable esophagram.     PET scan 8/2014:AP window lymph node SUV max less than 2.5.  This is most likely benign/reactive.  Surveillance is recommended at 3, 6, 12, and 24 months. Splenomegaly most likely from portal hypertension    He was admitted in 4/2019 worsening generalized muscle weakness and dysphagia. Labs on admission showed Normal and stable CBC and CMP.  Normal CK/aldolase.  Normal inflammatory markers.  Elevated IgG (1754). HMGCoA ab negative. Vit D: 15.    MRI femurs 4/6/19:  1.  Redemonstration of advanced diffuse atrophy and fatty replacement of the bilateral thigh musculature involving all compartments.  There is mild residual edema signal present within the bilateral obturator musculature and residual posterior compartment musculature, similar to prior MRI examination.  2.  Mild heterogeneity of the visualized bone marrow signal which may relate to red marrow reconversion.  Clinical correlation advised.  Patient will also benefit from PT/OT and rehab placement for muscle strengthening after completion of IVIG.      MRI humerus b/l 4/7:  Significant diffuse atrophy and fatty replacement of the bilateral proximal upper extremity musculature, including the rotator cuff musculature, right greater than left, in this patient with reported history of polymyositis.  There is some degree of sparing of the  "bilateral deltoid and triceps musculature with greater residual muscle bulk on the left compared to the right.  There is mild residual edema present within the remaining musculature, most pronounced within the triceps.    During that hospitalization he received IVIG x 5 days, He was started on cellcept 1g BID and prednisone 10mg daily.     Interval history in May 2019  Received IVIG 5/10-5/16 and 6/10-6/14.He has was admitted to inpatient rehab for 2.5 weeks. He was started on cellcept in the inpatient rehab setting. Currently on cellcept and prednisone 30mg daily   He is not receiving any PT at home. Overall he feels stronger than last visit.   He had one episode that he was leaning on left arm all day and felt that he could not hold anything with that.  He states that he "tastes his fevers", no shortness of breath, no chest pain, rashes.   He feels his swallowing has improved. He is eating all, had breakfast burrito.   Currently is on Prednisone 30mg daily, cellcept 1000mg BID, and IV monthly     Interval history:  Has only 3 pills of cellcept left. He has been compliant with his meds. He wastaking Prednisone 30mg daily, Just starting taking 25mg last week. Had IVIG 7/7- 7/12  PT/OT, last July 8, 2019 [facility] by [facility] specialist.           Review of Systems   Constitutional: Negative for appetite change, fatigue and fever.   HENT: Negative for mouth sores.         Negative hair loss    Eyes: Negative for pain and redness.   Respiratory: Negative for chest tightness and shortness of breath.    Cardiovascular: Negative for chest pain and leg swelling.   Gastrointestinal: Negative for abdominal pain, blood in stool, constipation and diarrhea.   Endocrine: Negative for cold intolerance.   Genitourinary: Negative for dysuria.   Musculoskeletal: Negative for arthralgias, back pain and neck stiffness.   Skin: Negative for rash.   Neurological: Negative for numbness " "and headaches.         Objective:   BP (!) 145/90   Pulse 84   Ht 5' 9" (1.753 m)   Wt 67.6 kg (149 lb)   BMI 22.00 kg/m²      Physical Exam   Constitutional: He is oriented to person, place, and time.   Thin man with poor dentition,    HENT:   Head: Normocephalic and atraumatic.   Mouth/Throat: Oropharynx is clear and moist. No oropharyngeal exudate.   Poor dentition    Eyes: Conjunctivae are normal. Pupils are equal, round, and reactive to light. No scleral icterus.   Neck: Normal range of motion. Neck supple.   Cardiovascular: Normal rate, regular rhythm and normal heart sounds.    No murmur heard.  Pulmonary/Chest: Effort normal and breath sounds normal. No respiratory distress. He has no wheezes.   Abdominal: Soft. There is no tenderness.       Right Side Rheumatological Exam     Muscle Strength (0-5 scale):  Neck Flexion:  4  Neck Extension: 4  Deltoid:  4.4  Biceps: 3/5   Triceps:  3  : 3/5   Iliopsoas: 3  Quadriceps:  3   Distal Lower Extremity: 4.8    Left Side Rheumatological Exam     Muscle Strength (0-5 scale):  Neck Flexion:  4  Neck Extension: 4  Deltoid:  4.4  Biceps: 3/5   Triceps:  3  :  3/5   Iliopsoas: 3  Quadriceps:  3   Distal Lower Extremity: 4.8      Neurological: He is alert and oriented to person, place, and time.   Skin: Skin is dry. No rash noted.     Musculoskeletal: He exhibits no edema.   No large or small joint synovitis  Muscle atrophy            Assessment:       1. On Cellcept therapy    2. Polymyositis        62yo M with PMH of polymyositis (dx 2009, +mildly +ku, outside biopsy proven with elevated CPK and aldolase), osteopenia, Vit D insufficiency, Hemoglobulin c/beta-zero thalaseema, A-fib, dysphagia, portal HTN, emphysema here for follow up of polymyositis. Patient with many social issues that are impeding his health care at this time, but this seems to be improving. Overall his condition does seem to be improving. He has been on Imuran in the past, his treatment has " been escalated to IVIG monthly and cellcept. Currently on prednisone but weaning.     He first established care here at [facility] in 8/2014. He was diagnosed in 5226-3702 when he was feeling weak, falling. He was a  and was unable to use his legs. He had a biopsy in 7204-0012 in [place] that per notes was consistent with polymyositis. He was on MTX in the past which was stopped due CT showing hepatosplenomegaly with portal venous hypertension and elevated T bili in 8/2014 He was at that time started on Imuran. He was on Imuran and steroids on and off due to noncompliance. In 2016, he was worked up again here at [facility]. MRI of the left lower extremities showed Severe atrophy with fatty replacement of the thigh musculature bilaterally.  There is mild increased edema signal within the remaining thigh musculature and left iliopsoas muscle without associated enhancement which could reflect a mild degree of residual myositis. EMG Left upper Ext 5/2016 was a technically difficult study. Had moderate ulnar entrapment at elbow, perhaps on a background of mild polyneuropathy. 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 Pathology of left thigh showing minute potion of muscle with end stage myopathic changes and focal chronic inflammation He was on Imuran and steroids on and off with noncompliance up until 4/2019. He was admitted in 4/2019 worsening generalized muscle weakness and dysphagia. Labs on admission showed Normal and stable CBC and CMP.  Normal CK/aldolase.  Normal inflammatory markers.  Elevated IgG (1754). HMGCoA ab negative. Vit D: 15.  During that hospitalization he received IVIG x 5 days, He was started on cellcept 1g BID and prednisone 10mg daily.  Discharged home at that time. Due to inadequate home care his improvement in strength in the hospital deteriorated. He was seen in clinic 5/2019 and his steroids were increased to 30mg  "daily. He was not on cellcept due to no approval. He has was admitted to inpatient rehab for 2.5 weeks. Received IVIG 5/10-5/16 and 6/10-6/14. He was started on cellcept in the inpatient rehab setting.         Specialist updates:  - Cardiology - Last saw 3/2015 - diagnosed with PAF.  No treatment at that time.  No follow up since  - Hem - Last saw 12/2017 - Evaluated for microcytic anemia and mild chronic thrombocytosis.  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  Discharged from clinic   - GI - Last saw 6/2015 - EGD/motility study ordered - not performed.  No follow up   - Hepatology - Last saw 5/2015 - Found portal HTN on CT with no evidence of cirrhosis or PV thrombosis.  Fibrosure with F1 stage (minimial fibrosis).  RTC PRN (no follow up)  - Pulmonary - Last saw 2/2015 -" Lung parenchymal normal - no evidence of fibrosis and one mediastinal LN marginal enlarged" CT chest showed GGO (2016). Repeat CT chest (2017) - no mention of GGO      Imaging:  CXR 1/2018:Patchy consolidation projected over the right lower lung zone, concerning for infection, correlation advised.       CT chest 12/2017:Peribronchial cuffing predominantly in the lower lung zones as well as retained secretions in the right middle lobe segmental bronchi consistent with bronchitis; such inflammation can occur with inhaled irritants or aspiration.  There is no evidence of interstitial lung disease such as NSIP or UIP.Upper lung zone predominant paraseptal emphysema and dispersed areas of centrilobular emphysema Stable 1.2 cm AP window lymph node.  Densely calcified granuloma in the posterobasal segment of the right lower lobe.Hepatosplenomegaly with associated splenic collateral vessels at the splenic hilum suggesting sequela of portal venous hypertension    CT chest 4/2019:   1. Mild peribronchial cuffing which may be seen with bronchitis.  Mild tubular bronchiectasis of the right lower lobe with several distal bronchi demonstrating " retained secretions, finding may be seen with small airways infectious/noninfectious inflammation or aspiration.  2. Centrilobular and paraseptal emphysema with an upper lung zone predominance.  3. Diffuse muscular atrophy.  4. Splenomegaly..     CT pelvis 6/2017: Obturator internus edema or hematoma. No acute fracture. Stable splenomegaly.     Esophogram 4/2015: Spontaneous gastroesophageal reflux.  Otherwise, unremarkable esophagram.     PET scan 8/2014:AP window lymph node SUV max less than 2.5.  This is most likely benign/reactive.  Surveillance is recommended at 3, 6, 12, and 24 months. Splenomegaly most likely from portal hypertension    MRI femurs 4/6/19:  1.  Redemonstration of advanced diffuse atrophy and fatty replacement of the bilateral thigh musculature involving all compartments.  There is mild residual edema signal present within the bilateral obturator musculature and residual posterior compartment musculature, similar to prior MRI examination.  2.  Mild heterogeneity of the visualized bone marrow signal which may relate to red marrow reconversion.  Clinical correlation advised.  Patient will also benefit from PT/OT and rehab placement for muscle strengthening after completion of IVIG.     MRI humerus b/l 4/7:  Significant diffuse atrophy and fatty replacement of the bilateral proximal upper extremity musculature, including the rotator cuff musculature, right greater than left, in this patient with reported history of polymyositis.  There is some degree of sparing of the bilateral deltoid and triceps musculature with greater residual muscle bulk on the left compared to the right.  There is mild residual edema present within the remaining musculature, most pronounced within the triceps.        Plan:       #Polymyositis   -Continue IVIG monthly (dose give over 5 days)   -continue cellcept 1500mg BID, monitoring labs today   -Currently on prednisone 25mg daily, will decrease prednisone by 5mg every 2  weeks until 15mg daily and then continue   -continue vit D 50,000 units weekly   -continue PT  -discussed with patient his current living situation, at this time he would like to continue to live at home as his situation is improving.   -referral to GI/hepatology for evaluation of hepatosplenomegaly and portal hypertension, will follow up at next visit     Patient seen and discussed with Dr. Huston    RTAMOR in  6 weeks     Lisa Durham MD  Rheumatology PGY 5

## 2019-07-29 ENCOUNTER — PATIENT MESSAGE (OUTPATIENT)
Dept: RHEUMATOLOGY | Facility: CLINIC | Age: 64
End: 2019-07-29

## 2019-07-29 ENCOUNTER — OFFICE VISIT (OUTPATIENT)
Dept: RHEUMATOLOGY | Facility: CLINIC | Age: 64
End: 2019-07-29
Payer: MEDICARE

## 2019-07-29 ENCOUNTER — TELEPHONE (OUTPATIENT)
Dept: RHEUMATOLOGY | Facility: CLINIC | Age: 64
End: 2019-07-29

## 2019-07-29 VITALS
SYSTOLIC BLOOD PRESSURE: 145 MMHG | DIASTOLIC BLOOD PRESSURE: 90 MMHG | BODY MASS INDEX: 22.07 KG/M2 | HEART RATE: 84 BPM | HEIGHT: 69 IN | WEIGHT: 149 LBS

## 2019-07-29 DIAGNOSIS — Z79.899 ON CELLCEPT THERAPY: Primary | ICD-10-CM

## 2019-07-29 DIAGNOSIS — M33.20 POLYMYOSITIS: ICD-10-CM

## 2019-07-29 PROCEDURE — 99999 PR PBB SHADOW E&M-EST. PATIENT-LVL IV: CPT | Mod: PBBFAC,GC,, | Performed by: STUDENT IN AN ORGANIZED HEALTH CARE EDUCATION/TRAINING PROGRAM

## 2019-07-29 PROCEDURE — 99214 OFFICE O/P EST MOD 30 MIN: CPT | Mod: S$PBB,GC,, | Performed by: STUDENT IN AN ORGANIZED HEALTH CARE EDUCATION/TRAINING PROGRAM

## 2019-07-29 PROCEDURE — 99214 PR OFFICE/OUTPT VISIT, EST, LEVL IV, 30-39 MIN: ICD-10-PCS | Mod: S$PBB,GC,, | Performed by: STUDENT IN AN ORGANIZED HEALTH CARE EDUCATION/TRAINING PROGRAM

## 2019-07-29 PROCEDURE — 99214 OFFICE O/P EST MOD 30 MIN: CPT | Mod: PBBFAC | Performed by: STUDENT IN AN ORGANIZED HEALTH CARE EDUCATION/TRAINING PROGRAM

## 2019-07-29 PROCEDURE — 99999 PR PBB SHADOW E&M-EST. PATIENT-LVL IV: ICD-10-PCS | Mod: PBBFAC,GC,, | Performed by: STUDENT IN AN ORGANIZED HEALTH CARE EDUCATION/TRAINING PROGRAM

## 2019-07-29 RX ORDER — MYCOPHENOLATE MOFETIL 500 MG/1
1500 TABLET ORAL 2 TIMES DAILY
Qty: 180 TABLET | Refills: 11 | Status: SHIPPED | OUTPATIENT
Start: 2019-07-29 | End: 2019-12-04 | Stop reason: SDUPTHER

## 2019-07-29 ASSESSMENT — ROUTINE ASSESSMENT OF PATIENT INDEX DATA (RAPID3)
PATIENT GLOBAL ASSESSMENT SCORE: 5
PAIN SCORE: 0
TOTAL RAPID3 SCORE: 3.66
AM STIFFNESS SCORE: 0, NO
PSYCHOLOGICAL DISTRESS SCORE: 0
MDHAQ FUNCTION SCORE: 1.8
FATIGUE SCORE: 0

## 2019-07-29 NOTE — TELEPHONE ENCOUNTER
Called Mr. Kahn and give number below so that he can order refills.       ----- Message from Bonny Dodson sent at 7/29/2019  4:48 PM CDT -----  Yes ma'am [phone]  ----- Message -----  From: Lisa Durham MD  Sent: 7/29/2019   4:44 PM  To: Bonny Dodson    Great. Do you have the number he needs to call, I can let him know.     ----- Message -----  From: Bonny Dodson  Sent: 7/29/2019   4:41 PM  To: Lisa Durham MD    According to their records you sent a prescription in June with 11 refills so he just needs to call and order it.  ----- Message -----  From: Lisa Durham MD  Sent: 7/29/2019   4:26 PM  To: Bonny Dodson    [facility]. If you fax over the form I can fill and fax back now. Just let me know after you fax and I can look for it.   Thanks!    ----- Message -----  From: Bonny Dodson  Sent: 7/29/2019   4:22 PM  To: MD Linda Jain Dr. I spoke to Deb earlier today about him. His prescription needs to be sent directly to the  program. That would be the quickest option. The fax number is [phone]. If you need me to do it, we have to input the prescription and then I have to get you to sign a form for me to submit to them.  ----- Message -----  From: Lisa Durham MD  Sent: 7/29/2019   4:17 PM  To: Bonny Dodson    Mr. Kahn is out of his cellcept. We saw him in clinic today. Are you able to help him and get the refill expedited anyway? Thanks for your help.   Lisa Durham MD  Rheumatology

## 2019-07-29 NOTE — PATIENT INSTRUCTIONS
1) Prednisone: Take 25 mg (5 tablets) for 2 weeks, then reduce to 20mg (4 tablets) for 2 weeks, then reduce to 15mg (3 tablets) and continue till next visit

## 2019-08-05 ENCOUNTER — INFUSION (OUTPATIENT)
Dept: INFUSION THERAPY | Facility: HOSPITAL | Age: 64
End: 2019-08-05
Attending: INTERNAL MEDICINE
Payer: MEDICARE

## 2019-08-05 VITALS
BODY MASS INDEX: 22.04 KG/M2 | HEART RATE: 68 BPM | TEMPERATURE: 99 F | DIASTOLIC BLOOD PRESSURE: 77 MMHG | HEIGHT: 69 IN | RESPIRATION RATE: 16 BRPM | SYSTOLIC BLOOD PRESSURE: 157 MMHG | WEIGHT: 148.81 LBS

## 2019-08-05 DIAGNOSIS — M33.20 POLYMYOSITIS: Primary | ICD-10-CM

## 2019-08-05 PROCEDURE — 96367 TX/PROPH/DG ADDL SEQ IV INF: CPT

## 2019-08-05 PROCEDURE — S0028 INJECTION, FAMOTIDINE, 20 MG: HCPCS | Performed by: INTERNAL MEDICINE

## 2019-08-05 PROCEDURE — 96366 THER/PROPH/DIAG IV INF ADDON: CPT

## 2019-08-05 PROCEDURE — 25000003 PHARM REV CODE 250: Performed by: INTERNAL MEDICINE

## 2019-08-05 PROCEDURE — 63600175 PHARM REV CODE 636 W HCPCS: Mod: JG | Performed by: INTERNAL MEDICINE

## 2019-08-05 PROCEDURE — 96376 TX/PRO/DX INJ SAME DRUG ADON: CPT

## 2019-08-05 PROCEDURE — 96365 THER/PROPH/DIAG IV INF INIT: CPT

## 2019-08-05 RX ORDER — FAMOTIDINE 10 MG/ML
20 INJECTION INTRAVENOUS
Status: CANCELLED | OUTPATIENT
Start: 2019-09-01

## 2019-08-05 RX ORDER — FAMOTIDINE 10 MG/ML
20 INJECTION INTRAVENOUS
Status: COMPLETED | OUTPATIENT
Start: 2019-08-05 | End: 2019-08-05

## 2019-08-05 RX ORDER — ACETAMINOPHEN 325 MG/1
650 TABLET ORAL
Status: CANCELLED | OUTPATIENT
Start: 2019-09-01

## 2019-08-05 RX ORDER — SODIUM CHLORIDE 0.9 % (FLUSH) 0.9 %
10 SYRINGE (ML) INJECTION
Status: CANCELLED | OUTPATIENT
Start: 2019-09-01

## 2019-08-05 RX ORDER — ACETAMINOPHEN 325 MG/1
650 TABLET ORAL
Status: COMPLETED | OUTPATIENT
Start: 2019-08-05 | End: 2019-08-05

## 2019-08-05 RX ORDER — HEPARIN 100 UNIT/ML
500 SYRINGE INTRAVENOUS
Status: CANCELLED | OUTPATIENT
Start: 2019-09-01

## 2019-08-05 RX ADMIN — ACETAMINOPHEN 650 MG: 325 TABLET ORAL at 01:08

## 2019-08-05 RX ADMIN — FAMOTIDINE 20 MG: 10 INJECTION INTRAVENOUS at 01:08

## 2019-08-05 RX ADMIN — HUMAN IMMUNOGLOBULIN G 25 G: 20 LIQUID INTRAVENOUS at 01:08

## 2019-08-05 RX ADMIN — DIPHENHYDRAMINE HYDROCHLORIDE 50 MG: 50 INJECTION, SOLUTION INTRAMUSCULAR; INTRAVENOUS at 01:08

## 2019-08-05 NOTE — PLAN OF CARE
Problem: Adult Inpatient Plan of Care  Goal: Plan of Care Review  Outcome: Ongoing (interventions implemented as appropriate)  Tolerated infusion well.  No reactions noted. No questions or concerns at this time.  Left unit in NAD

## 2019-08-05 NOTE — PLAN OF CARE
Problem: Adult Inpatient Plan of Care  Goal: Optimal Comfort and Wellbeing  Outcome: Ongoing (interventions implemented as appropriate)  Patient here for IVIG.  Assessment completed and labs reviewed.  VSS.  No needs at this time. Chair reclined and blanket offered.  Will continue to monitor.

## 2019-08-06 ENCOUNTER — INFUSION (OUTPATIENT)
Dept: INFUSION THERAPY | Facility: HOSPITAL | Age: 64
End: 2019-08-06
Attending: INTERNAL MEDICINE
Payer: MEDICARE

## 2019-08-06 VITALS
SYSTOLIC BLOOD PRESSURE: 126 MMHG | RESPIRATION RATE: 18 BRPM | DIASTOLIC BLOOD PRESSURE: 73 MMHG | TEMPERATURE: 98 F | HEART RATE: 77 BPM

## 2019-08-06 DIAGNOSIS — M33.20 POLYMYOSITIS: Primary | ICD-10-CM

## 2019-08-06 PROCEDURE — 96365 THER/PROPH/DIAG IV INF INIT: CPT

## 2019-08-06 PROCEDURE — S0028 INJECTION, FAMOTIDINE, 20 MG: HCPCS | Performed by: INTERNAL MEDICINE

## 2019-08-06 PROCEDURE — 25000003 PHARM REV CODE 250: Performed by: INTERNAL MEDICINE

## 2019-08-06 PROCEDURE — 96367 TX/PROPH/DG ADDL SEQ IV INF: CPT

## 2019-08-06 PROCEDURE — 96375 TX/PRO/DX INJ NEW DRUG ADDON: CPT

## 2019-08-06 PROCEDURE — 63600175 PHARM REV CODE 636 W HCPCS: Performed by: INTERNAL MEDICINE

## 2019-08-06 PROCEDURE — 96366 THER/PROPH/DIAG IV INF ADDON: CPT

## 2019-08-06 RX ORDER — FAMOTIDINE 10 MG/ML
20 INJECTION INTRAVENOUS
Status: COMPLETED | OUTPATIENT
Start: 2019-08-06 | End: 2019-08-06

## 2019-08-06 RX ORDER — HEPARIN 100 UNIT/ML
500 SYRINGE INTRAVENOUS
Status: DISCONTINUED | OUTPATIENT
Start: 2019-08-06 | End: 2019-08-06 | Stop reason: HOSPADM

## 2019-08-06 RX ORDER — ACETAMINOPHEN 325 MG/1
650 TABLET ORAL
Status: COMPLETED | OUTPATIENT
Start: 2019-08-06 | End: 2019-08-06

## 2019-08-06 RX ORDER — FAMOTIDINE 10 MG/ML
20 INJECTION INTRAVENOUS
Status: CANCELLED | OUTPATIENT
Start: 2019-09-01

## 2019-08-06 RX ORDER — HEPARIN 100 UNIT/ML
500 SYRINGE INTRAVENOUS
Status: CANCELLED | OUTPATIENT
Start: 2019-09-01

## 2019-08-06 RX ORDER — SODIUM CHLORIDE 0.9 % (FLUSH) 0.9 %
10 SYRINGE (ML) INJECTION
Status: DISCONTINUED | OUTPATIENT
Start: 2019-08-06 | End: 2019-08-06 | Stop reason: HOSPADM

## 2019-08-06 RX ORDER — ACETAMINOPHEN 325 MG/1
650 TABLET ORAL
Status: CANCELLED | OUTPATIENT
Start: 2019-09-01

## 2019-08-06 RX ORDER — SODIUM CHLORIDE 0.9 % (FLUSH) 0.9 %
10 SYRINGE (ML) INJECTION
Status: CANCELLED | OUTPATIENT
Start: 2019-09-01

## 2019-08-06 RX ADMIN — SODIUM CHLORIDE: 9 INJECTION, SOLUTION INTRAVENOUS at 02:08

## 2019-08-06 RX ADMIN — HUMAN IMMUNOGLOBULIN G 25 G: 20 LIQUID INTRAVENOUS at 02:08

## 2019-08-06 RX ADMIN — ACETAMINOPHEN 650 MG: 325 TABLET ORAL at 02:08

## 2019-08-06 RX ADMIN — FAMOTIDINE 20 MG: 10 INJECTION INTRAVENOUS at 02:08

## 2019-08-06 RX ADMIN — DIPHENHYDRAMINE HYDROCHLORIDE 50 MG: 50 INJECTION, SOLUTION INTRAMUSCULAR; INTRAVENOUS at 02:08

## 2019-08-06 NOTE — PLAN OF CARE
Problem: Adult Inpatient Plan of Care  Goal: Plan of Care Review  Outcome: Ongoing (interventions implemented as appropriate)  1705:  Patient tolerated IVIG infusion well, VSS, NAD noted, denies any changes.  PIV removed intact, AVS declined.  Patient released in stable condition, independently, via personal motorized wheelchair.

## 2019-08-07 ENCOUNTER — INFUSION (OUTPATIENT)
Dept: INFUSION THERAPY | Facility: HOSPITAL | Age: 64
End: 2019-08-07
Attending: INTERNAL MEDICINE
Payer: MEDICARE

## 2019-08-07 ENCOUNTER — CLINICAL SUPPORT (OUTPATIENT)
Dept: INTERNAL MEDICINE | Facility: CLINIC | Age: 64
End: 2019-08-07
Payer: MEDICARE

## 2019-08-07 VITALS
SYSTOLIC BLOOD PRESSURE: 135 MMHG | DIASTOLIC BLOOD PRESSURE: 70 MMHG | HEART RATE: 68 BPM | OXYGEN SATURATION: 99 % | TEMPERATURE: 98 F | RESPIRATION RATE: 18 BRPM

## 2019-08-07 DIAGNOSIS — I10 ESSENTIAL HYPERTENSION: ICD-10-CM

## 2019-08-07 DIAGNOSIS — M33.20 POLYMYOSITIS: Primary | ICD-10-CM

## 2019-08-07 DIAGNOSIS — Z01.30 BLOOD PRESSURE CHECK: Primary | ICD-10-CM

## 2019-08-07 PROCEDURE — 96365 THER/PROPH/DIAG IV INF INIT: CPT

## 2019-08-07 PROCEDURE — 25000003 PHARM REV CODE 250: Performed by: INTERNAL MEDICINE

## 2019-08-07 PROCEDURE — S0028 INJECTION, FAMOTIDINE, 20 MG: HCPCS | Performed by: INTERNAL MEDICINE

## 2019-08-07 PROCEDURE — 63600175 PHARM REV CODE 636 W HCPCS: Performed by: INTERNAL MEDICINE

## 2019-08-07 PROCEDURE — 96367 TX/PROPH/DG ADDL SEQ IV INF: CPT

## 2019-08-07 PROCEDURE — 96375 TX/PRO/DX INJ NEW DRUG ADDON: CPT

## 2019-08-07 RX ORDER — SODIUM CHLORIDE 0.9 % (FLUSH) 0.9 %
10 SYRINGE (ML) INJECTION
Status: CANCELLED | OUTPATIENT
Start: 2019-09-01

## 2019-08-07 RX ORDER — SODIUM CHLORIDE 0.9 % (FLUSH) 0.9 %
10 SYRINGE (ML) INJECTION
Status: DISCONTINUED | OUTPATIENT
Start: 2019-08-07 | End: 2019-08-07 | Stop reason: HOSPADM

## 2019-08-07 RX ORDER — ACETAMINOPHEN 325 MG/1
650 TABLET ORAL
Status: CANCELLED | OUTPATIENT
Start: 2019-09-01

## 2019-08-07 RX ORDER — HEPARIN 100 UNIT/ML
500 SYRINGE INTRAVENOUS
Status: CANCELLED | OUTPATIENT
Start: 2019-09-01

## 2019-08-07 RX ORDER — AMLODIPINE BESYLATE 5 MG/1
10 TABLET ORAL DAILY
Qty: 60 TABLET | Refills: 11 | Status: SHIPPED | OUTPATIENT
Start: 2019-08-07 | End: 2020-11-23 | Stop reason: SDUPTHER

## 2019-08-07 RX ORDER — FAMOTIDINE 10 MG/ML
20 INJECTION INTRAVENOUS
Status: COMPLETED | OUTPATIENT
Start: 2019-08-07 | End: 2019-08-07

## 2019-08-07 RX ORDER — ACETAMINOPHEN 325 MG/1
650 TABLET ORAL
Status: COMPLETED | OUTPATIENT
Start: 2019-08-07 | End: 2019-08-07

## 2019-08-07 RX ORDER — FAMOTIDINE 10 MG/ML
20 INJECTION INTRAVENOUS
Status: CANCELLED | OUTPATIENT
Start: 2019-09-01

## 2019-08-07 RX ORDER — HEPARIN 100 UNIT/ML
500 SYRINGE INTRAVENOUS
Status: DISCONTINUED | OUTPATIENT
Start: 2019-08-07 | End: 2019-08-07 | Stop reason: HOSPADM

## 2019-08-07 RX ORDER — LABETALOL 100 MG/1
100 TABLET, FILM COATED ORAL EVERY 12 HOURS
Qty: 60 TABLET | Refills: 11 | Status: SHIPPED | OUTPATIENT
Start: 2019-08-07 | End: 2020-11-23 | Stop reason: SINTOL

## 2019-08-07 RX ADMIN — HUMAN IMMUNOGLOBULIN G 25 G: 20 LIQUID INTRAVENOUS at 01:08

## 2019-08-07 RX ADMIN — SODIUM CHLORIDE: 0.9 INJECTION, SOLUTION INTRAVENOUS at 01:08

## 2019-08-07 RX ADMIN — DIPHENHYDRAMINE HYDROCHLORIDE 50 MG: 50 INJECTION, SOLUTION INTRAMUSCULAR; INTRAVENOUS at 01:08

## 2019-08-07 RX ADMIN — ACETAMINOPHEN 650 MG: 325 TABLET ORAL at 01:08

## 2019-08-07 RX ADMIN — FAMOTIDINE 20 MG: 10 INJECTION INTRAVENOUS at 01:08

## 2019-08-07 NOTE — TELEPHONE ENCOUNTER
Rx's refilled.  Restart meds, return to clinic for nursing BP check on medication.  Please notify patient.

## 2019-08-07 NOTE — PLAN OF CARE
Problem: Adult Inpatient Plan of Care  Goal: Plan of Care Review  Outcome: Ongoing (interventions implemented as appropriate)  Pt admitted for #19 tx IVIG. Labs reviewed. Pt received  Infusion over 2 hours ans 29 minutes, tolerated well.  Plan of care revowed and Pt instucted to contact  MD with any further questions or concerns, he verbalized understanding. Pt discharged @ 16:20

## 2019-08-07 NOTE — TELEPHONE ENCOUNTER
Pt is here for b/p check, 147/92, p 77. Pt states that he hasn't been taken his b/p meds in months and needs a refill. Dr Ambrosio notified.

## 2019-08-08 ENCOUNTER — INFUSION (OUTPATIENT)
Dept: INFUSION THERAPY | Facility: HOSPITAL | Age: 64
End: 2019-08-08
Attending: INTERNAL MEDICINE
Payer: MEDICARE

## 2019-08-08 ENCOUNTER — TELEPHONE (OUTPATIENT)
Dept: RHEUMATOLOGY | Facility: CLINIC | Age: 64
End: 2019-08-08

## 2019-08-08 VITALS
SYSTOLIC BLOOD PRESSURE: 145 MMHG | DIASTOLIC BLOOD PRESSURE: 86 MMHG | TEMPERATURE: 98 F | RESPIRATION RATE: 18 BRPM | HEART RATE: 72 BPM

## 2019-08-08 DIAGNOSIS — M33.20 POLYMYOSITIS: Primary | ICD-10-CM

## 2019-08-08 PROCEDURE — 96375 TX/PRO/DX INJ NEW DRUG ADDON: CPT

## 2019-08-08 PROCEDURE — 96367 TX/PROPH/DG ADDL SEQ IV INF: CPT

## 2019-08-08 PROCEDURE — 63600175 PHARM REV CODE 636 W HCPCS: Performed by: INTERNAL MEDICINE

## 2019-08-08 PROCEDURE — S0028 INJECTION, FAMOTIDINE, 20 MG: HCPCS | Performed by: INTERNAL MEDICINE

## 2019-08-08 PROCEDURE — 96413 CHEMO IV INFUSION 1 HR: CPT

## 2019-08-08 PROCEDURE — 25000003 PHARM REV CODE 250: Performed by: INTERNAL MEDICINE

## 2019-08-08 PROCEDURE — 96415 CHEMO IV INFUSION ADDL HR: CPT

## 2019-08-08 RX ORDER — SODIUM CHLORIDE 0.9 % (FLUSH) 0.9 %
10 SYRINGE (ML) INJECTION
Status: CANCELLED | OUTPATIENT
Start: 2019-09-01

## 2019-08-08 RX ORDER — ACETAMINOPHEN 325 MG/1
650 TABLET ORAL
Status: CANCELLED | OUTPATIENT
Start: 2019-09-01

## 2019-08-08 RX ORDER — FAMOTIDINE 10 MG/ML
20 INJECTION INTRAVENOUS
Status: COMPLETED | OUTPATIENT
Start: 2019-08-08 | End: 2019-08-08

## 2019-08-08 RX ORDER — HEPARIN 100 UNIT/ML
500 SYRINGE INTRAVENOUS
Status: CANCELLED | OUTPATIENT
Start: 2019-09-01

## 2019-08-08 RX ORDER — FAMOTIDINE 10 MG/ML
20 INJECTION INTRAVENOUS
Status: CANCELLED | OUTPATIENT
Start: 2019-09-01

## 2019-08-08 RX ORDER — ACETAMINOPHEN 325 MG/1
650 TABLET ORAL
Status: COMPLETED | OUTPATIENT
Start: 2019-08-08 | End: 2019-08-08

## 2019-08-08 RX ADMIN — HUMAN IMMUNOGLOBULIN G 25 G: 5 LIQUID INTRAVENOUS at 02:08

## 2019-08-08 RX ADMIN — DIPHENHYDRAMINE HYDROCHLORIDE 50 MG: 50 INJECTION, SOLUTION INTRAMUSCULAR; INTRAVENOUS at 01:08

## 2019-08-08 RX ADMIN — ACETAMINOPHEN 650 MG: 325 TABLET ORAL at 01:08

## 2019-08-08 RX ADMIN — FAMOTIDINE 20 MG: 10 INJECTION INTRAVENOUS at 01:08

## 2019-08-08 RX ADMIN — SODIUM CHLORIDE: 0.9 INJECTION, SOLUTION INTRAVENOUS at 01:08

## 2019-08-08 NOTE — TELEPHONE ENCOUNTER
Called patient and answered all questions.         ----- Message from Nella He MA sent at 8/7/2019 12:10 PM CDT -----  Contact: Self      ----- Message -----  From: Jessie Us  Sent: 8/7/2019  11:38 AM  To: Herminio Gonzalez Staff    Pt is requesting a call back in regards to medications, he wants to know does he still need to take to the Vitamin d with his predniSONE (DELTASONE) 5 MG tablet    Contact pt @ [phone]

## 2019-08-09 ENCOUNTER — INFUSION (OUTPATIENT)
Dept: INFUSION THERAPY | Facility: HOSPITAL | Age: 64
End: 2019-08-09
Attending: INTERNAL MEDICINE
Payer: MEDICARE

## 2019-08-09 VITALS
TEMPERATURE: 98 F | DIASTOLIC BLOOD PRESSURE: 70 MMHG | HEART RATE: 88 BPM | RESPIRATION RATE: 18 BRPM | SYSTOLIC BLOOD PRESSURE: 131 MMHG

## 2019-08-09 DIAGNOSIS — M33.20 POLYMYOSITIS: Primary | ICD-10-CM

## 2019-08-09 PROCEDURE — 96375 TX/PRO/DX INJ NEW DRUG ADDON: CPT

## 2019-08-09 PROCEDURE — 63600175 PHARM REV CODE 636 W HCPCS: Mod: JG | Performed by: INTERNAL MEDICINE

## 2019-08-09 PROCEDURE — 96366 THER/PROPH/DIAG IV INF ADDON: CPT

## 2019-08-09 PROCEDURE — 96365 THER/PROPH/DIAG IV INF INIT: CPT

## 2019-08-09 PROCEDURE — 96367 TX/PROPH/DG ADDL SEQ IV INF: CPT

## 2019-08-09 PROCEDURE — S0028 INJECTION, FAMOTIDINE, 20 MG: HCPCS | Performed by: INTERNAL MEDICINE

## 2019-08-09 PROCEDURE — 25000003 PHARM REV CODE 250: Performed by: INTERNAL MEDICINE

## 2019-08-09 RX ORDER — ACETAMINOPHEN 325 MG/1
650 TABLET ORAL
Status: COMPLETED | OUTPATIENT
Start: 2019-08-09 | End: 2019-08-09

## 2019-08-09 RX ORDER — HEPARIN 100 UNIT/ML
500 SYRINGE INTRAVENOUS
Status: DISCONTINUED | OUTPATIENT
Start: 2019-08-09 | End: 2019-08-09 | Stop reason: HOSPADM

## 2019-08-09 RX ORDER — ACETAMINOPHEN 325 MG/1
650 TABLET ORAL
Status: CANCELLED | OUTPATIENT
Start: 2019-09-01

## 2019-08-09 RX ORDER — SODIUM CHLORIDE 0.9 % (FLUSH) 0.9 %
10 SYRINGE (ML) INJECTION
Status: CANCELLED | OUTPATIENT
Start: 2019-09-01

## 2019-08-09 RX ORDER — SODIUM CHLORIDE 0.9 % (FLUSH) 0.9 %
10 SYRINGE (ML) INJECTION
Status: DISCONTINUED | OUTPATIENT
Start: 2019-08-09 | End: 2019-08-09 | Stop reason: HOSPADM

## 2019-08-09 RX ORDER — HEPARIN 100 UNIT/ML
500 SYRINGE INTRAVENOUS
Status: CANCELLED | OUTPATIENT
Start: 2019-09-01

## 2019-08-09 RX ORDER — FAMOTIDINE 10 MG/ML
20 INJECTION INTRAVENOUS
Status: COMPLETED | OUTPATIENT
Start: 2019-08-09 | End: 2019-08-09

## 2019-08-09 RX ORDER — FAMOTIDINE 10 MG/ML
20 INJECTION INTRAVENOUS
Status: CANCELLED | OUTPATIENT
Start: 2019-09-01

## 2019-08-09 RX ADMIN — HUMAN IMMUNOGLOBULIN G 25 G: 5 LIQUID INTRAVENOUS at 01:08

## 2019-08-09 RX ADMIN — DIPHENHYDRAMINE HYDROCHLORIDE 50 MG: 50 INJECTION, SOLUTION INTRAMUSCULAR; INTRAVENOUS at 01:08

## 2019-08-09 RX ADMIN — SODIUM CHLORIDE: 0.9 INJECTION, SOLUTION INTRAVENOUS at 01:08

## 2019-08-09 RX ADMIN — ACETAMINOPHEN 650 MG: 325 TABLET ORAL at 01:08

## 2019-08-09 RX ADMIN — FAMOTIDINE 20 MG: 10 INJECTION INTRAVENOUS at 01:08

## 2019-08-09 NOTE — PLAN OF CARE
Problem: Fatigue (Oncology Care)  Goal: Improved Activity Tolerance  Outcome: Ongoing (interventions implemented as appropriate)  Pt here today for IVIG day 5. Reports some fatigue today, however was able to transfer self from wheelchair to recliner with use of assistive board. Refused assistance but seemed surprised that he was able to do It alone. PIV started without difficulty. No questions at this time.

## 2019-08-09 NOTE — PLAN OF CARE
Problem: Adult Inpatient Plan of Care  Goal: Plan of Care Review  Outcome: Ongoing (interventions implemented as appropriate)  Pt tolerated infusion of IVIG. VSS Throughout. PIV flushed, saline locked and removed.

## 2019-08-12 ENCOUNTER — TELEPHONE (OUTPATIENT)
Dept: RHEUMATOLOGY | Facility: HOSPITAL | Age: 64
End: 2019-08-12

## 2019-08-12 NOTE — TELEPHONE ENCOUNTER
----- Message from Alexandria Metcalf PT sent at 8/12/2019  8:34 AM CDT -----  Hi Dr. Durham,       I am sorry, I have been out of town the past two weeks.  I apologize that [facility] K[facility], I started the transfer process, but apparently it was not followed through.  We will contact the clinic today and someone should be calling him by mid-week to get him set up with PT.      --Carri Metcalf, JENELLE    ----- Message -----  From: Lisa Durham MD  Sent: 7/29/2019   4:07 PM  To: Alexandria Metcalf PT    Just wanted to touch base with you about this mutual patient. Can you see if his PT can be set up at [place] location. Your note indicated for a neuro specialist. We would like to get PT started as soon a possibles.   Dr. DURHAM

## 2019-08-13 ENCOUNTER — CLINICAL SUPPORT (OUTPATIENT)
Dept: REHABILITATION | Facility: HOSPITAL | Age: 64
End: 2019-08-13
Attending: STUDENT IN AN ORGANIZED HEALTH CARE EDUCATION/TRAINING PROGRAM
Payer: MEDICARE

## 2019-08-13 DIAGNOSIS — R29.898 LEG WEAKNESS, BILATERAL: ICD-10-CM

## 2019-08-13 DIAGNOSIS — R29.898 BILATERAL ARM WEAKNESS: ICD-10-CM

## 2019-08-13 DIAGNOSIS — M62.81 WEAKNESS OF TRUNK MUSCULATURE: ICD-10-CM

## 2019-08-13 PROCEDURE — 97110 THERAPEUTIC EXERCISES: CPT | Mod: PO

## 2019-08-13 PROCEDURE — 97530 THERAPEUTIC ACTIVITIES: CPT | Mod: PO

## 2019-08-13 NOTE — PLAN OF CARE
[facility] Daily Treatment Note     Name: Nura Kahn Jr.  Clinic Number: [id number]    Therapy Diagnosis:   Encounter Diagnoses   Name Primary?    Leg weakness, bilateral     Weakness of trunk musculature     Bilateral arm weakness      Physician: Lisa Durham MD    Visit Date: 8/13/2019    Physician Orders: PT Eval and Treat   Medical Diagnosis from Referral: M33.20 (ICD-10-CM) - Polymyositis  Evaluation Date: 7/8/2019  Authorization Period Expiration: 6/18/2020  NEW Plan of Care Expiration: 11/13/2019  Visit # / Visits authorized: 1/ 18 (+eval)    Time In: 1336  Time Out: 1425  Total Billable Time: 49 minutes    Precautions: Standard, Fall and polymyositis    Subjective     Pt reports: He went to [facility] but they didn't have the equipment necessary to treat him. He does not exercise like he should. Wants to work mostly on building the confidence in his legs. He reports he is able to transfer by himself but cannot pull up his pants following episodes of continence. Pt reports he went to [facility] in June and then had his first session at [facility].     He was not compliant with home exercise program.    Pain: 0/10  Location: n/a    Objective     Pt attends session in scooter with belt tied around his knees to prevent hip abduction and slouched posture.    Nura received therapeutic exercises to develop strength, endurance and posture for 39 minutes including:  Seated heel raise x10  Seated toe raise x10  Seated clams with peach theraband x10  Seated adductor squeeze with ball x10  Supine bridges x10 with assist from UEs on thighs        LOWER EXTREMITY STRENGTH:    RIGHT 8/13/2019 LEFT 8/13/2019   Quadriceps trace trace Trace trace   Hamstrings 2/5 trace 2/5 trace      Hip adduction  0/5  0/5   Hip Flexion 2-/5 2-/5 2-/5 2-/5   Hip Abduction 3-/5 2+/5 3-/5 2+/5   Ankle dorsiflexion  2-/5  2-/5   Ankle plantarflexion  2-/5  2-/5       Nura participated in dynamic functional therapeutic activities to  improve functional performance for 10  minutes, including:  Transfer to therapy mat with slide board from scooter with supervision  Sit<>supine with modA  Transfer to  from therapy mat with slide board and supervision   Sit<>stand with total A of two persons between parallel bars  Transfer back to scooter from  with modA due to poor set up by pt    Home Exercises Provided and Patient Education Provided     Education provided:   - new HEP provided    Written Home Exercises Provided: Patient instructed to cont prior HEP.  Exercises were reviewed and Nura was able to demonstrate them prior to the end of the session.  Nura demonstrated good  understanding of the education provided.     See EMR under Patient Instructions for exercises provided on evaluation.    Assessment     Mr. Kahn tolerated session well today. Pt has not been in for PT since IE over a month ago; therefore, reassessment performed. Pt demonstrates fair+ balance but requires assist of UEs to remain balanced. He is able to set up for transfers with slide board with even surfaces but requires assist when going uphill. He appears motivated to participate in therapy and will benefit from therapy in order to regain strength and functional mobility. Pt's biggest goal at this time is to be able to stand better so that he can better perform donning pants following using the bathroom. His goals have been addressed and are appropriate for new POC through 11/13/2019.    Nura is progressing well towards his goals.   Pt prognosis is Good.     Pt will continue to benefit from skilled outpatient physical therapy to address the deficits listed in the problem list box on initial evaluation, provide pt/family education and to maximize pt's level of independence in the home and community environment.     Pt's spiritual, cultural and educational needs considered and pt agreeable to plan of care and goals.     Anticipated barriers to physical therapy:  [place]    Goals: (as of 8/13/2019)  Short Term Goals:   1.  Good return demonstration of HEP within 1 week for consistent strengthening not met, progressing  2 Patient to demonstrate unsupported sitting on stable surface for 30 seconds without assist or LOB within 4 weeks indicating improved trunk strength MET  3.  Patient able to perform sit to stand with mod/max assist without cueing and with good safety awareness within 4 weeks not met, progressing     Long Term Goals:  1. Patient to tolerate at least 2 minutes sustained standing activity with mod/max assist with good safety awareness not met, progressing  2. Patient to demonstrate independent sit without support for at least 2 minutes without assist or LOB within 8 weeks to indicate improved trunk control not met, progressing  3. Patient able to don/doff shirt independently within 8 weeks for improved independence with functional activity not met, progressing    Plan     PT to extend POC through 11/13/2019 due to pt unable to be seen following evaluation for a significant amount of time.    Work on sitting balance, core strength, standing tolerance with use of standing frame, and increasing strength.    Pablo Ryan, PT

## 2019-08-16 ENCOUNTER — CLINICAL SUPPORT (OUTPATIENT)
Dept: REHABILITATION | Facility: HOSPITAL | Age: 64
End: 2019-08-16
Attending: STUDENT IN AN ORGANIZED HEALTH CARE EDUCATION/TRAINING PROGRAM
Payer: MEDICARE

## 2019-08-16 DIAGNOSIS — R29.898 BILATERAL ARM WEAKNESS: ICD-10-CM

## 2019-08-16 DIAGNOSIS — M62.81 WEAKNESS OF TRUNK MUSCULATURE: ICD-10-CM

## 2019-08-16 DIAGNOSIS — R29.898 LEG WEAKNESS, BILATERAL: ICD-10-CM

## 2019-08-16 PROCEDURE — 97530 THERAPEUTIC ACTIVITIES: CPT | Mod: PO

## 2019-08-16 PROCEDURE — 97110 THERAPEUTIC EXERCISES: CPT | Mod: PO

## 2019-08-16 NOTE — PROGRESS NOTES
Clinical Support     8/13/2019  [facility]   Pablo Ryan, PT   Physical Therapy   Leg weakness, bilateral +2 more   Dx   PT Progress Note ; Referred by Lisa Durham MD   Reason for Visit    Instructions              Plan of Care           [facility] Daily Treatment Note      Name: Nura Kahn Jr.  Clinic Number: [id number]     Therapy Diagnosis:        Encounter Diagnoses   Name Primary?    Leg weakness, bilateral      Weakness of trunk musculature      Bilateral arm weakness        Physician: Lisa Durham MD     Visit Date: 8/13/2019     Physician Orders: PT Eval and Treat   Medical Diagnosis from Referral: M33.20 (ICD-10-CM) - Polymyositis  Evaluation Date: 7/8/2019  Authorization Period Expiration: 6/18/2020  NEW Plan of Care Expiration: 11/13/2019  Visit # / Visits authorized: 2/ 18 (+eval)     Time In: 9:00  Time Out: 9:45  Total Billable Time: 45 minutes     Precautions: Standard, Fall and polymyositis     Subjective      Pt reports: HEP compliance. Been taking the IVG infusion for about 4 months now  He was not compliant with home exercise program.     Pain: 0/10  Location: n/a     Objective      Pt attends session in [facility] with belt and peach TB  tied around his knees to prevent hip abduction and slouched posture.     /108 (left arm auto seated )  /107 (right arm auto seated)  /92 (left arm manual standing initial)  /98 (left arm standing at 5 minutes)    Nura received therapeutic exercises 30 minutes to develop strength, endurance and posture for 39 minutes including:  Seated heel raise 2 x10  Seated toe raise 2 x10  Seated clams with orange theraband 2 x10  Seated adductor squeeze with ball 2 x10  Supine bridges x10 with assist from UEs on thighs no performed today  Seated: chops x 10 reps  Seated: chest press 2 x 10 reps with dowel  Seated: lay back sit ups with B UE dowel support x 13 reps            Nura participated in dynamic functional  therapeutic activities for 20 minutes  to improve functional performance for 10  minutes, including:  Transfer to therapy mat with slide board from [facility] with supervision  Sit<>supine with modA  Transfer to  from therapy mat with slide board and supervision   Sit<>stand with total A of two persons between parallel bars  Transfer back to [facility] from  with modA due to poor set up by pt    Standing frame: 10 minutes   performed shoulder rolls     Home Exercises Provided and Patient Education Provided      Education provided:   - new HEP provided     Written Home Exercises Provided: Patient instructed to cont prior HEP.  Exercises were reviewed and Nura was able to demonstrate them prior to the end of the session.  Nura demonstrated good  understanding of the education provided.      See EMR under Patient Instructions for exercises provided on evaluation.     Assessment      Nura tolerated session well today. Initiated standing frame trial 10 minutes. BP reading high. However, patient asymptomatic.     Nura is progressing well towards his goals.   Pt prognosis is Good.      Pt will continue to benefit from skilled outpatient physical therapy to address the deficits listed in the problem list box on initial evaluation, provide pt/family education and to maximize pt's level of independence in the home and community environment.      Pt's spiritual, cultural and educational needs considered and pt agreeable to plan of care and goals.     Anticipated barriers to physical therapy: [place]     Goals: (as of 8/13/2019)  Short Term Goals:   1.  Good return demonstration of HEP within 1 week for consistent strengthening not met, progressing  2 Patient to demonstrate unsupported sitting on stable surface for 30 seconds without assist or LOB within 4 weeks indicating improved trunk strength MET  3.  Patient able to perform sit to stand with mod/max assist without cueing and with good safety awareness within 4 weeks not  met, progressing     Long Term Goals:  1. Patient to tolerate at least 2 minutes sustained standing activity with mod/max assist with good safety awareness not met, progressing  2. Patient to demonstrate independent sit without support for at least 2 minutes without assist or LOB within 8 weeks to indicate improved trunk control not met, progressing  3. Patient able to don/doff shirt independently within 8 weeks for improved independence with functional activity not met, progressing     Plan        Work on sitting balance, core strength, standing tolerance with use of standing frame, and increasing strength.

## 2019-08-20 ENCOUNTER — CLINICAL SUPPORT (OUTPATIENT)
Dept: REHABILITATION | Facility: HOSPITAL | Age: 64
End: 2019-08-20
Attending: STUDENT IN AN ORGANIZED HEALTH CARE EDUCATION/TRAINING PROGRAM
Payer: MEDICARE

## 2019-08-20 DIAGNOSIS — R29.898 LEG WEAKNESS, BILATERAL: ICD-10-CM

## 2019-08-20 DIAGNOSIS — R29.898 BILATERAL ARM WEAKNESS: ICD-10-CM

## 2019-08-20 DIAGNOSIS — M62.81 WEAKNESS OF TRUNK MUSCULATURE: ICD-10-CM

## 2019-08-20 PROCEDURE — 97530 THERAPEUTIC ACTIVITIES: CPT | Mod: PO

## 2019-08-20 PROCEDURE — 97110 THERAPEUTIC EXERCISES: CPT | Mod: PO

## 2019-08-20 NOTE — PROGRESS NOTES
Physical Therapy Daily Treatment Note     Name: Nura Kahn Jr.  Clinic Number: [id number]    Therapy Diagnosis:   Encounter Diagnoses   Name Primary?    Leg weakness, bilateral     Weakness of trunk musculature     Bilateral arm weakness      Physician: Lisa Durham MD    Visit Date: 8/20/2019    Physician Orders: PT Eval and Treat   Medical Diagnosis from Referral: M33.20 (ICD-10-CM) - Polymyositis  Evaluation Date: 7/8/2019  Authorization Period Expiration: 6/18/2020  NEW Plan of Care Expiration: 11/13/2019  Visit # / Visits authorized: 3/ 18 (+eval)    Time In: 1000 (pt arrives late to session)  Time Out: 1030  Total Billable Time: 30 minutes    Precautions: Standard, Fall and polymyositis    Subjective     Pt reports: He is late due to transportation.  He was compliant with home exercise program.      Pain: 0/10  Location: n/a     Objective     Pt attends session in [facility] with peach TB  tied around his knees to prevent hip abduction and slouched posture.     Nura received therapeutic exercises 20 minutes to develop strength, endurance and posture for 39 minutes including:  Seated heel raise 2 x10  Seated toe raise 2 x10  Seated clams with orange theraband 2 x10  Seated adductor squeeze with ball 2 x10  Seated: chops x 10 reps ea direction  Seated: chest press 2 x 10 reps with 2# dowel  Seated:oblique twists, 2 x10 to ea side        Nura participated in dynamic functional therapeutic activities for 10 minutes  to improve functional performance for 10  minutes, including:  Transfer to therapy mat with slide board from scooter with supervision  [facility] using squat pivot technique       Home Exercises Provided and Patient Education Provided      Education provided:   - new HEP provided     Written Home Exercises Provided: Patient instructed to cont prior HEP.  Exercises were reviewed and Nura was able to demonstrate them prior to the end of the session.  Nura  demonstrated good  understanding of the education provided.      See EMR under Patient Instructions for exercises provided on evaluation    Assessment     Nura tolerated session well today. He tolerates seated therex well and requires no assistance for transfers today. Pt able to complete squat pivot transfer due to close proximity of scooter to mat. He remains appropriate for skilled PT services.      Nura is progressing well towards his goals.   Pt prognosis is Good.      Pt will continue to benefit from skilled outpatient physical therapy to address the deficits listed in the problem list box on initial evaluation, provide pt/family education and to maximize pt's level of independence in the home and community environment.      Pt's spiritual, cultural and educational needs considered and pt agreeable to plan of care and goals.     Anticipated barriers to physical therapy: transportation     Goals: (as of 8/13/2019)  Short Term Goals:   1.  Good return demonstration of HEP within 1 week for consistent strengthening not met, progressing  2 Patient to demonstrate unsupported sitting on stable surface for 30 seconds without assist or LOB within 4 weeks indicating improved trunk strength MET  3.  Patient able to perform sit to stand with mod/max assist without cueing and with good safety awareness within 4 weeks not met, progressing     Long Term Goals:  1. Patient to tolerate at least 2 minutes sustained standing activity with mod/max assist with good safety awareness not met, progressing  2. Patient to demonstrate independent sit without support for at least 2 minutes without assist or LOB within 8 weeks to indicate improved trunk control not met, progressing  3. Patient able to don/doff shirt independently within 8 weeks for improved independence with functional activity not met, progressing     Plan         Work on sitting balance, core strength, standing tolerance with use of standing frame, and increasing  strength.    Pablo Ryan, PT

## 2019-08-23 ENCOUNTER — CLINICAL SUPPORT (OUTPATIENT)
Dept: REHABILITATION | Facility: HOSPITAL | Age: 64
End: 2019-08-23
Attending: STUDENT IN AN ORGANIZED HEALTH CARE EDUCATION/TRAINING PROGRAM
Payer: MEDICARE

## 2019-08-23 DIAGNOSIS — R29.898 LEG WEAKNESS, BILATERAL: ICD-10-CM

## 2019-08-23 DIAGNOSIS — M62.81 WEAKNESS OF TRUNK MUSCULATURE: ICD-10-CM

## 2019-08-23 DIAGNOSIS — R29.898 BILATERAL ARM WEAKNESS: ICD-10-CM

## 2019-08-23 PROCEDURE — 97110 THERAPEUTIC EXERCISES: CPT | Mod: PO

## 2019-08-23 PROCEDURE — 97530 THERAPEUTIC ACTIVITIES: CPT | Mod: PO

## 2019-08-23 NOTE — PROGRESS NOTES
Physical Therapy Daily Treatment Note     Name: Nura Kahn Jr.  Clinic Number: [id number]    Therapy Diagnosis:   Encounter Diagnoses   Name Primary?    Leg weakness, bilateral     Weakness of trunk musculature     Bilateral arm weakness      Physician: Lisa Durham MD    Visit Date: 2019    Physician Orders: PT Eval and Treat   Medical Diagnosis from Referral: M33.20 (ICD-10-CM) - Polymyositis  Evaluation Date: 2019  Authorization Period Expiration: 2020  NEW Plan of Care Expiration: 2019  Visit # / Visits authorized:  (+eval)    Time In: 1300  Time Out: 1345  Total Billable Time: 45 minutes    Precautions: Standard, Fall and polymyositis    Subjective     Pt reports: He is doing well today. No new complaints.    He was compliant with home exercise program.    Pain: 0/10  Location: n/a     Objective     Pt attends session in scooter with peach TB  tied around his knees to prevent hip abduction and slouched posture.     Nura received therapeutic exercises 25 minutes to develop strength, endurance and posture for 39 minutes includin minutes [employer] StepOne level 1.0, short rest break then an additional 2 minutes     Sitting EOM:   chops x 10 reps ea direction  oblique twists, 2 x10 to ea side   x15 with 3 second hold, scapular squeeze with PT facilitation of lumbar extensors for better posture and stretching of anterior shoulder muscles        Nura participated in dynamic functional therapeutic activities for 20 minutes  to improve functional performance, including:  Transfer to/from [employer] StepOne chair using squat pivot transfer at supervision/set-up  Transfer to therapy mat using squat pivot transfer with supervision  Transfer back to scooter from  at [facility] using squat pivot technique, scooter higher than mat surface       Home Exercises Provided and Patient Education Provided      Education provided:   - new HEP provided     Written Home Exercises Provided: Patient  instructed to cont prior HEP.  Exercises were reviewed and Nura was able to demonstrate them prior to the end of the session.  Nura demonstrated good  understanding of the education provided.      See EMR under Patient Instructions for exercises provided on evaluation    Assessment     Nura tolerated session well today. He reports normal muscular fatigue with use of SciFit for strengthening and with seated therex. He continues to require assist for transfers on uneven surfaces. Pt would benefit from attempting standing again during next session. He remains motivated and appropriate for skilled PT services.       Nura is progressing well towards his goals.   Pt prognosis is Good.      Pt will continue to benefit from skilled outpatient physical therapy to address the deficits listed in the problem list box on initial evaluation, provide pt/family education and to maximize pt's level of independence in the home and community environment.      Pt's spiritual, cultural and educational needs considered and pt agreeable to plan of care and goals.     Anticipated barriers to physical therapy: transportation     Goals: (as of 8/13/2019)  Short Term Goals:   1.  Good return demonstration of HEP within 1 week for consistent strengthening not met, progressing  2 Patient to demonstrate unsupported sitting on stable surface for 30 seconds without assist or LOB within 4 weeks indicating improved trunk strength MET  3.  Patient able to perform sit to stand with mod/max assist without cueing and with good safety awareness within 4 weeks not met, progressing     Long Term Goals:  1. Patient to tolerate at least 2 minutes sustained standing activity with mod/max assist with good safety awareness not met, progressing  2. Patient to demonstrate independent sit without support for at least 2 minutes without assist or LOB within 8 weeks to indicate improved trunk control not met, progressing  3. Patient able to don/doff shirt  independently within 8 weeks for improved independence with functional activity not met, progressing     Plan         Work on sitting balance, core strength, standing tolerance with use of standing frame, and increasing strength.    Pablo Ryan, PT

## 2019-08-26 ENCOUNTER — TELEPHONE (OUTPATIENT)
Dept: PHYSICAL MEDICINE AND REHAB | Facility: CLINIC | Age: 64
End: 2019-08-26

## 2019-08-26 NOTE — TELEPHONE ENCOUNTER
Called and spoke with patient.  Patient asked to cancel his scooter appointment for 09/11/19.  Patient states he has a scooter.      ----- Message from Norma Sanon sent at 8/26/2019  9:41 AM CDT -----  Contact: Pt  Pt is requesting a callback from office says he already have his scooter and need to know the reason for his visit on 9/11/2019    Pt can be reached at [phone]

## 2019-08-27 ENCOUNTER — TELEPHONE (OUTPATIENT)
Dept: RHEUMATOLOGY | Facility: CLINIC | Age: 64
End: 2019-08-27

## 2019-08-27 NOTE — TELEPHONE ENCOUNTER
Called patient. No answer. Left voicemail.   We will schedule his IVIg for next month     ----- Message from Jesse Bosch MD sent at 8/26/2019  5:45 PM CDT -----  Contact: Pt      ----- Message -----  From: Nella He MA  Sent: 8/26/2019   9:42 AM  To: Jesse Bosch MD        ----- Message -----  From: Norma Sanon  Sent: 8/26/2019   9:39 AM  To: Danitza Molina Staff    Pt is requesting a callback from office says he is not sure if he still has to have Chemo because he doesn't have anything scheduled     Pt can be reached at [phone]

## 2019-09-03 ENCOUNTER — CLINICAL SUPPORT (OUTPATIENT)
Dept: REHABILITATION | Facility: HOSPITAL | Age: 64
End: 2019-09-03
Attending: STUDENT IN AN ORGANIZED HEALTH CARE EDUCATION/TRAINING PROGRAM
Payer: MEDICARE

## 2019-09-03 DIAGNOSIS — R29.898 BILATERAL ARM WEAKNESS: ICD-10-CM

## 2019-09-03 DIAGNOSIS — M62.81 WEAKNESS OF TRUNK MUSCULATURE: ICD-10-CM

## 2019-09-03 DIAGNOSIS — R29.898 LEG WEAKNESS, BILATERAL: Primary | ICD-10-CM

## 2019-09-03 PROCEDURE — 97530 THERAPEUTIC ACTIVITIES: CPT | Mod: PO

## 2019-09-03 NOTE — PROGRESS NOTES
Physical Therapy Daily Treatment Note     Name: Nura Kahn Jr.  Clinic Number: [id number]    Therapy Diagnosis:   Encounter Diagnoses   Name Primary?    Leg weakness, bilateral Yes    Weakness of trunk musculature     Bilateral arm weakness      Physician: Lisa Durham MD    Visit Date: 9/3/2019    Physician Orders: PT Eval and Treat   Medical Diagnosis from Referral: M33.20 (ICD-10-CM) - Polymyositis  Evaluation Date: 7/8/2019  Authorization Period Expiration: 6/18/2020  NEW Plan of Care Expiration: 11/13/2019  Visit # / Visits authorized: 5/ 18 (+eval)    PN due: 9/13/2019    Time In: 953 (pt arrives late, unable to accommodate)  Time Out: 1030  Total Billable Time: 37 minutes    Precautions: Standard, Fall and polymyositis    Subjective     Pt reports: He is doing well today. No new complaints.    He was compliant with home exercise program.    Pain: 0/10  Location: n/a     Objective     Pt attends session in scooter with gait belt tied around his knees to prevent hip abduction and slouched posture.     Nura received therapeutic exercises to develop strength, endurance and posture for 0 minutes including:    While in standing frame:  2 x 10 chest press with yellow ball  x 10 AAROM bicep curls        Nura participated in dynamic functional therapeutic activities for 37 minutes  to improve functional performance, including:  Transfer to therapy mat using slide board transfer with supervision  Transfer back to scooter from mat at supervision using squat pivot technique    Pt stands in standing frame for 15 minutes total with no adverse affects noted. BP taken throughout session:    BP taken while seated EOM using automatic cuff: 160/113  BP taken while seated EOM using manual cuff (manual): 165/82  BP taken after 3 minutes in standing frame (manual): 158/80  BP taken after 10 minutes in standing frame (manual): 154/78  BP taken once returned to seated EOM (manual): 140/75     Home Exercises Provided and  Patient Education Provided      Education provided:   - new HEP provided     Written Home Exercises Provided: Patient instructed to cont prior HEP.  Exercises were reviewed and Nura was able to demonstrate them prior to the end of the session.  Nura demonstrated good  understanding of the education provided.      See EMR under Patient Instructions for exercises provided on evaluation    Assessment     Nura tolerated session well today. He tolerated use of standing frame for increased weight bearing through B LE well today. His BP remained stable throughout. Pt requires encouragement to participate in therex while in standing frame. PT and pt discuss the importance of strengthening back muscles in order to better be able to support himself when attempting to stand. PT expressed that sitting without back support is an excellent way to strengthen these muscles. He remains appropriate for skilled PT services.      Nura is progressing well towards his goals.   Pt prognosis is Good.      Pt will continue to benefit from skilled outpatient physical therapy to address the deficits listed in the problem list box on initial evaluation, provide pt/family education and to maximize pt's level of independence in the home and community environment.      Pt's spiritual, cultural and educational needs considered and pt agreeable to plan of care and goals.     Anticipated barriers to physical therapy: transportation     Goals: (as of 8/13/2019)  Short Term Goals:   1.  Good return demonstration of HEP within 1 week for consistent strengthening not met, progressing  2 Patient to demonstrate unsupported sitting on stable surface for 30 seconds without assist or LOB within 4 weeks indicating improved trunk strength MET  3.  Patient able to perform sit to stand with mod/max assist without cueing and with good safety awareness within 4 weeks not met, progressing     Long Term Goals:  1. Patient to tolerate at least 2 minutes  sustained standing activity with mod/max assist with good safety awareness not met, progressing  2. Patient to demonstrate independent sit without support for at least 2 minutes without assist or LOB within 8 weeks to indicate improved trunk control not met, progressing  3. Patient able to don/doff shirt independently within 8 weeks for improved independence with functional activity not met, progressing     Plan      Work on sitting balance, core strength, standing tolerance with use of standing frame, and increasing strength.    Pablo Ryan, PT

## 2019-09-06 ENCOUNTER — CLINICAL SUPPORT (OUTPATIENT)
Dept: REHABILITATION | Facility: HOSPITAL | Age: 64
End: 2019-09-06
Attending: STUDENT IN AN ORGANIZED HEALTH CARE EDUCATION/TRAINING PROGRAM
Payer: MEDICARE

## 2019-09-06 DIAGNOSIS — R29.898 LEG WEAKNESS, BILATERAL: Primary | ICD-10-CM

## 2019-09-06 DIAGNOSIS — M62.81 WEAKNESS OF TRUNK MUSCULATURE: ICD-10-CM

## 2019-09-06 DIAGNOSIS — R29.898 BILATERAL ARM WEAKNESS: ICD-10-CM

## 2019-09-06 PROCEDURE — 97530 THERAPEUTIC ACTIVITIES: CPT | Mod: PO

## 2019-09-06 NOTE — PLAN OF CARE
Physical Therapy Daily Treatment Note     Name: Nura Kahn Jr.  Clinic Number: [id number]    Therapy Diagnosis:   Encounter Diagnoses   Name Primary?    Leg weakness, bilateral Yes    Weakness of trunk musculature     Bilateral arm weakness      Physician: Lisa Durham MD    Visit Date: 9/6/2019    Physician Orders: PT Eval and Treat   Medical Diagnosis from Referral: M33.20 (ICD-10-CM) - Polymyositis  Evaluation Date: 7/8/2019  Authorization Period Expiration: 6/18/2020  NEW Plan of Care Expiration: 11/13/2019  Visit # / Visits authorized: 6/ 18 (+eval)    PN due: 10/6/2019    Time In: 951 (pt arrives late to session)  Time Out: 1030  Total Billable Time: 39 minutes    Precautions: Standard, Fall and polymyositis    Subjective     Pt reports: He is doing well today. No new complaints. He will be starting his infusion treatments next week.     He was compliant with home exercise program.    Pain: 0/10  Location: n/a     Objective     Pt attends session in scooter with gait belt tied around his knees to prevent hip abduction and slouched posture.     Nura received therapeutic exercises to develop strength, endurance and posture for 12 minutes including:    While in standing frame:  2 x 10 chest press with yellow ball  x 10 AAROM bicep curls     LOWER EXTREMITY STRENGTH:    RIGHT  eval 8/13/2019 9/6/2019 LEFT  eval 8/13/2019 9/6/2019   Quadriceps trace trace trace Trace trace trace   Hamstrings 2/5 trace 2/5 2/5 trace 2/5      Hip adducti  0/5 0/5 trace   0/5 2/5   Hip Flexion 2-/5 2-/5  2-/5 2-/5    Hip Abduction 3-/5 2+/5 3/5 3-/5 2+/5 3/5   Ankle dorsiflexion  2-/5 2-/5 3-/5   2-/5 2+/5   Ankle plantarflexion  2-/5 2-/5 3+/5   2-/5 3+/5           Nura participated in dynamic functional therapeutic activities for 27 minutes  to improve functional performance, including:  Transfer to therapy mat using squat pivot transfer with supervision  [facility] using squat pivot  technique    Pt stands in standing frame for 16 minutes total with no adverse affects noted. BP taken throughout session:    BP taken while seated EOM using manual cuff: 140/80  BP taken after 3 minutes in standing frame (manual): 138/78  BP taken after 10 minutes in standing frame (manual): 138/76  BP taken once returned to seated EOM (manual): 142/82     Home Exercises Provided and Patient Education Provided      Education provided:   - new HEP provided     Written Home Exercises Provided: Patient instructed to cont prior HEP.  Exercises were reviewed and Nura was able to demonstrate them prior to the end of the session.  Nura demonstrated good  understanding of the education provided.      See EMR under Patient Instructions for exercises provided on evaluation    Assessment     Nura tolerated session well today. He tolerated use of standing frame for increased weight bearing through B LE well today. His BP remained stable throughout. Pt requires encouragement to participate in therex while in standing frame. PT and pt discuss the importance of strengthening back muscles in order to better be able to support himself when attempting to stand. PT expressed that sitting without back support is an excellent way to strengthen these muscles. He remains appropriate for skilled PT services.      Nura is progressing well towards his goals.   Pt prognosis is Good.      Pt will continue to benefit from skilled outpatient physical therapy to address the deficits listed in the problem list box on initial evaluation, provide pt/family education and to maximize pt's level of independence in the home and community environment.      Pt's spiritual, cultural and educational needs considered and pt agreeable to plan of care and goals.     Anticipated barriers to physical therapy: transportation     Goals: (as of 9/6/2019)  Short Term Goals:   1.  Good return demonstration of HEP within 1 week for consistent  strengthening progressing  2. Patient to demonstrate unsupported sitting on stable surface for 30 seconds without assist or LOB within 4 weeks indicating improved trunk strength MET  3.  Patient able to perform sit to stand with mod/max assist without cueing and with good safety awareness within 4 weeks not tested today, progressing     Long Term Goals:  1. Patient to tolerate at least 2 minutes sustained standing activity with mod/max assist with good safety awareness not tested today, progressing  2. Patient to demonstrate independent sit without support for at least 2 minutes without assist or LOB within 8 weeks to indicate improved trunk control MET  3. Patient able to don/doff shirt independently within 8 weeks for improved independence with functional activity not met, progressing     Plan      Continue to work on standing frame use, strengthen B LE in supine, and attempt standing between // bars soon.    Pablo Ryan, PT

## 2019-09-09 ENCOUNTER — INFUSION (OUTPATIENT)
Dept: INFUSION THERAPY | Facility: HOSPITAL | Age: 64
End: 2019-09-09
Attending: INTERNAL MEDICINE
Payer: MEDICARE

## 2019-09-09 VITALS
DIASTOLIC BLOOD PRESSURE: 77 MMHG | HEART RATE: 66 BPM | BODY MASS INDEX: 21.88 KG/M2 | WEIGHT: 148.13 LBS | RESPIRATION RATE: 18 BRPM | TEMPERATURE: 98 F | SYSTOLIC BLOOD PRESSURE: 138 MMHG

## 2019-09-09 DIAGNOSIS — M33.20 POLYMYOSITIS: Primary | ICD-10-CM

## 2019-09-09 PROCEDURE — 96366 THER/PROPH/DIAG IV INF ADDON: CPT

## 2019-09-09 PROCEDURE — S0028 INJECTION, FAMOTIDINE, 20 MG: HCPCS | Performed by: INTERNAL MEDICINE

## 2019-09-09 PROCEDURE — 25000003 PHARM REV CODE 250: Performed by: INTERNAL MEDICINE

## 2019-09-09 PROCEDURE — 63600175 PHARM REV CODE 636 W HCPCS: Performed by: INTERNAL MEDICINE

## 2019-09-09 PROCEDURE — 96365 THER/PROPH/DIAG IV INF INIT: CPT

## 2019-09-09 RX ORDER — SODIUM CHLORIDE 0.9 % (FLUSH) 0.9 %
10 SYRINGE (ML) INJECTION
Status: CANCELLED | OUTPATIENT
Start: 2019-10-01

## 2019-09-09 RX ORDER — HEPARIN 100 UNIT/ML
500 SYRINGE INTRAVENOUS
Status: DISCONTINUED | OUTPATIENT
Start: 2019-09-09 | End: 2019-09-09 | Stop reason: HOSPADM

## 2019-09-09 RX ORDER — HEPARIN 100 UNIT/ML
500 SYRINGE INTRAVENOUS
Status: CANCELLED | OUTPATIENT
Start: 2019-10-01

## 2019-09-09 RX ORDER — FAMOTIDINE 10 MG/ML
20 INJECTION INTRAVENOUS
Status: COMPLETED | OUTPATIENT
Start: 2019-09-09 | End: 2019-09-09

## 2019-09-09 RX ORDER — SODIUM CHLORIDE 0.9 % (FLUSH) 0.9 %
10 SYRINGE (ML) INJECTION
Status: DISCONTINUED | OUTPATIENT
Start: 2019-09-09 | End: 2019-09-09 | Stop reason: HOSPADM

## 2019-09-09 RX ORDER — FAMOTIDINE 10 MG/ML
20 INJECTION INTRAVENOUS
Status: CANCELLED | OUTPATIENT
Start: 2019-10-01

## 2019-09-09 RX ORDER — ACETAMINOPHEN 325 MG/1
650 TABLET ORAL
Status: COMPLETED | OUTPATIENT
Start: 2019-09-09 | End: 2019-09-09

## 2019-09-09 RX ORDER — ACETAMINOPHEN 325 MG/1
650 TABLET ORAL
Status: CANCELLED | OUTPATIENT
Start: 2019-10-01

## 2019-09-09 RX ADMIN — ACETAMINOPHEN 650 MG: 325 TABLET ORAL at 01:09

## 2019-09-09 RX ADMIN — HUMAN IMMUNOGLOBULIN G 25 G: 20 LIQUID INTRAVENOUS at 02:09

## 2019-09-09 RX ADMIN — SODIUM CHLORIDE: 0.9 INJECTION, SOLUTION INTRAVENOUS at 01:09

## 2019-09-09 RX ADMIN — DIPHENHYDRAMINE HYDROCHLORIDE 50 MG: 50 INJECTION, SOLUTION INTRAMUSCULAR; INTRAVENOUS at 01:09

## 2019-09-09 RX ADMIN — FAMOTIDINE 20 MG: 10 INJECTION INTRAVENOUS at 01:09

## 2019-09-09 NOTE — PLAN OF CARE
Problem: Adult Inpatient Plan of Care  Goal: Plan of Care Review  Tolerated infusion well. VS stable. Discharged to home

## 2019-09-10 ENCOUNTER — DOCUMENTATION ONLY (OUTPATIENT)
Dept: REHABILITATION | Facility: HOSPITAL | Age: 64
End: 2019-09-10

## 2019-09-10 ENCOUNTER — CLINICAL SUPPORT (OUTPATIENT)
Dept: REHABILITATION | Facility: HOSPITAL | Age: 64
End: 2019-09-10
Attending: STUDENT IN AN ORGANIZED HEALTH CARE EDUCATION/TRAINING PROGRAM
Payer: MEDICARE

## 2019-09-10 ENCOUNTER — INFUSION (OUTPATIENT)
Dept: INFUSION THERAPY | Facility: HOSPITAL | Age: 64
End: 2019-09-10
Attending: INTERNAL MEDICINE
Payer: MEDICARE

## 2019-09-10 VITALS — SYSTOLIC BLOOD PRESSURE: 114 MMHG | DIASTOLIC BLOOD PRESSURE: 60 MMHG | RESPIRATION RATE: 18 BRPM | HEART RATE: 61 BPM

## 2019-09-10 DIAGNOSIS — R29.898 BILATERAL ARM WEAKNESS: ICD-10-CM

## 2019-09-10 DIAGNOSIS — M62.81 WEAKNESS OF TRUNK MUSCULATURE: ICD-10-CM

## 2019-09-10 DIAGNOSIS — M33.20 POLYMYOSITIS: Primary | ICD-10-CM

## 2019-09-10 DIAGNOSIS — R29.898 LEG WEAKNESS, BILATERAL: ICD-10-CM

## 2019-09-10 PROCEDURE — 96365 THER/PROPH/DIAG IV INF INIT: CPT

## 2019-09-10 PROCEDURE — 25000003 PHARM REV CODE 250: Performed by: INTERNAL MEDICINE

## 2019-09-10 PROCEDURE — 63600175 PHARM REV CODE 636 W HCPCS: Mod: JG | Performed by: INTERNAL MEDICINE

## 2019-09-10 PROCEDURE — 97530 THERAPEUTIC ACTIVITIES: CPT | Mod: PO

## 2019-09-10 PROCEDURE — 96366 THER/PROPH/DIAG IV INF ADDON: CPT

## 2019-09-10 PROCEDURE — 96367 TX/PROPH/DG ADDL SEQ IV INF: CPT

## 2019-09-10 PROCEDURE — 96375 TX/PRO/DX INJ NEW DRUG ADDON: CPT

## 2019-09-10 PROCEDURE — S0028 INJECTION, FAMOTIDINE, 20 MG: HCPCS | Performed by: INTERNAL MEDICINE

## 2019-09-10 RX ORDER — ACETAMINOPHEN 325 MG/1
650 TABLET ORAL
Status: COMPLETED | OUTPATIENT
Start: 2019-09-10 | End: 2019-09-10

## 2019-09-10 RX ORDER — FAMOTIDINE 10 MG/ML
20 INJECTION INTRAVENOUS
Status: CANCELLED | OUTPATIENT
Start: 2019-10-01

## 2019-09-10 RX ORDER — SODIUM CHLORIDE 0.9 % (FLUSH) 0.9 %
10 SYRINGE (ML) INJECTION
Status: DISCONTINUED | OUTPATIENT
Start: 2019-09-10 | End: 2019-09-10 | Stop reason: HOSPADM

## 2019-09-10 RX ORDER — ACETAMINOPHEN 325 MG/1
650 TABLET ORAL
Status: CANCELLED | OUTPATIENT
Start: 2019-10-01

## 2019-09-10 RX ORDER — FAMOTIDINE 10 MG/ML
20 INJECTION INTRAVENOUS
Status: COMPLETED | OUTPATIENT
Start: 2019-09-10 | End: 2019-09-10

## 2019-09-10 RX ORDER — SODIUM CHLORIDE 0.9 % (FLUSH) 0.9 %
10 SYRINGE (ML) INJECTION
Status: CANCELLED | OUTPATIENT
Start: 2019-10-01

## 2019-09-10 RX ORDER — HEPARIN 100 UNIT/ML
500 SYRINGE INTRAVENOUS
Status: CANCELLED | OUTPATIENT
Start: 2019-10-01

## 2019-09-10 RX ORDER — HEPARIN 100 UNIT/ML
500 SYRINGE INTRAVENOUS
Status: DISCONTINUED | OUTPATIENT
Start: 2019-09-10 | End: 2019-09-10 | Stop reason: HOSPADM

## 2019-09-10 RX ADMIN — HUMAN IMMUNOGLOBULIN G 25 G: 5 LIQUID INTRAVENOUS at 01:09

## 2019-09-10 RX ADMIN — DIPHENHYDRAMINE HYDROCHLORIDE 50 MG: 50 INJECTION, SOLUTION INTRAMUSCULAR; INTRAVENOUS at 01:09

## 2019-09-10 RX ADMIN — ACETAMINOPHEN 650 MG: 325 TABLET ORAL at 01:09

## 2019-09-10 RX ADMIN — FAMOTIDINE 20 MG: 10 INJECTION INTRAVENOUS at 01:09

## 2019-09-10 NOTE — PROGRESS NOTES
PT/PTA met face to face to discuss pt's treatment plan and progress towards established goals.  Continue with current PT POC with focus on strengthening and standing frame work.  Patient will be seen by physical therapist at least every sixth treatment or 30 days, whichever occurs first.    Monica Saeed, PTA  09/10/2019

## 2019-09-10 NOTE — PROGRESS NOTES
Physical Therapy Daily Treatment Note     Name: Nura Kahn Jr.  Clinic Number: [id number]    Therapy Diagnosis:   Encounter Diagnoses   Name Primary?    Leg weakness, bilateral     Weakness of trunk musculature     Bilateral arm weakness      Physician: Lisa Durham MD    Visit Date: 9/10/2019    Physician Orders: PT Eval and Treat   Medical Diagnosis from Referral: M33.20 (ICD-10-CM) - Polymyositis  Evaluation Date: 7/8/2019  Authorization Period Expiration: 6/18/2020  NEW Plan of Care Expiration: 11/13/2019  Visit # / Visits authorized: 7/ 18 (+eval)                          PN due: 10/6/2019    Time In: 953 (pt arrives late, unable to accommodate)  Time Out: 1030  Total Billable Time: 37 minutes    Precautions: Standard, Fall and polymyositis    Subjective     Pt reports: He is doing well today. No new complaints.    He was compliant with home exercise program.    Pain: 0/10  Location: n/a     Objective     Pt attends session in scooter with gait belt tied around his knees to prevent hip abduction and slouched posture.   BP in sitting after transfer to mat was 141/92    Nura received therapeutic exercises to develop strength, endurance and posture for 0 minutes including:         Nura participated in dynamic functional therapeutic activities for 37 minutes  to improve functional performance, including:  Transfer to therapy mat using slide board transfer with supervision  Transfer back to scooter from mat at supervision using squat pivot technique    Pt stands in standing frame for 20 minutes total with no adverse affects noted. BP taken throughout session:  While in standing frame:  X 20 glut squeezes with 3 sec hold  2 x 10 AAROM bicep curls, B UE  X 30 reps of weight shifting medial/lateral       BP taken while seated EOM using automatic cuff: 141/92  BP taken after 3 minutes in standing frame (manual): 130/81  BP taken after 10 minutes in standing frame (manual): 123/85  BP taken once returned to  seated EOM (manual): 132/80     Home Exercises Provided and Patient Education Provided      Education provided:   - new HEP provided     Written Home Exercises Provided: Patient instructed to cont prior HEP.  Exercises were reviewed and Nura was able to demonstrate them prior to the end of the session.  Nura demonstrated good  understanding of the education provided.      See EMR under Patient Instructions for exercises provided on evaluation    Assessment     Nura tolerated tx session well today and did not have any problems noted.  Pt was able to increase the amount of time in the standing frame and his blood pressure remained with in functional limits entire tx session.   Cont with plan of care.       Nura is progressing well towards his goals.   Pt prognosis is Good.      Pt will continue to benefit from skilled outpatient physical therapy to address the deficits listed in the problem list box on initial evaluation, provide pt/family education and to maximize pt's level of independence in the home and community environment.      Pt's spiritual, cultural and educational needs considered and pt agreeable to plan of care and goals.     Anticipated barriers to physical therapy: transportation     Goals: (as of 8/13/2019)  Short Term Goals:   1.  Good return demonstration of HEP within 1 week for consistent strengthening not met, progressing  2 Patient to demonstrate unsupported sitting on stable surface for 30 seconds without assist or LOB within 4 weeks indicating improved trunk strength MET  3.  Patient able to perform sit to stand with mod/max assist without cueing and with good safety awareness within 4 weeks not met, progressing     Long Term Goals:  1. Patient to tolerate at least 2 minutes sustained standing activity with mod/max assist with good safety awareness not met, progressing  2. Patient to demonstrate independent sit without support for at least 2 minutes without assist or LOB within 8 weeks  to indicate improved trunk control not met, progressing  3. Patient able to don/doff shirt independently within 8 weeks for improved independence with functional activity not met, progressing     Plan      Work on sitting balance, core strength, standing tolerance with use of standing frame, and increasing strength.    Monica Saeed, PTA

## 2019-09-11 ENCOUNTER — INFUSION (OUTPATIENT)
Dept: INFUSION THERAPY | Facility: HOSPITAL | Age: 64
End: 2019-09-11
Attending: INTERNAL MEDICINE
Payer: MEDICARE

## 2019-09-11 VITALS — SYSTOLIC BLOOD PRESSURE: 125 MMHG | HEART RATE: 70 BPM | DIASTOLIC BLOOD PRESSURE: 70 MMHG

## 2019-09-11 DIAGNOSIS — M33.20 POLYMYOSITIS: Primary | ICD-10-CM

## 2019-09-11 PROCEDURE — 96367 TX/PROPH/DG ADDL SEQ IV INF: CPT

## 2019-09-11 PROCEDURE — 96366 THER/PROPH/DIAG IV INF ADDON: CPT

## 2019-09-11 PROCEDURE — S0028 INJECTION, FAMOTIDINE, 20 MG: HCPCS | Performed by: INTERNAL MEDICINE

## 2019-09-11 PROCEDURE — 96365 THER/PROPH/DIAG IV INF INIT: CPT

## 2019-09-11 PROCEDURE — 63600175 PHARM REV CODE 636 W HCPCS: Mod: JG | Performed by: INTERNAL MEDICINE

## 2019-09-11 PROCEDURE — 25000003 PHARM REV CODE 250: Performed by: INTERNAL MEDICINE

## 2019-09-11 PROCEDURE — 96375 TX/PRO/DX INJ NEW DRUG ADDON: CPT

## 2019-09-11 RX ORDER — SODIUM CHLORIDE 0.9 % (FLUSH) 0.9 %
10 SYRINGE (ML) INJECTION
Status: CANCELLED | OUTPATIENT
Start: 2019-10-01

## 2019-09-11 RX ORDER — SODIUM CHLORIDE 0.9 % (FLUSH) 0.9 %
10 SYRINGE (ML) INJECTION
Status: DISCONTINUED | OUTPATIENT
Start: 2019-09-11 | End: 2019-09-11 | Stop reason: HOSPADM

## 2019-09-11 RX ORDER — HEPARIN 100 UNIT/ML
500 SYRINGE INTRAVENOUS
Status: DISCONTINUED | OUTPATIENT
Start: 2019-09-11 | End: 2019-09-11 | Stop reason: HOSPADM

## 2019-09-11 RX ORDER — FAMOTIDINE 10 MG/ML
20 INJECTION INTRAVENOUS
Status: CANCELLED | OUTPATIENT
Start: 2019-10-01

## 2019-09-11 RX ORDER — ACETAMINOPHEN 325 MG/1
650 TABLET ORAL
Status: COMPLETED | OUTPATIENT
Start: 2019-09-11 | End: 2019-09-11

## 2019-09-11 RX ORDER — ACETAMINOPHEN 325 MG/1
650 TABLET ORAL
Status: CANCELLED | OUTPATIENT
Start: 2019-10-01

## 2019-09-11 RX ORDER — FAMOTIDINE 10 MG/ML
20 INJECTION INTRAVENOUS
Status: COMPLETED | OUTPATIENT
Start: 2019-09-11 | End: 2019-09-11

## 2019-09-11 RX ORDER — HEPARIN 100 UNIT/ML
500 SYRINGE INTRAVENOUS
Status: CANCELLED | OUTPATIENT
Start: 2019-10-01

## 2019-09-11 RX ADMIN — HUMAN IMMUNOGLOBULIN G 25 G: 5 LIQUID INTRAVENOUS at 01:09

## 2019-09-11 RX ADMIN — FAMOTIDINE 20 MG: 10 INJECTION INTRAVENOUS at 01:09

## 2019-09-11 RX ADMIN — SODIUM CHLORIDE: 0.9 INJECTION, SOLUTION INTRAVENOUS at 01:09

## 2019-09-11 RX ADMIN — DIPHENHYDRAMINE HYDROCHLORIDE 50 MG: 50 INJECTION, SOLUTION INTRAMUSCULAR; INTRAVENOUS at 01:09

## 2019-09-11 RX ADMIN — ACETAMINOPHEN 650 MG: 325 TABLET ORAL at 01:09

## 2019-09-11 NOTE — PLAN OF CARE
Problem: Adult Inpatient Plan of Care  Goal: Plan of Care Review  Outcome: Ongoing (interventions implemented as appropriate)  Pt admitted for Treatment #24  IVIG. Labs reviewed and assessment performed. Pt offered no complaints.Pt received IVIG over 2 hours and 36 minutes. VSS. Pt discharged @ 16:40 via his own W/C, to RTC 9/12/19

## 2019-09-12 ENCOUNTER — INFUSION (OUTPATIENT)
Dept: INFUSION THERAPY | Facility: HOSPITAL | Age: 64
End: 2019-09-12
Attending: INTERNAL MEDICINE
Payer: MEDICARE

## 2019-09-12 VITALS
SYSTOLIC BLOOD PRESSURE: 132 MMHG | TEMPERATURE: 98 F | DIASTOLIC BLOOD PRESSURE: 79 MMHG | RESPIRATION RATE: 16 BRPM | HEART RATE: 72 BPM

## 2019-09-12 DIAGNOSIS — M33.20 POLYMYOSITIS: Primary | ICD-10-CM

## 2019-09-12 PROCEDURE — 25000003 PHARM REV CODE 250: Performed by: INTERNAL MEDICINE

## 2019-09-12 PROCEDURE — 96366 THER/PROPH/DIAG IV INF ADDON: CPT

## 2019-09-12 PROCEDURE — 96365 THER/PROPH/DIAG IV INF INIT: CPT

## 2019-09-12 PROCEDURE — 63600175 PHARM REV CODE 636 W HCPCS: Performed by: INTERNAL MEDICINE

## 2019-09-12 PROCEDURE — 96375 TX/PRO/DX INJ NEW DRUG ADDON: CPT

## 2019-09-12 PROCEDURE — 96367 TX/PROPH/DG ADDL SEQ IV INF: CPT

## 2019-09-12 PROCEDURE — S0028 INJECTION, FAMOTIDINE, 20 MG: HCPCS | Performed by: INTERNAL MEDICINE

## 2019-09-12 RX ORDER — FAMOTIDINE 10 MG/ML
20 INJECTION INTRAVENOUS
Status: CANCELLED | OUTPATIENT
Start: 2019-10-01

## 2019-09-12 RX ORDER — SODIUM CHLORIDE 0.9 % (FLUSH) 0.9 %
10 SYRINGE (ML) INJECTION
Status: DISCONTINUED | OUTPATIENT
Start: 2019-09-12 | End: 2019-09-12 | Stop reason: HOSPADM

## 2019-09-12 RX ORDER — HEPARIN 100 UNIT/ML
500 SYRINGE INTRAVENOUS
Status: DISCONTINUED | OUTPATIENT
Start: 2019-09-12 | End: 2019-09-12 | Stop reason: HOSPADM

## 2019-09-12 RX ORDER — SODIUM CHLORIDE 0.9 % (FLUSH) 0.9 %
10 SYRINGE (ML) INJECTION
Status: CANCELLED | OUTPATIENT
Start: 2019-10-01

## 2019-09-12 RX ORDER — ACETAMINOPHEN 325 MG/1
650 TABLET ORAL
Status: COMPLETED | OUTPATIENT
Start: 2019-09-12 | End: 2019-09-12

## 2019-09-12 RX ORDER — ACETAMINOPHEN 325 MG/1
650 TABLET ORAL
Status: CANCELLED | OUTPATIENT
Start: 2019-10-01

## 2019-09-12 RX ORDER — FAMOTIDINE 10 MG/ML
20 INJECTION INTRAVENOUS
Status: COMPLETED | OUTPATIENT
Start: 2019-09-12 | End: 2019-09-12

## 2019-09-12 RX ORDER — HEPARIN 100 UNIT/ML
500 SYRINGE INTRAVENOUS
Status: CANCELLED | OUTPATIENT
Start: 2019-10-01

## 2019-09-12 RX ADMIN — FAMOTIDINE 20 MG: 10 INJECTION INTRAVENOUS at 01:09

## 2019-09-12 RX ADMIN — DIPHENHYDRAMINE HYDROCHLORIDE 50 MG: 50 INJECTION, SOLUTION INTRAMUSCULAR; INTRAVENOUS at 01:09

## 2019-09-12 RX ADMIN — ACETAMINOPHEN 650 MG: 325 TABLET ORAL at 01:09

## 2019-09-12 RX ADMIN — HUMAN IMMUNOGLOBULIN G 25 G: 5 LIQUID INTRAVENOUS at 01:09

## 2019-09-12 RX ADMIN — SODIUM CHLORIDE: 9 INJECTION, SOLUTION INTRAVENOUS at 01:09

## 2019-09-12 NOTE — PLAN OF CARE
Problem: Adult Inpatient Plan of Care  Goal: Plan of Care Review  Outcome: Ongoing (interventions implemented as appropriate)  1630:  Patient tolerated IVIG infusion well, VSS, NAD noted, denies any new issues.  PIV removed intact, AVS declined, will return for Day 5 tomorrow, as scheduled.  Released in stable condition, via personal motorized wheelchair, independently.

## 2019-09-13 ENCOUNTER — CLINICAL SUPPORT (OUTPATIENT)
Dept: REHABILITATION | Facility: HOSPITAL | Age: 64
End: 2019-09-13
Attending: STUDENT IN AN ORGANIZED HEALTH CARE EDUCATION/TRAINING PROGRAM
Payer: MEDICARE

## 2019-09-13 ENCOUNTER — INFUSION (OUTPATIENT)
Dept: INFUSION THERAPY | Facility: HOSPITAL | Age: 64
End: 2019-09-13
Attending: INTERNAL MEDICINE
Payer: MEDICARE

## 2019-09-13 VITALS
HEART RATE: 81 BPM | SYSTOLIC BLOOD PRESSURE: 146 MMHG | RESPIRATION RATE: 16 BRPM | DIASTOLIC BLOOD PRESSURE: 84 MMHG | TEMPERATURE: 98 F

## 2019-09-13 DIAGNOSIS — M62.81 WEAKNESS OF TRUNK MUSCULATURE: ICD-10-CM

## 2019-09-13 DIAGNOSIS — R29.898 BILATERAL ARM WEAKNESS: ICD-10-CM

## 2019-09-13 DIAGNOSIS — M33.20 POLYMYOSITIS: Primary | ICD-10-CM

## 2019-09-13 DIAGNOSIS — R29.898 LEG WEAKNESS, BILATERAL: ICD-10-CM

## 2019-09-13 PROCEDURE — S0028 INJECTION, FAMOTIDINE, 20 MG: HCPCS | Performed by: INTERNAL MEDICINE

## 2019-09-13 PROCEDURE — 96366 THER/PROPH/DIAG IV INF ADDON: CPT

## 2019-09-13 PROCEDURE — 96367 TX/PROPH/DG ADDL SEQ IV INF: CPT

## 2019-09-13 PROCEDURE — 63600175 PHARM REV CODE 636 W HCPCS: Performed by: INTERNAL MEDICINE

## 2019-09-13 PROCEDURE — 25000003 PHARM REV CODE 250: Performed by: INTERNAL MEDICINE

## 2019-09-13 PROCEDURE — 97530 THERAPEUTIC ACTIVITIES: CPT | Mod: PO

## 2019-09-13 PROCEDURE — 96376 TX/PRO/DX INJ SAME DRUG ADON: CPT

## 2019-09-13 PROCEDURE — 96365 THER/PROPH/DIAG IV INF INIT: CPT

## 2019-09-13 RX ORDER — ACETAMINOPHEN 325 MG/1
650 TABLET ORAL
Status: CANCELLED | OUTPATIENT
Start: 2019-10-01

## 2019-09-13 RX ORDER — FAMOTIDINE 10 MG/ML
20 INJECTION INTRAVENOUS
Status: CANCELLED | OUTPATIENT
Start: 2019-10-01

## 2019-09-13 RX ORDER — ACETAMINOPHEN 325 MG/1
650 TABLET ORAL
Status: COMPLETED | OUTPATIENT
Start: 2019-09-13 | End: 2019-09-13

## 2019-09-13 RX ORDER — FAMOTIDINE 10 MG/ML
20 INJECTION INTRAVENOUS
Status: COMPLETED | OUTPATIENT
Start: 2019-09-13 | End: 2019-09-13

## 2019-09-13 RX ORDER — HEPARIN 100 UNIT/ML
500 SYRINGE INTRAVENOUS
Status: CANCELLED | OUTPATIENT
Start: 2019-10-01

## 2019-09-13 RX ORDER — SODIUM CHLORIDE 0.9 % (FLUSH) 0.9 %
10 SYRINGE (ML) INJECTION
Status: CANCELLED | OUTPATIENT
Start: 2019-10-01

## 2019-09-13 RX ADMIN — ACETAMINOPHEN 650 MG: 325 TABLET ORAL at 01:09

## 2019-09-13 RX ADMIN — DIPHENHYDRAMINE HYDROCHLORIDE 50 MG: 50 INJECTION, SOLUTION INTRAMUSCULAR; INTRAVENOUS at 01:09

## 2019-09-13 RX ADMIN — FAMOTIDINE 20 MG: 10 INJECTION INTRAVENOUS at 01:09

## 2019-09-13 RX ADMIN — HUMAN IMMUNOGLOBULIN G 25 G: 20 LIQUID INTRAVENOUS at 01:09

## 2019-09-13 NOTE — PLAN OF CARE
Problem: Adult Inpatient Plan of Care  Goal: Plan of Care Review  Outcome: Ongoing (interventions implemented as appropriate)  Tolerated infusion well.  No reactions noted.  No questions or concerns at this time.  Left in NAD.

## 2019-09-13 NOTE — PLAN OF CARE
Problem: Adult Inpatient Plan of Care  Goal: Patient-Specific Goal (Individualization)  Outcome: Ongoing (interventions implemented as appropriate)  Patient here for IVIG.  Assessment completed and labs reviewed.  VSS.  No needs at this time. Chair reclined and blanket offered.  Will continue to monitor.

## 2019-09-13 NOTE — PROGRESS NOTES
"  Physical Therapy Daily Treatment Note     Name: Nura Kahn Jr.  Clinic Number: [id number]    Therapy Diagnosis:   Encounter Diagnoses   Name Primary?    Leg weakness, bilateral     Weakness of trunk musculature     Bilateral arm weakness      Physician: Lisa Durham MD    Visit Date: 9/13/2019    Physician Orders: PT Eval and Treat   Medical Diagnosis from Referral: M33.20 (ICD-10-CM) - Polymyositis  Evaluation Date: 7/8/2019  Authorization Period Expiration: 6/18/2020  NEW Plan of Care Expiration: 11/13/2019  Visit # / Visits authorized: 8/ 18 (+eval)                          PN due: 10/6/2019    Time In: 0958 (pt arrives late, unable to accommodate) then has to use the restroom.  Time Out: 1030  Total Billable Time: 32 minutes    Precautions: Standard, Fall and polymyositis    Subjective     Pt reports: " I feel weak today, prob from those infusions."     He was compliant with home exercise program.    Pain: 0/10  Location: n/a     Objective     Pt attends session in scooter with gait belt tied around his knees to prevent hip abduction and slouched posture.   BP in sitting after transfer to mat was 146/85    Nura received therapeutic exercises to develop strength, endurance and posture for 0 minutes including:         Nura participated in dynamic functional therapeutic activities for 32 minutes  to improve functional performance, including:  Transfer to therapy mat using slide board transfer with supervision  Transfer back to scooter from mat at supervision using squat pivot technique    Pt stands in standing frame for 20 minutes total with no adverse affects noted. BP taken throughout session:  While in standing frame:  X 20 glut squeezes with 3 sec hold  X 30 reps of weight shifting medial/lateral       BP taken while seated EOM using automatic cuff: 146/87  BP taken after 3 minutes in standing frame (manual): 128/86  BP taken after 11 minutes in standing frame (manual): 119/80  BP taken once returned " to seated EOM (manual): 115/81     Home Exercises Provided and Patient Education Provided      Education provided:   - new HEP provided     Written Home Exercises Provided: Patient instructed to cont prior HEP.  Exercises were reviewed and Nura was able to demonstrate them prior to the end of the session.  Nura demonstrated good  understanding of the education provided.      See EMR under Patient Instructions for exercises provided on evaluation    Assessment     Nura tolerated tx session well today and did not have any problems noted.  Pt was agreeable to standing frame again and his blood pressure remained in therapeutic range the entire time.     Cont with plan of care.       Nura is progressing well towards his goals.   Pt prognosis is Good.      Pt will continue to benefit from skilled outpatient physical therapy to address the deficits listed in the problem list box on initial evaluation, provide pt/family education and to maximize pt's level of independence in the home and community environment.      Pt's spiritual, cultural and educational needs considered and pt agreeable to plan of care and goals.     Anticipated barriers to physical therapy: transportation     Goals: (as of 8/13/2019)  Short Term Goals:   1.  Good return demonstration of HEP within 1 week for consistent strengthening not met, progressing  2 Patient to demonstrate unsupported sitting on stable surface for 30 seconds without assist or LOB within 4 weeks indicating improved trunk strength MET  3.  Patient able to perform sit to stand with mod/max assist without cueing and with good safety awareness within 4 weeks not met, progressing     Long Term Goals:  1. Patient to tolerate at least 2 minutes sustained standing activity with mod/max assist with good safety awareness not met, progressing  2. Patient to demonstrate independent sit without support for at least 2 minutes without assist or LOB within 8 weeks to indicate improved trunk  control not met, progressing  3. Patient able to don/doff shirt independently within 8 weeks for improved independence with functional activity not met, progressing     Plan      Work on sitting balance, core strength, standing tolerance with use of standing frame, and increasing strength.    Monica Saeed, PTA

## 2019-09-18 ENCOUNTER — OFFICE VISIT (OUTPATIENT)
Dept: RHEUMATOLOGY | Facility: CLINIC | Age: 64
End: 2019-09-18
Payer: MEDICARE

## 2019-09-18 VITALS
WEIGHT: 152 LBS | HEART RATE: 79 BPM | HEIGHT: 67 IN | DIASTOLIC BLOOD PRESSURE: 76 MMHG | SYSTOLIC BLOOD PRESSURE: 130 MMHG | BODY MASS INDEX: 23.86 KG/M2

## 2019-09-18 DIAGNOSIS — M33.20 POLYMYOSITIS: Primary | ICD-10-CM

## 2019-09-18 DIAGNOSIS — Z79.899 ON CELLCEPT THERAPY: ICD-10-CM

## 2019-09-18 PROCEDURE — 99999 PR PBB SHADOW E&M-EST. PATIENT-LVL IV: CPT | Mod: PBBFAC,GC,, | Performed by: STUDENT IN AN ORGANIZED HEALTH CARE EDUCATION/TRAINING PROGRAM

## 2019-09-18 PROCEDURE — 99213 OFFICE O/P EST LOW 20 MIN: CPT | Mod: S$PBB,GC,, | Performed by: STUDENT IN AN ORGANIZED HEALTH CARE EDUCATION/TRAINING PROGRAM

## 2019-09-18 PROCEDURE — 99213 PR OFFICE/OUTPT VISIT, EST, LEVL III, 20-29 MIN: ICD-10-PCS | Mod: S$PBB,GC,, | Performed by: STUDENT IN AN ORGANIZED HEALTH CARE EDUCATION/TRAINING PROGRAM

## 2019-09-18 PROCEDURE — 99999 PR PBB SHADOW E&M-EST. PATIENT-LVL IV: ICD-10-PCS | Mod: PBBFAC,GC,, | Performed by: STUDENT IN AN ORGANIZED HEALTH CARE EDUCATION/TRAINING PROGRAM

## 2019-09-18 PROCEDURE — 99214 OFFICE O/P EST MOD 30 MIN: CPT | Mod: PBBFAC | Performed by: STUDENT IN AN ORGANIZED HEALTH CARE EDUCATION/TRAINING PROGRAM

## 2019-09-18 RX ORDER — PREDNISONE 2.5 MG/1
TABLET ORAL
Qty: 150 TABLET | Refills: 2 | Status: SHIPPED | OUTPATIENT
Start: 2019-09-18 | End: 2020-07-07

## 2019-09-19 NOTE — PROGRESS NOTES
I  Have personally take the history and examined the patient and agree with fellow's note as stated above. Much improved regarding nutrition, mobility with new motorized scooter, swallowing fine, UE much stronger, still no quad/psoas but some gastroc and ant tibials. Will gradually taper prednisone, continue mycophenolate and IVIg.  Needs Hepatology f/u now that he is improving. ALEXSANDER

## 2019-09-19 NOTE — PROGRESS NOTES
"  Physical Therapy Daily Treatment Note     Name: Nura Kahn Jr.  Clinic Number: [id number]    Therapy Diagnosis:   Encounter Diagnoses   Name Primary?    Leg weakness, bilateral Yes    Weakness of trunk musculature     Bilateral arm weakness      Physician: Lisa Durham MD    Visit Date: 9/20/2019    Physician Orders: PT Eval and Treat   Medical Diagnosis from Referral: M33.20 (ICD-10-CM) - Polymyositis  Evaluation Date: 7/8/2019  Authorization Period Expiration: 6/18/2020  NEW Plan of Care Expiration: 11/13/2019  Visit # / Visits authorized: 9/ 18 (+eval)                          PN due: 10/6/2019    Time In: 955 (pt arrives late, unable to accomodate)   Time Out: 1030  Total Billable Time: 35 minutes    Precautions: Standard, Fall and polymyositis    Subjective     Pt reports: "Its good that I come here because I don't do very much at home. I don't challenge myself much"     He was semi-compliant with home exercise program.    Pain: 0/10  Location: n/a     Objective     Pt attends session in scooter with gait belt tied around his knees to prevent hip abduction and slouched posture.    BP in sitting after sit<>stand transfer to mat was 152/95    Nura received therapeutic exercises to develop strength, endurance and posture for 10 minutes including:  shoulder flexion with hands interlocked, 2 x 10   Hip adduction, x20  Bicep curls AAROM, 2 x 10 ea    Nura participated in dynamic functional therapeutic activities for 25 minutes  to improve functional performance, including:  Transfer to therapy mat using slide board transfer with supervision  Transfer back to scooter from mat at supervision using squat pivot technique    Pt stands between parallel bars for 2 trials, first trial for approximately 3 mins, second trial 5 mins with Jd. Pt requires facilitation at hips and cues at chest to stand taller. Pt relies heavily on UEs for support while standing, locks out elbows and uses head for balance.  He " continues to require maxA of 1 person and Jd of second person to get to standing. Pt pulls on // bars in order to get to standing.      BP taken after 5 minute in standing trial (manual): 145/82       Home Exercises Provided and Patient Education Provided      Education provided:   - new HEP provided     Written Home Exercises Provided: Patient instructed to cont prior HEP.  Exercises were reviewed and Nura was able to demonstrate them prior to the end of the session.  Nura demonstrated good  understanding of the education provided.      See EMR under Patient Instructions for exercises provided on evaluation    Assessment     Nura tolerated tx session well today. He was able to tolerate two standing trials between parallel bars though pt continues to struggle to get into the standing position. He relies heavily on locking out joints when standing to remain balanced. Pt continues to have severe muscle wasting throughout B UE and LE. Pt remains appropriate for skilled PT services.      Nura is progressing well towards his goals.   Pt prognosis is Good.      Pt will continue to benefit from skilled outpatient physical therapy to address the deficits listed in the problem list box on initial evaluation, provide pt/family education and to maximize pt's level of independence in the home and community environment.      Pt's spiritual, cultural and educational needs considered and pt agreeable to plan of care and goals.     Anticipated barriers to physical therapy: transportation     Goals: (as of 8/13/2019)  Short Term Goals:   1.  Good return demonstration of HEP within 1 week for consistent strengthening not met, progressing  2 Patient to demonstrate unsupported sitting on stable surface for 30 seconds without assist or LOB within 4 weeks indicating improved trunk strength MET  3.  Patient able to perform sit to stand with mod/max assist without cueing and with good safety awareness within 4 weeks not met,  progressing     Long Term Goals:  1. Patient to tolerate at least 2 minutes sustained standing activity with mod/max assist with good safety awareness not met, progressing  2. Patient to demonstrate independent sit without support for at least 2 minutes without assist or LOB within 8 weeks to indicate improved trunk control not met, progressing  3. Patient able to don/doff shirt independently within 8 weeks for improved independence with functional activity not met, progressing     Plan      Work on sitting balance, core strength, standing tolerance with use of standing frame, and increasing strength.    Pablo Ryan, PT

## 2019-09-20 ENCOUNTER — CLINICAL SUPPORT (OUTPATIENT)
Dept: REHABILITATION | Facility: HOSPITAL | Age: 64
End: 2019-09-20
Attending: STUDENT IN AN ORGANIZED HEALTH CARE EDUCATION/TRAINING PROGRAM
Payer: MEDICARE

## 2019-09-20 DIAGNOSIS — R29.898 LEG WEAKNESS, BILATERAL: Primary | ICD-10-CM

## 2019-09-20 DIAGNOSIS — R29.898 BILATERAL ARM WEAKNESS: ICD-10-CM

## 2019-09-20 DIAGNOSIS — M62.81 WEAKNESS OF TRUNK MUSCULATURE: ICD-10-CM

## 2019-09-20 PROCEDURE — 97530 THERAPEUTIC ACTIVITIES: CPT | Mod: PO

## 2019-09-24 ENCOUNTER — CLINICAL SUPPORT (OUTPATIENT)
Dept: REHABILITATION | Facility: HOSPITAL | Age: 64
End: 2019-09-24
Attending: STUDENT IN AN ORGANIZED HEALTH CARE EDUCATION/TRAINING PROGRAM
Payer: MEDICARE

## 2019-09-24 DIAGNOSIS — R29.898 BILATERAL ARM WEAKNESS: ICD-10-CM

## 2019-09-24 DIAGNOSIS — R29.898 LEG WEAKNESS, BILATERAL: ICD-10-CM

## 2019-09-24 DIAGNOSIS — M62.81 WEAKNESS OF TRUNK MUSCULATURE: ICD-10-CM

## 2019-09-24 PROCEDURE — 97110 THERAPEUTIC EXERCISES: CPT | Mod: PO

## 2019-09-24 PROCEDURE — 97530 THERAPEUTIC ACTIVITIES: CPT | Mod: PO

## 2019-09-24 NOTE — PROGRESS NOTES
"  Physical Therapy Daily Treatment Note     Name: Nura Kahn Jr.  Clinic Number: [id number]    Therapy Diagnosis:   Encounter Diagnoses   Name Primary?    Leg weakness, bilateral     Weakness of trunk musculature     Bilateral arm weakness      Physician: Lisa Durham MD    Visit Date: 9/24/2019    Physician Orders: PT Eval and Treat   Medical Diagnosis from Referral: M33.20 (ICD-10-CM) - Polymyositis  Evaluation Date: 7/8/2019  Authorization Period Expiration: 6/18/2020  NEW Plan of Care Expiration: 11/13/2019  Visit # / Visits authorized: 10/ 18 (+eval)                          PN due: 10/6/2019    Time In: 1300  Time Out: 1345  Total Billable Time: 45 minutes    Precautions: Standard, Fall and polymyositis    Subjective     Pt reports: " I'm trying to do more at home."     He was semi-compliant with home exercise program.    Pain: 0/10  Location: n/a     Objective   Pt had to use the rest room prior to beginning tx session x 5 mins  Pt attends session in scooter with gait belt tied around his knees to prevent hip abduction and slouched posture.    BP in sitting prior to standing  130/85 ( manually)    Nura received therapeutic exercises to develop strength, endurance and posture for 15 minutes including:  Sitting in scooter shoulder flexion with hands interlocked, 2 x 10   B LE Hip adduction, x20  Bicep curls AAROM, 2 x 10 ea  B LE hip flexion AAROM 2 x 10 reps  B LE DF/PF 2 x 10 reps     Nura participated in dynamic functional therapeutic activities for 25 minutes  to improve functional performance, including:    Pt stands between parallel bars for 3 trials Jd. Pt requires facilitation at hips and cues at chest to stand taller. Pt relies heavily on UEs for support while standing, locks out elbows and uses head for balance.  He continues to require maxA of 1 person and Jd of second person to get to standing. Pt pulls on // bars in order to get to standing.    3 min 52 sec  4 min 30 sec  3 min 50 " sec    BP taken after tx session  (automatic): 133/86       Home Exercises Provided and Patient Education Provided      Education provided:   - new HEP provided     Written Home Exercises Provided: Patient instructed to cont prior HEP.  Exercises were reviewed and Nura was able to demonstrate them prior to the end of the session.  Nura demonstrated good  understanding of the education provided.      See EMR under Patient Instructions for exercises provided on evaluation    Assessment     Nura tolerated tx session well today and did not have any problems.  Pt was able to increase amount of standing trials today and cont to require the same assistance to stand.  Pt cont to rely mostly on his UE for standing.  Pt remains appropriate for skilled PT services.      Nura is progressing well towards his goals.   Pt prognosis is Good.      Pt will continue to benefit from skilled outpatient physical therapy to address the deficits listed in the problem list box on initial evaluation, provide pt/family education and to maximize pt's level of independence in the home and community environment.      Pt's spiritual, cultural and educational needs considered and pt agreeable to plan of care and goals.     Anticipated barriers to physical therapy: transportation     Goals: (as of 8/13/2019)  Short Term Goals:   1.  Good return demonstration of HEP within 1 week for consistent strengthening not met, progressing  2 Patient to demonstrate unsupported sitting on stable surface for 30 seconds without assist or LOB within 4 weeks indicating improved trunk strength MET  3.  Patient able to perform sit to stand with mod/max assist without cueing and with good safety awareness within 4 weeks not met, progressing     Long Term Goals:  1. Patient to tolerate at least 2 minutes sustained standing activity with mod/max assist with good safety awareness not met, progressing  2. Patient to demonstrate independent sit without support for  at least 2 minutes without assist or LOB within 8 weeks to indicate improved trunk control not met, progressing  3. Patient able to don/doff shirt independently within 8 weeks for improved independence with functional activity not met, progressing     Plan      Work on sitting balance, core strength, standing tolerance with use of standing frame, and increasing strength.    Monica Saeed, PTA

## 2019-10-01 ENCOUNTER — CLINICAL SUPPORT (OUTPATIENT)
Dept: REHABILITATION | Facility: HOSPITAL | Age: 64
End: 2019-10-01
Attending: STUDENT IN AN ORGANIZED HEALTH CARE EDUCATION/TRAINING PROGRAM
Payer: MEDICARE

## 2019-10-01 DIAGNOSIS — R29.898 BILATERAL ARM WEAKNESS: ICD-10-CM

## 2019-10-01 DIAGNOSIS — R29.898 LEG WEAKNESS, BILATERAL: Primary | ICD-10-CM

## 2019-10-01 DIAGNOSIS — M62.81 WEAKNESS OF TRUNK MUSCULATURE: ICD-10-CM

## 2019-10-01 PROCEDURE — 97110 THERAPEUTIC EXERCISES: CPT | Mod: PO

## 2019-10-01 NOTE — PLAN OF CARE
"  Physical Therapy Daily Treatment Note     Name: Nura Kahn Jr.  Clinic Number: [id number]    Therapy Diagnosis:   Encounter Diagnoses   Name Primary?    Leg weakness, bilateral Yes    Weakness of trunk musculature     Bilateral arm weakness      Physician: Lisa Durham MD    Visit Date: 10/1/2019    Physician Orders: PT Eval and Treat   Medical Diagnosis from Referral: M33.20 (ICD-10-CM) - Polymyositis  Evaluation Date: 7/8/2019  Authorization Period Expiration: 6/18/2020  NEW Plan of Care Expiration: 11/13/2019  Visit # / Visits authorized: 11/ 18 (+eval)                          PN due: 11/1/2019    Time In: 1300   Time Out: 1345  Total Billable Time: 45 minutes    Precautions: Standard, Fall and polymyositis    Subjective     Pt reports: "I sit at home and the most exercise I do is changing the channel on the tv."    He was not compliant with home exercise program.    Pain: 0/10  Location: n/a     Objective     Pt attends session in scooter with gait belt tied around his knees to prevent hip abduction and slouched posture.    BP in sitting prior to session 152/80 ( manually)    Nura received therapeutic exercises to develop strength, endurance and posture for 45 minutes including:     LOWER EXTREMITY STRENGTH:    RIGHT  eval 8/13/2019 9/6/2019 10/1 LEFT  eval 8/13/2019 9/6/2019 10/1   Quadriceps trace trace trace trace Trace trace trace trace   Hamstrings 2/5 trace 2/5 2/5 2/5 trace 2/5 2/5      Hip adducti  0/5 0/5 trace 0/5   0/5 2/5 0/5   Hip Flexion 2-/5 2-/5   2-/5 2-/5 2-/5   2-/5   Hip Abduction 3-/5 2+/5 3/5 2/5 3-/5 2+/5 3/5 2+/5   Ankle dorsiflexion  2-/5 2-/5 3-/5 3-/5   2-/5 2+/5 3-/5   Ankle plantarflexion  2-/5 2-/5 3+/5 4-/5   2-/5 3+/5 4-/5      Bridges with PT facilitation at B LE, 2 x10 with UEs supported on thighs, x10 without UE support     SittingEOM:  2 x 10 shoulder flexion with hands interlocked  B LE Hip adduction, x20  Bicep curls AAROM, 2 x 10 ea  B LE DF 2 x 10 reps     Nura " participated in dynamic functional therapeutic activities for 0 minutes  to improve functional performance, including:    BP in sitting at conclusion of session 145/82 ( manually)     Home Exercises Provided and Patient Education Provided      Education provided:   - new HEP provided     Written Home Exercises Provided: Patient instructed to cont prior HEP.  Exercises were reviewed and Nura was able to demonstrate them prior to the end of the session.  Nura demonstrated good  understanding of the education provided.      See EMR under Patient Instructions for exercises provided on evaluation    Assessment     Nura tolerated tx session well today and did not have any problems. Assessment period 9/6/2019-10/1/2019: Nura demonstrated minimal progress since last reassessment. In the last few session pt demonstrated improvement in standing endurance though he continues to require 2 person assist for these trials. Pt's B LE strength remains same as previous assessment. PT and pt discuss in detail the importance of exercises outside of sessions, pt admits that he does minimally outside of sessions. PT explains to pt that he must demonstrate improvement in order to continue with services. Pt remains appropriate for skilled PT services and his goals have been updated.      Nura is progressing well towards his goals.   Pt prognosis is Good.      Pt will continue to benefit from skilled outpatient physical therapy to address the deficits listed in the problem list box on initial evaluation, provide pt/family education and to maximize pt's level of independence in the home and community environment.      Pt's spiritual, cultural and educational needs considered and pt agreeable to plan of care and goals.     Anticipated barriers to physical therapy: transportation     Goals: (as of 10/1/2019)  Short Term Goals:   1.  Good return demonstration of HEP within 1 week for consistent strengthening not met, progressing  2  Patient to demonstrate unsupported sitting on stable surface for 30 seconds without assist or LOB within 4 weeks indicating improved trunk strength MET  3.  Patient able to perform sit to stand with mod/max assist without cueing and with good safety awareness within 4 weeks not met, progressing     Long Term Goals:  1. Patient to tolerate at least 2 minutes sustained standing activity with mod/max assist with good safety awareness. not met, progressing  2. Patient to demonstrate independent sit without support for at least 2 minutes without assist or LOB within 8 weeks to indicate improved trunk control MET  3. Patient able to don/doff shirt independently within 8 weeks for improved independence with functional activity not met, progressing     Plan      Continue to work on standing trials between parallel bars. Decreasing dependence on others for sit<>stand trials, functional mobility and low level strength.    Pablo Ryan, PT

## 2019-10-04 ENCOUNTER — CLINICAL SUPPORT (OUTPATIENT)
Dept: REHABILITATION | Facility: HOSPITAL | Age: 64
End: 2019-10-04
Attending: STUDENT IN AN ORGANIZED HEALTH CARE EDUCATION/TRAINING PROGRAM
Payer: MEDICARE

## 2019-10-04 ENCOUNTER — DOCUMENTATION ONLY (OUTPATIENT)
Dept: REHABILITATION | Facility: HOSPITAL | Age: 64
End: 2019-10-04

## 2019-10-04 DIAGNOSIS — M62.81 WEAKNESS OF TRUNK MUSCULATURE: ICD-10-CM

## 2019-10-04 DIAGNOSIS — R29.898 BILATERAL ARM WEAKNESS: ICD-10-CM

## 2019-10-04 DIAGNOSIS — R29.898 LEG WEAKNESS, BILATERAL: ICD-10-CM

## 2019-10-04 PROCEDURE — 97112 NEUROMUSCULAR REEDUCATION: CPT | Mod: PO

## 2019-10-04 PROCEDURE — 97110 THERAPEUTIC EXERCISES: CPT | Mod: PO

## 2019-10-04 NOTE — PROGRESS NOTES
Physical Therapy Daily Treatment Note      Name: Nura Kahn Jr.  Clinic Number: [id number]     Therapy Diagnosis:        Encounter Diagnoses   Name Primary?    Leg weakness, bilateral Yes    Weakness of trunk musculature      Bilateral arm weakness        Physician: Lisa Durham MD     Visit Date: 10/1/2019     Physician Orders: PT Eval and Treat   Medical Diagnosis from Referral: M33.20 (ICD-10-CM) - Polymyositis  Evaluation Date: 7/8/2019  Authorization Period Expiration: 6/18/2020  NEW Plan of Care Expiration: 11/13/2019  Visit # / Visits authorized: 12/ 18 (+eval)                          PN due: 11/1/2019     Time In: 1100   Time Out: 1200  Total Billable Time: 60 minutes     Precautions: Standard, Fall and polymyositis     Subjective      Pt reports: that worked out arms this week at and feels improved strength and posture.  He was not compliant with home exercise program.  Pain: 0/10  Location: n/a      Objective      Pt attends session in scooter with gait belt tied around his knees to prevent hip abduction and slouched posture.     BP in sitting prior to session 130/88 (manually)     Nura received therapeutic exercises to develop strength, endurance and posture for 50 minutes including:     Supine:  Bridges with facilitation at B LE, 2 x10 with UEs supported on thighs, x10 without UE support   AAROM shoulder flexion with dowel 20 x  Lat pull down in supine with yellow TB: 2 x 10 reps  Hip abduction with yellow TB 2 x 10  Hip adduction with facilitation at BLE, with UEs supported on thighs: 2 x 10 reps     Sidelying:   Horz shoulder abduction with facilitation: 2 x 10 reps  Bicep curls with A to stabilize proximal UE: 20 x  Shoulder abd with facilitation: 2 x 10 reps    Sitting:  AAROM shoulder flexion with dowel with low back support from PTA for posture correction  Lumbar stretching applied via sheet tied around PTA with scapular retractions: 5 min       Nura participated in dynamic functional  therapeutic activities for 10 minutes  to improve functional performance, including:     Pt attends session in scooter with gait belt tied around his knees to prevent hip abduction and slouched posture.     Pt scooted out of power chair (scooter) without a slide board, SBA   Sit to supine mod A for BLE placement on mat  Supine<>sidelying max A for BLE and trunk A   Supine <>sit max A for BLE lowering and trunk elevation    Pt scooted from EOM to power chair (scooter) without a slide board,SBA     Did not perform any standing trial due to time constraints, will resume during subsequent tx visits      Home Exercises Provided and Patient Education Provided      Education provided:   - new HEP provided     Written Home Exercises Provided: Patient instructed to cont prior HEP.  Exercises were reviewed and Nura was able to demonstrate them prior to the end of the session.  Nura demonstrated good  understanding of the education provided.      See EMR under Patient Instructions for exercises provided on evaluation     Assessment   Nura provided great effort and good tolerance to all therapeutic exercises that were initiated during today's tx visit. These exercises were included in order to promote BUE/vera-scapular and BLE/hip strengthening. Pt could benefit from vera-scapular, core and hip strengthening in order to improve center of gravity stabilization. It was noted that pt did not perform any standing trial due to time constraints but will resume during subsequent tx visits. It was also noted that pt reported performing HEP on a regular basis since the last tx visit. Pt remains appropriate for skilled PT services and his goals have been updated.     Nura is progressing well towards his goals.   Pt prognosis is Good.      Pt will continue to benefit from skilled outpatient physical therapy to address the deficits listed in the problem list box on initial evaluation, provide pt/family education and to maximize  pt's level of independence in the home and community environment.      Pt's spiritual, cultural and educational needs considered and pt agreeable to plan of care and goals.     Anticipated barriers to physical therapy: transportation     Goals: (as of 10/1/2019)  Short Term Goals:   1.  Good return demonstration of HEP within 1 week for consistent strengthening not met, progressing  2 Patient to demonstrate unsupported sitting on stable surface for 30 seconds without assist or LOB within 4 weeks indicating improved trunk strength MET  3.  Patient able to perform sit to stand with mod/max assist without cueing and with good safety awareness within 4 weeks not met, progressing     Long Term Goals:  1. Patient to tolerate at least 2 minutes sustained standing activity with mod/max assist with good safety awareness. not met, progressing  2. Patient to demonstrate independent sit without support for at least 2 minutes without assist or LOB within 8 weeks to indicate improved trunk control MET  3. Patient able to don/doff shirt independently within 8 weeks for improved independence with functional activity not met, progressing     Plan      Continue to work on standing trials between parallel bars. Decreasing dependence on others for sit<>stand trials, functional mobility and low level strength.     Fernando Falcon, PTA

## 2019-10-04 NOTE — PROGRESS NOTES
PT/PTA met face to face to discuss pt's treatment plan and progress towards established goals. Continue with current PT POC, including: low level strengthening UE and LE, transfers, and increasing standing tolerance. Pt will be seen by physical therapist at least every 6th treatment day or every 30 days, whichever occurs first.      Fernando Falcon, PTA  10  /04/19

## 2019-10-07 ENCOUNTER — INFUSION (OUTPATIENT)
Dept: INFUSION THERAPY | Facility: HOSPITAL | Age: 64
End: 2019-10-07
Attending: INTERNAL MEDICINE
Payer: MEDICARE

## 2019-10-07 ENCOUNTER — CLINICAL SUPPORT (OUTPATIENT)
Dept: INTERNAL MEDICINE | Facility: CLINIC | Age: 64
End: 2019-10-07
Payer: MEDICARE

## 2019-10-07 VITALS
BODY MASS INDEX: 23.84 KG/M2 | DIASTOLIC BLOOD PRESSURE: 71 MMHG | HEART RATE: 75 BPM | HEIGHT: 67 IN | WEIGHT: 151.88 LBS | TEMPERATURE: 98 F | SYSTOLIC BLOOD PRESSURE: 126 MMHG | RESPIRATION RATE: 17 BRPM

## 2019-10-07 DIAGNOSIS — M33.20 POLYMYOSITIS: Primary | ICD-10-CM

## 2019-10-07 PROCEDURE — S0028 INJECTION, FAMOTIDINE, 20 MG: HCPCS | Performed by: INTERNAL MEDICINE

## 2019-10-07 PROCEDURE — 99211 OFF/OP EST MAY X REQ PHY/QHP: CPT | Mod: PBBFAC,25

## 2019-10-07 PROCEDURE — 99999 PR PBB SHADOW E&M-EST. PATIENT-LVL I: CPT | Mod: PBBFAC,,,

## 2019-10-07 PROCEDURE — 63600175 PHARM REV CODE 636 W HCPCS: Performed by: INTERNAL MEDICINE

## 2019-10-07 PROCEDURE — 96375 TX/PRO/DX INJ NEW DRUG ADDON: CPT

## 2019-10-07 PROCEDURE — 99999 PR PBB SHADOW E&M-EST. PATIENT-LVL I: ICD-10-PCS | Mod: PBBFAC,,,

## 2019-10-07 PROCEDURE — 90686 IIV4 VACC NO PRSV 0.5 ML IM: CPT | Mod: PBBFAC

## 2019-10-07 PROCEDURE — 96365 THER/PROPH/DIAG IV INF INIT: CPT

## 2019-10-07 PROCEDURE — 96367 TX/PROPH/DG ADDL SEQ IV INF: CPT

## 2019-10-07 PROCEDURE — 25000003 PHARM REV CODE 250: Performed by: INTERNAL MEDICINE

## 2019-10-07 PROCEDURE — 96366 THER/PROPH/DIAG IV INF ADDON: CPT

## 2019-10-07 RX ORDER — SODIUM CHLORIDE 0.9 % (FLUSH) 0.9 %
10 SYRINGE (ML) INJECTION
Status: CANCELLED | OUTPATIENT
Start: 2019-11-01

## 2019-10-07 RX ORDER — ACETAMINOPHEN 325 MG/1
650 TABLET ORAL
Status: COMPLETED | OUTPATIENT
Start: 2019-10-07 | End: 2019-10-07

## 2019-10-07 RX ORDER — FAMOTIDINE 10 MG/ML
20 INJECTION INTRAVENOUS
Status: COMPLETED | OUTPATIENT
Start: 2019-10-07 | End: 2019-10-07

## 2019-10-07 RX ORDER — FAMOTIDINE 10 MG/ML
20 INJECTION INTRAVENOUS
Status: CANCELLED | OUTPATIENT
Start: 2019-11-01

## 2019-10-07 RX ORDER — HEPARIN 100 UNIT/ML
500 SYRINGE INTRAVENOUS
Status: CANCELLED | OUTPATIENT
Start: 2019-11-01

## 2019-10-07 RX ORDER — HEPARIN 100 UNIT/ML
500 SYRINGE INTRAVENOUS
Status: DISCONTINUED | OUTPATIENT
Start: 2019-10-07 | End: 2019-10-07 | Stop reason: HOSPADM

## 2019-10-07 RX ORDER — ACETAMINOPHEN 325 MG/1
650 TABLET ORAL
Status: CANCELLED | OUTPATIENT
Start: 2019-11-01

## 2019-10-07 RX ORDER — SODIUM CHLORIDE 0.9 % (FLUSH) 0.9 %
10 SYRINGE (ML) INJECTION
Status: DISCONTINUED | OUTPATIENT
Start: 2019-10-07 | End: 2019-10-07 | Stop reason: HOSPADM

## 2019-10-07 RX ADMIN — FAMOTIDINE 20 MG: 10 INJECTION INTRAVENOUS at 01:10

## 2019-10-07 RX ADMIN — ACETAMINOPHEN 650 MG: 325 TABLET ORAL at 01:10

## 2019-10-07 RX ADMIN — HUMAN IMMUNOGLOBULIN G 30 G: 20 LIQUID INTRAVENOUS at 01:10

## 2019-10-07 RX ADMIN — SODIUM CHLORIDE: 0.9 INJECTION, SOLUTION INTRAVENOUS at 01:10

## 2019-10-07 RX ADMIN — DIPHENHYDRAMINE HYDROCHLORIDE 50 MG: 50 INJECTION, SOLUTION INTRAMUSCULAR; INTRAVENOUS at 01:10

## 2019-10-07 NOTE — PROGRESS NOTES
Physical Therapy Daily Treatment Note      Name: Nura Kahn Jr.  Clinic Number: [id number]     Therapy Diagnosis:        Encounter Diagnoses   Name Primary?    Leg weakness, bilateral Yes    Weakness of trunk musculature      Bilateral arm weakness        Physician: Lisa Durham MD     Visit Date: 10/1/2019     Physician Orders: PT Eval and Treat   Medical Diagnosis from Referral: M33.20 (ICD-10-CM) - Polymyositis  Evaluation Date: 7/8/2019  Authorization Period Expiration: 6/18/2020  NEW Plan of Care Expiration: 11/13/2019  Visit # / Visits authorized: 13/ 18 (+eval)                          PN due: 11/1/2019     Time In: 954 (pt arrives late and requires restroom at start of session)   Time Out: 1030  Total Billable Time: 36 minutes     Precautions: Standard, Fall and polymyositis     Subjective      Pt reports: He stood up at home this morning when getting ready    He was not compliant with home exercise program.  Pain: 0/10  Location: n/a      Objective      Pt attends session in scooter with gait belt tied around his knees to prevent hip abduction and slouched posture.     BP in sitting prior to session 143/75 (manually)     Nura received therapeutic exercises to develop strength, endurance and posture for 0 minutes including:       Nura participated in dynamic functional therapeutic activities for 36 minutes  to improve functional performance, including:     Sit<>stand from scooter to // bars maxA of one person, x2 trials  Pt stands for two trials, seated rest between trials: 5:30 minutes and 5 minutes. Pt requires Jd for first trial and modA for second trial once he gets his footing/balance.    Scooter>mat transfer without slide board at supervision    Lumbar stretching applied via sheet tied around PT with scapular retractions: ~5 min    Pt scooted from EOM to power chair (scooter) without a slide board, Jd         Home Exercises Provided and Patient Education Provided      Education provided:    - new HEP provided     Written Home Exercises Provided: Patient instructed to cont prior HEP.  Exercises were reviewed and Nura was able to demonstrate them prior to the end of the session.  Nura demonstrated good  understanding of the education provided.      See EMR under Patient Instructions for exercises provided on evaluation     Assessment   Nura did well during today's session. He tolerated two standing trials today both for 5 minutes or greater. Pt continues to require a great amount of assistance to come to standing due to weakness in hip and core musculature, but once standing requires min-modA only. Pt relies heavily on UEs while in standing and is unable to keep standing balance when prompted to attempt to create elbow flexion. Pt remains appropriate for skilled PT services in order to increase his independence.    Nura is progressing well towards his goals.   Pt prognosis is Good.      Pt will continue to benefit from skilled outpatient physical therapy to address the deficits listed in the problem list box on initial evaluation, provide pt/family education and to maximize pt's level of independence in the home and community environment.      Pt's spiritual, cultural and educational needs considered and pt agreeable to plan of care and goals.     Anticipated barriers to physical therapy: transportation     Goals: (as of 10/1/2019)  Short Term Goals:   1.  Good return demonstration of HEP within 1 week for consistent strengthening not met, progressing  2 Patient to demonstrate unsupported sitting on stable surface for 30 seconds without assist or LOB within 4 weeks indicating improved trunk strength MET  3.  Patient able to perform sit to stand with mod/max assist without cueing and with good safety awareness within 4 weeks not met, progressing     Long Term Goals:  1. Patient to tolerate at least 2 minutes sustained standing activity with mod/max assist with good safety awareness. not met,  progressing  2. Patient to demonstrate independent sit without support for at least 2 minutes without assist or LOB within 8 weeks to indicate improved trunk control MET  3. Patient able to don/doff shirt independently within 8 weeks for improved independence with functional activity not met, progressing     Plan      Continue to work on standing trials between parallel bars. Decreasing dependence on others for sit<>stand trials, functional mobility and low level strength.     Pablo Ryan, PT

## 2019-10-08 ENCOUNTER — CLINICAL SUPPORT (OUTPATIENT)
Dept: REHABILITATION | Facility: HOSPITAL | Age: 64
End: 2019-10-08
Attending: STUDENT IN AN ORGANIZED HEALTH CARE EDUCATION/TRAINING PROGRAM
Payer: MEDICARE

## 2019-10-08 ENCOUNTER — INFUSION (OUTPATIENT)
Dept: INFUSION THERAPY | Facility: HOSPITAL | Age: 64
End: 2019-10-08
Attending: INTERNAL MEDICINE
Payer: MEDICARE

## 2019-10-08 VITALS — SYSTOLIC BLOOD PRESSURE: 142 MMHG | HEART RATE: 76 BPM | DIASTOLIC BLOOD PRESSURE: 79 MMHG | RESPIRATION RATE: 18 BRPM

## 2019-10-08 DIAGNOSIS — M33.20 POLYMYOSITIS: Primary | ICD-10-CM

## 2019-10-08 DIAGNOSIS — R29.898 LEG WEAKNESS, BILATERAL: Primary | ICD-10-CM

## 2019-10-08 DIAGNOSIS — R29.898 BILATERAL ARM WEAKNESS: ICD-10-CM

## 2019-10-08 DIAGNOSIS — M62.81 WEAKNESS OF TRUNK MUSCULATURE: ICD-10-CM

## 2019-10-08 PROCEDURE — 96365 THER/PROPH/DIAG IV INF INIT: CPT

## 2019-10-08 PROCEDURE — 96367 TX/PROPH/DG ADDL SEQ IV INF: CPT

## 2019-10-08 PROCEDURE — 25000003 PHARM REV CODE 250: Performed by: INTERNAL MEDICINE

## 2019-10-08 PROCEDURE — S0028 INJECTION, FAMOTIDINE, 20 MG: HCPCS | Performed by: INTERNAL MEDICINE

## 2019-10-08 PROCEDURE — 63600175 PHARM REV CODE 636 W HCPCS: Mod: JG | Performed by: INTERNAL MEDICINE

## 2019-10-08 PROCEDURE — 97530 THERAPEUTIC ACTIVITIES: CPT | Mod: PO

## 2019-10-08 PROCEDURE — 96375 TX/PRO/DX INJ NEW DRUG ADDON: CPT

## 2019-10-08 PROCEDURE — 96366 THER/PROPH/DIAG IV INF ADDON: CPT

## 2019-10-08 RX ORDER — ACETAMINOPHEN 325 MG/1
650 TABLET ORAL
Status: COMPLETED | OUTPATIENT
Start: 2019-10-08 | End: 2019-10-08

## 2019-10-08 RX ORDER — FAMOTIDINE 10 MG/ML
20 INJECTION INTRAVENOUS
Status: CANCELLED | OUTPATIENT
Start: 2019-11-01

## 2019-10-08 RX ORDER — FAMOTIDINE 10 MG/ML
20 INJECTION INTRAVENOUS
Status: COMPLETED | OUTPATIENT
Start: 2019-10-08 | End: 2019-10-08

## 2019-10-08 RX ORDER — SODIUM CHLORIDE 0.9 % (FLUSH) 0.9 %
10 SYRINGE (ML) INJECTION
Status: DISCONTINUED | OUTPATIENT
Start: 2019-10-08 | End: 2019-10-08 | Stop reason: HOSPADM

## 2019-10-08 RX ORDER — ACETAMINOPHEN 325 MG/1
650 TABLET ORAL
Status: CANCELLED | OUTPATIENT
Start: 2019-11-01

## 2019-10-08 RX ORDER — HEPARIN 100 UNIT/ML
500 SYRINGE INTRAVENOUS
Status: CANCELLED | OUTPATIENT
Start: 2019-11-01

## 2019-10-08 RX ORDER — SODIUM CHLORIDE 0.9 % (FLUSH) 0.9 %
10 SYRINGE (ML) INJECTION
Status: CANCELLED | OUTPATIENT
Start: 2019-11-01

## 2019-10-08 RX ADMIN — FAMOTIDINE 20 MG: 10 INJECTION INTRAVENOUS at 01:10

## 2019-10-08 RX ADMIN — SODIUM CHLORIDE: 0.9 INJECTION, SOLUTION INTRAVENOUS at 02:10

## 2019-10-08 RX ADMIN — DIPHENHYDRAMINE HYDROCHLORIDE 50 MG: 50 INJECTION, SOLUTION INTRAMUSCULAR; INTRAVENOUS at 01:10

## 2019-10-08 RX ADMIN — HUMAN IMMUNOGLOBULIN G 30 G: 20 LIQUID INTRAVENOUS at 02:10

## 2019-10-08 RX ADMIN — ACETAMINOPHEN 650 MG: 325 TABLET ORAL at 01:10

## 2019-10-08 NOTE — PLAN OF CARE
Pt tolerated IVIG today. NAD. PIV flushed and removed. Pt declined AVS. Uses my [facility]. Discharged home. Ambulated in scooter.

## 2019-10-08 NOTE — PLAN OF CARE
Problem: Adult Inpatient Plan of Care  Goal: Optimal Comfort and Wellbeing  Intervention: Provide Person-Centered Care  Flowsheets (Taken 10/8/2019 0461)  Trust Relationship/Rapport: care explained; choices provided; emotional support provided; empathic listening provided; questions answered; questions encouraged; reassurance provided; thoughts/feelings acknowledged

## 2019-10-09 ENCOUNTER — INFUSION (OUTPATIENT)
Dept: INFUSION THERAPY | Facility: HOSPITAL | Age: 64
End: 2019-10-09
Attending: INTERNAL MEDICINE
Payer: MEDICARE

## 2019-10-09 VITALS — RESPIRATION RATE: 18 BRPM | DIASTOLIC BLOOD PRESSURE: 71 MMHG | SYSTOLIC BLOOD PRESSURE: 128 MMHG | HEART RATE: 74 BPM

## 2019-10-09 DIAGNOSIS — M33.20 POLYMYOSITIS: Primary | ICD-10-CM

## 2019-10-09 PROCEDURE — 96365 THER/PROPH/DIAG IV INF INIT: CPT

## 2019-10-09 PROCEDURE — 96367 TX/PROPH/DG ADDL SEQ IV INF: CPT

## 2019-10-09 PROCEDURE — 96366 THER/PROPH/DIAG IV INF ADDON: CPT

## 2019-10-09 PROCEDURE — 63600175 PHARM REV CODE 636 W HCPCS: Performed by: INTERNAL MEDICINE

## 2019-10-09 PROCEDURE — 25000003 PHARM REV CODE 250: Performed by: INTERNAL MEDICINE

## 2019-10-09 PROCEDURE — S0028 INJECTION, FAMOTIDINE, 20 MG: HCPCS | Performed by: INTERNAL MEDICINE

## 2019-10-09 PROCEDURE — 96375 TX/PRO/DX INJ NEW DRUG ADDON: CPT

## 2019-10-09 RX ORDER — SODIUM CHLORIDE 0.9 % (FLUSH) 0.9 %
10 SYRINGE (ML) INJECTION
Status: DISCONTINUED | OUTPATIENT
Start: 2019-10-09 | End: 2019-10-09 | Stop reason: HOSPADM

## 2019-10-09 RX ORDER — FAMOTIDINE 10 MG/ML
20 INJECTION INTRAVENOUS
Status: COMPLETED | OUTPATIENT
Start: 2019-10-09 | End: 2019-10-09

## 2019-10-09 RX ORDER — SODIUM CHLORIDE 0.9 % (FLUSH) 0.9 %
10 SYRINGE (ML) INJECTION
Status: CANCELLED | OUTPATIENT
Start: 2019-11-01

## 2019-10-09 RX ORDER — FAMOTIDINE 10 MG/ML
20 INJECTION INTRAVENOUS
Status: CANCELLED | OUTPATIENT
Start: 2019-11-01

## 2019-10-09 RX ORDER — HEPARIN 100 UNIT/ML
500 SYRINGE INTRAVENOUS
Status: DISCONTINUED | OUTPATIENT
Start: 2019-10-09 | End: 2019-10-09 | Stop reason: HOSPADM

## 2019-10-09 RX ORDER — HEPARIN 100 UNIT/ML
500 SYRINGE INTRAVENOUS
Status: CANCELLED | OUTPATIENT
Start: 2019-11-01

## 2019-10-09 RX ORDER — ACETAMINOPHEN 325 MG/1
650 TABLET ORAL
Status: CANCELLED | OUTPATIENT
Start: 2019-11-01

## 2019-10-09 RX ORDER — ACETAMINOPHEN 325 MG/1
650 TABLET ORAL
Status: COMPLETED | OUTPATIENT
Start: 2019-10-09 | End: 2019-10-09

## 2019-10-09 RX ADMIN — ACETAMINOPHEN 650 MG: 325 TABLET ORAL at 01:10

## 2019-10-09 RX ADMIN — HUMAN IMMUNOGLOBULIN G 30 G: 20 LIQUID INTRAVENOUS at 01:10

## 2019-10-09 RX ADMIN — FAMOTIDINE 20 MG: 10 INJECTION INTRAVENOUS at 01:10

## 2019-10-09 RX ADMIN — DIPHENHYDRAMINE HYDROCHLORIDE 50 MG: 50 INJECTION, SOLUTION INTRAMUSCULAR; INTRAVENOUS at 01:10

## 2019-10-11 ENCOUNTER — CLINICAL SUPPORT (OUTPATIENT)
Dept: REHABILITATION | Facility: HOSPITAL | Age: 64
End: 2019-10-11
Attending: STUDENT IN AN ORGANIZED HEALTH CARE EDUCATION/TRAINING PROGRAM
Payer: MEDICARE

## 2019-10-11 ENCOUNTER — TELEPHONE (OUTPATIENT)
Dept: TRANSPLANT | Facility: CLINIC | Age: 64
End: 2019-10-11

## 2019-10-11 ENCOUNTER — INFUSION (OUTPATIENT)
Dept: INFUSION THERAPY | Facility: HOSPITAL | Age: 64
End: 2019-10-11
Attending: INTERNAL MEDICINE
Payer: MEDICARE

## 2019-10-11 ENCOUNTER — TELEPHONE (OUTPATIENT)
Dept: HEPATOLOGY | Facility: CLINIC | Age: 64
End: 2019-10-11

## 2019-10-11 VITALS — DIASTOLIC BLOOD PRESSURE: 69 MMHG | SYSTOLIC BLOOD PRESSURE: 132 MMHG | HEART RATE: 68 BPM | RESPIRATION RATE: 18 BRPM

## 2019-10-11 DIAGNOSIS — R29.898 BILATERAL ARM WEAKNESS: ICD-10-CM

## 2019-10-11 DIAGNOSIS — M33.20 POLYMYOSITIS: Primary | ICD-10-CM

## 2019-10-11 DIAGNOSIS — R29.898 LEG WEAKNESS, BILATERAL: ICD-10-CM

## 2019-10-11 DIAGNOSIS — M62.81 WEAKNESS OF TRUNK MUSCULATURE: ICD-10-CM

## 2019-10-11 PROCEDURE — 96366 THER/PROPH/DIAG IV INF ADDON: CPT

## 2019-10-11 PROCEDURE — 63600175 PHARM REV CODE 636 W HCPCS: Mod: JG | Performed by: INTERNAL MEDICINE

## 2019-10-11 PROCEDURE — 25000003 PHARM REV CODE 250: Performed by: INTERNAL MEDICINE

## 2019-10-11 PROCEDURE — 96365 THER/PROPH/DIAG IV INF INIT: CPT

## 2019-10-11 PROCEDURE — 96367 TX/PROPH/DG ADDL SEQ IV INF: CPT

## 2019-10-11 PROCEDURE — 96375 TX/PRO/DX INJ NEW DRUG ADDON: CPT

## 2019-10-11 PROCEDURE — 97110 THERAPEUTIC EXERCISES: CPT | Mod: PO

## 2019-10-11 PROCEDURE — S0028 INJECTION, FAMOTIDINE, 20 MG: HCPCS | Performed by: INTERNAL MEDICINE

## 2019-10-11 RX ORDER — FAMOTIDINE 10 MG/ML
20 INJECTION INTRAVENOUS
Status: COMPLETED | OUTPATIENT
Start: 2019-10-11 | End: 2019-10-11

## 2019-10-11 RX ORDER — ACETAMINOPHEN 325 MG/1
650 TABLET ORAL
Status: COMPLETED | OUTPATIENT
Start: 2019-10-11 | End: 2019-10-11

## 2019-10-11 RX ORDER — SODIUM CHLORIDE 0.9 % (FLUSH) 0.9 %
10 SYRINGE (ML) INJECTION
Status: DISCONTINUED | OUTPATIENT
Start: 2019-10-11 | End: 2019-10-11 | Stop reason: HOSPADM

## 2019-10-11 RX ORDER — HEPARIN 100 UNIT/ML
500 SYRINGE INTRAVENOUS
Status: CANCELLED | OUTPATIENT
Start: 2019-11-01

## 2019-10-11 RX ORDER — ACETAMINOPHEN 325 MG/1
650 TABLET ORAL
Status: CANCELLED | OUTPATIENT
Start: 2019-11-01

## 2019-10-11 RX ORDER — FAMOTIDINE 10 MG/ML
20 INJECTION INTRAVENOUS
Status: CANCELLED | OUTPATIENT
Start: 2019-11-01

## 2019-10-11 RX ORDER — SODIUM CHLORIDE 0.9 % (FLUSH) 0.9 %
10 SYRINGE (ML) INJECTION
Status: CANCELLED | OUTPATIENT
Start: 2019-11-01

## 2019-10-11 RX ADMIN — DIPHENHYDRAMINE HYDROCHLORIDE 50 MG: 50 INJECTION, SOLUTION INTRAMUSCULAR; INTRAVENOUS at 01:10

## 2019-10-11 RX ADMIN — ACETAMINOPHEN 650 MG: 325 TABLET ORAL at 01:10

## 2019-10-11 RX ADMIN — FAMOTIDINE 20 MG: 10 INJECTION INTRAVENOUS at 01:10

## 2019-10-11 RX ADMIN — HUMAN IMMUNOGLOBULIN G 30 G: 20 LIQUID INTRAVENOUS at 02:10

## 2019-10-11 RX ADMIN — SODIUM CHLORIDE: 0.9 INJECTION, SOLUTION INTRAVENOUS at 01:10

## 2019-10-11 NOTE — TELEPHONE ENCOUNTER
Pt continues to be referred by other providers. Pt cancelled last appt. Call placed, lvm to return out call for an appt due to multiple referrals to our clinic.  Letter mailed. Message to hep staff to call to schedule.

## 2019-10-11 NOTE — PROGRESS NOTES
Physical Therapy Daily Treatment Note      Name: Nura Kahn Jr.  Clinic Number: [id number]     Therapy Diagnosis:        Encounter Diagnoses   Name Primary?    Leg weakness, bilateral Yes    Weakness of trunk musculature      Bilateral arm weakness        Physician: Lisa Durham MD     Visit Date: 10/1/2019     Physician Orders: PT Eval and Treat   Medical Diagnosis from Referral: M33.20 (ICD-10-CM) - Polymyositis  Evaluation Date: 7/8/2019  Authorization Period Expiration: 6/18/2020  NEW Plan of Care Expiration: 11/13/2019  Visit # / Visits authorized: 14/ 18 (+eval)                          PN due: 11/1/2019     Time In: 954 (pt arrives late)  Time Out: 1030  Total Billable Time: 36 minutes     Precautions: Standard, Fall and polymyositis     Subjective      Pt reports: He feels he is getting stronger and he was able to close his legs this morning to fit through a doorway without using his hands.    He was compliant with home exercise program.  Pain: 0/10  Location: n/a      Objective      Pt attends session in scooter with gait belt tied around his knees to prevent hip abduction and slouched posture.      Nura received therapeutic exercises to develop strength, endurance and posture for 30 minutes including:    Supine:  Bridges with facilitation at B LE, 2 x10 with UEs supported on thighs, x10 without UE support   AROM shoulder flexion with dowel 20x   AROM chest press with dowel, 2 x 10 reps, pt requires Jd during second set  AAROM tricep press up with dowel, x10 reps  Lat pull down in supine with peach TB: 2 x 10 reps  Hip abduction with peach TB 2 x 10     Sitting:  x10 hip adduction squeeze on ball with 5 second hold     Nura participated in dynamic functional therapeutic activities for 6 minutes  to improve functional performance, including:     Scooter<>mat transfer without slide board at supervision and set up help       Home Exercises Provided and Patient Education Provided      Education  provided:   - new HEP provided     Written Home Exercises Provided: Patient instructed to cont prior HEP.  Exercises were reviewed and Nura was able to demonstrate them prior to the end of the session.  Nura demonstrated good  understanding of the education provided.      See EMR under Patient Instructions for exercises provided on evaluation     Assessment   Nura did well during today's session. He is demonstrating better muscular strength and overall increased motivation to participate in sessions. He reports improving function at home and reports that he is now participating in HEP. Pt remains appropriate for skilled PT services in order to increase his independence.    Nura is progressing well towards his goals.   Pt prognosis is Good.      Pt will continue to benefit from skilled outpatient physical therapy to address the deficits listed in the problem list box on initial evaluation, provide pt/family education and to maximize pt's level of independence in the home and community environment.      Pt's spiritual, cultural and educational needs considered and pt agreeable to plan of care and goals.     Anticipated barriers to physical therapy: transportation     Goals: (as of 10/1/2019)  Short Term Goals:   1.  Good return demonstration of HEP within 1 week for consistent strengthening not met, progressing  2 Patient to demonstrate unsupported sitting on stable surface for 30 seconds without assist or LOB within 4 weeks indicating improved trunk strength MET  3.  Patient able to perform sit to stand with mod/max assist without cueing and with good safety awareness within 4 weeks not met, progressing     Long Term Goals:  1. Patient to tolerate at least 2 minutes sustained standing activity with mod/max assist with good safety awareness. not met, progressing  2. Patient to demonstrate independent sit without support for at least 2 minutes without assist or LOB within 8 weeks to indicate improved trunk  control MET  3. Patient able to don/doff shirt independently within 8 weeks for improved independence with functional activity not met, progressing     Plan      Continue to work on standing trials between parallel bars. Decreasing dependence on others for sit<>stand trials, functional mobility and low level strength.     Pablo Ryan, PT

## 2019-10-11 NOTE — PLAN OF CARE
Pt tolerated IVIG today. NAD. PIV flushed and removed. declined AVS. Uses my [employer]. Discharged home in scooter. Appts reviewed with pt.

## 2019-10-11 NOTE — TELEPHONE ENCOUNTER
----- Message from Eliza Price sent at 10/11/2019 12:24 PM CDT -----  Another referral on this hannah. Please schedule. I am sending letters.

## 2019-10-11 NOTE — LETTER
October 11, 2019    Nura Kahn  [address] [facility] [address]      Dear Nura Kahn:    Your doctor has referred you to the [facility]. We are sending this letter to remind you to make an appointment with us to complete the referral process.     Please call us at [phone] to schedule an appointment. We look forward to seeing you soon.     If you received a call and have been scheduled, please disregard this letter.       Sincerely,        [facility]   [facility] [place], [place] [id number] [phone]

## 2019-10-11 NOTE — TELEPHONE ENCOUNTER
MA called patient schedule his appt to see Dr. Lucero back in the clinic he accepted 12/3/19 first available appt. Dr. Lucero have. Mailed appt reminder to patient.

## 2019-10-11 NOTE — PLAN OF CARE
Problem: Adult Inpatient Plan of Care  Goal: Optimal Comfort and Wellbeing  Intervention: Provide Person-Centered Care  Flowsheets (Taken 10/11/2019 1340)  Trust Relationship/Rapport: care explained; emotional support provided; empathic listening provided; questions answered; choices provided; questions encouraged; reassurance provided; thoughts/feelings acknowledged

## 2019-10-11 NOTE — LETTER
October 11, 2019    Nura LAN Femi Krishna  [address] [facility] [address]             [facility]  [address]  [place] [address]  Phone: [phone] Dear Mr. Kahn:    We are writing to you because we have made multiple attempts to reach you by phone and have been unsuccessful.      Your physician has referred you to Hepatology clinic for evaluation and we would like to schedule your appointment.     Please call us at your earliest convenience.  [facility] [phone]    Sincerely,      [facility]   [address] [place] [address] [phone]

## 2019-10-14 NOTE — PROGRESS NOTES
Physical Therapy Daily Treatment Note      Name: Nura Kahn Jr.  Clinic Number: [id number]     Therapy Diagnosis:        Encounter Diagnoses   Name Primary?    Leg weakness, bilateral Yes    Weakness of trunk musculature      Bilateral arm weakness        Physician: Lisa Durham MD     Visit Date: 10/1/2019     Physician Orders: PT Eval and Treat   Medical Diagnosis from Referral: M33.20 (ICD-10-CM) - Polymyositis  Evaluation Date: 7/8/2019  Authorization Period Expiration: 6/18/2020  NEW Plan of Care Expiration: 11/13/2019  Visit # / Visits authorized: 15/ 18 (+eval)                          PN due: 11/1/2019     Time In: 1307  Time Out: 1352  Total Billable Time: 45 minutes     Precautions: Standard, Fall and polymyositis     Subjective      Pt reports: He feels he is getting stronger and he was able to close his legs this morning to fit through a doorway without using his hands.    He was compliant with home exercise program.  Pain: 0/10  Location: n/a      Objective      Pt attends session in scooter with gait belt tied around his knees to prevent hip abduction and slouched posture.      Nura received therapeutic exercises to develop strength, endurance and posture for 0 minutes including:    Nura participated in dynamic functional therapeutic activities for 45 minutes  to improve functional performance, including:    Pt sets up scooter between parallel bars for sit to stand transfers with increased time for completion. Pt performs sit<>stand transfer with maxA to come to standing and maxA to find a balance point. Pt stands between the parallel bars for 4:30 minutes with Jd-CGA. PT then again maxA assists pt to perform short stand so that two foam pads were placed onto scooter seat so that pt has better advantage to assist with standing. Pt then performs 2 additional sit<>stand trials with maxA though pt better able to assist with use of UEs, assistance is not enough to get to standing. During final  standing trial of approximately 3 minutes pt performs x30 reps of minimal knee flexion and extension (approxiamtely 10 degree bend then extension). Pt also able to slightly bend elbows once. Pt continues to rely heavily on locking out joints in order to remain standing. He requires seated rest breaks between each standing trial.       Home Exercises Provided and Patient Education Provided      Education provided:   - continuing HEP, more exercise and movement at home.     Written Home Exercises Provided: Patient instructed to cont prior HEP.  Exercises were reviewed and Nura was able to demonstrate them prior to the end of the session.  Nura demonstrated good  understanding of the education provided.      See EMR under Patient Instructions for exercises provided on evaluation     Assessment   Nura did fair during today's session. He continues to ask PT about when he will be able to ambulate again, and pt explains to pt that he needs to be able to stand on his own prior to being able to ambulate. Pt appears to have a moderate understanding of this but not fully convinced. He continues to struggle with standing balance due to muscular atrophy throughout B UE and LE. Pt does report continued exercise at home and that he has noted improvements with his mobility. He remains appropriate for skilled PT services.     Nura is progressing well towards his goals.   Pt prognosis is Good.      Pt will continue to benefit from skilled outpatient physical therapy to address the deficits listed in the problem list box on initial evaluation, provide pt/family education and to maximize pt's level of independence in the home and community environment.      Pt's spiritual, cultural and educational needs considered and pt agreeable to plan of care and goals.     Anticipated barriers to physical therapy: transportation     Goals: (as of 10/1/2019)  Short Term Goals:   1.  Good return demonstration of HEP within 1 week for  consistent strengthening not met, progressing  2 Patient to demonstrate unsupported sitting on stable surface for 30 seconds without assist or LOB within 4 weeks indicating improved trunk strength MET  3.  Patient able to perform sit to stand with mod/max assist without cueing and with good safety awareness within 4 weeks not met, progressing     Long Term Goals:  1. Patient to tolerate at least 2 minutes sustained standing activity with mod/max assist with good safety awareness. not met, progressing  2. Patient to demonstrate independent sit without support for at least 2 minutes without assist or LOB within 8 weeks to indicate improved trunk control MET  3. Patient able to don/doff shirt independently within 8 weeks for improved independence with functional activity not met, progressing     Plan      Continue to work on standing trials between parallel bars. Decreasing dependence on others for sit<>stand trials, functional mobility and low level strength.     Pablo Ryan, PT

## 2019-10-15 ENCOUNTER — CLINICAL SUPPORT (OUTPATIENT)
Dept: REHABILITATION | Facility: HOSPITAL | Age: 64
End: 2019-10-15
Attending: STUDENT IN AN ORGANIZED HEALTH CARE EDUCATION/TRAINING PROGRAM
Payer: MEDICARE

## 2019-10-15 DIAGNOSIS — R29.898 LEG WEAKNESS, BILATERAL: ICD-10-CM

## 2019-10-15 DIAGNOSIS — M62.81 WEAKNESS OF TRUNK MUSCULATURE: ICD-10-CM

## 2019-10-15 DIAGNOSIS — R29.898 BILATERAL ARM WEAKNESS: ICD-10-CM

## 2019-10-15 PROCEDURE — 97530 THERAPEUTIC ACTIVITIES: CPT | Mod: PO

## 2019-10-22 DIAGNOSIS — Z12.11 COLON CANCER SCREENING: ICD-10-CM

## 2019-10-25 ENCOUNTER — LAB VISIT (OUTPATIENT)
Dept: LAB | Facility: HOSPITAL | Age: 64
End: 2019-10-25
Attending: FAMILY MEDICINE
Payer: MEDICARE

## 2019-10-25 DIAGNOSIS — Z12.11 COLON CANCER SCREENING: ICD-10-CM

## 2019-10-25 PROCEDURE — 82274 ASSAY TEST FOR BLOOD FECAL: CPT

## 2019-10-29 ENCOUNTER — PATIENT OUTREACH (OUTPATIENT)
Dept: ADMINISTRATIVE | Facility: HOSPITAL | Age: 64
End: 2019-10-29

## 2019-10-29 ENCOUNTER — DOCUMENTATION ONLY (OUTPATIENT)
Dept: REHABILITATION | Facility: HOSPITAL | Age: 64
End: 2019-10-29

## 2019-10-29 NOTE — PROGRESS NOTES
Physical Therapy: No show/Cancellation of Visit  Date: 10/29/2019    Patient was a no show to today's PT appointment. Patient's next scheduled appointment is 11/1/2019.     Cancel: 2  No show: 1    Therapist: Pablo Ryan, PT

## 2019-10-29 NOTE — PROGRESS NOTES
Outreach to pt completed RE: pt's fit kit rcvd in lab w/no collection date. Per pt, specimen collected 10/25/19. Lab notified.

## 2019-10-31 ENCOUNTER — TELEPHONE (OUTPATIENT)
Dept: RHEUMATOLOGY | Facility: HOSPITAL | Age: 64
End: 2019-10-31

## 2019-10-31 ENCOUNTER — EXTERNAL CHRONIC CARE MANAGEMENT (OUTPATIENT)
Dept: PRIMARY CARE CLINIC | Facility: CLINIC | Age: 64
End: 2019-10-31
Payer: MEDICARE

## 2019-10-31 PROCEDURE — 99490 CHRNC CARE MGMT STAFF 1ST 20: CPT | Mod: PBBFAC | Performed by: FAMILY MEDICINE

## 2019-10-31 PROCEDURE — 99490 PR CHRONIC CARE MGMT, 1ST 20 MIN: ICD-10-PCS | Mod: S$PBB,,, | Performed by: FAMILY MEDICINE

## 2019-10-31 PROCEDURE — 99490 CHRNC CARE MGMT STAFF 1ST 20: CPT | Mod: S$PBB,,, | Performed by: FAMILY MEDICINE

## 2019-10-31 NOTE — TELEPHONE ENCOUNTER
Called patient and discussed plan of care. All patient's questions answered.       ----- Message from Heike Casarez MA sent at 10/31/2019  9:54 AM CDT -----  Contact:   Pt  [phone]      ----- Message -----  From: Adilene Chang  Sent: 10/31/2019   9:51 AM CDT  To: Herminio Gonzalez Staff    Pt  Calling to discuss overview on his health , pt has general  questions about his infusion ...  Give the pt a call back to discuss

## 2019-11-01 ENCOUNTER — DOCUMENTATION ONLY (OUTPATIENT)
Dept: REHABILITATION | Facility: HOSPITAL | Age: 64
End: 2019-11-01

## 2019-11-01 NOTE — PROGRESS NOTES
OUTPATIENT PHYSICAL THERAPY DISCHARGE SUMMARY     Name: Nura Kahn Jr.  Clinic Number: [id number]    Therapy Diagnosis:           Encounter Diagnoses   Name Primary?    Leg weakness, bilateral Yes    Weakness of trunk musculature      Bilateral arm weakness        Physician: Lisa Durham MD     Treatment Orders: PT Eval and Treat  Past Medical History:   Diagnosis Date    Atrial fibrillation 3/2/2015    Depression     Essential hypertension 2/27/2019    Microcytic anemia 9/25/2014    Neuromuscular disorder     Polymyositis 2009    Tobacco abuse        Initial visit: 7/8/2019  Date of Last visit: 10/15/2019  Date of Discharge Note:  11/1/2019  Total Visits Received: 15  Missed Visits: 4  ASSESSMENT   Status Towards Goals Met:     Goals: (as of 10/1/2019)  Short Term Goals:   1.  Good return demonstration of HEP within 1 week for consistent strengthening not met, progressing  2 Patient to demonstrate unsupported sitting on stable surface for 30 seconds without assist or LOB within 4 weeks indicating improved trunk strength MET  3.  Patient able to perform sit to stand with mod/max assist without cueing and with good safety awareness within 4 weeks not met, progressing     Long Term Goals:  1. Patient to tolerate at least 2 minutes sustained standing activity with mod/max assist with good safety awareness. not met, progressing  2. Patient to demonstrate independent sit without support for at least 2 minutes without assist or LOB within 8 weeks to indicate improved trunk control MET  3. Patient able to don/doff shirt independently within 8 weeks for improved independence with functional activity not met, progressing    Goals Not achieved and why:   Pt missed scheduled PT visit today. PT able to speak with pt on the phone as this is 4th consecutive cancel/no show visit. Pt reports he has a lot of trouble getting to PT when it is raining as he takes public transit. PT and pt agree that it may be best for pt  to get home health PT at this time due to difficulty with transportation. Pt also admits to rarely perform home program.         Discharge reason : Non-Compliance with attendance and Patient self discharge    PLAN   This patient is discharged from Outpatient Physical Therapy Services.     Pablo Ryan, PT  11/01/2019

## 2019-11-04 ENCOUNTER — INFUSION (OUTPATIENT)
Dept: INFUSION THERAPY | Facility: HOSPITAL | Age: 64
End: 2019-11-04
Attending: INTERNAL MEDICINE
Payer: MEDICARE

## 2019-11-04 VITALS
HEIGHT: 67 IN | SYSTOLIC BLOOD PRESSURE: 130 MMHG | TEMPERATURE: 98 F | DIASTOLIC BLOOD PRESSURE: 71 MMHG | HEART RATE: 71 BPM | BODY MASS INDEX: 23.84 KG/M2 | RESPIRATION RATE: 18 BRPM | WEIGHT: 151.88 LBS

## 2019-11-04 DIAGNOSIS — M33.20 POLYMYOSITIS: Primary | ICD-10-CM

## 2019-11-04 PROCEDURE — 25000003 PHARM REV CODE 250: Performed by: INTERNAL MEDICINE

## 2019-11-04 PROCEDURE — 96375 TX/PRO/DX INJ NEW DRUG ADDON: CPT

## 2019-11-04 PROCEDURE — 96367 TX/PROPH/DG ADDL SEQ IV INF: CPT

## 2019-11-04 PROCEDURE — 96365 THER/PROPH/DIAG IV INF INIT: CPT

## 2019-11-04 PROCEDURE — S0028 INJECTION, FAMOTIDINE, 20 MG: HCPCS | Performed by: INTERNAL MEDICINE

## 2019-11-04 PROCEDURE — 63600175 PHARM REV CODE 636 W HCPCS: Performed by: INTERNAL MEDICINE

## 2019-11-04 PROCEDURE — 96366 THER/PROPH/DIAG IV INF ADDON: CPT

## 2019-11-04 RX ORDER — SODIUM CHLORIDE 0.9 % (FLUSH) 0.9 %
10 SYRINGE (ML) INJECTION
Status: DISCONTINUED | OUTPATIENT
Start: 2019-11-04 | End: 2019-11-04 | Stop reason: HOSPADM

## 2019-11-04 RX ORDER — ACETAMINOPHEN 325 MG/1
650 TABLET ORAL
Status: CANCELLED | OUTPATIENT
Start: 2019-12-01

## 2019-11-04 RX ORDER — FAMOTIDINE 10 MG/ML
20 INJECTION INTRAVENOUS
Status: COMPLETED | OUTPATIENT
Start: 2019-11-04 | End: 2019-11-04

## 2019-11-04 RX ORDER — SODIUM CHLORIDE 0.9 % (FLUSH) 0.9 %
10 SYRINGE (ML) INJECTION
Status: CANCELLED | OUTPATIENT
Start: 2019-12-01

## 2019-11-04 RX ORDER — HEPARIN 100 UNIT/ML
500 SYRINGE INTRAVENOUS
Status: CANCELLED | OUTPATIENT
Start: 2019-12-01

## 2019-11-04 RX ORDER — FAMOTIDINE 10 MG/ML
20 INJECTION INTRAVENOUS
Status: CANCELLED | OUTPATIENT
Start: 2019-12-01

## 2019-11-04 RX ORDER — ACETAMINOPHEN 325 MG/1
650 TABLET ORAL
Status: COMPLETED | OUTPATIENT
Start: 2019-11-04 | End: 2019-11-04

## 2019-11-04 RX ADMIN — Medication 50 MG: at 01:11

## 2019-11-04 RX ADMIN — HUMAN IMMUNOGLOBULIN G 30 G: 20 LIQUID INTRAVENOUS at 01:11

## 2019-11-04 RX ADMIN — FAMOTIDINE 20 MG: 10 INJECTION INTRAVENOUS at 01:11

## 2019-11-04 RX ADMIN — SODIUM CHLORIDE: 0.9 INJECTION, SOLUTION INTRAVENOUS at 01:11

## 2019-11-04 RX ADMIN — ACETAMINOPHEN 650 MG: 325 TABLET ORAL at 01:11

## 2019-11-04 NOTE — PLAN OF CARE
Pt tolerated IVIG with no complications. VSS. Pt instructed to call MD with any problems. NAD. Pt discharged home via wheelchair.

## 2019-11-05 ENCOUNTER — INFUSION (OUTPATIENT)
Dept: INFUSION THERAPY | Facility: HOSPITAL | Age: 64
End: 2019-11-05
Attending: INTERNAL MEDICINE
Payer: MEDICARE

## 2019-11-05 VITALS — DIASTOLIC BLOOD PRESSURE: 66 MMHG | HEART RATE: 68 BPM | RESPIRATION RATE: 18 BRPM | SYSTOLIC BLOOD PRESSURE: 114 MMHG

## 2019-11-05 DIAGNOSIS — M33.20 POLYMYOSITIS: Primary | ICD-10-CM

## 2019-11-05 LAB — HEMOCCULT STL QL IA: NEGATIVE

## 2019-11-05 PROCEDURE — S0028 INJECTION, FAMOTIDINE, 20 MG: HCPCS | Performed by: INTERNAL MEDICINE

## 2019-11-05 PROCEDURE — 63600175 PHARM REV CODE 636 W HCPCS: Performed by: INTERNAL MEDICINE

## 2019-11-05 PROCEDURE — 96367 TX/PROPH/DG ADDL SEQ IV INF: CPT

## 2019-11-05 PROCEDURE — 96366 THER/PROPH/DIAG IV INF ADDON: CPT

## 2019-11-05 PROCEDURE — 96365 THER/PROPH/DIAG IV INF INIT: CPT

## 2019-11-05 PROCEDURE — 25000003 PHARM REV CODE 250: Performed by: INTERNAL MEDICINE

## 2019-11-05 PROCEDURE — 96375 TX/PRO/DX INJ NEW DRUG ADDON: CPT

## 2019-11-05 RX ORDER — ACETAMINOPHEN 325 MG/1
650 TABLET ORAL
Status: COMPLETED | OUTPATIENT
Start: 2019-11-05 | End: 2019-11-05

## 2019-11-05 RX ORDER — SODIUM CHLORIDE 0.9 % (FLUSH) 0.9 %
10 SYRINGE (ML) INJECTION
Status: DISCONTINUED | OUTPATIENT
Start: 2019-11-05 | End: 2019-11-05 | Stop reason: HOSPADM

## 2019-11-05 RX ORDER — ACETAMINOPHEN 325 MG/1
650 TABLET ORAL
Status: CANCELLED | OUTPATIENT
Start: 2019-12-01

## 2019-11-05 RX ORDER — SODIUM CHLORIDE 0.9 % (FLUSH) 0.9 %
10 SYRINGE (ML) INJECTION
Status: CANCELLED | OUTPATIENT
Start: 2019-12-01

## 2019-11-05 RX ORDER — HEPARIN 100 UNIT/ML
500 SYRINGE INTRAVENOUS
Status: CANCELLED | OUTPATIENT
Start: 2019-12-01

## 2019-11-05 RX ORDER — FAMOTIDINE 10 MG/ML
20 INJECTION INTRAVENOUS
Status: CANCELLED | OUTPATIENT
Start: 2019-12-01

## 2019-11-05 RX ORDER — FAMOTIDINE 10 MG/ML
20 INJECTION INTRAVENOUS
Status: COMPLETED | OUTPATIENT
Start: 2019-11-05 | End: 2019-11-05

## 2019-11-05 RX ADMIN — ACETAMINOPHEN 650 MG: 325 TABLET ORAL at 01:11

## 2019-11-05 RX ADMIN — HUMAN IMMUNOGLOBULIN G 30 G: 10 LIQUID INTRAVENOUS at 02:11

## 2019-11-05 RX ADMIN — FAMOTIDINE 20 MG: 10 INJECTION INTRAVENOUS at 01:11

## 2019-11-05 RX ADMIN — SODIUM CHLORIDE: 0.9 INJECTION, SOLUTION INTRAVENOUS at 01:11

## 2019-11-05 RX ADMIN — DIPHENHYDRAMINE HYDROCHLORIDE 50 MG: 50 INJECTION, SOLUTION INTRAMUSCULAR; INTRAVENOUS at 01:11

## 2019-11-06 ENCOUNTER — CLINICAL SUPPORT (OUTPATIENT)
Dept: INTERNAL MEDICINE | Facility: CLINIC | Age: 64
End: 2019-11-06
Payer: MEDICARE

## 2019-11-06 ENCOUNTER — INFUSION (OUTPATIENT)
Dept: INFUSION THERAPY | Facility: HOSPITAL | Age: 64
End: 2019-11-06
Attending: INTERNAL MEDICINE
Payer: MEDICARE

## 2019-11-06 VITALS
TEMPERATURE: 98 F | RESPIRATION RATE: 18 BRPM | DIASTOLIC BLOOD PRESSURE: 70 MMHG | HEART RATE: 80 BPM | SYSTOLIC BLOOD PRESSURE: 128 MMHG

## 2019-11-06 DIAGNOSIS — M33.20 POLYMYOSITIS: Primary | ICD-10-CM

## 2019-11-06 PROCEDURE — 63600175 PHARM REV CODE 636 W HCPCS: Performed by: INTERNAL MEDICINE

## 2019-11-06 PROCEDURE — 96366 THER/PROPH/DIAG IV INF ADDON: CPT

## 2019-11-06 PROCEDURE — 25000003 PHARM REV CODE 250: Performed by: INTERNAL MEDICINE

## 2019-11-06 PROCEDURE — 96375 TX/PRO/DX INJ NEW DRUG ADDON: CPT

## 2019-11-06 PROCEDURE — 96365 THER/PROPH/DIAG IV INF INIT: CPT

## 2019-11-06 PROCEDURE — A4216 STERILE WATER/SALINE, 10 ML: HCPCS | Performed by: INTERNAL MEDICINE

## 2019-11-06 PROCEDURE — S0028 INJECTION, FAMOTIDINE, 20 MG: HCPCS | Performed by: INTERNAL MEDICINE

## 2019-11-06 PROCEDURE — 96367 TX/PROPH/DG ADDL SEQ IV INF: CPT

## 2019-11-06 RX ORDER — SODIUM CHLORIDE 0.9 % (FLUSH) 0.9 %
10 SYRINGE (ML) INJECTION
Status: CANCELLED | OUTPATIENT
Start: 2019-12-01

## 2019-11-06 RX ORDER — SODIUM CHLORIDE 0.9 % (FLUSH) 0.9 %
10 SYRINGE (ML) INJECTION
Status: DISCONTINUED | OUTPATIENT
Start: 2019-11-06 | End: 2019-11-06 | Stop reason: HOSPADM

## 2019-11-06 RX ORDER — FAMOTIDINE 10 MG/ML
20 INJECTION INTRAVENOUS
Status: COMPLETED | OUTPATIENT
Start: 2019-11-06 | End: 2019-11-06

## 2019-11-06 RX ORDER — FAMOTIDINE 10 MG/ML
20 INJECTION INTRAVENOUS
Status: CANCELLED | OUTPATIENT
Start: 2019-12-01

## 2019-11-06 RX ORDER — HEPARIN 100 UNIT/ML
500 SYRINGE INTRAVENOUS
Status: DISCONTINUED | OUTPATIENT
Start: 2019-11-06 | End: 2019-11-06 | Stop reason: HOSPADM

## 2019-11-06 RX ORDER — HEPARIN 100 UNIT/ML
500 SYRINGE INTRAVENOUS
Status: CANCELLED | OUTPATIENT
Start: 2019-12-01

## 2019-11-06 RX ORDER — ACETAMINOPHEN 325 MG/1
650 TABLET ORAL
Status: CANCELLED | OUTPATIENT
Start: 2019-12-01

## 2019-11-06 RX ORDER — ACETAMINOPHEN 325 MG/1
650 TABLET ORAL
Status: COMPLETED | OUTPATIENT
Start: 2019-11-06 | End: 2019-11-06

## 2019-11-06 RX ADMIN — SODIUM CHLORIDE: 0.9 INJECTION, SOLUTION INTRAVENOUS at 01:11

## 2019-11-06 RX ADMIN — ACETAMINOPHEN 650 MG: 325 TABLET ORAL at 01:11

## 2019-11-06 RX ADMIN — DIPHENHYDRAMINE HYDROCHLORIDE 50 MG: 50 INJECTION, SOLUTION INTRAMUSCULAR; INTRAVENOUS at 01:11

## 2019-11-06 RX ADMIN — Medication 10 ML: at 04:11

## 2019-11-06 RX ADMIN — FAMOTIDINE 20 MG: 10 INJECTION, SOLUTION INTRAVENOUS at 01:11

## 2019-11-06 RX ADMIN — HUMAN IMMUNOGLOBULIN G 30 G: 20 LIQUID INTRAVENOUS at 02:11

## 2019-11-06 NOTE — PROGRESS NOTES
Pt is here for b/p check, 128/92, p 74. Pt has only taken one b/p medication this morning. Dr Ambrosio notified, per dr Ambrosio to come back for b/p check in 1-2 weeks.

## 2019-11-07 ENCOUNTER — INFUSION (OUTPATIENT)
Dept: INFUSION THERAPY | Facility: HOSPITAL | Age: 64
End: 2019-11-07
Attending: INTERNAL MEDICINE
Payer: MEDICARE

## 2019-11-07 VITALS
WEIGHT: 151.88 LBS | HEART RATE: 70 BPM | TEMPERATURE: 98 F | HEIGHT: 67 IN | OXYGEN SATURATION: 98 % | SYSTOLIC BLOOD PRESSURE: 142 MMHG | RESPIRATION RATE: 18 BRPM | DIASTOLIC BLOOD PRESSURE: 70 MMHG | BODY MASS INDEX: 23.84 KG/M2

## 2019-11-07 DIAGNOSIS — M33.20 POLYMYOSITIS: Primary | ICD-10-CM

## 2019-11-07 PROCEDURE — S0028 INJECTION, FAMOTIDINE, 20 MG: HCPCS | Performed by: INTERNAL MEDICINE

## 2019-11-07 PROCEDURE — 96365 THER/PROPH/DIAG IV INF INIT: CPT

## 2019-11-07 PROCEDURE — 63600175 PHARM REV CODE 636 W HCPCS: Performed by: INTERNAL MEDICINE

## 2019-11-07 PROCEDURE — 25000003 PHARM REV CODE 250: Performed by: INTERNAL MEDICINE

## 2019-11-07 PROCEDURE — 96367 TX/PROPH/DG ADDL SEQ IV INF: CPT

## 2019-11-07 PROCEDURE — 96366 THER/PROPH/DIAG IV INF ADDON: CPT

## 2019-11-07 PROCEDURE — 96375 TX/PRO/DX INJ NEW DRUG ADDON: CPT

## 2019-11-07 RX ORDER — FAMOTIDINE 10 MG/ML
20 INJECTION INTRAVENOUS
Status: CANCELLED | OUTPATIENT
Start: 2019-12-01

## 2019-11-07 RX ORDER — SODIUM CHLORIDE 0.9 % (FLUSH) 0.9 %
10 SYRINGE (ML) INJECTION
Status: DISCONTINUED | OUTPATIENT
Start: 2019-11-07 | End: 2019-11-07 | Stop reason: HOSPADM

## 2019-11-07 RX ORDER — ACETAMINOPHEN 325 MG/1
650 TABLET ORAL
Status: COMPLETED | OUTPATIENT
Start: 2019-11-07 | End: 2019-11-07

## 2019-11-07 RX ORDER — HEPARIN 100 UNIT/ML
500 SYRINGE INTRAVENOUS
Status: CANCELLED | OUTPATIENT
Start: 2019-12-01

## 2019-11-07 RX ORDER — SODIUM CHLORIDE 0.9 % (FLUSH) 0.9 %
10 SYRINGE (ML) INJECTION
Status: CANCELLED | OUTPATIENT
Start: 2019-12-01

## 2019-11-07 RX ORDER — FAMOTIDINE 10 MG/ML
20 INJECTION INTRAVENOUS
Status: COMPLETED | OUTPATIENT
Start: 2019-11-07 | End: 2019-11-07

## 2019-11-07 RX ORDER — ACETAMINOPHEN 325 MG/1
650 TABLET ORAL
Status: CANCELLED | OUTPATIENT
Start: 2019-12-01

## 2019-11-07 RX ORDER — HEPARIN 100 UNIT/ML
500 SYRINGE INTRAVENOUS
Status: DISCONTINUED | OUTPATIENT
Start: 2019-11-07 | End: 2019-11-07 | Stop reason: HOSPADM

## 2019-11-07 RX ADMIN — ACETAMINOPHEN 650 MG: 325 TABLET ORAL at 01:11

## 2019-11-07 RX ADMIN — DIPHENHYDRAMINE HYDROCHLORIDE 50 MG: 50 INJECTION, SOLUTION INTRAMUSCULAR; INTRAVENOUS at 01:11

## 2019-11-07 RX ADMIN — HUMAN IMMUNOGLOBULIN G 30 G: 20 LIQUID INTRAVENOUS at 02:11

## 2019-11-07 RX ADMIN — SODIUM CHLORIDE: 9 INJECTION, SOLUTION INTRAVENOUS at 01:11

## 2019-11-07 RX ADMIN — FAMOTIDINE 20 MG: 10 INJECTION, SOLUTION INTRAVENOUS at 01:11

## 2019-11-07 NOTE — PLAN OF CARE
Pt tolerated IVIG infusion well, with no complications or s/s of adverse reaction. VS stable throughout infusion. Left wrist PIV positive for blood return, SL and removed prior to discharge. Catheter tip intact. RTC tomorrow 11/8/19 for more IVIG, pt verbalized understanding. Pt discharged with no distress noted, independently per mobility scooter. AVS printed and given to pt.

## 2019-11-08 ENCOUNTER — INFUSION (OUTPATIENT)
Dept: INFUSION THERAPY | Facility: HOSPITAL | Age: 64
End: 2019-11-08
Attending: INTERNAL MEDICINE
Payer: MEDICARE

## 2019-11-08 VITALS
HEART RATE: 82 BPM | TEMPERATURE: 98 F | SYSTOLIC BLOOD PRESSURE: 148 MMHG | DIASTOLIC BLOOD PRESSURE: 88 MMHG | RESPIRATION RATE: 18 BRPM

## 2019-11-08 DIAGNOSIS — M33.20 POLYMYOSITIS: Primary | ICD-10-CM

## 2019-11-08 PROCEDURE — 96367 TX/PROPH/DG ADDL SEQ IV INF: CPT

## 2019-11-08 PROCEDURE — 96375 TX/PRO/DX INJ NEW DRUG ADDON: CPT

## 2019-11-08 PROCEDURE — 96365 THER/PROPH/DIAG IV INF INIT: CPT

## 2019-11-08 PROCEDURE — 63600175 PHARM REV CODE 636 W HCPCS: Mod: JG | Performed by: INTERNAL MEDICINE

## 2019-11-08 PROCEDURE — 25000003 PHARM REV CODE 250: Performed by: INTERNAL MEDICINE

## 2019-11-08 PROCEDURE — S0028 INJECTION, FAMOTIDINE, 20 MG: HCPCS | Performed by: INTERNAL MEDICINE

## 2019-11-08 PROCEDURE — 96366 THER/PROPH/DIAG IV INF ADDON: CPT

## 2019-11-08 RX ORDER — ACETAMINOPHEN 325 MG/1
650 TABLET ORAL
Status: COMPLETED | OUTPATIENT
Start: 2019-11-08 | End: 2019-11-08

## 2019-11-08 RX ORDER — HEPARIN 100 UNIT/ML
500 SYRINGE INTRAVENOUS
Status: CANCELLED | OUTPATIENT
Start: 2019-12-01

## 2019-11-08 RX ORDER — FAMOTIDINE 10 MG/ML
20 INJECTION INTRAVENOUS
Status: CANCELLED | OUTPATIENT
Start: 2019-12-01

## 2019-11-08 RX ORDER — ACETAMINOPHEN 325 MG/1
650 TABLET ORAL
Status: CANCELLED | OUTPATIENT
Start: 2019-12-01

## 2019-11-08 RX ORDER — FAMOTIDINE 10 MG/ML
20 INJECTION INTRAVENOUS
Status: COMPLETED | OUTPATIENT
Start: 2019-11-08 | End: 2019-11-08

## 2019-11-08 RX ORDER — SODIUM CHLORIDE 0.9 % (FLUSH) 0.9 %
10 SYRINGE (ML) INJECTION
Status: DISCONTINUED | OUTPATIENT
Start: 2019-11-08 | End: 2019-11-08 | Stop reason: HOSPADM

## 2019-11-08 RX ORDER — HEPARIN 100 UNIT/ML
500 SYRINGE INTRAVENOUS
Status: DISCONTINUED | OUTPATIENT
Start: 2019-11-08 | End: 2019-11-08 | Stop reason: HOSPADM

## 2019-11-08 RX ORDER — SODIUM CHLORIDE 0.9 % (FLUSH) 0.9 %
10 SYRINGE (ML) INJECTION
Status: CANCELLED | OUTPATIENT
Start: 2019-12-01

## 2019-11-08 RX ADMIN — FAMOTIDINE 20 MG: 10 INJECTION, SOLUTION INTRAVENOUS at 02:11

## 2019-11-08 RX ADMIN — SODIUM CHLORIDE: 0.9 INJECTION, SOLUTION INTRAVENOUS at 02:11

## 2019-11-08 RX ADMIN — HUMAN IMMUNOGLOBULIN G 30 G: 10 LIQUID INTRAVENOUS at 03:11

## 2019-11-08 RX ADMIN — ACETAMINOPHEN 650 MG: 325 TABLET ORAL at 02:11

## 2019-11-08 RX ADMIN — DIPHENHYDRAMINE HYDROCHLORIDE 50 MG: 50 INJECTION, SOLUTION INTRAMUSCULAR; INTRAVENOUS at 03:11

## 2019-11-15 ENCOUNTER — TELEPHONE (OUTPATIENT)
Dept: RHEUMATOLOGY | Facility: CLINIC | Age: 64
End: 2019-11-15

## 2019-11-15 NOTE — TELEPHONE ENCOUNTER
Called patient. No answer. Voicemail left       ----- Message from Deb Angel MA sent at 11/11/2019  9:13 AM CST -----  Contact: self      ----- Message -----  From: Jenn Mcclain MD  Sent: 11/11/2019   8:59 AM CST  To: Deb Angel MA, MD Dr. Herminio Jain's patient. The fellows are supposed to be covering themselves.  If there's a problem, I will review the chart and decide what needs to be done.  ----- Message -----  From: Deb Angel MA  Sent: 11/11/2019   8:53 AM CST  To: Jenn Mcclain MD        ----- Message -----  From: Melia Parra  Sent: 11/11/2019   8:20 AM CST  To: Betzaida LAN Staff    Pt ask for a call in regards to the infusions is having him feel tired and weak. Pt ask what can he do       Contact info  [phone]

## 2019-11-18 ENCOUNTER — TELEPHONE (OUTPATIENT)
Dept: RHEUMATOLOGY | Facility: CLINIC | Age: 64
End: 2019-11-18

## 2019-11-18 NOTE — TELEPHONE ENCOUNTER
Called patient. No answer. VM left to return call     ----- Message from Heike Casarez MA sent at 11/18/2019  9:05 AM CST -----  Contact: pt      ----- Message -----  From: Aristides Starks  Sent: 11/18/2019   8:40 AM CST  To: Herminio Gonzalez Staff    Please call pt at [phone]    Patient is reporting some side effects from the IVIG treatment (having dark stool, headaches, chills and weakness)    Patient is feeling better this morning but would like to speak with staff     Thank you

## 2019-11-19 ENCOUNTER — PATIENT OUTREACH (OUTPATIENT)
Dept: ADMINISTRATIVE | Facility: HOSPITAL | Age: 64
End: 2019-11-19

## 2019-11-22 ENCOUNTER — OFFICE VISIT (OUTPATIENT)
Dept: INTERNAL MEDICINE | Facility: CLINIC | Age: 64
End: 2019-11-22
Payer: MEDICARE

## 2019-11-22 ENCOUNTER — TELEPHONE (OUTPATIENT)
Dept: HEPATOLOGY | Facility: CLINIC | Age: 64
End: 2019-11-22

## 2019-11-22 VITALS
TEMPERATURE: 98 F | HEART RATE: 82 BPM | OXYGEN SATURATION: 99 % | HEIGHT: 67 IN | DIASTOLIC BLOOD PRESSURE: 82 MMHG | BODY MASS INDEX: 23.79 KG/M2 | SYSTOLIC BLOOD PRESSURE: 128 MMHG

## 2019-11-22 DIAGNOSIS — K92.1 MELENA: ICD-10-CM

## 2019-11-22 DIAGNOSIS — R53.81 DEBILITY: ICD-10-CM

## 2019-11-22 DIAGNOSIS — Z74.09 IMPAIRED FUNCTIONAL MOBILITY AND ENDURANCE: ICD-10-CM

## 2019-11-22 DIAGNOSIS — M33.20 POLYMYOSITIS: Chronic | ICD-10-CM

## 2019-11-22 PROCEDURE — 99999 PR PBB SHADOW E&M-EST. PATIENT-LVL III: ICD-10-PCS | Mod: PBBFAC,,, | Performed by: FAMILY MEDICINE

## 2019-11-22 PROCEDURE — 99213 OFFICE O/P EST LOW 20 MIN: CPT | Mod: PBBFAC | Performed by: FAMILY MEDICINE

## 2019-11-22 PROCEDURE — 99213 OFFICE O/P EST LOW 20 MIN: CPT | Mod: S$PBB,,, | Performed by: FAMILY MEDICINE

## 2019-11-22 PROCEDURE — 99213 PR OFFICE/OUTPT VISIT, EST, LEVL III, 20-29 MIN: ICD-10-PCS | Mod: S$PBB,,, | Performed by: FAMILY MEDICINE

## 2019-11-22 PROCEDURE — 99999 PR PBB SHADOW E&M-EST. PATIENT-LVL III: CPT | Mod: PBBFAC,,, | Performed by: FAMILY MEDICINE

## 2019-11-22 NOTE — PROGRESS NOTES
Subjective:      Patient ID: Nura Kahn Jr. is a 63 y.o. male.    Chief Complaint: Stool Color Change; Home Health Review; and Extremity Weakness (x 2 week )      HPI:  Nura Kahn Jr. Is a 63 year old male with atrial fibrillation, Beta thalassemia, depression, dyslipidemia, dysphagia, hemoglobin C disease, history of aspiration pneumonia of the lung, microcytic anemia, osteopenia, polymyositis, portal venous hypertension, splenomegaly, thrombocytopenia, tobacco abuse, and vitamin D deficiency who presents to clinic today requesting evaluation for home health and complaining of dark stools.    Dark stools:  States he noticed black stool last week.  States he noticed 4 episodes of dark stools.      Requests evaluation for home health.  Has history of polymyositis and is dependent on using a scooter.  Has difficulty with normal ADLs (including difficulty getting to and going to the bathroom which has resulted in the patient urinating and defecating on himself in the past).  States he has been increasingly weak and is having difficulty even getting out of bed.  Was getting IVIG previously and felt some improvement but since his last infusion (beginning of this month) he has had headaches, felt hot/cold, and has had increasing weakness.  Has a good friend who is an RN who is helping take care of him now--bathing him, helping with transition to toilet, prepares meals.  Previously went to outpatient physical therapy but had difficulty with transportation and was not able to continue PT.      Past Medical History:   Diagnosis Date    Atrial fibrillation 3/2/2015    Depression     Essential hypertension 2/27/2019    Microcytic anemia 9/25/2014    Neuromuscular disorder     Polymyositis 2009    Tobacco abuse        No past surgical history on file.    Family History   Problem Relation Age of Onset    Diabetes Mother     Hypertension Mother     Diabetes Father     Hypertension Father     Heart attack Neg  Hx     Heart disease Neg Hx     Melanoma Neg Hx        Social History     Socioeconomic History    Marital status:      Spouse name: Not on file    Number of children: Not on file    Years of education: Not on file    Highest education level: Not on file   Occupational History    Not on file   Social Needs    Financial resource strain: Not on file    Food insecurity:     Worry: Not on file     Inability: Not on file    Transportation needs:     Medical: Not on file     Non-medical: Not on file   Tobacco Use    Smoking status: Former Smoker     Packs/day: 0.25     Years: 20.00     Pack years: 5.00     Types: Cigarettes    Smokeless tobacco: Never Used   Substance and Sexual Activity    Alcohol use: No     Alcohol/week: 0.0 standard drinks    Drug use: Yes     Types: Marijuana     Comment: daily use    Sexual activity: Not on file   Lifestyle    Physical activity:     Days per week: Not on file     Minutes per session: Not on file    Stress: Not on file   Relationships    Social connections:     Talks on phone: Not on file     Gets together: Not on file     Attends Sabianism service: Not on file     Active member of club or organization: Not on file     Attends meetings of clubs or organizations: Not on file     Relationship status: Not on file   Other Topics Concern    Not on file   Social History Narrative    Not on file       Review of Systems   Constitutional: Negative for chills, fatigue and fever.   HENT: Negative for congestion, hearing loss, nosebleeds, rhinorrhea, sore throat and trouble swallowing.    Eyes: Negative for pain and visual disturbance.   Respiratory: Negative for cough, shortness of breath and wheezing.    Cardiovascular: Negative for chest pain and palpitations.   Gastrointestinal: Negative for abdominal distention, abdominal pain, constipation, diarrhea and vomiting.        + Dark stools   Genitourinary: Negative for difficulty urinating, dysuria, frequency,  "hematuria and urgency.   Skin: Negative for color change and rash.   Neurological: Positive for weakness. Negative for dizziness, syncope, speech difficulty and numbness.   Psychiatric/Behavioral: Negative for agitation, behavioral problems and confusion. The patient is not nervous/anxious.      Objective:     Vitals:    11/22/19 1431   BP: 128/82   BP Location: Right arm   Patient Position: Sitting   BP Method: Medium (Manual)   Pulse: 82   Temp: 98.4 °F (36.9 °C)   TempSrc: Oral   SpO2: 99%   Weight: Comment: uto   Height: 5' 7" (1.702 m)       Physical Exam   Constitutional: He appears well-developed and well-nourished. He is cooperative. No distress.   Using motorized scooter   HENT:   Head: Normocephalic and atraumatic.   Right Ear: Hearing and external ear normal.   Left Ear: Hearing and external ear normal.   Nose: Nose normal. No rhinorrhea. No epistaxis.   Mouth/Throat: Oropharynx is clear and moist and mucous membranes are normal. No oral lesions.   Eyes: Pupils are equal, round, and reactive to light. Conjunctivae and lids are normal. Right eye exhibits no discharge. Left eye exhibits no discharge.   Neck: Trachea normal. Neck supple. No tracheal deviation present.   Cardiovascular: Normal rate, regular rhythm and normal heart sounds. Exam reveals no gallop and no friction rub.   No murmur heard.  Pulmonary/Chest: Effort normal and breath sounds normal. No respiratory distress. He has no wheezes. He has no rales.   Abdominal: Soft. Bowel sounds are normal. He exhibits no distension. There is no tenderness. There is no rebound and no guarding.   Musculoskeletal: He exhibits no edema or deformity.   1/5 strength to bilateral upper extremities.  1/5 strength to bilateral lower extremities.  Unable to resist examiner with resisted flexion/extension of bilateral shoulder joints and bilateral knee joints.   Neurological: He is alert. No cranial nerve deficit. He exhibits normal muscle tone.   Skin: Skin is warm " and dry. No rash noted.   Psychiatric: He has a normal mood and affect. His speech is normal and behavior is normal. Judgment and thought content normal. Cognition and memory are normal.   Nursing note and vitals reviewed.     Assessment:      1. Melena    2. Quadriparesis (muscle weakness)    3. Impaired functional mobility and endurance    4. Debility    5. Polymyositis      Plan:   Nura was seen today for stool color change, home health review and extremity weakness.    Diagnoses and all orders for this visit:    Melena  -     CBC auto differential; Future  -     Occult blood x 1, stool; Future    Quadriparesis (muscle weakness); Impaired functional mobility and endurance; Debility; Polymyositis  -     Ambulatory referral to Home Health external to [facility] at patient request.  Would benefit from home PT/OT and needs assistance with ADLs.

## 2019-11-25 ENCOUNTER — TELEPHONE (OUTPATIENT)
Dept: INTERNAL MEDICINE | Facility: CLINIC | Age: 64
End: 2019-11-25

## 2019-11-25 ENCOUNTER — TELEPHONE (OUTPATIENT)
Dept: RHEUMATOLOGY | Facility: CLINIC | Age: 64
End: 2019-11-25

## 2019-11-25 ENCOUNTER — HOSPITAL ENCOUNTER (EMERGENCY)
Facility: HOSPITAL | Age: 64
Discharge: HOME | End: 2019-11-25
Attending: EMERGENCY MEDICINE
Payer: MEDICARE

## 2019-11-25 VITALS
TEMPERATURE: 98 F | RESPIRATION RATE: 18 BRPM | SYSTOLIC BLOOD PRESSURE: 167 MMHG | HEART RATE: 78 BPM | OXYGEN SATURATION: 97 % | HEIGHT: 69 IN | BODY MASS INDEX: 23.7 KG/M2 | WEIGHT: 160 LBS | DIASTOLIC BLOOD PRESSURE: 97 MMHG

## 2019-11-25 DIAGNOSIS — M60.9 MYOSITIS, UNSPECIFIED MYOSITIS TYPE, UNSPECIFIED SITE: Primary | ICD-10-CM

## 2019-11-25 DIAGNOSIS — D64.9 ANEMIA, UNSPECIFIED TYPE: Primary | ICD-10-CM

## 2019-11-25 DIAGNOSIS — R19.5 STOOL COLOR ABNORMAL: ICD-10-CM

## 2019-11-25 LAB
ALBUMIN SERPL BCP-MCNC: 4.2 G/DL (ref 3.5–5.2)
ALP SERPL-CCNC: 65 U/L (ref 55–135)
ALT SERPL W/O P-5'-P-CCNC: 13 U/L (ref 10–44)
ANION GAP SERPL CALC-SCNC: 8 MMOL/L (ref 8–16)
ANISOCYTOSIS BLD QL SMEAR: SLIGHT
APTT BLDCRRT: 28.8 SEC (ref 21–32)
AST SERPL-CCNC: 16 U/L (ref 10–40)
BASOPHILS # BLD AUTO: 0.04 K/UL (ref 0–0.2)
BASOPHILS NFR BLD: 0.7 % (ref 0–1.9)
BILIRUB SERPL-MCNC: 1.5 MG/DL (ref 0.1–1)
BUN SERPL-MCNC: 7 MG/DL (ref 8–23)
CALCIUM SERPL-MCNC: 9.2 MG/DL (ref 8.7–10.5)
CHLORIDE SERPL-SCNC: 105 MMOL/L (ref 95–110)
CK MB SERPL-MCNC: 10.3 NG/ML (ref 0.1–6.5)
CK MB SERPL-RTO: 5.2 % (ref 0–5)
CK SERPL-CCNC: 199 U/L (ref 20–200)
CK SERPL-CCNC: 199 U/L (ref 20–200)
CO2 SERPL-SCNC: 27 MMOL/L (ref 23–29)
CREAT SERPL-MCNC: 0.5 MG/DL (ref 0.5–1.4)
CRP SERPL-MCNC: 0.9 MG/L (ref 0–8.2)
DIFFERENTIAL METHOD: ABNORMAL
EOSINOPHIL # BLD AUTO: 0.1 K/UL (ref 0–0.5)
EOSINOPHIL NFR BLD: 1.3 % (ref 0–8)
ERYTHROCYTE [DISTWIDTH] IN BLOOD BY AUTOMATED COUNT: 21.2 % (ref 11.5–14.5)
ERYTHROCYTE [SEDIMENTATION RATE] IN BLOOD BY WESTERGREN METHOD: <2 MM/HR (ref 0–23)
EST. GFR  (AFRICAN AMERICAN): >60 ML/MIN/1.73 M^2
EST. GFR  (NON AFRICAN AMERICAN): >60 ML/MIN/1.73 M^2
FERRITIN SERPL-MCNC: 746 NG/ML (ref 20–300)
GIANT PLATELETS BLD QL SMEAR: PRESENT
GLUCOSE SERPL-MCNC: 77 MG/DL (ref 70–110)
HCT VFR BLD AUTO: 33.6 % (ref 40–54)
HGB BLD-MCNC: 11 G/DL (ref 14–18)
HYPOCHROMIA BLD QL SMEAR: ABNORMAL
IMM GRANULOCYTES # BLD AUTO: 0.06 K/UL (ref 0–0.04)
IMM GRANULOCYTES NFR BLD AUTO: 1.1 % (ref 0–0.5)
INR PPP: 1.1 (ref 0.8–1.2)
IRON SERPL-MCNC: 65 UG/DL (ref 45–160)
LYMPHOCYTES # BLD AUTO: 1.9 K/UL (ref 1–4.8)
LYMPHOCYTES NFR BLD: 33.6 % (ref 18–48)
MAGNESIUM SERPL-MCNC: 2.1 MG/DL (ref 1.6–2.6)
MCH RBC QN AUTO: 18.8 PG (ref 27–31)
MCHC RBC AUTO-ENTMCNC: 32.7 G/DL (ref 32–36)
MCV RBC AUTO: 58 FL (ref 82–98)
MONOCYTES # BLD AUTO: 0.4 K/UL (ref 0.3–1)
MONOCYTES NFR BLD: 7.5 % (ref 4–15)
NEUTROPHILS # BLD AUTO: 3.1 K/UL (ref 1.8–7.7)
NEUTROPHILS NFR BLD: 55.8 % (ref 38–73)
NRBC BLD-RTO: 3 /100 WBC
OVALOCYTES BLD QL SMEAR: ABNORMAL
PLATELET # BLD AUTO: 165 K/UL (ref 150–350)
PMV BLD AUTO: ABNORMAL FL (ref 9.2–12.9)
POIKILOCYTOSIS BLD QL SMEAR: ABNORMAL
POLYCHROMASIA BLD QL SMEAR: ABNORMAL
POTASSIUM SERPL-SCNC: 3.5 MMOL/L (ref 3.5–5.1)
PROT SERPL-MCNC: 8.4 G/DL (ref 6–8.4)
PROTHROMBIN TIME: 10.9 SEC (ref 9–12.5)
RBC # BLD AUTO: 5.84 M/UL (ref 4.6–6.2)
RETICS/RBC NFR AUTO: 1.5 % (ref 0.4–2)
SATURATED IRON: 21 % (ref 20–50)
SODIUM SERPL-SCNC: 140 MMOL/L (ref 136–145)
SPHEROCYTES BLD QL SMEAR: ABNORMAL
TARGETS BLD QL SMEAR: ABNORMAL
TOTAL IRON BINDING CAPACITY: 305 UG/DL (ref 250–450)
TRANSFERRIN SERPL-MCNC: 206 MG/DL (ref 200–375)
WBC # BLD AUTO: 5.57 K/UL (ref 3.9–12.7)

## 2019-11-25 PROCEDURE — 99223 PR INITIAL HOSPITAL CARE,LEVL III: ICD-10-PCS | Mod: GC,,, | Performed by: INTERNAL MEDICINE

## 2019-11-25 PROCEDURE — 99284 EMERGENCY DEPT VISIT MOD MDM: CPT | Mod: GC,,, | Performed by: EMERGENCY MEDICINE

## 2019-11-25 PROCEDURE — 85025 COMPLETE CBC W/AUTO DIFF WBC: CPT

## 2019-11-25 PROCEDURE — 82728 ASSAY OF FERRITIN: CPT

## 2019-11-25 PROCEDURE — 82553 CREATINE MB FRACTION: CPT

## 2019-11-25 PROCEDURE — 85730 THROMBOPLASTIN TIME PARTIAL: CPT

## 2019-11-25 PROCEDURE — 85045 AUTOMATED RETICULOCYTE COUNT: CPT

## 2019-11-25 PROCEDURE — C9113 INJ PANTOPRAZOLE SODIUM, VIA: HCPCS | Performed by: STUDENT IN AN ORGANIZED HEALTH CARE EDUCATION/TRAINING PROGRAM

## 2019-11-25 PROCEDURE — 99284 EMERGENCY DEPT VISIT MOD MDM: CPT | Mod: 25

## 2019-11-25 PROCEDURE — 82085 ASSAY OF ALDOLASE: CPT

## 2019-11-25 PROCEDURE — 85610 PROTHROMBIN TIME: CPT

## 2019-11-25 PROCEDURE — 86140 C-REACTIVE PROTEIN: CPT

## 2019-11-25 PROCEDURE — 82550 ASSAY OF CK (CPK): CPT

## 2019-11-25 PROCEDURE — 99223 1ST HOSP IP/OBS HIGH 75: CPT | Mod: GC,,, | Performed by: INTERNAL MEDICINE

## 2019-11-25 PROCEDURE — 63600175 PHARM REV CODE 636 W HCPCS: Performed by: STUDENT IN AN ORGANIZED HEALTH CARE EDUCATION/TRAINING PROGRAM

## 2019-11-25 PROCEDURE — 80053 COMPREHEN METABOLIC PANEL: CPT

## 2019-11-25 PROCEDURE — 83540 ASSAY OF IRON: CPT

## 2019-11-25 PROCEDURE — 83735 ASSAY OF MAGNESIUM: CPT

## 2019-11-25 PROCEDURE — 99284 PR EMERGENCY DEPT VISIT,LEVEL IV: ICD-10-PCS | Mod: GC,,, | Performed by: EMERGENCY MEDICINE

## 2019-11-25 PROCEDURE — 85652 RBC SED RATE AUTOMATED: CPT

## 2019-11-25 PROCEDURE — 96374 THER/PROPH/DIAG INJ IV PUSH: CPT

## 2019-11-25 PROCEDURE — 82272 OCCULT BLD FECES 1-3 TESTS: CPT

## 2019-11-25 RX ORDER — PANTOPRAZOLE SODIUM 40 MG/10ML
80 INJECTION, POWDER, LYOPHILIZED, FOR SOLUTION INTRAVENOUS
Status: COMPLETED | OUTPATIENT
Start: 2019-11-25 | End: 2019-11-25

## 2019-11-25 RX ADMIN — PANTOPRAZOLE SODIUM 80 MG: 40 INJECTION, POWDER, LYOPHILIZED, FOR SOLUTION INTRAVENOUS at 02:11

## 2019-11-25 NOTE — HPI
The patient is a 63 year old man with PMH of polymyositis, osteopenia, vitamin D deficiency on 50,000u weekly, beta thalassemia, atrial fibrillation, portal hypertension, and COPD. His polymyositis was diagnosed in 2009, biopsy proven with mildly +ku and elevated CPK and aldolase. He currently receives monthly IVIg, and takes Cellcept 1500mg BID, and prednisone 10mg. He has dysphagia and severe muscle atrophy. He was last seen in Rheum clinic on 9/18/2019. His last IVIG was given November 8 but has not been consistently compliant with his medications. Mr. Kahn states that he has had darker stools over the past week. Last week he also had a headache and chills but those have since resolved. He states that he is a little bit weaker than usual and has been having a little more trouble transferring to his trailer. He has also been having a little more trouble swallowing but is close to his baseline. He otherwise denies a cough, urinary symptoms, diarrhea, abdominal pain, and bloody stool.    Treatment History:   MTX: discontinued due to hepatosplenomegaly with portal HTN noted on CT    Past Medical History:   Atrial fibrillation  Beta thalassemia  Polymyositis  Osteopenia  Vitamin D deficiency  Portal hypertension  COPD    Past Surgical History:   Muscle biopsy (2009)    Family History:  DM, HTN: Mother and father    Social History:   Wife works full time and daughter is only 10 years old

## 2019-11-25 NOTE — ED NOTES
Patient is upset because he does not wish to be in the stretcher or have additional bloodwork drawn.  MD advised of same.  Patient repeating that regardless of whether he gets admitted or not, he will not be staying in the hospital.

## 2019-11-25 NOTE — ED TRIAGE NOTES
Nura Kahn , an 63 y.o. male presents to the ED after leaving his rheumatologist and being told to come here for an evaluation.  Patient has a neuromuscular disorder and states that since he's almost done with IVIG infusions, he's been feeling weaker than normal and having dark stools.  He had labs drawn this morning and submitted a stool sample.  Patient adamant about not getting out of his motorized scooter and onto the stretcher because he states that he won't be staying long.  He wants a checkup and to go home.  His PCP and his rheumatologist both ordered labs.  Denies pain, shortness of breath, dizziness, nausea, vomiting, diarrhea.        Review of patient's allergies indicates:  No Known Allergies  Chief Complaint   Patient presents with    Melena     been getting weaker , i already have murscle disorder     Past Medical History:   Diagnosis Date    Atrial fibrillation 3/2/2015    Depression     Essential hypertension 2/27/2019    Microcytic anemia 9/25/2014    Neuromuscular disorder     Polymyositis 2009    Tobacco abuse

## 2019-11-25 NOTE — SUBJECTIVE & OBJECTIVE
Past Medical History:   Diagnosis Date    Atrial fibrillation 3/2/2015    Depression     Essential hypertension 2/27/2019    Microcytic anemia 9/25/2014    Neuromuscular disorder     Polymyositis 2009    Tobacco abuse        History reviewed. No pertinent surgical history.    Immunization History   Administered Date(s) Administered    Influenza 10/06/2011    Influenza - Quadrivalent - PF (6 months and older) 12/12/2018, 10/07/2019    Influenza - Trivalent - PF (ADULT) 09/27/2012    Pneumococcal Conjugate - 13 Valent 05/09/2016    Pneumococcal Polysaccharide - 23 Valent 12/12/2018       Review of patient's allergies indicates:  No Known Allergies  No current facility-administered medications for this encounter.      Current Outpatient Medications   Medication    albuterol (PROVENTIL/VENTOLIN HFA) 90 mcg/actuation inhaler    amLODIPine (NORVASC) 5 MG tablet    ammonium lactate 12 % Crea    ergocalciferol (ERGOCALCIFEROL) 50,000 unit Cap    ergocalciferol (ERGOCALCIFEROL) 50,000 unit Cap    famotidine (PEPCID) 20 MG tablet    labetalol (NORMODYNE) 100 MG tablet    mycophenolate (CELLCEPT) 500 mg Tab    predniSONE (DELTASONE) 2.5 MG tablet    predniSONE (DELTASONE) 5 MG tablet    predniSONE (DELTASONE) 5 MG tablet     Family History     Problem Relation (Age of Onset)    Diabetes Mother, Father    Hypertension Mother, Father        Tobacco Use    Smoking status: Former Smoker     Packs/day: 0.25     Years: 20.00     Pack years: 5.00     Types: Cigarettes    Smokeless tobacco: Never Used   Substance and Sexual Activity    Alcohol use: No     Alcohol/week: 0.0 standard drinks    Drug use: Yes     Types: Marijuana     Comment: daily use    Sexual activity: Not on file     Review of Systems   Constitutional: Positive for activity change. Negative for appetite change, chills, fatigue, fever and unexpected weight change.   HENT: Negative for congestion, hearing loss and mouth sores.    Respiratory:  Negative for cough, choking, chest tightness and shortness of breath.    Cardiovascular: Negative for chest pain, palpitations and leg swelling.   Gastrointestinal: Negative for abdominal distention, abdominal pain, blood in stool, constipation, diarrhea, nausea and vomiting.   Genitourinary: Negative for dysuria and hematuria.   Musculoskeletal: Negative for arthralgias, back pain and myalgias.   Skin: Negative for rash.   Neurological: Positive for weakness. Negative for numbness and headaches.     Objective:     Vital Signs (Most Recent):  Temp: 98.3 °F (36.8 °C) (11/25/19 1417)  Pulse: 78 (11/25/19 1304)  Resp: 18 (11/25/19 1129)  BP: (!) 167/97 (11/25/19 1129)  SpO2: 97 % (11/25/19 1304) Vital Signs (24h Range):  Temp:  [98.3 °F (36.8 °C)-98.4 °F (36.9 °C)] 98.3 °F (36.8 °C)  Pulse:  [78-80] 78  Resp:  [18] 18  SpO2:  [97 %] 97 %  BP: (167)/(97) 167/97     Weight: 72.6 kg (160 lb) (11/25/19 1129)  Body mass index is 23.63 kg/m².  Body surface area is 1.88 meters squared.    No intake or output data in the 24 hours ending 11/25/19 1448    Physical Exam   Constitutional: He is well-developed, well-nourished, and in no distress. No distress.   HENT:   Head: Normocephalic and atraumatic.   Mouth/Throat: Oropharynx is clear and moist. No oropharyngeal exudate.   Eyes: Pupils are equal, round, and reactive to light. Right eye exhibits no discharge. Left eye exhibits no discharge. No scleral icterus.   Neck: Normal range of motion. Neck supple. No thyromegaly present.   Cardiovascular: Normal rate, regular rhythm and normal heart sounds.  Exam reveals no gallop and no friction rub.    No murmur heard.  Pulmonary/Chest: Effort normal and breath sounds normal. No respiratory distress. He has no wheezes. He has no rales.   Abdominal: Soft. Bowel sounds are normal. He exhibits no distension and no mass. There is no tenderness. There is no rebound and no guarding.       Right Side Rheumatological Exam     Muscle Strength  (0-5 scale):  Neck Flexion:  5  Neck Extension: 5  Deltoid:  3  Biceps: 2/5   Triceps:  2  : 2/5   Iliopsoas: 1  Quadriceps:  1   Distal Lower Extremity: 1    Left Side Rheumatological Exam     Muscle Strength (0-5 scale):  Neck Flexion:  5  Neck Extension: 5  Deltoid:  4  Biceps: 2/5   Triceps:  2  :  2/5   Iliopsoas: 1  Quadriceps:  1   Distal Lower Extremity: 1      Lymphadenopathy:     He has no cervical adenopathy.   Skin: Skin is warm and dry. No rash noted. He is not diaphoretic.     Musculoskeletal:   No synovitis, dactylitis, enthesitis       Significant Labs:  Recent Labs   Lab 11/25/19  1313   WBC 5.57   RBC 5.84   HGB 11.0*   HCT 33.6*      MCV 58*   MCH 18.8*   MCHC 32.7     CMP: WNL  CPK: WNL  FOBT negative

## 2019-11-25 NOTE — TELEPHONE ENCOUNTER
Retic count/iron/TIBC/ferritin ordered for further evaluation of anemia; please process/notify patient once complete.  Explanation sent via portal.

## 2019-11-25 NOTE — CONSULTS
[facility]  Rheumatology  Consult Note    Patient Name: Nura Kahn Jr.  MRN: [id number]  Admission Date: 11/25/2019  Hospital Length of Stay: 0 days  Code Status: Prior   Attending Provider: Сергей Mary MD  Primary Care Physician: Socrates Ambrosio MD  Principal Problem:<principal problem not specified>    Consults  Subjective:     HPI: The patient is a 63 year old man with PMH of polymyositis, osteopenia, vitamin D deficiency on 50,000u weekly, beta thalassemia, atrial fibrillation, portal hypertension, and COPD. His polymyositis was diagnosed in 2009, biopsy proven with mildly +ku and elevated CPK and aldolase. He currently receives monthly IVIg, and takes Cellcept 1500mg BID, and prednisone 10mg. He has dysphagia and severe muscle atrophy. He was last seen in Rheum clinic on 9/18/2019. His last IVIG was given November 8 but has not been consistently compliant with his medications. Mr. Kahn states that he has had darker stools over the past week. Last week he also had a headache and chills but those have since resolved. He states that he is a little bit weaker than usual and has been having a little more trouble transferring to his trailer. He has also been having a little more trouble swallowing but is close to his baseline. He otherwise denies a cough, urinary symptoms, diarrhea, abdominal pain, and bloody stool.    Treatment History:   MTX: discontinued due to hepatosplenomegaly with portal HTN noted on CT    Past Medical History:   Atrial fibrillation  Beta thalassemia  Polymyositis  Osteopenia  Vitamin D deficiency  Portal hypertension  COPD    Past Surgical History:   Muscle biopsy (2009)    Family History:  DM, HTN: Mother and father    Social History:   Wife works full time and daughter is only 10 years old        Past Medical History:   Diagnosis Date    Atrial fibrillation 3/2/2015    Depression     Essential hypertension 2/27/2019    Microcytic anemia 9/25/2014     Neuromuscular disorder     Polymyositis 2009    Tobacco abuse        History reviewed. No pertinent surgical history.    Immunization History   Administered Date(s) Administered    Influenza 10/06/2011    Influenza - Quadrivalent - PF (6 months and older) 12/12/2018, 10/07/2019    Influenza - Trivalent - PF (ADULT) 09/27/2012    Pneumococcal Conjugate - 13 Valent 05/09/2016    Pneumococcal Polysaccharide - 23 Valent 12/12/2018       Review of patient's allergies indicates:  No Known Allergies  No current facility-administered medications for this encounter.      Current Outpatient Medications   Medication    albuterol (PROVENTIL/VENTOLIN HFA) 90 mcg/actuation inhaler    amLODIPine (NORVASC) 5 MG tablet    ammonium lactate 12 % Crea    ergocalciferol (ERGOCALCIFEROL) 50,000 unit Cap    ergocalciferol (ERGOCALCIFEROL) 50,000 unit Cap    famotidine (PEPCID) 20 MG tablet    labetalol (NORMODYNE) 100 MG tablet    mycophenolate (CELLCEPT) 500 mg Tab    predniSONE (DELTASONE) 2.5 MG tablet    predniSONE (DELTASONE) 5 MG tablet    predniSONE (DELTASONE) 5 MG tablet     Family History     Problem Relation (Age of Onset)    Diabetes Mother, Father    Hypertension Mother, Father        Tobacco Use    Smoking status: Former Smoker     Packs/day: 0.25     Years: 20.00     Pack years: 5.00     Types: Cigarettes    Smokeless tobacco: Never Used   Substance and Sexual Activity    Alcohol use: No     Alcohol/week: 0.0 standard drinks    Drug use: Yes     Types: Marijuana     Comment: daily use    Sexual activity: Not on file     Review of Systems   Constitutional: Positive for activity change. Negative for appetite change, chills, fatigue, fever and unexpected weight change.   HENT: Negative for congestion, hearing loss and mouth sores.    Respiratory: Negative for cough, choking, chest tightness and shortness of breath.    Cardiovascular: Negative for chest pain, palpitations and leg swelling.    Gastrointestinal: Negative for abdominal distention, abdominal pain, blood in stool, constipation, diarrhea, nausea and vomiting.   Genitourinary: Negative for dysuria and hematuria.   Musculoskeletal: Negative for arthralgias, back pain and myalgias.   Skin: Negative for rash.   Neurological: Positive for weakness. Negative for numbness and headaches.     Objective:     Vital Signs (Most Recent):  Temp: 98.3 °F (36.8 °C) (11/25/19 1417)  Pulse: 78 (11/25/19 1304)  Resp: 18 (11/25/19 1129)  BP: (!) 167/97 (11/25/19 1129)  SpO2: 97 % (11/25/19 1304) Vital Signs (24h Range):  Temp:  [98.3 °F (36.8 °C)-98.4 °F (36.9 °C)] 98.3 °F (36.8 °C)  Pulse:  [78-80] 78  Resp:  [18] 18  SpO2:  [97 %] 97 %  BP: (167)/(97) 167/97     Weight: 72.6 kg (160 lb) (11/25/19 1129)  Body mass index is 23.63 kg/m².  Body surface area is 1.88 meters squared.    No intake or output data in the 24 hours ending 11/25/19 1448    Physical Exam   Constitutional: He is well-developed, well-nourished, and in no distress. No distress.   HENT:   Head: Normocephalic and atraumatic.   Mouth/Throat: Oropharynx is clear and moist. No oropharyngeal exudate.   Eyes: Pupils are equal, round, and reactive to light. Right eye exhibits no discharge. Left eye exhibits no discharge. No scleral icterus.   Neck: Normal range of motion. Neck supple. No thyromegaly present.   Cardiovascular: Normal rate, regular rhythm and normal heart sounds.  Exam reveals no gallop and no friction rub.    No murmur heard.  Pulmonary/Chest: Effort normal and breath sounds normal. No respiratory distress. He has no wheezes. He has no rales.   Abdominal: Soft. Bowel sounds are normal. He exhibits no distension and no mass. There is no tenderness. There is no rebound and no guarding.       Right Side Rheumatological Exam     Muscle Strength (0-5 scale):  Neck Flexion:  5  Neck Extension: 5  Deltoid:  3  Biceps: 2/5   Triceps:  2  : 2/5   Iliopsoas: 1  Quadriceps:  1   Distal Lower  Extremity: 1    Left Side Rheumatological Exam     Muscle Strength (0-5 scale):  Neck Flexion:  5  Neck Extension: 5  Deltoid:  4  Biceps: 2/5   Triceps:  2  :  2/5   Iliopsoas: 1  Quadriceps:  1   Distal Lower Extremity: 1      Lymphadenopathy:     He has no cervical adenopathy.   Skin: Skin is warm and dry. No rash noted. He is not diaphoretic.     Musculoskeletal:   No synovitis, dactylitis, enthesitis       Significant Labs:  Recent Labs   Lab 11/25/19  1313   WBC 5.57   RBC 5.84   HGB 11.0*   HCT 33.6*      MCV 58*   MCH 18.8*   MCHC 32.7     CMP: WNL  CPK: WNL  FOBT negative    Assessment/Plan:     Polymyositis  The patient is a 63 year old man with PMH of  osteopenia, vitamin D deficiency on 50,000u weekly, beta thalassemia, atrial fibrillation, portal hypertension, COPD, and history of polymyositis with dysphagia and severe muscle atrophy at baseline. He currently receives monthly IVIg, and takes Cellcept 1500mg BID, and prednisone 10mg inconsistently. His polymyositis was reportedly diagnosed in 2009, and was biopsy proven with mildly +ku and elevated CPK and aldolase. 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 However, Mr. Kahn has not been consistently compliant with his medications which may also explain his recent lack of improvement. He does not appear to be acutely worse. CMP and CPK are within normal limits. FOBT was negative and Hgb has actually improved. He is cleared for discharge from the Rheum standpoint and we will arrange for follow up in clinic. Please discharge on current meds and we will call the patient if we would like him to increase his steroid dose. Would also recommend more aggressive physical therapy.     - Please discharge on current meds (Cellcept 1500mg BID and prednisone 10mg)  - We will call the patient if decide to  increase his steroid dose  - Recommend more aggressive physical therapy.         This patient was seen and discussed with Dr. Bosch.    Thank you for your consult. I will sign off. Please contact us if you have any additional questions.    Cristy Smith MD  Rheumatology PGY-4  [facility]

## 2019-11-25 NOTE — TELEPHONE ENCOUNTER
Called patient he states that he has had an acute decline in his physical strength over the past week or so. He is not able to hold his UE up as he was before. He also states that he had HA, chills and black stool last week. Went to see PCP yesterday who ordered PT/OT and FOBT. He had labs drawn for us today. Case discussed wt Dr. Huston. Plans to send patient for further evaluation to the hospital. He needs to be worked up for GI bleed, infection and worsening myosiitis. Needs admission due to his immunocompromised status. Of note Pt has severe muscle atrophy and he does not have elevated CPK/aldolase due to this. 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 He is currenly on IVIG monthly. cellcept 1500mg bid and prednisone 10mg daily. He states he missed one day of cellcept and prednisone, otherwise is compliant with his medications. Please consult Rheumatology.   Called patient and he is aware of plan. Questions answered.   Called ER and joe are aware patient is coming.         ----- Message from Heike Casarez MA sent at 11/22/2019  2:34 PM CST -----  Contact: Patient      ----- Message -----  From: Sofie Escamilla  Sent: 11/22/2019   2:21 PM CST  To: Herminio Johnson    Patient is having weakness in muscles, please call.    Phone: [phone]

## 2019-11-25 NOTE — ED PROVIDER NOTES
Encounter Date: 11/25/2019       History     Chief Complaint   Patient presents with    Melena     been getting weaker , i already have murscle disorder     HPI   63M w/ Hx myositis, as well as Hx as below presenting by rec from primary Rheumatologist w/ predominant complaint of dark stools. Reports noting dark stools, not overtly black, for the last week. No prior Hx of similar symptoms. Occasional constipation but otherwise endorses good bowel movements recently w/ no diarrhea or hematochezia. No associated abdominal pain or significant NSAID use per patient. No recent alcohol consumption. No dizziness, diaphoresis, CP, SOB, N/V, or excessive fatigue outside of baseline. Of note, pt endorses gradual worsening of diffuse muscle strength for the last several weeks, currently undergoing workup/treatment by rheumatology. He is on steroids daily.     Review of patient's allergies indicates:  No Known Allergies  Past Medical History:   Diagnosis Date    Atrial fibrillation 3/2/2015    Depression     Essential hypertension 2/27/2019    Microcytic anemia 9/25/2014    Neuromuscular disorder     Polymyositis 2009    Tobacco abuse      History reviewed. No pertinent surgical history.  Family History   Problem Relation Age of Onset    Diabetes Mother     Hypertension Mother     Diabetes Father     Hypertension Father     Heart attack Neg Hx     Heart disease Neg Hx     Melanoma Neg Hx      Social History     Tobacco Use    Smoking status: Former Smoker     Packs/day: 0.25     Years: 20.00     Pack years: 5.00     Types: Cigarettes    Smokeless tobacco: Never Used   Substance Use Topics    Alcohol use: No     Alcohol/week: 0.0 standard drinks    Drug use: Yes     Types: Marijuana     Comment: daily use     Review of Systems   Constitutional: Positive for fatigue. Negative for chills and fever.   HENT: Negative for congestion and rhinorrhea.    Respiratory: Negative for chest tightness and shortness of  breath.    Cardiovascular: Negative for chest pain and palpitations.   Gastrointestinal: Negative for abdominal pain and nausea.   Genitourinary: Negative for dysuria and frequency.   Musculoskeletal: Negative for arthralgias and myalgias.   Skin: Negative for color change and pallor.   Allergic/Immunologic: Positive for immunocompromised state.   Neurological: Positive for weakness. Negative for dizziness and headaches.   Psychiatric/Behavioral: Negative for agitation and confusion.   All other systems reviewed and are negative.      Physical Exam     Initial Vitals [11/25/19 1129]   BP Pulse Resp Temp SpO2   (!) 167/97 80 18 98.4 °F (36.9 °C) 97 %      MAP       --         Physical Exam    Nursing note and vitals reviewed.  Constitutional: He appears well-developed. He is not diaphoretic. No distress.   Cachectic in appearance   HENT:   Head: Normocephalic and atraumatic.   Right Ear: External ear normal.   Left Ear: External ear normal.   Eyes: Conjunctivae are normal. Pupils are equal, round, and reactive to light.   Neck: Neck supple. No tracheal deviation present.   Cardiovascular: Normal rate, regular rhythm, normal heart sounds and intact distal pulses.   Pulmonary/Chest: Breath sounds normal. No stridor. No respiratory distress. He has no wheezes. He has no rhonchi. He has no rales.   Abdominal: Soft. He exhibits no distension and no mass. There is no tenderness. There is no rebound and no guarding.   Genitourinary: Rectal exam shows guaiac negative stool. Guaiac negative stool. : Acceptable.  Genitourinary Comments: Soft, brown stool w/o melena/hematochezia. Occult negative.    Musculoskeletal: Normal range of motion. He exhibits no edema.   Evidence of diffuse muscle wasting and weakness, although able to move extremities. Unable to support weight.    Neurological: He is alert and oriented to person, place, and time. GCS score is 15. GCS eye subscore is 4. GCS verbal subscore is 5. GCS  motor subscore is 6.   Diffusely weak with atrophy of muscles diffusely.  Unable to sit up or move very well on his own.   Skin: Skin is warm. Capillary refill takes less than 2 seconds. No rash noted. No pallor.   Psychiatric: He has a normal mood and affect. His behavior is normal.         ED Course   Procedures  Labs Reviewed   ALDOLASE - Abnormal; Notable for the following components:       Result Value    Aldolase 29.6 (*)     All other components within normal limits   CK-MB - Abnormal; Notable for the following components:    CPK MB 10.3 (*)     MB% 5.2 (*)     All other components within normal limits   FERRITIN - Abnormal; Notable for the following components:    Ferritin 746 (*)     All other components within normal limits   CBC W/ AUTO DIFFERENTIAL - Abnormal; Notable for the following components:    Hemoglobin 11.0 (*)     Hematocrit 33.6 (*)     Mean Corpuscular Volume 58 (*)     Mean Corpuscular Hemoglobin 18.8 (*)     RDW 21.2 (*)     Immature Granulocytes 1.1 (*)     Immature Grans (Abs) 0.06 (*)     nRBC 3 (*)     All other components within normal limits   COMPREHENSIVE METABOLIC PANEL - Abnormal; Notable for the following components:    BUN, Bld 7 (*)     Total Bilirubin 1.5 (*)     All other components within normal limits   PROTIME-INR   APTT   MAGNESIUM   RETICULOCYTES   IRON AND TIBC   CK   SEDIMENTATION RATE   C-REACTIVE PROTEIN               Imaging Results    None          Medical Decision Making:   History:   I obtained history from: another health care provider.       <> Summary of History: Sent from home for by rheumatology concerning for worsening myositis +/- GI bleed  Old Medical Records: I decided to obtain old medical records.  Old Records Summarized: records from clinic visits.       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       <> Summary of the Discussion: Rheumatology       APC / Resident Notes:   House Officer Medical Decision Makin y.o. male, PMHx myositis, emergently evaluated for concern for darkening of stools, w/ vital signs notably stable , and cardiopulmonary exam that is unremarkable. Targeted exam is notable for diffuse muscular atrophy that appears to be at patient's baseline, and otherwise absence of abdominal pain/tenderness and rectal exam w/ no evidence of true melena nor occult blood.   DDx includes but not limited to bowel abnormality 2/2 food or medication, but otherwise suspicion for true GI bleeding is lower at this point. Initially provided w/ protonix. Bloodwork does not show worsening anemia, and his metabolic panel including lytes, magnesium, coags are otherwise stable. Remainder of bloodwork ordered to assist outpatient workup, and Rheumatology consulted to evaluate pt as he may no longer require prednisone dosing. Recs from rheum pending, but otherwise anticipate discharge w/ recs and further o/p workup.     Andrez Mares MD  House Officer, PGY-4  Emergency Medicine  2019 4:28 PM    HO-4 Update:  Rheum to call patient with further recs on medication use as outpatient. Will discharge w/ return precautions.  Andrez Mares M.D.  Emergency Medicine, PGY-4  2019 5:43 PM         Attending Attestation:   Physician Attestation Statement for Resident:  As the supervising MD   Physician Attestation Statement: I have personally seen and examined this patient.   I agree with the above history. -:   As the supervising MD I agree with the above PE.    As the supervising MD I agree with the above treatment, course, plan, and disposition.   -:     Overall well-appearing.  Here with reported dark stools at home that are light and Hemoccult negative here.  Vitals normal. Afebrile.  Rheumatology initially concerned because patient had expressed vague chills and weakness in his  immunosuppressed.  They were initially under the impression he would need admission.  However, lab work came back reassuring and they evaluated patient.  They believe patient can be followed as an outpatient basis.  Furthermore, patient does not want to stay in the hospital.  Clinically there is no indication for admission.  Rheumatology does not recommend any changes to medications.  They will follow-up patient clinic. Stable for discharge at this time.       I have reviewed and agree with the residents interpretation of the following: lab data.  I have reviewed the following: old records at this facility.                                  Clinical Impression:       ICD-10-CM ICD-9-CM   1. Myositis, unspecified myositis type, unspecified site M60.9 729.1   2. Stool color abnormal R19.5 792.1         Disposition:   Disposition: Discharged  Condition: Stable                     Andrez Bales MD  Resident  11/25/19 1743       Сергей Mary MD  11/27/19 4421

## 2019-11-25 NOTE — ASSESSMENT & PLAN NOTE
The patient is a 63 year old man with PMH of  osteopenia, vitamin D deficiency on 50,000u weekly, beta thalassemia, atrial fibrillation, portal hypertension, COPD, and history of polymyositis with dysphagia and severe muscle atrophy at baseline. He currently receives monthly IVIg, and takes Cellcept 1500mg BID, and prednisone 10mg inconsistently. His polymyositis was reportedly diagnosed in 2009, and was biopsy proven with mildly +ku and elevated CPK and aldolase. 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 However, Mr. Kahn has not been consistently compliant with his medications which may also explain his recent lack of improvement. He does not appear to be acutely worse. CMP and CPK are within normal limits. FOBT was negative and Hgb has actually improved. He is cleared for discharge from the Rheum standpoint and we will arrange for follow up in clinic. Please discharge on current meds and we will call the patient if we would like him to increase his steroid dose. Would also recommend more aggressive physical therapy.     - Please discharge on current meds (Cellcept 1500mg BID and prednisone 10mg)  - We will call the patient if decide to increase his steroid dose  - Recommend more aggressive physical therapy.

## 2019-11-27 LAB — ALDOLASE SERPL-CCNC: 29.6 U/L (ref 1.2–7.6)

## 2019-11-30 ENCOUNTER — EXTERNAL CHRONIC CARE MANAGEMENT (OUTPATIENT)
Dept: PRIMARY CARE CLINIC | Facility: CLINIC | Age: 64
End: 2019-11-30
Payer: MEDICARE

## 2019-11-30 PROCEDURE — 99490 CHRNC CARE MGMT STAFF 1ST 20: CPT | Mod: PBBFAC | Performed by: FAMILY MEDICINE

## 2019-11-30 PROCEDURE — 99490 CHRNC CARE MGMT STAFF 1ST 20: CPT | Mod: S$PBB,,, | Performed by: FAMILY MEDICINE

## 2019-11-30 PROCEDURE — 99490 PR CHRONIC CARE MGMT, 1ST 20 MIN: ICD-10-PCS | Mod: S$PBB,,, | Performed by: FAMILY MEDICINE

## 2019-12-03 ENCOUNTER — PATIENT OUTREACH (OUTPATIENT)
Dept: ADMINISTRATIVE | Facility: OTHER | Age: 64
End: 2019-12-03

## 2019-12-04 ENCOUNTER — OFFICE VISIT (OUTPATIENT)
Dept: RHEUMATOLOGY | Facility: CLINIC | Age: 64
End: 2019-12-04
Payer: MEDICARE

## 2019-12-04 VITALS
WEIGHT: 152 LBS | SYSTOLIC BLOOD PRESSURE: 151 MMHG | DIASTOLIC BLOOD PRESSURE: 55 MMHG | HEIGHT: 68 IN | BODY MASS INDEX: 23.04 KG/M2 | HEART RATE: 88 BPM

## 2019-12-04 DIAGNOSIS — R13.10 DYSPHAGIA, UNSPECIFIED TYPE: ICD-10-CM

## 2019-12-04 DIAGNOSIS — G72.41 INCLUSION BODY MYOSITIS (IBM): ICD-10-CM

## 2019-12-04 DIAGNOSIS — M33.20 POLYMYOSITIS: Primary | ICD-10-CM

## 2019-12-04 PROCEDURE — 99999 PR PBB SHADOW E&M-EST. PATIENT-LVL V: ICD-10-PCS | Mod: PBBFAC,GC,, | Performed by: STUDENT IN AN ORGANIZED HEALTH CARE EDUCATION/TRAINING PROGRAM

## 2019-12-04 PROCEDURE — 99213 OFFICE O/P EST LOW 20 MIN: CPT | Mod: S$PBB,GC,, | Performed by: STUDENT IN AN ORGANIZED HEALTH CARE EDUCATION/TRAINING PROGRAM

## 2019-12-04 PROCEDURE — 99999 PR PBB SHADOW E&M-EST. PATIENT-LVL V: CPT | Mod: PBBFAC,GC,, | Performed by: STUDENT IN AN ORGANIZED HEALTH CARE EDUCATION/TRAINING PROGRAM

## 2019-12-04 PROCEDURE — 99213 PR OFFICE/OUTPT VISIT, EST, LEVL III, 20-29 MIN: ICD-10-PCS | Mod: S$PBB,GC,, | Performed by: STUDENT IN AN ORGANIZED HEALTH CARE EDUCATION/TRAINING PROGRAM

## 2019-12-04 PROCEDURE — 99215 OFFICE O/P EST HI 40 MIN: CPT | Mod: PBBFAC | Performed by: STUDENT IN AN ORGANIZED HEALTH CARE EDUCATION/TRAINING PROGRAM

## 2019-12-04 RX ORDER — PREDNISONE 5 MG/1
20 TABLET ORAL DAILY
Qty: 120 TABLET | Refills: 2 | Status: SHIPPED | OUTPATIENT
Start: 2019-12-04 | End: 2020-03-03

## 2019-12-04 RX ORDER — MYCOPHENOLATE MOFETIL 500 MG/1
1500 TABLET ORAL 2 TIMES DAILY
Qty: 180 TABLET | Refills: 11 | Status: SHIPPED | OUTPATIENT
Start: 2019-12-04 | End: 2020-04-30 | Stop reason: SDUPTHER

## 2019-12-04 ASSESSMENT — ROUTINE ASSESSMENT OF PATIENT INDEX DATA (RAPID3)
MDHAQ FUNCTION SCORE: 2.2
AM STIFFNESS SCORE: 0, NO
PSYCHOLOGICAL DISTRESS SCORE: 3.3
PATIENT GLOBAL ASSESSMENT SCORE: 8
PAIN SCORE: 0
FATIGUE SCORE: 8
TOTAL RAPID3 SCORE: 5.11

## 2019-12-04 NOTE — PATIENT INSTRUCTIONS
Inclusion body myositis   Polymyositis     Continue IVIG for 5 days monthly  Continue Cellcept 1500mg twice a day  Increase prednisone to 20mg daily     Continue physical therapy     Need to see PMR doctor, Gastro doctor

## 2019-12-04 NOTE — PROGRESS NOTES
Subjective:       Patient ID: Nura Kahn Jr. is a 63 y.o. male.    Chief Complaint: follow up polymyositis     HPI   62yo M with PMH of overlap of polymyositis (dx 2009, mildly +ku, outside biopsy proven with elevated CPK and aldolase) and inclusion body myositis (+CN1A), osteopenia, Vit D insufficiency, Hemoglobulin c/beta-zero thalaseema, A-fib, dysphagia, portal HTN, emphysema here for follow up.    Per chart review it seems that he first established care here at [facility] in 8/2014. He was diagnosed in 0470-4707 when he was feeling weak, falling. He was a  and was unable to use his legs. He had a biopsy in 5066-7786 in [place] that per notes was consistent with polymyositis. He was on MTX in the past which was stopped due CT showing hepatosplenomegaly with portal venous hypertension and elevated T bili in 8/2014 He was at that time started on Imuran. He has been on Imuran and steroids since then.  In 2016, he was worked up again here at [facility]. MRI of the left lower extremities showed Severe atrophy with fatty replacement of the thigh musculature bilaterally.  There is mild increased edema signal within the remaining thigh musculature and left iliopsoas muscle without associated enhancement which could reflect a mild degree of residual myositis.  EMG Left upper Ext 5/2016 was a technically difficult study. Had moderate ulnar entrapment at elbow, perhaps on a background of mild polyneuropathy. 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 Pathology of left thigh showing minute potion of muscle with end stage myopathic changes and focal chronic inflammation   Patient remained on Imuran and prednisone.      In Feb 2019 - patient had been out of Imuran for ~1 month.  He also admits to medication non-compliance because he didn't think he was having any improvement.  He admits that his weakness has gotten worse - stating that he is  "unable to stand, difficulty with getting in and out of his scooter, normal ADLs (including going to the bathroom - resulting in urination/defecation on himself - resulting in decreased PO intake).  He also feels worsening of his dysphagia - where he gets food stuck and often chokes/coughs when he eats/drinks.       Wife works fulltime.  Daughter (10 yo).  Patient does not have social support/help for transportation to various appt.     Specialist updates:  - Cardiology - Last saw 3/2015 - diagnosed with PAF.  No treatment at that time.  No follow up since  - Hem - Last saw 12/2017 - Evaluated for microcytic anemia and mild chronic thrombocytosis.  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  Discharged from clinic   - GI - Last saw 6/2015 - EGD/motility study ordered - not performed.  No follow up   - Hepatology - Last saw 5/2015 - Found portal HTN on CT with no evidence of cirrhosis or PV thrombosis.  Fibrosure with F1 stage (minimial fibrosis).  RTC PRN (no follow up)  - Pulmonary - Last saw 2/2015 -" Lung parenchymal normal - no evidence of fibrosis and one mediastinal LN marginal enlarged" CT chest showed GGO (2016). Repeat CT chest (2017) - no mention of GGO      Imaging:  CXR 1/2018:Patchy consolidation projected over the right lower lung zone, concerning for infection, correlation advised.       CT chest 12/2017:Peribronchial cuffing predominantly in the lower lung zones as well as retained secretions in the right middle lobe segmental bronchi consistent with bronchitis; such inflammation can occur with inhaled irritants or aspiration.  There is no evidence of interstitial lung disease such as NSIP or UIP.Upper lung zone predominant paraseptal emphysema and dispersed areas of centrilobular emphysema Stable 1.2 cm AP window lymph node.  Densely calcified granuloma in the posterobasal segment of the right lower lobe.Hepatosplenomegaly with associated splenic collateral vessels at the splenic hilum " suggesting sequela of portal venous hypertension    CT chest 4/2019:   1. Mild peribronchial cuffing which may be seen with bronchitis.  Mild tubular bronchiectasis of the right lower lobe with several distal bronchi demonstrating retained secretions, finding may be seen with small airways infectious/noninfectious inflammation or aspiration.  2. Centrilobular and paraseptal emphysema with an upper lung zone predominance.  3. Diffuse muscular atrophy.  4. Splenomegaly..     CT pelvis 6/2017: Obturator internus edema or hematoma. No acute fracture. Stable splenomegaly.     Esophogram 4/2015: Spontaneous gastroesophageal reflux.  Otherwise, unremarkable esophagram.     PET scan 8/2014:AP window lymph node SUV max less than 2.5.  This is most likely benign/reactive.  Surveillance is recommended at 3, 6, 12, and 24 months. Splenomegaly most likely from portal hypertension    He was admitted in 4/2019 worsening generalized muscle weakness and dysphagia. Labs on admission showed Normal and stable CBC and CMP.  Normal CK/aldolase.  Normal inflammatory markers.  Elevated IgG (1754). HMGCoA ab negative. Vit D: 15.    MRI femurs 4/6/19:  1.  Redemonstration of advanced diffuse atrophy and fatty replacement of the bilateral thigh musculature involving all compartments.  There is mild residual edema signal present within the bilateral obturator musculature and residual posterior compartment musculature, similar to prior MRI examination.  2.  Mild heterogeneity of the visualized bone marrow signal which may relate to red marrow reconversion.  Clinical correlation advised.  Patient will also benefit from PT/OT and rehab placement for muscle strengthening after completion of IVIG.      MRI humerus b/l 4/7:  Significant diffuse atrophy and fatty replacement of the bilateral proximal upper extremity musculature, including the rotator cuff musculature, right greater than left, in this patient with reported history of polymyositis.  " There is some degree of sparing of the bilateral deltoid and triceps musculature with greater residual muscle bulk on the left compared to the right.  There is mild residual edema present within the remaining musculature, most pronounced within the triceps.    During that hospitalization he received IVIG x 5 days, He was started on cellcept 1g BID and prednisone 10mg daily. He went to inpatient rehab for about 2.5 weeks.     Last visit 9/2019: he was doing well. Plan to reduce prednisone from 15mg to 12.5mg then continue on 10mg daily.        Interval history:  Has stopped physical therapy 1 mos ago. Is doing exercises at home. He states he feels weak all over. His swallowing is also worsened.Things are getting stuck more, using 2-3 bottles of water to have dinner.   He was able to lift himself off the toilet but not anymore.   Currently on Prednisone 10mg and cellcept 1500mg BID   Not having dark stools, no blood in stool.       Review of Systems   Constitutional: Negative for appetite change, fatigue and fever.   HENT: Negative for mouth sores.         Negative hair loss    Eyes: Negative for pain and redness.   Respiratory: Negative for chest tightness and shortness of breath.    Cardiovascular: Negative for chest pain and leg swelling.   Gastrointestinal: Negative for abdominal pain, blood in stool, constipation and diarrhea.   Endocrine: Negative for cold intolerance.   Genitourinary: Negative for dysuria.   Musculoskeletal: Negative for arthralgias, back pain and neck stiffness.   Skin: Negative for rash.   Neurological: Negative for numbness and headaches.         Objective:   BP (!) 151/55   Pulse 88   Ht 5' 8.4" (1.737 m)   Wt 68.9 kg (152 lb)   BMI 22.84 kg/m²      Physical Exam   Constitutional: He is oriented to person, place, and time.   Thin man with poor dentition,    HENT:   Head: Normocephalic and atraumatic.   Mouth/Throat: Oropharynx is clear and moist. No oropharyngeal exudate.   Poor " dentition    Eyes: Conjunctivae are normal. Pupils are equal, round, and reactive to light. No scleral icterus.   Neck: Normal range of motion. Neck supple.   Cardiovascular: Normal rate, regular rhythm and normal heart sounds.    No murmur heard.  Pulmonary/Chest: Effort normal and breath sounds normal. No respiratory distress. He has no wheezes.   Abdominal: Soft. There is no tenderness.       Right Side Rheumatological Exam     Muscle Strength (0-5 scale):  Neck Flexion:  4  Neck Extension: 4  Deltoid:  4.4  Biceps: 3/5   Triceps:  3  : 3/5   Iliopsoas: 3  Quadriceps:  3   Distal Lower Extremity: 4.8    Left Side Rheumatological Exam     Muscle Strength (0-5 scale):  Neck Flexion:  4  Neck Extension: 4  Deltoid:  4.4  Biceps: 3/5   Triceps:  3  :  3/5   Iliopsoas: 3  Quadriceps:  3   Distal Lower Extremity: 4.8      Neurological: He is alert and oriented to person, place, and time.   Skin: Skin is dry. No rash noted.     Musculoskeletal: He exhibits no edema.   No large or small joint synovitis  Muscle atrophy            Assessment:       1. Polymyositis    2. Inclusion body myositis (IBM)    3. Dysphagia, unspecified type        62yo M with PMH of overlap of polymyositis (dx 2009, mildly +ku, outside biopsy proven with elevated CPK and aldolase) and inclusion body myositis (+CN1A), osteopenia, Vit D insufficiency, Hemoglobulin c/beta-zero thalaseema, A-fib, dysphagia, portal HTN, emphysema here for follow up of polymyositis. Patient with many social issues that are impeding his health care at this time, but this seems to be improving. Overall his condition does seem to be improving. He has been on Imuran in the past, his treatment has been escalated to IVIG monthly and cellcept. Currently on prednisone but weaning. Patient with overlap syndrome. IVIG, cellcept and steroids helping with polymyositis. He needs aggressive PT to help with inclusion body         Plan:       #Overlap: polymyositis and  inclusion body myositis   -Continue IVIG monthly (dose give over 5 days)   -continue cellcept 1500mg BID  -Currently on prednisone 10mg; due to worsening weakness and elevated aldolase will increase prednisone to 20mg for one mos.   -continue vit D 50,000 units weekly   -refer back to PT   -referral to GI for dysphagia   -will refer to PM&R to see if they can help with rehabilitation plan.       Patient seen and discussed with Dr. Huston    RTC 1 mos with labs prior     Lisa Durham MD  Rheumatology PGY 5

## 2019-12-09 ENCOUNTER — INFUSION (OUTPATIENT)
Dept: INFUSION THERAPY | Facility: HOSPITAL | Age: 64
End: 2019-12-09
Attending: INTERNAL MEDICINE
Payer: MEDICARE

## 2019-12-09 VITALS
DIASTOLIC BLOOD PRESSURE: 76 MMHG | SYSTOLIC BLOOD PRESSURE: 141 MMHG | HEART RATE: 75 BPM | BODY MASS INDEX: 22.49 KG/M2 | RESPIRATION RATE: 18 BRPM | HEIGHT: 69 IN | WEIGHT: 151.88 LBS

## 2019-12-09 DIAGNOSIS — M33.20 POLYMYOSITIS: Primary | ICD-10-CM

## 2019-12-09 PROCEDURE — S0028 INJECTION, FAMOTIDINE, 20 MG: HCPCS | Performed by: INTERNAL MEDICINE

## 2019-12-09 PROCEDURE — 63600175 PHARM REV CODE 636 W HCPCS: Mod: JG | Performed by: INTERNAL MEDICINE

## 2019-12-09 PROCEDURE — 96365 THER/PROPH/DIAG IV INF INIT: CPT

## 2019-12-09 PROCEDURE — 25000003 PHARM REV CODE 250: Performed by: INTERNAL MEDICINE

## 2019-12-09 PROCEDURE — 96375 TX/PRO/DX INJ NEW DRUG ADDON: CPT

## 2019-12-09 PROCEDURE — 96367 TX/PROPH/DG ADDL SEQ IV INF: CPT

## 2019-12-09 PROCEDURE — 96366 THER/PROPH/DIAG IV INF ADDON: CPT

## 2019-12-09 RX ORDER — ACETAMINOPHEN 325 MG/1
650 TABLET ORAL
Status: CANCELLED | OUTPATIENT
Start: 2020-01-01

## 2019-12-09 RX ORDER — HEPARIN 100 UNIT/ML
500 SYRINGE INTRAVENOUS
Status: CANCELLED | OUTPATIENT
Start: 2020-01-01

## 2019-12-09 RX ORDER — FAMOTIDINE 10 MG/ML
20 INJECTION INTRAVENOUS
Status: CANCELLED | OUTPATIENT
Start: 2020-01-01

## 2019-12-09 RX ORDER — SODIUM CHLORIDE 0.9 % (FLUSH) 0.9 %
10 SYRINGE (ML) INJECTION
Status: CANCELLED | OUTPATIENT
Start: 2020-01-01

## 2019-12-09 RX ORDER — SODIUM CHLORIDE 0.9 % (FLUSH) 0.9 %
10 SYRINGE (ML) INJECTION
Status: DISCONTINUED | OUTPATIENT
Start: 2019-12-09 | End: 2019-12-09 | Stop reason: HOSPADM

## 2019-12-09 RX ORDER — ACETAMINOPHEN 325 MG/1
650 TABLET ORAL
Status: COMPLETED | OUTPATIENT
Start: 2019-12-09 | End: 2019-12-09

## 2019-12-09 RX ORDER — HEPARIN 100 UNIT/ML
500 SYRINGE INTRAVENOUS
Status: DISCONTINUED | OUTPATIENT
Start: 2019-12-09 | End: 2019-12-09 | Stop reason: HOSPADM

## 2019-12-09 RX ORDER — FAMOTIDINE 10 MG/ML
20 INJECTION INTRAVENOUS
Status: COMPLETED | OUTPATIENT
Start: 2019-12-09 | End: 2019-12-09

## 2019-12-09 RX ADMIN — DIPHENHYDRAMINE HYDROCHLORIDE 50 MG: 50 INJECTION, SOLUTION INTRAMUSCULAR; INTRAVENOUS at 01:12

## 2019-12-09 RX ADMIN — HUMAN IMMUNOGLOBULIN G 30 G: 10 LIQUID INTRAVENOUS at 02:12

## 2019-12-09 RX ADMIN — FAMOTIDINE 20 MG: 10 INJECTION, SOLUTION INTRAVENOUS at 01:12

## 2019-12-09 RX ADMIN — SODIUM CHLORIDE: 9 INJECTION, SOLUTION INTRAVENOUS at 01:12

## 2019-12-09 RX ADMIN — ACETAMINOPHEN 650 MG: 325 TABLET ORAL at 01:12

## 2019-12-09 NOTE — PLAN OF CARE
1657 patient completed and tolerated treatment well. VSS, pt voiced no new complaints or concerns at this time. RTC 12/10/19 for D2. Removed PIV per patient request. AVS declined, pt d/c home. NAD noted.

## 2019-12-10 ENCOUNTER — INFUSION (OUTPATIENT)
Dept: INFUSION THERAPY | Facility: HOSPITAL | Age: 64
End: 2019-12-10
Attending: INTERNAL MEDICINE
Payer: MEDICARE

## 2019-12-10 VITALS
RESPIRATION RATE: 18 BRPM | OXYGEN SATURATION: 99 % | TEMPERATURE: 98 F | DIASTOLIC BLOOD PRESSURE: 78 MMHG | SYSTOLIC BLOOD PRESSURE: 140 MMHG | HEART RATE: 78 BPM

## 2019-12-10 DIAGNOSIS — M33.20 POLYMYOSITIS: Primary | ICD-10-CM

## 2019-12-10 PROCEDURE — 96375 TX/PRO/DX INJ NEW DRUG ADDON: CPT

## 2019-12-10 PROCEDURE — 96365 THER/PROPH/DIAG IV INF INIT: CPT

## 2019-12-10 PROCEDURE — S0028 INJECTION, FAMOTIDINE, 20 MG: HCPCS | Performed by: INTERNAL MEDICINE

## 2019-12-10 PROCEDURE — 96366 THER/PROPH/DIAG IV INF ADDON: CPT

## 2019-12-10 PROCEDURE — 25000003 PHARM REV CODE 250: Performed by: INTERNAL MEDICINE

## 2019-12-10 PROCEDURE — 63600175 PHARM REV CODE 636 W HCPCS: Performed by: INTERNAL MEDICINE

## 2019-12-10 PROCEDURE — 96367 TX/PROPH/DG ADDL SEQ IV INF: CPT

## 2019-12-10 RX ORDER — SODIUM CHLORIDE 0.9 % (FLUSH) 0.9 %
10 SYRINGE (ML) INJECTION
Status: CANCELLED | OUTPATIENT
Start: 2020-01-01

## 2019-12-10 RX ORDER — ACETAMINOPHEN 325 MG/1
650 TABLET ORAL
Status: CANCELLED | OUTPATIENT
Start: 2020-01-01

## 2019-12-10 RX ORDER — SODIUM CHLORIDE 0.9 % (FLUSH) 0.9 %
10 SYRINGE (ML) INJECTION
Status: DISCONTINUED | OUTPATIENT
Start: 2019-12-10 | End: 2019-12-10 | Stop reason: HOSPADM

## 2019-12-10 RX ORDER — FAMOTIDINE 10 MG/ML
20 INJECTION INTRAVENOUS
Status: COMPLETED | OUTPATIENT
Start: 2019-12-10 | End: 2019-12-10

## 2019-12-10 RX ORDER — HEPARIN 100 UNIT/ML
500 SYRINGE INTRAVENOUS
Status: CANCELLED | OUTPATIENT
Start: 2020-01-01

## 2019-12-10 RX ORDER — HEPARIN 100 UNIT/ML
500 SYRINGE INTRAVENOUS
Status: DISCONTINUED | OUTPATIENT
Start: 2019-12-10 | End: 2019-12-10 | Stop reason: HOSPADM

## 2019-12-10 RX ORDER — ACETAMINOPHEN 325 MG/1
650 TABLET ORAL
Status: COMPLETED | OUTPATIENT
Start: 2019-12-10 | End: 2019-12-10

## 2019-12-10 RX ORDER — FAMOTIDINE 10 MG/ML
20 INJECTION INTRAVENOUS
Status: CANCELLED | OUTPATIENT
Start: 2020-01-01

## 2019-12-10 RX ADMIN — DIPHENHYDRAMINE HYDROCHLORIDE 50 MG: 50 INJECTION, SOLUTION INTRAMUSCULAR; INTRAVENOUS at 01:12

## 2019-12-10 RX ADMIN — HUMAN IMMUNOGLOBULIN G 30 G: 10 LIQUID INTRAVENOUS at 01:12

## 2019-12-10 RX ADMIN — SODIUM CHLORIDE: 0.9 INJECTION, SOLUTION INTRAVENOUS at 01:12

## 2019-12-10 RX ADMIN — FAMOTIDINE 20 MG: 10 INJECTION, SOLUTION INTRAVENOUS at 01:12

## 2019-12-10 RX ADMIN — ACETAMINOPHEN 650 MG: 325 TABLET ORAL at 01:12

## 2019-12-10 NOTE — PLAN OF CARE
Pt admitted for Treatment #37 IVIG. Labs reviewed and Fall Precautions reviewed, fatigue addressed. Pt received IVIG over 2 hours and 30 min. Plan of care reviewed and Pt instructed to contact MD with further concerns. Pt given AVS and discharged @ 16:20.

## 2019-12-11 ENCOUNTER — INFUSION (OUTPATIENT)
Dept: INFUSION THERAPY | Facility: HOSPITAL | Age: 64
End: 2019-12-11
Attending: INTERNAL MEDICINE
Payer: MEDICARE

## 2019-12-11 VITALS
RESPIRATION RATE: 19 BRPM | DIASTOLIC BLOOD PRESSURE: 80 MMHG | HEART RATE: 78 BPM | OXYGEN SATURATION: 97 % | TEMPERATURE: 98 F | SYSTOLIC BLOOD PRESSURE: 130 MMHG

## 2019-12-11 DIAGNOSIS — M33.20 POLYMYOSITIS: Primary | ICD-10-CM

## 2019-12-11 PROCEDURE — 25000003 PHARM REV CODE 250: Performed by: INTERNAL MEDICINE

## 2019-12-11 PROCEDURE — 63600175 PHARM REV CODE 636 W HCPCS: Mod: JG | Performed by: INTERNAL MEDICINE

## 2019-12-11 PROCEDURE — 96366 THER/PROPH/DIAG IV INF ADDON: CPT

## 2019-12-11 PROCEDURE — S0028 INJECTION, FAMOTIDINE, 20 MG: HCPCS | Performed by: INTERNAL MEDICINE

## 2019-12-11 PROCEDURE — 96375 TX/PRO/DX INJ NEW DRUG ADDON: CPT

## 2019-12-11 PROCEDURE — 96367 TX/PROPH/DG ADDL SEQ IV INF: CPT

## 2019-12-11 PROCEDURE — 96365 THER/PROPH/DIAG IV INF INIT: CPT

## 2019-12-11 RX ORDER — HEPARIN 100 UNIT/ML
500 SYRINGE INTRAVENOUS
Status: DISCONTINUED | OUTPATIENT
Start: 2019-12-11 | End: 2019-12-11 | Stop reason: HOSPADM

## 2019-12-11 RX ORDER — ACETAMINOPHEN 325 MG/1
650 TABLET ORAL
Status: COMPLETED | OUTPATIENT
Start: 2019-12-11 | End: 2019-12-11

## 2019-12-11 RX ORDER — FAMOTIDINE 10 MG/ML
20 INJECTION INTRAVENOUS
Status: COMPLETED | OUTPATIENT
Start: 2019-12-11 | End: 2019-12-11

## 2019-12-11 RX ORDER — ACETAMINOPHEN 325 MG/1
650 TABLET ORAL
Status: CANCELLED | OUTPATIENT
Start: 2020-01-01

## 2019-12-11 RX ORDER — HEPARIN 100 UNIT/ML
500 SYRINGE INTRAVENOUS
Status: CANCELLED | OUTPATIENT
Start: 2020-01-01

## 2019-12-11 RX ORDER — SODIUM CHLORIDE 0.9 % (FLUSH) 0.9 %
10 SYRINGE (ML) INJECTION
Status: DISCONTINUED | OUTPATIENT
Start: 2019-12-11 | End: 2019-12-11 | Stop reason: HOSPADM

## 2019-12-11 RX ORDER — FAMOTIDINE 10 MG/ML
20 INJECTION INTRAVENOUS
Status: CANCELLED | OUTPATIENT
Start: 2020-01-01

## 2019-12-11 RX ORDER — SODIUM CHLORIDE 0.9 % (FLUSH) 0.9 %
10 SYRINGE (ML) INJECTION
Status: CANCELLED | OUTPATIENT
Start: 2020-01-01

## 2019-12-11 RX ADMIN — ACETAMINOPHEN 650 MG: 325 TABLET ORAL at 01:12

## 2019-12-11 RX ADMIN — SODIUM CHLORIDE: 0.9 INJECTION, SOLUTION INTRAVENOUS at 01:12

## 2019-12-11 RX ADMIN — FAMOTIDINE 20 MG: 10 INJECTION, SOLUTION INTRAVENOUS at 01:12

## 2019-12-11 RX ADMIN — HUMAN IMMUNOGLOBULIN G 30 G: 20 LIQUID INTRAVENOUS at 02:12

## 2019-12-11 RX ADMIN — DIPHENHYDRAMINE HYDROCHLORIDE 50 MG: 50 INJECTION, SOLUTION INTRAMUSCULAR; INTRAVENOUS at 01:12

## 2019-12-11 NOTE — PLAN OF CARE
Pt to clinic via motorized wlc. Pleasant and talkative. Pt states fell this am at home while transfering to commode. Attendant was able to help pt up and he denies any injury. Denies any significant complaints. PIV access achieved without difficulty. Pt tolerated Privigin infusion well. Denies HA or any other side effects. From clinic via motorized wlc to bus stop for transport home. In NAD.

## 2019-12-12 ENCOUNTER — INFUSION (OUTPATIENT)
Dept: INFUSION THERAPY | Facility: HOSPITAL | Age: 64
End: 2019-12-12
Attending: INTERNAL MEDICINE
Payer: MEDICARE

## 2019-12-12 VITALS
BODY MASS INDEX: 22.49 KG/M2 | HEIGHT: 69 IN | SYSTOLIC BLOOD PRESSURE: 117 MMHG | HEART RATE: 80 BPM | WEIGHT: 151.88 LBS | TEMPERATURE: 98 F | RESPIRATION RATE: 18 BRPM | DIASTOLIC BLOOD PRESSURE: 60 MMHG | OXYGEN SATURATION: 99 %

## 2019-12-12 DIAGNOSIS — M33.20 POLYMYOSITIS: Primary | ICD-10-CM

## 2019-12-12 PROCEDURE — 96366 THER/PROPH/DIAG IV INF ADDON: CPT

## 2019-12-12 PROCEDURE — 96375 TX/PRO/DX INJ NEW DRUG ADDON: CPT

## 2019-12-12 PROCEDURE — 96365 THER/PROPH/DIAG IV INF INIT: CPT

## 2019-12-12 PROCEDURE — S0028 INJECTION, FAMOTIDINE, 20 MG: HCPCS | Performed by: INTERNAL MEDICINE

## 2019-12-12 PROCEDURE — 63600175 PHARM REV CODE 636 W HCPCS: Performed by: INTERNAL MEDICINE

## 2019-12-12 PROCEDURE — 25000003 PHARM REV CODE 250: Performed by: INTERNAL MEDICINE

## 2019-12-12 PROCEDURE — 96367 TX/PROPH/DG ADDL SEQ IV INF: CPT

## 2019-12-12 RX ORDER — HEPARIN 100 UNIT/ML
500 SYRINGE INTRAVENOUS
Status: DISCONTINUED | OUTPATIENT
Start: 2019-12-12 | End: 2019-12-12 | Stop reason: HOSPADM

## 2019-12-12 RX ORDER — ACETAMINOPHEN 325 MG/1
650 TABLET ORAL
Status: CANCELLED | OUTPATIENT
Start: 2020-01-01

## 2019-12-12 RX ORDER — SODIUM CHLORIDE 0.9 % (FLUSH) 0.9 %
10 SYRINGE (ML) INJECTION
Status: DISCONTINUED | OUTPATIENT
Start: 2019-12-12 | End: 2019-12-12 | Stop reason: HOSPADM

## 2019-12-12 RX ORDER — HEPARIN 100 UNIT/ML
500 SYRINGE INTRAVENOUS
Status: CANCELLED | OUTPATIENT
Start: 2020-01-01

## 2019-12-12 RX ORDER — FAMOTIDINE 10 MG/ML
20 INJECTION INTRAVENOUS
Status: CANCELLED | OUTPATIENT
Start: 2020-01-01

## 2019-12-12 RX ORDER — FAMOTIDINE 10 MG/ML
20 INJECTION INTRAVENOUS
Status: COMPLETED | OUTPATIENT
Start: 2019-12-12 | End: 2019-12-12

## 2019-12-12 RX ORDER — SODIUM CHLORIDE 0.9 % (FLUSH) 0.9 %
10 SYRINGE (ML) INJECTION
Status: CANCELLED | OUTPATIENT
Start: 2020-01-01

## 2019-12-12 RX ORDER — ACETAMINOPHEN 325 MG/1
650 TABLET ORAL
Status: COMPLETED | OUTPATIENT
Start: 2019-12-12 | End: 2019-12-12

## 2019-12-12 RX ADMIN — SODIUM CHLORIDE: 9 INJECTION, SOLUTION INTRAVENOUS at 01:12

## 2019-12-12 RX ADMIN — FAMOTIDINE 20 MG: 10 INJECTION, SOLUTION INTRAVENOUS at 01:12

## 2019-12-12 RX ADMIN — HUMAN IMMUNOGLOBULIN G 30 G: 20 LIQUID INTRAVENOUS at 01:12

## 2019-12-12 RX ADMIN — ACETAMINOPHEN 650 MG: 325 TABLET ORAL at 01:12

## 2019-12-12 RX ADMIN — DIPHENHYDRAMINE HYDROCHLORIDE 50 MG: 50 INJECTION, SOLUTION INTRAMUSCULAR; INTRAVENOUS at 01:12

## 2019-12-12 NOTE — PLAN OF CARE
Pt tolerated IVIG infusion well, with no complications or s/s of adverse reaction. VS stable throughout infusion. Right hand PIV positive for blood return, SL and removed prior to discharge. Catheter tip intact. RTC tomorrow 12/13/19 for more IVIG, pt verbalized understanding. Pt discharged with no distress noted, independently per mobility scooter. Pt instructed to notify MD if concerns arise.

## 2019-12-13 ENCOUNTER — INFUSION (OUTPATIENT)
Dept: INFUSION THERAPY | Facility: HOSPITAL | Age: 64
End: 2019-12-13
Attending: INTERNAL MEDICINE
Payer: MEDICARE

## 2019-12-13 VITALS
DIASTOLIC BLOOD PRESSURE: 80 MMHG | TEMPERATURE: 98 F | HEART RATE: 92 BPM | SYSTOLIC BLOOD PRESSURE: 141 MMHG | RESPIRATION RATE: 18 BRPM

## 2019-12-13 DIAGNOSIS — M33.20 POLYMYOSITIS: Primary | ICD-10-CM

## 2019-12-13 PROCEDURE — 96375 TX/PRO/DX INJ NEW DRUG ADDON: CPT

## 2019-12-13 PROCEDURE — 63600175 PHARM REV CODE 636 W HCPCS: Performed by: INTERNAL MEDICINE

## 2019-12-13 PROCEDURE — 96366 THER/PROPH/DIAG IV INF ADDON: CPT

## 2019-12-13 PROCEDURE — S0028 INJECTION, FAMOTIDINE, 20 MG: HCPCS | Performed by: INTERNAL MEDICINE

## 2019-12-13 PROCEDURE — 96367 TX/PROPH/DG ADDL SEQ IV INF: CPT

## 2019-12-13 PROCEDURE — 25000003 PHARM REV CODE 250: Performed by: INTERNAL MEDICINE

## 2019-12-13 PROCEDURE — 96365 THER/PROPH/DIAG IV INF INIT: CPT

## 2019-12-13 RX ORDER — ACETAMINOPHEN 325 MG/1
650 TABLET ORAL
Status: CANCELLED | OUTPATIENT
Start: 2020-01-01

## 2019-12-13 RX ORDER — HEPARIN 100 UNIT/ML
500 SYRINGE INTRAVENOUS
Status: DISCONTINUED | OUTPATIENT
Start: 2019-12-13 | End: 2019-12-13 | Stop reason: HOSPADM

## 2019-12-13 RX ORDER — HEPARIN 100 UNIT/ML
500 SYRINGE INTRAVENOUS
Status: CANCELLED | OUTPATIENT
Start: 2020-01-01

## 2019-12-13 RX ORDER — SODIUM CHLORIDE 0.9 % (FLUSH) 0.9 %
10 SYRINGE (ML) INJECTION
Status: CANCELLED | OUTPATIENT
Start: 2020-01-01

## 2019-12-13 RX ORDER — FAMOTIDINE 10 MG/ML
20 INJECTION INTRAVENOUS
Status: CANCELLED | OUTPATIENT
Start: 2020-01-01

## 2019-12-13 RX ORDER — ACETAMINOPHEN 325 MG/1
650 TABLET ORAL
Status: COMPLETED | OUTPATIENT
Start: 2019-12-13 | End: 2019-12-13

## 2019-12-13 RX ORDER — FAMOTIDINE 10 MG/ML
20 INJECTION INTRAVENOUS
Status: COMPLETED | OUTPATIENT
Start: 2019-12-13 | End: 2019-12-13

## 2019-12-13 RX ORDER — SODIUM CHLORIDE 0.9 % (FLUSH) 0.9 %
10 SYRINGE (ML) INJECTION
Status: DISCONTINUED | OUTPATIENT
Start: 2019-12-13 | End: 2019-12-13 | Stop reason: HOSPADM

## 2019-12-13 RX ADMIN — SODIUM CHLORIDE: 0.9 INJECTION, SOLUTION INTRAVENOUS at 01:12

## 2019-12-13 RX ADMIN — FAMOTIDINE 20 MG: 10 INJECTION, SOLUTION INTRAVENOUS at 01:12

## 2019-12-13 RX ADMIN — DIPHENHYDRAMINE HYDROCHLORIDE 50 MG: 50 INJECTION INTRAMUSCULAR; INTRAVENOUS at 01:12

## 2019-12-13 RX ADMIN — HUMAN IMMUNOGLOBULIN G 30 G: 10 LIQUID INTRAVENOUS at 01:12

## 2019-12-13 RX ADMIN — ACETAMINOPHEN 650 MG: 325 TABLET ORAL at 01:12

## 2019-12-13 NOTE — PLAN OF CARE
Patient tolerated IVIG with no complications. VSS. Pt instructed to call MD with any problems. NAD. Pt discharged home independently.

## 2019-12-18 ENCOUNTER — TELEPHONE (OUTPATIENT)
Dept: RHEUMATOLOGY | Facility: CLINIC | Age: 64
End: 2019-12-18

## 2019-12-24 NOTE — PLAN OF CARE
Problem: Occupational Therapy Goal  Goal: Occupational Therapy Goal  Description  Goals to be met by: 4/15     Patient will increase functional independence with ADLs by performing:    UE Dressing with Moderate Assistance. Not met  LE Dressing with Moderate Assistance. Not met  Grooming while seated with Set-up Assistance. Not met  Toileting from bedside commode with Moderate Assistance for hygiene and clothing management. Not met  Rolling to Bilateral with Modified [place]. Not met  Supine to sit with Supervision. Not met  Stand pivot transfers with Moderate Assistance. Not met      Outcome: Met   No goals met. Hospital discharge.

## 2019-12-24 NOTE — PT/OT/SLP DISCHARGE
Occupational Therapy Discharge Summary    Nura Kahn Jr.  MRN: [id number]   Principal Problem: Polymyositis      Patient Discharged from acute Occupational Therapy on 12/24/19.  Please refer to prior OT note dated 4/10/19 for functional status.    Assessment:      Patient has not met goals.    Objective:     GOALS:   Multidisciplinary Problems     Occupational Therapy Goals     Not on file          Multidisciplinary Problems (Resolved)        Problem: Occupational Therapy Goal    Goal Priority Disciplines Outcome Interventions   Occupational Therapy Goal   (Resolved)     OT, PT/OT Met    Description:  Goals to be met by: 4/15     Patient will increase functional independence with ADLs by performing:    UE Dressing with Moderate Assistance. Not met  LE Dressing with Moderate Assistance. Not met  Grooming while seated with Set-up Assistance. Not met  Toileting from bedside commode with Moderate Assistance for hygiene and clothing management. Not met  Rolling to Bilateral with Modified [place]ner. Not met  Supine to sit with Supervision. Not met  Stand pivot transfers with Moderate Assistance. Not met                       Reasons for Discontinuation of Therapy Services  Transfer to alternate level of care.      Plan:     Patient Discharged to: Home with Home Health Service    DOMINGO Borrero  12/24/2019

## 2019-12-31 ENCOUNTER — EXTERNAL CHRONIC CARE MANAGEMENT (OUTPATIENT)
Dept: PRIMARY CARE CLINIC | Facility: CLINIC | Age: 64
End: 2019-12-31
Payer: MEDICARE

## 2019-12-31 PROCEDURE — 99490 PR CHRONIC CARE MGMT, 1ST 20 MIN: ICD-10-PCS | Mod: S$PBB,,, | Performed by: FAMILY MEDICINE

## 2019-12-31 PROCEDURE — 99490 CHRNC CARE MGMT STAFF 1ST 20: CPT | Mod: PBBFAC | Performed by: FAMILY MEDICINE

## 2019-12-31 PROCEDURE — 99490 CHRNC CARE MGMT STAFF 1ST 20: CPT | Mod: S$PBB,,, | Performed by: FAMILY MEDICINE

## 2020-01-06 ENCOUNTER — TELEPHONE (OUTPATIENT)
Dept: RHEUMATOLOGY | Facility: CLINIC | Age: 65
End: 2020-01-06

## 2020-01-06 NOTE — TELEPHONE ENCOUNTER
----- Message from Aristides Starks sent at 1/6/2020 12:52 PM CST -----  Contact: pt   Please call pt at [phone]    Patient has medication questions (refused to give details)    Thank you

## 2020-01-10 ENCOUNTER — PES CALL (OUTPATIENT)
Dept: ADMINISTRATIVE | Facility: CLINIC | Age: 65
End: 2020-01-10

## 2020-01-16 ENCOUNTER — PATIENT OUTREACH (OUTPATIENT)
Dept: ADMINISTRATIVE | Facility: OTHER | Age: 65
End: 2020-01-16

## 2020-01-20 ENCOUNTER — OFFICE VISIT (OUTPATIENT)
Dept: INTERNAL MEDICINE | Facility: CLINIC | Age: 65
End: 2020-01-20
Payer: MEDICARE

## 2020-01-20 ENCOUNTER — TELEPHONE (OUTPATIENT)
Dept: PHYSICAL MEDICINE AND REHAB | Facility: CLINIC | Age: 65
End: 2020-01-20

## 2020-01-20 ENCOUNTER — PROCEDURE VISIT (OUTPATIENT)
Dept: HEPATOLOGY | Facility: CLINIC | Age: 65
End: 2020-01-20
Payer: MEDICARE

## 2020-01-20 ENCOUNTER — OFFICE VISIT (OUTPATIENT)
Dept: HEPATOLOGY | Facility: CLINIC | Age: 65
End: 2020-01-20
Payer: MEDICARE

## 2020-01-20 VITALS
SYSTOLIC BLOOD PRESSURE: 166 MMHG | HEIGHT: 69 IN | DIASTOLIC BLOOD PRESSURE: 104 MMHG | WEIGHT: 160 LBS | BODY MASS INDEX: 23.7 KG/M2 | HEART RATE: 78 BPM | OXYGEN SATURATION: 97 %

## 2020-01-20 DIAGNOSIS — I10 ESSENTIAL HYPERTENSION: ICD-10-CM

## 2020-01-20 DIAGNOSIS — I70.0 AORTIC ATHEROSCLEROSIS: ICD-10-CM

## 2020-01-20 DIAGNOSIS — E55.9 VITAMIN D DEFICIENCY: ICD-10-CM

## 2020-01-20 DIAGNOSIS — R13.12 OROPHARYNGEAL DYSPHAGIA: ICD-10-CM

## 2020-01-20 DIAGNOSIS — R16.1 SPLENOMEGALY: ICD-10-CM

## 2020-01-20 DIAGNOSIS — D84.9 IMMUNOSUPPRESSION: Chronic | ICD-10-CM

## 2020-01-20 DIAGNOSIS — R53.81 DEBILITY: ICD-10-CM

## 2020-01-20 DIAGNOSIS — F32.1 CURRENT MODERATE EPISODE OF MAJOR DEPRESSIVE DISORDER WITHOUT PRIOR EPISODE: ICD-10-CM

## 2020-01-20 DIAGNOSIS — Z00.00 ENCOUNTER FOR PREVENTIVE HEALTH EXAMINATION: Primary | ICD-10-CM

## 2020-01-20 DIAGNOSIS — D58.2 HEMOGLOBIN C DISEASE: ICD-10-CM

## 2020-01-20 DIAGNOSIS — M33.20 POLYMYOSITIS: Chronic | ICD-10-CM

## 2020-01-20 DIAGNOSIS — K76.6 PORTAL VENOUS HYPERTENSION: ICD-10-CM

## 2020-01-20 DIAGNOSIS — I48.91 ATRIAL FIBRILLATION, UNSPECIFIED TYPE: ICD-10-CM

## 2020-01-20 DIAGNOSIS — R26.9 ABNORMALITY OF GAIT AND MOBILITY: ICD-10-CM

## 2020-01-20 DIAGNOSIS — D69.6 THROMBOCYTOPENIA: ICD-10-CM

## 2020-01-20 DIAGNOSIS — M85.80 OSTEOPENIA, UNSPECIFIED LOCATION: ICD-10-CM

## 2020-01-20 DIAGNOSIS — J84.10 PULMONARY GRANULOMA: ICD-10-CM

## 2020-01-20 DIAGNOSIS — K76.6 PORTAL VENOUS HYPERTENSION: Primary | ICD-10-CM

## 2020-01-20 DIAGNOSIS — D56.8 BETA 0 THALASSEMIA: ICD-10-CM

## 2020-01-20 PROCEDURE — 3079F PR MOST RECENT DIASTOLIC BLOOD PRESSURE 80-89 MM HG: ICD-10-PCS | Mod: HCNC,CPTII,S$GLB, | Performed by: NURSE PRACTITIONER

## 2020-01-20 PROCEDURE — 3077F PR MOST RECENT SYSTOLIC BLOOD PRESSURE >= 140 MM HG: ICD-10-PCS | Mod: HCNC,CPTII,S$GLB, | Performed by: INTERNAL MEDICINE

## 2020-01-20 PROCEDURE — G0439 PPPS, SUBSEQ VISIT: HCPCS | Mod: HCNC,S$GLB,, | Performed by: NURSE PRACTITIONER

## 2020-01-20 PROCEDURE — 91200 FIBROSCAN (VIBRATION CONTROLLED TRANSIENT ELASTOGRAPHY): ICD-10-PCS | Mod: HCNC,S$GLB,, | Performed by: INTERNAL MEDICINE

## 2020-01-20 PROCEDURE — 99999 PR PBB SHADOW E&M-EST. PATIENT-LVL IV: ICD-10-PCS | Mod: PBBFAC,HCNC,, | Performed by: NURSE PRACTITIONER

## 2020-01-20 PROCEDURE — G0439 PR MEDICARE ANNUAL WELLNESS SUBSEQUENT VISIT: ICD-10-PCS | Mod: HCNC,S$GLB,, | Performed by: NURSE PRACTITIONER

## 2020-01-20 PROCEDURE — 3080F PR MOST RECENT DIASTOLIC BLOOD PRESSURE >= 90 MM HG: ICD-10-PCS | Mod: HCNC,CPTII,S$GLB, | Performed by: INTERNAL MEDICINE

## 2020-01-20 PROCEDURE — 3077F SYST BP >= 140 MM HG: CPT | Mod: HCNC,CPTII,S$GLB, | Performed by: NURSE PRACTITIONER

## 2020-01-20 PROCEDURE — 3077F PR MOST RECENT SYSTOLIC BLOOD PRESSURE >= 140 MM HG: ICD-10-PCS | Mod: HCNC,CPTII,S$GLB, | Performed by: NURSE PRACTITIONER

## 2020-01-20 PROCEDURE — 3077F SYST BP >= 140 MM HG: CPT | Mod: HCNC,CPTII,S$GLB, | Performed by: INTERNAL MEDICINE

## 2020-01-20 PROCEDURE — 99999 PR PBB SHADOW E&M-EST. PATIENT-LVL IV: CPT | Mod: PBBFAC,HCNC,, | Performed by: NURSE PRACTITIONER

## 2020-01-20 PROCEDURE — 99213 PR OFFICE/OUTPT VISIT, EST, LEVL III, 20-29 MIN: ICD-10-PCS | Mod: HCNC,S$GLB,, | Performed by: INTERNAL MEDICINE

## 2020-01-20 PROCEDURE — 3080F DIAST BP >= 90 MM HG: CPT | Mod: HCNC,CPTII,S$GLB, | Performed by: INTERNAL MEDICINE

## 2020-01-20 PROCEDURE — 99213 OFFICE O/P EST LOW 20 MIN: CPT | Mod: HCNC,S$GLB,, | Performed by: INTERNAL MEDICINE

## 2020-01-20 PROCEDURE — 99999 PR PBB SHADOW E&M-EST. PATIENT-LVL III: ICD-10-PCS | Mod: PBBFAC,HCNC,, | Performed by: INTERNAL MEDICINE

## 2020-01-20 PROCEDURE — 3079F DIAST BP 80-89 MM HG: CPT | Mod: HCNC,CPTII,S$GLB, | Performed by: NURSE PRACTITIONER

## 2020-01-20 PROCEDURE — 3008F BODY MASS INDEX DOCD: CPT | Mod: HCNC,CPTII,S$GLB, | Performed by: INTERNAL MEDICINE

## 2020-01-20 PROCEDURE — 3008F PR BODY MASS INDEX (BMI) DOCUMENTED: ICD-10-PCS | Mod: HCNC,CPTII,S$GLB, | Performed by: INTERNAL MEDICINE

## 2020-01-20 PROCEDURE — 91200 LIVER ELASTOGRAPHY: CPT | Mod: HCNC,S$GLB,, | Performed by: INTERNAL MEDICINE

## 2020-01-20 PROCEDURE — 99999 PR PBB SHADOW E&M-EST. PATIENT-LVL III: CPT | Mod: PBBFAC,HCNC,, | Performed by: INTERNAL MEDICINE

## 2020-01-20 NOTE — Clinical Note
Dr. Ambrosio,I had the pleasure of seeing Mr. Kahn yesterday for an HRA visit.  He did score and 11/27 on his depression screening.  He does have a history of some depression and says he is depressed because of his physical limitations and disease state.  He did report that occasionally he will have thoughts that he would be better off dead.  However, he denies suicidal plans or means.  He is not interested in Psychiatry at this time and is not interested in medication.  I told him to let us know if he felt depression was getting worse and to follow up with you as needed.  See note for further info. This is just an FYI.Amada Zurita NP

## 2020-01-20 NOTE — PROGRESS NOTES
[facility] Consultation Note    Reason for Consult:  The primary encounter diagnosis was Portal venous hypertension. A diagnosis of Splenomegaly was also pertinent to this visit.    PCP: Socrates Ambrosio   [address] / [place] [id number]    HPI:  This is a 64 y.o. male here for evaluation of: portal hypertension, splenomegaly    63 y/o male with polymyositis, vit D def, Hgb c/beta-zero thalassemia, A fib, emphysema, dysphagia (seen in GI clinic with motility testing).  In May 2015, he had portal HTN findings on a CT scan with splenomegaly, but Fibroscan showed only F1 fibrosis (minimal).  Portal vein had no thrombosis.  He is here for follow-up.      Prior note by me on 5/5/2015, seen for hepatomegaly:   CT scan 8/7/14:  1. Enlarged lymph node 1.4 cm in the aorticopulmonary window of uncertain clinical significance. Possibly reactive to recent infections process, but based on clinical suspicion, a PET/CT may be indicated. If not, follow-up CT of the chest in 6 months could be  considered.  2. Splenomegaly and hepatomegaly with associated portal vein enlargement and splenic collaterals suggesting a sequelae of portal venous hypertension.  3. Biapical subpleural blebs with hyperexpanded lungs bilaterally suggestive of COPD.     PET CT 8/26/14:  AP window lymph node SUV max less than 2.5. This is most likely benign/reactive. Surveillance is recommended at 3, 6, 12, and 24 months.  Splenomegaly most likely from portal hypertension. Lymphoproliferative disorder less likely. SUV values are low.     Fibrosure 3/15/15:  showed F1 portal fibrosis.  No evidence of cirrhosis.  Hepatitis viral markers neg, ceruloplasmin normal,        Elevated liver enzymes: No  Abnormal imaging: Yes, splenomegaly  Cirrhosis: No  Hepatitis C: No  Hepatitis B: No - HBcAb total positive, HBsAb positive - consistent with hep B recovery   Fatty liver: No  Encephalopathy: No  Post-hospital discharge: No  Symptoms:  weakness    Primary hepatic manifestations:  Fatigue:Yes  Edema:No  Ascites:No  Encephalopathy:No  Abdominal pain:No  GI bleeds: No  Pruritus:No  Weight Changes:No  Changes in Bowel habits: No  Muscle cramps:Yes    Risk factors for liver disease:  No jaundice  No transfusions  No IVDU  Did not snort cocaine or similar agents  Did not live with anyone with hepatitis B or C  Sexual partner not tested  No hepatotoxic medications  No exposure to industrial toxins  Alcohol: none      ROS:  Constitutional: No fevers, chills, weight changes, fatigue  ENT: No allergies, nosebleeds,   CV: No chest pain  Pulm: No cough, shortness of breath  Ophtho: No vision changes  GI/Liver: see HPI  Derm: No rash, itching  Heme: No swollen glands, bruising  MSK: No joint pains, joint swelling  : No dysuria, hematuria, decrease in urine output  Endo: No hot or cold intolerance  Neuro: No confusion, disorientation, difficulty with sleep, memory, concentration, syncope, seizure  Psych: No anxiety, depression    Medical History:  has a past medical history of Atrial fibrillation (3/2/2015), Depression, Essential hypertension (2/27/2019), Microcytic anemia (9/25/2014), Neuromuscular disorder, Polymyositis (2009), and Tobacco abuse.    Surgical History:  has no past surgical history on file.    Family History: family history includes Diabetes in his father and mother; Hypertension in his father and mother..     Social History:  reports that he has quit smoking. His smoking use included cigarettes. He has a 5.00 pack-year smoking history. He has never used smokeless tobacco. He reports that he has current or past drug history. Drug: Marijuana. He reports that he does not drink alcohol.    Review of patient's allergies indicates:  No Known Allergies    Current Outpatient Rx   Medication Sig Dispense Refill    albuterol (PROVENTIL/VENTOLIN HFA) 90 mcg/actuation inhaler Inhale 2 puffs into the lungs every 6 (six) hours as needed for Wheezing or  "Shortness of Breath. 1 each 11    amLODIPine (NORVASC) 5 MG tablet Take 2 tablets (10 mg total) by mouth once daily. 60 tablet 11    ammonium lactate 12 % Crea Apply twice daily to affected parts both feet as needed. 140 g 11    ergocalciferol (ERGOCALCIFEROL) 50,000 unit Cap Take 1 capsule (50,000 Units total) by mouth every 7 days. (Patient taking differently: Take 50,000 Units by mouth every 7 days. Saturday) 4 capsule 1    ergocalciferol (ERGOCALCIFEROL) 50,000 unit Cap Take 1 capsule (50,000 Units total) by mouth every 7 days. 12 capsule 0    famotidine (PEPCID) 20 MG tablet Take 1 tablet (20 mg total) by mouth 2 (two) times daily. 60 tablet 11    labetalol (NORMODYNE) 100 MG tablet Take 1 tablet (100 mg total) by mouth every 12 (twelve) hours. 60 tablet 11    mycophenolate (CELLCEPT) 500 mg Tab Take 3 tablets (1,500 mg total) by mouth 2 (two) times daily. 180 tablet 11    predniSONE (DELTASONE) 2.5 MG tablet Take 12.5mg (5 tablets) for one month then take 10mg (4 tablets) 150 tablet 2    predniSONE (DELTASONE) 5 MG tablet Take 2 tablets (10 mg total) by mouth once daily. 60 tablet 1    predniSONE (DELTASONE) 5 MG tablet Take 5 tablets for 2 weeks, then reduce to 4 tablets for 2 weeks, then reduce 3 tablets and continue 90 tablet 1    predniSONE (DELTASONE) 5 MG tablet Take 4 tablets (20 mg total) by mouth once daily. 120 tablet 2       Objective Findings:    Vital Signs:  BP (!) 166/104 (BP Location: Right arm, Patient Position: Sitting, BP Method: Medium (Automatic))   Pulse 78   Ht 5' 9" (1.753 m)   Wt 72.6 kg (160 lb)   SpO2 97%   BMI 23.63 kg/m²   Body mass index is 23.63 kg/m².    Physical Exam:  General Appearance: Well appearing in no acute distress  Head:   Normocephalic, without obvious abnormality  Eyes:    No scleral icterus, EOMI  ENT: Neck supple, Lips, mucosa, and tongue normal; teeth and gums normal  Lungs: CTA bilaterally in anterior and posterior fields, no wheezes, no " crackles.  Heart:  Regular rate and rhythm, S1, S2 normal, no murmurs heard  Abdomen: Soft, non tender, non distended with positive bowel sounds in all four quadrants. No hepatosplenomegaly, ascites, or mass  Extremities: 2+ pulses, no clubbing, cyanosis or edema  Skin: No rash  Neurologic: CN II-XII intact, alert, oriented x 3. No asterixis      Labs:  Lab Results   Component Value Date    WBC 5.57 11/25/2019    HGB 11.0 (L) 11/25/2019    HCT 33.6 (L) 11/25/2019     11/25/2019    CHOL 129 12/12/2018    TRIG 85 12/12/2018    HDL 38 (L) 12/12/2018    INR 1.1 11/25/2019    CREATININE 0.5 11/25/2019    BUN 7 (L) 11/25/2019    BILITOT 1.5 (H) 11/25/2019    ALT 13 11/25/2019    AST 16 11/25/2019    ALKPHOS 65 11/25/2019     11/25/2019    K 3.5 11/25/2019     11/25/2019    CO2 27 11/25/2019    TSH 1.523 04/02/2019    PSA 0.80 12/12/2018    HGBA1C <4.0 (A) 04/02/2019       Imaging:       Endoscopy:      Assessment:  1. Portal venous hypertension    2. Splenomegaly       1.  Portal HTN findings in CT scan, with borderline low PLt count. No evidence of cirrhosis or PV thrombosis. In 3/2015, Fibrosure showed F1 stage which is minimal fibrosis in the liver.  Fibroscan today 1/20/2020 showed kPa 6.2, again F0-F1 fibrosis.  CAP is 231, which is <10% steatosis.  U/S 4/3/19 showed normal liver, homogeneous texture, no focal lesion in the liver, no ascites.  There is cholelithiasis and splenomegaly 20 cm.  Main PV and branches are patent, portal flow is in the normal direction.  This patient does NOT have cirrhosis or PV thrombosis.  His splenomegaly unrelated to liver or vascular in nature.    2.  Mild indirect hyperbilirubinemia. Total 1.4/direct 0.4   3.  Cholelithiasis could be pigment stones.  4.  Hep B abd C serlogies positive. Previous exposure and recovery.   5.  Transient A Fib on EKG 2/26/15, cardioverted spontaneously (based on EKG 3/2/15).      Recommendations:  No evidence of cirrhosis, therefore, no  further follow-up needed here.   Return PRN.    Follow up if symptoms worsen or fail to improve.      Order summary:  Orders Placed This Encounter   Procedures    FibroScan (Vibration Controlled Transient Elastography)       Thank you so much for allowing me to participate in the care of Nura Lucero MD

## 2020-01-20 NOTE — TELEPHONE ENCOUNTER
----- Message from Henna Goodwin sent at 1/20/2020 10:34 AM CST -----  Contact: Pt  Pt called to speak to the nurse regarding his appt on 1/21. Pt stated that he is not sure what the appt is for? He also asked if he could be seen in a morning slot due to transportation.    Pt can be reached at [phone]    Thank you.

## 2020-01-20 NOTE — PATIENT INSTRUCTIONS
Recommendations:  No evidence of cirrhosis, therefore, no further follow-up needed here.   Return PRN.

## 2020-01-20 NOTE — LETTER
January 20, 2020      Rickie Roe MD  [address]  [place] [address]           [facility]  [address]  [place] [address]  Phone: [phone]  Fax: [phone]          Patient: Nura Kahn Jr.   MR Number: [id number]   YOB: 1955   Date of Visit: 1/20/2020       Dear Dr. Rickie Roe:    Thank you for referring Nura Kahn to me for evaluation. Attached you will find relevant portions of my assessment and plan of care.    If you have questions, please do not hesitate to call me. I look forward to following Nura Kahn along with you.    Sincerely,    Lori Lucero MD    Enclosure  CC:  No Recipients    If you would like to receive this communication electronically, please contact [email or web address] or [phone] to request more information on [employer] Link access.    For providers and/or their staff who would like to refer a patient to [facility], please contact us through our one-stop-shop provider referral line, [facility], at [phone].    If you feel you have received this communication in error or would no longer like to receive these types of communications, please e-mail [email or web address]

## 2020-01-20 NOTE — PROCEDURES
FibroScan (Vibration Controlled Transient Elastography)  Date/Time: 2020 11:30 AM  Performed by: Lori Lucero MD  Authorized by: Lori Lucero MD     Diagnosis:  Other    Probe:  M    Universal Protocol: Patient's identity, procedure and site were verified, confirmatory pause was performed.  Discussed procedure including risks and potential complications.  Questions answered.  Patient verbalizes understanding and wishes to proceed with VCTE.     Procedure: After providing explanations of the procedure, patient was placed in the supine position with right arm in maximum abduction to allow optimal exposure of right lateral abdomen.  Patient was briefly assessed, Testing was performed in the mid-axillary location, 50Hz Shear Wave pulses were applied and the resulting Shear Wave and Propagation Speed detected with a 3.5 MHz ultrasonic signal, using the FibroScan probe, Skin to liver capsule distance and liver parenchyma were accessed during the entire examination with the FibroScan probe, Patient was instructed to breathe normally and to abstain from sudden movements during the procedure, allowing for random measurements of liver stiffness. At least 10 Shear Waves were produced, Individual measurements of each Shear Wave were calculated.  Patient tolerated the procedure well with no complications.  Meets discharge criteria as was dismissed.  Rates pain 0 out of 10.  Patient will follow up with ordering provider to review results.      Findings  Median liver stiffness score:  6.5  CAP Reading: dB/m:  231    IQR/med %:  15  Interpretation  Fibrosis interpretation is based on medial liver stiffness - Kilopascal (kPa).    Fibrosis Stage:  F 0-1  Steatosis interpretation is based on controlled attenuation parameter - (dB/m).    Steatosis Grade:  <S1  Comments/Plan:  Diagnosis: splenomegaly    Probe: M    Fibroscan readin.5 KPa    Fibrosis:F 0-1  No fibrosis    CAP readin dB/m    Steatosis: :<S1 no  steatosis

## 2020-01-21 VITALS
HEART RATE: 84 BPM | WEIGHT: 160.06 LBS | BODY MASS INDEX: 23.71 KG/M2 | SYSTOLIC BLOOD PRESSURE: 146 MMHG | HEIGHT: 69 IN | DIASTOLIC BLOOD PRESSURE: 84 MMHG

## 2020-01-21 PROBLEM — I70.0 AORTIC ATHEROSCLEROSIS: Status: ACTIVE | Noted: 2020-01-21

## 2020-01-21 PROBLEM — J69.0 ASPIRATION PNEUMONIA OF RIGHT LOWER LOBE: Status: RESOLVED | Noted: 2018-01-15 | Resolved: 2020-01-21

## 2020-01-21 PROBLEM — J84.10 PULMONARY GRANULOMA: Status: ACTIVE | Noted: 2020-01-21

## 2020-01-21 NOTE — PATIENT INSTRUCTIONS
Counseling and Referral of Other Preventative  (Italic type indicates deductible and co-insurance are waived)    Patient Name: Nura Kahn  Today's Date: 1/21/2020    Health Maintenance       Date Due Completion Date    TETANUS VACCINE 12/20/1975 ---    Shingles Vaccine (1 of 2) 12/20/2007 ---    PROSTATE-SPECIFIC ANTIGEN 12/12/2019 12/12/2018    Fecal Occult Blood Test (FOBT)/FitKit 11/25/2020 11/25/2019    Pneumococcal Vaccine (Highest Risk) (3 of 3 - PPSV23) 12/12/2023 12/12/2018    Lipid Panel 12/12/2023 12/12/2018        No orders of the defined types were placed in this encounter.    The following information is provided to all patients.  This information is to help you find resources for any of the problems found today that may be affecting your health:                Living healthy guide: www.[facility].[place].gov      Understanding Diabetes: www.diabetes.org      Eating healthy: www.cdc.gov/healthyweight      CDC home safety checklist: www.cdc.gov/steadi/patient.html      Agency on Aging: www.goea.[place].[facility]      Alcoholics anonymous (AA): www.aa.org      Physical Activity: www.cherelle.nih.gov/lz2obel      Tobacco use: www.quitwithusla.org

## 2020-01-21 NOTE — PROGRESS NOTES
I offered to discuss end of life issues, including information on how to make advance directives that the patient could use to name someone who would make medical decisions on their behalf if they became too ill to make themselves.    _X_Patient has advanced directives on file  ___Patient is interested, I provided paper work and offered to discuss.

## 2020-01-21 NOTE — PROGRESS NOTES
"Nura Kahn presented for a  Medicare AWV and comprehensive Health Risk Assessment today. The following components were reviewed and updated:    · Medical history  · Family History  · Social history  · Allergies and Current Medications  · Health Risk Assessment  · Health Maintenance  · Care Team     ** See Completed Assessments for Annual Wellness Visit within the encounter summary.**       The following assessments were completed:  · Living Situation  · CAGE  · Depression Screening  · Timed Get Up and Go  · Whisper Test  · Cognitive Function Screening  · Nutrition Screening  · ADL Screening  · PAQ Screening        Vitals:    01/20/20 1144   BP: (!) 146/84   BP Location: Left arm   Patient Position: Sitting   Pulse: 84   Weight: 72.6 kg (160 lb 0.9 oz)   Height: 5' 9" (1.753 m)     Body mass index is 23.64 kg/m².     Physical Exam   Constitutional: He is oriented to person, place, and time. He appears well-developed and well-nourished. No distress.   To visit in motorized scooter   HENT:   Head: Normocephalic and atraumatic.   Eyes: No scleral icterus.   Neck: Normal range of motion. Neck supple.   Cardiovascular: Normal rate, regular rhythm, normal heart sounds and intact distal pulses.   Pulmonary/Chest: Effort normal and breath sounds normal. No respiratory distress.   Abdominal: He exhibits no distension.   Musculoskeletal: He exhibits no edema.   Neurological: He is alert and oriented to person, place, and time.   Skin: Skin is warm and dry. He is not diaphoretic.   Psychiatric: He has a normal mood and affect. His behavior is normal.       Diagnoses and health risks identified today and associated recommendations/orders:    1. Encounter for preventive health examination  Assessment completed  Preventive health recommendations reviewed    2. Polymyositis  Stable.   Controlled with current medical therapy  Followed by Rheumatology    3. Quadriparesis (muscle weakness)  Stable.   Controlled with current " medical therapy  Followed by Rheumatology in Physical Medicine.     4. Immunosuppression  Stable.   On CellCept  Followed by Rheumatology      5. Aortic atherosclerosis  Stable.  Seen on CT from 04/03/2019  Followed by PCP.     6. Pulmonary granuloma  Stable.  Seen on CT from 12/27/2017  Followed by PCP.     7. Atrial fibrillation, unspecified type  Stable.   Controlled with current medical therapy  Followed by PCP.     8. Beta 0 thalassemia  Stable.   Controlled with current medical therapy  Followed by PCP.     9. Thrombocytopenia  Improved  Controlled with current medical therapy  Followed by PCP.     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   Followed by PCP.     11. Current moderate episode of major depressive disorder without prior episode  Depression screening patient scored an 11/27.  Patient does report significant depression due to physical limitations and disease state.  He does say he occasionally will have thoughts of being better off dead, however he does not have a plan to commit suicide.  Says he prays to God and will leave it up to him.  Has a girlfriend that he lives with.  Discussed seeing psychiatry for counseling.  Patient is not really interested at this time.  Discussed medications for depression, patient declined.  Instructed patient to follow closely with his PCP    12. Essential hypertension  Mildly elevated at today's visit  Patient did not take his blood pressure medication this morning   Controlled with current medical therapy  Followed by PCP.     13. Vitamin D deficiency  Stable.   Controlled with current medical therapy  Followed by PCP.     14. Oropharyngeal dysphagia  Stable.   Controlled with current medical therapy  Followed by Rheumatology.     15. Portal venous hypertension  Stable.   Followed by hepatology.     16. Splenomegaly  Stable.   Followed by PCP.     17. Osteopenia, unspecified location  Stable.   Controlled with current medical therapy  Followed by PCP in Rheumatology       18. Debility  Stable.   He uses a scooter  Followed by Physical Medicine.     19. Abnormality of gait and mobility  Unable to walk currently or transfer without assistance  He uses a motorized scooter  Followed by Rheumatology      Provided Nura with a 5-10 year written screening schedule and personal prevention plan. Recommendations were developed using the USPSTF age appropriate recommendations. Education, counseling, and referrals were provided as needed. After Visit Summary printed and given to patient which includes a list of additional screenings\tests needed.    No follow-ups on file.    Amada Zurita NP

## 2020-01-31 ENCOUNTER — EXTERNAL CHRONIC CARE MANAGEMENT (OUTPATIENT)
Dept: PRIMARY CARE CLINIC | Facility: CLINIC | Age: 65
End: 2020-01-31
Payer: MEDICARE

## 2020-01-31 PROCEDURE — 99490 CHRNC CARE MGMT STAFF 1ST 20: CPT | Mod: S$GLB,,, | Performed by: FAMILY MEDICINE

## 2020-01-31 PROCEDURE — 99490 PR CHRONIC CARE MGMT, 1ST 20 MIN: ICD-10-PCS | Mod: S$GLB,,, | Performed by: FAMILY MEDICINE

## 2020-02-03 NOTE — PLAN OF CARE
Problem: Adult Inpatient Plan of Care  Goal: Plan of Care Review  Outcome: Ongoing (interventions implemented as appropriate)  AAO x4. VSS. No acute events overnight. Ambien given for insomnia. Pt with SCDs in place. Encouraged to turn Q 2. Antifungal powder applied as needed. Callbell placed within reach and use encouraged.        You can access the FollowMyHealth Patient Portal offered by [facility] by registering at the following website: [email or web address]. By joining [email or web address]’s FollowMyHealth portal, you will also be able to view your health information using other applications (apps) compatible with our system.

## 2020-02-24 ENCOUNTER — TELEPHONE (OUTPATIENT)
Dept: GASTROENTEROLOGY | Facility: CLINIC | Age: 65
End: 2020-02-24

## 2020-02-24 NOTE — TELEPHONE ENCOUNTER
----- Message from Renata Lorenzana sent at 2/24/2020 12:21 PM CST -----  Contact: pt: [phone]  Pt state he's returning a call from Keke     Please contact pt: [phone]

## 2020-02-27 DIAGNOSIS — R05.9 COUGH: ICD-10-CM

## 2020-02-27 RX ORDER — ALBUTEROL SULFATE 90 UG/1
2 AEROSOL, METERED RESPIRATORY (INHALATION) EVERY 6 HOURS PRN
Qty: 18 G | Refills: 11 | Status: SHIPPED | OUTPATIENT
Start: 2020-02-27 | End: 2020-10-30 | Stop reason: SDUPTHER

## 2020-02-29 ENCOUNTER — EXTERNAL CHRONIC CARE MANAGEMENT (OUTPATIENT)
Dept: PRIMARY CARE CLINIC | Facility: CLINIC | Age: 65
End: 2020-02-29
Payer: MEDICARE

## 2020-02-29 PROCEDURE — 99490 CHRNC CARE MGMT STAFF 1ST 20: CPT | Mod: S$GLB,,, | Performed by: FAMILY MEDICINE

## 2020-02-29 PROCEDURE — 99490 PR CHRONIC CARE MGMT, 1ST 20 MIN: ICD-10-PCS | Mod: S$GLB,,, | Performed by: FAMILY MEDICINE

## 2020-02-29 PROCEDURE — G2058 CCM ADD 20MIN: HCPCS | Mod: S$GLB,,, | Performed by: FAMILY MEDICINE

## 2020-02-29 PROCEDURE — G2058 PR CHRON CARE MGMT, EA ADDTL 20 MINS: ICD-10-PCS | Mod: S$GLB,,, | Performed by: FAMILY MEDICINE

## 2020-03-31 ENCOUNTER — EXTERNAL CHRONIC CARE MANAGEMENT (OUTPATIENT)
Dept: PRIMARY CARE CLINIC | Facility: CLINIC | Age: 65
End: 2020-03-31
Payer: MEDICARE

## 2020-03-31 PROCEDURE — 99490 PR CHRONIC CARE MGMT, 1ST 20 MIN: ICD-10-PCS | Mod: S$GLB,,, | Performed by: FAMILY MEDICINE

## 2020-03-31 PROCEDURE — 99490 CHRNC CARE MGMT STAFF 1ST 20: CPT | Mod: S$GLB,,, | Performed by: FAMILY MEDICINE

## 2020-04-29 ENCOUNTER — TELEPHONE (OUTPATIENT)
Dept: RHEUMATOLOGY | Facility: CLINIC | Age: 65
End: 2020-04-29

## 2020-04-29 DIAGNOSIS — Z79.899 ON CELLCEPT THERAPY: ICD-10-CM

## 2020-04-29 DIAGNOSIS — M33.20 POLYMYOSITIS: ICD-10-CM

## 2020-04-29 DIAGNOSIS — G72.41 INCLUSION BODY MYOSITIS (IBM): Primary | ICD-10-CM

## 2020-04-29 DIAGNOSIS — R13.10 DYSPHAGIA, UNSPECIFIED TYPE: ICD-10-CM

## 2020-04-29 NOTE — TELEPHONE ENCOUNTER
----- Message from Aristides Starks sent at 4/28/2020 10:59 AM CDT -----  Contact: pt  Please call pt at [phone]    Patient has questions about taking mycophenolate (CELLCEPT) 500 mg Tab only    Patient is currently out of the Prednisone and have no transportation to go to the pharmacy    Patient stated he is during ok    Thank you

## 2020-04-30 ENCOUNTER — EXTERNAL CHRONIC CARE MANAGEMENT (OUTPATIENT)
Dept: PRIMARY CARE CLINIC | Facility: CLINIC | Age: 65
End: 2020-04-30
Payer: MEDICARE

## 2020-04-30 ENCOUNTER — TELEPHONE (OUTPATIENT)
Dept: PHARMACY | Facility: CLINIC | Age: 65
End: 2020-04-30

## 2020-04-30 PROCEDURE — 99490 CHRNC CARE MGMT STAFF 1ST 20: CPT | Mod: S$GLB,,, | Performed by: FAMILY MEDICINE

## 2020-04-30 PROCEDURE — 99490 PR CHRONIC CARE MGMT, 1ST 20 MIN: ICD-10-PCS | Mod: S$GLB,,, | Performed by: FAMILY MEDICINE

## 2020-04-30 RX ORDER — PREDNISONE 10 MG/1
20 TABLET ORAL DAILY
Qty: 180 TABLET | Refills: 0 | Status: SHIPPED | OUTPATIENT
Start: 2020-04-30 | End: 2020-07-29

## 2020-04-30 RX ORDER — MYCOPHENOLATE MOFETIL 500 MG/1
1500 TABLET ORAL 2 TIMES DAILY
Qty: 540 TABLET | Refills: 3 | Status: SHIPPED | OUTPATIENT
Start: 2020-04-30 | End: 2020-08-26 | Stop reason: SDUPTHER

## 2020-04-30 NOTE — TELEPHONE ENCOUNTER
DOCUMENTATION ONLY:     Prior Authorization for Cellcept is NOT required per [facility]. FABIAN      Case ID# none    Co-Pay: $3.60     Patient Assistance IS NOT required.     Forward to clinical pharmacist for consult & shipment.      FABIAN

## 2020-04-30 NOTE — TELEPHONE ENCOUNTER
Informed Patient  that [facility] received prescription for Cellcept and benefits investigation is required.  [facility] will be back in touch once insurance determination is received.

## 2020-04-30 NOTE — TELEPHONE ENCOUNTER
Patient messaged about reducing his medications as he has limited supply.    Called and spoke to the patient. He has been out of prednisone 20mg daily for the past 1 week. He also decreased his cellcept from 1500mg BID to 1000mg BID about 1 week ago to help retain a supply. He is home bound and does not want to use public transportation at this time due to risk of COVID 19. He asked a friend to go  his medication and that friend did not pick it up.   His symptoms are stable for now.   Case discussed with Dr. Huston.   I have sent refill to [facility] for cell-cept so they can mail medication to him ASAP as he is running low. I have sent his prednisone to [facility] and asked them to mail deliver it. I have placed a home health order in order to get labs for us for monitoring medication as well as disease: he needs a cbc/cmp/sed/crp/aldolase/cpk monthly for they next 3 months Also are hoping that IVIG would be able to be given at home.     Discussed plan with patient. He verbalizes understanding.

## 2020-05-04 ENCOUNTER — TELEPHONE (OUTPATIENT)
Dept: RHEUMATOLOGY | Facility: CLINIC | Age: 65
End: 2020-05-04

## 2020-05-04 RX ORDER — ACETAMINOPHEN 325 MG/1
650 TABLET ORAL
Status: CANCELLED | OUTPATIENT
Start: 2020-05-04

## 2020-05-04 RX ORDER — FAMOTIDINE 10 MG/ML
20 INJECTION INTRAVENOUS
Status: CANCELLED | OUTPATIENT
Start: 2020-05-04

## 2020-05-04 RX ORDER — SODIUM CHLORIDE 0.9 % (FLUSH) 0.9 %
10 SYRINGE (ML) INJECTION
Status: CANCELLED | OUTPATIENT
Start: 2020-05-04

## 2020-05-04 RX ORDER — HEPARIN 100 UNIT/ML
500 SYRINGE INTRAVENOUS
Status: CANCELLED | OUTPATIENT
Start: 2020-05-04

## 2020-05-04 NOTE — TELEPHONE ENCOUNTER
Called Mr. Kahn for IVIG update. Discussed with him that Home health was initially denied. [facility] is working on appeal in mean time we will try to have him transported here. He states that he has not received prednisone or cellcept yet.     Called [facility]. Prednisone was shipped on 4/30/20 via [employer]. Not delivered yet   Tracking number:  [id number]    Will message [facility] about cellcept     Therapy plan for IVIG in chart     Will need to coordinate to get labs done at infusion visit

## 2020-05-05 ENCOUNTER — TELEPHONE (OUTPATIENT)
Dept: RHEUMATOLOGY | Facility: CLINIC | Age: 65
End: 2020-05-05

## 2020-05-06 ENCOUNTER — TELEPHONE (OUTPATIENT)
Dept: RHEUMATOLOGY | Facility: HOSPITAL | Age: 65
End: 2020-05-06

## 2020-05-07 PROCEDURE — G0180 MD CERTIFICATION HHA PATIENT: HCPCS | Mod: ,,, | Performed by: INTERNAL MEDICINE

## 2020-05-07 PROCEDURE — G0180 PR HOME HEALTH MD CERTIFICATION: ICD-10-PCS | Mod: ,,, | Performed by: INTERNAL MEDICINE

## 2020-05-12 ENCOUNTER — TELEPHONE (OUTPATIENT)
Dept: INTERNAL MEDICINE | Facility: CLINIC | Age: 65
End: 2020-05-12

## 2020-05-12 DIAGNOSIS — M33.20 POLYMYOSITIS: Primary | ICD-10-CM

## 2020-05-12 NOTE — TELEPHONE ENCOUNTER
Spoke with pt pt said he stopped taking his BP medication on his last visit with you Dr Ambrosio. Pt said the BP medication had his pressure dropping too low. Pt said he feels good and will schedule apt to come in to see you soon. Thanks.

## 2020-05-12 NOTE — TELEPHONE ENCOUNTER
----- Message from Alma Chowdhury sent at 5/12/2020 10:42 AM CDT -----  Contact: Dagmar PT with [facility]   Dagmar states the pt is no longer taking his b/p medications. Dagmar states the b/p medications are still showing up on the patient's med profile. Please call and advise.

## 2020-05-18 ENCOUNTER — EXTERNAL HOME HEALTH (OUTPATIENT)
Dept: HOME HEALTH SERVICES | Facility: HOSPITAL | Age: 65
End: 2020-05-18
Payer: MEDICARE

## 2020-05-19 DIAGNOSIS — M33.20 POLYMYOSITIS: Primary | ICD-10-CM

## 2020-05-22 ENCOUNTER — PATIENT MESSAGE (OUTPATIENT)
Dept: RHEUMATOLOGY | Facility: CLINIC | Age: 65
End: 2020-05-22

## 2020-05-27 ENCOUNTER — TELEPHONE (OUTPATIENT)
Dept: INTERNAL MEDICINE | Facility: CLINIC | Age: 65
End: 2020-05-27

## 2020-05-27 NOTE — TELEPHONE ENCOUNTER
----- Message from Lilian Potts sent at 5/27/2020 11:38 AM CDT -----  Contact: primo--PT for [facility]--[phone]  Patient Requesting Order    Order Needed:  Outpatient physical Therapy    Communication Preference: primo--PT for [facility]--[phone]    Additional Information:  Location pt wants to go to is located on [facility] fax number [phone]  (off) [phone]. Please call Primo back

## 2020-05-27 NOTE — TELEPHONE ENCOUNTER
PT states they think patient will benefit with outpatient PT.  Pt wants to do at [facility] location ([facility] fax number [phone]).  Ordered PT. Please process/notify patient once complete.

## 2020-05-28 ENCOUNTER — TELEPHONE (OUTPATIENT)
Dept: PHARMACY | Facility: CLINIC | Age: 65
End: 2020-05-28

## 2020-05-29 ENCOUNTER — TELEPHONE (OUTPATIENT)
Dept: RHEUMATOLOGY | Facility: CLINIC | Age: 65
End: 2020-05-29

## 2020-05-29 NOTE — TELEPHONE ENCOUNTER
Called [facility]. Patient has been approved for home health IVIG and labs. They are contacting patient about setting up.

## 2020-05-31 ENCOUNTER — EXTERNAL CHRONIC CARE MANAGEMENT (OUTPATIENT)
Dept: PRIMARY CARE CLINIC | Facility: CLINIC | Age: 65
End: 2020-05-31
Payer: MEDICARE

## 2020-05-31 PROCEDURE — 99490 PR CHRONIC CARE MGMT, 1ST 20 MIN: ICD-10-PCS | Mod: S$GLB,,, | Performed by: FAMILY MEDICINE

## 2020-05-31 PROCEDURE — 99490 CHRNC CARE MGMT STAFF 1ST 20: CPT | Mod: S$GLB,,, | Performed by: FAMILY MEDICINE

## 2020-06-05 ENCOUNTER — PATIENT OUTREACH (OUTPATIENT)
Dept: ADMINISTRATIVE | Facility: OTHER | Age: 65
End: 2020-06-05

## 2020-06-05 NOTE — PROGRESS NOTES
Patient's chart was reviewed.   Requested updates within Care Everywhere.  Immunizations reconciled.    Health Maintenance was updated.

## 2020-06-10 ENCOUNTER — TELEPHONE (OUTPATIENT)
Dept: RHEUMATOLOGY | Facility: CLINIC | Age: 65
End: 2020-06-10

## 2020-06-10 NOTE — TELEPHONE ENCOUNTER
He was approved for IVIG and received in this week. Received call from [employer]. Company will finish this session and then will not provide service next month. Nurse is not comfortable in patients house. It in infested with roaches.

## 2020-06-16 ENCOUNTER — TELEPHONE (OUTPATIENT)
Dept: PHARMACY | Facility: CLINIC | Age: 65
End: 2020-06-16

## 2020-06-17 ENCOUNTER — PATIENT OUTREACH (OUTPATIENT)
Dept: ADMINISTRATIVE | Facility: OTHER | Age: 65
End: 2020-06-17

## 2020-06-30 ENCOUNTER — EXTERNAL CHRONIC CARE MANAGEMENT (OUTPATIENT)
Dept: PRIMARY CARE CLINIC | Facility: CLINIC | Age: 65
End: 2020-06-30
Payer: MEDICARE

## 2020-06-30 PROCEDURE — 99490 CHRNC CARE MGMT STAFF 1ST 20: CPT | Mod: S$GLB,,, | Performed by: FAMILY MEDICINE

## 2020-06-30 PROCEDURE — 99490 PR CHRONIC CARE MGMT, 1ST 20 MIN: ICD-10-PCS | Mod: S$GLB,,, | Performed by: FAMILY MEDICINE

## 2020-07-02 ENCOUNTER — TELEPHONE (OUTPATIENT)
Dept: INTERNAL MEDICINE | Facility: CLINIC | Age: 65
End: 2020-07-02

## 2020-07-02 NOTE — TELEPHONE ENCOUNTER
Recommend urgent care appt for this with myself or any available provider.  Please notify patient.

## 2020-07-02 NOTE — TELEPHONE ENCOUNTER
----- Message from Jamila Rodríguez sent at 7/2/2020  9:01 AM CDT -----  Contact: Patient [phone]  Patient is requesting a call about some problems he is having. Wants a call to discuss the problem    Please call and advise.    Thank You

## 2020-07-02 NOTE — TELEPHONE ENCOUNTER
Pt called to request a rx for itching pt stated he sits a lot and the area is very itching and bothering him pt said he was given a powder in hospital that stopped the itching. Pt said its slightly damp but he tries to stay dry a possible. Pt said the wetness may come due to the way he urinates. Please, advise. Thanks. Pt wants powder and a cream.

## 2020-07-07 ENCOUNTER — OFFICE VISIT (OUTPATIENT)
Dept: INTERNAL MEDICINE | Facility: CLINIC | Age: 65
End: 2020-07-07
Payer: MEDICARE

## 2020-07-07 VITALS
SYSTOLIC BLOOD PRESSURE: 130 MMHG | HEIGHT: 69 IN | TEMPERATURE: 98 F | OXYGEN SATURATION: 99 % | BODY MASS INDEX: 23.64 KG/M2 | HEART RATE: 74 BPM | DIASTOLIC BLOOD PRESSURE: 80 MMHG

## 2020-07-07 DIAGNOSIS — A63.0 GENITAL WARTS: ICD-10-CM

## 2020-07-07 DIAGNOSIS — B35.6 TINEA CRURIS: Primary | ICD-10-CM

## 2020-07-07 PROCEDURE — 99213 OFFICE O/P EST LOW 20 MIN: CPT | Mod: HCNC,S$GLB,, | Performed by: FAMILY MEDICINE

## 2020-07-07 PROCEDURE — 99999 PR PBB SHADOW E&M-EST. PATIENT-LVL IV: CPT | Mod: PBBFAC,HCNC,, | Performed by: FAMILY MEDICINE

## 2020-07-07 PROCEDURE — 3079F DIAST BP 80-89 MM HG: CPT | Mod: HCNC,CPTII,S$GLB, | Performed by: FAMILY MEDICINE

## 2020-07-07 PROCEDURE — 3079F PR MOST RECENT DIASTOLIC BLOOD PRESSURE 80-89 MM HG: ICD-10-PCS | Mod: HCNC,CPTII,S$GLB, | Performed by: FAMILY MEDICINE

## 2020-07-07 PROCEDURE — 3075F SYST BP GE 130 - 139MM HG: CPT | Mod: HCNC,CPTII,S$GLB, | Performed by: FAMILY MEDICINE

## 2020-07-07 PROCEDURE — 3075F PR MOST RECENT SYSTOLIC BLOOD PRESS GE 130-139MM HG: ICD-10-PCS | Mod: HCNC,CPTII,S$GLB, | Performed by: FAMILY MEDICINE

## 2020-07-07 PROCEDURE — 99999 PR PBB SHADOW E&M-EST. PATIENT-LVL IV: ICD-10-PCS | Mod: PBBFAC,HCNC,, | Performed by: FAMILY MEDICINE

## 2020-07-07 PROCEDURE — 3008F PR BODY MASS INDEX (BMI) DOCUMENTED: ICD-10-PCS | Mod: HCNC,CPTII,S$GLB, | Performed by: FAMILY MEDICINE

## 2020-07-07 PROCEDURE — 99213 PR OFFICE/OUTPT VISIT, EST, LEVL III, 20-29 MIN: ICD-10-PCS | Mod: HCNC,S$GLB,, | Performed by: FAMILY MEDICINE

## 2020-07-07 PROCEDURE — 3008F BODY MASS INDEX DOCD: CPT | Mod: HCNC,CPTII,S$GLB, | Performed by: FAMILY MEDICINE

## 2020-07-07 RX ORDER — TERBINAFINE HYDROCHLORIDE 250 MG/1
250 TABLET ORAL DAILY
Qty: 14 TABLET | Refills: 0 | Status: SHIPPED | OUTPATIENT
Start: 2020-07-07 | End: 2020-07-07 | Stop reason: CLARIF

## 2020-07-07 RX ORDER — DIPHENHYDRAMINE HYDROCHLORIDE 50 MG/ML
INJECTION INTRAMUSCULAR; INTRAVENOUS
COMMUNITY
Start: 2020-07-02 | End: 2021-12-13 | Stop reason: ALTCHOICE

## 2020-07-07 RX ORDER — HYDROXYZINE PAMOATE 25 MG/1
25 CAPSULE ORAL 4 TIMES DAILY PRN
Qty: 30 CAPSULE | Refills: 2 | Status: SHIPPED | OUTPATIENT
Start: 2020-07-07 | End: 2020-07-07 | Stop reason: CLARIF

## 2020-07-07 RX ORDER — HYDROXYZINE PAMOATE 25 MG/1
25 CAPSULE ORAL 4 TIMES DAILY PRN
Qty: 30 CAPSULE | Refills: 2 | Status: SHIPPED | OUTPATIENT
Start: 2020-07-07 | End: 2021-03-04

## 2020-07-07 RX ORDER — EPINEPHRINE 0.3 MG/.3ML
INJECTION SUBCUTANEOUS
COMMUNITY
Start: 2020-06-05 | End: 2023-01-01 | Stop reason: CLARIF

## 2020-07-07 RX ORDER — TERBINAFINE HYDROCHLORIDE 250 MG/1
250 TABLET ORAL DAILY
Qty: 14 TABLET | Refills: 0 | Status: SHIPPED | OUTPATIENT
Start: 2020-07-07 | End: 2020-07-21

## 2020-07-07 RX ORDER — IMMUNE GLOBULIN (HUMAN) 10 G/100ML
INJECTION INTRAVENOUS; SUBCUTANEOUS
COMMUNITY
Start: 2020-07-02 | End: 2021-03-04

## 2020-07-07 NOTE — PROGRESS NOTES
"Subjective:      Patient ID: Nura Kahn Jr. is a 64 y.o. male.    Chief Complaint: Anal Itching (off and on (bottom area))      HPI:  Nura Kahn Jr. is a 64 year old male with atrial fibrillation, Beta thalassemia, depression, dyslipidemia, dysphagia, hemoglobin C disease, history of aspiration pneumonia of the lung, microcytic anemia, osteopenia, polymyositis, portal venous hypertension, splenomegaly, thrombocytopenia, tobacco abuse, and vitamin D deficiency who presents to clinic today with a chief complaint of itching.    States he has noticed itching to his groin and buttocks and around his waist line.  States he put a tissue on this area and noticed some blood.  States he has had the itching off and on for "forever."  When asked to elaborate further he states he has had intermitting itching in this area since his 30's.  States he has been trying to keep the area dry.  States he has put medicated dollar store powder on the area which sometimes provides relief.  States he started taking a Goody pain medicine as well with improvement.  States he has an associated rash.  States there are "warts" in certain areas as well.  States his symptoms have gotten worse but now have gotten better.  Patient is a poor historian.      Past Medical History:   Diagnosis Date    Aspiration pneumonia of right lower lobe 1/15/2018    Atrial fibrillation 3/2/2015    Depression     Essential hypertension 2/27/2019    Microcytic anemia 9/25/2014    Neuromuscular disorder     Pneumonia     Polymyositis 2009    Tobacco abuse        History reviewed. No pertinent surgical history.    Family History   Problem Relation Age of Onset    Diabetes Mother     Hypertension Mother     Kidney disease Mother     Diabetes Father     Hypertension Father     Heart attack Neg Hx     Heart disease Neg Hx     Melanoma Neg Hx        Social History     Socioeconomic History    Marital status:      Spouse name: Not on file "    Number of children: Not on file    Years of education: Not on file    Highest education level: Not on file   Occupational History    Not on file   Social Needs    Financial resource strain: Not on file    Food insecurity     Worry: Not on file     Inability: Not on file    Transportation needs     Medical: Not on file     Non-medical: Not on file   Tobacco Use    Smoking status: Former Smoker     Packs/day: 0.25     Years: 20.00     Pack years: 5.00     Types: Cigarettes     Quit date: 2018     Years since quittin.4    Smokeless tobacco: Never Used   Substance and Sexual Activity    Alcohol use: No     Alcohol/week: 0.0 standard drinks    Drug use: Yes     Types: Marijuana     Comment: daily use    Sexual activity: Not on file   Lifestyle    Physical activity     Days per week: Not on file     Minutes per session: Not on file    Stress: Not on file   Relationships    Social connections     Talks on phone: Not on file     Gets together: Not on file     Attends Restoration service: Not on file     Active member of club or organization: Not on file     Attends meetings of clubs or organizations: Not on file     Relationship status: Not on file   Other Topics Concern    Not on file   Social History Narrative    Not on file       Review of Systems   Constitutional: Negative for chills, fatigue and fever.   HENT: Negative for congestion, hearing loss, nosebleeds, rhinorrhea, sore throat and trouble swallowing.    Eyes: Negative for pain and visual disturbance.   Respiratory: Negative for cough, shortness of breath and wheezing.    Cardiovascular: Negative for chest pain and palpitations.   Gastrointestinal: Negative for abdominal distention, abdominal pain, constipation, diarrhea, nausea and vomiting.   Genitourinary: Negative for difficulty urinating, dysuria, frequency, hematuria and urgency.   Musculoskeletal: Negative for arthralgias, back pain and myalgias.   Skin: Positive for rash.         "+ Itching, genital warts   Neurological: Positive for weakness. Negative for dizziness, syncope, speech difficulty, numbness and headaches.   Psychiatric/Behavioral: Negative for agitation, behavioral problems and confusion. The patient is not nervous/anxious.      Objective:     Vitals:    07/07/20 0805   BP: 130/80   BP Location: Right arm   Patient Position: Sitting   BP Method: Medium (Manual)   Pulse: 74   Temp: 98.4 °F (36.9 °C)   TempSrc: Oral   SpO2: 99%   Weight: Comment: uto   Height: 5' 9" (1.753 m)       Physical Exam  Vitals signs and nursing note reviewed.   Constitutional:       General: He is not in acute distress.     Appearance: He is well-developed.   HENT:      Head: Atraumatic.      Right Ear: Hearing and external ear normal.      Left Ear: Hearing and external ear normal.      Nose: Nose normal. No rhinorrhea.   Eyes:      General: Lids are normal.         Right eye: No discharge.         Left eye: No discharge.      Conjunctiva/sclera: Conjunctivae normal.   Neck:      Musculoskeletal: Neck supple.      Trachea: Trachea normal. No tracheal deviation.   Cardiovascular:      Rate and Rhythm: Normal rate and regular rhythm.      Heart sounds: Normal heart sounds. No friction rub. No gallop.    Pulmonary:      Effort: Pulmonary effort is normal. No respiratory distress.      Breath sounds: Normal breath sounds. No wheezing or rales.   Abdominal:      General: Bowel sounds are normal. There is no distension.      Palpations: Abdomen is soft.      Tenderness: There is no abdominal tenderness. There is no guarding or rebound.   Skin:     General: Skin is warm and dry.      Findings: Rash present.      Comments: Erythematous rased rash to bilateral inguinal regions, to the scrotum, and to the buttocks.  Multiple large genital warts noted around the scrotum.  Exam limited by patient's functional status and request to avoid having a female medical assistant in the room to assist with positioning. "   Neurological:      Mental Status: He is alert.      Motor: No abnormal muscle tone.   Psychiatric:         Speech: Speech normal.         Behavior: Behavior normal. Behavior is cooperative.         Thought Content: Thought content normal.         Judgment: Judgment normal.        Assessment:      1. Tinea cruris    2. Genital warts      Plan:   Nura was seen today for anal itching.    Diagnoses and all orders for this visit:    Tinea cruris  -     Comprehensive metabolic panel; Future 1 week after starting terbinafine  -     Start hydrOXYzine pamoate (VISTARIL) 25 MG Cap; Take 1 capsule (25 mg total) by mouth 4 (four) times daily as needed (itching).  -     Start terbinafine HCL (LAMISIL) 250 mg tablet; Take 1 tablet (250 mg total) by mouth once daily. for 14 days  -     Recommended he keep the area cool and dry.  Instructed patient to return to clinic if no improvement or for any worsening.    Genital warts  -     Ambulatory referral/consult to Urology; Future for removal.

## 2020-07-08 ENCOUNTER — TELEPHONE (OUTPATIENT)
Dept: GASTROENTEROLOGY | Facility: CLINIC | Age: 65
End: 2020-07-08

## 2020-07-08 ENCOUNTER — TELEPHONE (OUTPATIENT)
Dept: UROLOGY | Facility: CLINIC | Age: 65
End: 2020-07-08

## 2020-07-08 NOTE — TELEPHONE ENCOUNTER
MA contacted pt about appt on 7/16 . Maryam will not be in clinic on that day. Pt verbalized understanding and accepted appt. On 7/31 at 9 :20 .

## 2020-07-10 ENCOUNTER — TELEPHONE (OUTPATIENT)
Dept: INTERNAL MEDICINE | Facility: CLINIC | Age: 65
End: 2020-07-10

## 2020-07-10 NOTE — TELEPHONE ENCOUNTER
----- Message from Marine Valente sent at 7/10/2020  9:47 AM CDT -----  Contact: self [phone]  Pt would like to know if they canceled both of this specialty appt fors next week does he still need to come Tuesday to do blood work. Please call and advise

## 2020-07-13 ENCOUNTER — TELEPHONE (OUTPATIENT)
Dept: PHARMACY | Facility: CLINIC | Age: 65
End: 2020-07-13

## 2020-07-13 NOTE — TELEPHONE ENCOUNTER
Refill call regarding Cell[facility] at [facility].  Will prepare for shipment with patient consent on 7/15 to arrive .  Copay 0.00.  Patient has about 6 days on hand.  Patient has not started any new medications including OTC drugs. Patient has not had any medication/ dose or instruction changes. No new allergies or side effects reported with this shipment. Medication is being taken as prescribed by physician and properly stored. Two patient identifiers:  and Address verified. Patient has no questions or concerns for [facility].

## 2020-07-14 ENCOUNTER — TELEPHONE (OUTPATIENT)
Dept: INTERNAL MEDICINE | Facility: CLINIC | Age: 65
End: 2020-07-14

## 2020-07-14 ENCOUNTER — LAB VISIT (OUTPATIENT)
Dept: LAB | Facility: HOSPITAL | Age: 65
End: 2020-07-14
Payer: MEDICARE

## 2020-07-14 DIAGNOSIS — B35.6 TINEA CRURIS: ICD-10-CM

## 2020-07-14 LAB
ALBUMIN SERPL BCP-MCNC: 3.9 G/DL (ref 3.5–5.2)
ALP SERPL-CCNC: 47 U/L (ref 55–135)
ALT SERPL W/O P-5'-P-CCNC: 6 U/L (ref 10–44)
ANION GAP SERPL CALC-SCNC: 7 MMOL/L (ref 8–16)
AST SERPL-CCNC: 8 U/L (ref 10–40)
BILIRUB SERPL-MCNC: 1.2 MG/DL (ref 0.1–1)
BUN SERPL-MCNC: 11 MG/DL (ref 8–23)
CALCIUM SERPL-MCNC: 8.8 MG/DL (ref 8.7–10.5)
CHLORIDE SERPL-SCNC: 105 MMOL/L (ref 95–110)
CO2 SERPL-SCNC: 28 MMOL/L (ref 23–29)
CREAT SERPL-MCNC: 0.5 MG/DL (ref 0.5–1.4)
EST. GFR  (AFRICAN AMERICAN): >60 ML/MIN/1.73 M^2
EST. GFR  (NON AFRICAN AMERICAN): >60 ML/MIN/1.73 M^2
GLUCOSE SERPL-MCNC: 73 MG/DL (ref 70–110)
POTASSIUM SERPL-SCNC: 3.7 MMOL/L (ref 3.5–5.1)
PROT SERPL-MCNC: 7.2 G/DL (ref 6–8.4)
SODIUM SERPL-SCNC: 140 MMOL/L (ref 136–145)

## 2020-07-14 PROCEDURE — 36415 COLL VENOUS BLD VENIPUNCTURE: CPT | Mod: HCNC

## 2020-07-14 PROCEDURE — 80053 COMPREHEN METABOLIC PANEL: CPT | Mod: HCNC

## 2020-07-14 NOTE — TELEPHONE ENCOUNTER
----- Message from Socrates Ambrosio MD sent at 7/14/2020 11:14 AM CDT -----  Please notify patient of the following:    Lab reviewed.  Liver enzymes did not elevate; ok to continue terbinafine.

## 2020-07-21 ENCOUNTER — TELEPHONE (OUTPATIENT)
Dept: RHEUMATOLOGY | Facility: CLINIC | Age: 65
End: 2020-07-21

## 2020-07-21 NOTE — TELEPHONE ENCOUNTER
Spoke to patient regarding medications that were delivered for infusion.   Patient gave the phone to caregiver Mitali to speak to me regarding medications.   Mitali stated that in the past [facility] provided the infusion for the patient.   [facility] number provided.

## 2020-07-22 ENCOUNTER — TELEPHONE (OUTPATIENT)
Dept: RHEUMATOLOGY | Facility: CLINIC | Age: 65
End: 2020-07-22

## 2020-07-23 ENCOUNTER — TELEPHONE (OUTPATIENT)
Dept: RHEUMATOLOGY | Facility: CLINIC | Age: 65
End: 2020-07-23

## 2020-07-23 NOTE — TELEPHONE ENCOUNTER
Karyn with [facility] returned called.   She stated that the patient's home was infested with roaches and she would not be providing his IV infusions.   The patient left Karyn a message on yesterday and Karyn gave the message to the Supervisor Yaquelin.   I left Yaquelin a message at [phone].   Waiting on a return call.    HERNAN Boyd will set up infusion here for patient.

## 2020-07-23 NOTE — TELEPHONE ENCOUNTER
I have spoke with mr love.  He needs to come in for his infusion, was getting it at home.  The company does not want to go back to his home.  Any has called the company we waiting for a call back for the supv.  I have to wait until the ivig is approved again to rec'd it here

## 2020-07-27 ENCOUNTER — TELEPHONE (OUTPATIENT)
Dept: INTERNAL MEDICINE | Facility: CLINIC | Age: 65
End: 2020-07-27

## 2020-07-27 NOTE — TELEPHONE ENCOUNTER
----- Message from Alma Chowdhury sent at 7/27/2020  8:50 AM CDT -----  Contact: self [phone]  Pt is calling with questions about home health. Please call and advise.

## 2020-07-29 ENCOUNTER — PATIENT OUTREACH (OUTPATIENT)
Dept: ADMINISTRATIVE | Facility: OTHER | Age: 65
End: 2020-07-29

## 2020-07-30 ENCOUNTER — OFFICE VISIT (OUTPATIENT)
Dept: UROLOGY | Facility: CLINIC | Age: 65
End: 2020-07-30
Payer: MEDICARE

## 2020-07-30 VITALS
SYSTOLIC BLOOD PRESSURE: 163 MMHG | WEIGHT: 170 LBS | HEIGHT: 69 IN | DIASTOLIC BLOOD PRESSURE: 86 MMHG | BODY MASS INDEX: 25.18 KG/M2 | HEART RATE: 85 BPM

## 2020-07-30 DIAGNOSIS — A63.0 CONDYLOMA ACUMINATA: Primary | ICD-10-CM

## 2020-07-30 DIAGNOSIS — A63.0 GENITAL WARTS: ICD-10-CM

## 2020-07-30 PROCEDURE — 99999 PR PBB SHADOW E&M-EST. PATIENT-LVL III: CPT | Mod: PBBFAC,HCNC,, | Performed by: UROLOGY

## 2020-07-30 PROCEDURE — 99204 OFFICE O/P NEW MOD 45 MIN: CPT | Mod: HCNC,S$GLB,, | Performed by: UROLOGY

## 2020-07-30 PROCEDURE — 99999 PR PBB SHADOW E&M-EST. PATIENT-LVL III: ICD-10-PCS | Mod: PBBFAC,HCNC,, | Performed by: UROLOGY

## 2020-07-30 PROCEDURE — 3008F PR BODY MASS INDEX (BMI) DOCUMENTED: ICD-10-PCS | Mod: HCNC,CPTII,S$GLB, | Performed by: UROLOGY

## 2020-07-30 PROCEDURE — 3077F SYST BP >= 140 MM HG: CPT | Mod: HCNC,CPTII,S$GLB, | Performed by: UROLOGY

## 2020-07-30 PROCEDURE — 99204 PR OFFICE/OUTPT VISIT, NEW, LEVL IV, 45-59 MIN: ICD-10-PCS | Mod: HCNC,S$GLB,, | Performed by: UROLOGY

## 2020-07-30 PROCEDURE — 3008F BODY MASS INDEX DOCD: CPT | Mod: HCNC,CPTII,S$GLB, | Performed by: UROLOGY

## 2020-07-30 PROCEDURE — 3077F PR MOST RECENT SYSTOLIC BLOOD PRESSURE >= 140 MM HG: ICD-10-PCS | Mod: HCNC,CPTII,S$GLB, | Performed by: UROLOGY

## 2020-07-30 PROCEDURE — 3079F PR MOST RECENT DIASTOLIC BLOOD PRESSURE 80-89 MM HG: ICD-10-PCS | Mod: HCNC,CPTII,S$GLB, | Performed by: UROLOGY

## 2020-07-30 PROCEDURE — 3079F DIAST BP 80-89 MM HG: CPT | Mod: HCNC,CPTII,S$GLB, | Performed by: UROLOGY

## 2020-07-30 RX ORDER — NYSTATIN AND TRIAMCINOLONE ACETONIDE 100000; 1 [USP'U]/G; MG/G
CREAM TOPICAL 2 TIMES DAILY
Qty: 30 G | Refills: 4 | Status: SHIPPED | OUTPATIENT
Start: 2020-07-30 | End: 2021-09-30 | Stop reason: SDUPTHER

## 2020-07-30 RX ORDER — PREDNISONE 10 MG/1
20 TABLET ORAL DAILY
COMMUNITY
End: 2020-08-26 | Stop reason: SDUPTHER

## 2020-07-30 NOTE — LETTER
July 30, 2020      Socrates Ambrosio MD  [address]  [facility] [address]           [facility]rology 4th Floor  [address]  [place] [address]  Phone: [phone]          Patient: Nura Kahn Jr.   MR Number: [id number]   YOB: 1955   Date of Visit: 7/30/2020       Dear Dr. Socrates Ambrosio:    Thank you for referring Nura Kahn to me for evaluation. Attached you will find relevant portions of my assessment and plan of care.    If you have questions, please do not hesitate to call me. I look forward to following Nura Kahn along with you.    Sincerely,    Isaiah Mendiola Jr., MD    Enclosure  CC:  No Recipients    If you would like to receive this communication electronically, please contact [email or web address] or [phone] to request more information on [employer] Link access.    For providers and/or their staff who would like to refer a patient to [facility], please contact us through our one-stop-shop provider referral line, [facility], at [phone].    If you feel you have received this communication in error or would no longer like to receive these types of communications, please e-mail [email or web address]

## 2020-07-30 NOTE — PROGRESS NOTES
Subjective:       Patient ID: Nura Kahn Jr. is a 64 y.o. male.    Chief Complaint: Genital Warts    HPI patient with polymyositis is here with condyloma in the right groin.  He is on CellCept.  We discussed the possibility of laser excision however he would like to hold off on that.  He has had them frozen in the past but he does not want to have that done again    Past Medical History:   Diagnosis Date    Aspiration pneumonia of right lower lobe 1/15/2018    Atrial fibrillation 3/2/2015    Depression     Essential hypertension 2019    Microcytic anemia 2014    Neuromuscular disorder     Pneumonia     Polymyositis 2009    Tobacco abuse        No past surgical history on file.    Family History   Problem Relation Age of Onset    Diabetes Mother     Hypertension Mother     Kidney disease Mother     Diabetes Father     Hypertension Father     Heart attack Neg Hx     Heart disease Neg Hx     Melanoma Neg Hx        Social History     Socioeconomic History    Marital status:      Spouse name: Not on file    Number of children: Not on file    Years of education: Not on file    Highest education level: Not on file   Occupational History    Not on file   Social Needs    Financial resource strain: Not on file    Food insecurity     Worry: Not on file     Inability: Not on file    Transportation needs     Medical: Not on file     Non-medical: Not on file   Tobacco Use    Smoking status: Former Smoker     Packs/day: 0.25     Years: 20.00     Pack years: 5.00     Types: Cigarettes     Quit date: 2018     Years since quittin.5    Smokeless tobacco: Never Used   Substance and Sexual Activity    Alcohol use: No     Alcohol/week: 0.0 standard drinks    Drug use: Yes     Types: Marijuana     Comment: daily use    Sexual activity: Not on file   Lifestyle    Physical activity     Days per week: Not on file     Minutes per session: Not on file    Stress: Not on file    Relationships    Social connections     Talks on phone: Not on file     Gets together: Not on file     Attends Latter-day service: Not on file     Active member of club or organization: Not on file     Attends meetings of clubs or organizations: Not on file     Relationship status: Not on file   Other Topics Concern    Not on file   Social History Narrative    Not on file       Allergies:  Patient has no known allergies.    Medications:    Current Outpatient Medications:     albuterol (PROVENTIL/VENTOLIN HFA) 90 mcg/actuation inhaler, Inhale 2 puffs into the lungs every 6 (six) hours as needed for Wheezing or Shortness of Breath., Disp: 18 g, Rfl: 11    ergocalciferol (ERGOCALCIFEROL) 50,000 unit Cap, Take 1 capsule (50,000 Units total) by mouth every 7 days., Disp: 4 capsule, Rfl: 1    mycophenolate (CELLCEPT) 500 mg Tab, Take 3 tablets (1,500 mg total) by mouth 2 (two) times daily., Disp: 540 tablet, Rfl: 3    predniSONE (DELTASONE) 10 MG tablet, Take 20 mg by mouth once daily., Disp: , Rfl:     amLODIPine (NORVASC) 5 MG tablet, Take 2 tablets (10 mg total) by mouth once daily. (Patient not taking: Reported on 7/30/2020), Disp: 60 tablet, Rfl: 11    ammonium lactate 12 % Crea, Apply twice daily to affected parts both feet as needed. (Patient not taking: Reported on 7/30/2020), Disp: 140 g, Rfl: 11    diphenhydrAMINE (BENADRYL) 50 mg/mL injection, , Disp: , Rfl:     EPINEPHrine (EPIPEN) 0.3 mg/0.3 mL AtIn, , Disp: , Rfl:     GAMUNEX-C 10 gram/100 mL (10 %) Soln, , Disp: , Rfl:     hydrOXYzine pamoate (VISTARIL) 25 MG Cap, Take 1 capsule (25 mg total) by mouth 4 (four) times daily as needed (itching). (Patient not taking: Reported on 7/30/2020), Disp: 30 capsule, Rfl: 2    labetalol (NORMODYNE) 100 MG tablet, Take 1 tablet (100 mg total) by mouth every 12 (twelve) hours. (Patient not taking: Reported on 7/30/2020), Disp: 60 tablet, Rfl: 11    nystatin-triamcinolone (MYCOLOG II) cream, Apply topically  2 (two) times daily., Disp: 30 g, Rfl: 4    Review of Systems   Constitutional: Negative for activity change, appetite change, chills, diaphoresis, fatigue, fever and unexpected weight change.   HENT: Negative for congestion, dental problem, hearing loss, mouth sores, postnasal drip, rhinorrhea, sinus pressure and trouble swallowing.    Eyes: Negative for pain, discharge and itching.   Respiratory: Negative for apnea, cough, choking, chest tightness, shortness of breath and wheezing.    Cardiovascular: Negative for chest pain, palpitations and leg swelling.   Gastrointestinal: Negative for abdominal distention, abdominal pain, anal bleeding, blood in stool, constipation, diarrhea, nausea, rectal pain and vomiting.   Endocrine: Negative for polydipsia and polyuria.   Genitourinary: Negative for decreased urine volume, difficulty urinating, discharge, dysuria, enuresis, flank pain, frequency, genital sores, hematuria, penile pain, penile swelling, scrotal swelling, testicular pain and urgency.        He complains of itching in his right groin   Musculoskeletal: Negative for arthralgias, back pain and myalgias.   Skin: Negative for color change, rash and wound.   Neurological: Negative for dizziness, syncope, speech difficulty, light-headedness and headaches.   Hematological: Negative for adenopathy. Does not bruise/bleed easily.   Psychiatric/Behavioral: Negative for behavioral problems, confusion, hallucinations and sleep disturbance.       Objective:      Physical Exam   Genitourinary:    Genitourinary Comments: 10-20 condyloma right groin.  Patient has been using powder and that has built up         Assessment:       1. Condyloma acuminata    2. Genital warts        Plan:       Nura was seen today for genital warts.    Diagnoses and all orders for this visit:    Condyloma acuminata    Genital warts  -     Ambulatory referral/consult to Urology    Other orders  -     nystatin-triamcinolone (MYCOLOG II) cream;  Apply topically 2 (two) times daily.        I think the pruritus is coming from a yeast infection and I am going to treat that with Mycolog cream.  I recommended laser fulguration of condyloma but he has not wish to have that done right now due to his comorbidities.  Will see if this helps and he will call me

## 2020-07-31 ENCOUNTER — EXTERNAL CHRONIC CARE MANAGEMENT (OUTPATIENT)
Dept: PRIMARY CARE CLINIC | Facility: CLINIC | Age: 65
End: 2020-07-31
Payer: MEDICARE

## 2020-07-31 ENCOUNTER — TELEPHONE (OUTPATIENT)
Dept: ENDOSCOPY | Facility: HOSPITAL | Age: 65
End: 2020-07-31

## 2020-07-31 ENCOUNTER — OFFICE VISIT (OUTPATIENT)
Dept: GASTROENTEROLOGY | Facility: CLINIC | Age: 65
End: 2020-07-31
Payer: MEDICARE

## 2020-07-31 VITALS
WEIGHT: 170 LBS | BODY MASS INDEX: 25.18 KG/M2 | SYSTOLIC BLOOD PRESSURE: 131 MMHG | HEART RATE: 92 BPM | DIASTOLIC BLOOD PRESSURE: 92 MMHG | HEIGHT: 69 IN

## 2020-07-31 DIAGNOSIS — Z12.11 COLON CANCER SCREENING: ICD-10-CM

## 2020-07-31 DIAGNOSIS — Z01.818 PRE-OP TESTING: Primary | ICD-10-CM

## 2020-07-31 DIAGNOSIS — R13.10 DYSPHAGIA, UNSPECIFIED TYPE: Primary | ICD-10-CM

## 2020-07-31 DIAGNOSIS — Z12.11 SPECIAL SCREENING FOR MALIGNANT NEOPLASMS, COLON: Primary | ICD-10-CM

## 2020-07-31 PROCEDURE — 99490 CHRNC CARE MGMT STAFF 1ST 20: CPT | Mod: S$GLB,,, | Performed by: FAMILY MEDICINE

## 2020-07-31 PROCEDURE — 99499 RISK ADDL DX/OHS AUDIT: ICD-10-PCS | Mod: HCNC,S$GLB,, | Performed by: NURSE PRACTITIONER

## 2020-07-31 PROCEDURE — 99214 PR OFFICE/OUTPT VISIT, EST, LEVL IV, 30-39 MIN: ICD-10-PCS | Mod: HCNC,S$GLB,, | Performed by: NURSE PRACTITIONER

## 2020-07-31 PROCEDURE — 99999 PR PBB SHADOW E&M-EST. PATIENT-LVL IV: CPT | Mod: PBBFAC,HCNC,, | Performed by: NURSE PRACTITIONER

## 2020-07-31 PROCEDURE — 99499 UNLISTED E&M SERVICE: CPT | Mod: HCNC,S$GLB,, | Performed by: NURSE PRACTITIONER

## 2020-07-31 PROCEDURE — 3075F PR MOST RECENT SYSTOLIC BLOOD PRESS GE 130-139MM HG: ICD-10-PCS | Mod: HCNC,CPTII,S$GLB, | Performed by: NURSE PRACTITIONER

## 2020-07-31 PROCEDURE — 3008F PR BODY MASS INDEX (BMI) DOCUMENTED: ICD-10-PCS | Mod: HCNC,CPTII,S$GLB, | Performed by: NURSE PRACTITIONER

## 2020-07-31 PROCEDURE — 3080F PR MOST RECENT DIASTOLIC BLOOD PRESSURE >= 90 MM HG: ICD-10-PCS | Mod: HCNC,CPTII,S$GLB, | Performed by: NURSE PRACTITIONER

## 2020-07-31 PROCEDURE — 3008F BODY MASS INDEX DOCD: CPT | Mod: HCNC,CPTII,S$GLB, | Performed by: NURSE PRACTITIONER

## 2020-07-31 PROCEDURE — 3080F DIAST BP >= 90 MM HG: CPT | Mod: HCNC,CPTII,S$GLB, | Performed by: NURSE PRACTITIONER

## 2020-07-31 PROCEDURE — 99214 OFFICE O/P EST MOD 30 MIN: CPT | Mod: HCNC,S$GLB,, | Performed by: NURSE PRACTITIONER

## 2020-07-31 PROCEDURE — 3075F SYST BP GE 130 - 139MM HG: CPT | Mod: HCNC,CPTII,S$GLB, | Performed by: NURSE PRACTITIONER

## 2020-07-31 PROCEDURE — 99490 PR CHRONIC CARE MGMT, 1ST 20 MIN: ICD-10-PCS | Mod: S$GLB,,, | Performed by: FAMILY MEDICINE

## 2020-07-31 PROCEDURE — 99999 PR PBB SHADOW E&M-EST. PATIENT-LVL IV: ICD-10-PCS | Mod: PBBFAC,HCNC,, | Performed by: NURSE PRACTITIONER

## 2020-07-31 RX ORDER — POLYETHYLENE GLYCOL 3350, SODIUM SULFATE ANHYDROUS, SODIUM BICARBONATE, SODIUM CHLORIDE, POTASSIUM CHLORIDE 236; 22.74; 6.74; 5.86; 2.97 G/4L; G/4L; G/4L; G/4L; G/4L
4 POWDER, FOR SOLUTION ORAL ONCE
Qty: 4000 ML | Refills: 0 | Status: SHIPPED | OUTPATIENT
Start: 2020-07-31 | End: 2020-08-01

## 2020-07-31 NOTE — PROGRESS NOTES
[facility] Consultation Note    Reason for Consult:  The primary encounter diagnosis was Dysphagia, unspecified type. A diagnosis of Colon cancer screening was also pertinent to this visit.    PCP:   Socrates Ambrosio   [address] [facility] / [place] [address]    Referring MD:  Lisa Durham Md  [address] [place] [address]    Initial History of Present Illness (HPI):  This is a 64 y.o. male here for evaluation of dysphagia. He is a new patient. Saw Dr. Laurent in 2015 for dysphagia, visit reviewed. He was recommended to have an EGD at that time and it was not performed.  Dysphagia many years. He reports having solid and liquid esophageal dysphagia.  Abdominal pain - no  Reflux - no  Bowel habits - normal  GI bleeding - none  NSAID usage - occasional goodys    ROS:  Constitutional: No fevers, chills, No weight loss  ENT:  No sore throat, + PND  CV: No chest pain, no palpitation  Pulm: + cough, No shortness of breath, no wheezing  Ophtho: No vision changes  GI: see HPI  Derm: No rash, no itching  Heme: No easy bruising  MSK: No significant arthritis  : No dysuria, No hematuria  Neuro: No syncope, No seizure  Psych: No uncontrolled anxiety, No uncontrolled depression    Medical History:  has a past medical history of Aspiration pneumonia of right lower lobe (1/15/2018), Atrial fibrillation (3/2/2015), Depression, Essential hypertension (2/27/2019), Microcytic anemia (9/25/2014), Neuromuscular disorder, Pneumonia, Polymyositis (2009), and Tobacco abuse.    Surgical History:  has no past surgical history on file.    Family History: family history includes Diabetes in his father and mother; Hypertension in his father and mother; Kidney disease in his mother..     Social History:  reports that he quit smoking about 2 years ago. His smoking use included cigarettes. He has a 5.00 pack-year smoking history. He has never used smokeless tobacco. He reports current drug use. Drug: Marijuana. He  "reports that he does not drink alcohol.    Review of patient's allergies indicates:  No Known Allergies    Medication List with Changes/Refills   Current Medications    ALBUTEROL (PROVENTIL/VENTOLIN HFA) 90 MCG/ACTUATION INHALER    Inhale 2 puffs into the lungs every 6 (six) hours as needed for Wheezing or Shortness of Breath.    AMLODIPINE (NORVASC) 5 MG TABLET    Take 2 tablets (10 mg total) by mouth once daily.    AMMONIUM LACTATE 12 % CREA    Apply twice daily to affected parts both feet as needed.    DIPHENHYDRAMINE (BENADRYL) 50 MG/ML INJECTION        EPINEPHRINE (EPIPEN) 0.3 MG/0.3 ML ATIN        ERGOCALCIFEROL (ERGOCALCIFEROL) 50,000 UNIT CAP    Take 1 capsule (50,000 Units total) by mouth every 7 days.    GAMUNEX-C 10 GRAM/100 ML (10 %) SOLN        HYDROXYZINE PAMOATE (VISTARIL) 25 MG CAP    Take 1 capsule (25 mg total) by mouth 4 (four) times daily as needed (itching).    LABETALOL (NORMODYNE) 100 MG TABLET    Take 1 tablet (100 mg total) by mouth every 12 (twelve) hours.    MYCOPHENOLATE (CELLCEPT) 500 MG TAB    Take 3 tablets (1,500 mg total) by mouth 2 (two) times daily.    NYSTATIN-TRIAMCINOLONE (MYCOLOG II) CREAM    Apply topically 2 (two) times daily.    PREDNISONE (DELTASONE) 10 MG TABLET    Take 20 mg by mouth once daily.         Objective Findings:    Vital Signs:  BP (!) 131/92 (BP Location: Left arm)   Pulse 92   Ht 5' 9" (1.753 m)   Wt 77.1 kg (169 lb 15.6 oz)   BMI 25.10 kg/m²   Body mass index is 25.1 kg/m².    Physical Exam:  General Appearance: Well appearing, NAD noted  Eyes:    No scleral icterus  ENT:  No lesions or masses   Lungs: BBS CTA ,  no wheezes  Heart:  HRRR, S1 & S2 normal, no murmurs heard  Abdomen:  Non distended, soft  Musculoskeletal:  No major joint deformities  Skin: No petechiae or rash on exposed skin areas  Neurologic:  Alert and oriented x4  Psychiatric:  Normal speech mentation and affect    Labs reviewed:  Lab Results   Component Value Date    WBC 5.57 " 11/25/2019    HGB 11.0 (L) 11/25/2019    HCT 33.6 (L) 11/25/2019     11/25/2019    CHOL 129 12/12/2018    TRIG 85 12/12/2018    HDL 38 (L) 12/12/2018    ALT 6 (L) 07/14/2020    AST 8 (L) 07/14/2020     07/14/2020    K 3.7 07/14/2020     07/14/2020    CREATININE 0.5 07/14/2020    BUN 11 07/14/2020    CO2 28 07/14/2020    TSH 1.523 04/02/2019    PSA 0.80 12/12/2018    INR 1.1 11/25/2019    HGBA1C <4.0 (A) 04/02/2019           [place]:  [facility] [place]   Transit : Normal oral transit time.   Penetration/Aspiration: Laryngeal penetration.  Miscellaneous: Significant contrast stasis at the vallecula and pyriform sinuses.    Chest CT 2019   1. Mild peribronchial cuffing which may be seen with bronchitis.  Mild tubular bronchiectasis of the right lower lobe with several distal bronchi demonstrating retained secretions, finding may be seen with small airways infectious/noninfectious inflammation or aspiration.  2. Centrilobular and paraseptal emphysema with an upper lung zone predominance.  3. Diffuse muscular atrophy.  4. Splenomegaly.    Endoscopy reviewed:    Reports colonoscopy at  more than 5 years ago.    Medical Decision Making:    Assessment:    Nura Kahn Jr. is a 64 y.o. male here for:    1. Dysphagia, unspecified type    2. Colon cancer screening         Distracting visit. Patient answered phone multiple times during our visit.   We discussed EGD needs to be done to eval his dysphagia since this has never been done. Dysphagia could be due to his polymyositis.      Recommendations:  1. EGD   2. Screening colonscopy    F/u pending scope results    Order summary:  Orders Placed This Encounter    Case request GI: EGD (ESOPHAGOGASTRODUODENOSCOPY), COLONOSCOPY         Thank you for allowing me to participate in the care of Nura Kahn Jr.    KATEY Talley

## 2020-07-31 NOTE — H&P (VIEW-ONLY)
[facility] Consultation Note    Reason for Consult:  The primary encounter diagnosis was Dysphagia, unspecified type. A diagnosis of Colon cancer screening was also pertinent to this visit.    PCP:   Socrates Ambrosio   [address] [facility] / [place] [address]    Referring MD:  Lisa Durham Md  [address] [facility] [place] [address]    Initial History of Present Illness (HPI):  This is a 64 y.o. male here for evaluation of dysphagia. He is a new patient. Saw Dr. Laurent in 2015 for dysphagia, visit reviewed. He was recommended to have an EGD at that time and it was not performed.  Dysphagia many years. He reports having solid and liquid esophageal dysphagia.  Abdominal pain - no  Reflux - no  Bowel habits - normal  GI bleeding - none  NSAID usage - occasional goodys    ROS:  Constitutional: No fevers, chills, No weight loss  ENT:  No sore throat, + PND  CV: No chest pain, no palpitation  Pulm: + cough, No shortness of breath, no wheezing  Ophtho: No vision changes  GI: see HPI  Derm: No rash, no itching  Heme: No easy bruising  MSK: No significant arthritis  : No dysuria, No hematuria  Neuro: No syncope, No seizure  Psych: No uncontrolled anxiety, No uncontrolled depression    Medical History:  has a past medical history of Aspiration pneumonia of right lower lobe (1/15/2018), Atrial fibrillation (3/2/2015), Depression, Essential hypertension (2/27/2019), Microcytic anemia (9/25/2014), Neuromuscular disorder, Pneumonia, Polymyositis (2009), and Tobacco abuse.    Surgical History:  has no past surgical history on file.    Family History: family history includes Diabetes in his father and mother; Hypertension in his father and mother; Kidney disease in his mother..     Social History:  reports that he quit smoking about 2 years ago. His smoking use included cigarettes. He has a 5.00 pack-year smoking history. He has never used smokeless tobacco. He reports current drug use. Drug: Marijuana. He  "reports that he does not drink alcohol.    Review of patient's allergies indicates:  No Known Allergies    Medication List with Changes/Refills   Current Medications    ALBUTEROL (PROVENTIL/VENTOLIN HFA) 90 MCG/ACTUATION INHALER    Inhale 2 puffs into the lungs every 6 (six) hours as needed for Wheezing or Shortness of Breath.    AMLODIPINE (NORVASC) 5 MG TABLET    Take 2 tablets (10 mg total) by mouth once daily.    AMMONIUM LACTATE 12 % CREA    Apply twice daily to affected parts both feet as needed.    DIPHENHYDRAMINE (BENADRYL) 50 MG/ML INJECTION        EPINEPHRINE (EPIPEN) 0.3 MG/0.3 ML ATIN        ERGOCALCIFEROL (ERGOCALCIFEROL) 50,000 UNIT CAP    Take 1 capsule (50,000 Units total) by mouth every 7 days.    GAMUNEX-C 10 GRAM/100 ML (10 %) SOLN        HYDROXYZINE PAMOATE (VISTARIL) 25 MG CAP    Take 1 capsule (25 mg total) by mouth 4 (four) times daily as needed (itching).    LABETALOL (NORMODYNE) 100 MG TABLET    Take 1 tablet (100 mg total) by mouth every 12 (twelve) hours.    MYCOPHENOLATE (CELLCEPT) 500 MG TAB    Take 3 tablets (1,500 mg total) by mouth 2 (two) times daily.    NYSTATIN-TRIAMCINOLONE (MYCOLOG II) CREAM    Apply topically 2 (two) times daily.    PREDNISONE (DELTASONE) 10 MG TABLET    Take 20 mg by mouth once daily.         Objective Findings:    Vital Signs:  BP (!) 131/92 (BP Location: Left arm)   Pulse 92   Ht 5' 9" (1.753 m)   Wt 77.1 kg (169 lb 15.6 oz)   BMI 25.10 kg/m²   Body mass index is 25.1 kg/m².    Physical Exam:  General Appearance: Well appearing, NAD noted  Eyes:    No scleral icterus  ENT:  No lesions or masses   Lungs: BBS CTA ,  no wheezes  Heart:  HRRR, S1 & S2 normal, no murmurs heard  Abdomen:  Non distended, soft  Musculoskeletal:  No major joint deformities  Skin: No petechiae or rash on exposed skin areas  Neurologic:  Alert and oriented x4  Psychiatric:  Normal speech mentation and affect    Labs reviewed:  Lab Results   Component Value Date    WBC 5.57 " 11/25/2019    HGB 11.0 (L) 11/25/2019    HCT 33.6 (L) 11/25/2019     11/25/2019    CHOL 129 12/12/2018    TRIG 85 12/12/2018    HDL 38 (L) 12/12/2018    ALT 6 (L) 07/14/2020    AST 8 (L) 07/14/2020     07/14/2020    K 3.7 07/14/2020     07/14/2020    CREATININE 0.5 07/14/2020    BUN 11 07/14/2020    CO2 28 07/14/2020    TSH 1.523 04/02/2019    PSA 0.80 12/12/2018    INR 1.1 11/25/2019    HGBA1C <4.0 (A) 04/02/2019           Imaging reviewed:  [facility] [place]   Transit : Normal oral transit time.   Penetration/Aspiration: Laryngeal penetration.  Miscellaneous: Significant contrast stasis at the vallecula and pyriform sinuses.    Chest CT 2019   1. Mild peribronchial cuffing which may be seen with bronchitis.  Mild tubular bronchiectasis of the right lower lobe with several distal bronchi demonstrating retained secretions, finding may be seen with small airways infectious/noninfectious inflammation or aspiration.  2. Centrilobular and paraseptal emphysema with an upper lung zone predominance.  3. Diffuse muscular atrophy.  4. Splenomegaly.    Endoscopy reviewed:    Reports colonoscopy at  more than 5 years ago.    Medical Decision Making:    Assessment:    Nura Kahn Jr. is a 64 y.o. male here for:    1. Dysphagia, unspecified type    2. Colon cancer screening         Distracting visit. Patient answered phone multiple times during our visit.   We discussed EGD needs to be done to eval his dysphagia since this has never been done. Dysphagia could be due to his polymyositis.      Recommendations:  1. EGD   2. Screening colonscopy    F/u pending scope results    Order summary:  Orders Placed This Encounter    Case request GI: EGD (ESOPHAGOGASTRODUODENOSCOPY), COLONOSCOPY         Thank you for allowing me to participate in the care of Nura Kahn Jr.    KATEY Talley

## 2020-08-03 ENCOUNTER — LAB VISIT (OUTPATIENT)
Dept: PRIMARY CARE CLINIC | Facility: CLINIC | Age: 65
End: 2020-08-03
Payer: MEDICARE

## 2020-08-03 DIAGNOSIS — Z01.818 PRE-OP TESTING: ICD-10-CM

## 2020-08-03 LAB — SARS-COV-2 RNA RESP QL NAA+PROBE: NOT DETECTED

## 2020-08-03 PROCEDURE — U0003 INFECTIOUS AGENT DETECTION BY NUCLEIC ACID (DNA OR RNA); SEVERE ACUTE RESPIRATORY SYNDROME CORONAVIRUS 2 (SARS-COV-2) (CORONAVIRUS DISEASE [COVID-19]), AMPLIFIED PROBE TECHNIQUE, MAKING USE OF HIGH THROUGHPUT TECHNOLOGIES AS DESCRIBED BY CMS-2020-01-R: HCPCS | Mod: HCNC

## 2020-08-03 NOTE — PROGRESS NOTES
Two patient identifiers verified.  Covid nasal swab collected with consent given.  Patient tolerated procedure well.

## 2020-08-06 ENCOUNTER — HOSPITAL ENCOUNTER (OUTPATIENT)
Facility: HOSPITAL | Age: 65
Discharge: HOME | End: 2020-08-06
Attending: INTERNAL MEDICINE | Admitting: INTERNAL MEDICINE
Payer: MEDICARE

## 2020-08-06 ENCOUNTER — ANESTHESIA EVENT (OUTPATIENT)
Dept: ENDOSCOPY | Facility: HOSPITAL | Age: 65
End: 2020-08-06
Payer: MEDICARE

## 2020-08-06 ENCOUNTER — ANESTHESIA (OUTPATIENT)
Dept: ENDOSCOPY | Facility: HOSPITAL | Age: 65
End: 2020-08-06
Payer: MEDICARE

## 2020-08-06 VITALS
TEMPERATURE: 99 F | HEART RATE: 96 BPM | SYSTOLIC BLOOD PRESSURE: 158 MMHG | RESPIRATION RATE: 20 BRPM | BODY MASS INDEX: 22.9 KG/M2 | HEIGHT: 70 IN | OXYGEN SATURATION: 97 % | DIASTOLIC BLOOD PRESSURE: 90 MMHG | WEIGHT: 160 LBS

## 2020-08-06 DIAGNOSIS — R13.10 DYSPHAGIA, UNSPECIFIED TYPE: Primary | ICD-10-CM

## 2020-08-06 PROCEDURE — 88305 TISSUE EXAM BY PATHOLOGIST: CPT | Mod: HCNC | Performed by: PATHOLOGY

## 2020-08-06 PROCEDURE — 27201089 HC SNARE, DISP (ANY): Mod: HCNC | Performed by: INTERNAL MEDICINE

## 2020-08-06 PROCEDURE — 88305 TISSUE EXAM BY PATHOLOGIST: CPT | Mod: 26,HCNC,, | Performed by: PATHOLOGY

## 2020-08-06 PROCEDURE — 37000008 HC ANESTHESIA 1ST 15 MINUTES: Mod: HCNC | Performed by: INTERNAL MEDICINE

## 2020-08-06 PROCEDURE — 63600175 PHARM REV CODE 636 W HCPCS: Mod: HCNC | Performed by: NURSE ANESTHETIST, CERTIFIED REGISTERED

## 2020-08-06 PROCEDURE — D9220A PRA ANESTHESIA: ICD-10-PCS | Mod: HCNC,CRNA,, | Performed by: NURSE ANESTHETIST, CERTIFIED REGISTERED

## 2020-08-06 PROCEDURE — D9220A PRA ANESTHESIA: Mod: HCNC,CRNA,, | Performed by: NURSE ANESTHETIST, CERTIFIED REGISTERED

## 2020-08-06 PROCEDURE — 45385 PR COLONOSCOPY,REMV LESN,SNARE: ICD-10-PCS | Mod: PT,HCNC,, | Performed by: INTERNAL MEDICINE

## 2020-08-06 PROCEDURE — 43235 PR EGD, FLEX, DIAGNOSTIC: ICD-10-PCS | Mod: 51,HCNC,, | Performed by: INTERNAL MEDICINE

## 2020-08-06 PROCEDURE — 25000003 PHARM REV CODE 250: Mod: HCNC | Performed by: NURSE ANESTHETIST, CERTIFIED REGISTERED

## 2020-08-06 PROCEDURE — D9220A PRA ANESTHESIA: ICD-10-PCS | Mod: HCNC,ANES,, | Performed by: ANESTHESIOLOGY

## 2020-08-06 PROCEDURE — 88305 TISSUE EXAM BY PATHOLOGIST: ICD-10-PCS | Mod: 26,HCNC,, | Performed by: PATHOLOGY

## 2020-08-06 PROCEDURE — 43235 EGD DIAGNOSTIC BRUSH WASH: CPT | Mod: HCNC | Performed by: INTERNAL MEDICINE

## 2020-08-06 PROCEDURE — 37000009 HC ANESTHESIA EA ADD 15 MINS: Mod: HCNC | Performed by: INTERNAL MEDICINE

## 2020-08-06 PROCEDURE — 43235 EGD DIAGNOSTIC BRUSH WASH: CPT | Mod: 51,HCNC,, | Performed by: INTERNAL MEDICINE

## 2020-08-06 PROCEDURE — 45385 COLONOSCOPY W/LESION REMOVAL: CPT | Mod: PT,HCNC,, | Performed by: INTERNAL MEDICINE

## 2020-08-06 PROCEDURE — D9220A PRA ANESTHESIA: Mod: HCNC,ANES,, | Performed by: ANESTHESIOLOGY

## 2020-08-06 PROCEDURE — 45385 COLONOSCOPY W/LESION REMOVAL: CPT | Mod: HCNC | Performed by: INTERNAL MEDICINE

## 2020-08-06 RX ORDER — ONDANSETRON 2 MG/ML
4 INJECTION INTRAMUSCULAR; INTRAVENOUS DAILY PRN
Status: DISCONTINUED | OUTPATIENT
Start: 2020-08-06 | End: 2020-08-06 | Stop reason: HOSPADM

## 2020-08-06 RX ORDER — SODIUM CHLORIDE 9 MG/ML
INJECTION, SOLUTION INTRAVENOUS CONTINUOUS PRN
Status: DISCONTINUED | OUTPATIENT
Start: 2020-08-06 | End: 2020-08-06

## 2020-08-06 RX ORDER — GLYCOPYRROLATE 0.2 MG/ML
INJECTION INTRAMUSCULAR; INTRAVENOUS
Status: DISCONTINUED | OUTPATIENT
Start: 2020-08-06 | End: 2020-08-06

## 2020-08-06 RX ORDER — MIDAZOLAM HYDROCHLORIDE 1 MG/ML
INJECTION, SOLUTION INTRAMUSCULAR; INTRAVENOUS
Status: DISCONTINUED | OUTPATIENT
Start: 2020-08-06 | End: 2020-08-06

## 2020-08-06 RX ORDER — LIDOCAINE HCL/PF 100 MG/5ML
SYRINGE (ML) INTRAVENOUS
Status: DISCONTINUED | OUTPATIENT
Start: 2020-08-06 | End: 2020-08-06

## 2020-08-06 RX ORDER — PROPOFOL 10 MG/ML
VIAL (ML) INTRAVENOUS CONTINUOUS PRN
Status: DISCONTINUED | OUTPATIENT
Start: 2020-08-06 | End: 2020-08-06

## 2020-08-06 RX ORDER — SODIUM CHLORIDE 9 MG/ML
INJECTION, SOLUTION INTRAVENOUS CONTINUOUS
Status: DISCONTINUED | OUTPATIENT
Start: 2020-08-06 | End: 2020-08-06 | Stop reason: HOSPADM

## 2020-08-06 RX ORDER — SODIUM CHLORIDE 0.9 % (FLUSH) 0.9 %
3 SYRINGE (ML) INJECTION EVERY 30 MIN PRN
Status: DISCONTINUED | OUTPATIENT
Start: 2020-08-06 | End: 2020-08-06 | Stop reason: HOSPADM

## 2020-08-06 RX ORDER — FENTANYL CITRATE 50 UG/ML
25 INJECTION, SOLUTION INTRAMUSCULAR; INTRAVENOUS EVERY 5 MIN PRN
Status: DISCONTINUED | OUTPATIENT
Start: 2020-08-06 | End: 2020-08-06 | Stop reason: HOSPADM

## 2020-08-06 RX ORDER — PROPOFOL 10 MG/ML
VIAL (ML) INTRAVENOUS
Status: DISCONTINUED | OUTPATIENT
Start: 2020-08-06 | End: 2020-08-06

## 2020-08-06 RX ADMIN — GLYCOPYRROLATE 0.2 MG: 0.2 INJECTION, SOLUTION INTRAMUSCULAR; INTRAVENOUS at 08:08

## 2020-08-06 RX ADMIN — PROPOFOL 50 MG: 10 INJECTION, EMULSION INTRAVENOUS at 08:08

## 2020-08-06 RX ADMIN — MIDAZOLAM HYDROCHLORIDE 2 MG: 1 INJECTION, SOLUTION INTRAMUSCULAR; INTRAVENOUS at 08:08

## 2020-08-06 RX ADMIN — Medication 100 MG: at 08:08

## 2020-08-06 RX ADMIN — SODIUM CHLORIDE: 0.9 INJECTION, SOLUTION INTRAVENOUS at 08:08

## 2020-08-06 RX ADMIN — PROPOFOL 150 MCG/KG/MIN: 10 INJECTION, EMULSION INTRAVENOUS at 08:08

## 2020-08-06 NOTE — INTERVAL H&P NOTE
The patient has been examined and the H&P has been reviewed:    Pre-Procedure H and P Addendum    Patient seen and examined.  History and exam unchanged from prior history and physical.      Procedure: EGD  Indication: Dysphagia   ASA Class: per anesthesiology  Airway: normal  Neck Mobility: full range of motion  Mallampatti score: per anesthesia  History of anesthesia problems: no  Family history of anesthesia problems: no  Anesthesia Plan: MAC      Active Hospital Problems    Diagnosis  POA    Dysphagia [R13.10]  Yes      Resolved Hospital Problems   No resolved problems to display.

## 2020-08-06 NOTE — DISCHARGE INSTRUCTIONS
Upper GI Endoscopy     During endoscopy, a long, flexible tube is used to view the inside of your upper GI tract.      Upper GI endoscopy allows your healthcare provider to look directly into the beginning of your gastrointestinal (GI) tract. The esophagus, stomach, and duodenum (the first part of the small intestine) make up the upper GI tract.   Before the exam  Follow these and any other instructions you are given before your endoscopy. If you dont follow the healthcare providers instructions carefully, the test may need to be canceled or done over:  · Don't eat or drink anything after midnight the night before your exam. If your exam is in the afternoon, drink only clear liquids in the morning. Don't eat or drink anything for 8 hours before the exam. In some cases, you may be able to take medicines with sips of water until 2 hours before the procedure. Speak with your healthcare provider about this.   · Bring your X-rays and any other test results you have.  · Because you will be sedated, arrange for an adult to drive you home after the exam.  · Tell your healthcare provider before the exam if you are taking any medicines or have any medical problems.  The procedure  Here is what to expect:  · You will lie on the endoscopy table. Usually patients lie on the left side.  · You will be monitored and given oxygen.  · Your throat may be numbed with a spray or gargle. You are given medicine through an intravenous (IV) line that will help you relax and remain comfortable. You may be awake or asleep during the procedure.  · The healthcare provider will put the endoscope in your mouth and down your esophagus. It is thinner than most pieces of food that you swallow. It will not affect your breathing. The medicine helps keep you from gagging.  · Air is put into your GI tract to expand it. It can make you burp.  · During the procedure, the healthcare provider can take biopsies (tissue samples), remove abnormalities,  such as polyps, or treat abnormalities through a variety of devices placed through the endoscope. You will not feel this.   · The endoscope carries images of your upper GI tract to a video screen. If you are awake, you may be able to look at the images.  · After the procedure is done, you will rest for a time. An adult must drive you home.  When to call your healthcare provider  Contact your healthcare provider if you have:  · Black or tarry stools, or blood in your stool  · Fever  · Pain in your belly that does not go away  · Nausea and vomiting, or vomiting blood   Date Last Reviewed: 7/1/2016 © 2000-2017 [employer] [address] [place] [address], [place] [address]. All rights reserved. This information is not intended as a substitute for professional medical care. Always follow your healthcare professional's instructions.        Colonoscopy     A camera attached to a flexible tube with a viewing lens is used to take video pictures.     Colonoscopy is a test to view the inside of your lower digestive tract (colon and rectum). Sometimes it can show the last part of the small intestine (ileum). During the test, small pieces of tissue may be removed for testing. This is called a biopsy. Small growths, such as polyps, may also be removed.   Why is colonoscopy done?  The test is done to help look for colon cancer. And it can help find the source of abdominal pain, bleeding, and changes in bowel habits. It may be needed once a year, depending on factors such as your:  · Age  · Health history  · Family health history  · Symptoms  · Results from any prior colonoscopy  Risks and possible complications  These include:  · Bleeding               · A puncture or tear in the colon   · Risks of anesthesia  · A cancer lesion not being seen  Getting ready   To prepare for the test:  · Talk with your healthcare provider about the risks of the test (see below). Also ask your healthcare provider about alternatives to the  test.  · Tell your healthcare provider about any medicines you take. Also tell him or her about any health conditions you may have.  · Make sure your rectum and colon are empty for the test. Follow the diet and bowel prep instructions exactly. If you dont, the test may need to be rescheduled.  · Plan for a friend or family member to drive you home after the test.     Colonoscopy provides an inside view of the entire colon.     You may discuss the results with your doctor right away or at a future visit.  During the test   The test is usually done in the hospital on an outpatient basis. This means you go home the same day. The procedure takes about 30 minutes. During that time:  · You are given relaxing (sedating) medicine through an IV line. You may be drowsy, or fully asleep.  · The healthcare provider will first give you a physical exam to check for anal and rectal problems.  · Then the anus is lubricated and the scope inserted.  · If you are awake, you may have a feeling similar to needing to have a bowel movement. You may also feel pressure as air is pumped into the colon. Its OK to pass gas during the procedure.  · Biopsy, polyp removal, or other treatments may be done during the test.  After the test   You may have gas right after the test. It can help to try to pass it to help prevent later bloating. Your healthcare provider may discuss the results with you right away. Or you may need to schedule a follow-up visit to talk about the results. After the test, you can go back to your normal eating and other activities. You may be tired from the sedation and need to rest for a few hours.  Date Last Reviewed: 11/1/2016 © 2000-2017 [employer]. [address] [place] [address]. All rights reserved. This information is not intended as a substitute for professional medical care. Always follow your healthcare professional's instructions.        Understanding Colon and Rectal Polyps    The  colon (also called the large intestine) is a muscular tube that forms the last part of the digestive tract. It absorbs water and stores food waste. The colon is about 4 to 6 feet long. The rectum is the last 6 inches of the colon. The colon and rectum have a smooth lining composed of millions of cells. Changes in these cells can lead to growths in the colon that can become cancerous and should be removed. Multiple tests are available to screen for colon cancer, but the colonoscopy is the most recommended test. During colonoscopy, these polyps can be removed. How often you need this test depends on many things including your condition, your family history, symptoms, and what the findings were at the previous colonoscopy.   When the colon lining changes  Changes that happen in the cells that line the colon or rectum can lead to growths called polyps. Over a period of years, polyps can turn cancerous. Removing polyps early may prevent cancer from ever forming.  Polyps  Polyps are fleshy clumps of tissue that form on the lining of the colon or rectum. Small polyps are usually benign (not cancerous). However, over time, cells in a polyp can change and become cancerous. Certain types of polyps known as adenomatous polyps are premalignant. The risk for invasive cancer increases with the size of the polyp and certain cell and gene features. This means that they can become cancerous if they're not removed. Hyperplastic polyps are benign. They can grow quite large and not turn cancerous.   Cancer  Almost all colorectal cancers start when polyp cells begin growing abnormally. As a cancerous tumor grows, it may involve more and more of the colon or rectum. In time, cancer can also grow beyond the colon or rectum and spread to nearby organs or to glands called lymph nodes. The cells can also travel to other parts of the body. This is known as metastasis. The earlier a cancerous tumor is removed, the better the chance of  preventing its spread.    Date Last Reviewed: 8/1/2016  © 1995-2492 [employer] [address] [place] [address], [place] [address]. All rights reserved. This information is not intended as a substitute for professional medical care. Always follow your healthcare professional's instructions.

## 2020-08-06 NOTE — ANESTHESIA PREPROCEDURE EVALUATION
08/06/2020  Nura Kahn Jr. is a 64 y.o., male.    Past Medical History:   Diagnosis Date    Aspiration pneumonia of right lower lobe 1/15/2018    Atrial fibrillation 3/2/2015    Depression     Essential hypertension 2/27/2019    Microcytic anemia 9/25/2014    Neuromuscular disorder     Pneumonia     Polymyositis 2009    Tobacco abuse        History reviewed. No pertinent surgical history.      Anesthesia Evaluation    I have reviewed the Patient Summary Reports.     I have reviewed the Nursing Notes. I have reviewed the NPO Status.      Review of Systems  Anesthesia Hx:  No problems with previous Anesthesia  History of prior surgery of interest to airway management or planning: Denies Family Hx of Anesthesia complications.   Denies Personal Hx of Anesthesia complications.   Cardiovascular:   Exercise tolerance: poor Hypertension Dysrhythmias (not currently) atrial fibrillation    Pulmonary:   Denies COPD.  Denies Asthma.  Denies Recent URI.    Hepatic/GI:  Hepatic/GI Normal    Musculoskeletal:   Polymyositis, weakness in extremities, trunk and now with swallowing; denies any breathing difficulties  Takes prednisone  Gets IVIG but has not gotten recently due to COVID   Psych:   depression          Physical Exam  General:  Well nourished    Airway/Jaw/Neck:  Airway Findings: Mouth Opening: Normal Tongue: Normal  General Airway Assessment: Adult  Mallampati: II  TM Distance: Normal, at least 6 cm      Dental:  Dental Findings:    Chest/Lungs:  Chest/Lungs Findings: Normal Respiratory Rate     Heart/Vascular:  Heart Findings: Rate: Normal        Mental Status:  Mental Status Findings:  Alert and Oriented         Anesthesia Plan  Type of Anesthesia, risks & benefits discussed:  Anesthesia Type:  general  Patient's Preference:   Intra-op Monitoring Plan: standard ASA monitors  Intra-op Monitoring  Plan Comments:   Post Op Pain Control Plan: multimodal analgesia, IV/PO Opioids PRN and per primary service following discharge from PACU  Post Op Pain Control Plan Comments:   Induction:   IV  Beta Blocker:  Patient is not currently on a Beta-Blocker (No further documentation required).       Informed Consent: Patient understands risks and agrees with Anesthesia plan.  Questions answered. Anesthesia consent signed with patient.  ASA Score: 4     Day of Surgery Review of History & Physical:    H&P update referred to the surgeon.         Ready For Surgery From Anesthesia Perspective.

## 2020-08-06 NOTE — ANESTHESIA POSTPROCEDURE EVALUATION
Anesthesia Post Evaluation    Patient: Nura Kahn Jr.    Procedure(s) Performed: Procedure(s) (LRB):  EGD (ESOPHAGOGASTRODUODENOSCOPY) (N/A)  COLONOSCOPY (N/A)    Final Anesthesia Type: general    Patient location during evaluation: PACU  Patient participation: Yes- Able to Participate  Level of consciousness: awake and alert  Post-procedure vital signs: reviewed and stable  Pain management: adequate  Airway patency: patent    PONV status at discharge: No PONV  Anesthetic complications: no      Cardiovascular status: blood pressure returned to baseline  Respiratory status: unassisted, room air and spontaneous ventilation  Hydration status: euvolemic  Follow-up not needed.          Vitals Value Taken Time   /84 08/06/20 0931   Temp 36.1 °C (97 °F) 08/06/20 0926   Pulse 91 08/06/20 0939   Resp 35 08/06/20 0939   SpO2 100 % 08/06/20 0939   Vitals shown include unvalidated device data.      No case tracking events are documented in the log.      Pain/Daisy Score: Daisy Score: 6 (8/6/2020  9:30 AM)

## 2020-08-06 NOTE — INTERVAL H&P NOTE
The patient has been examined and the H&P has been reviewed:    Pre-Procedure H and P Addendum    Patient seen and examined.  History and exam unchanged from prior history and physical.      Procedure: EGD and colonoscopy  Indication: Dysphagia and colon cancer screening  ASA Class: per anesthesiology  Airway: normal  Neck Mobility: full range of motion  Mallampatti score: per anesthesia  History of anesthesia problems: no  Family history of anesthesia problems: no  Anesthesia Plan: MAC        Active Hospital Problems    Diagnosis  POA    Dysphagia [R13.10]  Yes      Resolved Hospital Problems   No resolved problems to display.

## 2020-08-06 NOTE — [employer] PATIENT INSTRUCTIONS
Discharge Summary/Instructions after an Endoscopic Procedure  Patient Name: Nura Kahn  Patient MRN: [id number]  Patient YOB: 1955 Thursday, August 6, 2020  Brandan Meyer MD  RESTRICTIONS:  During your procedure today, you received medications for sedation.  These   medications may affect your judgment, balance and coordination.  Therefore,   for 24 hours, you have the following restrictions:   - DO NOT drive a car, operate machinery, make legal/financial decisions,   sign important papers or drink alcohol.    ACTIVITY:  Today: no heavy lifting, straining or running due to procedural   sedation/anesthesia.  The following day: return to full activity including work.  DIET:  Eat and drink normally unless instructed otherwise.     TREATMENT FOR COMMON SIDE EFFECTS:  - Mild abdominal pain, nausea, belching, bloating or excessive gas:  rest,   eat lightly and use a heating pad.  - Sore Throat: treat with throat lozenges and/or gargle with warm salt   water.  - Because air was used during the procedure, expelling large amounts of air   from your rectum or belching is normal.  - If a bowel prep was taken, you may not have a bowel movement for 1-3 days.    This is normal.  SYMPTOMS TO WATCH FOR AND REPORT TO YOUR PHYSICIAN:  1. Abdominal pain or bloating, other than gas cramps.  2. Chest pain.  3. Back pain.  4. Signs of infection such as: chills or fever occurring within 24 hours   after the procedure.  5. Rectal bleeding, which would show as bright red, maroon, or black stools.   (A tablespoon of blood from the rectum is not serious, especially if   hemorrhoids are present.)  6. Vomiting.  7. Weakness or dizziness.  GO DIRECTLY TO THE NEAREST EMERGENCY ROOM IF YOU HAVE ANY OF THE FOLLOWING:      Difficulty breathing              Chills and/or fever over 101 F   Persistent vomiting and/or vomiting blood   Severe abdominal pain   Severe chest pain   Black, tarry stools   Bleeding- more than one  tablespoon   Any other symptom or condition that you feel may need urgent attention  Your doctor recommends these additional instructions:  If any biopsies were taken, your doctors clinic will contact you in 1 to 2   weeks with any results.  - Discharge patient to home.   - Patient has a contact number available for emergencies.  The signs and   symptoms of potential delayed complications were discussed with the   patient.  Return to normal activities tomorrow.  Written discharge   instructions were provided to the patient.   - Resume previous diet.   - Continue present medications.   - Await pathology results.   - Telephone GI clinic for pathology results in 1 week.   - Repeat colonoscopy in 5 years for surveillance.  For questions, problems or results please call your physician - Brandan Meyer MD at Work:  [phone].  [facility], EMERGENCY ROOM PHONE NUMBER: [phone]  IF A COMPLICATION OR EMERGENCY SITUATION ARISES AND YOU ARE UNABLE TO REACH   YOUR PHYSICIAN - GO DIRECTLY TO THE EMERGENCY ROOM.  Brandan Meyer MD  8/6/2020 9:51:54 AM  This report has been verified and signed electronically.  [employer]

## 2020-08-06 NOTE — TRANSFER OF CARE
"Anesthesia Transfer of Care Note    Patient: Nura Kahn Jr.    Procedure(s) Performed: Procedure(s) (LRB):  EGD (ESOPHAGOGASTRODUODENOSCOPY) (N/A)  COLONOSCOPY (N/A)    Patient location: [facility]    Anesthesia Type: general    Transport from OR: Transported from OR on 100% O2 by closed face mask with adequate spontaneous ventilation    Post pain: adequate analgesia    Post assessment: no apparent anesthetic complications and tolerated procedure well    Post vital signs: stable    Level of consciousness: sedated and responds to stimulation    Nausea/Vomiting: no nausea/vomiting    Complications: none    Transfer of care protocol was followed      Last vitals:   Visit Vitals  BP (!) 179/103   Pulse 79   Temp 36.9 °C (98.5 °F)   Resp 14   Ht 5' 10" (1.778 m)   Wt 72.6 kg (160 lb)   SpO2 100%   BMI 22.96 kg/m²     "

## 2020-08-06 NOTE — PROVATION PATIENT INSTRUCTIONS
Discharge Summary/Instructions after an Endoscopic Procedure  Patient Name: Nura Kahn  Patient MRN: [id number]  Patient YOB: 1955 Thursday, August 6, 2020  Brandan Meyer MD  RESTRICTIONS:  During your procedure today, you received medications for sedation.  These   medications may affect your judgment, balance and coordination.  Therefore,   for 24 hours, you have the following restrictions:   - DO NOT drive a car, operate machinery, make legal/financial decisions,   sign important papers or drink alcohol.    ACTIVITY:  Today: no heavy lifting, straining or running due to procedural   sedation/anesthesia.  The following day: return to full activity including work.  DIET:  Eat and drink normally unless instructed otherwise.     TREATMENT FOR COMMON SIDE EFFECTS:  - Mild abdominal pain, nausea, belching, bloating or excessive gas:  rest,   eat lightly and use a heating pad.  - Sore Throat: treat with throat lozenges and/or gargle with warm salt   water.  - Because air was used during the procedure, expelling large amounts of air   from your rectum or belching is normal.  - If a bowel prep was taken, you may not have a bowel movement for 1-3 days.    This is normal.  SYMPTOMS TO WATCH FOR AND REPORT TO YOUR PHYSICIAN:  1. Abdominal pain or bloating, other than gas cramps.  2. Chest pain.  3. Back pain.  4. Signs of infection such as: chills or fever occurring within 24 hours   after the procedure.  5. Rectal bleeding, which would show as bright red, maroon, or black stools.   (A tablespoon of blood from the rectum is not serious, especially if   hemorrhoids are present.)  6. Vomiting.  7. Weakness or dizziness.  GO DIRECTLY TO THE NEAREST EMERGENCY ROOM IF YOU HAVE ANY OF THE FOLLOWING:      Difficulty breathing              Chills and/or fever over 101 F   Persistent vomiting and/or vomiting blood   Severe abdominal pain   Severe chest pain   Black, tarry stools   Bleeding- more than one  tablespoon   Any other symptom or condition that you feel may need urgent attention  Your doctor recommends these additional instructions:  If any biopsies were taken, your doctors clinic will contact you in 1 to 2   weeks with any results.  - Perform a colonoscopy now, for colon cancer screening.   - See colonoscopy report for further details.  For questions, problems or results please call your physician - Brandan Meyer MD at Work:  [phone].  [facility], EMERGENCY ROOM PHONE NUMBER: [phone]  IF A COMPLICATION OR EMERGENCY SITUATION ARISES AND YOU ARE UNABLE TO REACH   YOUR PHYSICIAN - GO DIRECTLY TO THE EMERGENCY ROOM.  Brandan Meyer MD  8/6/2020 10:03:17 AM  This report has been verified and signed electronically.  [employer]   Left arm;

## 2020-08-11 LAB
FINAL PATHOLOGIC DIAGNOSIS: NORMAL
GROSS: NORMAL

## 2020-08-25 RX ORDER — PREDNISONE 10 MG/1
20 TABLET ORAL DAILY
Qty: 180 TABLET | Refills: 0 | Status: CANCELLED | OUTPATIENT
Start: 2020-08-25 | End: 2020-11-23

## 2020-08-26 ENCOUNTER — OFFICE VISIT (OUTPATIENT)
Dept: RHEUMATOLOGY | Facility: CLINIC | Age: 65
End: 2020-08-26
Payer: MEDICARE

## 2020-08-26 VITALS
WEIGHT: 152 LBS | BODY MASS INDEX: 23.86 KG/M2 | HEIGHT: 67 IN | DIASTOLIC BLOOD PRESSURE: 98 MMHG | SYSTOLIC BLOOD PRESSURE: 151 MMHG | HEART RATE: 87 BPM

## 2020-08-26 DIAGNOSIS — Z79.52 LONG TERM SYSTEMIC STEROID USER: ICD-10-CM

## 2020-08-26 DIAGNOSIS — G72.41 INCLUSION BODY MYOSITIS (IBM): ICD-10-CM

## 2020-08-26 DIAGNOSIS — Z13.820 OSTEOPOROSIS SCREENING: ICD-10-CM

## 2020-08-26 DIAGNOSIS — Z79.899 ON CELLCEPT THERAPY: ICD-10-CM

## 2020-08-26 DIAGNOSIS — M33.20 POLYMYOSITIS: Primary | ICD-10-CM

## 2020-08-26 PROCEDURE — 3080F DIAST BP >= 90 MM HG: CPT | Mod: HCNC,CPTII,GC,S$GLB

## 2020-08-26 PROCEDURE — 99999 PR PBB SHADOW E&M-EST. PATIENT-LVL III: CPT | Mod: PBBFAC,HCNC,GC,

## 2020-08-26 PROCEDURE — 3077F SYST BP >= 140 MM HG: CPT | Mod: HCNC,CPTII,GC,S$GLB

## 2020-08-26 PROCEDURE — 3080F PR MOST RECENT DIASTOLIC BLOOD PRESSURE >= 90 MM HG: ICD-10-PCS | Mod: HCNC,CPTII,GC,S$GLB

## 2020-08-26 PROCEDURE — 3008F BODY MASS INDEX DOCD: CPT | Mod: HCNC,CPTII,GC,S$GLB

## 2020-08-26 PROCEDURE — 3008F PR BODY MASS INDEX (BMI) DOCUMENTED: ICD-10-PCS | Mod: HCNC,CPTII,GC,S$GLB

## 2020-08-26 PROCEDURE — 99214 PR OFFICE/OUTPT VISIT, EST, LEVL IV, 30-39 MIN: ICD-10-PCS | Mod: HCNC,GC,S$GLB,

## 2020-08-26 PROCEDURE — 99214 OFFICE O/P EST MOD 30 MIN: CPT | Mod: HCNC,GC,S$GLB,

## 2020-08-26 PROCEDURE — 99999 PR PBB SHADOW E&M-EST. PATIENT-LVL III: ICD-10-PCS | Mod: PBBFAC,HCNC,GC,

## 2020-08-26 PROCEDURE — 3077F PR MOST RECENT SYSTOLIC BLOOD PRESSURE >= 140 MM HG: ICD-10-PCS | Mod: HCNC,CPTII,GC,S$GLB

## 2020-08-26 RX ORDER — SODIUM CHLORIDE 0.9 % (FLUSH) 0.9 %
10 SYRINGE (ML) INJECTION
Status: CANCELLED | OUTPATIENT
Start: 2020-08-26

## 2020-08-26 RX ORDER — PREDNISONE 2.5 MG/1
7.5 TABLET ORAL DAILY
Qty: 90 TABLET | Refills: 4 | Status: SHIPPED | OUTPATIENT
Start: 2020-08-26 | End: 2021-03-04 | Stop reason: SDUPTHER

## 2020-08-26 RX ORDER — HEPARIN 100 UNIT/ML
500 SYRINGE INTRAVENOUS
Status: CANCELLED | OUTPATIENT
Start: 2020-08-26

## 2020-08-26 RX ORDER — MYCOPHENOLATE MOFETIL 500 MG/1
1500 TABLET ORAL 2 TIMES DAILY
Qty: 540 TABLET | Refills: 3 | Status: SHIPPED | OUTPATIENT
Start: 2020-08-26 | End: 2021-03-04 | Stop reason: SDUPTHER

## 2020-08-26 ASSESSMENT — ROUTINE ASSESSMENT OF PATIENT INDEX DATA (RAPID3)
PATIENT GLOBAL ASSESSMENT SCORE: 6
MDHAQ FUNCTION SCORE: 2
PSYCHOLOGICAL DISTRESS SCORE: 4.4
TOTAL RAPID3 SCORE: 4.22
FATIGUE SCORE: 3
PAIN SCORE: 0
AM STIFFNESS SCORE: 0, NO

## 2020-08-26 NOTE — PROGRESS NOTES
I have personally reviewed the history, confirmed exam findings, and discussed assessment and plan with fellow. [facility] Health([facility] and [facility]) refuse for safety reasons to continue IVIG administration at his home, so will schedule in Infusion Clinic here.

## 2020-08-26 NOTE — ASSESSMENT & PLAN NOTE
64yo M with PMH of overlap of polymyositis (dx 2009, mildly +ku, outside biopsy proven with elevated CPK and aldolase) and inclusion body myositis (+CN1A), osteopenia, Vit D insufficiency, Hemoglobulin c/beta-zero thalaseema, A-fib, dysphagia, portal HTN, emphysema here for follow up of polymyositis. Patient with many social issues that are impeding his health care at this time, but this seems to be improving. Overall his condition does seem to be improving. He has been on Imuran in the past, his treatment has been escalated to IVIG monthly and cellcept. Currently on prednisone but weaning. Patient with overlap syndrome. IVIG, cellcept and steroids helping with polymyositis. He needs aggressive PT to help with inclusion body         #Overlap: polymyositis and inclusion body myositis   -Continue IVIG monthly will plan to give over 2 days at [facility](1mg/kg)  -continue cellcept 1500mg BID  -Decreasing Prednisone to 7.5mg  -Refills done  -Labs today  -continue vit D 50,000 units weekly           RTC 2-3 mos with labs prior

## 2020-08-26 NOTE — PROGRESS NOTES
Subjective:       Patient ID: Nura Kahn Jr. is a 64 y.o. male.    Chief Complaint: No chief complaint on file.    HPI   62yo M with PMH of overlap of polymyositis (dx 2009, mildly +ku, outside biopsy proven with elevated CPK and aldolase) and inclusion body myositis (+CN1A), osteopenia, Vit D insufficiency, Hemoglobulin c/beta-zero thalaseema, A-fib, dysphagia, portal HTN, emphysema here for follow up.     Per chart review it seems that he first established care here at [facility] in 8/2014. He was diagnosed in [id number] when he was feeling weak, falling. He was a  and was unable to use his legs. He had a biopsy in [id number] in [place] that per notes was consistent with polymyositis. He was on MTX in the past which was stopped due CT showing hepatosplenomegaly with portal venous hypertension and elevated T bili in 8/2014 He was at that time started on Imuran. He has been on Imuran and steroids since then.  In 2016, he was worked up again here at [facility]. MRI of the left lower extremities showed Severe atrophy with fatty replacement of the thigh musculature bilaterally.  There is mild increased edema signal within the remaining thigh musculature and left iliopsoas muscle without associated enhancement which could reflect a mild degree of residual myositis.  EMG Left upper Ext 5/2016 was a technically difficult study. Had moderate ulnar entrapment at elbow, perhaps on a background of mild polyneuropathy. 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 Pathology of left thigh showing minute potion of muscle with end stage myopathic changes and focal chronic inflammation   Patient remained on Imuran and prednisone.      In Feb 2019 - patient had been out of Imuran for ~1 month.  He also admits to medication non-compliance because he didn't think he was having any improvement.  He admits that his weakness has gotten worse - stating that  "he is unable to stand, difficulty with getting in and out of his scooter, normal ADLs (including going to the bathroom - resulting in urination/defecation on himself - resulting in decreased PO intake).  He also feels worsening of his dysphagia - where he gets food stuck and often chokes/coughs when he eats/drinks.       Wife works fulltime.  Daughter (10 yo).  Patient does not have social support/help for transportation to various appt.     Specialist updates:  - Cardiology - Last saw 3/2015 - diagnosed with PAF.  No treatment at that time.  No follow up since  - Hem - Last saw 12/2017 - Evaluated for microcytic anemia and mild chronic thrombocytosis.  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  Discharged from clinic   - GI - Last saw 6/2015 - EGD/motility study ordered - not performed.  No follow up   - Hepatology - Last saw 5/2015 - Found portal HTN on CT with no evidence of cirrhosis or PV thrombosis.  Fibrosure with F1 stage (minimial fibrosis).  RTC PRN (no follow up)  - Pulmonary - Last saw 2/2015 -" Lung parenchymal normal - no evidence of fibrosis and one mediastinal LN marginal enlarged" CT chest showed GGO (2016). Repeat CT chest (2017) - no mention of GGO      Imaging:  CXR 1/2018:Patchy consolidation projected over the right lower lung zone, concerning for infection, correlation advised.       CT chest 12/2017:Peribronchial cuffing predominantly in the lower lung zones as well as retained secretions in the right middle lobe segmental bronchi consistent with bronchitis; such inflammation can occur with inhaled irritants or aspiration.  There is no evidence of interstitial lung disease such as NSIP or UIP.Upper lung zone predominant paraseptal emphysema and dispersed areas of centrilobular emphysema Stable 1.2 cm AP window lymph node.  Densely calcified granuloma in the posterobasal segment of the right lower lobe.Hepatosplenomegaly with associated splenic collateral vessels at the splenic hilum " suggesting sequela of portal venous hypertension     CT chest 4/2019:   1. Mild peribronchial cuffing which may be seen with bronchitis.  Mild tubular bronchiectasis of the right lower lobe with several distal bronchi demonstrating retained secretions, finding may be seen with small airways infectious/noninfectious inflammation or aspiration.  2. Centrilobular and paraseptal emphysema with an upper lung zone predominance.  3. Diffuse muscular atrophy.  4. Splenomegaly..     CT pelvis 6/2017: Obturator internus edema or hematoma. No acute fracture. Stable splenomegaly.     Esophogram 4/2015: Spontaneous gastroesophageal reflux.  Otherwise, unremarkable esophagram.     PET scan 8/2014:AP window lymph node SUV max less than 2.5.  This is most likely benign/reactive.  Surveillance is recommended at 3, 6, 12, and 24 months. Splenomegaly most likely from portal hypertension     He was admitted in 4/2019 worsening generalized muscle weakness and dysphagia. Labs on admission showed Normal and stable CBC and CMP.  Normal CK/aldolase.  Normal inflammatory markers.  Elevated IgG (1754). HMGCoA ab negative. Vit D: 15.    MRI femurs 4/6/19:  1.  Redemonstration of advanced diffuse atrophy and fatty replacement of the bilateral thigh musculature involving all compartments.  There is mild residual edema signal present within the bilateral obturator musculature and residual posterior compartment musculature, similar to prior MRI examination.  2.  Mild heterogeneity of the visualized bone marrow signal which may relate to red marrow reconversion.  Clinical correlation advised.  Patient will also benefit from PT/OT and rehab placement for muscle strengthening after completion of IVIG.      MRI humerus b/l 4/7:  Significant diffuse atrophy and fatty replacement of the bilateral proximal upper extremity musculature, including the rotator cuff musculature, right greater than left, in this patient with reported history of polymyositis.   There is some degree of sparing of the bilateral deltoid and triceps musculature with greater residual muscle bulk on the left compared to the right.  There is mild residual edema present within the remaining musculature, most pronounced within the triceps.     During that hospitalization he received IVIG x 5 days, He was started on cellcept 1g BID and prednisone 10mg daily. He went to inpatient rehab for about 2.5 weeks.      Last visit 9/2019: he was doing well. Plan to reduce prednisone from 15mg to 12.5mg then continue on 10mg daily.         12/04/2019:  Has stopped physical therapy 1 mos ago. Is doing exercises at home. He states he feels weak all over. His swallowing is also worsened.Things are getting stuck more, using 2-3 bottles of water to have dinner.   He was able to lift himself off the toilet but not anymore.   Currently on Prednisone 10mg and cellcept 1500mg BID   Not having dark stools, no blood in stool.     Interval history:  8/26/20:   Currently on Prednisone 10mg and Cellcept 1500mg BID. He has not been able to get his IVIG infusions at home since June due to home living conditions that lead to home health refusing to come out to give his IVIG. Social work has been working for an alternative solution. We will be trying to give him the IVIG treatment here at [facility]. Patient has weakness but it states it is better since starting the IVIG. He is able to feed himself, dress himself, but is not able to lift his pants all the way up. He is in a motorized chair today.     Review of Systems   Constitutional: Negative for chills and fever.   Eyes: Negative for visual disturbance.   Respiratory: Negative for shortness of breath.    Cardiovascular: Negative for chest pain.   Gastrointestinal: Negative for constipation and diarrhea.   Musculoskeletal: Negative for arthralgias and back pain.   Neurological: Positive for weakness. Negative for headaches.         Objective:   BP (!) 151/98   Pulse 87   Ht  "5' 7.2" (1.707 m)   Wt 68.9 kg (152 lb)   BMI 23.67 kg/m²      Physical Exam   Constitutional: He is oriented to person, place, and time. No distress.   thin   HENT:   Head: Normocephalic and atraumatic.   Eyes: Conjunctivae and EOM are normal. Pupils are equal, round, and reactive to light.   weats glasses   Cardiovascular: Normal rate, normal heart sounds and intact distal pulses.    No murmur heard.  Pulmonary/Chest: Effort normal and breath sounds normal. No respiratory distress. He has no wheezes. He has no rales.   Abdominal: Soft. Bowel sounds are normal.       Right Side Rheumatological Exam     Muscle Strength (0-5 scale):  Neck Flexion:  4  Neck Extension: 4  Deltoid:  3  Biceps: 3/5   Triceps:  3  : 3/5   Iliopsoas: 3  Quadriceps:  3   Distal Lower Extremity: 3    Left Side Rheumatological Exam     Muscle Strength (0-5 scale):  Neck Flexion:  4  Neck Extension: 4  Deltoid:  3  Biceps: 3/5   Triceps:  3  :  3/5   Iliopsoas: 3  Quadriceps:  3   Distal Lower Extremity: 3      Neurological: He is alert and oriented to person, place, and time.   Skin: Skin is warm and dry. No rash noted. He is not diaphoretic. No erythema.     Psychiatric: Mood, memory, affect and judgment normal.   Musculoskeletal: No tenderness or edema.      Comments: Limited ROM due to weakness          No data to display     Assessment:       1. Polymyositis    2. Inclusion body myositis (IBM)    3. On Cellcept therapy    4. Osteoporosis screening    5. Long term systemic steroid user            Plan:       Problem List Items Addressed This Visit        Immunology/Multi System    Polymyositis - Primary (Chronic)     64yo M with PMH of overlap of polymyositis (dx 2009, mildly +ku, outside biopsy proven with elevated CPK and aldolase) and inclusion body myositis (+CN1A), osteopenia, Vit D insufficiency, Hemoglobulin c/beta-zero thalaseema, A-fib, dysphagia, portal HTN, emphysema here for follow up of polymyositis. Patient with " many social issues that are impeding his health care at this time, but this seems to be improving. Overall his condition does seem to be improving. He has been on Imuran in the past, his treatment has been escalated to IVIG monthly and cellcept. Currently on prednisone but weaning. Patient with overlap syndrome. IVIG, cellcept and steroids helping with polymyositis. He needs aggressive PT to help with inclusion body         #Overlap: polymyositis and inclusion body myositis   -Continue IVIG monthly will plan to give over 2 days at [facility]  -continue cellcept 1500mg BID  -Decreasing Prednisone to 7.5mg  -Refills done  -Labs today  -continue vit D 50,000 units weekly           RTC 2-3 mos with labs prior             Relevant Medications    predniSONE (DELTASONE) 2.5 MG tablet    mycophenolate (CELLCEPT) 500 mg Tab    Other Relevant Orders    CBC auto differential    Comprehensive metabolic panel    C-Reactive Protein    Sedimentation rate    POCT Lipid Panel    Aldolase    CK    DXA Bone Density Spine And Hip    Spirometry with bronchodilator      Other Visit Diagnoses     Inclusion body myositis (IBM)        Relevant Medications    predniSONE (DELTASONE) 2.5 MG tablet    Other Relevant Orders    CBC auto differential    Comprehensive metabolic panel    C-Reactive Protein    Sedimentation rate    POCT Lipid Panel    Aldolase    CK    DXA Bone Density Spine And Hip    Spirometry with bronchodilator    On Cellcept therapy        Relevant Orders    CBC auto differential    Comprehensive metabolic panel    C-Reactive Protein    Sedimentation rate    POCT Lipid Panel    Aldolase    CK    DXA Bone Density Spine And Hip    Spirometry with bronchodilator    Osteoporosis screening        Relevant Orders    CBC auto differential    Comprehensive metabolic panel    C-Reactive Protein    Sedimentation rate    POCT Lipid Panel    Aldolase    CK    DXA Bone Density Spine And Hip    Spirometry with bronchodilator    COVID-19 Routine  Screening    Long term systemic steroid user        Relevant Medications    predniSONE (DELTASONE) 2.5 MG tablet    Other Relevant Orders    DXA Bone Density Spine And Hip

## 2020-08-28 ENCOUNTER — TELEPHONE (OUTPATIENT)
Dept: PHARMACY | Facility: CLINIC | Age: 65
End: 2020-08-28

## 2020-08-31 ENCOUNTER — EXTERNAL CHRONIC CARE MANAGEMENT (OUTPATIENT)
Dept: PRIMARY CARE CLINIC | Facility: CLINIC | Age: 65
End: 2020-08-31
Payer: MEDICARE

## 2020-08-31 ENCOUNTER — TELEPHONE (OUTPATIENT)
Dept: INTERNAL MEDICINE | Facility: CLINIC | Age: 65
End: 2020-08-31

## 2020-08-31 DIAGNOSIS — M33.20 POLYMYOSITIS: Primary | Chronic | ICD-10-CM

## 2020-08-31 DIAGNOSIS — R26.81 GAIT INSTABILITY: Chronic | ICD-10-CM

## 2020-08-31 PROCEDURE — 99490 CHRNC CARE MGMT STAFF 1ST 20: CPT | Mod: S$GLB,,, | Performed by: FAMILY MEDICINE

## 2020-08-31 PROCEDURE — 99490 PR CHRONIC CARE MGMT, 1ST 20 MIN: ICD-10-PCS | Mod: S$GLB,,, | Performed by: FAMILY MEDICINE

## 2020-08-31 NOTE — TELEPHONE ENCOUNTER
----- Message from Juliana France sent at 8/31/2020  8:56 AM CDT -----  Type:  Needs Medical Advice    Who Called: kate       Would the patient rather a call back or a response via [facility]? Call back     Best Call Back Number: [phone]    Additional Information: kate would like to get another referral for PT

## 2020-09-03 ENCOUNTER — CLINICAL SUPPORT (OUTPATIENT)
Dept: REHABILITATION | Facility: HOSPITAL | Age: 65
End: 2020-09-03
Payer: MEDICARE

## 2020-09-03 DIAGNOSIS — M33.20 POLYMYOSITIS: Chronic | ICD-10-CM

## 2020-09-03 DIAGNOSIS — Z74.09 IMPAIRED FUNCTIONAL MOBILITY AND ACTIVITY TOLERANCE: Primary | ICD-10-CM

## 2020-09-03 PROCEDURE — 97162 PT EVAL MOD COMPLEX 30 MIN: CPT | Mod: HCNC,PO

## 2020-09-03 NOTE — PLAN OF CARE
[facility]  Physical Therapy Neurological Rehabilitation Initial Evaluation    Name: Nura Kahn Jr.  Clinic Number: [id number]    Therapy Diagnosis:   Encounter Diagnoses   Name Primary?    Polymyositis     Impaired functional mobility and activity tolerance Yes     Physician: Socrates Ambrosio MD    Physician Orders: PT Eval and Treat   Medical Diagnosis from Referral: M33.20 (ICD-10-CM) - Polymyositis R26.81 (ICD-10-CM) - Gait instability   Evaluation Date: 9/3/2020  Insurance Authorization Period: 09/01/2020 - 10/01/2020   Plan of Care Expiration: 11/03/2020   Visit # / Visits authorized: 01/ 01 pending additional auth    Time In: 0830  Time Out: 0915  Total Billable Time: 45 minutes    Precautions: Standard and Fall    Subjective   Date of onset: Onset of symptoms in 2008; Gradual decline in function due to sedentary behavior  History of current condition - Nura reports: that physical therapy will be good exercise for him. He also wants to improve his standing tolerance. He also reports difficulty pulling his pants up. He reported that was able to walk very short distances  3 years ago, but he has become deconditioned.     Medical History:   Past Medical History:   Diagnosis Date    Aspiration pneumonia of right lower lobe 1/15/2018    Atrial fibrillation 3/2/2015    Depression     Essential hypertension 2/27/2019    Microcytic anemia 9/25/2014    Neuromuscular disorder     Pneumonia     Polymyositis 2009    Tobacco abuse        Surgical History:   Nura Kahn Jr.  has a past surgical history that includes Esophagogastroduodenoscopy (N/A, 8/6/2020) and Colonoscopy (N/A, 8/6/2020).    Medications:   Nura has a current medication list which includes the following prescription(s): albuterol, amlodipine, ammonium lactate, diphenhydramine, epinephrine, ergocalciferol, gamunex-c, hydroxyzine pamoate, labetalol, mycophenolate, nystatin-triamcinolone, and  "prednisone.    Allergies:   Review of patient's allergies indicates:  No Known Allergies     Imaging: None recent    Prior Therapy: Prior OPPT at this facility, discontinued due to poor motivation at the time  Social History: Patient reports that he lives with family, but was apprehensive to provide therapist with additional details  Falls: 0   DME: Scooter, eli, wheelchair, commode  Home Environment: Townhouse, single story, ramp to enter   Exercise Routine / History: Supine arm and leg exercises   Family Present at time of Eval: No   Occupation: Not currently working  Prior Level of Function: Standing with support, Requires assistance for dressing and bathing  Current Level of Function: Unable to stand, requires assistance for dressing and bathing    Pain: no complaints of pain    Pts goals: "I want to be more confident in standing up"    Objective     Patient's mobility presenting to therapy evaluation: Scooter, with belt around his thighs to prevent hip abduction  - Follows commands: 100% of time   - Speech: no deficits     Mental status: normal mood, behavior, speech, dress, motor activity, and thought processes  Appearance: Disheveled  Behavior:  calm and cooperative  Attention Span and Concentration:  Normal    Dominant hand:  left     Posture Alignment: Slouched sitting posture    Sensation: Light Touch: Did not formally assess, but patient reported occasional tingling in bilateral hands         Tone: normal  Limbs/muscles affected: N/a    Coordination:   - fine motor:  DNT   - UE coordination:  DNT  - LE coordination:   DNT    ROM:   UPPER EXTREMITY--AROM/PROM  (R) UE: limited as follows: Functional for patient, but abnormal movement patterns  (L) UE: limited as follows: Functional for patient, but abnormal movement patterns         RANGE OF MOTION--LOWER EXTREMITIES  (R) LE Hip: normal   Knee: normal   Ankle: normal    (L) LE: Hip: normal   Knee: normal   Ankle: normal    Strength: manual muscle test " grades below     Lower Extremity Strength  Right LE Evaluation 09/03/2020 Left LE Evaluation 09/03/2020   Hip Flexion: 2/5 Hip Flexion: 2-/5   Hip Extension:  2-/5 Hip Extension: 2-/5   Hip Abduction: Trace Hip Abduction: trace   Hip Adduction: trace Hip Adduction trace   Knee Extension: trace Knee Extension: trace   Knee Flexion: 2-/5 Knee Flexion: 2-/5   Ankle Dorsiflexion: 3-/5 Ankle Dorsiflexion: 3-/5   Ankle Plantarflexion: 3/5 Ankle Plantarflexion: 3/5     Flexibility: intact     Functional Mobility (Bed mobility, transfers)  Bed mobility:  Min A  Supine to sit:  Mod A  Sit to supine:  Min A  Sit to stand:   Max A to clear buttock  Stand pivot:    Unable  Transfers to bed:  Mod I with scoot transfer - poor efficiency of movement. Excessive shearing; Increased risk of wound development  Transfers to toilet: Mod I per patient report      CMS Impairment/Limitation/Restriction for FOTO Survey not captured by staff this date         TREATMENT   No treatment provided today. All time spent on evaluation.     Written Home Exercises Provided: No. To be established at initial follow up session.     Home Exercises and Patient Education Provided    Education provided re: Examination findings, Role of physical therapy, PT goals, scheduling - pt verbalized understanding.     Assessment   Nura is a 64 y.o. male referred to outpatient Physical Therapy with a medical diagnosis of M33.20 (ICD-10-CM) - Polymyositis R26.81 (ICD-10-CM) - Gait instability. Pt presents with LE weakness, trunk weakness, and impaired functional mobility. He is unable to complete active movement of hip and knee musculature against gravity and exhibits poor efficiency of movement with transfers. MMT of UE was not formally assessed, but patient did exhibit abnormal movement patterns of bilateral UEs and was unable to generate adequate force through UE pushoff to assist with sit <> stand. With sit <> stand attempts with Max A, he was able to achieve  just minimal buttock clearance. He will benefit from skilled therapy to improve LE strength, functional mobility, and standing tolerance.     Pt will benefit from skilled outpatient Physical Therapy to address the deficits stated above and in the chart below, provide pt/family education, and to maximize pt's level of independence.     Pt prognosis is Fair.     Plan of care discussed with patient: Yes   Pt's spiritual, cultural and educational needs considered and patient is agreeable to the plan of care and goals as stated below:     Anticipated Barriers for therapy: comorbidities, requires transportation assist via [employer] for transportation    Medical Necessity is demonstrated by the following  History  Co-morbidities and personal factors that may impact the plan of care Co-morbidities:   Polymyositis, Depression, HTN    Personal Factors:   coping style     high   Examination  Body Structures and Functions, activity limitations and participation restrictions that may impact the plan of care Body Regions:   lower extremities  upper extremities  trunk    Body Systems:    strength  balance  transfers    Participation Restrictions:   Standing for ADLs  Bathing      Activity limitations:   Learning and applying knowledge  no deficits    General Tasks and Commands  no deficits    Communication  no deficits    Mobility  walking    Self care  washing oneself (bathing, drying, washing hands)  dressing    Domestic Life  no deficits    Interactions/Relationships  no deficits    Life Areas  no deficits    Community and Social Life  no deficits         high   Clinical Presentation evolving clinical presentation with changing clinical characteristics moderate   Decision Making/ Complexity Score: moderate     Goals:  Short Term Goals: 4 weeks   Patient will be independent with HEP emphasizing LE and trunk strengthening  Patient will exhibit increased bilateral hip and knee manual muscle test score by 1/2 grade for improved  biomechanics with functional tasks.  Patient will tolerate 15 minutes of B LE weightbearing in standing frame.  Patient will perform slide board transfer with Min A to improve efficiency of transfers  Patient will perform supine > sit transfer with Min A for improve household independence    Long Term Goals: 8 weeks   1. Patient will perform sit <> stand transfer with Max A for improved functional independence  2. Patient will tolerate 60 seconds of standing with bilateral UE support  3. Patient will perform squat pivot transfer with Mod A for improved functional independence and decrease shearing force    Plan   Plan of care Certification: 9/3/2020 to 11/03/2020.     Outpatient Physical Therapy 2 times weekly for 8 weeks to include the following interventions: Neuromuscular Re-ed, Patient Education, Self Care, Therapeutic Activites and Therapeutic Exercise.     Jessica Mcpherson PT

## 2020-09-10 ENCOUNTER — CLINICAL SUPPORT (OUTPATIENT)
Dept: REHABILITATION | Facility: HOSPITAL | Age: 65
End: 2020-09-10
Payer: MEDICARE

## 2020-09-10 DIAGNOSIS — Z74.09 IMPAIRED FUNCTIONAL MOBILITY AND ENDURANCE: Primary | ICD-10-CM

## 2020-09-10 PROCEDURE — 97530 THERAPEUTIC ACTIVITIES: CPT | Mod: HCNC,PO

## 2020-09-10 PROCEDURE — 97110 THERAPEUTIC EXERCISES: CPT | Mod: HCNC,PO

## 2020-09-10 NOTE — PROGRESS NOTES
Physical Therapy Daily Treatment Note     Name: Nuar Kahn Jr.  Clinic Number: [id number]    Therapy Diagnosis:   Encounter Diagnosis   Name Primary?    Impaired functional mobility and endurance Yes     Physician: Socrates Ambrosio MD    Visit Date: 9/10/2020    Physician Orders: PT Eval and Treat   Medical Diagnosis from Referral: M33.20 (ICD-10-CM) - Polymyositis R26.81 (ICD-10-CM) - Gait instability   Evaluation Date: 9/3/2020  Insurance Authorization Period: 09/01/2020 - 10/01/2020   Plan of Care Expiration: 11/03/2020   Visit # / Visits authorized: 02/ 01 pending additional auth     Time In: 0830  Time Out: 0915  Total Billable Time: 45 minutes     Precautions: Standard and Fall      Missed visits: 0  Cancelled visits: 1  Subjective     Pt reports: no complaints upon arrival.  HEP to be initiated as appropriate.  Response to previous treatment: No adverse effects reported   Functional change: Ongoing     Pain: 0/10  Location:  n/a     Objective     Nura received therapeutic exercises to develop strength for 20 minutes including:    Sidelying hip fexion <> extension x 15 reps bilaterally  Passive LTR with AA return to starting position x 4 minutes  Supine adduction x 10 reps bilaterally    Nura participated in neuromuscular re-education activities to improve: Balance for 0 minutes. The following activities were included:      Nura participated in dynamic functional therapeutic activities to improve functional performance for 25 minutes, including:    Sit <> stand x 1 attempt, Max A x 1, unable to clear buttock  Sit <> stand x 3, Max A x 2, Standing duration ~15 sec  Scooter <> mat Setup Assist, increased time to perform    Nura participated in gait training to improve functional mobility and safety for 0  minutes:      Home Exercises Provided and Patient Education Provided     Education provided:   - POC, Proper technique with therapeutic interventions, Benefits/purpose of today's therapeutic  interventions    Written Home Exercises Provided: no    Assessment   Nura tolerated initial treatment session well. He was appropriately challenged with sidelying hip strengthening exercise. He responded well to standing, though Max A x 2 was required. Patient was resistant to allowing therapist to assist with transfers; patient was informed that increased time required to perform the transfers could limit time to participate in meaningful therapy.     Nura is progressing well towards his goals.   Pt prognosis is Fair.     Pt will continue to benefit from skilled outpatient physical therapy to address the deficits listed in the problem list box on initial evaluation, provide pt/family education and to maximize pt's level of independence in the home and community environment.     Pt's spiritual, cultural and educational needs considered and pt agreeable to plan of care and goals.     Anticipated barriers to physical therapy: comorbidities, requires transportation assist via [employer] for transportation     Goals:   Short Term Goals: 4 weeks   1. Patient will be independent with HEP emphasizing LE and trunk strengthening Ongoing  2. Patient will exhibit increased bilateral hip and knee manual muscle test score by 1/2 grade for improved biomechanics with functional tasks. Ongoing  3. Patient will tolerate 15 minutes of B LE weightbearing in standing frame. Ongoing  4. Patient will perform slide board transfer with Min A to improve efficiency of transfers. Ongoing  5. Patient will perform supine > sit transfer with Min A for improve household independence Ongoing     Long Term Goals: 8 weeks   1. Patient will perform sit <> stand transfer with Max A for improved functional independence Ongoing  2. Patient will tolerate 60 seconds of standing with bilateral UE support Ongoing  3. Patient will perform squat pivot transfer with Mod A for improved functional independence and decrease shearing force Ongoing      Plan      Continue gravity-eliminated strengthening and weightbearing (Skyla Walker, Standing Frame)     Jessica Mcpherson, PT

## 2020-09-16 NOTE — PROGRESS NOTES
Physical Therapy Daily Treatment Note     Name: Nura Kahn Jr.  Clinic Number: [id number]    Therapy Diagnosis:   Encounter Diagnosis   Name Primary?    Impaired functional mobility and endurance Yes     Physician: Socrates Ambrosio MD    Visit Date: 9/17/2020    Physician Orders: PT Eval and Treat   Medical Diagnosis from Referral: M33.20 (ICD-10-CM) - Polymyositis R26.81 (ICD-10-CM) - Gait instability   Evaluation Date: 9/3/2020  Insurance Authorization Period: 09/01/2020 - 10/01/2020   Plan of Care Expiration: 11/03/2020   Visit # / Visits authorized: 03/ 01 pending additional auth     Time In: 0900  Time Out: 0955  Total Billable Time: 50 minutes     Precautions: Standard and Fall      Missed visits: 0  Cancelled visits: 1  Subjective     Pt reports: that he felt good following last visit  HEP to be initiated as appropriate.  Response to previous treatment: No adverse effects reported   Functional change: Ongoing     Pain: 0/10  Location:  n/a     Objective     Nura received therapeutic exercises to develop strength for 40 minutes including:    Sidelying hip flexion <> extension 2 x 12 reps bilaterally  Sidelying neutral > hip extension 2 x 12 reps bilaterally  Sidelying hip flexor stretch 3 x 30s, bilaterally    Short Arc Quad AA x 10 reps bilaterally, min active range  Supine adduction x 10 reps bilaterally    Nura participated in neuromuscular re-education activities to improve: Balance for 0 minutes. The following activities were included:      Nura participated in dynamic functional therapeutic activities to improve functional performance for 10 minutes, including:    Scooter <> mat Setup Assist, increased time to perform    Standing frame:   Trial 1: 5 minutes, occasional R<>L weightshifts with 3 sec hold    Nura participated in gait training to improve functional mobility and safety for 0  minutes:      Home Exercises Provided and Patient Education Provided     Education provided:   - POC,  Proper technique with therapeutic interventions, Benefits/purpose of today's therapeutic interventions    Written Home Exercises Provided: no    Assessment   Nura tolerated today's treatment session well. Min A required throughout the session for efficiency of bed mobility and transfers. He exhibited improved active range with sidelying exercises today and responded well to hip flexor stretching. A clicking sensation was observed via palpation during L sidelying with R hip flexion > extension; he reported no pain. Active range was minimal with SAQ exercise; hip flexor strengthening will continue in sidelying, with progression to positions against gravity as appropriate. Active adductor contraction appears improved since last visit, though still impaired (L more involved). He tolerated the standing frame well; duration to be progressed as tolerated.     Nura is progressing well towards his goals.   Pt prognosis is Fair.     Pt will continue to benefit from skilled outpatient physical therapy to address the deficits listed in the problem list box on initial evaluation, provide pt/family education and to maximize pt's level of independence in the home and community environment.     Pt's spiritual, cultural and educational needs considered and pt agreeable to plan of care and goals.     Anticipated barriers to physical therapy: comorbidities, requires transportation assist via [employer] for transportation     Goals:   Short Term Goals: 4 weeks   1. Patient will be independent with HEP emphasizing LE and trunk strengthening Ongoing  2. Patient will exhibit increased bilateral hip and knee manual muscle test score by 1/2 grade for improved biomechanics with functional tasks. Ongoing  3. Patient will tolerate 15 minutes of B LE weightbearing in standing frame. Ongoing  4. Patient will perform slide board transfer with Min A to improve efficiency of transfers. Ongoing  5. Patient will perform supine > sit transfer with  Min A for improve household independence Ongoing     Long Term Goals: 8 weeks   1. Patient will perform sit <> stand transfer with Max A for improved functional independence Ongoing  2. Patient will tolerate 60 seconds of standing with bilateral UE support Ongoing  3. Patient will perform squat pivot transfer with Mod A for improved functional independence and decrease shearing force Ongoing      Plan     Continue gravity-eliminated strengthening and weightbearing (Skyla Walker, Standing Frame)     Jessica Mcpherson PT

## 2020-09-17 ENCOUNTER — CLINICAL SUPPORT (OUTPATIENT)
Dept: REHABILITATION | Facility: HOSPITAL | Age: 65
End: 2020-09-17
Payer: MEDICARE

## 2020-09-17 DIAGNOSIS — Z74.09 IMPAIRED FUNCTIONAL MOBILITY AND ENDURANCE: Primary | ICD-10-CM

## 2020-09-17 PROCEDURE — 97530 THERAPEUTIC ACTIVITIES: CPT | Mod: HCNC,PO

## 2020-09-17 PROCEDURE — 97110 THERAPEUTIC EXERCISES: CPT | Mod: HCNC,PO

## 2020-09-21 NOTE — PROGRESS NOTES
Physical Therapy Daily Treatment Note     Name: Nura Kahn Jr.  Clinic Number: [id number]    Therapy Diagnosis:   Encounter Diagnosis   Name Primary?    Impaired functional mobility and endurance      Physician: Socrates Ambrosio MD    Visit Date: 9/22/2020    Physician Orders: PT Eval and Treat   Medical Diagnosis from Referral: M33.20 (ICD-10-CM) - Polymyositis R26.81 (ICD-10-CM) - Gait instability   Evaluation Date: 9/3/2020  Insurance Authorization Period: 09/01/2020 - 10/01/2020   Plan of Care Expiration: 11/03/2020   Visit # / Visits authorized: 04/ 01 pending additional auth     Time In: 0930   Time Out: 1015   Total Billable Time: 45 minutes     Precautions: Standard and Fall      Missed visits: 0  Cancelled visits: 1  Subjective     Pt reports: feeling tired today because he didn't sleep well.  HEP to be initiated as appropriate.  Response to previous treatment: No adverse effects reported   Functional change: Ongoing     Pain: 0/10  Location:  n/a     Objective     Nura received therapeutic exercises to develop strength for 35 minutes including:    Sidelying hip flexion <> extension 2 x 12 reps bilaterally  Sidelying neutral > hip extension 2 x 12 reps bilaterally  Sidelying hip flexor stretch 3 x 30s, bilaterally    Supine hip flex <> ext, AAROM x 10 reps bilaterally  Supine adduction 2 x 10 reps bilaterally    Nura participated in neuromuscular re-education activities to improve: Balance for 0 minutes. The following activities were included:      Nura participated in dynamic functional therapeutic activities to improve functional performance for 10 minutes, including:    Scooter <> mat Setup Assist, increased time to perform    Standing frame:   Trial 1: 5 minutes, occasional R<>L weightshifts with 3 sec hold    Nura participated in gait training to improve functional mobility and safety for 0  minutes:      Home Exercises Provided and Patient Education Provided     Education provided:  "  - POC, Proper technique with therapeutic interventions, Benefits/purpose of today's therapeutic interventions    Written Home Exercises Provided: no    Assessment   Nura tolerated today's treatment session well. He exhibits improved efficiency of scoot transfers from scooter <> mat table. He also exhibits improved active range with sidelying and supine strengthening exercises. R hip continues to "pop" with R hip flexion <> extension, so therapist limits flexion range to 50%.     Nura is progressing well towards his goals.   Pt prognosis is Fair.     Pt will continue to benefit from skilled outpatient physical therapy to address the deficits listed in the problem list box on initial evaluation, provide pt/family education and to maximize pt's level of independence in the home and community environment.     Pt's spiritual, cultural and educational needs considered and pt agreeable to plan of care and goals.     Anticipated barriers to physical therapy: comorbidities, requires transportation assist via [employer] for transportation     Goals:  Short Term Goal = 4 weeks Initial -09/03/2020 Progress Status   1. Patient will be independent with HEP emphasizing LE and trunk strengthening Requires education Ongoing   2. Patient will exhibit increased bilateral hip and knee manual muscle test score by 1/2 grade for improved biomechanics with functional tasks. R hip flexion:  L hip flexion:  R hip extension:  L hip extension:  B hip abduction: trace  Bilateral hip adduction: trace  B knee extension: trace  B knee flexion: 2-/5   Ongoing   3. Patient will tolerate 15 minutes of B LE weightbearing in standing frame. Not performed on eval date Ongoing   4. Patient will perform slide board transfer with Min A to improve efficiency of transfers. Not performed on eval date. Inefficient scoot transfer requiring Setup A Ongoing   5. Patient will perform supine > sit transfer with Min A for improve household independence Mod A " Ongoing     Long Term Goal = 8 weeks Initial - 09/03/2020 Progress Status   1. Patient will perform sit <> stand transfer with Max A for improved functional independence Max A to clear buttock Ongoing   2. Patient will tolerate 60 seconds of standing with bilateral UE support Unable Ongoing   3. Patient will perform squat pivot transfer with Mod A for improved functional independence and decrease shearing force Not performed on eval date. Inefficient scoot transfer requiring Setup A Ongoing         Plan     Continue gravity-eliminated strengthening and weightbearing (Skyla Walker, Standing Frame)     Jessica Mcpherson PT

## 2020-09-22 ENCOUNTER — CLINICAL SUPPORT (OUTPATIENT)
Dept: REHABILITATION | Facility: HOSPITAL | Age: 65
End: 2020-09-22
Payer: MEDICARE

## 2020-09-22 DIAGNOSIS — Z74.09 IMPAIRED FUNCTIONAL MOBILITY AND ENDURANCE: ICD-10-CM

## 2020-09-22 PROCEDURE — 97110 THERAPEUTIC EXERCISES: CPT | Mod: HCNC,PO

## 2020-09-22 PROCEDURE — 97530 THERAPEUTIC ACTIVITIES: CPT | Mod: HCNC,PO

## 2020-09-29 ENCOUNTER — CLINICAL SUPPORT (OUTPATIENT)
Dept: REHABILITATION | Facility: HOSPITAL | Age: 65
End: 2020-09-29
Payer: MEDICARE

## 2020-09-29 ENCOUNTER — PATIENT MESSAGE (OUTPATIENT)
Dept: OTHER | Facility: OTHER | Age: 65
End: 2020-09-29

## 2020-09-29 DIAGNOSIS — Z74.09 IMPAIRED FUNCTIONAL MOBILITY AND ENDURANCE: ICD-10-CM

## 2020-09-29 PROCEDURE — 97530 THERAPEUTIC ACTIVITIES: CPT | Mod: HCNC,PO,CQ

## 2020-09-29 PROCEDURE — 97110 THERAPEUTIC EXERCISES: CPT | Mod: HCNC,PO,CQ

## 2020-09-29 NOTE — PROGRESS NOTES
"  Physical Therapy Daily Treatment Note     Name: Nura Kahn Jr.  Clinic Number: [id number]    Therapy Diagnosis:   No diagnosis found.  Physician: Socrates Ambrosio MD    Visit Date: 9/29/2020    Physician Orders: PT Eval and Treat   Medical Diagnosis from Referral: M33.20 (ICD-10-CM) - Polymyositis R26.81 (ICD-10-CM) - Gait instability   Evaluation Date: 9/3/2020  Insurance Authorization Period: 09/01/2020 - 10/01/2020   Plan of Care Expiration: 11/03/2020   Visit # / Visits authorized: 05/ 01 pending additional auth     Time In: 9:00  Time Out: 9:45  Total Billable Time:45  minutes     Precautions: Standard and Fall      Missed visits: 0  Cancelled visits: 2  Subjective     Pt reports: " You not going to take it out on me since the [employer] lost are you."   HEP to be initiated as appropriate.  Response to previous treatment: No adverse effects reported   Functional change: Ongoing     Pain: 0/10  Location:  n/a     Objective     Nura received therapeutic exercises to develop strength for 30 minutes including:    Sidelying hip flexion <> extension 2 x 12 reps bilaterally  Sidelying neutral > hip extension 2 x 12 reps bilaterally  Sidelying hip flexor stretch 3 x 30s, bilaterally    Supine hip flex <> ext, AAROM 2x 10 reps bilaterally with slide board  Supine adduction 2 x 10 reps bilaterally with slide board and AAROM    Nura participated in neuromuscular re-education activities to improve: Balance for 0 minutes. The following activities were included:      Nura participated in dynamic functional therapeutic activities to improve functional performance for 15 minutes, including:    Scooter <> mat Setup Assist, increased time to perform, slide board and SBA  Sit to supine with Mod A .  Mod A to manage B LE's  Rolling R<>L with Mod A.  Supine to sit with MOd A.  Mod A to elevate trunk.    Standing frame:   Trial 1: 4 minutes, occasional R<>L weightshifts with 3 sec hold    Nura participated in gait " training to improve functional mobility and safety for 0  minutes:      Home Exercises Provided and Patient Education Provided     Education provided:   - POC, Proper technique with therapeutic interventions, Benefits/purpose of today's therapeutic interventions    Written Home Exercises Provided: no    Assessment   Nura tolerated tx session well and cont to be motivated and cooperative.  Nura cont with strengthening exercises and had improved posture in standing frame.  Cont with plan of care.   Nura is progressing well towards his goals.   Pt prognosis is Fair.     Pt will continue to benefit from skilled outpatient physical therapy to address the deficits listed in the problem list box on initial evaluation, provide pt/family education and to maximize pt's level of independence in the home and community environment.     Pt's spiritual, cultural and educational needs considered and pt agreeable to plan of care and goals.     Anticipated barriers to physical therapy: comorbidities, requires transportation assist via [employer] for transportation     Goals:  Short Term Goal = 4 weeks Initial -09/03/2020 Progress Status   1. Patient will be independent with HEP emphasizing LE and trunk strengthening Requires education Ongoing   2. Patient will exhibit increased bilateral hip and knee manual muscle test score by 1/2 grade for improved biomechanics with functional tasks. R hip flexion:  L hip flexion:  R hip extension:  L hip extension:  B hip abduction: trace  Bilateral hip adduction: trace  B knee extension: trace  B knee flexion: 2-/5   Ongoing   3. Patient will tolerate 15 minutes of B LE weightbearing in standing frame. Not performed on eval date Ongoing   4. Patient will perform slide board transfer with Min A to improve efficiency of transfers. Not performed on eval date. Inefficient scoot transfer requiring Setup A Ongoing   5. Patient will perform supine > sit transfer with Min A for improve household  independence Mod A Ongoing     Long Term Goal = 8 weeks Initial - 09/03/2020 Progress Status   1. Patient will perform sit <> stand transfer with Max A for improved functional independence Max A to clear buttock Ongoing   2. Patient will tolerate 60 seconds of standing with bilateral UE support Unable Ongoing   3. Patient will perform squat pivot transfer with Mod A for improved functional independence and decrease shearing force Not performed on eval date. Inefficient scoot transfer requiring Setup A Ongoing         Plan     Continue gravity-eliminated strengthening and weightbearing (Skyla Walker, Standing Frame)     Monica Saeed, PTA

## 2020-09-30 ENCOUNTER — EXTERNAL CHRONIC CARE MANAGEMENT (OUTPATIENT)
Dept: PRIMARY CARE CLINIC | Facility: CLINIC | Age: 65
End: 2020-09-30
Payer: MEDICARE

## 2020-09-30 PROCEDURE — 99490 PR CHRONIC CARE MGMT, 1ST 20 MIN: ICD-10-PCS | Mod: S$GLB,,, | Performed by: FAMILY MEDICINE

## 2020-09-30 PROCEDURE — 99490 CHRNC CARE MGMT STAFF 1ST 20: CPT | Mod: S$GLB,,, | Performed by: FAMILY MEDICINE

## 2020-09-30 NOTE — PROGRESS NOTES
Physical Therapy Daily Treatment Note     Name: Nura Kahn Jr.  Clinic Number: [id number]    Therapy Diagnosis:   Encounter Diagnosis   Name Primary?    Impaired functional mobility and endurance      Physician: Socrates Ambrosio MD    Visit Date: 10/1/2020    Physician Orders: PT Eval and Treat   Medical Diagnosis from Referral: M33.20 (ICD-10-CM) - Polymyositis R26.81 (ICD-10-CM) - Gait instability   Evaluation Date: 9/3/2020  Insurance Authorization Period: 09/01/2020 - 10/01/2020   Plan of Care Expiration: 11/03/2020   Visit # / Visits authorized: 04/ 20 6 total     Time In: 9:15  Time Out: 1005  Total Billable Time: 50 minutes     Precautions: Standard and Fall      Missed visits: 0  Cancelled visits: 2  Subjective     Pt reports: No complaints or major changes upon arrival.  HEP to be initiated as appropriate.  Response to previous treatment: No adverse effects reported   Functional change: Ongoing     Pain: 0/10  Location:  n/a     Objective     Nura received therapeutic exercises to develop strength for 35 minutes including:    Sidelying hip flexion <> extension 2 x 10 reps bilaterally (light resistance for 2nd set)  Sidelying neutral > hip extension 2 x 10 reps bilaterally (light resistance for 2nd set)  Sidelying hip flexor stretch 3 x 30s, bilaterally    Supine adduction 2 x 10 reps bilaterally with slide board and AAROM   Supine LTR with belt around LEs for AA x 60s bilaterally  Supine hip flex <> ext, AAROM 1 x 10 reps bilaterally with slide board (not performed this date)      Nura participated in neuromuscular re-education activities to improve: Balance for 0 minutes. The following activities were included:      Nura participated in dynamic functional therapeutic activities to improve functional performance for 15 minutes, including:    Scooter <> mat Setup Assist, increased time to perform, slide board and SBA  Sit to supine with Mod A .  Mod A to manage B LE's  Rolling R<>L with Mod  A.  Supine to sit with MOd A.  Mod A to elevate trunk.    Standing frame:   Trial 1: 5 minutes, occasional R<>L weightshifts with 3 sec hold    Nura participated in gait training to improve functional mobility and safety for 0  minutes:      Home Exercises Provided and Patient Education Provided     Education provided:   - POC, Proper technique with therapeutic interventions, Benefits/purpose of today's therapeutic interventions    Written Home Exercises Provided: no    Assessment   Nura tolerated tx session well. He exhibits improved active LE range with sidelying hip flexion <> extension and was able to take minimal resistance from therapist. He also exhibits less popping sensation of R hip during this exercises, suggesting improved muscular stability of the hip joint. Duration in standing frame to increase next visit.    Nura is progressing well towards his goals.   Pt prognosis is Fair.     Pt will continue to benefit from skilled outpatient physical therapy to address the deficits listed in the problem list box on initial evaluation, provide pt/family education and to maximize pt's level of independence in the home and community environment.     Pt's spiritual, cultural and educational needs considered and pt agreeable to plan of care and goals.     Anticipated barriers to physical therapy: comorbidities, requires transportation assist via [employer] for transportation     Goals:  Short Term Goal = 4 weeks Initial -09/03/2020 Progress Status   1. Patient will be independent with HEP emphasizing LE and trunk strengthening Requires education Ongoing   2. Patient will exhibit increased bilateral hip and knee manual muscle test score by 1/2 grade for improved biomechanics with functional tasks. R hip flexion:  L hip flexion:  R hip extension:  L hip extension:  B hip abduction: trace  Bilateral hip adduction: trace  B knee extension: trace  B knee flexion: 2-/5   Ongoing   3. Patient will tolerate 15 minutes of  B LE weightbearing in standing frame. Not performed on eval date Ongoing   4. Patient will perform slide board transfer with Min A to improve efficiency of transfers. Not performed on eval date. Inefficient scoot transfer requiring Setup A Ongoing   5. Patient will perform supine > sit transfer with Min A for improve household independence Mod A Ongoing     Long Term Goal = 8 weeks Initial - 09/03/2020 Progress Status   1. Patient will perform sit <> stand transfer with Max A for improved functional independence Max A to clear buttock Ongoing   2. Patient will tolerate 60 seconds of standing with bilateral UE support Unable Ongoing   3. Patient will perform squat pivot transfer with Mod A for improved functional independence and decrease shearing force Not performed on eval date. Inefficient scoot transfer requiring Setup A Ongoing         Plan     Continue gravity-eliminated strengthening and weightbearing (Skyla Walker, Standing Frame)     Jessica Mcpherson PT

## 2020-10-01 ENCOUNTER — CLINICAL SUPPORT (OUTPATIENT)
Dept: REHABILITATION | Facility: HOSPITAL | Age: 65
End: 2020-10-01
Payer: MEDICARE

## 2020-10-01 DIAGNOSIS — Z74.09 IMPAIRED FUNCTIONAL MOBILITY AND ENDURANCE: ICD-10-CM

## 2020-10-01 PROCEDURE — 97530 THERAPEUTIC ACTIVITIES: CPT | Mod: HCNC,PO

## 2020-10-01 PROCEDURE — 97110 THERAPEUTIC EXERCISES: CPT | Mod: HCNC,PO

## 2020-10-05 NOTE — PROGRESS NOTES
Physical Therapy Daily Treatment Note     Name: Nura Kahn Jr.  Clinic Number: [id number]    Therapy Diagnosis:   Encounter Diagnosis   Name Primary?    Impaired functional mobility and endurance      Physician: Socrates Ambrosio MD    Visit Date: 10/6/2020    Physician Orders: PT Eval and Treat   Medical Diagnosis from Referral: M33.20 (ICD-10-CM) - Polymyositis R26.81 (ICD-10-CM) - Gait instability   Evaluation Date: 9/3/2020  Insurance Authorization Period: 09/01/2020 - 10/01/2020   Plan of Care Expiration: 11/03/2020   Visit # / Visits authorized: 05/ 20 7 total     Time In: 9:30  Time Out: 1015  Total Billable Time: 40 minutes (restroom break)     Precautions: Standard and Fall      Missed visits: 0  Cancelled visits: 2  Subjective     Pt reports: No complaints or major changes upon arrival.   HEP to be initiated as appropriate.  Response to previous treatment: No adverse effects reported   Functional change: Ongoing     Pain: 0/10  Location:  n/a     Objective     Nura received therapeutic exercises to develop strength for 15 minutes including:    Not performed this date:  Sidelying hip flexion <> extension 2 x 10 reps bilaterally (light resistance for 2nd set)  Sidelying neutral > hip extension 2 x 10 reps bilaterally (light resistance for 2nd set)  Sidelying hip flexor stretch 3 x 30s, bilaterally    Supine adduction 2 x 10 reps bilaterally with slide board and AAROM   Supine hip flex <> ext, AAROM 1 x 10 reps bilaterally with slide board       Nura participated in neuromuscular re-education activities to improve: Balance for 0 minutes. The following activities were included:      Nura participated in dynamic functional therapeutic activities to improve functional performance for 25 minutes, including:    Scooter <> mat Setup Assist, increased time to perform, slide board and SBA  Sit to supine with Mod A .  Mod A to manage B LE's  Rolling midline to R sidelying with Min A x 3 reps  Rolling  midline to L sidelying with Min A x 3 reps  Supine to sit with Mod A.  Mod A to elevate trunk.    Standing frame:   Trial 1: 5 minutes, occasional R<>L weightshifts with 3 sec hold    Nura participated in gait training to improve functional mobility and safety for 0  minutes:      Home Exercises Provided and Patient Education Provided     Education provided:   - POC, Proper technique with therapeutic interventions, Benefits/purpose of today's therapeutic interventions    Written Home Exercises Provided: no    Assessment   Nura tolerated tx session well. He will be reassessed for progress next visit. Continued therapy to increase standing frame duration and continue gravity eliminated strengthening.      Nura is progressing well towards his goals.   Pt prognosis is Fair.     Pt will continue to benefit from skilled outpatient physical therapy to address the deficits listed in the problem list box on initial evaluation, provide pt/family education and to maximize pt's level of independence in the home and community environment.     Pt's spiritual, cultural and educational needs considered and pt agreeable to plan of care and goals.     Anticipated barriers to physical therapy: comorbidities, requires transportation assist via [employer] for transportation     Goals:  Short Term Goal = 4 weeks Initial -09/03/2020 Progress Status   1. Patient will be independent with HEP emphasizing LE and trunk strengthening Requires education Ongoing   2. Patient will exhibit increased bilateral hip and knee manual muscle test score by 1/2 grade for improved biomechanics with functional tasks. R hip flexion:  L hip flexion:  R hip extension:  L hip extension:  B hip abduction: trace  Bilateral hip adduction: trace  B knee extension: trace  B knee flexion: 2-/5   Ongoing   3. Patient will tolerate 15 minutes of B LE weightbearing in standing frame. Not performed on eval date Ongoing   4. Patient will perform slide board transfer  with Min A to improve efficiency of transfers. Not performed on eval date. Inefficient scoot transfer requiring Setup A Ongoing   5. Patient will perform supine > sit transfer with Min A for improve household independence Mod A Ongoing     Long Term Goal = 8 weeks Initial - 09/03/2020 Progress Status   1. Patient will perform sit <> stand transfer with Max A for improved functional independence Max A to clear buttock Ongoing   2. Patient will tolerate 60 seconds of standing with bilateral UE support Unable Ongoing   3. Patient will perform squat pivot transfer with Mod A for improved functional independence and decrease shearing force Not performed on eval date. Inefficient scoot transfer requiring Setup A Ongoing         Plan     Continue gravity-eliminated strengthening and weightbearing (Skyla Walker, Standing Frame)     Jessica Mcpherson PT

## 2020-10-06 ENCOUNTER — CLINICAL SUPPORT (OUTPATIENT)
Dept: REHABILITATION | Facility: HOSPITAL | Age: 65
End: 2020-10-06
Payer: MEDICARE

## 2020-10-06 DIAGNOSIS — Z74.09 IMPAIRED FUNCTIONAL MOBILITY AND ENDURANCE: ICD-10-CM

## 2020-10-06 PROCEDURE — 97530 THERAPEUTIC ACTIVITIES: CPT | Mod: HCNC,PO

## 2020-10-06 PROCEDURE — 97110 THERAPEUTIC EXERCISES: CPT | Mod: HCNC,PO

## 2020-10-07 ENCOUNTER — TELEPHONE (OUTPATIENT)
Dept: PHARMACY | Facility: CLINIC | Age: 65
End: 2020-10-07

## 2020-10-07 NOTE — TELEPHONE ENCOUNTER
[facility]. Pt confirmed shipping of [employer] on 10/12 to arrive on 10/13. Address and  verified. $0 copay in 004. Pt reported 1 week on hand. Pt reported 1 missed dose, counseled pt to use phone alarm or pillbox to remember to take his medication daily. Pt did not start any new medications. Pt had no further questions or concerns.

## 2020-10-08 ENCOUNTER — CLINICAL SUPPORT (OUTPATIENT)
Dept: REHABILITATION | Facility: HOSPITAL | Age: 65
End: 2020-10-08
Payer: MEDICARE

## 2020-10-08 DIAGNOSIS — Z74.09 IMPAIRED FUNCTIONAL MOBILITY AND ENDURANCE: ICD-10-CM

## 2020-10-08 PROCEDURE — 97530 THERAPEUTIC ACTIVITIES: CPT | Mod: HCNC,PO

## 2020-10-08 PROCEDURE — 97110 THERAPEUTIC EXERCISES: CPT | Mod: HCNC,PO

## 2020-10-08 NOTE — PROGRESS NOTES
Physical Therapy Daily Treatment Note     Name: Nura Kahn Jr.  Clinic Number: [id number]    Therapy Diagnosis:   Encounter Diagnosis   Name Primary?    Impaired functional mobility and endurance      Physician: Socrates Ambrosio MD    Visit Date: 10/8/2020    Physician Orders: PT Eval and Treat   Medical Diagnosis from Referral: M33.20 (ICD-10-CM) - Polymyositis R26.81 (ICD-10-CM) - Gait instability   Evaluation Date: 9/3/2020  Insurance Authorization Period: 09/01/2020 - 10/01/2020   Plan of Care Expiration: 11/03/2020   Visit # / Visits authorized: 06/ 20 8 total     Time In: 9:15  Time Out: 10:10  Total Billable Time: 55 minutes     Precautions: Standard and Fall      Missed visits: 0  Cancelled visits: 2  Subjective     Pt reports: feeling weaker and discouraged today. He had difficulty getting of the commode this morning.  HEP to be initiated as appropriate.  Response to previous treatment: No adverse effects reported   Functional change: Ongoing     Pain: 0/10  Location:  n/a     Objective     Nura received therapeutic exercises to develop strength for 25 minutes including:    Lower Extremity Strength  Right LE Evaluation 09/03/2020 10/08/2020 Left LE Evaluation 09/03/2020 10/08/2020   Hip Flexion: 2/5 2+/5 Hip Flexion: 2-/5 2+/5   Hip Extension:  2-/5 2-/5 Hip Extension: 2-/5 2/5   Hip Abduction: Trace 2/5 Hip Abduction: trace 2-/5   Hip Adduction: trace 2-/5 Hip Adduction trace 2-/5   Knee Extension: trace 2/5 Knee Extension: trace 2/5   Knee Flexion: 2-/5 2-/5 Knee Flexion: 2-/5 2-/5   Ankle Dorsiflexion: 3-/5 3-/5 Ankle Dorsiflexion: 3-/5 3-/5   Ankle Plantarflexion: 3/5 3+/5 Ankle Plantarflexion: 3/5 3+/5        All time today spent of reassessment:  Not performed this date:  Sidelying hip flexion <> extension 2 x 10 reps bilaterally (light resistance for 2nd set)  Sidelying neutral > hip extension 2 x 10 reps bilaterally (light resistance for 2nd set)  Sidelying hip flexor stretch 3 x 30s,  bilaterally  Supine adduction 2 x 10 reps bilaterally with slide board and AAROM   Supine hip flex <> ext, AAROM 1 x 10 reps bilaterally with slide board       Nura participated in neuromuscular re-education activities to improve: Balance for 0 minutes. The following activities were included:      Nura participated in dynamic functional therapeutic activities to improve functional performance for 30 minutes, including:    Scooter <> mat Setup Assist, increased time to perform, slide board and SBA  Sit to supine with Mod A .  Mod A to manage B LE's  Supine to sit with Mod A.  Mod A to elevate trunk.    Standing frame:   Trial 1: 5 minutes, occasional R<>L weightshifts with 3 sec hold   Trial 2: 5 minutes    Nura participated in gait training to improve functional mobility and safety for 0  minutes:      Home Exercises Provided and Patient Education Provided     Education provided:   - POC, Proper technique with therapeutic interventions, Benefits/purpose of today's therapeutic interventions    Written Home Exercises Provided: no    Assessment   Reassessment period: 09/03/2020-10/8/2020. Pt reassessed for progress today, exhibiting fair progress. He exhibits good strength gains of hip and knee musculature and improved efficiency of w/c <> mat transfers with use of a sliding board. He responds well to LE weightbearing in standing frame up to 5 minutes; increased duration would likely be tolerated well but time constraints have limited increased standing frame duration. While in standing frame, he does exhibit trunk weakness, requiring manual assistance for trunk positioning to prevent leaning onto chest pad. He is concerned that he has not received [facility] as he was intended to months ago despite multiple call attempts to the [facility] facility. He was advised to contact his  to assist with managing his medical management. At this time, he is progressing appropriately and will benefit from continued skilled  therapy emphasizing LE strengthening, LE weightbearing, and functional mobility training.     Nura is progressing well towards his goals.   Pt prognosis is Fair.     Pt will continue to benefit from skilled outpatient physical therapy to address the deficits listed in the problem list box on initial evaluation, provide pt/family education and to maximize pt's level of independence in the home and community environment.     Pt's spiritual, cultural and educational needs considered and pt agreeable to plan of care and goals.     Anticipated barriers to physical therapy: comorbidities, requires transportation assist via [employer] for transportation     Goals:  Short Term Goal = 4 weeks Initial -09/03/2020 10/08/2020 Progress Status   1. Patient will be independent with HEP emphasizing LE and trunk strengthening Requires education Requires education Ongoing   2. Patient will exhibit increased bilateral hip and knee manual muscle test score by 1/2 grade for improved biomechanics with functional tasks. R hip flexion: 2-/5  L hip flexion: 2-/5  R hip extension: 2-/5  L hip extension: 2-/5  B hip abduction: trace  Bilateral hip adduction: trace  B knee extension: trace  B knee flexion: 2-/5   R hip flexion: 2+/5   L hip flexion: 2+/5  R hip extension: 2-/5  L hip extension: 2/5  R hip abduction: 2/5  L hip aduction: 2-/5  Bilateral hip adduction: 2-/5  B knee extension: 2/5  B knee flexion: 2-/5 Progressing   3. Patient will tolerate 15 minutes of B LE weightbearing in standing frame. Not performed on eval date 5 minutes Ongoing   4. Patient will perform slide board transfer with Min A to improve efficiency of transfers.  Upgrade: atient will perform slide board transfer with Mod I to improve efficiency of transfers. Not performed on eval date. Inefficient scoot transfer requiring Setup A Set up Assist and SBA Met 10/08/2020  Goal Upgraded    5. Patient will perform supine > sit transfer with Min A for improve household  independence Mod A Mod A Ongoing     Long Term Goal = 8 weeks Initial - 09/03/2020 10/08/2020 Progress Status   1. Patient will perform sit <> stand transfer with Max A for improved functional independence Max A to clear buttock Max A to clear buttock  Max A x 2 to stand Ongoing   2. Patient will tolerate 60 seconds of standing with bilateral UE support Unable DNT Ongoing   3. Patient will perform squat pivot transfer with Mod A for improved functional independence and decrease shearing force Not performed on eval date. Inefficient scoot transfer requiring Setup A DNT Ongoing         Plan     Continue gravity-eliminated strengthening and weightbearing (Skyla Walker, Standing Frame)     Jessica Mcpherson, PT

## 2020-10-13 ENCOUNTER — CLINICAL SUPPORT (OUTPATIENT)
Dept: REHABILITATION | Facility: HOSPITAL | Age: 65
End: 2020-10-13
Attending: OPHTHALMOLOGY
Payer: MEDICARE

## 2020-10-13 ENCOUNTER — DOCUMENTATION ONLY (OUTPATIENT)
Dept: REHABILITATION | Facility: HOSPITAL | Age: 65
End: 2020-10-13

## 2020-10-13 DIAGNOSIS — Z74.09 IMPAIRED FUNCTIONAL MOBILITY AND ENDURANCE: ICD-10-CM

## 2020-10-13 PROCEDURE — 97110 THERAPEUTIC EXERCISES: CPT | Mod: HCNC,PO

## 2020-10-13 PROCEDURE — 97530 THERAPEUTIC ACTIVITIES: CPT | Mod: HCNC,PO

## 2020-10-13 NOTE — PROGRESS NOTES
PT/PTA met face to face to discuss pt's treatment plan and progress towards established goals.  Continue with current PT POC with focus on gravity eliminated strengthening exercises, standing frame and activity tolerance.  Patient will be seen by physical therapist at least every sixth treatment or 30 days, whichever occurs first.    Monica Saeed, PTA  10/13/2020    Face to face meeting performed on 10/13/2020.  Jessica Mcpherson, PT  10/13/2020

## 2020-10-13 NOTE — PROGRESS NOTES
Physical Therapy Daily Treatment Note     Name: Nura Kahn Jr.  Clinic Number: [id number]    Therapy Diagnosis:   Encounter Diagnosis   Name Primary?    Impaired functional mobility and endurance      Physician: Socrates Ambrosio MD    Visit Date: 10/13/2020    Physician Orders: PT Eval and Treat   Medical Diagnosis from Referral: M33.20 (ICD-10-CM) - Polymyositis R26.81 (ICD-10-CM) - Gait instability   Evaluation Date: 9/3/2020  Insurance Authorization Period: 09/01/2020 - 10/01/2020   Plan of Care Expiration: 11/03/2020   Visit # / Visits authorized: 07/ 20 9 total     Time In: 9:20   Time Out: 10:05  Total Billable Time: 45 minutes     Precautions: Standard and Fall      Missed visits: 0  Cancelled visits: 2  Subjective     Pt reports: no complaints upon arrival.  HEP to be initiated as appropriate.  Response to previous treatment: No adverse effects reported   Functional change: Ongoing     Pain: 0/10  Location:  n/a     Objective     Nura received therapeutic exercises to develop strength for 10 minutes including:    Seated EOM:  Modified sit ups from blue wedge 2 x 10 reps  Scap squeezes 3 x 10 reps    Not performed this date:  Sidelying hip flexion <> extension 2 x 10 reps bilaterally (light resistance for 2nd set)  Sidelying neutral > hip extension 2 x 10 reps bilaterally (light resistance for 2nd set)  Sidelying hip flexor stretch 3 x 30s, bilaterally  Supine adduction 2 x 10 reps bilaterally with slide board and AAROM   Supine hip flex <> ext, AAROM 1 x 10 reps bilaterally with slide board       Nura participated in neuromuscular re-education activities to improve: Balance for 0 minutes. The following activities were included:      Nura participated in dynamic functional therapeutic activities to improve functional performance for 35 minutes, including:    Scooter <> mat Setup Assist, increased time to perform, slide board and SBA  Sit to supine with Mod A .  Mod A to manage B LE's  Supine to  sit with Mod A.  Mod A to elevate trunk.    Standing frame: with occasional cues to bring hips and shoulders fwd instead of leaning back onto harness   Trial 1: 5 minutes   Trial 2: 10 minutes    Nura participated in gait training to improve functional mobility and safety for 0  minutes:      Home Exercises Provided and Patient Education Provided     Education provided:   - POC, Proper technique with therapeutic interventions, Benefits/purpose of today's therapeutic interventions    Written Home Exercises Provided: no    Assessment   Nura tolerated today's session well, with increased spirits today. He tolerated a total of 15 minutes in the standing frame. He performed modified sit ups well. He achieved a fair scapular musculature contraction, but range was limited due to shoulder weakness. Therapy to continue per POC, emphasizing gravity eliminated strengthening, weightbearing, and trunk strengthening.     Nura is progressing well towards his goals.   Pt prognosis is Fair.     Pt will continue to benefit from skilled outpatient physical therapy to address the deficits listed in the problem list box on initial evaluation, provide pt/family education and to maximize pt's level of independence in the home and community environment.     Pt's spiritual, cultural and educational needs considered and pt agreeable to plan of care and goals.     Anticipated barriers to physical therapy: comorbidities, requires transportation assist via [employer] for transportation     Goals:  Short Term Goal = 4 weeks Initial -09/03/2020 10/08/2020 Progress Status   1. Patient will be independent with HEP emphasizing LE and trunk strengthening Requires education Requires education Ongoing   2. Patient will exhibit increased bilateral hip and knee manual muscle test score by 1/2 grade for improved biomechanics with functional tasks. R hip flexion: 2-/5  L hip flexion: 2-/5  R hip extension: 2-/5  L hip extension: 2-/5  B hip abduction:  trace  Bilateral hip adduction: trace  B knee extension: trace  B knee flexion: 2-/5   R hip flexion: 2+/5   L hip flexion: 2+/5  R hip extension: 2-/5  L hip extension: 2/5  R hip abduction: 2/5  L hip aduction: 2-/5  Bilateral hip adduction: 2-/5  B knee extension: 2/5  B knee flexion: 2-/5 Progressing   3. Patient will tolerate 15 minutes of B LE weightbearing in standing frame. Not performed on eval date 5 minutes Ongoing   4. Patient will perform slide board transfer with Min A to improve efficiency of transfers.  Upgrade: atient will perform slide board transfer with Mod I to improve efficiency of transfers. Not performed on eval date. Inefficient scoot transfer requiring Setup A Set up Assist and SBA Met 10/08/2020  Goal Upgraded    5. Patient will perform supine > sit transfer with Min A for improve household independence Mod A Mod A Ongoing     Long Term Goal = 8 weeks Initial - 09/03/2020 10/08/2020 Progress Status   1. Patient will perform sit <> stand transfer with Max A for improved functional independence Max A to clear buttock Max A to clear buttock  Max A x 2 to stand Ongoing   2. Patient will tolerate 60 seconds of standing with bilateral UE support Unable DNT Ongoing   3. Patient will perform squat pivot transfer with Mod A for improved functional independence and decrease shearing force Not performed on eval date. Inefficient scoot transfer requiring Setup A DNT Ongoing         Plan     Continue gravity-eliminated strengthening and weightbearing (Skyla Walker, Standing Frame)     Jessica Mcpherson PT

## 2020-10-14 ENCOUNTER — TELEPHONE (OUTPATIENT)
Dept: RHEUMATOLOGY | Facility: CLINIC | Age: 65
End: 2020-10-14

## 2020-10-14 NOTE — PROGRESS NOTES
Physical Therapy Daily Treatment Note     Name: Nura Kahn Jr.  Clinic Number: [id number]    Therapy Diagnosis:   Encounter Diagnosis   Name Primary?    Impaired functional mobility and endurance      Physician: Socrates Ambrosio MD    Visit Date: 10/15/2020    Physician Orders: PT Eval and Treat   Medical Diagnosis from Referral: M33.20 (ICD-10-CM) - Polymyositis R26.81 (ICD-10-CM) - Gait instability   Evaluation Date: 9/3/2020  Insurance Authorization Period: 09/01/2020 - 10/01/2020   Plan of Care Expiration: 11/03/2020   Visit # / Visits authorized: 08/ 20 10 total     Time In: 9:15   Time Out: 10:00   Total Billable Time: 45 minutes     Precautions: Standard and Fall      Missed visits: 0  Cancelled visits: 2  Subjective     Pt reports: no complaints upon arrival.   HEP to be initiated as appropriate.  Response to previous treatment: No adverse effects reported   Functional change: Ongoing     Pain: 0/10  Location:  n/a     Objective     Nura received therapeutic exercises to develop strength for 25 minutes including:    Seated EOM:  Modified sit ups from blue wedge 2 x 10 reps  Scap squeezes 3 x 10 reps  Rolling swissball fwd to end of range then back to upright x 2 minutes, SBA  Rolling swissball laterally to end of range then back to upright middle x 1 minute ea side, SBA  Bending fwd to place forearms on swissball then returning to upright sitting, x 4 reps, Mod A - Min A      Not performed this date:  Sidelying hip flexion <> extension 2 x 10 reps bilaterally (light resistance for 2nd set)  Sidelying neutral > hip extension 2 x 10 reps bilaterally (light resistance for 2nd set)  Sidelying hip flexor stretch 3 x 30s, bilaterally  Supine adduction 2 x 10 reps bilaterally with slide board and AAROM   Supine hip flex <> ext, AAROM 1 x 10 reps bilaterally with slide board       Nura participated in neuromuscular re-education activities to improve: Balance for 0 minutes. The following activities were  included:      Nura participated in dynamic functional therapeutic activities to improve functional performance for 20 minutes, including:    Scooter <> mat Setup Assist, increased time to perform, slide board and SBA  Sit to supine with Mod A .  Mod A to manage B LE's  Supine to sit with Mod A.  Mod A to elevate trunk.    Standing frame: with occasional cues to bring hips and shoulders fwd instead of leaning back onto harness   Trial 1: 10 minutes    Static standing    Scap squeezes    Glute sets    Quad sets    Nura participated in gait training to improve functional mobility and safety for 0  minutes:      Home Exercises Provided and Patient Education Provided     Education provided:   - POC, Proper technique with therapeutic interventions, Benefits/purpose of today's therapeutic interventions    Written Home Exercises Provided: no    Assessment   Nura tolerated today's session well. He was significantly challenged with transiting to and from elbows on swissball, demonstrating significant fearful response. He exhibited improved trunk control in standing frame and tolerated increased duration; he fatigued around 10 min requested to sit. Therapy to continue emphasizing trunk strengthening, LE strengthening, and weightbearing.     Nura is progressing well towards his goals.   Pt prognosis is Fair.     Pt will continue to benefit from skilled outpatient physical therapy to address the deficits listed in the problem list box on initial evaluation, provide pt/family education and to maximize pt's level of independence in the home and community environment.     Pt's spiritual, cultural and educational needs considered and pt agreeable to plan of care and goals.     Anticipated barriers to physical therapy: comorbidities, requires transportation assist via [employer] for transportation     Goals:  Short Term Goal = 4 weeks Initial -09/03/2020 10/08/2020 Progress Status   1. Patient will be independent with HEP  emphasizing LE and trunk strengthening Requires education Requires education Ongoing   2. Patient will exhibit increased bilateral hip and knee manual muscle test score by 1/2 grade for improved biomechanics with functional tasks. R hip flexion: 2-/5  L hip flexion: 2-/5  R hip extension: 2-/5  L hip extension: 2-/5  B hip abduction: trace  Bilateral hip adduction: trace  B knee extension: trace  B knee flexion: 2-/5   R hip flexion: 2+/5   L hip flexion: 2+/5  R hip extension: 2-/5  L hip extension: 2/5  R hip abduction: 2/5  L hip aduction: 2-/5  Bilateral hip adduction: 2-/5  B knee extension: 2/5  B knee flexion: 2-/5 Progressing   3. Patient will tolerate 15 minutes of B LE weightbearing in standing frame. Not performed on eval date 5 minutes Ongoing   4. Patient will perform slide board transfer with Min A to improve efficiency of transfers.  Upgrade: atient will perform slide board transfer with Mod I to improve efficiency of transfers. Not performed on eval date. Inefficient scoot transfer requiring Setup A Set up Assist and SBA Met 10/08/2020  Goal Upgraded    5. Patient will perform supine > sit transfer with Min A for improve household independence Mod A Mod A Ongoing     Long Term Goal = 8 weeks Initial - 09/03/2020 10/08/2020 Progress Status   1. Patient will perform sit <> stand transfer with Max A for improved functional independence Max A to clear buttock Max A to clear buttock  Max A x 2 to stand Ongoing   2. Patient will tolerate 60 seconds of standing with bilateral UE support Unable DNT Ongoing   3. Patient will perform squat pivot transfer with Mod A for improved functional independence and decrease shearing force Not performed on eval date. Inefficient scoot transfer requiring Setup A DNT Ongoing         Plan     Continue gravity-eliminated strengthening and weightbearing (Skyla Walker, Standing Frame)     Jessica Mcpherson PT

## 2020-10-14 NOTE — TELEPHONE ENCOUNTER
Called and spoke to the patient, He is doing well. He was just curious to when he can restart his infusions. We are in process to get it approved to get it done at [facility]. He states he is ready when it gets approved. He has been doing physical therapy in the meantime. He had no other complaints and no questions. I advised him we would reach out to him when it was all approved.    ROYER

## 2020-10-14 NOTE — PLAN OF CARE
Problem: Adult Inpatient Plan of Care  Goal: Plan of Care Review    Recommendations     Recommendation/Intervention:   1. Order Boost Plus to aid in nutrient intake.   2. Continue current mechanical soft diet and provide assistance with meal set-up/feeding as needed.   3. RD following.     Goals: Intake >/=85% EEN/EPN  Nutrition Goal Status: new           yes

## 2020-10-15 ENCOUNTER — CLINICAL SUPPORT (OUTPATIENT)
Dept: REHABILITATION | Facility: HOSPITAL | Age: 65
End: 2020-10-15
Payer: MEDICARE

## 2020-10-15 DIAGNOSIS — Z74.09 IMPAIRED FUNCTIONAL MOBILITY AND ENDURANCE: ICD-10-CM

## 2020-10-15 PROCEDURE — 97530 THERAPEUTIC ACTIVITIES: CPT | Mod: HCNC,PO

## 2020-10-15 PROCEDURE — 97110 THERAPEUTIC EXERCISES: CPT | Mod: HCNC,PO

## 2020-10-19 NOTE — PROGRESS NOTES
Physical Therapy Daily Treatment Note     Name: Nura Kahn Jr.  Clinic Number: [id number]    Therapy Diagnosis:   Encounter Diagnosis   Name Primary?    Impaired functional mobility and endurance      Physician: Socrates Ambrosio MD    Visit Date: 10/20/2020    Physician Orders: PT Eval and Treat   Medical Diagnosis from Referral: M33.20 (ICD-10-CM) - Polymyositis R26.81 (ICD-10-CM) - Gait instability   Evaluation Date: 9/3/2020  Insurance Authorization Period: 09/01/2020 - 10/01/2020   Plan of Care Expiration: 11/03/2020   Visit # / Visits authorized: 09/ 20 11 total     Time In: 9:30   Time Out: 10:15   Total Billable Time: 45 minutes     Precautions: Standard and Fall      Missed visits: 0  Cancelled visits: 2  Subjective     Pt reports: no complaints upon arrival. He is getting IvIG injections next week  HEP to be initiated as appropriate.  Response to previous treatment: No adverse effects reported   Functional change: Ongoing     Pain: 0/10  Location:  n/a     Objective     Nura received therapeutic exercises to develop strength for 30 minutes including:    Sidelying hip flexion <> extension 2 x 15 reps bilaterally   Sidelying neutral > hip extension 2 x 15 reps bilaterally   Supine adduction  x 10 reps bilaterally with slide board and AAROM   Supine knee extension<>flexion 1 x 10 reps bilaterally    Seated EOM: Not performed this date  Modified sit ups from blue wedge 2 x 10 reps  Scap squeezes 3 x 10 reps  Rolling swissball fwd to end of range then back to upright x 2 minutes, SBA  Rolling swissball laterally to end of range then back to upright middle x 1 minute ea side, SBA  Bending fwd to place forearms on swissball then returning to upright sitting, x 4 reps, Mod A - Min A    Nura participated in neuromuscular re-education activities to improve: Balance for 0 minutes. The following activities were included:      Nura participated in dynamic functional therapeutic activities to improve  functional performance for 15 minutes, including:    Scooter <> mat Setup Assist, increased time to perform, slide board and SBA  Sit to supine with Mod A .  Mod A to manage B LE's  Supine to sit with Mod A.  Mod A to elevate trunk.    Standing frame: with occasional cues to bring hips and shoulders fwd instead of leaning back onto harness   Trial 1: 5 minutes    Static standing     Nura participated in gait training to improve functional mobility and safety for 0  minutes:      Home Exercises Provided and Patient Education Provided     Education provided:   - POC, Proper technique with therapeutic interventions, Benefits/purpose of today's therapeutic interventions    Written Home Exercises Provided: no    Assessment   Nura tolerated today's session well. He exhibits improving LE strength, requiring less assistance with therapeutic interventions. He also exhibits improved trunk strength in standing frame. Therapy to continue per established POC.     Nura is progressing well towards his goals.   Pt prognosis is Fair.     Pt will continue to benefit from skilled outpatient physical therapy to address the deficits listed in the problem list box on initial evaluation, provide pt/family education and to maximize pt's level of independence in the home and community environment.     Pt's spiritual, cultural and educational needs considered and pt agreeable to plan of care and goals.     Anticipated barriers to physical therapy: comorbidities, requires transportation assist via [employer] for transportation     Goals:  Short Term Goal = 4 weeks Initial -09/03/2020 10/08/2020 Progress Status   1. Patient will be independent with HEP emphasizing LE and trunk strengthening Requires education Requires education Ongoing   2. Patient will exhibit increased bilateral hip and knee manual muscle test score by 1/2 grade for improved biomechanics with functional tasks. R hip flexion: 2-/5  L hip flexion: 2-/5  R hip extension:  2-/5  L hip extension: 2-/5  B hip abduction: trace  Bilateral hip adduction: trace  B knee extension: trace  B knee flexion: 2-/5   R hip flexion: 2+/5   L hip flexion: 2+/5  R hip extension: 2-/5  L hip extension: 2/5  R hip abduction: 2/5  L hip aduction: 2-/5  Bilateral hip adduction: 2-/5  B knee extension: 2/5  B knee flexion: 2-/5 Progressing   3. Patient will tolerate 15 minutes of B LE weightbearing in standing frame. Not performed on eval date 5 minutes Ongoing   4. Patient will perform slide board transfer with Min A to improve efficiency of transfers.  Upgrade: atient will perform slide board transfer with Mod I to improve efficiency of transfers. Not performed on eval date. Inefficient scoot transfer requiring Setup A Set up Assist and SBA Met 10/08/2020  Goal Upgraded    5. Patient will perform supine > sit transfer with Min A for improve household independence Mod A Mod A Ongoing     Long Term Goal = 8 weeks Initial - 09/03/2020 10/08/2020 Progress Status   1. Patient will perform sit <> stand transfer with Max A for improved functional independence Max A to clear buttock Max A to clear buttock  Max A x 2 to stand Ongoing   2. Patient will tolerate 60 seconds of standing with bilateral UE support Unable DNT Ongoing   3. Patient will perform squat pivot transfer with Mod A for improved functional independence and decrease shearing force Not performed on eval date. Inefficient scoot transfer requiring Setup A DNT Ongoing         Plan     Continue gravity-eliminated strengthening and weightbearing (Skyla Walker, Standing Frame)     Jessica Mcpherson PT

## 2020-10-20 ENCOUNTER — CLINICAL SUPPORT (OUTPATIENT)
Dept: REHABILITATION | Facility: HOSPITAL | Age: 65
End: 2020-10-20
Payer: MEDICARE

## 2020-10-20 DIAGNOSIS — Z74.09 IMPAIRED FUNCTIONAL MOBILITY AND ENDURANCE: ICD-10-CM

## 2020-10-20 PROCEDURE — 97530 THERAPEUTIC ACTIVITIES: CPT | Mod: HCNC,PO

## 2020-10-20 PROCEDURE — 97110 THERAPEUTIC EXERCISES: CPT | Mod: HCNC,PO

## 2020-10-21 NOTE — PROGRESS NOTES
Physical Therapy Daily Treatment Note     Name: Nura Kahn Jr.  Clinic Number: [id number]    Therapy Diagnosis:   Encounter Diagnosis   Name Primary?    Impaired functional mobility and endurance      Physician: Socrates Ambrosio MD    Visit Date: 10/22/2020    Physician Orders: PT Eval and Treat   Medical Diagnosis from Referral: M33.20 (ICD-10-CM) - Polymyositis R26.81 (ICD-10-CM) - Gait instability   Evaluation Date: 9/3/2020  Insurance Authorization Period: 09/01/2020 - 10/01/2020   Plan of Care Expiration: 11/03/2020   Visit # / Visits authorized: 10/ 20 12 total     Time In: 9:15   Time Out: 10:00   Total Billable Time: 45 minutes     Precautions: Standard and Fall      Missed visits: 0  Cancelled visits: 2  Subjective     Pt reports: no complaints upon arrival.   HEP to be initiated as appropriate.  Response to previous treatment: No adverse effects reported   Functional change: Ongoing     Pain: 0/10  Location:  n/a     Objective     Nura received therapeutic exercises to develop strength for 30 minutes including:    Seated EOM:   Scap squeezes 3 x 10 reps  Rolling swissball fwd to end of range then back to upright x 2 minutes, SBA  Rolling swissball laterally to end of range then back to upright middle x 1 minute ea side, SBA  Bending fwd to place forearms on swissball then returning to upright sitting, x 4 reps, Mod A - Min A  AAROM arm lifts to 90 degrees with trunk rotation to tap therapist's hand 3 x 8 reps    Not performed this date:  Modified sit ups from blue wedge 2 x 10 reps  Sidelying hip flexion <> extension 2 x 15 reps bilaterally   Sidelying neutral > hip extension 2 x 15 reps bilaterally   Supine adduction  x 10 reps bilaterally with slide board and AAROM   Supine knee extension<>flexion 1 x 10 reps bilaterally    Nura participated in neuromuscular re-education activities to improve: Balance for 0 minutes. The following activities were included:      Nura participated in dynamic  functional therapeutic activities to improve functional performance for 15 minutes, including:    Scooter <> mat Setup Assist, increased time to perform, slide board and SBA  Sit to supine with Mod A .  Mod A to manage B LE's  Supine to sit with Mod A.  Mod A to elevate trunk.    Standing frame: with occasional cues to bring hips and shoulders fwd instead of leaning back onto harness   Trial 1: 7 minutes    Static standing      Glute sets    Quad sets    Scap squeezes    BUE lifts, Min A    Nura participated in gait training to improve functional mobility and safety for 0  minutes:      Home Exercises Provided and Patient Education Provided     Education provided:   - POC, Proper technique with therapeutic interventions, Benefits/purpose of today's therapeutic interventions    Written Home Exercises Provided: no    Assessment   Nura tolerated today's session well. He exhibited less reluctance with trunk strengthening exercises at the EOM today. He initially had difficulty with standing quad sets in the standing frame, but did perform several visible contractions. Therapy to continue per established POC.    Nura is progressing well towards his goals.   Pt prognosis is Fair.     Pt will continue to benefit from skilled outpatient physical therapy to address the deficits listed in the problem list box on initial evaluation, provide pt/family education and to maximize pt's level of independence in the home and community environment.     Pt's spiritual, cultural and educational needs considered and pt agreeable to plan of care and goals.     Anticipated barriers to physical therapy: comorbidities, requires transportation assist via [employer] for transportation     Goals:  Short Term Goal = 4 weeks Initial -09/03/2020 10/08/2020 Progress Status   1. Patient will be independent with HEP emphasizing LE and trunk strengthening Requires education Requires education Ongoing   2. Patient will exhibit increased bilateral  hip and knee manual muscle test score by 1/2 grade for improved biomechanics with functional tasks. R hip flexion: 2-/5  L hip flexion: 2-/5  R hip extension: 2-/5  L hip extension: 2-/5  B hip abduction: trace  Bilateral hip adduction: trace  B knee extension: trace  B knee flexion: 2-/5   R hip flexion: 2+/5   L hip flexion: 2+/5  R hip extension: 2-/5  L hip extension: 2/5  R hip abduction: 2/5  L hip aduction: 2-/5  Bilateral hip adduction: 2-/5  B knee extension: 2/5  B knee flexion: 2-/5 Progressing   3. Patient will tolerate 15 minutes of B LE weightbearing in standing frame. Not performed on eval date 5 minutes Ongoing   4. Patient will perform slide board transfer with Min A to improve efficiency of transfers.  Upgrade: atient will perform slide board transfer with Mod I to improve efficiency of transfers. Not performed on eval date. Inefficient scoot transfer requiring Setup A Set up Assist and SBA Met 10/08/2020  Goal Upgraded    5. Patient will perform supine > sit transfer with Min A for improve household independence Mod A Mod A Ongoing     Long Term Goal = 8 weeks Initial - 09/03/2020 10/08/2020 Progress Status   1. Patient will perform sit <> stand transfer with Max A for improved functional independence Max A to clear buttock Max A to clear buttock  Max A x 2 to stand Ongoing   2. Patient will tolerate 60 seconds of standing with bilateral UE support Unable DNT Ongoing   3. Patient will perform squat pivot transfer with Mod A for improved functional independence and decrease shearing force Not performed on eval date. Inefficient scoot transfer requiring Setup A DNT Ongoing         Plan   Continue standing frame. Alternate LE strengthening and trunk strengthening.     Jessica Mcpherson, PT

## 2020-10-22 ENCOUNTER — CLINICAL SUPPORT (OUTPATIENT)
Dept: REHABILITATION | Facility: HOSPITAL | Age: 65
End: 2020-10-22
Payer: MEDICARE

## 2020-10-22 DIAGNOSIS — Z74.09 IMPAIRED FUNCTIONAL MOBILITY AND ENDURANCE: ICD-10-CM

## 2020-10-22 PROCEDURE — 97110 THERAPEUTIC EXERCISES: CPT | Mod: HCNC,PO

## 2020-10-22 PROCEDURE — 97530 THERAPEUTIC ACTIVITIES: CPT | Mod: HCNC,PO

## 2020-10-23 ENCOUNTER — TELEPHONE (OUTPATIENT)
Dept: INFECTIOUS DISEASES | Facility: HOSPITAL | Age: 65
End: 2020-10-23

## 2020-10-26 ENCOUNTER — INFUSION (OUTPATIENT)
Dept: INFECTIOUS DISEASES | Facility: HOSPITAL | Age: 65
End: 2020-10-26
Attending: INTERNAL MEDICINE
Payer: MEDICARE

## 2020-10-26 VITALS
TEMPERATURE: 98 F | HEART RATE: 79 BPM | OXYGEN SATURATION: 100 % | DIASTOLIC BLOOD PRESSURE: 85 MMHG | SYSTOLIC BLOOD PRESSURE: 177 MMHG | HEIGHT: 67 IN | RESPIRATION RATE: 18 BRPM | BODY MASS INDEX: 23.84 KG/M2 | WEIGHT: 151.88 LBS

## 2020-10-26 DIAGNOSIS — M33.20 POLYMYOSITIS: Primary | ICD-10-CM

## 2020-10-26 PROCEDURE — 96365 THER/PROPH/DIAG IV INF INIT: CPT | Mod: HCNC

## 2020-10-26 PROCEDURE — 63600175 PHARM REV CODE 636 W HCPCS: Mod: JG,HCNC

## 2020-10-26 PROCEDURE — 96367 TX/PROPH/DG ADDL SEQ IV INF: CPT | Mod: HCNC

## 2020-10-26 PROCEDURE — 96366 THER/PROPH/DIAG IV INF ADDON: CPT | Mod: HCNC

## 2020-10-26 PROCEDURE — 25000003 PHARM REV CODE 250: Mod: HCNC

## 2020-10-26 RX ORDER — HEPARIN 100 UNIT/ML
500 SYRINGE INTRAVENOUS
Status: CANCELLED | OUTPATIENT
Start: 2020-10-26

## 2020-10-26 RX ORDER — HEPARIN 100 UNIT/ML
500 SYRINGE INTRAVENOUS
Status: DISCONTINUED | OUTPATIENT
Start: 2020-10-26 | End: 2020-10-26 | Stop reason: HOSPADM

## 2020-10-26 RX ORDER — SODIUM CHLORIDE 0.9 % (FLUSH) 0.9 %
10 SYRINGE (ML) INJECTION
Status: CANCELLED | OUTPATIENT
Start: 2020-10-26

## 2020-10-26 RX ORDER — SODIUM CHLORIDE 0.9 % (FLUSH) 0.9 %
10 SYRINGE (ML) INJECTION
Status: DISCONTINUED | OUTPATIENT
Start: 2020-10-26 | End: 2020-10-26 | Stop reason: HOSPADM

## 2020-10-26 RX ADMIN — SODIUM CHLORIDE: 0.9 INJECTION, SOLUTION INTRAVENOUS at 08:10

## 2020-10-26 RX ADMIN — DIPHENHYDRAMINE HYDROCHLORIDE 25 MG: 50 INJECTION INTRAMUSCULAR; INTRAVENOUS at 08:10

## 2020-10-26 RX ADMIN — HUMAN IMMUNOGLOBULIN G 70 G: 40 LIQUID INTRAVENOUS at 09:10

## 2020-10-26 NOTE — PROGRESS NOTES
Physical Therapy Daily Treatment Note     Name: Nura Kahn Jr.  Clinic Number: [id number]    Therapy Diagnosis:   No diagnosis found.  Physician: Socrates Ambrosio MD    Visit Date: 10/27/2020    Physician Orders: PT Eval and Treat   Medical Diagnosis from Referral: M33.20 (ICD-10-CM) - Polymyositis R26.81 (ICD-10-CM) - Gait instability   Evaluation Date: 9/3/2020  Insurance Authorization Period: 09/01/2020 - 10/01/2020   Plan of Care Expiration: 11/03/2020   Visit # / Visits authorized: 11/ 20 13 total     Time In: 9:30   Time Out: 10:15    Total Billable Time: 45 minutes     Precautions: Standard and Fall      Missed visits: 0  Cancelled visits: 2  Subjective     Pt reports: no complaints upon arrival. He had IVIg infusion yesterday and has another appointment tomorrow.  HEP to be initiated as appropriate.  Response to previous treatment: No adverse effects reported   Functional change: Ongoing     Pain: 0/10  Location:  n/a     Objective     Nura received therapeutic exercises to develop strength for 25 minutes including:    Sidelying hip flexion <> extension 3 x 15 reps bilaterally   Sidelying neutral > hip extension 3 x 15 reps bilaterally   Supine glute sets x 10 reps with 5 sec hold  Supine quad sets x 10 reps with 5 sec hold    Seated EOM: Not performed  Scap squeezes 3 x 10 reps  Rolling swissball fwd to end of range then back to upright x 2 minutes, SBA  Rolling swissball laterally to end of range then back to upright middle x 1 minute ea side, SBA  Bending fwd to place forearms on swissball then returning to upright sitting, x 4 reps, Mod A - Min A  AAROM arm lifts to 90 degrees with trunk rotation to tap therapist's hand 3 x 8 reps  Modified sit ups from blue wedge 2 x 10 reps    Nura participated in neuromuscular re-education activities to improve: Balance for 0 minutes. The following activities were included:      Nura participated in dynamic functional therapeutic activities to improve  functional performance for 20 minutes, including:    Scooter <> mat Setup Assist, increased time to perform, slide board and SBA  Sit to supine with Mod A .  Mod A to manage B LE's  Supine to sit with Mod A.  Mod A to elevate trunk.    Standing frame: with occasional cues to bring hips and shoulders fwd instead of leaning back onto harness   Trial 1: 12 minutes    Static standing      Glute sets    Quad sets    Scap squeezes    BUE lifts, Min A    Nura participated in gait training to improve functional mobility and safety for 0  minutes:      Home Exercises Provided and Patient Education Provided     Education provided:   - POC, Proper technique with therapeutic interventions, Benefits/purpose of today's therapeutic interventions    Written Home Exercises Provided: no    Assessment   Nura tolerated today's session well, with increased reps performed with LE strengthening. Therapy to attempt to progress LE strengthening interventions to include slight manual resistance with gravity eliminated tasks and AA gravity resisted interventions.     Nura is progressing well towards his goals.   Pt prognosis is Fair.     Pt will continue to benefit from skilled outpatient physical therapy to address the deficits listed in the problem list box on initial evaluation, provide pt/family education and to maximize pt's level of independence in the home and community environment.     Pt's spiritual, cultural and educational needs considered and pt agreeable to plan of care and goals.     Anticipated barriers to physical therapy: comorbidities, requires transportation assist via [employer] for transportation     Goals:  Short Term Goal = 4 weeks Initial -09/03/2020 10/08/2020 Progress Status   1. Patient will be independent with HEP emphasizing LE and trunk strengthening Requires education Requires education Ongoing   2. Patient will exhibit increased bilateral hip and knee manual muscle test score by 1/2 grade for improved  biomechanics with functional tasks. R hip flexion: 2-/5  L hip flexion: 2-/5  R hip extension: 2-/5  L hip extension: 2-/5  B hip abduction: trace  Bilateral hip adduction: trace  B knee extension: trace  B knee flexion: 2-/5   R hip flexion: 2+/5   L hip flexion: 2+/5  R hip extension: 2-/5  L hip extension: 2/5  R hip abduction: 2/5  L hip aduction: 2-/5  Bilateral hip adduction: 2-/5  B knee extension: 2/5  B knee flexion: 2-/5 Progressing   3. Patient will tolerate 15 minutes of B LE weightbearing in standing frame. Not performed on eval date 5 minutes Ongoing   4. Patient will perform slide board transfer with Min A to improve efficiency of transfers.  Upgrade: atient will perform slide board transfer with Mod I to improve efficiency of transfers. Not performed on eval date. Inefficient scoot transfer requiring Setup A Set up Assist and SBA Met 10/08/2020  Goal Upgraded    5. Patient will perform supine > sit transfer with Min A for improve household independence Mod A Mod A Ongoing     Long Term Goal = 8 weeks Initial - 09/03/2020 10/08/2020 Progress Status   1. Patient will perform sit <> stand transfer with Max A for improved functional independence Max A to clear buttock Max A to clear buttock  Max A x 2 to stand Ongoing   2. Patient will tolerate 60 seconds of standing with bilateral UE support Unable DNT Ongoing   3. Patient will perform squat pivot transfer with Mod A for improved functional independence and decrease shearing force Not performed on eval date. Inefficient scoot transfer requiring Setup A DNT Ongoing         Plan   Continue standing frame. Alternate LE strengthening and trunk strengthening.     Jessica Mcpherson, PT

## 2020-10-26 NOTE — PROGRESS NOTES
Patient arrived for Privigen 70 GM 1/2. Benadryl 25 mg IVPB given 30 minutes prior to infusion.  NS @ 75 ml ran concurrently with IVIG.  Privigen initiated at 21 ml/hr and increased every 15 minutes per  protocol to a max rate of 231 ml/hr.  Patient tolerated well and left in NAD.

## 2020-10-27 ENCOUNTER — CLINICAL SUPPORT (OUTPATIENT)
Dept: REHABILITATION | Facility: HOSPITAL | Age: 65
End: 2020-10-27
Payer: MEDICARE

## 2020-10-27 DIAGNOSIS — Z74.09 IMPAIRED FUNCTIONAL MOBILITY AND ENDURANCE: ICD-10-CM

## 2020-10-27 PROCEDURE — 97110 THERAPEUTIC EXERCISES: CPT | Mod: HCNC,PO

## 2020-10-27 PROCEDURE — 97530 THERAPEUTIC ACTIVITIES: CPT | Mod: HCNC,PO

## 2020-10-30 DIAGNOSIS — R05.9 COUGH: ICD-10-CM

## 2020-10-30 RX ORDER — ALBUTEROL SULFATE 90 UG/1
2 AEROSOL, METERED RESPIRATORY (INHALATION) EVERY 6 HOURS PRN
Qty: 18 G | Refills: 11 | Status: SHIPPED | OUTPATIENT
Start: 2020-10-30 | End: 2022-03-02 | Stop reason: SDUPTHER

## 2020-10-31 ENCOUNTER — EXTERNAL CHRONIC CARE MANAGEMENT (OUTPATIENT)
Dept: PRIMARY CARE CLINIC | Facility: CLINIC | Age: 65
End: 2020-10-31
Payer: MEDICARE

## 2020-10-31 PROCEDURE — 99489 PR COMPLX CHRON CARE MGMT, EA ADDTL 30 MIN, PER MONTH: ICD-10-PCS | Mod: S$GLB,,, | Performed by: FAMILY MEDICINE

## 2020-10-31 PROCEDURE — 99487 CPLX CHRNC CARE 1ST 60 MIN: CPT | Mod: S$GLB,,, | Performed by: FAMILY MEDICINE

## 2020-10-31 PROCEDURE — 99487 PR COMPLX CHRON CARE MGMT, 1ST HR, PER MONTH: ICD-10-PCS | Mod: S$GLB,,, | Performed by: FAMILY MEDICINE

## 2020-10-31 PROCEDURE — 99489 CPLX CHRNC CARE EA ADDL 30: CPT | Mod: S$GLB,,, | Performed by: FAMILY MEDICINE

## 2020-11-02 ENCOUNTER — INFUSION (OUTPATIENT)
Dept: INFECTIOUS DISEASES | Facility: HOSPITAL | Age: 65
End: 2020-11-02
Payer: MEDICARE

## 2020-11-02 VITALS
RESPIRATION RATE: 20 BRPM | SYSTOLIC BLOOD PRESSURE: 173 MMHG | HEART RATE: 76 BPM | BODY MASS INDEX: 23.84 KG/M2 | HEIGHT: 67 IN | WEIGHT: 151.88 LBS | DIASTOLIC BLOOD PRESSURE: 90 MMHG | OXYGEN SATURATION: 100 % | TEMPERATURE: 99 F

## 2020-11-02 DIAGNOSIS — M33.20 POLYMYOSITIS: Primary | ICD-10-CM

## 2020-11-02 PROCEDURE — 63600175 PHARM REV CODE 636 W HCPCS: Mod: HCNC

## 2020-11-02 PROCEDURE — 96367 TX/PROPH/DG ADDL SEQ IV INF: CPT | Mod: HCNC

## 2020-11-02 PROCEDURE — 96366 THER/PROPH/DIAG IV INF ADDON: CPT | Mod: HCNC

## 2020-11-02 PROCEDURE — 96365 THER/PROPH/DIAG IV INF INIT: CPT | Mod: HCNC

## 2020-11-02 PROCEDURE — 25000003 PHARM REV CODE 250: Mod: HCNC

## 2020-11-02 RX ORDER — HEPARIN 100 UNIT/ML
500 SYRINGE INTRAVENOUS
Status: DISCONTINUED | OUTPATIENT
Start: 2020-11-02 | End: 2020-11-02 | Stop reason: HOSPADM

## 2020-11-02 RX ORDER — HEPARIN 100 UNIT/ML
500 SYRINGE INTRAVENOUS
Status: CANCELLED | OUTPATIENT
Start: 2020-11-02

## 2020-11-02 RX ORDER — SODIUM CHLORIDE 0.9 % (FLUSH) 0.9 %
10 SYRINGE (ML) INJECTION
Status: CANCELLED | OUTPATIENT
Start: 2020-11-02

## 2020-11-02 RX ORDER — SODIUM CHLORIDE 0.9 % (FLUSH) 0.9 %
10 SYRINGE (ML) INJECTION
Status: DISCONTINUED | OUTPATIENT
Start: 2020-11-02 | End: 2020-11-02 | Stop reason: HOSPADM

## 2020-11-02 RX ADMIN — SODIUM CHLORIDE: 0.9 INJECTION, SOLUTION INTRAVENOUS at 09:11

## 2020-11-02 RX ADMIN — DIPHENHYDRAMINE HYDROCHLORIDE 25 MG: 50 INJECTION INTRAMUSCULAR; INTRAVENOUS at 09:11

## 2020-11-02 RX ADMIN — HUMAN IMMUNOGLOBULIN G 70 G: 40 LIQUID INTRAVENOUS at 09:11

## 2020-11-03 ENCOUNTER — CLINICAL SUPPORT (OUTPATIENT)
Dept: REHABILITATION | Facility: HOSPITAL | Age: 65
End: 2020-11-03
Payer: MEDICARE

## 2020-11-03 ENCOUNTER — TELEPHONE (OUTPATIENT)
Dept: RHEUMATOLOGY | Facility: HOSPITAL | Age: 65
End: 2020-11-03

## 2020-11-03 DIAGNOSIS — Z74.09 IMPAIRED FUNCTIONAL MOBILITY AND ENDURANCE: ICD-10-CM

## 2020-11-03 PROCEDURE — 97530 THERAPEUTIC ACTIVITIES: CPT | Mod: HCNC,PO

## 2020-11-03 PROCEDURE — 97110 THERAPEUTIC EXERCISES: CPT | Mod: HCNC,PO

## 2020-11-03 NOTE — PROGRESS NOTES
Physical Therapy Daily Treatment Note     Name: Nura Kahn Jr.  Clinic Number: [id number]    Therapy Diagnosis:   Encounter Diagnosis   Name Primary?    Impaired functional mobility and endurance      Physician: Socrates Ambrosio MD    Visit Date: 11/3/2020    Physician Orders: PT Eval and Treat   Medical Diagnosis from Referral: M33.20 (ICD-10-CM) - Polymyositis R26.81 (ICD-10-CM) - Gait instability   Evaluation Date: 9/3/2020  Insurance Authorization Period: 09/01/2020 - 10/01/2020   Plan of Care Expiration: 11/03/2020   Updated POC Expiration: 01/03/2020   Visit # / Visits authorized: 12/ 20 14 total     Time In: 9:12   Time Out: 10:12    Total Billable Time: 60 minutes     Precautions: Standard and Fall      Missed visits: 0  Cancelled visits: 2  Subjective     Pt reports: no complaints upon arrival. He continues to have difficulty with transfers at the infusion clinic.   HEP to be initiated as appropriate.  Response to previous treatment: No adverse effects reported   Functional change: Ongoing     Pain: 0/10  Location:  n/a     Objective     Nura received therapeutic exercises to develop strength for 15 minutes including:    Lower Extremity Strength  Right LE Evaluation 09/03/2020 10/08/2020 11/03/2020 Left LE Evaluation 09/03/2020 10/08/2020 11/03/2020   Hip Flexion: 2/5 2+/5 2+/5 Hip Flexion: 2-/5 2+/5 2+/5   Hip Extension:  2-/5 2-/5 2/5 Hip Extension: 2-/5 2/5 2/5   Hip Abduction: Trace 2/5 2/5 Hip Abduction: trace 2-/5 2/5   Hip Adduction: trace 2-/5 2-/5 Hip Adduction trace 2-/5 2/5   Knee Extension: trace 2/5 2/5 Knee Extension: trace 2/5 2/5   Knee Flexion: 2-/5 2-/5 2-/5 Knee Flexion: 2-/5 2-/5 2/5   Ankle Dorsiflexion: 3-/5 3-/5 3-/5 Ankle Dorsiflexion: 3-/5 3-/5 3-/5   Ankle Plantarflexion: 3/5 3+/5 3+/5 Ankle Plantarflexion: 3/5 3+/5 3+/5         Nura participated in neuromuscular re-education activities to improve: Balance for 0 minutes. The following activities were  included:      Nura participated in dynamic functional therapeutic activities to improve functional performance for 45 minutes, including:    Standing trial in // bars: Max A x 2 to stabilize hips, knees, and trunk   2 trials x ~20 seconds ea    Scooter <> mat Setup Assist, increased time to perform, slide board and SBA  Sit to supine with Mod A .  Mod A to manage B LE's  Supine to sit with Mod A.  Mod A to elevate trunk.     Standing frame: with occasional cues to bring hips and shoulders fwd instead of leaning back onto harness   Trial 1: 15 minutes    Static standing      Glute sets    Quad sets    Scap squeezes    Nura participated in gait training to improve functional mobility and safety for 0  minutes:      Home Exercises Provided and Patient Education Provided     Education provided:   - POC, Proper technique with therapeutic interventions, Benefits/purpose of today's therapeutic interventions    Written Home Exercises Provided: no    Assessment   Reassessment period: 10/08/2020-11/3/2020. Pt reassessed for progress today, exhibiting good progress. With MMT, he exhibits improved L LE hip and knee strength. He tolerates increased duration in standing frame up to 15 minutes without excessive fatigue. Standing in //bars was attempted today. He required Max A x 2 to achieve standing as well as Max A x 2 for B hip, B knee, and trunk stabilization to maintain standing position. He exhibits improved independence with slide board transfer to <> from scooter and plinth, but requires Max A for transfers to <> from low heights or more compliant surfaces. He reports difficulty with toilet transfers and transfers at his infusion clinics. At this time, he is progressing appropriately and will benefit from continued PT x 8 additional weeks to further progress LE strength and functional mobility.    Nura is progressing well towards his goals.   Pt prognosis is Fair.     Pt will continue to benefit from skilled  outpatient physical therapy to address the deficits listed in the problem list box on initial evaluation, provide pt/family education and to maximize pt's level of independence in the home and community environment.     Pt's spiritual, cultural and educational needs considered and pt agreeable to plan of care and goals.     Anticipated barriers to physical therapy: comorbidities, requires transportation assist via [employer] for transportation     Goals:  Short Term Goal = 4 weeks Initial -09/03/2020 10/08/2020 11/03/2020 Progress Status   1. Patient will be independent with HEP emphasizing LE and trunk strengthening Requires education Requires education Initiated; to be updated as appropriate Ongoing   2. Patient will exhibit increased bilateral hip and knee manual muscle test score by 1/2 grade for improved biomechanics with functional tasks. R hip flexion: 2-/5  L hip flexion: 2-/5  R hip extension: 2-/5  L hip extension: 2-/5  B hip abduction: trace  Bilateral hip adduction: trace  B knee extension: trace  B knee flexion: 2-/5   R hip flexion: 2+/5   L hip flexion: 2+/5  R hip extension: 2-/5  L hip extension: 2/5  R hip abduction: 2/5  L hip aduction: 2-/5  Bilateral hip adduction: 2-/5  B knee extension: 2/5  B knee flexion: 2-/5 R hip flexion: 2+/5   L hip flexion: 2+/5  R hip extension: 2/5  L hip extension: 2/5  R hip abduction: 2/5  L hip aduction: 2/5  R hip adduction: 2-/5  L hip adduction: 2/5  B knee extension: 2/5  R knee flexion: 2-/5  L knee flexion: 2/5 Progressing   3. Patient will tolerate 15 minutes of B LE weightbearing in standing frame. Not performed on eval date 5 minutes 15 minutes with signs and reports of fatigue Progressing   4. Patient will perform slide board transfer with Min A to improve efficiency of transfers.  Upgrade: Patient will perform slide board transfer with Mod I to improve efficiency of transfers. Not performed on eval date. Inefficient scoot transfer requiring Setup A Set  up Assist and SBA Set up Assist and SBA Met 10/08/2020  Goal Upgraded     Upgraded goal - Ongoing   5. Patient will perform supine > sit transfer with Min A for improve household independence Mod A Mod A Mod A Ongoing     Long Term Goal = 8 weeks Initial - 09/03/2020 10/08/2020 11/03/2020 Progress Status   1. Patient will perform sit <> stand transfer with Max A for improved functional independence Max A to clear buttock Max A to clear buttock  Max A x 2 to stand Max A x 2 in //bars to stabilize trunk, B hips, and B kness Ongoing   2. Patient will tolerate 60 seconds of standing with bilateral UE support Unable DNT ~20 seconds Ongoing   3. Patient will perform squat pivot transfer with Mod A for improved functional independence and decrease shearing force Not performed on eval date. Inefficient scoot transfer requiring Setup A DNT Max A x 2 Ongoing         Plan   Updated certification dates: 11/3/2020-01/03/2020.      Continue standing frame. Alternate LE strengthening and trunk strengthening.     Jessica Mcpherson, PT

## 2020-11-03 NOTE — PLAN OF CARE
Physical Therapy Daily Treatment Note     Name: Nura Kahn Jr.  Clinic Number: [id number]    Therapy Diagnosis:   Encounter Diagnosis   Name Primary?    Impaired functional mobility and endurance      Physician: Socrates Ambrosio MD    Visit Date: 11/3/2020    Physician Orders: PT Eval and Treat   Medical Diagnosis from Referral: M33.20 (ICD-10-CM) - Polymyositis R26.81 (ICD-10-CM) - Gait instability   Evaluation Date: 9/3/2020  Insurance Authorization Period: 09/01/2020 - 10/01/2020   Plan of Care Expiration: 11/03/2020   Updated POC Expiration: 01/03/2020   Visit # / Visits authorized: 12/ 20 14 total     Time In: 9:12   Time Out: 10:12    Total Billable Time: 60 minutes     Precautions: Standard and Fall      Missed visits: 0  Cancelled visits: 2  Subjective     Pt reports: no complaints upon arrival. He continues to have difficulty with transfers at the infusion clinic.   HEP to be initiated as appropriate.  Response to previous treatment: No adverse effects reported   Functional change: Ongoing     Pain: 0/10  Location:  n/a     Objective     Nura received therapeutic exercises to develop strength for 15 minutes including:    Lower Extremity Strength  Right LE Evaluation 09/03/2020 10/08/2020 11/03/2020 Left LE Evaluation 09/03/2020 10/08/2020 11/03/2020   Hip Flexion: 2/5 2+/5 2+/5 Hip Flexion: 2-/5 2+/5 2+/5   Hip Extension:  2-/5 2-/5 2/5 Hip Extension: 2-/5 2/5 2/5   Hip Abduction: Trace 2/5 2/5 Hip Abduction: trace 2-/5 2/5   Hip Adduction: trace 2-/5 2-/5 Hip Adduction trace 2-/5 2/5   Knee Extension: trace 2/5 2/5 Knee Extension: trace 2/5 2/5   Knee Flexion: 2-/5 2-/5 2-/5 Knee Flexion: 2-/5 2-/5 2/5   Ankle Dorsiflexion: 3-/5 3-/5 3-/5 Ankle Dorsiflexion: 3-/5 3-/5 3-/5   Ankle Plantarflexion: 3/5 3+/5 3+/5 Ankle Plantarflexion: 3/5 3+/5 3+/5         Nura participated in neuromuscular re-education activities to improve: Balance for 0 minutes. The following activities were  included:      Nura participated in dynamic functional therapeutic activities to improve functional performance for 45 minutes, including:    Standing trial in // bars: Max A x 2 to stabilize hips, knees, and trunk   2 trials x ~20 seconds ea    Scooter <> mat Setup Assist, increased time to perform, slide board and SBA  Sit to supine with Mod A .  Mod A to manage B LE's  Supine to sit with Mod A.  Mod A to elevate trunk.     Standing frame: with occasional cues to bring hips and shoulders fwd instead of leaning back onto harness   Trial 1: 15 minutes    Static standing      Glute sets    Quad sets    Scap squeezes    Nura participated in gait training to improve functional mobility and safety for 0  minutes:      Home Exercises Provided and Patient Education Provided     Education provided:   - POC, Proper technique with therapeutic interventions, Benefits/purpose of today's therapeutic interventions    Written Home Exercises Provided: no    Assessment   Reassessment period: 10/08/2020-11/3/2020. Pt reassessed for progress today, exhibiting good progress. With MMT, he exhibits improved L LE hip and knee strength. He tolerates increased duration in standing frame up to 15 minutes without excessive fatigue. Standing in //bars was attempted today. He required Max A x 2 to achieve standing as well as Max A x 2 for B hip, B knee, and trunk stabilization to maintain standing position. He exhibits improved independence with slide board transfer to <> from scooter and plinth, but requires Max A for transfers to <> from low heights or more compliant surfaces. He reports difficulty with toilet transfers and transfers at his infusion clinics. At this time, he is progressing appropriately and will benefit from continued PT x 8 additional weeks to further progress LE strength and functional mobility.    Nura is progressing well towards his goals.   Pt prognosis is Fair.     Pt will continue to benefit from skilled  outpatient physical therapy to address the deficits listed in the problem list box on initial evaluation, provide pt/family education and to maximize pt's level of independence in the home and community environment.     Pt's spiritual, cultural and educational needs considered and pt agreeable to plan of care and goals.     Anticipated barriers to physical therapy: comorbidities, requires transportation assist via [employer] for transportation     Goals:  Short Term Goal = 4 weeks Initial -09/03/2020 10/08/2020 11/03/2020 Progress Status   1. Patient will be independent with HEP emphasizing LE and trunk strengthening Requires education Requires education Initiated; to be updated as appropriate Ongoing   2. Patient will exhibit increased bilateral hip and knee manual muscle test score by 1/2 grade for improved biomechanics with functional tasks. R hip flexion: 2-/5  L hip flexion: 2-/5  R hip extension: 2-/5  L hip extension: 2-/5  B hip abduction: trace  Bilateral hip adduction: trace  B knee extension: trace  B knee flexion: 2-/5   R hip flexion: 2+/5   L hip flexion: 2+/5  R hip extension: 2-/5  L hip extension: 2/5  R hip abduction: 2/5  L hip aduction: 2-/5  Bilateral hip adduction: 2-/5  B knee extension: 2/5  B knee flexion: 2-/5 R hip flexion: 2+/5   L hip flexion: 2+/5  R hip extension: 2/5  L hip extension: 2/5  R hip abduction: 2/5  L hip aduction: 2/5  R hip adduction: 2-/5  L hip adduction: 2/5  B knee extension: 2/5  R knee flexion: 2-/5  L knee flexion: 2/5 Progressing   3. Patient will tolerate 15 minutes of B LE weightbearing in standing frame. Not performed on eval date 5 minutes 15 minutes with signs and reports of fatigue Progressing   4. Patient will perform slide board transfer with Min A to improve efficiency of transfers.  Upgrade: Patient will perform slide board transfer with Mod I to improve efficiency of transfers. Not performed on eval date. Inefficient scoot transfer requiring Setup A Set  up Assist and SBA Set up Assist and SBA Met 10/08/2020  Goal Upgraded     Upgraded goal - Ongoing   5. Patient will perform supine > sit transfer with Min A for improve household independence Mod A Mod A Mod A Ongoing     Long Term Goal = 8 weeks Initial - 09/03/2020 10/08/2020 11/03/2020 Progress Status   1. Patient will perform sit <> stand transfer with Max A for improved functional independence Max A to clear buttock Max A to clear buttock  Max A x 2 to stand Max A x 2 in //bars to stabilize trunk, B hips, and B kness Ongoing   2. Patient will tolerate 60 seconds of standing with bilateral UE support Unable DNT ~20 seconds Ongoing   3. Patient will perform squat pivot transfer with Mod A for improved functional independence and decrease shearing force Not performed on eval date. Inefficient scoot transfer requiring Setup A DNT Max A x 2 Ongoing         Plan   Updated certification dates: 11/3/2020-01/03/2020.      Continue standing frame. Alternate LE strengthening and trunk strengthening.     Jessica Mcpherson, PT

## 2020-11-05 ENCOUNTER — PATIENT MESSAGE (OUTPATIENT)
Dept: PHARMACY | Facility: CLINIC | Age: 65
End: 2020-11-05

## 2020-11-10 ENCOUNTER — TELEPHONE (OUTPATIENT)
Dept: INTERNAL MEDICINE | Facility: CLINIC | Age: 65
End: 2020-11-10

## 2020-11-10 DIAGNOSIS — M33.20 POLYMYOSITIS: Chronic | ICD-10-CM

## 2020-11-10 DIAGNOSIS — Z59.19 DIRTY LIVING CONDITIONS: Primary | ICD-10-CM

## 2020-11-10 DIAGNOSIS — Z74.09 IMPAIRED FUNCTIONAL MOBILITY AND ENDURANCE: Primary | ICD-10-CM

## 2020-11-10 DIAGNOSIS — R53.81 DEBILITY: ICD-10-CM

## 2020-11-10 SDOH — SOCIAL DETERMINANTS OF HEALTH (SDOH): OTHER INADEQUATE HOUSING: Z59.19

## 2020-11-10 NOTE — TELEPHONE ENCOUNTER
Spoke with pt pt stated he need a rx for a new power wheelchair. Pt stated he was unable to go to therapy today due to the chair not working. Please, advise. Thanks. Pt also asked is he due for a flu shot.

## 2020-11-10 NOTE — TELEPHONE ENCOUNTER
----- Message from Ninfa Rapp MD sent at 11/10/2020 11:08 AM CST -----  Regarding: social work consult  Hi Jimmie Barry and Daily,    I am the medical director of the infusion center at [facility]. The infusion nurses have alerted me every time Mr. Kahn comes to the center, roaches are coming out of his clothing.    I was hoping you could place a social work consult for Mr. Kahn to ensure he has everything he needs to take care of himself at home.    Thanks,  Ninfa Rapp

## 2020-11-10 NOTE — TELEPHONE ENCOUNTER
Received message from manager of patient's infusion center stating the infusion nurses note cockroaches on Mr. Kahn's clothing.    Referred to social work; please process/notify patient once complete.

## 2020-11-10 NOTE — TELEPHONE ENCOUNTER
"Yes he is due for a flu shot; please leave card for Fluzone for him to .  Additionally does he need a "power wheelchair" or a scooter; I thought he used a motorized scooter not a motorized wheelchair.  Please enquire and notify me.  "

## 2020-11-17 ENCOUNTER — CLINICAL SUPPORT (OUTPATIENT)
Dept: REHABILITATION | Facility: HOSPITAL | Age: 65
End: 2020-11-17
Payer: MEDICARE

## 2020-11-17 DIAGNOSIS — Z74.09 IMPAIRED FUNCTIONAL MOBILITY AND ENDURANCE: ICD-10-CM

## 2020-11-17 PROCEDURE — 97530 THERAPEUTIC ACTIVITIES: CPT | Mod: HCNC,PO

## 2020-11-18 ENCOUNTER — IMMUNIZATION (OUTPATIENT)
Dept: PHARMACY | Facility: CLINIC | Age: 65
End: 2020-11-18
Payer: MEDICARE

## 2020-11-19 ENCOUNTER — CLINICAL SUPPORT (OUTPATIENT)
Dept: REHABILITATION | Facility: HOSPITAL | Age: 65
End: 2020-11-19
Payer: MEDICARE

## 2020-11-19 DIAGNOSIS — Z74.09 IMPAIRED FUNCTIONAL MOBILITY AND ENDURANCE: ICD-10-CM

## 2020-11-19 PROCEDURE — 97110 THERAPEUTIC EXERCISES: CPT | Mod: HCNC,PO

## 2020-11-19 PROCEDURE — 97530 THERAPEUTIC ACTIVITIES: CPT | Mod: HCNC,PO

## 2020-11-19 NOTE — PROGRESS NOTES
Physical Therapy Daily Treatment Note     Name: Nura Kahn Jr.  Clinic Number: [id number]    Therapy Diagnosis:   Encounter Diagnosis   Name Primary?    Impaired functional mobility and endurance      Physician: Socrates Ambrosio MD    Visit Date: 11/19/2020    Physician Orders: PT Eval and Treat   Medical Diagnosis from Referral: M33.20 (ICD-10-CM) - Polymyositis R26.81 (ICD-10-CM) - Gait instability   Evaluation Date: 9/3/2020  Insurance Authorization Period: 09/01/2020 - 10/01/2020   Plan of Care Expiration: 11/03/2020   Updated POC Expiration: 01/03/2020   Visit # / Visits authorized: 13/ 20 15 total      Time In: 1000  Time Out: 1045  Total Billable Time: 45 minutes     Precautions: Standard and Fall      Missed visits: 0  Cancelled visits: 2  Subjective     Pt reports: that he feels his arms are weakening.  HEP to be initiated as appropriate.  Response to previous treatment: No adverse effects reported   Functional change: Ongoing     Pain: 0/10  Location:  n/a     Objective     Nura received therapeutic exercises to develop strength for 30 minutes including:    Friction eliminated by therapist:  Sidelying hip flexion <> extension 3 x 10 reps bilaterally   Sidelying knee flexion <> extension 3 x 10 reps bilaterally  Supine hip abduction <> abduction 2 x 10 reps, bilaterally    Nura participated in neuromuscular re-education activities to improve: Balance for 0 minutes. The following activities were included:      Nura participated in dynamic functional therapeutic activities to improve functional performance for 15 minutes, including:     Scooter <> mat Setup Assist, increased time to perform, slide board and SBA  Sit to supine with Mod A .  Mod A to manage B LE's  Supine to sit with Mod A.  Mod A to elevate trunk.     Standing frame: with occasional cues to bring hips and shoulders fwd instead of leaning back onto harness   Trial 1: 3 minutes (time constraints)       Seated EOM Trunk  strengthening: not performed this date  Scap squeezes 3 x 10 reps  Reclined on wedge <> forward bend 2 x 10 reps    Nura participated in gait training to improve functional mobility and safety for 0  minutes:      Home Exercises Provided and Patient Education Provided     Education provided:   - POC, Proper technique with therapeutic interventions, Benefits/purpose of today's therapeutic interventions    Written Home Exercises Provided: no    Assessment   Nura presented today in low spirits due to the situation at the infusion clinic as well as increased self-perceived weakness lately. Therapist spent time encouraging patient to follow through with social work referral to get the help he needs as well as discussing the benefits of continued PT to maintain and continue to progress strength and mobility. Therapy to continue per established POC.     Nura is progressing well towards his goals.   Pt prognosis is Fair.     Pt will continue to benefit from skilled outpatient physical therapy to address the deficits listed in the problem list box on initial evaluation, provide pt/family education and to maximize pt's level of independence in the home and community environment.     Pt's spiritual, cultural and educational needs considered and pt agreeable to plan of care and goals.     Anticipated barriers to physical therapy: comorbidities, requires transportation assist via [employer] for transportation     Goals:  Short Term Goal = 4 weeks Initial -09/03/2020 10/08/2020 11/03/2020 Progress Status   1. Patient will be independent with HEP emphasizing LE and trunk strengthening Requires education Requires education Initiated; to be updated as appropriate Ongoing   2. Patient will exhibit increased bilateral hip and knee manual muscle test score by 1/2 grade for improved biomechanics with functional tasks. R hip flexion: 2-/5  L hip flexion: 2-/5  R hip extension: 2-/5  L hip extension: 2-/5  B hip abduction:  trace  Bilateral hip adduction: trace  B knee extension: trace  B knee flexion: 2-/5   R hip flexion: 2+/5   L hip flexion: 2+/5  R hip extension: 2-/5  L hip extension: 2/5  R hip abduction: 2/5  L hip aduction: 2-/5  Bilateral hip adduction: 2-/5  B knee extension: 2/5  B knee flexion: 2-/5 R hip flexion: 2+/5   L hip flexion: 2+/5  R hip extension: 2/5  L hip extension: 2/5  R hip abduction: 2/5  L hip aduction: 2/5  R hip adduction: 2-/5  L hip adduction: 2/5  B knee extension: 2/5  R knee flexion: 2-/5  L knee flexion: 2/5 Progressing   3. Patient will tolerate 15 minutes of B LE weightbearing in standing frame. Not performed on eval date 5 minutes 15 minutes with signs and reports of fatigue Progressing   4. Patient will perform slide board transfer with Min A to improve efficiency of transfers.  Upgrade: Patient will perform slide board transfer with Mod I to improve efficiency of transfers. Not performed on eval date. Inefficient scoot transfer requiring Setup A Set up Assist and SBA Set up Assist and SBA Met 10/08/2020  Goal Upgraded     Upgraded goal - Ongoing   5. Patient will perform supine > sit transfer with Min A for improve household independence Mod A Mod A Mod A Ongoing     Long Term Goal = 8 weeks Initial - 09/03/2020 10/08/2020 11/03/2020 Progress Status   1. Patient will perform sit <> stand transfer with Max A for improved functional independence Max A to clear buttock Max A to clear buttock  Max A x 2 to stand Max A x 2 in //bars to stabilize trunk, B hips, and B kness Ongoing   2. Patient will tolerate 60 seconds of standing with bilateral UE support Unable DNT ~20 seconds Ongoing   3. Patient will perform squat pivot transfer with Mod A for improved functional independence and decrease shearing force Not performed on eval date. Inefficient scoot transfer requiring Setup A DNT Max A x 2 Ongoing       Plan   Continue standing frame. Alternate LE strengthening and trunk strengthening.      Jessica Mcpherson, PT

## 2020-11-23 ENCOUNTER — OFFICE VISIT (OUTPATIENT)
Dept: INTERNAL MEDICINE | Facility: CLINIC | Age: 65
End: 2020-11-23
Payer: MEDICARE

## 2020-11-23 VITALS
HEIGHT: 67 IN | SYSTOLIC BLOOD PRESSURE: 144 MMHG | TEMPERATURE: 99 F | OXYGEN SATURATION: 99 % | DIASTOLIC BLOOD PRESSURE: 84 MMHG | BODY MASS INDEX: 23.79 KG/M2 | HEART RATE: 79 BPM

## 2020-11-23 DIAGNOSIS — I10 ESSENTIAL HYPERTENSION: ICD-10-CM

## 2020-11-23 DIAGNOSIS — M33.20 POLYMYOSITIS: Chronic | ICD-10-CM

## 2020-11-23 DIAGNOSIS — Z74.09 IMPAIRED FUNCTIONAL MOBILITY AND ENDURANCE: ICD-10-CM

## 2020-11-23 PROCEDURE — 99999 PR PBB SHADOW E&M-EST. PATIENT-LVL IV: ICD-10-PCS | Mod: PBBFAC,HCNC,, | Performed by: FAMILY MEDICINE

## 2020-11-23 PROCEDURE — 3008F BODY MASS INDEX DOCD: CPT | Mod: HCNC,CPTII,S$GLB, | Performed by: FAMILY MEDICINE

## 2020-11-23 PROCEDURE — 99499 RISK ADDL DX/OHS AUDIT: ICD-10-PCS | Mod: S$GLB,,, | Performed by: FAMILY MEDICINE

## 2020-11-23 PROCEDURE — 1126F PR PAIN SEVERITY QUANTIFIED, NO PAIN PRESENT: ICD-10-PCS | Mod: HCNC,S$GLB,, | Performed by: FAMILY MEDICINE

## 2020-11-23 PROCEDURE — 99213 PR OFFICE/OUTPT VISIT, EST, LEVL III, 20-29 MIN: ICD-10-PCS | Mod: HCNC,S$GLB,, | Performed by: FAMILY MEDICINE

## 2020-11-23 PROCEDURE — 3008F PR BODY MASS INDEX (BMI) DOCUMENTED: ICD-10-PCS | Mod: HCNC,CPTII,S$GLB, | Performed by: FAMILY MEDICINE

## 2020-11-23 PROCEDURE — 99499 UNLISTED E&M SERVICE: CPT | Mod: S$GLB,,, | Performed by: FAMILY MEDICINE

## 2020-11-23 PROCEDURE — 99213 OFFICE O/P EST LOW 20 MIN: CPT | Mod: HCNC,S$GLB,, | Performed by: FAMILY MEDICINE

## 2020-11-23 PROCEDURE — 3079F PR MOST RECENT DIASTOLIC BLOOD PRESSURE 80-89 MM HG: ICD-10-PCS | Mod: HCNC,CPTII,S$GLB, | Performed by: FAMILY MEDICINE

## 2020-11-23 PROCEDURE — 1126F AMNT PAIN NOTED NONE PRSNT: CPT | Mod: HCNC,S$GLB,, | Performed by: FAMILY MEDICINE

## 2020-11-23 PROCEDURE — 3077F PR MOST RECENT SYSTOLIC BLOOD PRESSURE >= 140 MM HG: ICD-10-PCS | Mod: HCNC,CPTII,S$GLB, | Performed by: FAMILY MEDICINE

## 2020-11-23 PROCEDURE — 3079F DIAST BP 80-89 MM HG: CPT | Mod: HCNC,CPTII,S$GLB, | Performed by: FAMILY MEDICINE

## 2020-11-23 PROCEDURE — 99999 PR PBB SHADOW E&M-EST. PATIENT-LVL IV: CPT | Mod: PBBFAC,HCNC,, | Performed by: FAMILY MEDICINE

## 2020-11-23 PROCEDURE — 3077F SYST BP >= 140 MM HG: CPT | Mod: HCNC,CPTII,S$GLB, | Performed by: FAMILY MEDICINE

## 2020-11-23 RX ORDER — AMLODIPINE BESYLATE 5 MG/1
5 TABLET ORAL DAILY
Qty: 30 TABLET | Refills: 11 | Status: SHIPPED | OUTPATIENT
Start: 2020-11-23 | End: 2021-04-16

## 2020-11-23 NOTE — PROGRESS NOTES
Subjective:      Patient ID: Nura Kahn Jr. is a 64 y.o. male.    Chief Complaint: mobility      HPI:  Nura Khan Jr. is a 64 year old male with atrial fibrillation, Beta thalassemia, depression, dyslipidemia, dysphagia, hemoglobin C disease, history of aspiration pneumonia of the lung, microcytic anemia, osteopenia, polymyositis, portal venous hypertension, splenomegaly, thrombocytopenia, tobacco abuse, and vitamin D deficiency who presents to clinic today for mobility evaluation for a new scooter.    Patient with history of polymyositis with resultant extremity weakness and difficulty performing activities of daily life secondary to weakness.    Patient has difficulty ambulating safely secondary to polymyositis and extremity weakness.  Has difficulty with normal ADLs (including difficulty getting to and going to the bathroom which has resulted in the patient urinating and defecating on himself in the past).  States he has been increasingly weak and is having difficulty even getting out of bed.  Has previously been treated with IVIG for this.  Has a friend who is an RN who comes to help take care of him in the home with assistance with activities like bathing, transitioning to toilet, and meal preparation.  States he goes to out patient physical therapy for this as well but has transportation issues.  A walker or cane is not sufficient to support the patient adequately to complete his ADLs.  The patient does not have the strength to manipulate a manual wheelchair due to associated upper extremity weakness.  Patient has the physical and mental abilities and is able to transfer and operate a scooter safely in the home.   Symptoms have gradually worsened over time.      Patient can walk 0 feet without stopping.  Pace of ambulation:  Unable to assess as he cannot ambulate.    Currently uses a motorized scooter.  States it is not trust worthy.  States there is a wire that comes lose from time to time and  leaves him stranded.  States the key ignition switch also doesn't work sometimes.  States some back parts of the scooter are missing as well.    Unable to stand up from a seated position without assistance.  States they have to use a machine to stand him up at physical therapy.    Use of a scooter will significantly improve his ability to participate with ADL's in the home.      HTN:  BP elevated today.  States he stopped taking his blood pressure medications a few months ago previously because they were making his BP drop too low.  States the systolic was noted to 99.  Previously on amlodipine and labetalol.      Past Medical History:   Diagnosis Date    Aspiration pneumonia of right lower lobe 1/15/2018    Atrial fibrillation 3/2/2015    Depression     Essential hypertension 2/27/2019    Microcytic anemia 9/25/2014    Neuromuscular disorder     Pneumonia     Polymyositis 2009    Tobacco abuse        Past Surgical History:   Procedure Laterality Date    COLONOSCOPY N/A 8/6/2020    Procedure: COLONOSCOPY;  Surgeon: Brandan Meyer MD;  Location: [facility] ([address] [place] [address]);  Service: Endoscopy;  Laterality: N/A;    ESOPHAGOGASTRODUODENOSCOPY N/A 8/6/2020    Procedure: EGD (ESOPHAGOGASTRODUODENOSCOPY);  Surgeon: Brandan Meyer MD;  Location: [address]);  Service: Endoscopy;  Laterality: N/A;  multiple co-morbidities. will have family member for assistance.  has neuromuscular issues-needs lift. in W/C-unable to transfer per self     COVID test at [facility] on 8/3-GT       Family History   Problem Relation Age of Onset    Diabetes Mother     Hypertension Mother     Kidney disease Mother     Diabetes Father     Hypertension Father     Heart attack Neg Hx     Heart disease Neg Hx     Melanoma Neg Hx        Social History     Socioeconomic History    Marital status: Single     Spouse name: Not on file    Number of children: Not on file    Years of education: Not on file    Highest education  level: Not on file   Occupational History    Not on file   Social Needs    Financial resource strain: Not on file    Food insecurity     Worry: Not on file     Inability: Not on file    Transportation needs     Medical: Not on file     Non-medical: Not on file   Tobacco Use    Smoking status: Former Smoker     Packs/day: 0.25     Years: 20.00     Pack years: 5.00     Types: Cigarettes     Quit date: 2018     Years since quittin.8    Smokeless tobacco: Never Used   Substance and Sexual Activity    Alcohol use: No     Alcohol/week: 0.0 standard drinks    Drug use: Yes     Types: Marijuana     Comment: daily use    Sexual activity: Not on file   Lifestyle    Physical activity     Days per week: Not on file     Minutes per session: Not on file    Stress: Not on file   Relationships    Social connections     Talks on phone: Not on file     Gets together: Not on file     Attends [employer]ars[employer] service: Not on file     Active member of club or organization: Not on file     Attends meetings of clubs or organizations: Not on file     Relationship status: Not on file   Other Topics Concern    Not on file   Social History Narrative    Not on file       Review of Systems   Constitutional: Positive for fatigue. Negative for chills and fever.   HENT: Negative for congestion, hearing loss, nosebleeds, rhinorrhea, sore throat and trouble swallowing.    Eyes: Negative for pain and visual disturbance.   Respiratory: Negative for cough, shortness of breath and wheezing.    Cardiovascular: Negative for chest pain and palpitations.   Gastrointestinal: Negative for abdominal distention, abdominal pain, constipation, diarrhea, nausea and vomiting.   Genitourinary: Negative for dysuria, frequency, hematuria and urgency.   Musculoskeletal: Positive for arthralgias. Negative for back pain and myalgias.   Skin: Negative for color change and rash.   Neurological: Positive for weakness. Negative for dizziness, syncope, speech  "difficulty, numbness and headaches.   Psychiatric/Behavioral: Negative for agitation, behavioral problems and confusion. The patient is not nervous/anxious.      Objective:     Vitals:    11/23/20 1130 11/23/20 1158   BP: (!) 158/90 (!) 144/84   BP Location: Left arm Left arm   Patient Position: Sitting Sitting   BP Method: Medium (Manual) Medium (Manual)   Pulse: 79    Temp: 98.8 °F (37.1 °C)    TempSrc: Oral    SpO2: 99%    Weight: Comment: uto    Height: 5' 7" (1.702 m)        Physical Exam  Vitals signs and nursing note reviewed.   Constitutional:       General: He is not in acute distress.     Appearance: He is well-developed.   HENT:      Right Ear: Hearing and external ear normal.      Left Ear: Hearing and external ear normal.      Nose: Nose normal. No rhinorrhea.   Eyes:      General: Lids are normal.         Right eye: No discharge.         Left eye: No discharge.      Conjunctiva/sclera: Conjunctivae normal.   Neck:      Musculoskeletal: Neck supple.      Trachea: Trachea normal. No tracheal deviation.   Cardiovascular:      Rate and Rhythm: Normal rate and regular rhythm.      Heart sounds: Normal heart sounds. No murmur. No friction rub. No gallop.    Pulmonary:      Effort: Pulmonary effort is normal. No respiratory distress.      Breath sounds: Normal breath sounds. No wheezing or rales.   Abdominal:      General: Bowel sounds are normal. There is no distension.      Palpations: Abdomen is soft.      Tenderness: There is no abdominal tenderness. There is no guarding or rebound.   Musculoskeletal:      Right shoulder: He exhibits decreased range of motion and decreased strength.      Left shoulder: He exhibits decreased range of motion and decreased strength.      Right elbow: He exhibits decreased range of motion.      Left elbow: He exhibits decreased range of motion.      Right hip: He exhibits decreased range of motion and decreased strength.      Left hip: He exhibits decreased range of motion " and decreased strength.      Right knee: He exhibits decreased range of motion.      Left knee: He exhibits decreased range of motion.   Skin:     General: Skin is warm and dry.   Neurological:      Mental Status: He is alert.      Motor: Weakness and atrophy present.      Gait: Gait abnormal.      Comments: Unable to rise to a standing position without assistance.  Strength 1-2/5 to bilateral upper and lower extremities.   Psychiatric:         Speech: Speech normal.         Behavior: Behavior normal. Behavior is cooperative.         Thought Content: Thought content normal.         Judgment: Judgment normal.        Assessment:      1. Polymyositis    2. Quadriparesis (muscle weakness)    3. Impaired functional mobility and endurance    4. Essential hypertension      Plan:   Nura was seen today for mobility.    Diagnoses and all orders for this visit:    Polymyositis; Quadriparesis (muscle weakness); Impaired functional mobility and endurance        -     Re-ordered motorized scooter for home use; continue PT    Essential hypertension  -     Restart amLODIPine (NORVASC) 5 MG tablet; Take 1 tablet (5 mg total) by mouth once daily.  Counseled patient on the importance of a low sodium diet and daily aerobic exercise.  RTC for nursing BP check in 1-2 weeks.

## 2020-11-23 NOTE — PROGRESS NOTES
Physical Therapy Daily Treatment Note     Name: Nura Kahn Jr.  Clinic Number: [id number]    Therapy Diagnosis:   Encounter Diagnosis   Name Primary?    Impaired functional mobility and endurance      Physician: Socrates Ambrosio MD    Visit Date: 11/24/2020    Physician Orders: PT Eval and Treat   Medical Diagnosis from Referral: M33.20 (ICD-10-CM) - Polymyositis R26.81 (ICD-10-CM) - Gait instability   Evaluation Date: 9/3/2020  Insurance Authorization Period: 09/01/2020 - 10/01/2020   Plan of Care Expiration: 11/03/2020   Updated POC Expiration: 01/03/2020   Visit # / Visits authorized: 14/ 20 16 total      Time In: 0930  Time Out: 1017  Total Billable Time: 47 minutes     Precautions: Standard and Fall      Missed visits: 0  Cancelled visits: 2  Subjective     Pt reports: no new complaints upon arrival.   HEP to be initiated as appropriate.  Response to previous treatment: No adverse effects reported   Functional change: Ongoing     Pain: 0/10  Location:  n/a     Objective     Nura received therapeutic exercises to develop strength for 0 minutes including:    Friction eliminated by therapist: not performed this date   Sidelying hip flexion <> extension 3 x 10 reps bilaterally   Sidelying knee flexion <> extension 3 x 10 reps bilaterally  Supine hip abduction <> abduction 2 x 10 reps, bilaterally    Nura participated in neuromuscular re-education activities to improve: Balance for 0 minutes. The following activities were included:      Nura participated in dynamic functional therapeutic activities to improve functional performance for 45 minutes, including:     Scooter <> mat Setup Assist, increased time to perform, slide board and SBA  Sit to supine with Mod A .  Mod A to manage B LE's  Supine to sit with Mod A.  Mod A to elevate trunk.     Standing frame: with occasional cues to bring hips and shoulders fwd instead of leaning back onto harness   Trial 1: 12 minutes   Static standing   Glute  squeezes   Quad sets   Transverse Abdominus Sets   Bilateral scap squeezes       Seated EOM Trunk strengthening:   Unilateral scap squeezes 2 x 10 reps bilaterally  Reclined on red wedge <> forward bend 2 x 10 reps, AA  Red swissball ball roll x 15 reps  Anterior pelvic tilts x 20 reps    Nura participated in gait training to improve functional mobility and safety for 0  minutes:      Home Exercises Provided and Patient Education Provided     Education provided:   - POC, Proper technique with therapeutic interventions, Benefits/purpose of today's therapeutic interventions    Written Home Exercises Provided: no    Assessment   Nura tolerated today's session well. He exhibits improving trunk control, though fear remains a limiting factor. He exhibits improved motor control with LE strengthening while in standing frame. Anterior pelvic tilt training initiated today due to posteriorly posturing of pelvic in resting posture; continuing training required.     Nura is progressing well towards his goals.   Pt prognosis is Fair.     Pt will continue to benefit from skilled outpatient physical therapy to address the deficits listed in the problem list box on initial evaluation, provide pt/family education and to maximize pt's level of independence in the home and community environment.     Pt's spiritual, cultural and educational needs considered and pt agreeable to plan of care and goals.     Anticipated barriers to physical therapy: comorbidities, requires transportation assist via [employer] for transportation     Goals:  Short Term Goal = 4 weeks Initial -09/03/2020 10/08/2020 11/03/2020 Progress Status   1. Patient will be independent with HEP emphasizing LE and trunk strengthening Requires education Requires education Initiated; to be updated as appropriate Ongoing   2. Patient will exhibit increased bilateral hip and knee manual muscle test score by 1/2 grade for improved biomechanics with functional tasks. R hip  flexion: 2-/5  L hip flexion: 2-/5  R hip extension: 2-/5  L hip extension: 2-/5  B hip abduction: trace  Bilateral hip adduction: trace  B knee extension: trace  B knee flexion: 2-/5   R hip flexion: 2+/5   L hip flexion: 2+/5  R hip extension: 2-/5  L hip extension: 2/5  R hip abduction: 2/5  L hip aduction: 2-/5  Bilateral hip adduction: 2-/5  B knee extension: 2/5  B knee flexion: 2-/5 R hip flexion: 2+/5   L hip flexion: 2+/5  R hip extension: 2/5  L hip extension: 2/5  R hip abduction: 2/5  L hip aduction: 2/5  R hip adduction: 2-/5  L hip adduction: 2/5  B knee extension: 2/5  R knee flexion: 2-/5  L knee flexion: 2/5 Progressing   3. Patient will tolerate 15 minutes of B LE weightbearing in standing frame. Not performed on eval date 5 minutes 15 minutes with signs and reports of fatigue Progressing   4. Patient will perform slide board transfer with Min A to improve efficiency of transfers.  Upgrade: Patient will perform slide board transfer with Mod I to improve efficiency of transfers. Not performed on eval date. Inefficient scoot transfer requiring Setup A Set up Assist and SBA Set up Assist and SBA Met 10/08/2020  Goal Upgraded     Upgraded goal - Ongoing   5. Patient will perform supine > sit transfer with Min A for improve household independence Mod A Mod A Mod A Ongoing     Long Term Goal = 8 weeks Initial - 09/03/2020 10/08/2020 11/03/2020 Progress Status   1. Patient will perform sit <> stand transfer with Max A for improved functional independence Max A to clear buttock Max A to clear buttock  Max A x 2 to stand Max A x 2 in //bars to stabilize trunk, B hips, and B kness Ongoing   2. Patient will tolerate 60 seconds of standing with bilateral UE support Unable DNT ~20 seconds Ongoing   3. Patient will perform squat pivot transfer with Mod A for improved functional independence and decrease shearing force Not performed on eval date. Inefficient scoot transfer requiring Setup A DNT Max A x 2 Ongoing        Plan   Continue standing frame. Alternate LE strengthening and trunk strengthening.     Jessica Mcpherson, PT

## 2020-11-24 ENCOUNTER — CLINICAL SUPPORT (OUTPATIENT)
Dept: REHABILITATION | Facility: HOSPITAL | Age: 65
End: 2020-11-24
Payer: MEDICARE

## 2020-11-24 DIAGNOSIS — Z74.09 IMPAIRED FUNCTIONAL MOBILITY AND ENDURANCE: ICD-10-CM

## 2020-11-24 PROCEDURE — 97530 THERAPEUTIC ACTIVITIES: CPT | Mod: HCNC,PO

## 2020-11-25 NOTE — PROGRESS NOTES
Physical Therapy Daily Treatment Note     Name: Nura Kahn Jr.  Clinic Number: [id number]    Therapy Diagnosis:   Encounter Diagnosis   Name Primary?    Impaired functional mobility and endurance      Physician: Socrates Ambrosio MD    Visit Date: 11/27/2020    Physician Orders: PT Eval and Treat   Medical Diagnosis from Referral: M33.20 (ICD-10-CM) - Polymyositis R26.81 (ICD-10-CM) - Gait instability   Evaluation Date: 9/3/2020  Insurance Authorization Period: 09/01/2020 - 10/01/2020   Plan of Care Expiration: 11/03/2020   Updated POC Expiration: 01/03/2020   Visit # / Visits authorized: 15/ 20 17 total      Time In: 1000  Time Out: 1045   Total Billable Time: 45 minutes     Precautions: Standard and Fall      Missed visits: 0  Cancelled visits: 2  Subjective     Pt reports: no new complaints upon arrival.   HEP to be initiated as appropriate.  Response to previous treatment: No adverse effects reported   Functional change: Ongoing     Pain: 0/10  Location:  n/a     Objective     Nura received therapeutic exercises to develop strength for 30 minutes including:    Supine hip abduction <> abduction 2 x 10 reps, bilaterally  Supine hip flexion AA <> hip extension resisted 2 x 10 reps    Nura participated in neuromuscular re-education activities to improve: Balance for 0 minutes. The following activities were included:      Nura participated in dynamic functional therapeutic activities to improve functional performance for 15 minutes, including:     Scooter <> mat Setup Assist, increased time to perform, slide board and SBA  Sit to supine with Mod A .  Mod A to manage B LE's  Supine to sit with Mod A.  Mod A to elevate trunk.     Standing frame: with occasional cues to bring hips and shoulders fwd instead of leaning back onto harness   Trial 1: 10 minutes   Static standing   Lateral Weightshifting    Glute squeezes   Quad sets       Nura participated in gait training to improve functional mobility  and safety for 0  minutes:      Home Exercises Provided and Patient Education Provided     Education provided:   - POC, Proper technique with therapeutic interventions, Benefits/purpose of today's therapeutic interventions    Written Home Exercises Provided: no    Assessment   Nura tolerated today's session well. He exhibits improving LE strength. Increased active range noted on R LE, but slight movement against gravity was observed bilaterally. Continue therapy is required to progress LE strength.    Nura is progressing well towards his goals.   Pt prognosis is Fair.     Pt will continue to benefit from skilled outpatient physical therapy to address the deficits listed in the problem list box on initial evaluation, provide pt/family education and to maximize pt's level of independence in the home and community environment.     Pt's spiritual, cultural and educational needs considered and pt agreeable to plan of care and goals.     Anticipated barriers to physical therapy: comorbidities, requires transportation assist via [employer] for transportation     Goals:  Short Term Goal = 4 weeks Initial -09/03/2020 10/08/2020 11/03/2020 Progress Status   1. Patient will be independent with HEP emphasizing LE and trunk strengthening Requires education Requires education Initiated; to be updated as appropriate Ongoing   2. Patient will exhibit increased bilateral hip and knee manual muscle test score by 1/2 grade for improved biomechanics with functional tasks. R hip flexion: 2-/5  L hip flexion: 2-/5  R hip extension: 2-/5  L hip extension: 2-/5  B hip abduction: trace  Bilateral hip adduction: trace  B knee extension: trace  B knee flexion: 2-/5   R hip flexion: 2+/5   L hip flexion: 2+/5  R hip extension: 2-/5  L hip extension: 2/5  R hip abduction: 2/5  L hip aduction: 2-/5  Bilateral hip adduction: 2-/5  B knee extension: 2/5  B knee flexion: 2-/5 R hip flexion: 2+/5   L hip flexion: 2+/5  R hip extension: 2/5  L hip  extension: 2/5  R hip abduction: 2/5  L hip aduction: 2/5  R hip adduction: 2-/5  L hip adduction: 2/5  B knee extension: 2/5  R knee flexion: 2-/5  L knee flexion: 2/5 Progressing   3. Patient will tolerate 15 minutes of B LE weightbearing in standing frame. Not performed on eval date 5 minutes 15 minutes with signs and reports of fatigue Progressing   4. Patient will perform slide board transfer with Min A to improve efficiency of transfers.  Upgrade: Patient will perform slide board transfer with Mod I to improve efficiency of transfers. Not performed on eval date. Inefficient scoot transfer requiring Setup A Set up Assist and SBA Set up Assist and SBA Met 10/08/2020  Goal Upgraded     Upgraded goal - Ongoing   5. Patient will perform supine > sit transfer with Min A for improve household independence Mod A Mod A Mod A Ongoing     Long Term Goal = 8 weeks Initial - 09/03/2020 10/08/2020 11/03/2020 Progress Status   1. Patient will perform sit <> stand transfer with Max A for improved functional independence Max A to clear buttock Max A to clear buttock  Max A x 2 to stand Max A x 2 in //bars to stabilize trunk, B hips, and B kness Ongoing   2. Patient will tolerate 60 seconds of standing with bilateral UE support Unable DNT ~20 seconds Ongoing   3. Patient will perform squat pivot transfer with Mod A for improved functional independence and decrease shearing force Not performed on eval date. Inefficient scoot transfer requiring Setup A DNT Max A x 2 Ongoing       Plan   Continue standing frame. Alternate LE strengthening and trunk strengthening.     Jessica Mcpherson, PT

## 2020-11-27 ENCOUNTER — TELEPHONE (OUTPATIENT)
Dept: INTERNAL MEDICINE | Facility: CLINIC | Age: 65
End: 2020-11-27

## 2020-11-27 ENCOUNTER — CLINICAL SUPPORT (OUTPATIENT)
Dept: REHABILITATION | Facility: HOSPITAL | Age: 65
End: 2020-11-27
Payer: MEDICARE

## 2020-11-27 DIAGNOSIS — Z74.09 IMPAIRED FUNCTIONAL MOBILITY AND ENDURANCE: ICD-10-CM

## 2020-11-27 PROCEDURE — 97530 THERAPEUTIC ACTIVITIES: CPT | Mod: HCNC,PO

## 2020-11-27 PROCEDURE — 97110 THERAPEUTIC EXERCISES: CPT | Mod: HCNC,PO

## 2020-11-27 NOTE — TELEPHONE ENCOUNTER
Spoke with pt, pt decided that he would rather take his blood pressure at home and send the doctor the readings instead of coming in the office.

## 2020-11-27 NOTE — TELEPHONE ENCOUNTER
----- Message from Francesca Poole sent at 11/27/2020 12:29 PM CST -----  Contact: [phone]  Pt would like to know if he can take his own blood pressure and call us with readings as he wants to avoid coming into the clinic

## 2020-11-30 ENCOUNTER — EXTERNAL CHRONIC CARE MANAGEMENT (OUTPATIENT)
Dept: PRIMARY CARE CLINIC | Facility: CLINIC | Age: 65
End: 2020-11-30
Payer: MEDICARE

## 2020-11-30 PROCEDURE — 99490 PR CHRONIC CARE MGMT, 1ST 20 MIN: ICD-10-PCS | Mod: S$GLB,,, | Performed by: FAMILY MEDICINE

## 2020-11-30 PROCEDURE — 99490 CHRNC CARE MGMT STAFF 1ST 20: CPT | Mod: S$GLB,,, | Performed by: FAMILY MEDICINE

## 2020-12-02 ENCOUNTER — SPECIALTY PHARMACY (OUTPATIENT)
Dept: PHARMACY | Facility: CLINIC | Age: 65
End: 2020-12-02

## 2020-12-02 ENCOUNTER — PATIENT MESSAGE (OUTPATIENT)
Dept: INTERNAL MEDICINE | Facility: CLINIC | Age: 65
End: 2020-12-02

## 2020-12-02 ENCOUNTER — TELEPHONE (OUTPATIENT)
Dept: PHARMACY | Facility: CLINIC | Age: 65
End: 2020-12-02

## 2020-12-03 ENCOUNTER — CLINICAL SUPPORT (OUTPATIENT)
Dept: REHABILITATION | Facility: HOSPITAL | Age: 65
End: 2020-12-03
Payer: MEDICARE

## 2020-12-03 DIAGNOSIS — Z74.09 IMPAIRED FUNCTIONAL MOBILITY AND ENDURANCE: ICD-10-CM

## 2020-12-03 PROCEDURE — 97530 THERAPEUTIC ACTIVITIES: CPT | Mod: HCNC,PO

## 2020-12-03 PROCEDURE — 97110 THERAPEUTIC EXERCISES: CPT | Mod: HCNC,PO

## 2020-12-03 NOTE — PROGRESS NOTES
"    Physical Therapy Daily Treatment Note     Name: Nura Kahn Jr.  Clinic Number: [id number]    Therapy Diagnosis:   Encounter Diagnosis   Name Primary?    Impaired functional mobility and endurance      Physician: Socrates Ambrosio MD    Visit Date: 12/3/2020    Physician Orders: PT Eval and Treat   Medical Diagnosis from Referral: M33.20 (ICD-10-CM) - Polymyositis R26.81 (ICD-10-CM) - Gait instability   Evaluation Date: 9/3/2020  Insurance Authorization Period: 09/01/2020 - 10/01/2020   Plan of Care Expiration: 11/03/2020   Updated POC Expiration: 01/03/2021   Visit # / Visits authorized: 17/ 20 18 total      Time In: 1000   Time Out: 1045   Total Billable Time: 45 minutes     Precautions: Standard and Fall      Missed visits: 0  Cancelled visits: 2  Subjective     Pt reports: "my legs feel weaker because I didn't sleep well last night"  HEP to be initiated as appropriate.  Response to previous treatment: No adverse effects reported   Functional change: Ongoing     Pain: 0/10  Location:  n/a     Objective     Nura received therapeutic exercises to develop strength for 30 minutes including:    Lower Extremity Strength  Right LE Evaluation 12/03/2020 Left LE Evaluation 12/03/2020   Hip Flexion: 2/5 Hip Flexion: 2/5   Hip Extension:  2/5 Hip Extension: 2/5   Hip Abduction: 2/5 Hip Abduction: 2/5   Hip Adduction: 2/5 Hip Adduction 2/5   Knee Extension: 2/5 Knee Extension: 2/5   Knee Flexion: 2/5 Knee Flexion: 2/5   Ankle Dorsiflexion: DNT Ankle Dorsiflexion: DNT   Ankle Plantarflexion: DNT Ankle Plantarflexion: DNT         Nura participated in neuromuscular re-education activities to improve: Balance for 0 minutes. The following activities were included:      Nura participated in dynamic functional therapeutic activities to improve functional performance for 15 minutes, including:     Scooter <> mat Setup Assist, increased time to perform, slide board and SBA  Sit to supine with Mod A .  Mod A to manage B " LE's  Supine to sit with Mod A.  Mod A to elevate trunk.     Standing frame: with occasional cues to bring hips and shoulders fwd instead of leaning back onto harness   Trial 1: 10 minutes   Static standing   Lateral Weightshifting    Glute squeezes   Quad sets       Nura participated in gait training to improve functional mobility and safety for 0  minutes:      Home Exercises Provided and Patient Education Provided     Education provided:   - POC, Proper technique with therapeutic interventions, Benefits/purpose of today's therapeutic interventions    Written Home Exercises Provided: no    Assessment   Reassessment period: 11/03/2020-12/3/2020. Pt reassessed for progress today, exhibiting overall consistent performance. He exhibits slight decline of bilateral hip flexor strength and slight improvement of R hip abduction and R knee flexion strength. He tolerates 10-15 minutes of fully supported standing in standing frame well, with decreased fatigue noted compared to last progress assessment. Patient's expectations for mobility are somewhat unrealistic. PT had a discussion with patient regarding relapsing/remitting nature of polymyositis as well as the long-lasting effects it has on muscles which limit how much recovery can be made. Emphasized that our goal with PT is maintain LE strength and to improve functional mobility in light of these changes. PT to continue per established POC, continuing LE and trunk strengthening as well as weightbearing using standing frame.       Nura is progressing well towards his goals.   Pt prognosis is Fair.     Pt will continue to benefit from skilled outpatient physical therapy to address the deficits listed in the problem list box on initial evaluation, provide pt/family education and to maximize pt's level of independence in the home and community environment.     Pt's spiritual, cultural and educational needs considered and pt agreeable to plan of care and goals.      Anticipated barriers to physical therapy: comorbidities, requires transportation assist via [employer] for transportation     Goals:  Short Term Goal = 4 weeks Initial -09/03/2020 10/08/2020 11/03/2020 12/03/2020 Progress Status   1. Patient will be independent with HEP emphasizing LE and trunk strengthening Requires education Requires education Initiated; to be updated as appropriate Partial compliance Progressing   2. Patient will exhibit increased bilateral hip and knee manual muscle test score by 1/2 grade for improved biomechanics with functional tasks. R hip flexion: 2-/5  L hip flexion: 2-/5  R hip extension: 2-/5  L hip extension: 2-/5  B hip abduction: trace  Bilateral hip adduction: trace  B knee extension: trace  B knee flexion: 2-/5   R hip flexion: 2+/5   L hip flexion: 2+/5  R hip extension: 2-/5  L hip extension: 2/5  R hip abduction: 2/5  L hip aduction: 2-/5  Bilateral hip adduction: 2-/5  B knee extension: 2/5  B knee flexion: 2-/5 R hip flexion: 2+/5   L hip flexion: 2+/5  R hip extension: 2/5  L hip extension: 2/5  R hip abduction: 2/5  L hip aduction: 2/5  R hip adduction: 2-/5  L hip adduction: 2/5  B knee extension: 2/5  R knee flexion: 2-/5  L knee flexion: 2/5 R hip flexion: 2/5   L hip flexion: 2/5  R hip extension: 2/5  L hip extension: 2/5  R hip abduction: 2/5  L hip aduction: 2/5  R hip adduction: 2/5  L hip adduction: 2/5  B knee extension: 2/5  R knee flexion: 2/5  L knee flexion: 2/5 Progressing   3. Patient will tolerate 15 minutes of B LE weightbearing in standing frame. Not performed on eval date 5 minutes 15 minutes with signs and reports of fatigue 15 minutes without fatigue MET 12/03/2020   4. Patient will perform slide board transfer with Min A to improve efficiency of transfers.  Upgrade: Patient will perform slide board transfer with Mod I to improve efficiency of transfers. Not performed on eval date. Inefficient scoot transfer requiring Setup A Set up Assist and SBA Set up  Assist and SBA Set up Assist and SBA Met 10/08/2020  Goal Upgraded     Upgraded goal - Ongoing   5. Patient will perform supine > sit transfer with Min A for improve household independence Mod A Mod A Mod A Mod A Ongoing     Long Term Goal = 8 weeks Initial - 09/03/2020 10/08/2020 11/03/2020 12/03/2020 Progress Status   1. Patient will perform sit <> stand transfer with Max A for improved functional independence Max A to clear buttock Max A to clear buttock  Max A x 2 to stand Max A x 2 in //bars to stabilize trunk, B hips, and B kness DNT this date Ongoing   2. Patient will tolerate 60 seconds of standing with bilateral UE support Unable DNT ~20 seconds DNT this date Ongoing   3. Patient will perform squat pivot transfer with Mod A for improved functional independence and decrease shearing force Not performed on eval date. Inefficient scoot transfer requiring Setup A DNT Max A x 2 DNT this date Ongoing       Plan   Continue standing frame. Alternate LE strengthening and trunk strengthening.     Jessica Mcpherson, PT

## 2020-12-08 ENCOUNTER — CLINICAL SUPPORT (OUTPATIENT)
Dept: REHABILITATION | Facility: HOSPITAL | Age: 65
End: 2020-12-08
Payer: MEDICARE

## 2020-12-08 DIAGNOSIS — Z74.09 IMPAIRED FUNCTIONAL MOBILITY AND ENDURANCE: ICD-10-CM

## 2020-12-08 PROCEDURE — 97530 THERAPEUTIC ACTIVITIES: CPT | Mod: HCNC,PO

## 2020-12-08 PROCEDURE — 97110 THERAPEUTIC EXERCISES: CPT | Mod: HCNC,PO

## 2020-12-08 NOTE — PLAN OF CARE
"    Physical Therapy Daily Treatment Note     Name: Nura Kahn Jr.  Clinic Number: [id number]    Therapy Diagnosis:   Encounter Diagnosis   Name Primary?    Impaired functional mobility and endurance      Physician: Socrates Ambrosio MD    Visit Date: 12/3/2020    Physician Orders: PT Eval and Treat   Medical Diagnosis from Referral: M33.20 (ICD-10-CM) - Polymyositis R26.81 (ICD-10-CM) - Gait instability   Evaluation Date: 9/3/2020  Insurance Authorization Period: 09/01/2020 - 10/01/2020   Plan of Care Expiration: 11/03/2020   Updated POC Expiration: 01/03/2021   Visit # / Visits authorized: 17/ 20 18 total      Time In: 1000   Time Out: 1045   Total Billable Time: 45 minutes     Precautions: Standard and Fall      Missed visits: 0  Cancelled visits: 2  Subjective     Pt reports: "my legs feel weaker because I didn't sleep well last night"  HEP to be initiated as appropriate.  Response to previous treatment: No adverse effects reported   Functional change: Ongoing     Pain: 0/10  Location:  n/a     Objective     Nura received therapeutic exercises to develop strength for 30 minutes including:    Lower Extremity Strength  Right LE Evaluation 12/03/2020 Left LE Evaluation 12/03/2020   Hip Flexion: 2/5 Hip Flexion: 2/5   Hip Extension:  2/5 Hip Extension: 2/5   Hip Abduction: 2/5 Hip Abduction: 2/5   Hip Adduction: 2/5 Hip Adduction 2/5   Knee Extension: 2/5 Knee Extension: 2/5   Knee Flexion: 2/5 Knee Flexion: 2/5   Ankle Dorsiflexion: DNT Ankle Dorsiflexion: DNT   Ankle Plantarflexion: DNT Ankle Plantarflexion: DNT         Nura participated in neuromuscular re-education activities to improve: Balance for 0 minutes. The following activities were included:      Nura participated in dynamic functional therapeutic activities to improve functional performance for 15 minutes, including:     Scooter <> mat Setup Assist, increased time to perform, slide board and SBA  Sit to supine with Mod A .  Mod A to manage B " LE's  Supine to sit with Mod A.  Mod A to elevate trunk.     Standing frame: with occasional cues to bring hips and shoulders fwd instead of leaning back onto harness   Trial 1: 10 minutes   Static standing   Lateral Weightshifting    Glute squeezes   Quad sets       Nura participated in gait training to improve functional mobility and safety for 0  minutes:      Home Exercises Provided and Patient Education Provided     Education provided:   - POC, Proper technique with therapeutic interventions, Benefits/purpose of today's therapeutic interventions    Written Home Exercises Provided: no    Assessment   Reassessment period: 11/03/2020-12/3/2020. Pt reassessed for progress today, exhibiting overall consistent performance. He exhibits slight decline of bilateral hip flexor strength and slight improvement of R hip abduction and R knee flexion strength. He tolerates 10-15 minutes of fully supported standing in standing frame well, with decreased fatigue noted compared to last progress assessment. Patient's expectations for mobility are somewhat unrealistic. PT had a discussion with patient regarding relapsing/remitting nature of polymyositis as well as the long-lasting effects it has on muscles which limit how much recovery can be made. Emphasized that our goal with PT is maintain LE strength and to improve functional mobility in light of these changes. PT to continue per established POC, continuing LE and trunk strengthening as well as weightbearing using standing frame.       Nura is progressing well towards his goals.   Pt prognosis is Fair.     Pt will continue to benefit from skilled outpatient physical therapy to address the deficits listed in the problem list box on initial evaluation, provide pt/family education and to maximize pt's level of independence in the home and community environment.     Pt's spiritual, cultural and educational needs considered and pt agreeable to plan of care and goals.      Anticipated barriers to physical therapy: comorbidities, requires transportation assist via [employer] for transportation     Goals:  Short Term Goal = 4 weeks Initial -09/03/2020 10/08/2020 11/03/2020 12/03/2020 Progress Status   1. Patient will be independent with HEP emphasizing LE and trunk strengthening Requires education Requires education Initiated; to be updated as appropriate Partial compliance Progressing   2. Patient will exhibit increased bilateral hip and knee manual muscle test score by 1/2 grade for improved biomechanics with functional tasks. R hip flexion: 2-/5  L hip flexion: 2-/5  R hip extension: 2-/5  L hip extension: 2-/5  B hip abduction: trace  Bilateral hip adduction: trace  B knee extension: trace  B knee flexion: 2-/5   R hip flexion: 2+/5   L hip flexion: 2+/5  R hip extension: 2-/5  L hip extension: 2/5  R hip abduction: 2/5  L hip aduction: 2-/5  Bilateral hip adduction: 2-/5  B knee extension: 2/5  B knee flexion: 2-/5 R hip flexion: 2+/5   L hip flexion: 2+/5  R hip extension: 2/5  L hip extension: 2/5  R hip abduction: 2/5  L hip aduction: 2/5  R hip adduction: 2-/5  L hip adduction: 2/5  B knee extension: 2/5  R knee flexion: 2-/5  L knee flexion: 2/5 R hip flexion: 2/5   L hip flexion: 2/5  R hip extension: 2/5  L hip extension: 2/5  R hip abduction: 2/5  L hip aduction: 2/5  R hip adduction: 2/5  L hip adduction: 2/5  B knee extension: 2/5  R knee flexion: 2/5  L knee flexion: 2/5 Progressing   3. Patient will tolerate 15 minutes of B LE weightbearing in standing frame. Not performed on eval date 5 minutes 15 minutes with signs and reports of fatigue 15 minutes without fatigue MET 12/03/2020   4. Patient will perform slide board transfer with Min A to improve efficiency of transfers.  Upgrade: Patient will perform slide board transfer with Mod I to improve efficiency of transfers. Not performed on eval date. Inefficient scoot transfer requiring Setup A Set up Assist and SBA Set up  Assist and SBA Set up Assist and SBA Met 10/08/2020  Goal Upgraded     Upgraded goal - Ongoing   5. Patient will perform supine > sit transfer with Min A for improve household independence Mod A Mod A Mod A Mod A Ongoing     Long Term Goal = 8 weeks Initial - 09/03/2020 10/08/2020 11/03/2020 12/03/2020 Progress Status   1. Patient will perform sit <> stand transfer with Max A for improved functional independence Max A to clear buttock Max A to clear buttock  Max A x 2 to stand Max A x 2 in //bars to stabilize trunk, B hips, and B kness DNT this date Ongoing   2. Patient will tolerate 60 seconds of standing with bilateral UE support Unable DNT ~20 seconds DNT this date Ongoing   3. Patient will perform squat pivot transfer with Mod A for improved functional independence and decrease shearing force Not performed on eval date. Inefficient scoot transfer requiring Setup A DNT Max A x 2 DNT this date Ongoing       Plan   Continue standing frame. Alternate LE strengthening and trunk strengthening.     Jessica Mcpherson, PT

## 2020-12-08 NOTE — PROGRESS NOTES
Physical Therapy Daily Treatment Note     Name: Nura Kahn Jr.  Clinic Number: [id number]    Therapy Diagnosis:   Encounter Diagnosis   Name Primary?    Impaired functional mobility and endurance      Physician: Socrates Ambrosio MD    Visit Date: 12/8/2020    Physician Orders: PT Eval and Treat   Medical Diagnosis from Referral: M33.20 (ICD-10-CM) - Polymyositis R26.81 (ICD-10-CM) - Gait instability   Evaluation Date: 9/3/2020  Insurance Authorization Period: 09/01/2020 - 10/01/2020   Plan of Care Expiration: 11/03/2020   Updated POC Expiration: 01/03/2021   Visit # / Visits authorized: 18/ 20 19 total      Time In: 1015  Time Out: 1100  Total Billable Time: 45 minutes     Precautions: Standard and Fall      Missed visits: 0  Cancelled visits: 2  Subjective     Pt reports: no complaints upon arrival  He was compliant with home exercise program.    Response to previous treatment: No adverse effects reported   Functional change: Ongoing     Pain: 0/10  Location:  n/a     Objective     Nura received therapeutic exercises to develop strength for 30 minutes including:    EOM:  Reclined back onto wedge <> uprighting sitting 2 x 10 reps  Lateral lean to L forearm <> R forearm 2 x 10 reps  Pushing target laterally out of [place] to reach target, x 10 reps bilaterally    Nura participated in neuromuscular re-education activities to improve: Balance for 0 minutes. The following activities were included:      Nura participated in dynamic functional therapeutic activities to improve functional performance for 15 minutes, including:     Scooter <> mat Setup Assist, increased time to perform, slide board and SBA  Sit to supine with Mod A .  Mod A to manage B LE's  Supine to sit with Mod A.  Mod A to elevate trunk.     Standing frame: with occasional cues to bring hips and shoulders fwd instead of leaning back onto harness   Trial 1: 10 minutes   Static standing   Unilateral Scap Squeezes   Glute squeezes           Nura participated in gait training to improve functional mobility and safety for 0  minutes:      Home Exercises Provided and Patient Education Provided     Education provided:   - POC, Proper technique with therapeutic interventions, Benefits/purpose of today's therapeutic interventions    Written Home Exercises Provided: no    Assessment   Nura tolerated today's treatment session well. With attempts of forward weightshifts to teach head/hip strategy, he exhibited fear and no hip unweighting. Plan to progress trunk strengthening next visit. Continued PT is necessary to progress LE strengthening, trunk strengthening, as well as teach compensatory movements to improve functional independence.    Nura is progressing well towards his goals.   Pt prognosis is Fair.     Pt will continue to benefit from skilled outpatient physical therapy to address the deficits listed in the problem list box on initial evaluation, provide pt/family education and to maximize pt's level of independence in the home and community environment.     Pt's spiritual, cultural and educational needs considered and pt agreeable to plan of care and goals.     Anticipated barriers to physical therapy: comorbidities, requires transportation assist via [employer] for transportation     Goals:  Short Term Goal = 4 weeks Initial -09/03/2020 10/08/2020 11/03/2020 12/03/2020 Progress Status   1. Patient will be independent with HEP emphasizing LE and trunk strengthening Requires education Requires education Initiated; to be updated as appropriate Partial compliance Progressing   2. Patient will exhibit increased bilateral hip and knee manual muscle test score by 1/2 grade for improved biomechanics with functional tasks. R hip flexion: 2-/5  L hip flexion: 2-/5  R hip extension: 2-/5  L hip extension: 2-/5  B hip abduction: trace  Bilateral hip adduction: trace  B knee extension: trace  B knee flexion: 2-/5   R hip flexion: 2+/5   L hip flexion:  2+/5  R hip extension: 2-/5  L hip extension: 2/5  R hip abduction: 2/5  L hip aduction: 2-/5  Bilateral hip adduction: 2-/5  B knee extension: 2/5  B knee flexion: 2-/5 R hip flexion: 2+/5   L hip flexion: 2+/5  R hip extension: 2/5  L hip extension: 2/5  R hip abduction: 2/5  L hip aduction: 2/5  R hip adduction: 2-/5  L hip adduction: 2/5  B knee extension: 2/5  R knee flexion: 2-/5  L knee flexion: 2/5 R hip flexion: 2/5   L hip flexion: 2/5  R hip extension: 2/5  L hip extension: 2/5  R hip abduction: 2/5  L hip aduction: 2/5  R hip adduction: 2/5  L hip adduction: 2/5  B knee extension: 2/5  R knee flexion: 2/5  L knee flexion: 2/5 Progressing   3. Patient will tolerate 15 minutes of B LE weightbearing in standing frame. Not performed on eval date 5 minutes 15 minutes with signs and reports of fatigue 15 minutes without fatigue MET 12/03/2020   4. Patient will perform slide board transfer with Min A to improve efficiency of transfers.  Upgrade: Patient will perform slide board transfer with Mod I to improve efficiency of transfers. Not performed on eval date. Inefficient scoot transfer requiring Setup A Set up Assist and SBA Set up Assist and SBA Set up Assist and SBA Met 10/08/2020  Goal Upgraded     Upgraded goal - Ongoing   5. Patient will perform supine > sit transfer with Min A for improve household independence Mod A Mod A Mod A Mod A Ongoing     Long Term Goal = 8 weeks Initial - 09/03/2020 10/08/2020 11/03/2020 12/03/2020 Progress Status   1. Patient will perform sit <> stand transfer with Max A for improved functional independence Max A to clear buttock Max A to clear buttock  Max A x 2 to stand Max A x 2 in //bars to stabilize trunk, B hips, and B kness DNT this date Ongoing   2. Patient will tolerate 60 seconds of standing with bilateral UE support Unable DNT ~20 seconds DNT this date Ongoing   3. Patient will perform squat pivot transfer with Mod A for improved functional independence and decrease  shearing force Not performed on eval date. Inefficient scoot transfer requiring Setup A DNT Max A x 2 DNT this date Ongoing       Plan   Continue standing frame. Alternate LE strengthening and trunk strengthening.     Jessica Mcpherson, PT

## 2020-12-09 ENCOUNTER — PES CALL (OUTPATIENT)
Dept: ADMINISTRATIVE | Facility: CLINIC | Age: 65
End: 2020-12-09

## 2020-12-09 ENCOUNTER — PATIENT MESSAGE (OUTPATIENT)
Dept: INTERNAL MEDICINE | Facility: CLINIC | Age: 65
End: 2020-12-09

## 2020-12-09 ENCOUNTER — TELEPHONE (OUTPATIENT)
Dept: INTERNAL MEDICINE | Facility: CLINIC | Age: 65
End: 2020-12-09

## 2020-12-09 NOTE — PROGRESS NOTES
Physical Therapy Daily Treatment Note     Name: Nura Kahn Jr.  Clinic Number: [id number]    Therapy Diagnosis:   Encounter Diagnosis   Name Primary?    Impaired functional mobility and endurance      Physician: Socrates Ambrosio MD    Visit Date: 12/10/2020    Physician Orders: PT Eval and Treat   Medical Diagnosis from Referral: M33.20 (ICD-10-CM) - Polymyositis R26.81 (ICD-10-CM) - Gait instability   Evaluation Date: 9/3/2020  Insurance Authorization Period: 09/01/2020 - 10/01/2020   Plan of Care Expiration: 11/03/2020   Updated POC Expiration: 01/03/2021   Visit # / Visits authorized: 19/ 20 20 total      Time In: 1000   Time Out: 1045   Total Billable Time: 45 minutes     Precautions: Standard and Fall      Missed visits: 0  Cancelled visits: 2  Subjective     Pt reports: no complaints upon arrival   He was compliant with home exercise program.    Response to previous treatment: No adverse effects reported   Functional change: Ongoing     Pain: 0/10  Location:  n/a     Objective     Nura received therapeutic exercises to develop strength for 35 minutes including:    Longsitting:  Reclined back onto wedge <> uprighting sitting x 10 reps  Fwd lean <> upright sitting x 10 reps  Scap squeezes x 10 reps bilaterally  BUE lifts x 10 reps  Unilateral UE lifts x 10 reps, AA  Trunk extension/anterior pelvic tilt x 20 reps    Nura participated in neuromuscular re-education activities to improve: Balance for 0 minutes. The following activities were included:      Nura participated in dynamic functional therapeutic activities to improve functional performance for 10 minutes, including:     EOM:  Scooting with head/hips principle education x 10 minutes    Scooter <> mat Setup Assist, increased time to perform, slide board and SBA  Sit to supine with Mod A .  Mod A to manage B LE's  Supine to sit with Mod A.  Mod A to elevate trunk.     Standing frame (not performed this date): with occasional cues to bring  hips and shoulders fwd instead of leaning back onto harness   Static standing   Unilateral Scap Squeezes   Glute squeezes          Nura participated in gait training to improve functional mobility and safety for 0  minutes:      Home Exercises Provided and Patient Education Provided     Education provided:   - POC, Proper technique with therapeutic interventions, Benefits/purpose of today's therapeutic interventions    Written Home Exercises Provided: no    Assessment   Nura tolerated today's treatment session well. With head-hips strategy, he was able to achieve contralateral buttock clearance, but was unable to generate enough force to achieve the scoot. He did well with longsitting trunk and UE strengthening; several good reps of trunk extension range was achieved. PT to continue per established POC, progressing trunk strengthening and LE strengthening as tolerated.     Nura is progressing well towards his goals.   Pt prognosis is Fair.     Pt will continue to benefit from skilled outpatient physical therapy to address the deficits listed in the problem list box on initial evaluation, provide pt/family education and to maximize pt's level of independence in the home and community environment.     Pt's spiritual, cultural and educational needs considered and pt agreeable to plan of care and goals.     Anticipated barriers to physical therapy: comorbidities, requires transportation assist via [employer] for transportation     Goals:  Short Term Goal = 4 weeks Initial -09/03/2020 10/08/2020 11/03/2020 12/03/2020 Progress Status   1. Patient will be independent with HEP emphasizing LE and trunk strengthening Requires education Requires education Initiated; to be updated as appropriate Partial compliance Progressing   2. Patient will exhibit increased bilateral hip and knee manual muscle test score by 1/2 grade for improved biomechanics with functional tasks. R hip flexion: 2-/5  L hip flexion: 2-/5  R hip  extension: 2-/5  L hip extension: 2-/5  B hip abduction: trace  Bilateral hip adduction: trace  B knee extension: trace  B knee flexion: 2-/5   R hip flexion: 2+/5   L hip flexion: 2+/5  R hip extension: 2-/5  L hip extension: 2/5  R hip abduction: 2/5  L hip aduction: 2-/5  Bilateral hip adduction: 2-/5  B knee extension: 2/5  B knee flexion: 2-/5 R hip flexion: 2+/5   L hip flexion: 2+/5  R hip extension: 2/5  L hip extension: 2/5  R hip abduction: 2/5  L hip aduction: 2/5  R hip adduction: 2-/5  L hip adduction: 2/5  B knee extension: 2/5  R knee flexion: 2-/5  L knee flexion: 2/5 R hip flexion: 2/5   L hip flexion: 2/5  R hip extension: 2/5  L hip extension: 2/5  R hip abduction: 2/5  L hip aduction: 2/5  R hip adduction: 2/5  L hip adduction: 2/5  B knee extension: 2/5  R knee flexion: 2/5  L knee flexion: 2/5 Progressing   3. Patient will tolerate 15 minutes of B LE weightbearing in standing frame. Not performed on eval date 5 minutes 15 minutes with signs and reports of fatigue 15 minutes without fatigue MET 12/03/2020   4. Patient will perform slide board transfer with Min A to improve efficiency of transfers.  Upgrade: Patient will perform slide board transfer with Mod I to improve efficiency of transfers. Not performed on eval date. Inefficient scoot transfer requiring Setup A Set up Assist and SBA Set up Assist and SBA Set up Assist and SBA Met 10/08/2020  Goal Upgraded     Upgraded goal - Ongoing   5. Patient will perform supine > sit transfer with Min A for improve household independence Mod A Mod A Mod A Mod A Ongoing     Long Term Goal = 8 weeks Initial - 09/03/2020 10/08/2020 11/03/2020 12/03/2020 Progress Status   1. Patient will perform sit <> stand transfer with Max A for improved functional independence Max A to clear buttock Max A to clear buttock  Max A x 2 to stand Max A x 2 in //bars to stabilize trunk, B hips, and B kness DNT this date Ongoing   2. Patient will tolerate 60 seconds of  standing with bilateral UE support Unable DNT ~20 seconds DNT this date Ongoing   3. Patient will perform squat pivot transfer with Mod A for improved functional independence and decrease shearing force Not performed on eval date. Inefficient scoot transfer requiring Setup A DNT Max A x 2 DNT this date Ongoing       Plan   Continue standing frame. Alternate LE strengthening and trunk strengthening.     Jessica Mcpherson, PT

## 2020-12-10 ENCOUNTER — PES CALL (OUTPATIENT)
Dept: ADMINISTRATIVE | Facility: CLINIC | Age: 65
End: 2020-12-10

## 2020-12-10 ENCOUNTER — CLINICAL SUPPORT (OUTPATIENT)
Dept: REHABILITATION | Facility: HOSPITAL | Age: 65
End: 2020-12-10
Payer: MEDICARE

## 2020-12-10 DIAGNOSIS — Z74.09 IMPAIRED FUNCTIONAL MOBILITY AND ENDURANCE: ICD-10-CM

## 2020-12-10 PROCEDURE — 97530 THERAPEUTIC ACTIVITIES: CPT | Mod: HCNC,PO

## 2020-12-10 PROCEDURE — 97110 THERAPEUTIC EXERCISES: CPT | Mod: HCNC,PO

## 2020-12-11 ENCOUNTER — PATIENT MESSAGE (OUTPATIENT)
Dept: OTHER | Facility: OTHER | Age: 65
End: 2020-12-11

## 2020-12-11 ENCOUNTER — PATIENT MESSAGE (OUTPATIENT)
Dept: INTERNAL MEDICINE | Facility: CLINIC | Age: 65
End: 2020-12-11

## 2020-12-11 NOTE — PROGRESS NOTES
Physical Therapy Daily Treatment Note     Name: Nura Kahn Jr.  Clinic Number: [id number]    Therapy Diagnosis:   Encounter Diagnosis   Name Primary?    Impaired functional mobility and endurance      Physician: Socrates Ambrosio MD    Visit Date: 12/15/2020    Physician Orders: PT Eval and Treat   Medical Diagnosis from Referral: M33.20 (ICD-10-CM) - Polymyositis R26.81 (ICD-10-CM) - Gait instability   Evaluation Date: 9/3/2020  Insurance Authorization Period: 09/01/2020 - 10/01/2020   Plan of Care Expiration: 11/03/2020   Updated POC Expiration: 01/03/2021   Visit # / Visits authorized: 20/ 20 21 total      Time In: 0930  Time Out: 1015   Total Billable Time: 45 minutes     Precautions: Standard and Fall      Missed visits: 0  Cancelled visits: 2  Subjective     Pt reports: no complaints upon arrival   He was compliant with home exercise program.    Response to previous treatment: No adverse effects reported   Functional change: Ongoing     Pain: 0/10  Location:  n/a     Objective     Nura received therapeutic exercises to develop strength for 25 minutes including:    Supine:   Hip Abduction <> hip adduction x 10 reps bilaterally, therapist eliminating friction  Hook-lying fallouts (hip abd <> add) x 10 reps, AA  Hip flexion <> resisted hip extension x 10 reps bilaterally    Longsitting: Not performed this date  Reclined back onto wedge <> uprighting sitting x 10 reps  Fwd lean <> upright sitting x 10 reps  Scap squeezes x 10 reps bilaterally  BUE lifts x 10 reps  Unilateral UE lifts x 10 reps, AA  Trunk extension/anterior pelvic tilt x 20 reps    Nura participated in neuromuscular re-education activities to improve: Balance for 0 minutes. The following activities were included:      Nura participated in dynamic functional therapeutic activities to improve functional performance for 20 minutes, including:    Scooter <> mat Setup Assist, increased time to perform, slide board and SBA  Sit to supine  with Mod A.  Mod A to manage B LE's  Supine to sit with Mod A.  Mod A to elevate trunk.     Standing frame x 15 minutes: with occasional cues to bring hips and shoulders fwd instead of leaning back onto harness   Static standing   Unilateral Scap Squeezes   Glute squeezes   Lateral weight shifts    Quad sets        Nura participated in gait training to improve functional mobility and safety for 0  minutes:      Home Exercises Provided and Patient Education Provided     Education provided:   - POC, Proper technique with therapeutic interventions, Benefits/purpose of today's therapeutic interventions    Written Home Exercises Provided: no    Assessment   Nura tolerated today's treatment session well. He exhibits improved LE strength in gravity eliminated conditions, but continued difficulty against gravity. PT to continue per established POC to progress LE and trunk strength as well as LE weightbearing.     Nura is progressing well towards his goals.   Pt prognosis is Fair.     Pt will continue to benefit from skilled outpatient physical therapy to address the deficits listed in the problem list box on initial evaluation, provide pt/family education and to maximize pt's level of independence in the home and community environment.     Pt's spiritual, cultural and educational needs considered and pt agreeable to plan of care and goals.     Anticipated barriers to physical therapy: comorbidities, requires transportation assist via [employer] for transportation     Goals:  Short Term Goal = 4 weeks Initial -09/03/2020 10/08/2020 11/03/2020 12/03/2020 Progress Status   1. Patient will be independent with HEP emphasizing LE and trunk strengthening Requires education Requires education Initiated; to be updated as appropriate Partial compliance Progressing   2. Patient will exhibit increased bilateral hip and knee manual muscle test score by 1/2 grade for improved biomechanics with functional tasks. R hip flexion: 2-/5  L  hip flexion: 2-/5  R hip extension: 2-/5  L hip extension: 2-/5  B hip abduction: trace  Bilateral hip adduction: trace  B knee extension: trace  B knee flexion: 2-/5   R hip flexion: 2+/5   L hip flexion: 2+/5  R hip extension: 2-/5  L hip extension: 2/5  R hip abduction: 2/5  L hip aduction: 2-/5  Bilateral hip adduction: 2-/5  B knee extension: 2/5  B knee flexion: 2-/5 R hip flexion: 2+/5   L hip flexion: 2+/5  R hip extension: 2/5  L hip extension: 2/5  R hip abduction: 2/5  L hip aduction: 2/5  R hip adduction: 2-/5  L hip adduction: 2/5  B knee extension: 2/5  R knee flexion: 2-/5  L knee flexion: 2/5 R hip flexion: 2/5   L hip flexion: 2/5  R hip extension: 2/5  L hip extension: 2/5  R hip abduction: 2/5  L hip aduction: 2/5  R hip adduction: 2/5  L hip adduction: 2/5  B knee extension: 2/5  R knee flexion: 2/5  L knee flexion: 2/5 Progressing   3. Patient will tolerate 15 minutes of B LE weightbearing in standing frame. Not performed on eval date 5 minutes 15 minutes with signs and reports of fatigue 15 minutes without fatigue MET 12/03/2020   4. Patient will perform slide board transfer with Min A to improve efficiency of transfers.  Upgrade: Patient will perform slide board transfer with Mod I to improve efficiency of transfers. Not performed on eval date. Inefficient scoot transfer requiring Setup A Set up Assist and SBA Set up Assist and SBA Set up Assist and SBA Met 10/08/2020  Goal Upgraded     Upgraded goal - Ongoing   5. Patient will perform supine > sit transfer with Min A for improve household independence Mod A Mod A Mod A Mod A Ongoing     Long Term Goal = 8 weeks Initial - 09/03/2020 10/08/2020 11/03/2020 12/03/2020 Progress Status   1. Patient will perform sit <> stand transfer with Max A for improved functional independence Max A to clear buttock Max A to clear buttock  Max A x 2 to stand Max A x 2 in //bars to stabilize trunk, B hips, and B kness DNT this date Ongoing   2. Patient will  tolerate 60 seconds of standing with bilateral UE support Unable DNT ~20 seconds DNT this date Ongoing   3. Patient will perform squat pivot transfer with Mod A for improved functional independence and decrease shearing force Not performed on eval date. Inefficient scoot transfer requiring Setup A DNT Max A x 2 DNT this date Ongoing       Plan   Continue standing frame. Alternate LE strengthening and trunk strengthening.     Jessica Mcpherson, PT

## 2020-12-14 ENCOUNTER — SPECIALTY PHARMACY (OUTPATIENT)
Dept: PHARMACY | Facility: CLINIC | Age: 65
End: 2020-12-14

## 2020-12-15 ENCOUNTER — CLINICAL SUPPORT (OUTPATIENT)
Dept: REHABILITATION | Facility: HOSPITAL | Age: 65
End: 2020-12-15
Payer: MEDICARE

## 2020-12-15 DIAGNOSIS — Z74.09 IMPAIRED FUNCTIONAL MOBILITY AND ENDURANCE: ICD-10-CM

## 2020-12-15 PROCEDURE — 97110 THERAPEUTIC EXERCISES: CPT | Mod: HCNC,PO

## 2020-12-15 PROCEDURE — 97530 THERAPEUTIC ACTIVITIES: CPT | Mod: HCNC,PO

## 2020-12-16 ENCOUNTER — PATIENT MESSAGE (OUTPATIENT)
Dept: INTERNAL MEDICINE | Facility: CLINIC | Age: 65
End: 2020-12-16

## 2020-12-17 RX ORDER — SODIUM CHLORIDE 0.9 % (FLUSH) 0.9 %
10 SYRINGE (ML) INJECTION
Status: CANCELLED | OUTPATIENT
Start: 2020-12-21

## 2020-12-17 RX ORDER — ACETAMINOPHEN 325 MG/1
325 TABLET ORAL EVERY 4 HOURS PRN
Status: CANCELLED | OUTPATIENT
Start: 2020-12-21

## 2020-12-17 RX ORDER — HEPARIN 100 UNIT/ML
500 SYRINGE INTRAVENOUS
Status: CANCELLED | OUTPATIENT
Start: 2020-12-21

## 2020-12-29 ENCOUNTER — CLINICAL SUPPORT (OUTPATIENT)
Dept: REHABILITATION | Facility: HOSPITAL | Age: 65
End: 2020-12-29
Payer: MEDICARE

## 2020-12-29 DIAGNOSIS — Z74.09 IMPAIRED FUNCTIONAL MOBILITY AND ENDURANCE: ICD-10-CM

## 2020-12-29 PROCEDURE — 97110 THERAPEUTIC EXERCISES: CPT | Mod: HCNC,PO

## 2020-12-29 NOTE — PROGRESS NOTES
Physical Therapy Daily Treatment Note     Name: Nura Kahn Jr.  Clinic Number: [id number]    Therapy Diagnosis:   No diagnosis found.  Physician: Socrates Ambrosio MD    Visit Date: 12/29/2020    Physician Orders: PT Eval and Treat   Medical Diagnosis from Referral: M33.20 (ICD-10-CM) - Polymyositis R26.81 (ICD-10-CM) - Gait instability   Evaluation Date: 9/3/2020  Insurance Authorization Period: 07/17/2020 - 03/17/2021   Plan of Care Expiration: 11/03/2020   Updated POC Expiration: 01/03/2021   Visit # / Visits authorized: 1/20 21 total      Time In: 1030  Time Out: 1100  Total Billable Time: 30 minutes     Precautions: Standard and Fall      Missed visits: 0  Cancelled visits: 2  Subjective     Pt reports: no complaints upon arrival. He arrived late due to his transportation  He was compliant with home exercise program.    Response to previous treatment: No adverse effects reported   Functional change: Ongoing     Pain: 0/10  Location:  n/a     Objective     Nura received therapeutic exercises to develop strength for 20 minutes including:    Supine:   Hip Abduction <> hip adduction x 10 reps bilaterally, therapist eliminating friction  Hook-lying fallouts (isometric hip abd <> AA add) x 10 reps  Hip flexion <> resisted hip extension x 10 reps bilaterally  SAQ x 10 reps bilaterally, AA    Longsitting: Not performed this date  Reclined back onto wedge <> uprighting sitting x 10 reps  Fwd lean <> upright sitting x 10 reps  Scap squeezes x 10 reps bilaterally  BUE lifts x 10 reps  Unilateral UE lifts x 10 reps, AA  Trunk extension/anterior pelvic tilt x 20 reps    Nura participated in neuromuscular re-education activities to improve: Balance for 0 minutes. The following activities were included:      Nura participated in dynamic functional therapeutic activities to improve functional performance for 10 minutes, including:    Scooter <> mat Setup Assist, increased time to perform, slide board and  SBA  Sit to supine with Mod A.  Mod A to manage B LE's  Supine to sit with Mod A.  Mod A to elevate trunk.     Standing frame x 15 minutes: with occasional cues to bring hips and shoulders fwd instead of leaning back onto harness (Not performed this date)   Static standing   Unilateral Scap Squeezes   Glute squeezes   Lateral weight shifts    Quad sets        Nura participated in gait training to improve functional mobility and safety for 0  minutes:      Home Exercises Provided and Patient Education Provided     Education provided:   - POC, Proper technique with therapeutic interventions, Benefits/purpose of today's therapeutic interventions    Written Home Exercises Provided: no    Assessment   Nura tolerated today's treatment session well. Participation was limited due to late arrival. Minimal active range against gravity noted. PT to reassess for progress next visit with POC discussion regarding discharge to follow.     Nura is progressing well towards his goals.   Pt prognosis is Fair.     Pt will continue to benefit from skilled outpatient physical therapy to address the deficits listed in the problem list box on initial evaluation, provide pt/family education and to maximize pt's level of independence in the home and community environment.     Pt's spiritual, cultural and educational needs considered and pt agreeable to plan of care and goals.     Anticipated barriers to physical therapy: comorbidities, requires transportation assist via [employer] for transportation     Goals:  Short Term Goal = 4 weeks Initial -09/03/2020 10/08/2020 11/03/2020 12/03/2020 Progress Status   1. Patient will be independent with HEP emphasizing LE and trunk strengthening Requires education Requires education Initiated; to be updated as appropriate Partial compliance Progressing   2. Patient will exhibit increased bilateral hip and knee manual muscle test score by 1/2 grade for improved biomechanics with functional tasks. R  hip flexion: 2-/5  L hip flexion: 2-/5  R hip extension: 2-/5  L hip extension: 2-/5  B hip abduction: trace  Bilateral hip adduction: trace  B knee extension: trace  B knee flexion: 2-/5   R hip flexion: 2+/5   L hip flexion: 2+/5  R hip extension: 2-/5  L hip extension: 2/5  R hip abduction: 2/5  L hip aduction: 2-/5  Bilateral hip adduction: 2-/5  B knee extension: 2/5  B knee flexion: 2-/5 R hip flexion: 2+/5   L hip flexion: 2+/5  R hip extension: 2/5  L hip extension: 2/5  R hip abduction: 2/5  L hip aduction: 2/5  R hip adduction: 2-/5  L hip adduction: 2/5  B knee extension: 2/5  R knee flexion: 2-/5  L knee flexion: 2/5 R hip flexion: 2/5   L hip flexion: 2/5  R hip extension: 2/5  L hip extension: 2/5  R hip abduction: 2/5  L hip aduction: 2/5  R hip adduction: 2/5  L hip adduction: 2/5  B knee extension: 2/5  R knee flexion: 2/5  L knee flexion: 2/5 Progressing   3. Patient will tolerate 15 minutes of B LE weightbearing in standing frame. Not performed on eval date 5 minutes 15 minutes with signs and reports of fatigue 15 minutes without fatigue MET 12/03/2020   4. Patient will perform slide board transfer with Min A to improve efficiency of transfers.  Upgrade: Patient will perform slide board transfer with Mod I to improve efficiency of transfers. Not performed on eval date. Inefficient scoot transfer requiring Setup A Set up Assist and SBA Set up Assist and SBA Set up Assist and SBA Met 10/08/2020  Goal Upgraded     Upgraded goal - Ongoing   5. Patient will perform supine > sit transfer with Min A for improve household independence Mod A Mod A Mod A Mod A Ongoing     Long Term Goal = 8 weeks Initial - 09/03/2020 10/08/2020 11/03/2020 12/03/2020 Progress Status   1. Patient will perform sit <> stand transfer with Max A for improved functional independence Max A to clear buttock Max A to clear buttock  Max A x 2 to stand Max A x 2 in //bars to stabilize trunk, B hips, and B kness DNT this date Ongoing    2. Patient will tolerate 60 seconds of standing with bilateral UE support Unable DNT ~20 seconds DNT this date Ongoing   3. Patient will perform squat pivot transfer with Mod A for improved functional independence and decrease shearing force Not performed on eval date. Inefficient scoot transfer requiring Setup A DNT Max A x 2 DNT this date Ongoing       Plan   Continue standing frame. Alternate LE strengthening and trunk strengthening.     Jessica Mcpherson, PT

## 2020-12-31 ENCOUNTER — EXTERNAL CHRONIC CARE MANAGEMENT (OUTPATIENT)
Dept: PRIMARY CARE CLINIC | Facility: CLINIC | Age: 65
End: 2020-12-31
Payer: MEDICARE

## 2020-12-31 ENCOUNTER — CLINICAL SUPPORT (OUTPATIENT)
Dept: REHABILITATION | Facility: HOSPITAL | Age: 65
End: 2020-12-31
Payer: MEDICARE

## 2020-12-31 DIAGNOSIS — Z74.09 IMPAIRED FUNCTIONAL MOBILITY AND ENDURANCE: ICD-10-CM

## 2020-12-31 PROCEDURE — 99489 CPLX CHRNC CARE EA ADDL 30: CPT | Mod: S$GLB,,, | Performed by: FAMILY MEDICINE

## 2020-12-31 PROCEDURE — 97110 THERAPEUTIC EXERCISES: CPT | Mod: HCNC,PO

## 2020-12-31 PROCEDURE — 99487 PR COMPLX CHRON CARE MGMT, 1ST HR, PER MONTH: ICD-10-PCS | Mod: S$GLB,,, | Performed by: FAMILY MEDICINE

## 2020-12-31 PROCEDURE — 99487 CPLX CHRNC CARE 1ST 60 MIN: CPT | Mod: S$GLB,,, | Performed by: FAMILY MEDICINE

## 2020-12-31 PROCEDURE — 99489 PR COMPLX CHRON CARE MGMT, EA ADDTL 30 MIN, PER MONTH: ICD-10-PCS | Mod: S$GLB,,, | Performed by: FAMILY MEDICINE

## 2020-12-31 PROCEDURE — 97530 THERAPEUTIC ACTIVITIES: CPT | Mod: HCNC,PO

## 2020-12-31 NOTE — PLAN OF CARE
Physical Therapy Daily Treatment Note     Name: Nura Kahn Jr.  Clinic Number: [id number]    Therapy Diagnosis:   Encounter Diagnosis   Name Primary?    Impaired functional mobility and endurance      Physician: Socrates Ambrosio MD    Visit Date: 12/31/2020    Physician Orders: PT Eval and Treat   Medical Diagnosis from Referral: M33.20 (ICD-10-CM) - Polymyositis R26.81 (ICD-10-CM) - Gait instability   Evaluation Date: 9/3/2020  Insurance Authorization Period: 07/17/2020 - 03/17/2021   Plan of Care Expiration: 11/03/2020   Updated POC Expiration: 01/03/2021   Visit # / Visits authorized: 2/20 22 total      Time In: 1000  Time Out: 1045  Total Billable Time: 45 minutes     Precautions: Standard and Fall      Missed visits: 0  Cancelled visits: 2  Subjective     Pt reports: no complaints upon arrival. He does not feel that his strength is improving.  He was compliant with home exercise program.    Response to previous treatment: No adverse effects reported   Functional change: Ongoing     Pain: 0/10  Location:  n/a     Objective     Nura received therapeutic exercises to develop strength for 20 minutes including:    Lower Extremity Strength  Right LE Evaluation 12/31/2020 Left LE Evaluation 12/31/2020   Hip Flexion: 2/5 Hip Flexion: 2/5   Hip Extension:  2/5 Hip Extension: 2/5   Hip Abduction: 2/5 Hip Abduction: 2/5   Hip Adduction: 2-/5 Hip Adduction trace   Knee Extension: 2/5 Knee Extension: 2/5   Knee Flexion: 2-/5 Knee Flexion: 2-/5   Ankle Dorsiflexion: 3-/5 Ankle Dorsiflexion: 3-/5   Ankle Plantarflexion: 3/5 Ankle Plantarflexion: 3/5       Nura participated in neuromuscular re-education activities to improve: Balance for 0 minutes. The following activities were included:      Nura participated in dynamic functional therapeutic activities to improve functional performance for 25 minutes, including:    Scooter <> mat Setup Assist, increased time to perform, slide board and SBA  Sit to supine  with Mod A.  Mod A to manage B LE's  Supine to sit with Mod A.  Mod A to elevate trunk     Standing frame x 15 minutes: with occasional cues to bring hips and shoulders fwd instead of leaning back onto harness    Static standing   Unilateral Scap Squeezes   Glute squeezes   Lateral weight shifts    Quad sets        Nura participated in gait training to improve functional mobility and safety for 0  minutes:      Home Exercises Provided and Patient Education Provided     Education provided:   - POC, Proper technique with therapeutic interventions, Benefits/purpose of today's therapeutic interventions    Written Home Exercises Provided: no    Assessment   Reassessment period: 12/03/2020-12/31/2020. Pt reassessed for progress today, exhibiting no change since prior reassessment. In fact, LE strength has declined slightly on the LLE. He exhibits improved postural awareness, but no significant change has been achieved towards improving functional mobility. Recommending continued PT for 1 additional month, emphasizing trunk control and LE strengthening as appropriate, with likely d/c at the end of January. Therapist recommended patient look into purchasing a standing frame for home use.       Nura is progressing well towards his goals.   Pt prognosis is Fair.     Pt will continue to benefit from skilled outpatient physical therapy to address the deficits listed in the problem list box on initial evaluation, provide pt/family education and to maximize pt's level of independence in the home and community environment.     Pt's spiritual, cultural and educational needs considered and pt agreeable to plan of care and goals.     Anticipated barriers to physical therapy: comorbidities, requires transportation assist via [employer] for transportation     Goals:  Short Term Goal = 4 weeks Initial -09/03/2020 10/08/2020 11/03/2020 12/03/2020 12/31/2020 Progress Status   1. Patient will be independent with HEP emphasizing LE and  trunk strengthening Requires education Requires education Initiated; to be updated as appropriate Partial compliance Partial compliance Progressing   2. Patient will exhibit increased bilateral hip and knee manual muscle test score by 1/2 grade for improved biomechanics with functional tasks. R hip flexion: 2-/5  L hip flexion: 2-/5  R hip extension: 2-/5  L hip extension: 2-/5  B hip abduction: trace  Bilateral hip adduction: trace  B knee extension: trace  B knee flexion: 2-/5   R hip flexion: 2+/5   L hip flexion: 2+/5  R hip extension: 2-/5  L hip extension: 2/5  R hip abduction: 2/5  L hip aduction: 2-/5  Bilateral hip adduction: 2-/5  B knee extension: 2/5  B knee flexion: 2-/5 R hip flexion: 2+/5   L hip flexion: 2+/5  R hip extension: 2/5  L hip extension: 2/5  R hip abduction: 2/5  L hip aduction: 2/5  R hip adduction: 2-/5  L hip adduction: 2/5  B knee extension: 2/5  R knee flexion: 2-/5  L knee flexion: 2/5 R hip flexion: 2/5   L hip flexion: 2/5  R hip extension: 2/5  L hip extension: 2/5  R hip abduction: 2/5  L hip aduction: 2/5  R hip adduction: 2/5  L hip adduction: 2/5  B knee extension: 2/5  R knee flexion: 2/5  L knee flexion: 2/5 R hip flexion: 2/5   L hip flexion: 2/5  R hip extension: 2/5  L hip extension: 2/5  R hip abduction: 2/5  L hip aduction: 2/5  R hip adduction: 2/5  L hip adduction: trace  B knee extension: 2/5  R knee flexion: 2/5  L knee flexion: 2-/5 Progressing   3. Patient will tolerate 15 minutes of B LE weightbearing in standing frame. Not performed on eval date 5 minutes 15 minutes with signs and reports of fatigue 15 minutes without fatigue 15 minutes without fatigue MET 12/03/2020   4. Patient will perform slide board transfer with Min A to improve efficiency of transfers.  Upgrade: Patient will perform slide board transfer with Mod I to improve efficiency of transfers. Not performed on eval date. Inefficient scoot transfer requiring Setup A Set up Assist and SBA Set up  Assist and SBA Set up Assist and SBA Set up Assist and SBA Met 10/08/2020  Goal Upgraded     Upgraded goal - Ongoing   5. Patient will perform supine > sit transfer with Min A for improve household independence Mod A Mod A Mod A Mod A Mod A Ongoing     Long Term Goal = 8 weeks Initial - 09/03/2020 10/08/2020 11/03/2020 12/03/2020 12/31/2020 Progress Status   1. Patient will perform sit <> stand transfer with Max A for improved functional independence Max A to clear buttock Max A to clear buttock  Max A x 2 to stand Max A x 2 in //bars to stabilize trunk, B hips, and B kness DNT this date DNT this date Ongoing   2. Patient will tolerate 60 seconds of standing with bilateral UE support Unable DNT ~20 seconds Max A DNT this date DNT this date Ongoing   3. Patient will perform squat pivot transfer with Mod A for improved functional independence and decrease shearing force Not performed on eval date. Inefficient scoot transfer requiring Setup A DNT Max A x 2 DNT this date DNT this date Ongoing       Plan   Continue standing frame. Alternate LE strengthening and trunk strengthening.     Jessica Mcpherson, PT

## 2020-12-31 NOTE — PROGRESS NOTES
Physical Therapy Daily Treatment Note     Name: Nura Kahn Jr.  Clinic Number: [id number]    Therapy Diagnosis:   Encounter Diagnosis   Name Primary?    Impaired functional mobility and endurance      Physician: Socrates Ambrosio MD    Visit Date: 12/31/2020    Physician Orders: PT Eval and Treat   Medical Diagnosis from Referral: M33.20 (ICD-10-CM) - Polymyositis R26.81 (ICD-10-CM) - Gait instability   Evaluation Date: 9/3/2020  Insurance Authorization Period: 07/17/2020 - 03/17/2021   Plan of Care Expiration: 11/03/2020   Updated POC Expiration: 01/03/2021   Visit # / Visits authorized: 2/20 22 total      Time In: 1000  Time Out: 1045  Total Billable Time: 45 minutes     Precautions: Standard and Fall      Missed visits: 0  Cancelled visits: 2  Subjective     Pt reports: no complaints upon arrival. He does not feel that his strength is improving.  He was compliant with home exercise program.    Response to previous treatment: No adverse effects reported   Functional change: Ongoing     Pain: 0/10  Location:  n/a     Objective     Nura received therapeutic exercises to develop strength for 20 minutes including:    Lower Extremity Strength  Right LE Evaluation 12/31/2020 Left LE Evaluation 12/31/2020   Hip Flexion: 2/5 Hip Flexion: 2/5   Hip Extension:  2/5 Hip Extension: 2/5   Hip Abduction: 2/5 Hip Abduction: 2/5   Hip Adduction: 2-/5 Hip Adduction trace   Knee Extension: 2/5 Knee Extension: 2/5   Knee Flexion: 2-/5 Knee Flexion: 2-/5   Ankle Dorsiflexion: 3-/5 Ankle Dorsiflexion: 3-/5   Ankle Plantarflexion: 3/5 Ankle Plantarflexion: 3/5       Nura participated in neuromuscular re-education activities to improve: Balance for 0 minutes. The following activities were included:      Nura participated in dynamic functional therapeutic activities to improve functional performance for 25 minutes, including:    Scooter <> mat Setup Assist, increased time to perform, slide board and SBA  Sit to supine  with Mod A.  Mod A to manage B LE's  Supine to sit with Mod A.  Mod A to elevate trunk     Standing frame x 15 minutes: with occasional cues to bring hips and shoulders fwd instead of leaning back onto harness    Static standing   Unilateral Scap Squeezes   Glute squeezes   Lateral weight shifts    Quad sets        Nura participated in gait training to improve functional mobility and safety for 0  minutes:      Home Exercises Provided and Patient Education Provided     Education provided:   - POC, Proper technique with therapeutic interventions, Benefits/purpose of today's therapeutic interventions    Written Home Exercises Provided: no    Assessment   Reassessment period: 12/03/2020-12/31/2020. Pt reassessed for progress today, exhibiting no change since prior reassessment. In fact, LE strength has declined slightly on the LLE. He exhibits improved postural awareness, but no significant change has been achieved towards improving functional mobility. Recommending continued PT for 1 additional month, emphasizing trunk control and LE strengthening as appropriate, with likely d/c at the end of January. Therapist recommended patient look into purchasing a standing frame for home use.       Nura is progressing well towards his goals.   Pt prognosis is Fair.     Pt will continue to benefit from skilled outpatient physical therapy to address the deficits listed in the problem list box on initial evaluation, provide pt/family education and to maximize pt's level of independence in the home and community environment.     Pt's spiritual, cultural and educational needs considered and pt agreeable to plan of care and goals.     Anticipated barriers to physical therapy: comorbidities, requires transportation assist via [employer] for transportation     Goals:  Short Term Goal = 4 weeks Initial -09/03/2020 10/08/2020 11/03/2020 12/03/2020 12/31/2020 Progress Status   1. Patient will be independent with HEP emphasizing LE and  trunk strengthening Requires education Requires education Initiated; to be updated as appropriate Partial compliance Partial compliance Progressing   2. Patient will exhibit increased bilateral hip and knee manual muscle test score by 1/2 grade for improved biomechanics with functional tasks. R hip flexion: 2-/5  L hip flexion: 2-/5  R hip extension: 2-/5  L hip extension: 2-/5  B hip abduction: trace  Bilateral hip adduction: trace  B knee extension: trace  B knee flexion: 2-/5   R hip flexion: 2+/5   L hip flexion: 2+/5  R hip extension: 2-/5  L hip extension: 2/5  R hip abduction: 2/5  L hip aduction: 2-/5  Bilateral hip adduction: 2-/5  B knee extension: 2/5  B knee flexion: 2-/5 R hip flexion: 2+/5   L hip flexion: 2+/5  R hip extension: 2/5  L hip extension: 2/5  R hip abduction: 2/5  L hip aduction: 2/5  R hip adduction: 2-/5  L hip adduction: 2/5  B knee extension: 2/5  R knee flexion: 2-/5  L knee flexion: 2/5 R hip flexion: 2/5   L hip flexion: 2/5  R hip extension: 2/5  L hip extension: 2/5  R hip abduction: 2/5  L hip aduction: 2/5  R hip adduction: 2/5  L hip adduction: 2/5  B knee extension: 2/5  R knee flexion: 2/5  L knee flexion: 2/5 R hip flexion: 2/5   L hip flexion: 2/5  R hip extension: 2/5  L hip extension: 2/5  R hip abduction: 2/5  L hip aduction: 2/5  R hip adduction: 2/5  L hip adduction: trace  B knee extension: 2/5  R knee flexion: 2/5  L knee flexion: 2-/5 Progressing   3. Patient will tolerate 15 minutes of B LE weightbearing in standing frame. Not performed on eval date 5 minutes 15 minutes with signs and reports of fatigue 15 minutes without fatigue 15 minutes without fatigue MET 12/03/2020   4. Patient will perform slide board transfer with Min A to improve efficiency of transfers.  Upgrade: Patient will perform slide board transfer with Mod I to improve efficiency of transfers. Not performed on eval date. Inefficient scoot transfer requiring Setup A Set up Assist and SBA Set up  Assist and SBA Set up Assist and SBA Set up Assist and SBA Met 10/08/2020  Goal Upgraded     Upgraded goal - Ongoing   5. Patient will perform supine > sit transfer with Min A for improve household independence Mod A Mod A Mod A Mod A Mod A Ongoing     Long Term Goal = 8 weeks Initial - 09/03/2020 10/08/2020 11/03/2020 12/03/2020 12/31/2020 Progress Status   1. Patient will perform sit <> stand transfer with Max A for improved functional independence Max A to clear buttock Max A to clear buttock  Max A x 2 to stand Max A x 2 in //bars to stabilize trunk, B hips, and B kness DNT this date DNT this date Ongoing   2. Patient will tolerate 60 seconds of standing with bilateral UE support Unable DNT ~20 seconds Max A DNT this date DNT this date Ongoing   3. Patient will perform squat pivot transfer with Mod A for improved functional independence and decrease shearing force Not performed on eval date. Inefficient scoot transfer requiring Setup A DNT Max A x 2 DNT this date DNT this date Ongoing       Plan   Continue standing frame. Alternate LE strengthening and trunk strengthening.     Jessica Mcpherson, PT

## 2021-01-13 ENCOUNTER — PATIENT MESSAGE (OUTPATIENT)
Dept: PHARMACY | Facility: CLINIC | Age: 66
End: 2021-01-13

## 2021-01-14 ENCOUNTER — PATIENT MESSAGE (OUTPATIENT)
Dept: RHEUMATOLOGY | Facility: CLINIC | Age: 66
End: 2021-01-14

## 2021-01-15 ENCOUNTER — TELEPHONE (OUTPATIENT)
Dept: RHEUMATOLOGY | Facility: CLINIC | Age: 66
End: 2021-01-15

## 2021-01-20 ENCOUNTER — PATIENT MESSAGE (OUTPATIENT)
Dept: INTERNAL MEDICINE | Facility: CLINIC | Age: 66
End: 2021-01-20

## 2021-01-21 ENCOUNTER — PES CALL (OUTPATIENT)
Dept: ADMINISTRATIVE | Facility: CLINIC | Age: 66
End: 2021-01-21

## 2021-01-28 ENCOUNTER — PES CALL (OUTPATIENT)
Dept: ADMINISTRATIVE | Facility: CLINIC | Age: 66
End: 2021-01-28

## 2021-01-31 ENCOUNTER — EXTERNAL CHRONIC CARE MANAGEMENT (OUTPATIENT)
Dept: PRIMARY CARE CLINIC | Facility: CLINIC | Age: 66
End: 2021-01-31
Payer: MEDICARE

## 2021-01-31 PROCEDURE — 99487 PR COMPLX CHRON CARE MGMT, 1ST HR, PER MONTH: ICD-10-PCS | Mod: S$GLB,,, | Performed by: FAMILY MEDICINE

## 2021-01-31 PROCEDURE — 99489 CPLX CHRNC CARE EA ADDL 30: CPT | Mod: S$GLB,,, | Performed by: FAMILY MEDICINE

## 2021-01-31 PROCEDURE — 99487 CPLX CHRNC CARE 1ST 60 MIN: CPT | Mod: S$GLB,,, | Performed by: FAMILY MEDICINE

## 2021-01-31 PROCEDURE — 99489 PR COMPLX CHRON CARE MGMT, EA ADDTL 30 MIN, PER MONTH: ICD-10-PCS | Mod: S$GLB,,, | Performed by: FAMILY MEDICINE

## 2021-02-08 ENCOUNTER — DOCUMENTATION ONLY (OUTPATIENT)
Dept: REHABILITATION | Facility: HOSPITAL | Age: 66
End: 2021-02-08

## 2021-02-08 ENCOUNTER — SPECIALTY PHARMACY (OUTPATIENT)
Dept: PHARMACY | Facility: CLINIC | Age: 66
End: 2021-02-08

## 2021-02-08 DIAGNOSIS — Z74.09 IMPAIRED FUNCTIONAL MOBILITY AND ENDURANCE: Primary | ICD-10-CM

## 2021-02-11 ENCOUNTER — TELEPHONE (OUTPATIENT)
Dept: RHEUMATOLOGY | Facility: CLINIC | Age: 66
End: 2021-02-11

## 2021-02-23 ENCOUNTER — PATIENT MESSAGE (OUTPATIENT)
Dept: RHEUMATOLOGY | Facility: CLINIC | Age: 66
End: 2021-02-23

## 2021-02-23 ENCOUNTER — SPECIALTY PHARMACY (OUTPATIENT)
Dept: PHARMACY | Facility: CLINIC | Age: 66
End: 2021-02-23

## 2021-02-23 DIAGNOSIS — M33.20 POLYMYOSITIS: ICD-10-CM

## 2021-02-24 ENCOUNTER — PATIENT MESSAGE (OUTPATIENT)
Dept: RHEUMATOLOGY | Facility: CLINIC | Age: 66
End: 2021-02-24

## 2021-02-28 ENCOUNTER — EXTERNAL CHRONIC CARE MANAGEMENT (OUTPATIENT)
Dept: PRIMARY CARE CLINIC | Facility: CLINIC | Age: 66
End: 2021-02-28
Payer: MEDICARE

## 2021-02-28 PROCEDURE — 99487 PR COMPLX CHRON CARE MGMT, 1ST HR, PER MONTH: ICD-10-PCS | Mod: S$GLB,,, | Performed by: FAMILY MEDICINE

## 2021-02-28 PROCEDURE — 99489 CPLX CHRNC CARE EA ADDL 30: CPT | Mod: S$GLB,,, | Performed by: FAMILY MEDICINE

## 2021-02-28 PROCEDURE — 99487 CPLX CHRNC CARE 1ST 60 MIN: CPT | Mod: S$GLB,,, | Performed by: FAMILY MEDICINE

## 2021-02-28 PROCEDURE — 99489 PR COMPLX CHRON CARE MGMT, EA ADDTL 30 MIN, PER MONTH: ICD-10-PCS | Mod: S$GLB,,, | Performed by: FAMILY MEDICINE

## 2021-03-03 ENCOUNTER — PES CALL (OUTPATIENT)
Dept: ADMINISTRATIVE | Facility: CLINIC | Age: 66
End: 2021-03-03

## 2021-03-04 ENCOUNTER — OFFICE VISIT (OUTPATIENT)
Dept: RHEUMATOLOGY | Facility: CLINIC | Age: 66
End: 2021-03-04
Payer: MEDICARE

## 2021-03-04 ENCOUNTER — LAB VISIT (OUTPATIENT)
Dept: LAB | Facility: HOSPITAL | Age: 66
End: 2021-03-04
Payer: MEDICARE

## 2021-03-04 VITALS
BODY MASS INDEX: 24.52 KG/M2 | HEIGHT: 66 IN | HEART RATE: 78 BPM | SYSTOLIC BLOOD PRESSURE: 177 MMHG | DIASTOLIC BLOOD PRESSURE: 110 MMHG

## 2021-03-04 DIAGNOSIS — G72.41 INCLUSION BODY MYOSITIS (IBM): ICD-10-CM

## 2021-03-04 DIAGNOSIS — M33.20 POLYMYOSITIS: Chronic | ICD-10-CM

## 2021-03-04 DIAGNOSIS — Z79.52 LONG TERM SYSTEMIC STEROID USER: ICD-10-CM

## 2021-03-04 LAB
ALBUMIN SERPL BCP-MCNC: 4.3 G/DL (ref 3.5–5.2)
ALP SERPL-CCNC: 51 U/L (ref 55–135)
ALT SERPL W/O P-5'-P-CCNC: 9 U/L (ref 10–44)
ANION GAP SERPL CALC-SCNC: 8 MMOL/L (ref 8–16)
AST SERPL-CCNC: 12 U/L (ref 10–40)
BASOPHILS # BLD AUTO: 0.03 K/UL (ref 0–0.2)
BASOPHILS NFR BLD: 0.5 % (ref 0–1.9)
BILIRUB SERPL-MCNC: 2 MG/DL (ref 0.1–1)
BUN SERPL-MCNC: 8 MG/DL (ref 8–23)
CALCIUM SERPL-MCNC: 8.8 MG/DL (ref 8.7–10.5)
CHLORIDE SERPL-SCNC: 109 MMOL/L (ref 95–110)
CK SERPL-CCNC: 182 U/L (ref 20–200)
CO2 SERPL-SCNC: 26 MMOL/L (ref 23–29)
CREAT SERPL-MCNC: 0.4 MG/DL (ref 0.5–1.4)
CRP SERPL-MCNC: 5 MG/L (ref 0–8.2)
DIFFERENTIAL METHOD: ABNORMAL
EOSINOPHIL # BLD AUTO: 0.1 K/UL (ref 0–0.5)
EOSINOPHIL NFR BLD: 1.3 % (ref 0–8)
ERYTHROCYTE [DISTWIDTH] IN BLOOD BY AUTOMATED COUNT: 21.8 % (ref 11.5–14.5)
ERYTHROCYTE [SEDIMENTATION RATE] IN BLOOD BY WESTERGREN METHOD: <2 MM/HR (ref 0–23)
EST. GFR  (AFRICAN AMERICAN): >60 ML/MIN/1.73 M^2
EST. GFR  (NON AFRICAN AMERICAN): >60 ML/MIN/1.73 M^2
GLUCOSE SERPL-MCNC: 83 MG/DL (ref 70–110)
HCT VFR BLD AUTO: 33.1 % (ref 40–54)
HGB BLD-MCNC: 10.4 G/DL (ref 14–18)
IMM GRANULOCYTES # BLD AUTO: 0.02 K/UL (ref 0–0.04)
IMM GRANULOCYTES NFR BLD AUTO: 0.3 % (ref 0–0.5)
LYMPHOCYTES # BLD AUTO: 1.7 K/UL (ref 1–4.8)
LYMPHOCYTES NFR BLD: 26.5 % (ref 18–48)
MCH RBC QN AUTO: 18 PG (ref 27–31)
MCHC RBC AUTO-ENTMCNC: 31.4 G/DL (ref 32–36)
MCV RBC AUTO: 57 FL (ref 82–98)
MONOCYTES # BLD AUTO: 0.5 K/UL (ref 0.3–1)
MONOCYTES NFR BLD: 7.3 % (ref 4–15)
NEUTROPHILS # BLD AUTO: 4 K/UL (ref 1.8–7.7)
NEUTROPHILS NFR BLD: 64.1 % (ref 38–73)
NRBC BLD-RTO: 1 /100 WBC
PLATELET # BLD AUTO: 180 K/UL (ref 150–350)
PMV BLD AUTO: ABNORMAL FL (ref 9.2–12.9)
POTASSIUM SERPL-SCNC: 3.4 MMOL/L (ref 3.5–5.1)
PROT SERPL-MCNC: 7.4 G/DL (ref 6–8.4)
RBC # BLD AUTO: 5.77 M/UL (ref 4.6–6.2)
SODIUM SERPL-SCNC: 143 MMOL/L (ref 136–145)
WBC # BLD AUTO: 6.27 K/UL (ref 3.9–12.7)

## 2021-03-04 PROCEDURE — 3080F PR MOST RECENT DIASTOLIC BLOOD PRESSURE >= 90 MM HG: ICD-10-PCS | Mod: CPTII,GC,S$GLB,

## 2021-03-04 PROCEDURE — 99999 PR PBB SHADOW E&M-EST. PATIENT-LVL II: CPT | Mod: PBBFAC,GC,,

## 2021-03-04 PROCEDURE — 3077F PR MOST RECENT SYSTOLIC BLOOD PRESSURE >= 140 MM HG: ICD-10-PCS | Mod: CPTII,GC,S$GLB,

## 2021-03-04 PROCEDURE — 36415 COLL VENOUS BLD VENIPUNCTURE: CPT

## 2021-03-04 PROCEDURE — 99999 PR PBB SHADOW E&M-EST. PATIENT-LVL II: ICD-10-PCS | Mod: PBBFAC,GC,,

## 2021-03-04 PROCEDURE — 3080F DIAST BP >= 90 MM HG: CPT | Mod: CPTII,GC,S$GLB,

## 2021-03-04 PROCEDURE — 3077F SYST BP >= 140 MM HG: CPT | Mod: CPTII,GC,S$GLB,

## 2021-03-04 PROCEDURE — 82085 ASSAY OF ALDOLASE: CPT

## 2021-03-04 PROCEDURE — 99214 PR OFFICE/OUTPT VISIT, EST, LEVL IV, 30-39 MIN: ICD-10-PCS | Mod: GC,S$GLB,,

## 2021-03-04 PROCEDURE — 80053 COMPREHEN METABOLIC PANEL: CPT

## 2021-03-04 PROCEDURE — 86140 C-REACTIVE PROTEIN: CPT

## 2021-03-04 PROCEDURE — 85025 COMPLETE CBC W/AUTO DIFF WBC: CPT

## 2021-03-04 PROCEDURE — 85652 RBC SED RATE AUTOMATED: CPT

## 2021-03-04 PROCEDURE — 99214 OFFICE O/P EST MOD 30 MIN: CPT | Mod: GC,S$GLB,,

## 2021-03-04 PROCEDURE — 82550 ASSAY OF CK (CPK): CPT

## 2021-03-04 RX ORDER — HEPARIN 100 UNIT/ML
500 SYRINGE INTRAVENOUS
Status: CANCELLED | OUTPATIENT
Start: 2021-03-08

## 2021-03-04 RX ORDER — SODIUM CHLORIDE 0.9 % (FLUSH) 0.9 %
10 SYRINGE (ML) INJECTION
Status: CANCELLED | OUTPATIENT
Start: 2021-03-08

## 2021-03-04 RX ORDER — ACETAMINOPHEN 325 MG/1
325 TABLET ORAL EVERY 4 HOURS PRN
Status: CANCELLED | OUTPATIENT
Start: 2021-03-08 | End: 2021-03-09

## 2021-03-04 RX ORDER — MYCOPHENOLATE MOFETIL 500 MG/1
1500 TABLET ORAL 2 TIMES DAILY
Qty: 540 TABLET | Refills: 3 | OUTPATIENT
Start: 2021-03-04 | End: 2021-03-04 | Stop reason: SDUPTHER

## 2021-03-04 RX ORDER — MYCOPHENOLATE MOFETIL 500 MG/1
1500 TABLET ORAL 2 TIMES DAILY
Qty: 540 TABLET | Refills: 3 | Status: SHIPPED | OUTPATIENT
Start: 2021-03-04 | End: 2022-03-28 | Stop reason: SDUPTHER

## 2021-03-04 RX ORDER — PREDNISONE 10 MG/1
TABLET ORAL
Qty: 280 TABLET | Refills: 2 | Status: SHIPPED | OUTPATIENT
Start: 2021-03-04 | End: 2021-03-04 | Stop reason: SDUPTHER

## 2021-03-04 RX ORDER — PREDNISONE 10 MG/1
TABLET ORAL
Qty: 280 TABLET | Refills: 2 | Status: SHIPPED | OUTPATIENT
Start: 2021-03-04 | End: 2021-09-16 | Stop reason: SDUPTHER

## 2021-03-04 ASSESSMENT — ROUTINE ASSESSMENT OF PATIENT INDEX DATA (RAPID3)
PAIN SCORE: 0
FATIGUE SCORE: 10
PSYCHOLOGICAL DISTRESS SCORE: 9.9
TOTAL RAPID3 SCORE: 6.66
AM STIFFNESS SCORE: 1, YES
MDHAQ FUNCTION SCORE: 3
PATIENT GLOBAL ASSESSMENT SCORE: 10

## 2021-03-07 LAB — ALDOLASE SERPL-CCNC: 6.6 U/L (ref 1.2–7.6)

## 2021-03-18 RX ORDER — SODIUM CHLORIDE 0.9 % (FLUSH) 0.9 %
10 SYRINGE (ML) INJECTION
Status: CANCELLED | OUTPATIENT
Start: 2021-03-22

## 2021-03-18 RX ORDER — HEPARIN 100 UNIT/ML
500 SYRINGE INTRAVENOUS
Status: CANCELLED | OUTPATIENT
Start: 2021-03-22

## 2021-03-18 RX ORDER — ACETAMINOPHEN 325 MG/1
325 TABLET ORAL EVERY 4 HOURS PRN
Status: CANCELLED | OUTPATIENT
Start: 2021-03-22 | End: 2021-03-23

## 2021-03-19 ENCOUNTER — PATIENT MESSAGE (OUTPATIENT)
Dept: PHARMACY | Facility: CLINIC | Age: 66
End: 2021-03-19

## 2021-03-19 ENCOUNTER — LAB VISIT (OUTPATIENT)
Dept: INTERNAL MEDICINE | Facility: CLINIC | Age: 66
End: 2021-03-19
Payer: MEDICARE

## 2021-03-19 DIAGNOSIS — Z13.820 OSTEOPOROSIS SCREENING: ICD-10-CM

## 2021-03-19 PROCEDURE — U0003 INFECTIOUS AGENT DETECTION BY NUCLEIC ACID (DNA OR RNA); SEVERE ACUTE RESPIRATORY SYNDROME CORONAVIRUS 2 (SARS-COV-2) (CORONAVIRUS DISEASE [COVID-19]), AMPLIFIED PROBE TECHNIQUE, MAKING USE OF HIGH THROUGHPUT TECHNOLOGIES AS DESCRIBED BY CMS-2020-01-R: HCPCS

## 2021-03-19 PROCEDURE — U0005 INFEC AGEN DETEC AMPLI PROBE: HCPCS

## 2021-03-19 RX ORDER — SODIUM CHLORIDE 0.9 % (FLUSH) 0.9 %
10 SYRINGE (ML) INJECTION
Status: CANCELLED | OUTPATIENT
Start: 2021-03-22

## 2021-03-19 RX ORDER — ACETAMINOPHEN 325 MG/1
325 TABLET ORAL EVERY 4 HOURS PRN
Status: CANCELLED | OUTPATIENT
Start: 2021-03-22 | End: 2021-03-23

## 2021-03-19 RX ORDER — HEPARIN 100 UNIT/ML
500 SYRINGE INTRAVENOUS
Status: CANCELLED | OUTPATIENT
Start: 2021-03-22

## 2021-03-20 LAB — SARS-COV-2 RNA RESP QL NAA+PROBE: NOT DETECTED

## 2021-03-22 ENCOUNTER — INFUSION (OUTPATIENT)
Dept: INFUSION THERAPY | Facility: HOSPITAL | Age: 66
End: 2021-03-22
Payer: MEDICARE

## 2021-03-22 VITALS
SYSTOLIC BLOOD PRESSURE: 136 MMHG | BODY MASS INDEX: 24.41 KG/M2 | WEIGHT: 151.88 LBS | HEART RATE: 70 BPM | RESPIRATION RATE: 18 BRPM | DIASTOLIC BLOOD PRESSURE: 89 MMHG | TEMPERATURE: 98 F | HEIGHT: 66 IN

## 2021-03-22 DIAGNOSIS — G72.41 INCLUSION BODY MYOSITIS (IBM): Primary | ICD-10-CM

## 2021-03-22 DIAGNOSIS — M33.20 POLYMYOSITIS: ICD-10-CM

## 2021-03-22 PROCEDURE — 96367 TX/PROPH/DG ADDL SEQ IV INF: CPT

## 2021-03-22 PROCEDURE — 63600175 PHARM REV CODE 636 W HCPCS: Mod: JG

## 2021-03-22 PROCEDURE — 96366 THER/PROPH/DIAG IV INF ADDON: CPT

## 2021-03-22 PROCEDURE — 25000003 PHARM REV CODE 250

## 2021-03-22 PROCEDURE — 96365 THER/PROPH/DIAG IV INF INIT: CPT

## 2021-03-22 RX ORDER — ACETAMINOPHEN 325 MG/1
325 TABLET ORAL EVERY 4 HOURS PRN
Status: DISCONTINUED | OUTPATIENT
Start: 2021-03-22 | End: 2021-03-22 | Stop reason: HOSPADM

## 2021-03-22 RX ORDER — SODIUM CHLORIDE 0.9 % (FLUSH) 0.9 %
10 SYRINGE (ML) INJECTION
Status: CANCELLED | OUTPATIENT
Start: 2021-04-08

## 2021-03-22 RX ORDER — HEPARIN 100 UNIT/ML
500 SYRINGE INTRAVENOUS
Status: DISCONTINUED | OUTPATIENT
Start: 2021-03-22 | End: 2021-03-22 | Stop reason: HOSPADM

## 2021-03-22 RX ORDER — SODIUM CHLORIDE 0.9 % (FLUSH) 0.9 %
10 SYRINGE (ML) INJECTION
Status: DISCONTINUED | OUTPATIENT
Start: 2021-03-22 | End: 2021-03-22 | Stop reason: HOSPADM

## 2021-03-22 RX ORDER — HEPARIN 100 UNIT/ML
500 SYRINGE INTRAVENOUS
Status: CANCELLED | OUTPATIENT
Start: 2021-04-08

## 2021-03-22 RX ORDER — ACETAMINOPHEN 325 MG/1
325 TABLET ORAL EVERY 4 HOURS PRN
Status: CANCELLED | OUTPATIENT
Start: 2021-04-08 | End: 2021-04-09

## 2021-03-22 RX ADMIN — ACETAMINOPHEN 325 MG: 325 TABLET ORAL at 09:03

## 2021-03-22 RX ADMIN — HUMAN IMMUNOGLOBULIN G 70 G: 10 LIQUID INTRAVENOUS at 09:03

## 2021-03-22 RX ADMIN — DIPHENHYDRAMINE HYDROCHLORIDE 25 MG: 50 INJECTION INTRAMUSCULAR; INTRAVENOUS at 09:03

## 2021-03-23 ENCOUNTER — INFUSION (OUTPATIENT)
Dept: INFUSION THERAPY | Facility: HOSPITAL | Age: 66
End: 2021-03-23
Payer: MEDICARE

## 2021-03-23 VITALS
DIASTOLIC BLOOD PRESSURE: 85 MMHG | HEIGHT: 66 IN | BODY MASS INDEX: 24.41 KG/M2 | RESPIRATION RATE: 18 BRPM | TEMPERATURE: 98 F | HEART RATE: 78 BPM | SYSTOLIC BLOOD PRESSURE: 138 MMHG | WEIGHT: 151.88 LBS

## 2021-03-23 DIAGNOSIS — G72.41 INCLUSION BODY MYOSITIS (IBM): Primary | ICD-10-CM

## 2021-03-23 DIAGNOSIS — M33.20 POLYMYOSITIS: ICD-10-CM

## 2021-03-23 PROCEDURE — 96366 THER/PROPH/DIAG IV INF ADDON: CPT

## 2021-03-23 PROCEDURE — 63600175 PHARM REV CODE 636 W HCPCS

## 2021-03-23 PROCEDURE — 96367 TX/PROPH/DG ADDL SEQ IV INF: CPT

## 2021-03-23 PROCEDURE — 96365 THER/PROPH/DIAG IV INF INIT: CPT

## 2021-03-23 PROCEDURE — 25000003 PHARM REV CODE 250

## 2021-03-23 RX ORDER — HEPARIN 100 UNIT/ML
500 SYRINGE INTRAVENOUS
Status: CANCELLED | OUTPATIENT
Start: 2021-04-08

## 2021-03-23 RX ORDER — SODIUM CHLORIDE 0.9 % (FLUSH) 0.9 %
10 SYRINGE (ML) INJECTION
Status: CANCELLED | OUTPATIENT
Start: 2021-04-08

## 2021-03-23 RX ORDER — ACETAMINOPHEN 325 MG/1
325 TABLET ORAL EVERY 4 HOURS PRN
Status: CANCELLED | OUTPATIENT
Start: 2021-04-08 | End: 2021-04-09

## 2021-03-23 RX ORDER — HEPARIN 100 UNIT/ML
500 SYRINGE INTRAVENOUS
Status: DISCONTINUED | OUTPATIENT
Start: 2021-03-23 | End: 2021-03-23 | Stop reason: HOSPADM

## 2021-03-23 RX ORDER — SODIUM CHLORIDE 0.9 % (FLUSH) 0.9 %
10 SYRINGE (ML) INJECTION
Status: DISCONTINUED | OUTPATIENT
Start: 2021-03-23 | End: 2021-03-23 | Stop reason: HOSPADM

## 2021-03-23 RX ORDER — ACETAMINOPHEN 325 MG/1
325 TABLET ORAL EVERY 4 HOURS PRN
Status: DISCONTINUED | OUTPATIENT
Start: 2021-03-23 | End: 2021-03-23 | Stop reason: HOSPADM

## 2021-03-23 RX ADMIN — ACETAMINOPHEN 325 MG: 325 TABLET ORAL at 09:03

## 2021-03-23 RX ADMIN — DIPHENHYDRAMINE HYDROCHLORIDE 25 MG: 50 INJECTION INTRAMUSCULAR; INTRAVENOUS at 09:03

## 2021-03-23 RX ADMIN — SODIUM CHLORIDE: 9 INJECTION, SOLUTION INTRAVENOUS at 09:03

## 2021-03-23 RX ADMIN — HUMAN IMMUNOGLOBULIN G 70 G: 40 LIQUID INTRAVENOUS at 10:03

## 2021-03-25 ENCOUNTER — DOCUMENTATION ONLY (OUTPATIENT)
Dept: INFUSION THERAPY | Facility: HOSPITAL | Age: 66
End: 2021-03-25

## 2021-03-31 ENCOUNTER — EXTERNAL CHRONIC CARE MANAGEMENT (OUTPATIENT)
Dept: PRIMARY CARE CLINIC | Facility: CLINIC | Age: 66
End: 2021-03-31
Payer: MEDICARE

## 2021-03-31 ENCOUNTER — SPECIALTY PHARMACY (OUTPATIENT)
Dept: PHARMACY | Facility: CLINIC | Age: 66
End: 2021-03-31

## 2021-03-31 PROCEDURE — 99490 PR CHRONIC CARE MGMT, 1ST 20 MIN: ICD-10-PCS | Mod: S$GLB,,, | Performed by: FAMILY MEDICINE

## 2021-03-31 PROCEDURE — 99439 PR CHRONIC CARE MGMT, EA ADDTL 20 MIN: ICD-10-PCS | Mod: S$GLB,,, | Performed by: FAMILY MEDICINE

## 2021-03-31 PROCEDURE — 99490 CHRNC CARE MGMT STAFF 1ST 20: CPT | Mod: S$GLB,,, | Performed by: FAMILY MEDICINE

## 2021-03-31 PROCEDURE — 99439 CHRNC CARE MGMT STAF EA ADDL: CPT | Mod: S$GLB,,, | Performed by: FAMILY MEDICINE

## 2021-04-01 ENCOUNTER — OFFICE VISIT (OUTPATIENT)
Dept: INTERNAL MEDICINE | Facility: CLINIC | Age: 66
End: 2021-04-01
Payer: MEDICARE

## 2021-04-01 VITALS
BODY MASS INDEX: 24.41 KG/M2 | DIASTOLIC BLOOD PRESSURE: 88 MMHG | HEIGHT: 66 IN | WEIGHT: 151.88 LBS | SYSTOLIC BLOOD PRESSURE: 146 MMHG | HEART RATE: 76 BPM

## 2021-04-01 DIAGNOSIS — D69.6 THROMBOCYTOPENIA: ICD-10-CM

## 2021-04-01 DIAGNOSIS — M85.80 OSTEOPENIA, UNSPECIFIED LOCATION: ICD-10-CM

## 2021-04-01 DIAGNOSIS — M33.20 POLYMYOSITIS: Chronic | ICD-10-CM

## 2021-04-01 DIAGNOSIS — D84.9 IMMUNOSUPPRESSION: Chronic | ICD-10-CM

## 2021-04-01 DIAGNOSIS — D58.2 HEMOGLOBIN C DISEASE: ICD-10-CM

## 2021-04-01 DIAGNOSIS — D56.8 BETA 0 THALASSEMIA: ICD-10-CM

## 2021-04-01 DIAGNOSIS — F32.0 CURRENT MILD EPISODE OF MAJOR DEPRESSIVE DISORDER WITHOUT PRIOR EPISODE: ICD-10-CM

## 2021-04-01 DIAGNOSIS — I70.0 AORTIC ATHEROSCLEROSIS: ICD-10-CM

## 2021-04-01 DIAGNOSIS — I48.91 ATRIAL FIBRILLATION, UNSPECIFIED TYPE: ICD-10-CM

## 2021-04-01 DIAGNOSIS — Z00.00 ENCOUNTER FOR PREVENTIVE HEALTH EXAMINATION: Primary | ICD-10-CM

## 2021-04-01 DIAGNOSIS — I10 ESSENTIAL HYPERTENSION: ICD-10-CM

## 2021-04-01 DIAGNOSIS — R26.9 ABNORMALITY OF GAIT AND MOBILITY: ICD-10-CM

## 2021-04-01 DIAGNOSIS — G72.41 INCLUSION BODY MYOSITIS (IBM): ICD-10-CM

## 2021-04-01 DIAGNOSIS — J84.10 PULMONARY GRANULOMA: ICD-10-CM

## 2021-04-01 PROCEDURE — G0439 PR MEDICARE ANNUAL WELLNESS SUBSEQUENT VISIT: ICD-10-PCS | Mod: S$GLB,,, | Performed by: NURSE PRACTITIONER

## 2021-04-01 PROCEDURE — 99999 PR PBB SHADOW E&M-EST. PATIENT-LVL III: CPT | Mod: PBBFAC,,, | Performed by: NURSE PRACTITIONER

## 2021-04-01 PROCEDURE — 99499 RISK ADDL DX/OHS AUDIT: ICD-10-PCS | Mod: HCNC,S$GLB,, | Performed by: NURSE PRACTITIONER

## 2021-04-01 PROCEDURE — G0439 PPPS, SUBSEQ VISIT: HCPCS | Mod: S$GLB,,, | Performed by: NURSE PRACTITIONER

## 2021-04-01 PROCEDURE — 3079F PR MOST RECENT DIASTOLIC BLOOD PRESSURE 80-89 MM HG: ICD-10-PCS | Mod: CPTII,S$GLB,, | Performed by: NURSE PRACTITIONER

## 2021-04-01 PROCEDURE — 3079F DIAST BP 80-89 MM HG: CPT | Mod: CPTII,S$GLB,, | Performed by: NURSE PRACTITIONER

## 2021-04-01 PROCEDURE — 3008F PR BODY MASS INDEX (BMI) DOCUMENTED: ICD-10-PCS | Mod: CPTII,S$GLB,, | Performed by: NURSE PRACTITIONER

## 2021-04-01 PROCEDURE — 3077F PR MOST RECENT SYSTOLIC BLOOD PRESSURE >= 140 MM HG: ICD-10-PCS | Mod: CPTII,S$GLB,, | Performed by: NURSE PRACTITIONER

## 2021-04-01 PROCEDURE — 3077F SYST BP >= 140 MM HG: CPT | Mod: CPTII,S$GLB,, | Performed by: NURSE PRACTITIONER

## 2021-04-01 PROCEDURE — 99499 UNLISTED E&M SERVICE: CPT | Mod: HCNC,S$GLB,, | Performed by: NURSE PRACTITIONER

## 2021-04-01 PROCEDURE — 3008F BODY MASS INDEX DOCD: CPT | Mod: CPTII,S$GLB,, | Performed by: NURSE PRACTITIONER

## 2021-04-01 PROCEDURE — 99999 PR PBB SHADOW E&M-EST. PATIENT-LVL III: ICD-10-PCS | Mod: PBBFAC,,, | Performed by: NURSE PRACTITIONER

## 2021-04-15 RX ORDER — FAMOTIDINE 10 MG/ML
20 INJECTION INTRAVENOUS
Status: CANCELLED | OUTPATIENT
Start: 2021-04-22

## 2021-04-15 RX ORDER — HEPARIN 100 UNIT/ML
500 SYRINGE INTRAVENOUS
Status: CANCELLED | OUTPATIENT
Start: 2021-04-22

## 2021-04-15 RX ORDER — SODIUM CHLORIDE 0.9 % (FLUSH) 0.9 %
10 SYRINGE (ML) INJECTION
Status: CANCELLED | OUTPATIENT
Start: 2021-04-22

## 2021-04-15 RX ORDER — ACETAMINOPHEN 325 MG/1
650 TABLET ORAL
Status: CANCELLED | OUTPATIENT
Start: 2021-04-22

## 2021-04-16 ENCOUNTER — OFFICE VISIT (OUTPATIENT)
Dept: INTERNAL MEDICINE | Facility: CLINIC | Age: 66
End: 2021-04-16
Payer: MEDICARE

## 2021-04-16 VITALS
TEMPERATURE: 99 F | OXYGEN SATURATION: 99 % | DIASTOLIC BLOOD PRESSURE: 84 MMHG | BODY MASS INDEX: 24.52 KG/M2 | SYSTOLIC BLOOD PRESSURE: 146 MMHG | HEIGHT: 66 IN | HEART RATE: 86 BPM

## 2021-04-16 DIAGNOSIS — R05.3 CHRONIC COUGH: ICD-10-CM

## 2021-04-16 DIAGNOSIS — I10 ESSENTIAL HYPERTENSION: ICD-10-CM

## 2021-04-16 PROCEDURE — 1126F AMNT PAIN NOTED NONE PRSNT: CPT | Mod: S$GLB,,, | Performed by: FAMILY MEDICINE

## 2021-04-16 PROCEDURE — 99999 PR PBB SHADOW E&M-EST. PATIENT-LVL IV: CPT | Mod: PBBFAC,,, | Performed by: FAMILY MEDICINE

## 2021-04-16 PROCEDURE — 1126F PR PAIN SEVERITY QUANTIFIED, NO PAIN PRESENT: ICD-10-PCS | Mod: S$GLB,,, | Performed by: FAMILY MEDICINE

## 2021-04-16 PROCEDURE — 3008F BODY MASS INDEX DOCD: CPT | Mod: CPTII,S$GLB,, | Performed by: FAMILY MEDICINE

## 2021-04-16 PROCEDURE — 99213 OFFICE O/P EST LOW 20 MIN: CPT | Mod: S$GLB,,, | Performed by: FAMILY MEDICINE

## 2021-04-16 PROCEDURE — 3008F PR BODY MASS INDEX (BMI) DOCUMENTED: ICD-10-PCS | Mod: CPTII,S$GLB,, | Performed by: FAMILY MEDICINE

## 2021-04-16 PROCEDURE — 99999 PR PBB SHADOW E&M-EST. PATIENT-LVL IV: ICD-10-PCS | Mod: PBBFAC,,, | Performed by: FAMILY MEDICINE

## 2021-04-16 PROCEDURE — 99213 PR OFFICE/OUTPT VISIT, EST, LEVL III, 20-29 MIN: ICD-10-PCS | Mod: S$GLB,,, | Performed by: FAMILY MEDICINE

## 2021-04-16 RX ORDER — AMLODIPINE BESYLATE 5 MG/1
7.5 TABLET ORAL DAILY
Qty: 45 TABLET | Refills: 11 | Status: SHIPPED | OUTPATIENT
Start: 2021-04-16 | End: 2021-04-22 | Stop reason: DRUGHIGH

## 2021-04-19 ENCOUNTER — INFUSION (OUTPATIENT)
Dept: INFUSION THERAPY | Facility: HOSPITAL | Age: 66
End: 2021-04-19
Payer: MEDICARE

## 2021-04-19 VITALS
WEIGHT: 151.88 LBS | TEMPERATURE: 98 F | SYSTOLIC BLOOD PRESSURE: 121 MMHG | RESPIRATION RATE: 18 BRPM | DIASTOLIC BLOOD PRESSURE: 70 MMHG | HEIGHT: 66 IN | HEART RATE: 81 BPM | BODY MASS INDEX: 24.41 KG/M2

## 2021-04-19 DIAGNOSIS — M33.20 POLYMYOSITIS: Primary | ICD-10-CM

## 2021-04-19 PROCEDURE — 63600175 PHARM REV CODE 636 W HCPCS

## 2021-04-19 PROCEDURE — 96365 THER/PROPH/DIAG IV INF INIT: CPT

## 2021-04-19 PROCEDURE — 96366 THER/PROPH/DIAG IV INF ADDON: CPT

## 2021-04-19 PROCEDURE — 96375 TX/PRO/DX INJ NEW DRUG ADDON: CPT

## 2021-04-19 PROCEDURE — 25000003 PHARM REV CODE 250

## 2021-04-19 PROCEDURE — 96368 THER/DIAG CONCURRENT INF: CPT

## 2021-04-19 PROCEDURE — 96367 TX/PROPH/DG ADDL SEQ IV INF: CPT

## 2021-04-19 RX ORDER — FAMOTIDINE 10 MG/ML
20 INJECTION INTRAVENOUS
Status: CANCELLED | OUTPATIENT
Start: 2021-05-17

## 2021-04-19 RX ORDER — FAMOTIDINE 10 MG/ML
20 INJECTION INTRAVENOUS
Status: COMPLETED | OUTPATIENT
Start: 2021-04-19 | End: 2021-04-19

## 2021-04-19 RX ORDER — SODIUM CHLORIDE 0.9 % (FLUSH) 0.9 %
10 SYRINGE (ML) INJECTION
Status: CANCELLED | OUTPATIENT
Start: 2021-05-17

## 2021-04-19 RX ORDER — ACETAMINOPHEN 325 MG/1
650 TABLET ORAL
Status: COMPLETED | OUTPATIENT
Start: 2021-04-19 | End: 2021-04-19

## 2021-04-19 RX ORDER — SODIUM CHLORIDE 0.9 % (FLUSH) 0.9 %
10 SYRINGE (ML) INJECTION
Status: DISCONTINUED | OUTPATIENT
Start: 2021-04-19 | End: 2021-04-19 | Stop reason: HOSPADM

## 2021-04-19 RX ORDER — ACETAMINOPHEN 325 MG/1
650 TABLET ORAL
Status: CANCELLED | OUTPATIENT
Start: 2021-05-17

## 2021-04-19 RX ORDER — HEPARIN 100 UNIT/ML
500 SYRINGE INTRAVENOUS
Status: DISCONTINUED | OUTPATIENT
Start: 2021-04-19 | End: 2021-04-19 | Stop reason: HOSPADM

## 2021-04-19 RX ORDER — HEPARIN 100 UNIT/ML
500 SYRINGE INTRAVENOUS
Status: CANCELLED | OUTPATIENT
Start: 2021-05-17

## 2021-04-19 RX ADMIN — SODIUM CHLORIDE: 0.9 INJECTION, SOLUTION INTRAVENOUS at 09:04

## 2021-04-19 RX ADMIN — DIPHENHYDRAMINE HYDROCHLORIDE 50 MG: 50 INJECTION, SOLUTION INTRAMUSCULAR; INTRAVENOUS at 09:04

## 2021-04-19 RX ADMIN — HUMAN IMMUNOGLOBULIN G 70 G: 40 LIQUID INTRAVENOUS at 09:04

## 2021-04-19 RX ADMIN — FAMOTIDINE 20 MG: 10 INJECTION INTRAVENOUS at 09:04

## 2021-04-19 RX ADMIN — ACETAMINOPHEN 650 MG: 325 TABLET ORAL at 09:04

## 2021-04-20 ENCOUNTER — INFUSION (OUTPATIENT)
Dept: INFUSION THERAPY | Facility: HOSPITAL | Age: 66
End: 2021-04-20
Payer: MEDICARE

## 2021-04-20 VITALS
HEART RATE: 88 BPM | WEIGHT: 151.88 LBS | RESPIRATION RATE: 18 BRPM | TEMPERATURE: 98 F | SYSTOLIC BLOOD PRESSURE: 158 MMHG | DIASTOLIC BLOOD PRESSURE: 82 MMHG | BODY MASS INDEX: 24.41 KG/M2 | HEIGHT: 66 IN

## 2021-04-20 DIAGNOSIS — M33.20 POLYMYOSITIS: Primary | ICD-10-CM

## 2021-04-20 PROCEDURE — 63600175 PHARM REV CODE 636 W HCPCS: Mod: JG

## 2021-04-20 PROCEDURE — 96365 THER/PROPH/DIAG IV INF INIT: CPT

## 2021-04-20 PROCEDURE — 96375 TX/PRO/DX INJ NEW DRUG ADDON: CPT

## 2021-04-20 PROCEDURE — 96367 TX/PROPH/DG ADDL SEQ IV INF: CPT

## 2021-04-20 PROCEDURE — 96366 THER/PROPH/DIAG IV INF ADDON: CPT

## 2021-04-20 PROCEDURE — 25000003 PHARM REV CODE 250

## 2021-04-20 RX ORDER — SODIUM CHLORIDE 0.9 % (FLUSH) 0.9 %
10 SYRINGE (ML) INJECTION
Status: CANCELLED | OUTPATIENT
Start: 2021-05-17

## 2021-04-20 RX ORDER — HEPARIN 100 UNIT/ML
500 SYRINGE INTRAVENOUS
Status: CANCELLED | OUTPATIENT
Start: 2021-05-17

## 2021-04-20 RX ORDER — ACETAMINOPHEN 325 MG/1
650 TABLET ORAL
Status: COMPLETED | OUTPATIENT
Start: 2021-04-20 | End: 2021-04-20

## 2021-04-20 RX ORDER — FAMOTIDINE 10 MG/ML
20 INJECTION INTRAVENOUS
Status: COMPLETED | OUTPATIENT
Start: 2021-04-20 | End: 2021-04-20

## 2021-04-20 RX ORDER — SODIUM CHLORIDE 0.9 % (FLUSH) 0.9 %
10 SYRINGE (ML) INJECTION
Status: DISCONTINUED | OUTPATIENT
Start: 2021-04-20 | End: 2021-04-20 | Stop reason: HOSPADM

## 2021-04-20 RX ORDER — HEPARIN 100 UNIT/ML
500 SYRINGE INTRAVENOUS
Status: DISCONTINUED | OUTPATIENT
Start: 2021-04-20 | End: 2021-04-20 | Stop reason: HOSPADM

## 2021-04-20 RX ORDER — ACETAMINOPHEN 325 MG/1
650 TABLET ORAL
Status: CANCELLED | OUTPATIENT
Start: 2021-05-17

## 2021-04-20 RX ORDER — FAMOTIDINE 10 MG/ML
20 INJECTION INTRAVENOUS
Status: CANCELLED | OUTPATIENT
Start: 2021-05-17

## 2021-04-20 RX ADMIN — DIPHENHYDRAMINE HYDROCHLORIDE 50 MG: 50 INJECTION, SOLUTION INTRAMUSCULAR; INTRAVENOUS at 08:04

## 2021-04-20 RX ADMIN — HUMAN IMMUNOGLOBULIN G 70 G: 10 LIQUID INTRAVENOUS at 09:04

## 2021-04-20 RX ADMIN — ACETAMINOPHEN 650 MG: 325 TABLET ORAL at 08:04

## 2021-04-20 RX ADMIN — SODIUM CHLORIDE: 0.9 INJECTION, SOLUTION INTRAVENOUS at 08:04

## 2021-04-20 RX ADMIN — FAMOTIDINE 20 MG: 10 INJECTION INTRAVENOUS at 08:04

## 2021-04-22 ENCOUNTER — CLINICAL SUPPORT (OUTPATIENT)
Dept: INTERNAL MEDICINE | Facility: CLINIC | Age: 66
End: 2021-04-22
Payer: MEDICARE

## 2021-04-22 ENCOUNTER — HOSPITAL ENCOUNTER (OUTPATIENT)
Dept: RADIOLOGY | Facility: HOSPITAL | Age: 66
Discharge: HOME | End: 2021-04-22
Attending: FAMILY MEDICINE
Payer: MEDICARE

## 2021-04-22 VITALS — HEART RATE: 90 BPM | DIASTOLIC BLOOD PRESSURE: 86 MMHG | OXYGEN SATURATION: 97 % | SYSTOLIC BLOOD PRESSURE: 162 MMHG

## 2021-04-22 DIAGNOSIS — R05.3 CHRONIC COUGH: ICD-10-CM

## 2021-04-22 DIAGNOSIS — I10 ESSENTIAL HYPERTENSION: Primary | ICD-10-CM

## 2021-04-22 PROCEDURE — 71046 X-RAY EXAM CHEST 2 VIEWS: CPT | Mod: 26,,, | Performed by: RADIOLOGY

## 2021-04-22 PROCEDURE — 71046 X-RAY EXAM CHEST 2 VIEWS: CPT | Mod: TC

## 2021-04-22 PROCEDURE — 71046 XR CHEST PA AND LATERAL: ICD-10-PCS | Mod: 26,,, | Performed by: RADIOLOGY

## 2021-04-22 PROCEDURE — 99999 PR PBB SHADOW E&M-EST. PATIENT-LVL I: ICD-10-PCS | Mod: PBBFAC,,,

## 2021-04-22 PROCEDURE — 99999 PR PBB SHADOW E&M-EST. PATIENT-LVL I: CPT | Mod: PBBFAC,,,

## 2021-04-22 RX ORDER — AMLODIPINE BESYLATE 10 MG/1
10 TABLET ORAL DAILY
Qty: 90 TABLET | Refills: 3 | Status: SHIPPED | OUTPATIENT
Start: 2021-04-22 | End: 2022-01-01 | Stop reason: SDUPTHER

## 2021-04-22 RX ORDER — LOSARTAN POTASSIUM 25 MG/1
25 TABLET ORAL DAILY
Qty: 90 TABLET | Refills: 3 | Status: SHIPPED | OUTPATIENT
Start: 2021-04-22 | End: 2021-05-06 | Stop reason: SDUPTHER

## 2021-04-23 ENCOUNTER — SPECIALTY PHARMACY (OUTPATIENT)
Dept: PHARMACY | Facility: CLINIC | Age: 66
End: 2021-04-23

## 2021-04-30 ENCOUNTER — EXTERNAL CHRONIC CARE MANAGEMENT (OUTPATIENT)
Dept: PRIMARY CARE CLINIC | Facility: CLINIC | Age: 66
End: 2021-04-30
Payer: MEDICARE

## 2021-04-30 PROCEDURE — 99490 PR CHRONIC CARE MGMT, 1ST 20 MIN: ICD-10-PCS | Mod: S$GLB,,, | Performed by: FAMILY MEDICINE

## 2021-04-30 PROCEDURE — 99490 CHRNC CARE MGMT STAFF 1ST 20: CPT | Mod: S$GLB,,, | Performed by: FAMILY MEDICINE

## 2021-05-03 ENCOUNTER — PATIENT MESSAGE (OUTPATIENT)
Dept: INTERNAL MEDICINE | Facility: CLINIC | Age: 66
End: 2021-05-03

## 2021-05-06 ENCOUNTER — OFFICE VISIT (OUTPATIENT)
Dept: INTERNAL MEDICINE | Facility: CLINIC | Age: 66
End: 2021-05-06
Payer: MEDICARE

## 2021-05-06 VITALS
HEART RATE: 80 BPM | SYSTOLIC BLOOD PRESSURE: 140 MMHG | BODY MASS INDEX: 24.52 KG/M2 | HEIGHT: 66 IN | OXYGEN SATURATION: 97 % | TEMPERATURE: 98 F | DIASTOLIC BLOOD PRESSURE: 88 MMHG

## 2021-05-06 DIAGNOSIS — R58 ECCHYMOSIS: ICD-10-CM

## 2021-05-06 DIAGNOSIS — I10 ESSENTIAL HYPERTENSION: ICD-10-CM

## 2021-05-06 PROCEDURE — 1126F AMNT PAIN NOTED NONE PRSNT: CPT | Mod: S$GLB,,, | Performed by: FAMILY MEDICINE

## 2021-05-06 PROCEDURE — 1126F PR PAIN SEVERITY QUANTIFIED, NO PAIN PRESENT: ICD-10-PCS | Mod: S$GLB,,, | Performed by: FAMILY MEDICINE

## 2021-05-06 PROCEDURE — 99999 PR PBB SHADOW E&M-EST. PATIENT-LVL IV: ICD-10-PCS | Mod: PBBFAC,,, | Performed by: FAMILY MEDICINE

## 2021-05-06 PROCEDURE — 3079F PR MOST RECENT DIASTOLIC BLOOD PRESSURE 80-89 MM HG: ICD-10-PCS | Mod: CPTII,S$GLB,, | Performed by: FAMILY MEDICINE

## 2021-05-06 PROCEDURE — 3077F SYST BP >= 140 MM HG: CPT | Mod: CPTII,S$GLB,, | Performed by: FAMILY MEDICINE

## 2021-05-06 PROCEDURE — 3079F DIAST BP 80-89 MM HG: CPT | Mod: CPTII,S$GLB,, | Performed by: FAMILY MEDICINE

## 2021-05-06 PROCEDURE — 99999 PR PBB SHADOW E&M-EST. PATIENT-LVL IV: CPT | Mod: PBBFAC,,, | Performed by: FAMILY MEDICINE

## 2021-05-06 PROCEDURE — 3077F PR MOST RECENT SYSTOLIC BLOOD PRESSURE >= 140 MM HG: ICD-10-PCS | Mod: CPTII,S$GLB,, | Performed by: FAMILY MEDICINE

## 2021-05-06 PROCEDURE — 3008F PR BODY MASS INDEX (BMI) DOCUMENTED: ICD-10-PCS | Mod: CPTII,S$GLB,, | Performed by: FAMILY MEDICINE

## 2021-05-06 PROCEDURE — 99213 PR OFFICE/OUTPT VISIT, EST, LEVL III, 20-29 MIN: ICD-10-PCS | Mod: S$GLB,,, | Performed by: FAMILY MEDICINE

## 2021-05-06 PROCEDURE — 99213 OFFICE O/P EST LOW 20 MIN: CPT | Mod: S$GLB,,, | Performed by: FAMILY MEDICINE

## 2021-05-06 PROCEDURE — 3008F BODY MASS INDEX DOCD: CPT | Mod: CPTII,S$GLB,, | Performed by: FAMILY MEDICINE

## 2021-05-06 RX ORDER — LOSARTAN POTASSIUM 25 MG/1
25 TABLET ORAL DAILY
Qty: 90 TABLET | Refills: 3 | Status: SHIPPED | OUTPATIENT
Start: 2021-05-06 | End: 2022-01-01 | Stop reason: SDUPTHER

## 2021-05-12 ENCOUNTER — PATIENT MESSAGE (OUTPATIENT)
Dept: INTERNAL MEDICINE | Facility: CLINIC | Age: 66
End: 2021-05-12

## 2021-05-14 ENCOUNTER — IMMUNIZATION (OUTPATIENT)
Dept: INTERNAL MEDICINE | Facility: CLINIC | Age: 66
End: 2021-05-14
Payer: MEDICARE

## 2021-05-14 DIAGNOSIS — Z23 NEED FOR VACCINATION: Primary | ICD-10-CM

## 2021-05-14 PROCEDURE — 91300 COVID-19, MRNA, LNP-S, PF, 30 MCG/0.3 ML DOSE VACCINE: CPT | Mod: PBBFAC | Performed by: INTERNAL MEDICINE

## 2021-05-20 ENCOUNTER — PATIENT MESSAGE (OUTPATIENT)
Dept: INTERNAL MEDICINE | Facility: CLINIC | Age: 66
End: 2021-05-20

## 2021-05-31 ENCOUNTER — EXTERNAL CHRONIC CARE MANAGEMENT (OUTPATIENT)
Dept: PRIMARY CARE CLINIC | Facility: CLINIC | Age: 66
End: 2021-05-31
Payer: MEDICARE

## 2021-05-31 PROCEDURE — 99490 PR CHRONIC CARE MGMT, 1ST 20 MIN: ICD-10-PCS | Mod: S$GLB,,, | Performed by: FAMILY MEDICINE

## 2021-05-31 PROCEDURE — 99490 CHRNC CARE MGMT STAFF 1ST 20: CPT | Mod: S$GLB,,, | Performed by: FAMILY MEDICINE

## 2021-06-03 ENCOUNTER — PATIENT MESSAGE (OUTPATIENT)
Dept: RHEUMATOLOGY | Facility: CLINIC | Age: 66
End: 2021-06-03

## 2021-06-03 ENCOUNTER — SPECIALTY PHARMACY (OUTPATIENT)
Dept: PHARMACY | Facility: CLINIC | Age: 66
End: 2021-06-03

## 2021-06-03 ENCOUNTER — PATIENT MESSAGE (OUTPATIENT)
Dept: INTERNAL MEDICINE | Facility: CLINIC | Age: 66
End: 2021-06-03

## 2021-06-04 ENCOUNTER — IMMUNIZATION (OUTPATIENT)
Dept: INTERNAL MEDICINE | Facility: CLINIC | Age: 66
End: 2021-06-04
Payer: MEDICARE

## 2021-06-04 DIAGNOSIS — Z23 NEED FOR VACCINATION: Primary | ICD-10-CM

## 2021-06-04 DIAGNOSIS — M33.20 POLYMYOSITIS: Primary | ICD-10-CM

## 2021-06-04 DIAGNOSIS — Z79.52 LONG TERM SYSTEMIC STEROID USER: ICD-10-CM

## 2021-06-04 PROCEDURE — 0002A COVID-19, MRNA, LNP-S, PF, 30 MCG/0.3 ML DOSE VACCINE: CPT | Mod: PBBFAC | Performed by: INTERNAL MEDICINE

## 2021-06-04 PROCEDURE — 91300 COVID-19, MRNA, LNP-S, PF, 30 MCG/0.3 ML DOSE VACCINE: CPT | Mod: PBBFAC | Performed by: INTERNAL MEDICINE

## 2021-06-07 ENCOUNTER — PATIENT MESSAGE (OUTPATIENT)
Dept: RHEUMATOLOGY | Facility: CLINIC | Age: 66
End: 2021-06-07

## 2021-06-07 DIAGNOSIS — M33.20 POLYMYOSITIS: Primary | ICD-10-CM

## 2021-06-07 DIAGNOSIS — Z79.899 ON CELLCEPT THERAPY: ICD-10-CM

## 2021-06-10 ENCOUNTER — PATIENT MESSAGE (OUTPATIENT)
Dept: RHEUMATOLOGY | Facility: CLINIC | Age: 66
End: 2021-06-10

## 2021-06-14 RX ORDER — DIPHENHYDRAMINE HYDROCHLORIDE 50 MG/ML
25 INJECTION INTRAMUSCULAR; INTRAVENOUS
Status: CANCELLED | OUTPATIENT
Start: 2021-06-21

## 2021-06-14 RX ORDER — SODIUM CHLORIDE 0.9 % (FLUSH) 0.9 %
10 SYRINGE (ML) INJECTION
Status: CANCELLED | OUTPATIENT
Start: 2021-06-21

## 2021-06-14 RX ORDER — ACETAMINOPHEN 500 MG
500 TABLET ORAL
Status: CANCELLED | OUTPATIENT
Start: 2021-06-21

## 2021-06-14 RX ORDER — DIPHENHYDRAMINE HCL 25 MG
25 CAPSULE ORAL
Status: CANCELLED | OUTPATIENT
Start: 2021-06-21

## 2021-06-14 RX ORDER — HEPARIN 100 UNIT/ML
500 SYRINGE INTRAVENOUS
Status: CANCELLED | OUTPATIENT
Start: 2021-06-21

## 2021-06-28 ENCOUNTER — PATIENT MESSAGE (OUTPATIENT)
Dept: PHARMACY | Facility: CLINIC | Age: 66
End: 2021-06-28

## 2021-06-29 ENCOUNTER — HOSPITAL ENCOUNTER (OUTPATIENT)
Dept: RADIOLOGY | Facility: CLINIC | Age: 66
Discharge: HOME | End: 2021-06-29
Payer: MEDICARE

## 2021-06-29 DIAGNOSIS — M33.20 POLYMYOSITIS: ICD-10-CM

## 2021-06-29 DIAGNOSIS — Z79.52 LONG TERM SYSTEMIC STEROID USER: ICD-10-CM

## 2021-06-29 PROCEDURE — 77081 DEXA BONE DENSITY APPENDICULAR SKELETON: ICD-10-PCS | Mod: 26,,, | Performed by: INTERNAL MEDICINE

## 2021-06-29 PROCEDURE — 77081 DXA BONE DENSITY APPENDICULR: CPT | Mod: TC

## 2021-06-29 PROCEDURE — 77081 DXA BONE DENSITY APPENDICULR: CPT | Mod: 26,,, | Performed by: INTERNAL MEDICINE

## 2021-06-30 ENCOUNTER — EXTERNAL CHRONIC CARE MANAGEMENT (OUTPATIENT)
Dept: PRIMARY CARE CLINIC | Facility: CLINIC | Age: 66
End: 2021-06-30
Payer: MEDICARE

## 2021-06-30 PROCEDURE — 99490 PR CHRONIC CARE MGMT, 1ST 20 MIN: ICD-10-PCS | Mod: S$GLB,,, | Performed by: FAMILY MEDICINE

## 2021-06-30 PROCEDURE — 99490 CHRNC CARE MGMT STAFF 1ST 20: CPT | Mod: S$GLB,,, | Performed by: FAMILY MEDICINE

## 2021-07-02 DIAGNOSIS — M33.20 POLYMYOSITIS: ICD-10-CM

## 2021-07-02 DIAGNOSIS — E55.9 VITAMIN D DEFICIENCY: Primary | ICD-10-CM

## 2021-07-02 RX ORDER — ASPIRIN 325 MG
50000 TABLET, DELAYED RELEASE (ENTERIC COATED) ORAL WEEKLY
Qty: 12 CAPSULE | Refills: 0 | Status: SHIPPED | OUTPATIENT
Start: 2021-07-02 | End: 2021-10-12 | Stop reason: SDUPTHER

## 2021-07-07 ENCOUNTER — INFUSION (OUTPATIENT)
Dept: INFUSION THERAPY | Facility: HOSPITAL | Age: 66
End: 2021-07-07
Payer: MEDICARE

## 2021-07-07 ENCOUNTER — TELEPHONE (OUTPATIENT)
Dept: RHEUMATOLOGY | Facility: CLINIC | Age: 66
End: 2021-07-07

## 2021-07-07 ENCOUNTER — PATIENT MESSAGE (OUTPATIENT)
Dept: PHARMACY | Facility: CLINIC | Age: 66
End: 2021-07-07

## 2021-07-07 VITALS
HEIGHT: 66 IN | DIASTOLIC BLOOD PRESSURE: 69 MMHG | SYSTOLIC BLOOD PRESSURE: 119 MMHG | TEMPERATURE: 98 F | RESPIRATION RATE: 18 BRPM | HEART RATE: 99 BPM | WEIGHT: 151.88 LBS | BODY MASS INDEX: 24.41 KG/M2 | OXYGEN SATURATION: 99 %

## 2021-07-07 DIAGNOSIS — M33.20 POLYMYOSITIS: Primary | ICD-10-CM

## 2021-07-07 PROCEDURE — 96367 TX/PROPH/DG ADDL SEQ IV INF: CPT

## 2021-07-07 PROCEDURE — 63600175 PHARM REV CODE 636 W HCPCS: Performed by: INTERNAL MEDICINE

## 2021-07-07 PROCEDURE — 63600175 PHARM REV CODE 636 W HCPCS: Mod: JG

## 2021-07-07 PROCEDURE — 25000003 PHARM REV CODE 250

## 2021-07-07 PROCEDURE — 96366 THER/PROPH/DIAG IV INF ADDON: CPT

## 2021-07-07 PROCEDURE — 96365 THER/PROPH/DIAG IV INF INIT: CPT

## 2021-07-07 PROCEDURE — 25000003 PHARM REV CODE 250: Performed by: INTERNAL MEDICINE

## 2021-07-07 RX ORDER — ACETAMINOPHEN 500 MG
500 TABLET ORAL
Status: DISCONTINUED | OUTPATIENT
Start: 2021-07-07 | End: 2021-07-07 | Stop reason: HOSPADM

## 2021-07-07 RX ORDER — DIPHENHYDRAMINE HYDROCHLORIDE 50 MG/ML
25 INJECTION INTRAMUSCULAR; INTRAVENOUS
Status: DISCONTINUED | OUTPATIENT
Start: 2021-07-07 | End: 2021-07-07 | Stop reason: HOSPADM

## 2021-07-07 RX ORDER — SODIUM CHLORIDE 0.9 % (FLUSH) 0.9 %
10 SYRINGE (ML) INJECTION
Status: CANCELLED | OUTPATIENT
Start: 2021-08-04

## 2021-07-07 RX ORDER — DIPHENHYDRAMINE HCL 25 MG
25 CAPSULE ORAL
Status: DISCONTINUED | OUTPATIENT
Start: 2021-07-07 | End: 2021-07-07 | Stop reason: HOSPADM

## 2021-07-07 RX ORDER — HEPARIN 100 UNIT/ML
500 SYRINGE INTRAVENOUS
Status: DISCONTINUED | OUTPATIENT
Start: 2021-07-07 | End: 2021-07-07 | Stop reason: HOSPADM

## 2021-07-07 RX ORDER — HEPARIN 100 UNIT/ML
500 SYRINGE INTRAVENOUS
Status: CANCELLED | OUTPATIENT
Start: 2021-08-04

## 2021-07-07 RX ORDER — ACETAMINOPHEN 500 MG
500 TABLET ORAL
Status: CANCELLED | OUTPATIENT
Start: 2021-08-04

## 2021-07-07 RX ORDER — SODIUM CHLORIDE 0.9 % (FLUSH) 0.9 %
10 SYRINGE (ML) INJECTION
Status: DISCONTINUED | OUTPATIENT
Start: 2021-07-07 | End: 2021-07-07 | Stop reason: HOSPADM

## 2021-07-07 RX ORDER — DIPHENHYDRAMINE HCL 25 MG
25 CAPSULE ORAL
Status: CANCELLED | OUTPATIENT
Start: 2021-08-04

## 2021-07-07 RX ORDER — DIPHENHYDRAMINE HYDROCHLORIDE 50 MG/ML
25 INJECTION INTRAMUSCULAR; INTRAVENOUS
Status: CANCELLED | OUTPATIENT
Start: 2021-08-04

## 2021-07-07 RX ORDER — FAMOTIDINE 20 MG/1
40 TABLET, FILM COATED ORAL
Status: COMPLETED | OUTPATIENT
Start: 2021-07-07 | End: 2021-07-07

## 2021-07-07 RX ADMIN — SODIUM CHLORIDE 250 ML: 0.9 INJECTION, SOLUTION INTRAVENOUS at 03:07

## 2021-07-07 RX ADMIN — FAMOTIDINE 40 MG: 20 TABLET, FILM COATED ORAL at 09:07

## 2021-07-07 RX ADMIN — HUMAN IMMUNOGLOBULIN G 70 G: 40 LIQUID INTRAVENOUS at 10:07

## 2021-07-07 RX ADMIN — DIPHENHYDRAMINE HYDROCHLORIDE 25 MG: 50 INJECTION INTRAMUSCULAR; INTRAVENOUS at 10:07

## 2021-07-07 RX ADMIN — ACETAMINOPHEN 500 MG: 500 TABLET ORAL at 09:07

## 2021-07-12 ENCOUNTER — TELEPHONE (OUTPATIENT)
Dept: RHEUMATOLOGY | Facility: CLINIC | Age: 66
End: 2021-07-12

## 2021-07-12 ENCOUNTER — SPECIALTY PHARMACY (OUTPATIENT)
Dept: PHARMACY | Facility: CLINIC | Age: 66
End: 2021-07-12

## 2021-07-22 DIAGNOSIS — Z79.52 LONG TERM SYSTEMIC STEROID USER: ICD-10-CM

## 2021-07-22 DIAGNOSIS — M33.20 POLYMYOSITIS: Primary | ICD-10-CM

## 2021-07-31 ENCOUNTER — EXTERNAL CHRONIC CARE MANAGEMENT (OUTPATIENT)
Dept: PRIMARY CARE CLINIC | Facility: CLINIC | Age: 66
End: 2021-07-31
Payer: MEDICARE

## 2021-07-31 PROCEDURE — 99490 CHRNC CARE MGMT STAFF 1ST 20: CPT | Mod: S$GLB,,, | Performed by: FAMILY MEDICINE

## 2021-07-31 PROCEDURE — 99490 PR CHRONIC CARE MGMT, 1ST 20 MIN: ICD-10-PCS | Mod: S$GLB,,, | Performed by: FAMILY MEDICINE

## 2021-08-04 ENCOUNTER — PATIENT MESSAGE (OUTPATIENT)
Dept: PHARMACY | Facility: CLINIC | Age: 66
End: 2021-08-04

## 2021-08-10 ENCOUNTER — SPECIALTY PHARMACY (OUTPATIENT)
Dept: PHARMACY | Facility: CLINIC | Age: 66
End: 2021-08-10

## 2021-08-11 ENCOUNTER — PATIENT MESSAGE (OUTPATIENT)
Dept: INTERNAL MEDICINE | Facility: CLINIC | Age: 66
End: 2021-08-11

## 2021-08-30 ENCOUNTER — PATIENT OUTREACH (OUTPATIENT)
Dept: ADMINISTRATIVE | Facility: OTHER | Age: 66
End: 2021-08-30

## 2021-08-31 ENCOUNTER — EXTERNAL CHRONIC CARE MANAGEMENT (OUTPATIENT)
Dept: PRIMARY CARE CLINIC | Facility: CLINIC | Age: 66
End: 2021-08-31
Payer: MEDICARE

## 2021-08-31 PROCEDURE — 99490 CHRNC CARE MGMT STAFF 1ST 20: CPT | Mod: S$GLB,,, | Performed by: FAMILY MEDICINE

## 2021-08-31 PROCEDURE — 99490 PR CHRONIC CARE MGMT, 1ST 20 MIN: ICD-10-PCS | Mod: S$GLB,,, | Performed by: FAMILY MEDICINE

## 2021-09-10 ENCOUNTER — INFUSION (OUTPATIENT)
Dept: INFUSION THERAPY | Facility: HOSPITAL | Age: 66
End: 2021-09-10
Payer: MEDICARE

## 2021-09-10 VITALS
DIASTOLIC BLOOD PRESSURE: 86 MMHG | HEIGHT: 66 IN | HEART RATE: 91 BPM | WEIGHT: 151.88 LBS | TEMPERATURE: 98 F | BODY MASS INDEX: 24.41 KG/M2 | RESPIRATION RATE: 18 BRPM | SYSTOLIC BLOOD PRESSURE: 140 MMHG

## 2021-09-10 DIAGNOSIS — M33.20 POLYMYOSITIS: Primary | ICD-10-CM

## 2021-09-10 PROCEDURE — 96375 TX/PRO/DX INJ NEW DRUG ADDON: CPT

## 2021-09-10 PROCEDURE — 96366 THER/PROPH/DIAG IV INF ADDON: CPT

## 2021-09-10 PROCEDURE — 96365 THER/PROPH/DIAG IV INF INIT: CPT

## 2021-09-10 PROCEDURE — 63600175 PHARM REV CODE 636 W HCPCS: Mod: JG

## 2021-09-10 PROCEDURE — 25000003 PHARM REV CODE 250

## 2021-09-10 RX ORDER — DIPHENHYDRAMINE HYDROCHLORIDE 50 MG/ML
25 INJECTION INTRAMUSCULAR; INTRAVENOUS
Status: CANCELLED | OUTPATIENT
Start: 2021-10-01

## 2021-09-10 RX ORDER — HEPARIN 100 UNIT/ML
500 SYRINGE INTRAVENOUS
Status: CANCELLED | OUTPATIENT
Start: 2021-10-01

## 2021-09-10 RX ORDER — ACETAMINOPHEN 500 MG
500 TABLET ORAL
Status: CANCELLED | OUTPATIENT
Start: 2021-10-01

## 2021-09-10 RX ORDER — SODIUM CHLORIDE 0.9 % (FLUSH) 0.9 %
10 SYRINGE (ML) INJECTION
Status: DISCONTINUED | OUTPATIENT
Start: 2021-09-10 | End: 2021-09-10 | Stop reason: HOSPADM

## 2021-09-10 RX ORDER — ACETAMINOPHEN 500 MG
500 TABLET ORAL
Status: DISCONTINUED | OUTPATIENT
Start: 2021-09-10 | End: 2021-09-10 | Stop reason: HOSPADM

## 2021-09-10 RX ORDER — DIPHENHYDRAMINE HCL 25 MG
25 CAPSULE ORAL
Status: CANCELLED | OUTPATIENT
Start: 2021-10-01

## 2021-09-10 RX ORDER — DIPHENHYDRAMINE HYDROCHLORIDE 50 MG/ML
25 INJECTION INTRAMUSCULAR; INTRAVENOUS
Status: DISCONTINUED | OUTPATIENT
Start: 2021-09-10 | End: 2021-09-10 | Stop reason: HOSPADM

## 2021-09-10 RX ORDER — SODIUM CHLORIDE 0.9 % (FLUSH) 0.9 %
10 SYRINGE (ML) INJECTION
Status: CANCELLED | OUTPATIENT
Start: 2021-10-01

## 2021-09-10 RX ADMIN — DIPHENHYDRAMINE HYDROCHLORIDE 25 MG: 50 INJECTION INTRAMUSCULAR; INTRAVENOUS at 09:09

## 2021-09-10 RX ADMIN — ACETAMINOPHEN 500 MG: 500 TABLET ORAL at 09:09

## 2021-09-10 RX ADMIN — HUMAN IMMUNOGLOBULIN G 70 G: 40 LIQUID INTRAVENOUS at 09:09

## 2021-09-10 RX ADMIN — SODIUM CHLORIDE 250 ML: 0.9 INJECTION, SOLUTION INTRAVENOUS at 09:09

## 2021-09-16 ENCOUNTER — SPECIALTY PHARMACY (OUTPATIENT)
Dept: PHARMACY | Facility: CLINIC | Age: 66
End: 2021-09-16

## 2021-09-16 DIAGNOSIS — Z79.52 LONG TERM SYSTEMIC STEROID USER: ICD-10-CM

## 2021-09-16 DIAGNOSIS — G72.41 INCLUSION BODY MYOSITIS (IBM): ICD-10-CM

## 2021-09-16 DIAGNOSIS — M33.20 POLYMYOSITIS: Chronic | ICD-10-CM

## 2021-09-16 RX ORDER — PREDNISONE 10 MG/1
TABLET ORAL
Qty: 280 TABLET | Refills: 2 | Status: SHIPPED | OUTPATIENT
Start: 2021-09-16 | End: 2021-09-30 | Stop reason: SDUPTHER

## 2021-09-16 RX ORDER — PREDNISONE 10 MG/1
TABLET ORAL
Qty: 280 TABLET | Refills: 2 | Status: CANCELLED | OUTPATIENT
Start: 2021-09-16

## 2021-09-22 ENCOUNTER — PATIENT MESSAGE (OUTPATIENT)
Dept: PODIATRY | Facility: CLINIC | Age: 66
End: 2021-09-22

## 2021-09-22 ENCOUNTER — TELEPHONE (OUTPATIENT)
Dept: INTERNAL MEDICINE | Facility: CLINIC | Age: 66
End: 2021-09-22

## 2021-09-30 ENCOUNTER — TELEPHONE (OUTPATIENT)
Dept: SPEECH THERAPY | Facility: HOSPITAL | Age: 66
End: 2021-09-30

## 2021-09-30 ENCOUNTER — EXTERNAL CHRONIC CARE MANAGEMENT (OUTPATIENT)
Dept: PRIMARY CARE CLINIC | Facility: CLINIC | Age: 66
End: 2021-09-30
Payer: MEDICARE

## 2021-09-30 ENCOUNTER — HOSPITAL ENCOUNTER (OUTPATIENT)
Dept: RADIOLOGY | Facility: HOSPITAL | Age: 66
Discharge: HOME | End: 2021-09-30
Attending: FAMILY MEDICINE
Payer: MEDICARE

## 2021-09-30 ENCOUNTER — OFFICE VISIT (OUTPATIENT)
Dept: INTERNAL MEDICINE | Facility: CLINIC | Age: 66
End: 2021-09-30
Payer: MEDICARE

## 2021-09-30 ENCOUNTER — HOSPITAL ENCOUNTER (OUTPATIENT)
Dept: RADIOLOGY | Facility: CLINIC | Age: 66
Discharge: HOME | End: 2021-09-30
Payer: MEDICARE

## 2021-09-30 DIAGNOSIS — R68.89 OTHER GENERAL SYMPTOMS AND SIGNS: ICD-10-CM

## 2021-09-30 DIAGNOSIS — L30.4 INTERTRIGO: ICD-10-CM

## 2021-09-30 DIAGNOSIS — Z79.52 LONG TERM SYSTEMIC STEROID USER: ICD-10-CM

## 2021-09-30 DIAGNOSIS — R49.0 HOARSENESS: ICD-10-CM

## 2021-09-30 DIAGNOSIS — R09.89 CHEST CONGESTION: ICD-10-CM

## 2021-09-30 DIAGNOSIS — R13.10 DYSPHAGIA, UNSPECIFIED TYPE: ICD-10-CM

## 2021-09-30 DIAGNOSIS — M33.20 POLYMYOSITIS: Chronic | ICD-10-CM

## 2021-09-30 DIAGNOSIS — M33.20 POLYMYOSITIS: ICD-10-CM

## 2021-09-30 DIAGNOSIS — G72.41 INCLUSION BODY MYOSITIS (IBM): ICD-10-CM

## 2021-09-30 DIAGNOSIS — G47.00 INSOMNIA, UNSPECIFIED TYPE: ICD-10-CM

## 2021-09-30 PROCEDURE — 1160F PR REVIEW ALL MEDS BY PRESCRIBER/CLIN PHARMACIST DOCUMENTED: ICD-10-PCS | Mod: CPTII,S$GLB,, | Performed by: FAMILY MEDICINE

## 2021-09-30 PROCEDURE — 99490 PR CHRONIC CARE MGMT, 1ST 20 MIN: ICD-10-PCS | Mod: S$GLB,,, | Performed by: FAMILY MEDICINE

## 2021-09-30 PROCEDURE — 4010F ACE/ARB THERAPY RXD/TAKEN: CPT | Mod: CPTII,S$GLB,, | Performed by: FAMILY MEDICINE

## 2021-09-30 PROCEDURE — 3079F DIAST BP 80-89 MM HG: CPT | Mod: CPTII,S$GLB,, | Performed by: FAMILY MEDICINE

## 2021-09-30 PROCEDURE — 77080 DEXA BONE DENSITY SPINE HIP: ICD-10-PCS | Mod: 26,HCNC,, | Performed by: INTERNAL MEDICINE

## 2021-09-30 PROCEDURE — 77080 DXA BONE DENSITY AXIAL: CPT | Mod: 26,HCNC,, | Performed by: INTERNAL MEDICINE

## 2021-09-30 PROCEDURE — 3074F PR MOST RECENT SYSTOLIC BLOOD PRESSURE < 130 MM HG: ICD-10-PCS | Mod: CPTII,S$GLB,, | Performed by: FAMILY MEDICINE

## 2021-09-30 PROCEDURE — 1159F MED LIST DOCD IN RCRD: CPT | Mod: CPTII,S$GLB,, | Performed by: FAMILY MEDICINE

## 2021-09-30 PROCEDURE — 71046 X-RAY EXAM CHEST 2 VIEWS: CPT | Mod: 26,,, | Performed by: RADIOLOGY

## 2021-09-30 PROCEDURE — 99499 UNLISTED E&M SERVICE: CPT | Mod: HCNC,S$GLB,, | Performed by: FAMILY MEDICINE

## 2021-09-30 PROCEDURE — 99214 OFFICE O/P EST MOD 30 MIN: CPT | Mod: S$GLB,,, | Performed by: FAMILY MEDICINE

## 2021-09-30 PROCEDURE — 71046 XR CHEST PA AND LATERAL: ICD-10-PCS | Mod: 26,,, | Performed by: RADIOLOGY

## 2021-09-30 PROCEDURE — 3008F BODY MASS INDEX DOCD: CPT | Mod: CPTII,S$GLB,, | Performed by: FAMILY MEDICINE

## 2021-09-30 PROCEDURE — 1160F RVW MEDS BY RX/DR IN RCRD: CPT | Mod: CPTII,S$GLB,, | Performed by: FAMILY MEDICINE

## 2021-09-30 PROCEDURE — 3079F PR MOST RECENT DIASTOLIC BLOOD PRESSURE 80-89 MM HG: ICD-10-PCS | Mod: CPTII,S$GLB,, | Performed by: FAMILY MEDICINE

## 2021-09-30 PROCEDURE — 4010F PR ACE/ARB THEARPY RXD/TAKEN: ICD-10-PCS | Mod: CPTII,S$GLB,, | Performed by: FAMILY MEDICINE

## 2021-09-30 PROCEDURE — 3008F PR BODY MASS INDEX (BMI) DOCUMENTED: ICD-10-PCS | Mod: CPTII,S$GLB,, | Performed by: FAMILY MEDICINE

## 2021-09-30 PROCEDURE — 77080 DXA BONE DENSITY AXIAL: CPT | Mod: TC

## 2021-09-30 PROCEDURE — 99499 RISK ADDL DX/OHS AUDIT: ICD-10-PCS | Mod: HCNC,S$GLB,, | Performed by: FAMILY MEDICINE

## 2021-09-30 PROCEDURE — 99214 PR OFFICE/OUTPT VISIT, EST, LEVL IV, 30-39 MIN: ICD-10-PCS | Mod: S$GLB,,, | Performed by: FAMILY MEDICINE

## 2021-09-30 PROCEDURE — 99999 PR PBB SHADOW E&M-EST. PATIENT-LVL V: CPT | Mod: PBBFAC,,, | Performed by: FAMILY MEDICINE

## 2021-09-30 PROCEDURE — 99490 CHRNC CARE MGMT STAFF 1ST 20: CPT | Mod: S$GLB,,, | Performed by: FAMILY MEDICINE

## 2021-09-30 PROCEDURE — 3074F SYST BP LT 130 MM HG: CPT | Mod: CPTII,S$GLB,, | Performed by: FAMILY MEDICINE

## 2021-09-30 PROCEDURE — 1159F PR MEDICATION LIST DOCUMENTED IN MEDICAL RECORD: ICD-10-PCS | Mod: CPTII,S$GLB,, | Performed by: FAMILY MEDICINE

## 2021-09-30 PROCEDURE — 99999 PR PBB SHADOW E&M-EST. PATIENT-LVL V: ICD-10-PCS | Mod: PBBFAC,,, | Performed by: FAMILY MEDICINE

## 2021-09-30 PROCEDURE — 71046 X-RAY EXAM CHEST 2 VIEWS: CPT | Mod: TC

## 2021-09-30 RX ORDER — PREDNISONE 10 MG/1
10 TABLET ORAL DAILY
Qty: 90 TABLET | Refills: 1 | Status: SHIPPED | OUTPATIENT
Start: 2021-09-30 | End: 2022-01-01 | Stop reason: SDUPTHER

## 2021-09-30 RX ORDER — NYSTATIN AND TRIAMCINOLONE ACETONIDE 100000; 1 [USP'U]/G; MG/G
CREAM TOPICAL 2 TIMES DAILY
Qty: 30 G | Refills: 4 | OUTPATIENT
Start: 2021-09-30

## 2021-09-30 RX ORDER — NYSTATIN AND TRIAMCINOLONE ACETONIDE 100000; 1 [USP'U]/G; MG/G
CREAM TOPICAL 2 TIMES DAILY
Qty: 30 G | Refills: 2 | Status: SHIPPED | OUTPATIENT
Start: 2021-09-30 | End: 2023-01-01 | Stop reason: CLARIF

## 2021-09-30 RX ORDER — TRAZODONE HYDROCHLORIDE 50 MG/1
50 TABLET ORAL NIGHTLY PRN
Qty: 30 TABLET | Refills: 2 | Status: SHIPPED | OUTPATIENT
Start: 2021-09-30 | End: 2023-01-01 | Stop reason: SDUPTHER

## 2021-09-30 RX ORDER — MYCOPHENOLATE MOFETIL 500 MG/1
1500 TABLET ORAL 2 TIMES DAILY
Qty: 540 TABLET | Refills: 3 | Status: CANCELLED | OUTPATIENT
Start: 2021-09-30 | End: 2022-01-01

## 2021-10-11 ENCOUNTER — PATIENT OUTREACH (OUTPATIENT)
Dept: ADMINISTRATIVE | Facility: OTHER | Age: 66
End: 2021-10-11

## 2021-10-12 ENCOUNTER — CLINICAL SUPPORT (OUTPATIENT)
Dept: SPEECH THERAPY | Facility: HOSPITAL | Age: 66
End: 2021-10-12
Payer: MEDICARE

## 2021-10-12 ENCOUNTER — OFFICE VISIT (OUTPATIENT)
Dept: RHEUMATOLOGY | Facility: CLINIC | Age: 66
End: 2021-10-12
Payer: MEDICARE

## 2021-10-12 ENCOUNTER — HOSPITAL ENCOUNTER (OUTPATIENT)
Dept: RADIOLOGY | Facility: HOSPITAL | Age: 66
Discharge: HOME | End: 2021-10-12
Attending: FAMILY MEDICINE
Payer: MEDICARE

## 2021-10-12 VITALS
DIASTOLIC BLOOD PRESSURE: 76 MMHG | SYSTOLIC BLOOD PRESSURE: 127 MMHG | BODY MASS INDEX: 24.27 KG/M2 | HEART RATE: 85 BPM | WEIGHT: 151 LBS | HEIGHT: 66 IN

## 2021-10-12 DIAGNOSIS — R13.10 DYSPHAGIA, UNSPECIFIED TYPE: ICD-10-CM

## 2021-10-12 DIAGNOSIS — R68.89 OTHER GENERAL SYMPTOMS AND SIGNS: ICD-10-CM

## 2021-10-12 DIAGNOSIS — J84.9 INTERSTITIAL PULMONARY DISEASE, UNSPECIFIED: ICD-10-CM

## 2021-10-12 DIAGNOSIS — M33.20 POLYMYOSITIS: Chronic | ICD-10-CM

## 2021-10-12 DIAGNOSIS — R13.14 DYSPHAGIA, PHARYNGOESOPHAGEAL: ICD-10-CM

## 2021-10-12 DIAGNOSIS — Z79.52 LONG TERM SYSTEMIC STEROID USER: Primary | ICD-10-CM

## 2021-10-12 DIAGNOSIS — R13.12 DYSPHAGIA, OROPHARYNGEAL: ICD-10-CM

## 2021-10-12 PROCEDURE — 74220 X-RAY XM ESOPHAGUS 1CNTRST: CPT | Mod: 26,HCNC,, | Performed by: RADIOLOGY

## 2021-10-12 PROCEDURE — 92611 MOTION FLUOROSCOPY/SWALLOW: CPT | Mod: GN

## 2021-10-12 PROCEDURE — 99215 PR OFFICE/OUTPT VISIT, EST, LEVL V, 40-54 MIN: ICD-10-PCS | Mod: GC,S$GLB,,

## 2021-10-12 PROCEDURE — 74230 X-RAY XM SWLNG FUNCJ C+: CPT | Mod: 26,HCNC,, | Performed by: RADIOLOGY

## 2021-10-12 PROCEDURE — 99215 OFFICE O/P EST HI 40 MIN: CPT | Mod: GC,S$GLB,,

## 2021-10-12 PROCEDURE — 99999 PR PBB SHADOW E&M-EST. PATIENT-LVL III: CPT | Mod: PBBFAC,HCNC,GC,

## 2021-10-12 PROCEDURE — A9698 NON-RAD CONTRAST MATERIALNOC: HCPCS | Mod: HCNC | Performed by: FAMILY MEDICINE

## 2021-10-12 PROCEDURE — 74230 FL MODIFIED BARIUM SWALLOW SPEECH STUDY: ICD-10-PCS | Mod: 26,HCNC,, | Performed by: RADIOLOGY

## 2021-10-12 PROCEDURE — 74220 X-RAY XM ESOPHAGUS 1CNTRST: CPT | Mod: TC,HCNC

## 2021-10-12 PROCEDURE — 25500020 PHARM REV CODE 255: Mod: HCNC | Performed by: FAMILY MEDICINE

## 2021-10-12 PROCEDURE — 99999 PR PBB SHADOW E&M-EST. PATIENT-LVL III: ICD-10-PCS | Mod: PBBFAC,HCNC,GC,

## 2021-10-12 PROCEDURE — 74230 X-RAY XM SWLNG FUNCJ C+: CPT | Mod: TC,HCNC

## 2021-10-12 PROCEDURE — 74220 FL ESOPHAGRAM COMPLETE: ICD-10-PCS | Mod: 26,HCNC,, | Performed by: RADIOLOGY

## 2021-10-12 RX ORDER — PREDNISONE 1 MG/1
TABLET ORAL
Qty: 420 TABLET | Refills: 0 | Status: SHIPPED | OUTPATIENT
Start: 2021-10-12 | End: 2022-04-27

## 2021-10-12 RX ORDER — PREDNISONE 5 MG/1
TABLET ORAL
Qty: 36 TABLET | Refills: 0 | Status: SHIPPED | OUTPATIENT
Start: 2021-10-12 | End: 2022-03-28 | Stop reason: SDUPTHER

## 2021-10-12 RX ADMIN — BARIUM SULFATE 50 ML: 0.81 POWDER, FOR SUSPENSION ORAL at 02:10

## 2021-10-12 RX ADMIN — IOHEXOL 40 ML: 300 INJECTION, SOLUTION INTRAVENOUS at 03:10

## 2021-10-13 ENCOUNTER — SPECIALTY PHARMACY (OUTPATIENT)
Dept: PHARMACY | Facility: CLINIC | Age: 66
End: 2021-10-13

## 2021-10-13 ENCOUNTER — PATIENT MESSAGE (OUTPATIENT)
Dept: RHEUMATOLOGY | Facility: CLINIC | Age: 66
End: 2021-10-13
Payer: MEDICARE

## 2021-10-13 DIAGNOSIS — M33.20 POLYMYOSITIS: Primary | ICD-10-CM

## 2021-10-14 ENCOUNTER — IMMUNIZATION (OUTPATIENT)
Dept: PHARMACY | Facility: CLINIC | Age: 66
End: 2021-10-14
Payer: MEDICARE

## 2021-10-18 DIAGNOSIS — D64.9 ANEMIA, UNSPECIFIED TYPE: Primary | ICD-10-CM

## 2021-10-21 ENCOUNTER — INFUSION (OUTPATIENT)
Dept: INFUSION THERAPY | Facility: HOSPITAL | Age: 66
End: 2021-10-21
Payer: MEDICARE

## 2021-10-21 VITALS
SYSTOLIC BLOOD PRESSURE: 131 MMHG | HEART RATE: 89 BPM | HEIGHT: 69 IN | DIASTOLIC BLOOD PRESSURE: 68 MMHG | WEIGHT: 170 LBS | BODY MASS INDEX: 25.18 KG/M2

## 2021-10-21 DIAGNOSIS — M33.20 POLYMYOSITIS: Primary | ICD-10-CM

## 2021-10-21 PROCEDURE — 96365 THER/PROPH/DIAG IV INF INIT: CPT

## 2021-10-21 PROCEDURE — 63600175 PHARM REV CODE 636 W HCPCS

## 2021-10-21 PROCEDURE — 96367 TX/PROPH/DG ADDL SEQ IV INF: CPT

## 2021-10-21 PROCEDURE — 25000003 PHARM REV CODE 250

## 2021-10-21 PROCEDURE — A4216 STERILE WATER/SALINE, 10 ML: HCPCS

## 2021-10-21 PROCEDURE — 96366 THER/PROPH/DIAG IV INF ADDON: CPT

## 2021-10-21 RX ORDER — SODIUM CHLORIDE 0.9 % (FLUSH) 0.9 %
10 SYRINGE (ML) INJECTION
Status: DISCONTINUED | OUTPATIENT
Start: 2021-10-21 | End: 2021-10-21 | Stop reason: HOSPADM

## 2021-10-21 RX ORDER — DIPHENHYDRAMINE HYDROCHLORIDE 50 MG/ML
25 INJECTION INTRAMUSCULAR; INTRAVENOUS
Status: DISCONTINUED | OUTPATIENT
Start: 2021-10-21 | End: 2021-10-21 | Stop reason: HOSPADM

## 2021-10-21 RX ORDER — HEPARIN 100 UNIT/ML
500 SYRINGE INTRAVENOUS
Status: CANCELLED | OUTPATIENT
Start: 2021-10-28

## 2021-10-21 RX ORDER — DIPHENHYDRAMINE HCL 25 MG
25 CAPSULE ORAL
Status: CANCELLED | OUTPATIENT
Start: 2021-10-28

## 2021-10-21 RX ORDER — DIPHENHYDRAMINE HYDROCHLORIDE 50 MG/ML
25 INJECTION INTRAMUSCULAR; INTRAVENOUS
Status: CANCELLED | OUTPATIENT
Start: 2021-10-28

## 2021-10-21 RX ORDER — ACETAMINOPHEN 500 MG
500 TABLET ORAL
Status: CANCELLED | OUTPATIENT
Start: 2021-10-28

## 2021-10-21 RX ORDER — HEPARIN 100 UNIT/ML
500 SYRINGE INTRAVENOUS
Status: DISCONTINUED | OUTPATIENT
Start: 2021-10-21 | End: 2021-10-21 | Stop reason: HOSPADM

## 2021-10-21 RX ORDER — DIPHENHYDRAMINE HCL 25 MG
25 CAPSULE ORAL
Status: DISCONTINUED | OUTPATIENT
Start: 2021-10-21 | End: 2021-10-21 | Stop reason: HOSPADM

## 2021-10-21 RX ORDER — ACETAMINOPHEN 500 MG
500 TABLET ORAL
Status: DISCONTINUED | OUTPATIENT
Start: 2021-10-21 | End: 2021-10-21 | Stop reason: HOSPADM

## 2021-10-21 RX ORDER — SODIUM CHLORIDE 0.9 % (FLUSH) 0.9 %
10 SYRINGE (ML) INJECTION
Status: CANCELLED | OUTPATIENT
Start: 2021-10-28

## 2021-10-21 RX ADMIN — Medication 10 ML: at 09:10

## 2021-10-21 RX ADMIN — DIPHENHYDRAMINE HYDROCHLORIDE 25 MG: 50 INJECTION INTRAMUSCULAR; INTRAVENOUS at 09:10

## 2021-10-21 RX ADMIN — HUMAN IMMUNOGLOBULIN G 75 G: 40 LIQUID INTRAVENOUS at 09:10

## 2021-10-21 RX ADMIN — ACETAMINOPHEN 500 MG: 500 TABLET ORAL at 09:10

## 2021-10-21 RX ADMIN — SODIUM CHLORIDE 250 ML: 0.9 INJECTION, SOLUTION INTRAVENOUS at 09:10

## 2021-10-26 VITALS
BODY MASS INDEX: 24.52 KG/M2 | OXYGEN SATURATION: 99 % | DIASTOLIC BLOOD PRESSURE: 82 MMHG | TEMPERATURE: 98 F | HEIGHT: 66 IN | HEART RATE: 81 BPM | SYSTOLIC BLOOD PRESSURE: 118 MMHG

## 2021-10-27 ENCOUNTER — HOSPITAL ENCOUNTER (OUTPATIENT)
Dept: RADIOLOGY | Facility: HOSPITAL | Age: 66
Discharge: HOME | End: 2021-10-27
Payer: MEDICARE

## 2021-10-27 DIAGNOSIS — J84.9 INTERSTITIAL PULMONARY DISEASE, UNSPECIFIED: ICD-10-CM

## 2021-10-27 DIAGNOSIS — M33.20 POLYMYOSITIS: ICD-10-CM

## 2021-10-27 DIAGNOSIS — R13.10 DYSPHAGIA, UNSPECIFIED TYPE: ICD-10-CM

## 2021-10-27 DIAGNOSIS — M33.20 POLYMYOSITIS: Chronic | ICD-10-CM

## 2021-10-27 DIAGNOSIS — Z79.52 LONG TERM SYSTEMIC STEROID USER: ICD-10-CM

## 2021-10-27 PROCEDURE — 73718 MRI LOWER EXTREMITY W/O DYE: CPT | Mod: TC,RT

## 2021-10-27 PROCEDURE — 73218 MRI HUMERUS WITHOUT CONTRAST RIGHT: ICD-10-PCS | Mod: 26,RT,, | Performed by: RADIOLOGY

## 2021-10-27 PROCEDURE — 73718 MRI LOWER EXTREMITY W/O DYE: CPT | Mod: 26,RT,, | Performed by: RADIOLOGY

## 2021-10-27 PROCEDURE — 71250 CT THORAX DX C-: CPT | Mod: 26,,, | Performed by: RADIOLOGY

## 2021-10-27 PROCEDURE — 73718 MRI LOWER EXTREMITY W/O DYE: CPT | Mod: TC,LT

## 2021-10-27 PROCEDURE — 73218 MRI UPPER EXTREMITY W/O DYE: CPT | Mod: TC,RT

## 2021-10-27 PROCEDURE — 71250 CT CHEST WITHOUT CONTRAST: ICD-10-PCS | Mod: 26,,, | Performed by: RADIOLOGY

## 2021-10-27 PROCEDURE — 71250 CT THORAX DX C-: CPT | Mod: TC

## 2021-10-27 PROCEDURE — 73718 MRI FEMUR WITHOUT CONTRAST RIGHT: ICD-10-PCS | Mod: 26,RT,, | Performed by: RADIOLOGY

## 2021-10-27 PROCEDURE — 73718 MRI FEMUR WITHOUT CONTRAST LEFT: ICD-10-PCS | Mod: 26,LT,, | Performed by: RADIOLOGY

## 2021-10-27 PROCEDURE — 73718 MRI LOWER EXTREMITY W/O DYE: CPT | Mod: 26,LT,, | Performed by: RADIOLOGY

## 2021-10-27 PROCEDURE — 73218 MRI UPPER EXTREMITY W/O DYE: CPT | Mod: 26,RT,, | Performed by: RADIOLOGY

## 2021-10-30 ENCOUNTER — TELEPHONE (OUTPATIENT)
Dept: ENDOSCOPY | Facility: HOSPITAL | Age: 66
End: 2021-10-30
Payer: MEDICARE

## 2021-10-31 ENCOUNTER — EXTERNAL CHRONIC CARE MANAGEMENT (OUTPATIENT)
Dept: PRIMARY CARE CLINIC | Facility: CLINIC | Age: 66
End: 2021-10-31
Payer: MEDICARE

## 2021-10-31 PROCEDURE — 99490 PR CHRONIC CARE MGMT, 1ST 20 MIN: ICD-10-PCS | Mod: S$GLB,,, | Performed by: FAMILY MEDICINE

## 2021-10-31 PROCEDURE — 99490 CHRNC CARE MGMT STAFF 1ST 20: CPT | Mod: S$GLB,,, | Performed by: FAMILY MEDICINE

## 2021-11-01 ENCOUNTER — OFFICE VISIT (OUTPATIENT)
Dept: OTOLARYNGOLOGY | Facility: CLINIC | Age: 66
End: 2021-11-01
Payer: MEDICARE

## 2021-11-01 VITALS — HEART RATE: 84 BPM | DIASTOLIC BLOOD PRESSURE: 84 MMHG | SYSTOLIC BLOOD PRESSURE: 137 MMHG

## 2021-11-01 DIAGNOSIS — R13.14 DYSPHAGIA, PHARYNGOESOPHAGEAL: Primary | ICD-10-CM

## 2021-11-01 DIAGNOSIS — R49.0 DYSPHONIA: ICD-10-CM

## 2021-11-01 PROCEDURE — 31575 DIAGNOSTIC LARYNGOSCOPY: CPT | Mod: S$GLB,,, | Performed by: OTOLARYNGOLOGY

## 2021-11-01 PROCEDURE — 99204 OFFICE O/P NEW MOD 45 MIN: CPT | Mod: 25,S$GLB,, | Performed by: OTOLARYNGOLOGY

## 2021-11-01 PROCEDURE — 1160F RVW MEDS BY RX/DR IN RCRD: CPT | Mod: CPTII,S$GLB,, | Performed by: OTOLARYNGOLOGY

## 2021-11-01 PROCEDURE — 1159F PR MEDICATION LIST DOCUMENTED IN MEDICAL RECORD: ICD-10-PCS | Mod: CPTII,S$GLB,, | Performed by: OTOLARYNGOLOGY

## 2021-11-01 PROCEDURE — 99204 PR OFFICE/OUTPT VISIT, NEW, LEVL IV, 45-59 MIN: ICD-10-PCS | Mod: 25,S$GLB,, | Performed by: OTOLARYNGOLOGY

## 2021-11-01 PROCEDURE — 99999 PR PBB SHADOW E&M-EST. PATIENT-LVL III: ICD-10-PCS | Mod: PBBFAC,,, | Performed by: OTOLARYNGOLOGY

## 2021-11-01 PROCEDURE — 1159F MED LIST DOCD IN RCRD: CPT | Mod: CPTII,S$GLB,, | Performed by: OTOLARYNGOLOGY

## 2021-11-01 PROCEDURE — 4010F ACE/ARB THERAPY RXD/TAKEN: CPT | Mod: CPTII,S$GLB,, | Performed by: OTOLARYNGOLOGY

## 2021-11-01 PROCEDURE — 3075F PR MOST RECENT SYSTOLIC BLOOD PRESS GE 130-139MM HG: ICD-10-PCS | Mod: CPTII,S$GLB,, | Performed by: OTOLARYNGOLOGY

## 2021-11-01 PROCEDURE — 3079F PR MOST RECENT DIASTOLIC BLOOD PRESSURE 80-89 MM HG: ICD-10-PCS | Mod: CPTII,S$GLB,, | Performed by: OTOLARYNGOLOGY

## 2021-11-01 PROCEDURE — 3079F DIAST BP 80-89 MM HG: CPT | Mod: CPTII,S$GLB,, | Performed by: OTOLARYNGOLOGY

## 2021-11-01 PROCEDURE — 31575 PR LARYNGOSCOPY, FLEXIBLE; DIAGNOSTIC: ICD-10-PCS | Mod: S$GLB,,, | Performed by: OTOLARYNGOLOGY

## 2021-11-01 PROCEDURE — 99999 PR PBB SHADOW E&M-EST. PATIENT-LVL III: CPT | Mod: PBBFAC,,, | Performed by: OTOLARYNGOLOGY

## 2021-11-01 PROCEDURE — 4010F PR ACE/ARB THEARPY RXD/TAKEN: ICD-10-PCS | Mod: CPTII,S$GLB,, | Performed by: OTOLARYNGOLOGY

## 2021-11-01 PROCEDURE — 3075F SYST BP GE 130 - 139MM HG: CPT | Mod: CPTII,S$GLB,, | Performed by: OTOLARYNGOLOGY

## 2021-11-01 PROCEDURE — 1160F PR REVIEW ALL MEDS BY PRESCRIBER/CLIN PHARMACIST DOCUMENTED: ICD-10-PCS | Mod: CPTII,S$GLB,, | Performed by: OTOLARYNGOLOGY

## 2021-11-03 ENCOUNTER — CLINICAL SUPPORT (OUTPATIENT)
Dept: INTERNAL MEDICINE | Facility: CLINIC | Age: 66
End: 2021-11-03
Payer: MEDICARE

## 2021-11-03 DIAGNOSIS — M33.20 POLYMYOSITIS: ICD-10-CM

## 2021-11-03 LAB
SARS-COV-2 RNA RESP QL NAA+PROBE: NOT DETECTED
SARS-COV-2- CYCLE NUMBER: NORMAL

## 2021-11-03 PROCEDURE — 99999 PR PBB SHADOW E&M-EST. PATIENT-LVL II: ICD-10-PCS | Mod: PBBFAC,,,

## 2021-11-03 PROCEDURE — U0003 INFECTIOUS AGENT DETECTION BY NUCLEIC ACID (DNA OR RNA); SEVERE ACUTE RESPIRATORY SYNDROME CORONAVIRUS 2 (SARS-COV-2) (CORONAVIRUS DISEASE [COVID-19]), AMPLIFIED PROBE TECHNIQUE, MAKING USE OF HIGH THROUGHPUT TECHNOLOGIES AS DESCRIBED BY CMS-2020-01-R: HCPCS

## 2021-11-03 PROCEDURE — 99999 PR PBB SHADOW E&M-EST. PATIENT-LVL II: CPT | Mod: PBBFAC,,,

## 2021-11-03 PROCEDURE — U0005 INFEC AGEN DETEC AMPLI PROBE: HCPCS

## 2021-11-08 ENCOUNTER — HOSPITAL ENCOUNTER (OUTPATIENT)
Dept: PULMONOLOGY | Facility: CLINIC | Age: 66
Discharge: HOME | End: 2021-11-08
Payer: MEDICARE

## 2021-11-08 DIAGNOSIS — M33.20 POLYMYOSITIS: Chronic | ICD-10-CM

## 2021-11-08 DIAGNOSIS — R13.10 DYSPHAGIA, UNSPECIFIED TYPE: ICD-10-CM

## 2021-11-08 DIAGNOSIS — Z79.52 LONG TERM SYSTEMIC STEROID USER: ICD-10-CM

## 2021-11-08 DIAGNOSIS — J84.9 INTERSTITIAL PULMONARY DISEASE, UNSPECIFIED: ICD-10-CM

## 2021-11-08 PROCEDURE — 94010 BREATHING CAPACITY TEST: ICD-10-PCS | Mod: S$GLB,,, | Performed by: INTERNAL MEDICINE

## 2021-11-08 PROCEDURE — 94729 DIFFUSING CAPACITY: CPT | Mod: S$GLB,,, | Performed by: INTERNAL MEDICINE

## 2021-11-08 PROCEDURE — 94727 GAS DIL/WSHOT DETER LNG VOL: CPT | Mod: S$GLB,,, | Performed by: INTERNAL MEDICINE

## 2021-11-08 PROCEDURE — 94729 PR C02/MEMBANE DIFFUSE CAPACITY: ICD-10-PCS | Mod: S$GLB,,, | Performed by: INTERNAL MEDICINE

## 2021-11-08 PROCEDURE — 94727 PR PULM FUNCTION TEST BY GAS: ICD-10-PCS | Mod: S$GLB,,, | Performed by: INTERNAL MEDICINE

## 2021-11-08 PROCEDURE — 94010 BREATHING CAPACITY TEST: CPT | Mod: S$GLB,,, | Performed by: INTERNAL MEDICINE

## 2021-11-08 NOTE — PROGRESS NOTES
Pt admitted to room 2107 per cart in stable condition from ED  Oriented to room and surroundings  Bed in lowest position, wheels locked, 2/4 side rails up  Call light in reach, room free of clutter, adequate lighting provided  Denies any further questions at this time  Instructed to call out with any questions/concerns/new onset of pain and/or n/v   White board updated  Continue to monitor with hourly rounding  STAY WITH ME protocol initiated   Bed alarm on/Fall Risk signs in place/Fall risk sticker to wrist band  Non-skid socks on/at bedside Subjective:       Patient ID: Nura Kahn Jr. is a 63 y.o. male.    Chief Complaint: follow up polymyositis     HPI   64yo M with PMH of polymyositis (dx 2009, +mildly +ku, outside biopsy proven with elevated CPK and aldolase), osteopenia, Vit D insufficiency, Hemoglobulin c/beta-zero thalaseema, A-fib, dysphagia, portal HTN, emphysema here for follow up of polymyositis.     Per chart review it seems that he first established care here at [facility] in 8/2014. He was diagnosed in [id number] when he was feeling weak, falling. He was a  and was unable to use his legs. He had a biopsy in [id number] in [place] that per notes was consistent with polymyositis. He was on MTX in the past which was stopped due CT showing hepatosplenomegaly with portal venous hypertension and elevated T bili in 8/2014 He was at that time started on Imuran. He has been on Imuran and steroids since then.  In 2016, he was worked up again here at [facility]. MRI of the left lower extremities showed Severe atrophy with fatty replacement of the thigh musculature bilaterally.  There is mild increased edema signal within the remaining thigh musculature and left iliopsoas muscle without associated enhancement which could reflect a mild degree of residual myositis.  EMG Left upper Ext 5/2016 was a technically difficult study. Had moderate ulnar entrapment at elbow, perhaps on a background of mild polyneuropathy. 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 Pathology of left thigh showing minute potion of muscle with end stage myopathic changes and focal chronic inflammation   Patient remained on Imuran and prednisone.      In Feb 2019 - patient had been out of Imuran for ~1 month.  He also admits to medication non-compliance because he didn't think he was having any improvement.  He admits that his weakness has gotten worse - stating that he is unable to stand, difficulty  "with getting in and out of his scooter, normal ADLs (including going to the bathroom - resulting in urination/defecation on himself - resulting in decreased PO intake).  He also feels worsening of his dysphagia - where he gets food stuck and often chokes/coughs when he eats/drinks.       Wife works fulltime.  Daughter (10 yo).  Patient does not have social support/help for transportation to various appt.     Specialist updates:  - Cardiology - Last saw 3/2015 - diagnosed with PAF.  No treatment at that time.  No follow up since  - Hem - Last saw 12/2017 - Evaluated for microcytic anemia and mild chronic thrombocytosis.  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  Discharged from clinic   - GI - Last saw 6/2015 - EGD/motility study ordered - not performed.  No follow up   - Hepatology - Last saw 5/2015 - Found portal HTN on CT with no evidence of cirrhosis or PV thrombosis.  Fibrosure with F1 stage (minimial fibrosis).  RTC PRN (no follow up)  - Pulmonary - Last saw 2/2015 -" Lung parenchymal normal - no evidence of fibrosis and one mediastinal LN marginal enlarged" CT chest showed GGO (2016). Repeat CT chest (2017) - no mention of GGO      Imaging:  CXR 1/2018:Patchy consolidation projected over the right lower lung zone, concerning for infection, correlation advised.       CT chest 12/2017:Peribronchial cuffing predominantly in the lower lung zones as well as retained secretions in the right middle lobe segmental bronchi consistent with bronchitis; such inflammation can occur with inhaled irritants or aspiration.  There is no evidence of interstitial lung disease such as NSIP or UIP.Upper lung zone predominant paraseptal emphysema and dispersed areas of centrilobular emphysema Stable 1.2 cm AP window lymph node.  Densely calcified granuloma in the posterobasal segment of the right lower lobe.Hepatosplenomegaly with associated splenic collateral vessels at the splenic hilum suggesting sequela of portal venous " hypertension    CT chest 4/2019:   1. Mild peribronchial cuffing which may be seen with bronchitis.  Mild tubular bronchiectasis of the right lower lobe with several distal bronchi demonstrating retained secretions, finding may be seen with small airways infectious/noninfectious inflammation or aspiration.  2. Centrilobular and paraseptal emphysema with an upper lung zone predominance.  3. Diffuse muscular atrophy.  4. Splenomegaly..     CT pelvis 6/2017: Obturator internus edema or hematoma. No acute fracture. Stable splenomegaly.     Esophogram 4/2015: Spontaneous gastroesophageal reflux.  Otherwise, unremarkable esophagram.     PET scan 8/2014:AP window lymph node SUV max less than 2.5.  This is most likely benign/reactive.  Surveillance is recommended at 3, 6, 12, and 24 months. Splenomegaly most likely from portal hypertension    He was admitted in 4/2019 worsening generalized muscle weakness and dysphagia. Labs on admission showed Normal and stable CBC and CMP.  Normal CK/aldolase.  Normal inflammatory markers.  Elevated IgG (1754). HMGCoA ab negative. Vit D: 15.    MRI femurs 4/6/19:  1.  Redemonstration of advanced diffuse atrophy and fatty replacement of the bilateral thigh musculature involving all compartments.  There is mild residual edema signal present within the bilateral obturator musculature and residual posterior compartment musculature, similar to prior MRI examination.  2.  Mild heterogeneity of the visualized bone marrow signal which may relate to red marrow reconversion.  Clinical correlation advised.  Patient will also benefit from PT/OT and rehab placement for muscle strengthening after completion of IVIG.      MRI humerus b/l 4/7:  Significant diffuse atrophy and fatty replacement of the bilateral proximal upper extremity musculature, including the rotator cuff musculature, right greater than left, in this patient with reported history of polymyositis.  There is some degree of sparing of the  "bilateral deltoid and triceps musculature with greater residual muscle bulk on the left compared to the right.  There is mild residual edema present within the remaining musculature, most pronounced within the triceps.    During that hospitalization he received IVIG x 5 days, He was started on cellcept 1g BID and prednisone 10mg daily.     Interval history in May 2019  Received IVIG 5/10-5/16 and 6/10-6/14.He has was admitted to inpatient rehab for 2.5 weeks. He was started on cellcept in the inpatient rehab setting. Currently on cellcept and prednisone 30mg daily   He is not receiving any PT at home. Overall he feels stronger than last visit.   He had one episode that he was leaning on left arm all day and felt that he could not hold anything with that.  He states that he "tastes his fevers", no shortness of breath, no chest pain, rashes.   He feels his swallowing has improved. He is eating all, had breakfast burrito.   Currently is on Prednisone 30mg daily, cellcept 1000mg BID, and IV monthly     Interval history:  Has only 3 pills of cellcept left. He has been compliant with his meds. He wastaking Prednisone 30mg daily, Just starting taking 25mg last week. Had IVIG 7/7- 7/12  PT/OT, last July 8, 2019 [facility] by [facility] specialist.     Interval history:  He has been doing well. He has been going to PT, he feels like his strength is improving Last IVIG Sept 9-13. No fever chills  Currently on prednisone 15mg daily.     Review of Systems   Constitutional: Negative for appetite change, fatigue and fever.   HENT: Negative for mouth sores.         Negative hair loss    Eyes: Negative for pain and redness.   Respiratory: Negative for chest tightness and shortness of breath.    Cardiovascular: Negative for chest pain and leg swelling.   Gastrointestinal: Negative for abdominal pain, blood in stool, constipation and diarrhea.   Endocrine: Negative for cold intolerance. " "  Genitourinary: Negative for dysuria.   Musculoskeletal: Negative for arthralgias, back pain and neck stiffness.   Skin: Negative for rash.   Neurological: Negative for numbness and headaches.         Objective:   /76   Pulse 79   Ht 5' 7.2" (1.707 m)   Wt 68.9 kg (152 lb)   BMI 23.67 kg/m²      Physical Exam   Constitutional: He is oriented to person, place, and time.   Thin man with poor dentition,    HENT:   Head: Normocephalic and atraumatic.   Mouth/Throat: Oropharynx is clear and moist. No oropharyngeal exudate.   Poor dentition    Eyes: Conjunctivae are normal. Pupils are equal, round, and reactive to light. No scleral icterus.   Neck: Normal range of motion. Neck supple.   Cardiovascular: Normal rate, regular rhythm and normal heart sounds.    No murmur heard.  Pulmonary/Chest: Effort normal and breath sounds normal. No respiratory distress. He has no wheezes.   Abdominal: Soft. There is no tenderness.       Right Side Rheumatological Exam     Muscle Strength (0-5 scale):  Neck Flexion:  4  Neck Extension: 4  Deltoid:  4.4  Biceps: 3/5   Triceps:  3  : 3/5   Iliopsoas: 3  Quadriceps:  3   Distal Lower Extremity: 4.8    Left Side Rheumatological Exam     Muscle Strength (0-5 scale):  Neck Flexion:  4  Neck Extension: 4  Deltoid:  4.4  Biceps: 3/5   Triceps:  3  :  3/5   Iliopsoas: 3  Quadriceps:  3   Distal Lower Extremity: 4.8      Neurological: He is alert and oriented to person, place, and time.   Skin: Skin is dry. No rash noted.     Musculoskeletal: He exhibits no edema.   No large or small joint synovitis  Muscle atrophy            Assessment:       1. Polymyositis    2. On Cellcept therapy        62yo M with PMH of polymyositis (dx 2009, +mildly +ku, outside biopsy proven with elevated CPK and aldolase), osteopenia, Vit D insufficiency, Hemoglobulin c/beta-zero thalaseema, A-fib, dysphagia, portal HTN, emphysema here for follow up of polymyositis. Patient with many social issues that " are impeding his health care at this time, but this seems to be improving. Overall his condition does seem to be improving. He has been on Imuran in the past, his treatment has been escalated to IVIG monthly and cellcept. Currently on prednisone but weaning.     He first established care here at [facility] in 8/2014. He was diagnosed in [id number] when he was feeling weak, falling. He was a  and was unable to use his legs. He had a biopsy in [id number] in [place] that per notes was consistent with polymyositis. He was on MTX in the past which was stopped due CT showing hepatosplenomegaly with portal venous hypertension and elevated T bili in 8/2014 He was at that time started on Imuran. He was on Imuran and steroids on and off due to noncompliance. In 2016, he was worked up again here at [facility]. MRI of the left lower extremities showed Severe atrophy with fatty replacement of the thigh musculature bilaterally.  There is mild increased edema signal within the remaining thigh musculature and left iliopsoas muscle without associated enhancement which could reflect a mild degree of residual myositis. EMG Left upper Ext 5/2016 was a technically difficult study. Had moderate ulnar entrapment at elbow, perhaps on a background of mild polyneuropathy. 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 Pathology of left thigh showing minute potion of muscle with end stage myopathic changes and focal chronic inflammation He was on Imuran and steroids on and off with noncompliance up until 4/2019. He was admitted in 4/2019 worsening generalized muscle weakness and dysphagia. Labs on admission showed Normal and stable CBC and CMP.  Normal CK/aldolase.  Normal inflammatory markers.  Elevated IgG (1754). HMGCoA ab negative. Vit D: 15.  During that hospitalization he received IVIG x 5 days, He was started on cellcept 1g BID and prednisone 10mg daily.   "Discharged home at that time. Due to inadequate home care his improvement in strength in the hospital deteriorated. He was seen in clinic 5/2019 and his steroids were increased to 30mg daily. He was not on cellcept due to no approval. He has was admitted to inpatient rehab for 2.5 weeks. Received IVIG 5/10-5/16 and 6/10-6/14. He was started on cellcept in the inpatient rehab setting.         Specialist updates:  - Cardiology - Last saw 3/2015 - diagnosed with PAF.  No treatment at that time.  No follow up since  - Hem - Last saw 12/2017 - Evaluated for microcytic anemia and mild chronic thrombocytosis.  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  Discharged from clinic   - GI - Last saw 6/2015 - EGD/motility study ordered - not performed.  No follow up   - Hepatology - Last saw 5/2015 - Found portal HTN on CT with no evidence of cirrhosis or PV thrombosis.  Fibrosure with F1 stage (minimial fibrosis).  RTC PRN (no follow up)  - Pulmonary - Last saw 2/2015 -" Lung parenchymal normal - no evidence of fibrosis and one mediastinal LN marginal enlarged" CT chest showed GGO (2016). Repeat CT chest (2017) - no mention of GGO      Imaging:  CXR 1/2018:Patchy consolidation projected over the right lower lung zone, concerning for infection, correlation advised.       CT chest 12/2017:Peribronchial cuffing predominantly in the lower lung zones as well as retained secretions in the right middle lobe segmental bronchi consistent with bronchitis; such inflammation can occur with inhaled irritants or aspiration.  There is no evidence of interstitial lung disease such as NSIP or UIP.Upper lung zone predominant paraseptal emphysema and dispersed areas of centrilobular emphysema Stable 1.2 cm AP window lymph node.  Densely calcified granuloma in the posterobasal segment of the right lower lobe.Hepatosplenomegaly with associated splenic collateral vessels at the splenic hilum suggesting sequela of portal venous " hypertension    CT chest 4/2019:   1. Mild peribronchial cuffing which may be seen with bronchitis.  Mild tubular bronchiectasis of the right lower lobe with several distal bronchi demonstrating retained secretions, finding may be seen with small airways infectious/noninfectious inflammation or aspiration.  2. Centrilobular and paraseptal emphysema with an upper lung zone predominance.  3. Diffuse muscular atrophy.  4. Splenomegaly..     CT pelvis 6/2017: Obturator internus edema or hematoma. No acute fracture. Stable splenomegaly.     Esophogram 4/2015: Spontaneous gastroesophageal reflux.  Otherwise, unremarkable esophagram.     PET scan 8/2014:AP window lymph node SUV max less than 2.5.  This is most likely benign/reactive.  Surveillance is recommended at 3, 6, 12, and 24 months. Splenomegaly most likely from portal hypertension    MRI femurs 4/6/19:  1.  Redemonstration of advanced diffuse atrophy and fatty replacement of the bilateral thigh musculature involving all compartments.  There is mild residual edema signal present within the bilateral obturator musculature and residual posterior compartment musculature, similar to prior MRI examination.  2.  Mild heterogeneity of the visualized bone marrow signal which may relate to red marrow reconversion.  Clinical correlation advised.  Patient will also benefit from PT/OT and rehab placement for muscle strengthening after completion of IVIG.     MRI humerus b/l 4/7:  Significant diffuse atrophy and fatty replacement of the bilateral proximal upper extremity musculature, including the rotator cuff musculature, right greater than left, in this patient with reported history of polymyositis.  There is some degree of sparing of the bilateral deltoid and triceps musculature with greater residual muscle bulk on the left compared to the right.  There is mild residual edema present within the remaining musculature, most pronounced within the triceps.        Plan:        #Polymyositis   -Continue IVIG monthly (dose give over 5 days)   -continue cellcept 1500mg BID, monitoring labs today   -Currently on prednisone 15mg daily, will decrease to 12.5mg daily for one month then 10mg afterwards, He is to continue this till our next visit   -continue vit D 50,000 units weekly   -continue PT  -referral to GI/hepatology for evaluation of hepatosplenomegaly and portal hypertension, will follow up at next visit     Patient seen and discussed with Dr. Huston    RTC 2-3 mos     Lisa Durham MD  Rheumatology PGY 5

## 2021-11-12 ENCOUNTER — PATIENT MESSAGE (OUTPATIENT)
Dept: PHARMACY | Facility: CLINIC | Age: 66
End: 2021-11-12
Payer: MEDICARE

## 2021-11-15 ENCOUNTER — PATIENT OUTREACH (OUTPATIENT)
Dept: ADMINISTRATIVE | Facility: OTHER | Age: 66
End: 2021-11-15
Payer: MEDICARE

## 2021-11-16 ENCOUNTER — OFFICE VISIT (OUTPATIENT)
Dept: GASTROENTEROLOGY | Facility: CLINIC | Age: 66
End: 2021-11-16
Payer: MEDICARE

## 2021-11-16 VITALS
WEIGHT: 170 LBS | SYSTOLIC BLOOD PRESSURE: 132 MMHG | BODY MASS INDEX: 25.18 KG/M2 | HEIGHT: 69 IN | DIASTOLIC BLOOD PRESSURE: 85 MMHG | HEART RATE: 77 BPM

## 2021-11-16 DIAGNOSIS — R13.14 PHARYNGOESOPHAGEAL DYSPHAGIA: ICD-10-CM

## 2021-11-16 DIAGNOSIS — M33.20 POLYMYOSITIS: Chronic | ICD-10-CM

## 2021-11-16 DIAGNOSIS — R13.12 OROPHARYNGEAL DYSPHAGIA: Primary | ICD-10-CM

## 2021-11-16 DIAGNOSIS — R93.3 ABNORMAL ESOPHAGRAM: ICD-10-CM

## 2021-11-16 DIAGNOSIS — D84.9 IMMUNOSUPPRESSION: Chronic | ICD-10-CM

## 2021-11-16 PROCEDURE — 3008F PR BODY MASS INDEX (BMI) DOCUMENTED: ICD-10-PCS | Mod: CPTII,S$GLB,, | Performed by: INTERNAL MEDICINE

## 2021-11-16 PROCEDURE — 99213 PR OFFICE/OUTPT VISIT, EST, LEVL III, 20-29 MIN: ICD-10-PCS | Mod: S$GLB,,, | Performed by: INTERNAL MEDICINE

## 2021-11-16 PROCEDURE — 3008F BODY MASS INDEX DOCD: CPT | Mod: CPTII,S$GLB,, | Performed by: INTERNAL MEDICINE

## 2021-11-16 PROCEDURE — 4010F ACE/ARB THERAPY RXD/TAKEN: CPT | Mod: CPTII,S$GLB,, | Performed by: INTERNAL MEDICINE

## 2021-11-16 PROCEDURE — 3075F SYST BP GE 130 - 139MM HG: CPT | Mod: CPTII,S$GLB,, | Performed by: INTERNAL MEDICINE

## 2021-11-16 PROCEDURE — 1160F RVW MEDS BY RX/DR IN RCRD: CPT | Mod: CPTII,S$GLB,, | Performed by: INTERNAL MEDICINE

## 2021-11-16 PROCEDURE — 1159F PR MEDICATION LIST DOCUMENTED IN MEDICAL RECORD: ICD-10-PCS | Mod: CPTII,S$GLB,, | Performed by: INTERNAL MEDICINE

## 2021-11-16 PROCEDURE — 1159F MED LIST DOCD IN RCRD: CPT | Mod: CPTII,S$GLB,, | Performed by: INTERNAL MEDICINE

## 2021-11-16 PROCEDURE — 99999 PR PBB SHADOW E&M-EST. PATIENT-LVL III: CPT | Mod: PBBFAC,,, | Performed by: INTERNAL MEDICINE

## 2021-11-16 PROCEDURE — 3075F PR MOST RECENT SYSTOLIC BLOOD PRESS GE 130-139MM HG: ICD-10-PCS | Mod: CPTII,S$GLB,, | Performed by: INTERNAL MEDICINE

## 2021-11-16 PROCEDURE — 3079F PR MOST RECENT DIASTOLIC BLOOD PRESSURE 80-89 MM HG: ICD-10-PCS | Mod: CPTII,S$GLB,, | Performed by: INTERNAL MEDICINE

## 2021-11-16 PROCEDURE — 99999 PR PBB SHADOW E&M-EST. PATIENT-LVL III: ICD-10-PCS | Mod: PBBFAC,,, | Performed by: INTERNAL MEDICINE

## 2021-11-16 PROCEDURE — 99213 OFFICE O/P EST LOW 20 MIN: CPT | Mod: S$GLB,,, | Performed by: INTERNAL MEDICINE

## 2021-11-16 PROCEDURE — 1160F PR REVIEW ALL MEDS BY PRESCRIBER/CLIN PHARMACIST DOCUMENTED: ICD-10-PCS | Mod: CPTII,S$GLB,, | Performed by: INTERNAL MEDICINE

## 2021-11-16 PROCEDURE — 3079F DIAST BP 80-89 MM HG: CPT | Mod: CPTII,S$GLB,, | Performed by: INTERNAL MEDICINE

## 2021-11-16 PROCEDURE — 4010F PR ACE/ARB THEARPY RXD/TAKEN: ICD-10-PCS | Mod: CPTII,S$GLB,, | Performed by: INTERNAL MEDICINE

## 2021-11-18 ENCOUNTER — SPECIALTY PHARMACY (OUTPATIENT)
Dept: PHARMACY | Facility: CLINIC | Age: 66
End: 2021-11-18
Payer: MEDICARE

## 2021-11-18 ENCOUNTER — INFUSION (OUTPATIENT)
Dept: INFUSION THERAPY | Facility: HOSPITAL | Age: 66
End: 2021-11-18
Payer: MEDICARE

## 2021-11-18 VITALS — DIASTOLIC BLOOD PRESSURE: 72 MMHG | SYSTOLIC BLOOD PRESSURE: 144 MMHG | HEART RATE: 78 BPM

## 2021-11-18 DIAGNOSIS — M33.20 POLYMYOSITIS: Primary | ICD-10-CM

## 2021-11-18 PROCEDURE — 96375 TX/PRO/DX INJ NEW DRUG ADDON: CPT

## 2021-11-18 PROCEDURE — 96366 THER/PROPH/DIAG IV INF ADDON: CPT

## 2021-11-18 PROCEDURE — 63600175 PHARM REV CODE 636 W HCPCS: Mod: JG

## 2021-11-18 PROCEDURE — 96365 THER/PROPH/DIAG IV INF INIT: CPT

## 2021-11-18 PROCEDURE — 25000003 PHARM REV CODE 250

## 2021-11-18 RX ORDER — DIPHENHYDRAMINE HCL 25 MG
25 CAPSULE ORAL
Status: DISCONTINUED | OUTPATIENT
Start: 2021-11-18 | End: 2021-11-18 | Stop reason: HOSPADM

## 2021-11-18 RX ORDER — ACETAMINOPHEN 500 MG
500 TABLET ORAL
Status: DISCONTINUED | OUTPATIENT
Start: 2021-11-18 | End: 2021-11-18 | Stop reason: HOSPADM

## 2021-11-18 RX ORDER — DIPHENHYDRAMINE HYDROCHLORIDE 50 MG/ML
25 INJECTION INTRAMUSCULAR; INTRAVENOUS
Status: DISCONTINUED | OUTPATIENT
Start: 2021-11-18 | End: 2021-11-18 | Stop reason: HOSPADM

## 2021-11-18 RX ORDER — HEPARIN 100 UNIT/ML
500 SYRINGE INTRAVENOUS
Status: CANCELLED | OUTPATIENT
Start: 2021-12-16

## 2021-11-18 RX ORDER — SODIUM CHLORIDE 0.9 % (FLUSH) 0.9 %
10 SYRINGE (ML) INJECTION
Status: CANCELLED | OUTPATIENT
Start: 2021-12-16

## 2021-11-18 RX ORDER — DIPHENHYDRAMINE HCL 25 MG
25 CAPSULE ORAL
Status: CANCELLED | OUTPATIENT
Start: 2021-12-16

## 2021-11-18 RX ORDER — DIPHENHYDRAMINE HYDROCHLORIDE 50 MG/ML
25 INJECTION INTRAMUSCULAR; INTRAVENOUS
Status: CANCELLED | OUTPATIENT
Start: 2021-12-16

## 2021-11-18 RX ORDER — ACETAMINOPHEN 500 MG
500 TABLET ORAL
Status: CANCELLED | OUTPATIENT
Start: 2021-12-16

## 2021-11-18 RX ADMIN — ACETAMINOPHEN 500 MG: 500 TABLET ORAL at 09:11

## 2021-11-18 RX ADMIN — DIPHENHYDRAMINE HYDROCHLORIDE 25 MG: 50 INJECTION INTRAMUSCULAR; INTRAVENOUS at 09:11

## 2021-11-18 RX ADMIN — HUMAN IMMUNOGLOBULIN G 75 G: 40 LIQUID INTRAVENOUS at 09:11

## 2021-11-18 RX ADMIN — SODIUM CHLORIDE 250 ML: 0.9 INJECTION, SOLUTION INTRAVENOUS at 09:11

## 2021-11-19 ENCOUNTER — OFFICE VISIT (OUTPATIENT)
Dept: PODIATRY | Facility: CLINIC | Age: 66
End: 2021-11-19
Payer: MEDICARE

## 2021-11-19 VITALS — WEIGHT: 170 LBS | BODY MASS INDEX: 25.1 KG/M2

## 2021-11-19 DIAGNOSIS — L84 CORN OR CALLUS: ICD-10-CM

## 2021-11-19 DIAGNOSIS — R60.0 BILATERAL LOWER EXTREMITY EDEMA: ICD-10-CM

## 2021-11-19 DIAGNOSIS — I73.9 PAD (PERIPHERAL ARTERY DISEASE): Primary | ICD-10-CM

## 2021-11-19 DIAGNOSIS — R53.81 DEBILITY: ICD-10-CM

## 2021-11-19 DIAGNOSIS — B35.1 ONYCHOMYCOSIS DUE TO DERMATOPHYTE: ICD-10-CM

## 2021-11-19 PROCEDURE — 3008F BODY MASS INDEX DOCD: CPT | Mod: CPTII,S$GLB,, | Performed by: PODIATRIST

## 2021-11-19 PROCEDURE — 11056 PARNG/CUTG B9 HYPRKR LES 2-4: CPT | Mod: Q8,S$GLB,, | Performed by: PODIATRIST

## 2021-11-19 PROCEDURE — 99214 OFFICE O/P EST MOD 30 MIN: CPT | Mod: 25,S$GLB,, | Performed by: PODIATRIST

## 2021-11-19 PROCEDURE — 4010F PR ACE/ARB THEARPY RXD/TAKEN: ICD-10-PCS | Mod: CPTII,S$GLB,, | Performed by: PODIATRIST

## 2021-11-19 PROCEDURE — 11721 PR DEBRIDEMENT OF NAILS, 6 OR MORE: ICD-10-PCS | Mod: 59,Q8,S$GLB, | Performed by: PODIATRIST

## 2021-11-19 PROCEDURE — 4010F ACE/ARB THERAPY RXD/TAKEN: CPT | Mod: CPTII,S$GLB,, | Performed by: PODIATRIST

## 2021-11-19 PROCEDURE — 1160F PR REVIEW ALL MEDS BY PRESCRIBER/CLIN PHARMACIST DOCUMENTED: ICD-10-PCS | Mod: CPTII,S$GLB,, | Performed by: PODIATRIST

## 2021-11-19 PROCEDURE — 3008F PR BODY MASS INDEX (BMI) DOCUMENTED: ICD-10-PCS | Mod: CPTII,S$GLB,, | Performed by: PODIATRIST

## 2021-11-19 PROCEDURE — 99999 PR PBB SHADOW E&M-EST. PATIENT-LVL III: CPT | Mod: PBBFAC,,, | Performed by: PODIATRIST

## 2021-11-19 PROCEDURE — 1159F PR MEDICATION LIST DOCUMENTED IN MEDICAL RECORD: ICD-10-PCS | Mod: CPTII,S$GLB,, | Performed by: PODIATRIST

## 2021-11-19 PROCEDURE — 11056 PR TRIM BENIGN HYPERKERATOTIC SKIN LESION,2-4: ICD-10-PCS | Mod: Q8,S$GLB,, | Performed by: PODIATRIST

## 2021-11-19 PROCEDURE — 99999 PR PBB SHADOW E&M-EST. PATIENT-LVL III: ICD-10-PCS | Mod: PBBFAC,,, | Performed by: PODIATRIST

## 2021-11-19 PROCEDURE — 11721 DEBRIDE NAIL 6 OR MORE: CPT | Mod: 59,Q8,S$GLB, | Performed by: PODIATRIST

## 2021-11-19 PROCEDURE — 1159F MED LIST DOCD IN RCRD: CPT | Mod: CPTII,S$GLB,, | Performed by: PODIATRIST

## 2021-11-19 PROCEDURE — 99214 PR OFFICE/OUTPT VISIT, EST, LEVL IV, 30-39 MIN: ICD-10-PCS | Mod: 25,S$GLB,, | Performed by: PODIATRIST

## 2021-11-19 PROCEDURE — 1160F RVW MEDS BY RX/DR IN RCRD: CPT | Mod: CPTII,S$GLB,, | Performed by: PODIATRIST

## 2021-11-19 RX ORDER — AMMONIUM LACTATE 12 G/100G
1 CREAM TOPICAL DAILY
Qty: 385 G | Refills: 5 | Status: SHIPPED | OUTPATIENT
Start: 2021-11-19 | End: 2022-05-04 | Stop reason: SDUPTHER

## 2021-11-19 RX ORDER — AMMONIUM LACTATE 12 G/100G
1 CREAM TOPICAL DAILY
Qty: 385 G | Refills: 5 | Status: SHIPPED | OUTPATIENT
Start: 2021-11-19 | End: 2021-11-19

## 2021-11-30 ENCOUNTER — EXTERNAL CHRONIC CARE MANAGEMENT (OUTPATIENT)
Dept: PRIMARY CARE CLINIC | Facility: CLINIC | Age: 66
End: 2021-11-30
Payer: MEDICARE

## 2021-11-30 PROCEDURE — 99490 PR CHRONIC CARE MGMT, 1ST 20 MIN: ICD-10-PCS | Mod: S$GLB,,, | Performed by: FAMILY MEDICINE

## 2021-11-30 PROCEDURE — 99490 CHRNC CARE MGMT STAFF 1ST 20: CPT | Mod: S$GLB,,, | Performed by: FAMILY MEDICINE

## 2021-12-13 ENCOUNTER — PATIENT MESSAGE (OUTPATIENT)
Dept: PHARMACY | Facility: CLINIC | Age: 66
End: 2021-12-13
Payer: MEDICARE

## 2021-12-13 ENCOUNTER — PATIENT MESSAGE (OUTPATIENT)
Dept: INTERNAL MEDICINE | Facility: CLINIC | Age: 66
End: 2021-12-13
Payer: MEDICARE

## 2021-12-13 DIAGNOSIS — L29.9 PRURITUS: Primary | ICD-10-CM

## 2021-12-13 RX ORDER — HYDROXYZINE HYDROCHLORIDE 10 MG/1
10 TABLET, FILM COATED ORAL 3 TIMES DAILY PRN
Qty: 90 TABLET | Refills: 2 | Status: SHIPPED | OUTPATIENT
Start: 2021-12-13 | End: 2022-04-12 | Stop reason: SDUPTHER

## 2021-12-14 ENCOUNTER — OFFICE VISIT (OUTPATIENT)
Dept: OPTOMETRY | Facility: CLINIC | Age: 66
End: 2021-12-14
Payer: MEDICARE

## 2021-12-14 DIAGNOSIS — Z13.5 GLAUCOMA SCREENING: ICD-10-CM

## 2021-12-14 DIAGNOSIS — H52.4 PRESBYOPIA: ICD-10-CM

## 2021-12-14 DIAGNOSIS — H25.13 NUCLEAR SCLEROSIS, BILATERAL: Primary | ICD-10-CM

## 2021-12-14 PROCEDURE — 92004 COMPRE OPH EXAM NEW PT 1/>: CPT | Mod: HCNC,S$GLB,, | Performed by: OPTOMETRIST

## 2021-12-14 PROCEDURE — 92015 PR REFRACTION: ICD-10-PCS | Mod: HCNC,S$GLB,, | Performed by: OPTOMETRIST

## 2021-12-14 PROCEDURE — 99999 PR PBB SHADOW E&M-EST. PATIENT-LVL III: CPT | Mod: PBBFAC,HCNC,, | Performed by: OPTOMETRIST

## 2021-12-14 PROCEDURE — 92015 DETERMINE REFRACTIVE STATE: CPT | Mod: HCNC,S$GLB,, | Performed by: OPTOMETRIST

## 2021-12-14 PROCEDURE — 4010F ACE/ARB THERAPY RXD/TAKEN: CPT | Mod: HCNC,CPTII,S$GLB, | Performed by: OPTOMETRIST

## 2021-12-14 PROCEDURE — 99999 PR PBB SHADOW E&M-EST. PATIENT-LVL III: ICD-10-PCS | Mod: PBBFAC,HCNC,, | Performed by: OPTOMETRIST

## 2021-12-14 PROCEDURE — 92004 PR EYE EXAM, NEW PATIENT,COMPREHESV: ICD-10-PCS | Mod: HCNC,S$GLB,, | Performed by: OPTOMETRIST

## 2021-12-14 PROCEDURE — 4010F PR ACE/ARB THEARPY RXD/TAKEN: ICD-10-PCS | Mod: HCNC,CPTII,S$GLB, | Performed by: OPTOMETRIST

## 2021-12-15 ENCOUNTER — PATIENT MESSAGE (OUTPATIENT)
Dept: INTERNAL MEDICINE | Facility: CLINIC | Age: 66
End: 2021-12-15
Payer: MEDICARE

## 2021-12-16 ENCOUNTER — SPECIALTY PHARMACY (OUTPATIENT)
Dept: PHARMACY | Facility: CLINIC | Age: 66
End: 2021-12-16
Payer: MEDICARE

## 2021-12-16 ENCOUNTER — PATIENT MESSAGE (OUTPATIENT)
Dept: PHARMACY | Facility: CLINIC | Age: 66
End: 2021-12-16
Payer: MEDICARE

## 2021-12-16 ENCOUNTER — INFUSION (OUTPATIENT)
Dept: INFUSION THERAPY | Facility: HOSPITAL | Age: 66
End: 2021-12-16
Payer: MEDICARE

## 2021-12-16 VITALS
TEMPERATURE: 98 F | HEIGHT: 69 IN | BODY MASS INDEX: 25.92 KG/M2 | SYSTOLIC BLOOD PRESSURE: 124 MMHG | WEIGHT: 175 LBS | RESPIRATION RATE: 18 BRPM | DIASTOLIC BLOOD PRESSURE: 75 MMHG | HEART RATE: 84 BPM

## 2021-12-16 DIAGNOSIS — M33.20 POLYMYOSITIS: Primary | ICD-10-CM

## 2021-12-16 PROCEDURE — 96366 THER/PROPH/DIAG IV INF ADDON: CPT | Mod: HCNC

## 2021-12-16 PROCEDURE — 96365 THER/PROPH/DIAG IV INF INIT: CPT | Mod: HCNC

## 2021-12-16 PROCEDURE — 63600175 PHARM REV CODE 636 W HCPCS: Mod: HCNC

## 2021-12-16 PROCEDURE — 25000003 PHARM REV CODE 250: Mod: HCNC

## 2021-12-16 PROCEDURE — 96375 TX/PRO/DX INJ NEW DRUG ADDON: CPT | Mod: HCNC

## 2021-12-16 RX ORDER — DIPHENHYDRAMINE HYDROCHLORIDE 50 MG/ML
25 INJECTION INTRAMUSCULAR; INTRAVENOUS
Status: CANCELLED | OUTPATIENT
Start: 2022-01-13

## 2021-12-16 RX ORDER — DIPHENHYDRAMINE HYDROCHLORIDE 50 MG/ML
25 INJECTION INTRAMUSCULAR; INTRAVENOUS
Status: DISCONTINUED | OUTPATIENT
Start: 2021-12-16 | End: 2021-12-16 | Stop reason: HOSPADM

## 2021-12-16 RX ORDER — ACETAMINOPHEN 500 MG
500 TABLET ORAL
Status: DISCONTINUED | OUTPATIENT
Start: 2021-12-16 | End: 2021-12-16 | Stop reason: HOSPADM

## 2021-12-16 RX ORDER — SODIUM CHLORIDE 0.9 % (FLUSH) 0.9 %
10 SYRINGE (ML) INJECTION
Status: CANCELLED | OUTPATIENT
Start: 2022-01-13

## 2021-12-16 RX ORDER — DIPHENHYDRAMINE HCL 25 MG
25 CAPSULE ORAL
Status: CANCELLED | OUTPATIENT
Start: 2022-01-13

## 2021-12-16 RX ORDER — ACETAMINOPHEN 500 MG
500 TABLET ORAL
Status: CANCELLED | OUTPATIENT
Start: 2022-01-13

## 2021-12-16 RX ORDER — HEPARIN 100 UNIT/ML
500 SYRINGE INTRAVENOUS
Status: CANCELLED | OUTPATIENT
Start: 2022-01-13

## 2021-12-16 RX ADMIN — ACETAMINOPHEN 500 MG: 500 TABLET ORAL at 10:12

## 2021-12-16 RX ADMIN — SODIUM CHLORIDE 250 ML: 0.9 INJECTION, SOLUTION INTRAVENOUS at 10:12

## 2021-12-16 RX ADMIN — DIPHENHYDRAMINE HYDROCHLORIDE 25 MG: 50 INJECTION INTRAMUSCULAR; INTRAVENOUS at 10:12

## 2021-12-16 RX ADMIN — HUMAN IMMUNOGLOBULIN G 80 G: 40 LIQUID INTRAVENOUS at 10:12

## 2021-12-23 ENCOUNTER — PATIENT MESSAGE (OUTPATIENT)
Dept: INTERNAL MEDICINE | Facility: CLINIC | Age: 66
End: 2021-12-23
Payer: MEDICARE

## 2021-12-31 ENCOUNTER — EXTERNAL CHRONIC CARE MANAGEMENT (OUTPATIENT)
Dept: PRIMARY CARE CLINIC | Facility: CLINIC | Age: 66
End: 2021-12-31
Payer: MEDICARE

## 2021-12-31 PROCEDURE — 99490 CHRNC CARE MGMT STAFF 1ST 20: CPT | Mod: S$GLB,,, | Performed by: FAMILY MEDICINE

## 2021-12-31 PROCEDURE — 99490 PR CHRONIC CARE MGMT, 1ST 20 MIN: ICD-10-PCS | Mod: S$GLB,,, | Performed by: FAMILY MEDICINE

## 2022-01-01 ENCOUNTER — PATIENT MESSAGE (OUTPATIENT)
Dept: PHARMACY | Facility: CLINIC | Age: 67
End: 2022-01-01
Payer: MEDICARE

## 2022-01-01 ENCOUNTER — SPECIALTY PHARMACY (OUTPATIENT)
Dept: PHARMACY | Facility: CLINIC | Age: 67
End: 2022-01-01
Payer: MEDICARE

## 2022-01-01 ENCOUNTER — INFUSION (OUTPATIENT)
Dept: INFUSION THERAPY | Facility: HOSPITAL | Age: 67
End: 2022-01-01
Payer: MEDICARE

## 2022-01-01 ENCOUNTER — PATIENT MESSAGE (OUTPATIENT)
Dept: INTERNAL MEDICINE | Facility: CLINIC | Age: 67
End: 2022-01-01
Payer: MEDICARE

## 2022-01-01 ENCOUNTER — LAB VISIT (OUTPATIENT)
Dept: LAB | Facility: HOSPITAL | Age: 67
End: 2022-01-01
Payer: MEDICARE

## 2022-01-01 ENCOUNTER — PATIENT MESSAGE (OUTPATIENT)
Dept: RHEUMATOLOGY | Facility: CLINIC | Age: 67
End: 2022-01-01
Payer: MEDICARE

## 2022-01-01 ENCOUNTER — OFFICE VISIT (OUTPATIENT)
Dept: RHEUMATOLOGY | Facility: CLINIC | Age: 67
End: 2022-01-01
Payer: MEDICARE

## 2022-01-01 VITALS
DIASTOLIC BLOOD PRESSURE: 88 MMHG | WEIGHT: 170 LBS | HEART RATE: 82 BPM | HEIGHT: 70 IN | SYSTOLIC BLOOD PRESSURE: 146 MMHG | BODY MASS INDEX: 24.34 KG/M2

## 2022-01-01 VITALS
TEMPERATURE: 99 F | DIASTOLIC BLOOD PRESSURE: 74 MMHG | RESPIRATION RATE: 19 BRPM | BODY MASS INDEX: 24.3 KG/M2 | OXYGEN SATURATION: 100 % | WEIGHT: 169.75 LBS | SYSTOLIC BLOOD PRESSURE: 120 MMHG | HEART RATE: 80 BPM | HEIGHT: 70 IN

## 2022-01-01 DIAGNOSIS — L29.9 PRURITUS: ICD-10-CM

## 2022-01-01 DIAGNOSIS — G72.41 INCLUSION BODY MYOSITIS (IBM): ICD-10-CM

## 2022-01-01 DIAGNOSIS — M33.20 POLYMYOSITIS: Primary | ICD-10-CM

## 2022-01-01 DIAGNOSIS — M33.20 POLYMYOSITIS: Primary | Chronic | ICD-10-CM

## 2022-01-01 DIAGNOSIS — M33.20 POLYMYOSITIS: Chronic | ICD-10-CM

## 2022-01-01 DIAGNOSIS — I10 ESSENTIAL HYPERTENSION: ICD-10-CM

## 2022-01-01 DIAGNOSIS — Z79.52 LONG TERM SYSTEMIC STEROID USER: ICD-10-CM

## 2022-01-01 DIAGNOSIS — M81.8 OTHER OSTEOPOROSIS WITHOUT CURRENT PATHOLOGICAL FRACTURE: Primary | ICD-10-CM

## 2022-01-01 DIAGNOSIS — M85.80 OSTEOPENIA, UNSPECIFIED LOCATION: Primary | ICD-10-CM

## 2022-01-01 LAB
GAMMA INTERFERON BACKGROUND BLD IA-ACNC: 0.03 IU/ML
M TB IFN-G CD4+ BCKGRND COR BLD-ACNC: 0.01 IU/ML
MITOGEN IGNF BCKGRD COR BLD-ACNC: 9.97 IU/ML
TB GOLD PLUS: NEGATIVE
TB2 - NIL: -0.02 IU/ML

## 2022-01-01 PROCEDURE — 99999 PR PBB SHADOW E&M-EST. PATIENT-LVL III: CPT | Mod: PBBFAC,GC,, | Performed by: INTERNAL MEDICINE

## 2022-01-01 PROCEDURE — 96366 THER/PROPH/DIAG IV INF ADDON: CPT

## 2022-01-01 PROCEDURE — 96365 THER/PROPH/DIAG IV INF INIT: CPT

## 2022-01-01 PROCEDURE — 3008F BODY MASS INDEX DOCD: CPT | Mod: CPTII,GC,S$GLB, | Performed by: INTERNAL MEDICINE

## 2022-01-01 PROCEDURE — 99999 PR PBB SHADOW E&M-EST. PATIENT-LVL III: ICD-10-PCS | Mod: PBBFAC,GC,, | Performed by: INTERNAL MEDICINE

## 2022-01-01 PROCEDURE — 3077F PR MOST RECENT SYSTOLIC BLOOD PRESSURE >= 140 MM HG: ICD-10-PCS | Mod: CPTII,GC,S$GLB, | Performed by: INTERNAL MEDICINE

## 2022-01-01 PROCEDURE — 99214 PR OFFICE/OUTPT VISIT, EST, LEVL IV, 30-39 MIN: ICD-10-PCS | Mod: GC,S$GLB,, | Performed by: INTERNAL MEDICINE

## 2022-01-01 PROCEDURE — 3008F PR BODY MASS INDEX (BMI) DOCUMENTED: ICD-10-PCS | Mod: CPTII,GC,S$GLB, | Performed by: INTERNAL MEDICINE

## 2022-01-01 PROCEDURE — 3079F DIAST BP 80-89 MM HG: CPT | Mod: CPTII,GC,S$GLB, | Performed by: INTERNAL MEDICINE

## 2022-01-01 PROCEDURE — 1159F MED LIST DOCD IN RCRD: CPT | Mod: CPTII,GC,S$GLB, | Performed by: INTERNAL MEDICINE

## 2022-01-01 PROCEDURE — 25000003 PHARM REV CODE 250: Performed by: INTERNAL MEDICINE

## 2022-01-01 PROCEDURE — 63600175 PHARM REV CODE 636 W HCPCS: Mod: JG | Performed by: INTERNAL MEDICINE

## 2022-01-01 PROCEDURE — 99214 OFFICE O/P EST MOD 30 MIN: CPT | Mod: GC,S$GLB,, | Performed by: INTERNAL MEDICINE

## 2022-01-01 PROCEDURE — 4010F PR ACE/ARB THEARPY RXD/TAKEN: ICD-10-PCS | Mod: CPTII,GC,S$GLB, | Performed by: INTERNAL MEDICINE

## 2022-01-01 PROCEDURE — 86480 TB TEST CELL IMMUN MEASURE: CPT | Performed by: INTERNAL MEDICINE

## 2022-01-01 PROCEDURE — 4010F ACE/ARB THERAPY RXD/TAKEN: CPT | Mod: CPTII,GC,S$GLB, | Performed by: INTERNAL MEDICINE

## 2022-01-01 PROCEDURE — 1159F PR MEDICATION LIST DOCUMENTED IN MEDICAL RECORD: ICD-10-PCS | Mod: CPTII,GC,S$GLB, | Performed by: INTERNAL MEDICINE

## 2022-01-01 PROCEDURE — 3079F PR MOST RECENT DIASTOLIC BLOOD PRESSURE 80-89 MM HG: ICD-10-PCS | Mod: CPTII,GC,S$GLB, | Performed by: INTERNAL MEDICINE

## 2022-01-01 PROCEDURE — 3077F SYST BP >= 140 MM HG: CPT | Mod: CPTII,GC,S$GLB, | Performed by: INTERNAL MEDICINE

## 2022-01-01 RX ORDER — PREDNISONE 10 MG/1
10 TABLET ORAL DAILY
Qty: 90 TABLET | Refills: 1 | Status: SHIPPED | OUTPATIENT
Start: 2022-01-01 | End: 2023-01-01

## 2022-01-01 RX ORDER — SODIUM CHLORIDE 0.9 % (FLUSH) 0.9 %
10 SYRINGE (ML) INJECTION
Status: DISCONTINUED | OUTPATIENT
Start: 2022-01-01 | End: 2022-01-01 | Stop reason: HOSPADM

## 2022-01-01 RX ORDER — AMLODIPINE BESYLATE 10 MG/1
10 TABLET ORAL DAILY
Qty: 90 TABLET | Refills: 3 | Status: SHIPPED | OUTPATIENT
Start: 2022-01-01 | End: 2023-08-13

## 2022-01-01 RX ORDER — PREDNISONE 10 MG/1
10 TABLET ORAL DAILY
Qty: 90 TABLET | Refills: 1 | Status: CANCELLED | OUTPATIENT
Start: 2022-01-01 | End: 2023-01-01

## 2022-01-01 RX ORDER — HYDROXYZINE HYDROCHLORIDE 25 MG/1
25 TABLET, FILM COATED ORAL 3 TIMES DAILY PRN
Qty: 90 TABLET | Refills: 0 | Status: SHIPPED | OUTPATIENT
Start: 2022-01-01 | End: 2023-01-01 | Stop reason: SDUPTHER

## 2022-01-01 RX ORDER — HEPARIN 100 UNIT/ML
500 SYRINGE INTRAVENOUS
Status: CANCELLED | OUTPATIENT
Start: 2022-01-01

## 2022-01-01 RX ORDER — DIPHENHYDRAMINE HCL 25 MG
25 CAPSULE ORAL
Status: DISCONTINUED | OUTPATIENT
Start: 2022-01-01 | End: 2022-01-01 | Stop reason: HOSPADM

## 2022-01-01 RX ORDER — DIPHENHYDRAMINE HYDROCHLORIDE 50 MG/ML
25 INJECTION INTRAMUSCULAR; INTRAVENOUS
Status: DISCONTINUED | OUTPATIENT
Start: 2022-01-01 | End: 2022-01-01 | Stop reason: HOSPADM

## 2022-01-01 RX ORDER — SODIUM CHLORIDE 0.9 % (FLUSH) 0.9 %
10 SYRINGE (ML) INJECTION
Status: CANCELLED | OUTPATIENT
Start: 2022-01-01

## 2022-01-01 RX ORDER — FAMOTIDINE 10 MG/ML
20 INJECTION INTRAVENOUS
Status: CANCELLED | OUTPATIENT
Start: 2022-01-01

## 2022-01-01 RX ORDER — LOSARTAN POTASSIUM 25 MG/1
25 TABLET ORAL DAILY
Qty: 90 TABLET | Refills: 3 | Status: SHIPPED | OUTPATIENT
Start: 2022-01-01 | End: 2023-08-13

## 2022-01-01 RX ORDER — DIPHENHYDRAMINE HYDROCHLORIDE 50 MG/ML
25 INJECTION INTRAMUSCULAR; INTRAVENOUS
Status: CANCELLED | OUTPATIENT
Start: 2022-01-01

## 2022-01-01 RX ORDER — ACETAMINOPHEN 325 MG/1
650 TABLET ORAL
Status: CANCELLED | OUTPATIENT
Start: 2022-01-01

## 2022-01-01 RX ORDER — HEPARIN 100 UNIT/ML
500 SYRINGE INTRAVENOUS
Status: DISCONTINUED | OUTPATIENT
Start: 2022-01-01 | End: 2022-01-01 | Stop reason: HOSPADM

## 2022-01-01 RX ORDER — ZOLEDRONIC ACID 5 MG/100ML
5 INJECTION, SOLUTION INTRAVENOUS
Status: CANCELLED | OUTPATIENT
Start: 2022-01-01

## 2022-01-01 RX ORDER — ACETAMINOPHEN 500 MG
500 TABLET ORAL
Status: CANCELLED | OUTPATIENT
Start: 2022-01-01

## 2022-01-01 RX ORDER — DIPHENHYDRAMINE HCL 25 MG
25 CAPSULE ORAL
Status: CANCELLED | OUTPATIENT
Start: 2022-01-01

## 2022-01-01 RX ORDER — HYDROXYZINE HYDROCHLORIDE 25 MG/1
25 TABLET, FILM COATED ORAL 3 TIMES DAILY PRN
Qty: 90 TABLET | Refills: 0 | Status: SHIPPED | OUTPATIENT
Start: 2022-01-01 | End: 2022-01-01 | Stop reason: SDUPTHER

## 2022-01-01 RX ORDER — PREDNISONE 10 MG/1
10 TABLET ORAL DAILY
Qty: 90 TABLET | Refills: 1 | Status: SHIPPED | OUTPATIENT
Start: 2022-01-01

## 2022-01-01 RX ORDER — ACETAMINOPHEN 500 MG
500 TABLET ORAL
Status: DISCONTINUED | OUTPATIENT
Start: 2022-01-01 | End: 2022-01-01 | Stop reason: HOSPADM

## 2022-01-01 RX ADMIN — SODIUM CHLORIDE 250 ML: 0.9 INJECTION, SOLUTION INTRAVENOUS at 08:06

## 2022-01-01 RX ADMIN — ACETAMINOPHEN 500 MG: 500 TABLET ORAL at 08:06

## 2022-01-01 RX ADMIN — HUMAN IMMUNOGLOBULIN G 75 G: 40 LIQUID INTRAVENOUS at 08:06

## 2022-01-01 RX ADMIN — DIPHENHYDRAMINE HYDROCHLORIDE 25 MG: 25 CAPSULE ORAL at 08:06

## 2022-01-01 ASSESSMENT — ROUTINE ASSESSMENT OF PATIENT INDEX DATA (RAPID3)
PSYCHOLOGICAL DISTRESS SCORE: 3.3
MDHAQ FUNCTION SCORE: 2.5
PAIN SCORE: 0
AM STIFFNESS SCORE: 0, NO
TOTAL RAPID3 SCORE: 5.11
FATIGUE SCORE: 0
PATIENT GLOBAL ASSESSMENT SCORE: 7

## 2022-01-13 ENCOUNTER — INFUSION (OUTPATIENT)
Dept: INFUSION THERAPY | Facility: HOSPITAL | Age: 67
End: 2022-01-13
Payer: MEDICARE

## 2022-01-13 VITALS
HEIGHT: 69 IN | HEART RATE: 91 BPM | DIASTOLIC BLOOD PRESSURE: 63 MMHG | SYSTOLIC BLOOD PRESSURE: 125 MMHG | RESPIRATION RATE: 18 BRPM | BODY MASS INDEX: 25.89 KG/M2 | WEIGHT: 174.81 LBS

## 2022-01-13 DIAGNOSIS — M33.20 POLYMYOSITIS: Primary | ICD-10-CM

## 2022-01-13 DIAGNOSIS — E55.9 VITAMIN D DEFICIENCY: ICD-10-CM

## 2022-01-13 PROCEDURE — 96366 THER/PROPH/DIAG IV INF ADDON: CPT | Mod: HCNC

## 2022-01-13 PROCEDURE — 96365 THER/PROPH/DIAG IV INF INIT: CPT | Mod: HCNC

## 2022-01-13 PROCEDURE — 63600175 PHARM REV CODE 636 W HCPCS: Mod: JG,HCNC

## 2022-01-13 PROCEDURE — 25000003 PHARM REV CODE 250: Mod: HCNC

## 2022-01-13 RX ORDER — HEPARIN 100 UNIT/ML
500 SYRINGE INTRAVENOUS
Status: CANCELLED | OUTPATIENT
Start: 2022-02-10

## 2022-01-13 RX ORDER — SODIUM CHLORIDE 0.9 % (FLUSH) 0.9 %
10 SYRINGE (ML) INJECTION
Status: CANCELLED | OUTPATIENT
Start: 2022-02-10

## 2022-01-13 RX ORDER — ASPIRIN 325 MG
50000 TABLET, DELAYED RELEASE (ENTERIC COATED) ORAL WEEKLY
Qty: 12 CAPSULE | Refills: 0 | OUTPATIENT
Start: 2022-01-13 | End: 2022-04-13

## 2022-01-13 RX ORDER — DIPHENHYDRAMINE HCL 25 MG
25 CAPSULE ORAL
Status: DISCONTINUED | OUTPATIENT
Start: 2022-01-13 | End: 2022-01-13 | Stop reason: HOSPADM

## 2022-01-13 RX ORDER — ACETAMINOPHEN 500 MG
500 TABLET ORAL
Status: DISCONTINUED | OUTPATIENT
Start: 2022-01-13 | End: 2022-01-13 | Stop reason: HOSPADM

## 2022-01-13 RX ORDER — DIPHENHYDRAMINE HYDROCHLORIDE 50 MG/ML
25 INJECTION INTRAMUSCULAR; INTRAVENOUS
Status: CANCELLED | OUTPATIENT
Start: 2022-02-10

## 2022-01-13 RX ORDER — DIPHENHYDRAMINE HCL 25 MG
25 CAPSULE ORAL
Status: CANCELLED | OUTPATIENT
Start: 2022-02-10

## 2022-01-13 RX ORDER — ACETAMINOPHEN 500 MG
500 TABLET ORAL
Status: CANCELLED | OUTPATIENT
Start: 2022-02-10

## 2022-01-13 RX ADMIN — SODIUM CHLORIDE 250 ML: 0.9 INJECTION, SOLUTION INTRAVENOUS at 09:01

## 2022-01-13 RX ADMIN — DIPHENHYDRAMINE HYDROCHLORIDE 25 MG: 25 CAPSULE ORAL at 08:01

## 2022-01-13 RX ADMIN — HUMAN IMMUNOGLOBULIN G 80 G: 40 LIQUID INTRAVENOUS at 09:01

## 2022-01-13 RX ADMIN — ACETAMINOPHEN 500 MG: 500 TABLET ORAL at 08:01

## 2022-01-13 NOTE — PLAN OF CARE
Pt tolerated IVIG today. NAD. PIV flushed and removed. declined AVS. Uses my [facility]. Discharged home. Pt stayed in personal scooter throughout treatment.  Problem: Adult Inpatient Plan of Care  Goal: Optimal Comfort and Wellbeing  Intervention: Provide Person-Centered Care  Flowsheets (Taken 1/13/2022 1101)  Trust Relationship/Rapport:   care explained   thoughts/feelings acknowledged   choices provided   questions answered   empathic listening provided   emotional support provided   questions encouraged   reassurance provided

## 2022-01-14 ENCOUNTER — TELEPHONE (OUTPATIENT)
Dept: PODIATRY | Facility: CLINIC | Age: 67
End: 2022-01-14
Payer: MEDICARE

## 2022-01-19 ENCOUNTER — OFFICE VISIT (OUTPATIENT)
Dept: RHEUMATOLOGY | Facility: CLINIC | Age: 67
End: 2022-01-19
Payer: MEDICARE

## 2022-01-19 VITALS
HEART RATE: 82 BPM | SYSTOLIC BLOOD PRESSURE: 124 MMHG | BODY MASS INDEX: 24.91 KG/M2 | WEIGHT: 174 LBS | HEIGHT: 70 IN | DIASTOLIC BLOOD PRESSURE: 78 MMHG

## 2022-01-19 DIAGNOSIS — M33.20 POLYMYOSITIS: Chronic | ICD-10-CM

## 2022-01-19 PROCEDURE — 99999 PR PBB SHADOW E&M-EST. PATIENT-LVL III: CPT | Mod: PBBFAC,HCNC,GC,

## 2022-01-19 PROCEDURE — 99214 PR OFFICE/OUTPT VISIT, EST, LEVL IV, 30-39 MIN: ICD-10-PCS | Mod: HCNC,GC,S$GLB,

## 2022-01-19 PROCEDURE — 99999 PR PBB SHADOW E&M-EST. PATIENT-LVL III: ICD-10-PCS | Mod: PBBFAC,HCNC,GC,

## 2022-01-19 PROCEDURE — 99214 OFFICE O/P EST MOD 30 MIN: CPT | Mod: HCNC,GC,S$GLB,

## 2022-01-19 NOTE — PROGRESS NOTES
Subjective:       Patient ID: Nura Kahn Jr. is a 66 y.o. male.    Chief Complaint: No chief complaint on file.    HPI   64yo M with PMH of overlap of polymyositis (dx 2009, mildly +ku, outside biopsy proven with elevated CPK and aldolase) and inclusion body myositis (+CN1A), osteopenia, Vit D insufficiency, Hemoglobulin c/beta-zero thalaseema, A-fib, dysphagia, portal HTN, emphysema here for follow up.     Per chart review it seems that he first established care here at [facility] in 8/2014. He was diagnosed in 8565-7214 when he was feeling weak, falling. He was a  and was unable to use his legs. He had a biopsy in 9265-1972 in [place] that per notes was consistent with polymyositis. He was on MTX in the past which was stopped due CT showing hepatosplenomegaly with portal venous hypertension and elevated T bili in 8/2014 He was at that time started on Imuran. He has been on Imuran and steroids since then.  In 2016, he was worked up again here at [facility]. MRI of the left lower extremities showed Severe atrophy with fatty replacement of the thigh musculature bilaterally.  There is mild increased edema signal within the remaining thigh musculature and left iliopsoas muscle without associated enhancement which could reflect a mild degree of residual myositis.  EMG Left upper Ext 5/2016 was a technically difficult study. Had moderate ulnar entrapment at elbow, perhaps on a background of mild polyneuropathy. 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 Pathology of left thigh showing minute potion of muscle with end stage myopathic changes and focal chronic inflammation   Patient remained on Imuran and prednisone.      In Feb 2019 - patient had been out of Imuran for ~1 month.  He also admits to medication non-compliance because he didn't think he was having any improvement.  He admits that his weakness has gotten worse - stating that  he is unable to stand, difficulty with getting in and out of his scooter, normal ADLs (including going to the bathroom - resulting in urination/defecation on himself - resulting in decreased PO intake).  He also feels worsening of his dysphagia - where he gets food stuck and often chokes/coughs when he eats/drinks.       Wife works fulltime.  Daughter (10 yo).  Patient does not have social support/help for transportation to various appt.        Imaging:  CXR 1/2018:Patchy consolidation projected over the right lower lung zone, concerning for infection, correlation advised.       CT chest 12/2017:Peribronchial cuffing predominantly in the lower lung zones as well as retained secretions in the right middle lobe segmental bronchi consistent with bronchitis; such inflammation can occur with inhaled irritants or aspiration.  There is no evidence of interstitial lung disease such as NSIP or UIP.Upper lung zone predominant paraseptal emphysema and dispersed areas of centrilobular emphysema Stable 1.2 cm AP window lymph node.  Densely calcified granuloma in the posterobasal segment of the right lower lobe.Hepatosplenomegaly with associated splenic collateral vessels at the splenic hilum suggesting sequela of portal venous hypertension     CT chest 4/2019:   1. Mild peribronchial cuffing which may be seen with bronchitis.  Mild tubular bronchiectasis of the right lower lobe with several distal bronchi demonstrating retained secretions, finding may be seen with small airways infectious/noninfectious inflammation or aspiration.  2. Centrilobular and paraseptal emphysema with an upper lung zone predominance.  3. Diffuse muscular atrophy.  4. Splenomegaly..     CT pelvis 6/2017: Obturator internus edema or hematoma. No acute fracture. Stable splenomegaly.     Esophogram 4/2015: Spontaneous gastroesophageal reflux.  Otherwise, unremarkable esophagram.     PET scan 8/2014:AP window lymph node SUV max less than 2.5.  This is most  likely benign/reactive.  Surveillance is recommended at 3, 6, 12, and 24 months. Splenomegaly most likely from portal hypertension     He was admitted in 4/2019 worsening generalized muscle weakness and dysphagia. Labs on admission showed Normal and stable CBC and CMP.  Normal CK/aldolase.  Normal inflammatory markers.  Elevated IgG (1754). HMGCoA ab negative. Vit D: 15.    MRI femurs 4/6/19:  1.  Redemonstration of advanced diffuse atrophy and fatty replacement of the bilateral thigh musculature involving all compartments.  There is mild residual edema signal present within the bilateral obturator musculature and residual posterior compartment musculature, similar to prior MRI examination.  2.  Mild heterogeneity of the visualized bone marrow signal which may relate to red marrow reconversion.  Clinical correlation advised.  Patient will also benefit from PT/OT and rehab placement for muscle strengthening after completion of IVIG.      MRI humerus b/l 4/7:  Significant diffuse atrophy and fatty replacement of the bilateral proximal upper extremity musculature, including the rotator cuff musculature, right greater than left, in this patient with reported history of polymyositis.  There is some degree of sparing of the bilateral deltoid and triceps musculature with greater residual muscle bulk on the left compared to the right.  There is mild residual edema present within the remaining musculature, most pronounced within the triceps.     During that hospitalization he received IVIG x 5 days, He was started on cellcept 1g BID and prednisone 10mg daily. He went to inpatient rehab for about 2.5 weeks.      Last visit 9/2019: he was doing well. Plan to reduce prednisone from 15mg to 12.5mg then continue on 10mg daily.      Interval history:   12/04/2019:  Has stopped physical therapy 1 mos ago. Is doing exercises at home. He states he feels weak all over. His swallowing is also worsened.Things are getting stuck more,  using 2-3 bottles of water to have dinner.   He was able to lift himself off the toilet but not anymore.   Currently on Prednisone 10mg and cellcept 1500mg BID   Not having dark stools, no blood in stool.      8/26/20:   Currently on Prednisone 10mg and Cellcept 1500mg BID. He has not been able to get his IVIG infusions at home since June due to home living conditions that lead to home health refusing to come out to give his IVIG. Social work has been working for an alternative solution. We will be trying to give him the IVIG treatment here at [facility]. Patient has weakness but it states it is better since starting the IVIG. He is able to feed himself, dress himself, but is not able to lift his pants all the way up. He is in a motorized chair today.      3/4/21:  Patient is having weakness that has gradually gotten worse. He hasn't gotten his IVIG since November. Unsure why      10/12/21:  Taking Prednisone 10mg daily, Cellcept 1500mg BID  Patient has been stable in terms of his muscle disease. He has been having some dysphagia for which he has tests and GI follow up. His voice has also changed so he is meeting the ENT as well. He has gotten his IVIG last month.       1/19/21:  Taking IVIG monthly, last one was last week;  Prednisone 10mg and Cellcept 1500mg BID  Patient has not progressed but has not gotten better, he feels he is stuck; He did the imaging end of last year showing active myositis, discussed rituxan, will plan to start once approved      Review of Systems   Constitutional: Negative for chills and fever.   Eyes: Negative for visual disturbance.   Respiratory: Positive for shortness of breath (CLARKE).    Cardiovascular: Negative for chest pain.   Gastrointestinal: Negative for abdominal pain.   Musculoskeletal: Negative for arthralgias and joint swelling.   Skin: Negative for rash.   Neurological: Positive for weakness. Negative for headaches.   Psychiatric/Behavioral: Negative for confusion.      "    Objective:   /78   Pulse 82   Ht 5' 9.6" (1.768 m)   Wt 78.9 kg (174 lb)   BMI 25.25 kg/m²      Physical Exam   Constitutional: He is oriented to person, place, and time. No distress.   In wheelchair   HENT:   Head: Normocephalic and atraumatic.   Eyes: Pupils are equal, round, and reactive to light. Conjunctivae are normal.   Cardiovascular: Normal rate, regular rhythm and normal heart sounds.   No murmur heard.  Pulmonary/Chest: Effort normal and breath sounds normal.   Abdominal: Soft.   Musculoskeletal:         General: No tenderness.      Comments: Limited ROM due to muscle weakness, right upper extremity worse than left, 2/5 muscle weakness in psoas/hip muscles, 2/5 in quads, 3/5 in lower extremity   Neurological: He is alert and oriented to person, place, and time.   Skin: Skin is warm and dry. No rash noted. He is not diaphoretic. No erythema.   Psychiatric: Mood, memory, affect and judgment normal.        No data to display     Assessment:       1. Polymyositis            Plan:       Problem List Items Addressed This Visit        Immunology/Multi System    Polymyositis (Chronic)     62yo M with PMH of overlap of polymyositis (dx 2009, mildly +ku, outside biopsy proven with elevated CPK and aldolase) and inclusion body myositis (+CN1A), osteopenia, Vit D insufficiency, Hemoglobulin c/beta-zero thalaseema, A-fib, dysphagia, portal HTN, emphysema here for follow up of polymyositis. Patient with many social issues that are impeding his health care at this time, but this seems to be improving. Overall his condition does seem to be improving. He has been on Imuran in the past, his treatment has been escalated to IVIG monthly and cellcept. Currently on prednisone but weaning. Patient with overlap syndrome. IVIG, cellcept and steroids helping with polymyositis. He needs aggressive PT to help with inclusion body         #Overlap: polymyositis and inclusion body myositis   +Ku and +CN1A      -Continue " monthly IVIG  -Continue Cellcept 1500mg BID  -Continue Prednisone 10mg for now while we prepare for Rituximab  -Consent signed and orders placed  -continue vit D 50,000 units weekly  -Will help get another appointment with PM&R Dr. Celis     Case discussed with Dr. Franklin    RTC 1-2 months with me and Dr. Huston

## 2022-01-19 NOTE — ASSESSMENT & PLAN NOTE
62yo M with PMH of overlap of polymyositis (dx 2009, mildly +ku, outside biopsy proven with elevated CPK and aldolase) and inclusion body myositis (+CN1A), osteopenia, Vit D insufficiency, Hemoglobulin c/beta-zero thalaseema, A-fib, dysphagia, portal HTN, emphysema here for follow up of polymyositis. Patient with many social issues that are impeding his health care at this time, but this seems to be improving. Overall his condition does seem to be improving. He has been on Imuran in the past, his treatment has been escalated to IVIG monthly and cellcept. Currently on prednisone but weaning. Patient with overlap syndrome. IVIG, cellcept and steroids helping with polymyositis. He needs aggressive PT to help with inclusion body         #Overlap: polymyositis and inclusion body myositis   +Ku and +CN1A      -Continue monthly IVIG  -Continue Cellcept 1500mg BID  -Continue Prednisone 10mg for now while we prepare for Rituximab  -Consent signed and orders placed  -continue vit D 50,000 units weekly  -Will help get another appointment with PM&R Dr. Celis     Case discussed with Dr. Franklin    RTC 1-2 months with me and Dr. Huston

## 2022-01-20 RX ORDER — HEPARIN 100 UNIT/ML
500 SYRINGE INTRAVENOUS
Status: CANCELLED | OUTPATIENT
Start: 2022-01-26

## 2022-01-20 RX ORDER — SODIUM CHLORIDE 0.9 % (FLUSH) 0.9 %
10 SYRINGE (ML) INJECTION
Status: CANCELLED | OUTPATIENT
Start: 2022-01-26

## 2022-01-20 RX ORDER — FAMOTIDINE 10 MG/ML
20 INJECTION INTRAVENOUS
Status: CANCELLED | OUTPATIENT
Start: 2022-01-26

## 2022-01-20 RX ORDER — ACETAMINOPHEN 325 MG/1
650 TABLET ORAL
Status: CANCELLED | OUTPATIENT
Start: 2022-01-26

## 2022-01-20 NOTE — PROGRESS NOTES
I have personally taken the history and examined the patient to verify the fellow's notation, and I agree with the impression and plan stated.      He has unusual condition with overlapping Ku+ inflammatory myositis and CN1A+ inclusion body myositis (previous bx done outside)  He has stabilized with current regimen of IVIg, MMF and prednisone but he is still wheelchair bound so will try to switch IVIg to RTX, as well as continue physical rehab.    WD

## 2022-01-26 ENCOUNTER — PATIENT MESSAGE (OUTPATIENT)
Dept: PHARMACY | Facility: CLINIC | Age: 67
End: 2022-01-26
Payer: MEDICARE

## 2022-01-27 ENCOUNTER — SPECIALTY PHARMACY (OUTPATIENT)
Dept: PHARMACY | Facility: CLINIC | Age: 67
End: 2022-01-27
Payer: MEDICARE

## 2022-01-27 NOTE — TELEPHONE ENCOUNTER
Specialty Pharmacy - Refill Coordination    Specialty Medication Orders Linked to Encounter    Flowsheet Row Most Recent Value   Medication #1 mycophenolate (CELLCEPT) 500 mg Tab (Order#[id number], Rx#[id number])        Refill Questions - Documented Responses    Flowsheet Row Most Recent Value   Patient Availability and HIPAA Verification    Does patient want to proceed with activity? Yes   HIPAA/medical authority confirmed? Yes   Relationship to patient of person spoken to? Self   Refill Screening Questions    Changes to allergies? No   Changes to medications? No   New conditions since last clinic visit? No   Unplanned office visit, urgent care, ED, or hospital admission in the last 4 weeks? No   How does patient/caregiver feel medication is working? Very good   Financial problems or insurance changes? No   How many doses of your specialty medications were missed in the last 4 weeks? 0   Would patient like to speak to a pharmacist? No   When does the patient need to receive the medication? 01/28/22   Refill Delivery Questions    How will the patient receive the medication? Delivery Brittnee   When does the patient need to receive the medication? 01/28/22   Shipping Address Home   Address in [facility] confirmed and updated if neccessary? Yes   Expected Copay ($) 3.95   Is the patient able to afford the medication copay? Yes   Payment Method invoice (approval required)  [Bill on 2/1/22 with patient permission.]   Days supply of Refill 30   Supplies needed? No supplies needed   Refill activity completed? Yes   Refill activity plan Refill scheduled   Shipment/Pickup Date: 01/28/22          Current Outpatient Medications   Medication Sig    albuterol (PROVENTIL/VENTOLIN HFA) 90 mcg/actuation inhaler Inhale 2 puffs into the lungs every 6 (six) hours as needed for Wheezing or Shortness of Breath.    amLODIPine (NORVASC) 10 MG tablet Take 1 tablet (10 mg total) by mouth once daily.    ammonium lactate 12 % Crea  Apply 1 g topically once daily. Apply to areas of dry skin and nails daily    EPINEPHrine (EPIPEN) 0.3 mg/0.3 mL AtIn     hydrOXYzine HCL (ATARAX) 10 MG Tab Take 1 tablet (10 mg total) by mouth 3 (three) times daily as needed (itching).    losartan (COZAAR) 25 MG tablet Take 1 tablet (25 mg total) by mouth once daily.    mycophenolate (CELLCEPT) 500 mg Tab Take 3 tablets (1,500 mg total) by mouth 2 (two) times daily.    nystatin-triamcinolone (MYCOLOG II) cream Apply topically 2 (two) times daily    predniSONE (DELTASONE) 1 MG tablet Take 9mg daily for 2 weeks, then 8mg daily for 2 weeks, then 7mg daily for 2 weeks, 6mg daily for 2 weeks, then 5mg daily until seen in clinic (start new prednisone 5 mg script after finishing prednisone 1 mg script) (Patient taking differently: Take 9mg daily for 2 weeks, then 8mg daily for 2 weeks, then 7mg daily for 2 weeks, 6mg daily for 2 weeks, then 5mg daily until seen in clinic (start new prednisone 5 mg script after finishing prednisone 1 mg script))    predniSONE (DELTASONE) 10 MG tablet Take 1 tablet (10 mg total) by mouth once daily.    predniSONE (DELTASONE) 5 MG tablet Take 1 tablet by mouth once daily until seen in clinic. (begin after finishing prednisone 1 mg script) (Patient taking differently: Take 1 tablet by mouth once daily until seen in clinic. (begin after finishing prednisone 1 mg script))    traZODone (DESYREL) 50 MG tablet Take 1 tablet (50 mg total) by mouth nightly as needed for Insomnia.   Last reviewed on 12/14/2021  9:12 AM by Murphy Potts OD    Review of patient's allergies indicates:  No Known Allergies Last reviewed on  1/19/2022 10:36 AM by Deb Angel      Tasks added this encounter   2/20/2022 - Refill Call (Auto Added)   Tasks due within next 3 months   No tasks due.     Fauzia Givens, PharmD  [facility]  [address] [address]  Phone: [phone]  Fax: [phone]

## 2022-01-28 ENCOUNTER — PATIENT MESSAGE (OUTPATIENT)
Dept: INTERNAL MEDICINE | Facility: CLINIC | Age: 67
End: 2022-01-28
Payer: MEDICARE

## 2022-01-31 ENCOUNTER — EXTERNAL CHRONIC CARE MANAGEMENT (OUTPATIENT)
Dept: PRIMARY CARE CLINIC | Facility: CLINIC | Age: 67
End: 2022-01-31
Payer: MEDICARE

## 2022-01-31 PROCEDURE — 99490 PR CHRONIC CARE MGMT, 1ST 20 MIN: ICD-10-PCS | Mod: S$GLB,,, | Performed by: FAMILY MEDICINE

## 2022-01-31 PROCEDURE — 99490 CHRNC CARE MGMT STAFF 1ST 20: CPT | Mod: S$GLB,,, | Performed by: FAMILY MEDICINE

## 2022-02-22 ENCOUNTER — SPECIALTY PHARMACY (OUTPATIENT)
Dept: PHARMACY | Facility: CLINIC | Age: 67
End: 2022-02-22
Payer: MEDICARE

## 2022-02-25 ENCOUNTER — PATIENT MESSAGE (OUTPATIENT)
Dept: PHARMACY | Facility: CLINIC | Age: 67
End: 2022-02-25
Payer: MEDICARE

## 2022-03-02 DIAGNOSIS — R05.9 COUGH: ICD-10-CM

## 2022-03-02 RX ORDER — ALBUTEROL SULFATE 90 UG/1
2 AEROSOL, METERED RESPIRATORY (INHALATION) EVERY 6 HOURS PRN
Qty: 18 G | Refills: 11 | Status: SHIPPED | OUTPATIENT
Start: 2022-03-02 | End: 2023-01-01 | Stop reason: SDUPTHER

## 2022-03-02 NOTE — TELEPHONE ENCOUNTER
No new care gaps identified.  Powered by [employer]. Reference number: [id number].   3/02/2022 10:46:34 AM CST

## 2022-03-03 ENCOUNTER — TELEPHONE (OUTPATIENT)
Dept: ENDOSCOPY | Facility: HOSPITAL | Age: 67
End: 2022-03-03
Payer: MEDICARE

## 2022-03-03 ENCOUNTER — SPECIALTY PHARMACY (OUTPATIENT)
Dept: PHARMACY | Facility: CLINIC | Age: 67
End: 2022-03-03
Payer: MEDICARE

## 2022-03-03 NOTE — TELEPHONE ENCOUNTER
Specialty Pharmacy - Refill Coordination    Specialty Medication Orders Linked to Encounter    Flowsheet Row Most Recent Value   Medication #1 mycophenolate (CELLCEPT) 500 mg Tab (Order#[id number], Rx#[id number])          Refill Questions - Documented Responses    Flowsheet Row Most Recent Value   Patient Availability and HIPAA Verification    Does patient want to proceed with activity? Yes   HIPAA/medical authority confirmed? Yes   Relationship to patient of person spoken to? Self   Refill Screening Questions    Changes to allergies? No   Changes to medications? No   New conditions since last clinic visit? No   Unplanned office visit, urgent care, ED, or hospital admission in the last 4 weeks? No   How does patient/caregiver feel medication is working? Excellent   Financial problems or insurance changes? No   How many doses of your specialty medications were missed in the last 4 weeks? 0   Would patient like to speak to a pharmacist? No   When does the patient need to receive the medication? 03/04/22   Refill Delivery Questions    How will the patient receive the medication? Delivery Brittnee   When does the patient need to receive the medication? 03/04/22   Shipping Address Home   Address in [facility] confirmed and updated if neccessary? Yes   Expected Copay ($) 3.95   Is the patient able to afford the medication copay? Yes   Payment Method CC on file   Days supply of Refill 30   Supplies needed? No supplies needed   Refill activity completed? Yes   Refill activity plan Refill scheduled   Shipment/Pickup Date: 03/04/22          Current Outpatient Medications   Medication Sig    albuterol (PROVENTIL/VENTOLIN HFA) 90 mcg/actuation inhaler Inhale 2 puffs into the lungs every 6 (six) hours as needed for Wheezing or Shortness of Breath.    amLODIPine (NORVASC) 10 MG tablet Take 1 tablet (10 mg total) by mouth once daily.    ammonium lactate 12 % Crea Apply 1 g topically once daily. Apply to areas of dry skin and  nails daily    EPINEPHrine (EPIPEN) 0.3 mg/0.3 mL AtIn     hydrOXYzine HCL (ATARAX) 10 MG Tab Take 1 tablet (10 mg total) by mouth 3 (three) times daily as needed (itching).    losartan (COZAAR) 25 MG tablet Take 1 tablet (25 mg total) by mouth once daily.    mycophenolate (CELLCEPT) 500 mg Tab Take 3 tablets (1,500 mg total) by mouth 2 (two) times daily.    nystatin-triamcinolone (MYCOLOG II) cream Apply topically 2 (two) times daily    predniSONE (DELTASONE) 1 MG tablet Take 9mg daily for 2 weeks, then 8mg daily for 2 weeks, then 7mg daily for 2 weeks, 6mg daily for 2 weeks, then 5mg daily until seen in clinic (start new prednisone 5 mg script after finishing prednisone 1 mg script) (Patient taking differently: Take 9mg daily for 2 weeks, then 8mg daily for 2 weeks, then 7mg daily for 2 weeks, 6mg daily for 2 weeks, then 5mg daily until seen in clinic (start new prednisone 5 mg script after finishing prednisone 1 mg script))    predniSONE (DELTASONE) 10 MG tablet Take 1 tablet (10 mg total) by mouth once daily.    predniSONE (DELTASONE) 5 MG tablet Take 1 tablet by mouth once daily until seen in clinic. (begin after finishing prednisone 1 mg script) (Patient taking differently: Take 1 tablet by mouth once daily until seen in clinic. (begin after finishing prednisone 1 mg script))    traZODone (DESYREL) 50 MG tablet Take 1 tablet (50 mg total) by mouth nightly as needed for Insomnia.   Last reviewed on 12/14/2021  9:12 AM by Murphy Potts OD    Review of patient's allergies indicates:  No Known Allergies Last reviewed on  3/2/2022 10:46 AM by Kaity Smith      Tasks added this encounter   No tasks added.   Tasks due within next 3 months   2/20/2022 - Refill Call (Auto Added)     Wilfredo PharmD  [facility]  [address] [address]  Phone: [phone]  Fax: [phone]       Self

## 2022-03-19 NOTE — TELEPHONE ENCOUNTER
Left message for home health nurse Karyn to return call.   Karyn's number is [phone]              ----- Message from Nura Huston MD sent at 7/22/2020  7:44 PM CDT -----  Any, yes the absolutely needs IVIg  ASAP, he is overdue. Needs f/u with standing labs and f/u with fellow and myself ASAP. RJQ  ----- Message -----  From: Any Felder RN  Sent: 7/22/2020   1:52 PM CDT  To: Nura Huston MD, Deb Angel MA    Called patient yesterday, he had medications he received for an infusion.   He said no one came from home health to give it.  I instructed the family to call home health and gave the phone number.   This message was received this am.   Is he still to get this medication?  ----- Message -----  From: Kiersten Bateman  Sent: 7/22/2020   9:27 AM CDT  To: [facility] Staff    Pt would like to receive a call back regarding his infusion. Pt state the home hema nurse told him he was discharged Please contact the pt to advise.    Contact info [phone]           [[phone]],[UNKNOWN]

## 2022-03-22 NOTE — PATIENT INSTRUCTIONS
Controlling High Blood Pressure  High blood pressure (hypertension) is often called the silent killer. This is because many people who have it dont know it. High blood pressure is defined as 140/90 mm Hg or higher. Know your blood pressure and remember to check it regularly. Doing so can save your life. Here are some things you can do to help control your blood pressure.    Choose heart-healthy foods  · Select low-salt, low-fat foods. Limit sodium intake to 2,400 mg per day or the amount suggested by your healthcare provider.  · Limit canned, dried, cured, packaged, and fast foods. These can contain a lot of salt.  · Eat 8 to 10 servings of fruits and vegetables every day.  · Choose lean meats, fish, or chicken.  · Eat whole-grain pasta, brown rice, and beans.  · Eat 2 to 3 servings of low-fat or fat-free dairy products.  · Ask your doctor about the DASH eating plan. This plan helps reduce blood pressure.  · When you go to a restaurant, ask that your meal be prepared with no added salt.  Maintain a healthy weight  · Ask your healthcare provider how many calories to eat a day. Then stick to that number.  · Ask your healthcare provider what weight range is healthiest for you. If you are overweight, a weight loss of only 3% to 5% of your body weight can help lower blood pressure. Generally, a good weight loss goal is to lose 10% of your body weight in a year.  · Limit snacks and sweets.  · Get regular exercise.  Get up and get active  · Choose activities you enjoy. Find ones you can do with friends or family. This includes bicycling, dancing, walking, and jogging.  · Park farther away from building entrances.  · Use stairs instead of the elevator.  · When you can, walk or bike instead of driving.  · [place] leaves, garden, or do household repairs.  · Be active at a moderate to vigorous level of physical activity for at least 40 minutes for a minimum of 3 to 4 days a week.   Manage stress  · Make time to relax and enjoy  [FreeTextEntry1] : No family hx CAD \par \par Denies any chest pain or sob.  Avoids any kind of strenuous activity as it exacerbates her palps. \par Today she is in AFIB but is not aware \par \par Denies CP, WIGGINS edema or orthopnea. \par \par Admits to dietary indiscretions particularly with carbohydrates\par .\par \par \par \par   life. Find time to laugh.  · Communicate your concerns with your loved ones and your healthcare provider.  · Visit with family and friends, and keep up with hobbies.  Limit alcohol and quit smoking  · Men should have no more than 2 drinks per day.  · Women should have no more than 1 drink per day.  · Talk with your healthcare provider about quitting smoking. Smoking significantly increases your risk for heart disease and stroke. Ask your healthcare provider about community smoking cessation programs and other options.  Medicines  If lifestyle changes arent enough, your healthcare provider may prescribe high blood pressure medicine. Take all medicines as prescribed. If you have any questions about your medicines, ask your healthcare provider before stopping or changing them.   Date Last Reviewed: 4/27/2016  © 0881-6162 [employer]. [address] [place] [address], [place] [address]. All rights reserved. This information is not intended as a substitute for professional medical care. Always follow your healthcare professional's instructions.

## 2022-03-23 ENCOUNTER — PATIENT MESSAGE (OUTPATIENT)
Dept: GASTROENTEROLOGY | Facility: CLINIC | Age: 67
End: 2022-03-23
Payer: MEDICARE

## 2022-03-23 ENCOUNTER — PATIENT MESSAGE (OUTPATIENT)
Dept: RHEUMATOLOGY | Facility: CLINIC | Age: 67
End: 2022-03-23
Payer: MEDICARE

## 2022-03-24 NOTE — TELEPHONE ENCOUNTER
"We had already contacted him and he said    "Pt declined to schedule at this time. Wants to discuss details with dr brown before scheduling."    He wanted to discuss with Dr. Brown  "

## 2022-03-25 ENCOUNTER — PATIENT OUTREACH (OUTPATIENT)
Dept: ADMINISTRATIVE | Facility: OTHER | Age: 67
End: 2022-03-25
Payer: MEDICARE

## 2022-03-25 ENCOUNTER — PATIENT MESSAGE (OUTPATIENT)
Dept: GASTROENTEROLOGY | Facility: CLINIC | Age: 67
End: 2022-03-25
Payer: MEDICARE

## 2022-03-25 NOTE — PROGRESS NOTES
Health Maintenance Due   Topic Date Due    High Dose Statin  Never done    Shingles Vaccine (1 of 2) Never done    PROSTATE-SPECIFIC ANTIGEN  12/12/2019    COVID-19 Vaccine (3 - Booster for Pfizer series) 11/04/2021     Updates were requested from care everywhere.  Chart was reviewed for overdue Proactive [facility] Encounters (DELROY) topics (CRS, Breast Cancer Screening, Eye exam)  Health Maintenance has been updated.  LINKS immunization registry triggered.  Immunizations were reconciled.

## 2022-03-28 ENCOUNTER — OFFICE VISIT (OUTPATIENT)
Dept: RHEUMATOLOGY | Facility: CLINIC | Age: 67
End: 2022-03-28
Payer: MEDICARE

## 2022-03-28 ENCOUNTER — SPECIALTY PHARMACY (OUTPATIENT)
Dept: PHARMACY | Facility: CLINIC | Age: 67
End: 2022-03-28
Payer: MEDICARE

## 2022-03-28 ENCOUNTER — LAB VISIT (OUTPATIENT)
Dept: LAB | Facility: HOSPITAL | Age: 67
End: 2022-03-28
Payer: MEDICARE

## 2022-03-28 VITALS
DIASTOLIC BLOOD PRESSURE: 87 MMHG | BODY MASS INDEX: 24.91 KG/M2 | HEIGHT: 70 IN | HEART RATE: 79 BPM | SYSTOLIC BLOOD PRESSURE: 140 MMHG | WEIGHT: 174 LBS

## 2022-03-28 DIAGNOSIS — G72.41 INCLUSION BODY MYOSITIS (IBM): ICD-10-CM

## 2022-03-28 DIAGNOSIS — G72.41 INCLUSION BODY MYOSITIS (IBM): Primary | ICD-10-CM

## 2022-03-28 DIAGNOSIS — M33.20 POLYMYOSITIS: Chronic | ICD-10-CM

## 2022-03-28 DIAGNOSIS — D64.9 ANEMIA, UNSPECIFIED TYPE: ICD-10-CM

## 2022-03-28 LAB
ALBUMIN SERPL BCP-MCNC: 4.4 G/DL (ref 3.5–5.2)
ALP SERPL-CCNC: 51 U/L (ref 55–135)
ALT SERPL W/O P-5'-P-CCNC: 7 U/L (ref 10–44)
ANION GAP SERPL CALC-SCNC: 10 MMOL/L (ref 8–16)
AST SERPL-CCNC: 8 U/L (ref 10–40)
BASOPHILS # BLD AUTO: 0.01 K/UL (ref 0–0.2)
BASOPHILS NFR BLD: 0.2 % (ref 0–1.9)
BILIRUB SERPL-MCNC: 1.9 MG/DL (ref 0.1–1)
BUN SERPL-MCNC: 10 MG/DL (ref 8–23)
CALCIUM SERPL-MCNC: 9.2 MG/DL (ref 8.7–10.5)
CHLORIDE SERPL-SCNC: 104 MMOL/L (ref 95–110)
CK SERPL-CCNC: 61 U/L (ref 20–200)
CO2 SERPL-SCNC: 27 MMOL/L (ref 23–29)
CREAT SERPL-MCNC: 0.4 MG/DL (ref 0.5–1.4)
CRP SERPL-MCNC: 0.4 MG/L (ref 0–8.2)
DIFFERENTIAL METHOD: ABNORMAL
EOSINOPHIL # BLD AUTO: 0 K/UL (ref 0–0.5)
EOSINOPHIL NFR BLD: 0 % (ref 0–8)
ERYTHROCYTE [DISTWIDTH] IN BLOOD BY AUTOMATED COUNT: 20.3 % (ref 11.5–14.5)
ERYTHROCYTE [SEDIMENTATION RATE] IN BLOOD BY WESTERGREN METHOD: <2 MM/HR (ref 0–23)
EST. GFR  (AFRICAN AMERICAN): >60 ML/MIN/1.73 M^2
EST. GFR  (NON AFRICAN AMERICAN): >60 ML/MIN/1.73 M^2
FERRITIN SERPL-MCNC: 648 NG/ML (ref 20–300)
GLUCOSE SERPL-MCNC: 87 MG/DL (ref 70–110)
HAPTOGLOB SERPL-MCNC: 64 MG/DL (ref 30–250)
HCT VFR BLD AUTO: 32.3 % (ref 40–54)
HGB BLD-MCNC: 10.1 G/DL (ref 14–18)
IGA SERPL-MCNC: 160 MG/DL (ref 40–350)
IGG SERPL-MCNC: 1527 MG/DL (ref 650–1600)
IGM SERPL-MCNC: 47 MG/DL (ref 50–300)
IMM GRANULOCYTES # BLD AUTO: 0.03 K/UL (ref 0–0.04)
IMM GRANULOCYTES NFR BLD AUTO: 0.6 % (ref 0–0.5)
IRON SERPL-MCNC: 85 UG/DL (ref 45–160)
LDH SERPL L TO P-CCNC: 159 U/L (ref 110–260)
LYMPHOCYTES # BLD AUTO: 0.6 K/UL (ref 1–4.8)
LYMPHOCYTES NFR BLD: 11 % (ref 18–48)
MCH RBC QN AUTO: 18 PG (ref 27–31)
MCHC RBC AUTO-ENTMCNC: 31.3 G/DL (ref 32–36)
MCV RBC AUTO: 58 FL (ref 82–98)
MONOCYTES # BLD AUTO: 0.2 K/UL (ref 0.3–1)
MONOCYTES NFR BLD: 4.1 % (ref 4–15)
NEUTROPHILS # BLD AUTO: 4.5 K/UL (ref 1.8–7.7)
NEUTROPHILS NFR BLD: 84.1 % (ref 38–73)
NRBC BLD-RTO: 2 /100 WBC
PLATELET # BLD AUTO: 149 K/UL (ref 150–450)
PMV BLD AUTO: ABNORMAL FL (ref 9.2–12.9)
POTASSIUM SERPL-SCNC: 3.9 MMOL/L (ref 3.5–5.1)
PROT SERPL-MCNC: 7.5 G/DL (ref 6–8.4)
RBC # BLD AUTO: 5.62 M/UL (ref 4.6–6.2)
RETICS/RBC NFR AUTO: 1.6 % (ref 0.4–2)
SATURATED IRON: 32 % (ref 20–50)
SODIUM SERPL-SCNC: 141 MMOL/L (ref 136–145)
TOTAL IRON BINDING CAPACITY: 269 UG/DL (ref 250–450)
TRANSFERRIN SERPL-MCNC: 182 MG/DL (ref 200–375)
WBC # BLD AUTO: 5.38 K/UL (ref 3.9–12.7)

## 2022-03-28 PROCEDURE — 83615 LACTATE (LD) (LDH) ENZYME: CPT

## 2022-03-28 PROCEDURE — 82784 ASSAY IGA/IGD/IGG/IGM EACH: CPT | Mod: 59

## 2022-03-28 PROCEDURE — 85045 AUTOMATED RETICULOCYTE COUNT: CPT

## 2022-03-28 PROCEDURE — 99214 OFFICE O/P EST MOD 30 MIN: CPT | Mod: GC,S$GLB,,

## 2022-03-28 PROCEDURE — 82085 ASSAY OF ALDOLASE: CPT

## 2022-03-28 PROCEDURE — 82728 ASSAY OF FERRITIN: CPT

## 2022-03-28 PROCEDURE — 80053 COMPREHEN METABOLIC PANEL: CPT

## 2022-03-28 PROCEDURE — 99999 PR PBB SHADOW E&M-EST. PATIENT-LVL III: ICD-10-PCS | Mod: PBBFAC,GC,,

## 2022-03-28 PROCEDURE — 85652 RBC SED RATE AUTOMATED: CPT

## 2022-03-28 PROCEDURE — 83010 ASSAY OF HAPTOGLOBIN QUANT: CPT

## 2022-03-28 PROCEDURE — 99214 PR OFFICE/OUTPT VISIT, EST, LEVL IV, 30-39 MIN: ICD-10-PCS | Mod: GC,S$GLB,,

## 2022-03-28 PROCEDURE — 84466 ASSAY OF TRANSFERRIN: CPT

## 2022-03-28 PROCEDURE — 99999 PR PBB SHADOW E&M-EST. PATIENT-LVL III: CPT | Mod: PBBFAC,GC,,

## 2022-03-28 PROCEDURE — 84238 ASSAY NONENDOCRINE RECEPTOR: CPT

## 2022-03-28 PROCEDURE — 85025 COMPLETE CBC W/AUTO DIFF WBC: CPT

## 2022-03-28 PROCEDURE — 82550 ASSAY OF CK (CPK): CPT

## 2022-03-28 PROCEDURE — 86140 C-REACTIVE PROTEIN: CPT

## 2022-03-28 RX ORDER — DIPHENHYDRAMINE HCL 25 MG
25 CAPSULE ORAL
Status: CANCELLED | OUTPATIENT
Start: 2022-03-28

## 2022-03-28 RX ORDER — MYCOPHENOLATE MOFETIL 500 MG/1
1500 TABLET ORAL 2 TIMES DAILY
Qty: 540 TABLET | Refills: 3 | Status: SHIPPED | OUTPATIENT
Start: 2022-03-28 | End: 2022-04-27 | Stop reason: SDUPTHER

## 2022-03-28 RX ORDER — ACETAMINOPHEN 500 MG
500 TABLET ORAL
Status: CANCELLED | OUTPATIENT
Start: 2022-03-28

## 2022-03-28 RX ORDER — SODIUM CHLORIDE 0.9 % (FLUSH) 0.9 %
10 SYRINGE (ML) INJECTION
Status: CANCELLED | OUTPATIENT
Start: 2022-03-28

## 2022-03-28 RX ORDER — HEPARIN 100 UNIT/ML
500 SYRINGE INTRAVENOUS
Status: CANCELLED | OUTPATIENT
Start: 2022-03-28

## 2022-03-28 RX ORDER — PREDNISONE 5 MG/1
10 TABLET ORAL DAILY
Qty: 60 TABLET | Refills: 0 | Status: SHIPPED | OUTPATIENT
Start: 2022-03-28 | End: 2022-04-27

## 2022-03-28 RX ORDER — ERGOCALCIFEROL 1.25 MG/1
50000 CAPSULE ORAL
Qty: 12 CAPSULE | Refills: 2 | Status: SHIPPED | OUTPATIENT
Start: 2022-03-28 | End: 2023-01-01 | Stop reason: CLARIF

## 2022-03-28 RX ORDER — DIPHENHYDRAMINE HYDROCHLORIDE 50 MG/ML
25 INJECTION INTRAMUSCULAR; INTRAVENOUS
Status: CANCELLED | OUTPATIENT
Start: 2022-03-28

## 2022-03-28 NOTE — PROGRESS NOTES
I have reviewed the notes,documentation by  and I agree with his recommendations and I have made an addendum to his note

## 2022-03-28 NOTE — TELEPHONE ENCOUNTER
Contacted patient regarding Cellcept refill-- patient requested we call him back sometime next week.

## 2022-03-28 NOTE — ASSESSMENT & PLAN NOTE
64yo M with PMH of overlap of polymyositis (dx 2009, mildly +ku, outside biopsy proven with elevated CPK and aldolase) and inclusion body myositis (+CN1A), osteopenia, Vit D insufficiency, Hemoglobulin c/beta-zero thalaseema, A-fib, dysphagia, portal HTN, emphysema here for follow up of polymyositis. Patient with many social issues that are impeding his health care at this time, but this seems to be improving. Overall his condition does seem to be improving. He has been on Imuran in the past, his treatment has been escalated to IVIG monthly and cellcept. Currently on prednisone but weaning. Patient with overlap syndrome. IVIG, cellcept and steroids helping with polymyositis. He needs aggressive PT to help with inclusion body         #Overlap: polymyositis and inclusion body myositis   +Ku and +CN1A      -Continue monthly IVIG  -Continue Cellcept 1500mg BID  -Continue Prednisone 10mg for now while we prepare for Rituximab  -Consent signed and orders placed  -continue vit D 50,000 units weekly  -Will help get another appointment with PM&R Dr. Celis     Case discussed with Dr. Franklin    RTC 1-2 months with me and Dr. Huston

## 2022-03-28 NOTE — PROGRESS NOTES
I have personally taken the history and examined the patient to verify the fellow's notation, and I agree with the impression and plan stated.      65 yo M with PMH of overlap of polymyositis (dx 2009, mildly +ku, outside biopsy proven with elevated CPK and aldolase) and inclusion body myositis (+CN1A), osteopenia, Vit D insufficiency, Hemoglobulin c/beta-zero thalaseema, A-fib, dysphagia, portal HTN, emphysema here for follow up.    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    I am seeing him for the first time  Most of the documentation above as per  and       Patient has no additional complaints today    Last labs :    CK,aldolase,ESR,CRP nml  AST,ALT nml    Rehab planned    Rituximab approved  Vaccine first  2 to 4 weeks later-rituximab  Hold cellcept for 2 weeks after covid vaccine    Labs today    CK,Aldolase,esr,crp,cbc,immunoglobulins     Return to  in a month

## 2022-03-28 NOTE — PROGRESS NOTES
Subjective:       Patient ID: Nura Kahn Jr. is a 66 y.o. male.    Chief Complaint: No chief complaint on file.    HPI   64yo M with PMH of overlap of polymyositis (dx 2009, mildly +ku, outside biopsy proven with elevated CPK and aldolase) and inclusion body myositis (+CN1A), osteopenia, Vit D insufficiency, Hemoglobulin c/beta-zero thalaseema, A-fib, dysphagia, portal HTN, emphysema here for follow up.     Per chart review it seems that he first established care here at [facility] in 8/2014. He was diagnosed in 9655-4652 when he was feeling weak, falling. He was a  and was unable to use his legs. He had a biopsy in [id number] in [place] that per notes was consistent with polymyositis. He was on MTX in the past which was stopped due CT showing hepatosplenomegaly with portal venous hypertension and elevated T bili in 8/2014 He was at that time started on Imuran. He has been on Imuran and steroids since then.  In 2016, he was worked up again here at [facility]. MRI of the left lower extremities showed Severe atrophy with fatty replacement of the thigh musculature bilaterally.  There is mild increased edema signal within the remaining thigh musculature and left iliopsoas muscle without associated enhancement which could reflect a mild degree of residual myositis.  EMG Left upper Ext 5/2016 was a technically difficult study. Had moderate ulnar entrapment at elbow, perhaps on a background of mild polyneuropathy. 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 Pathology of left thigh showing minute potion of muscle with end stage myopathic changes and focal chronic inflammation   Patient remained on Imuran and prednisone.      In Feb 2019 - patient had been out of Imuran for ~1 month.  He also admits to medication non-compliance because he didn't think he was having any improvement.  He admits that his weakness has gotten worse - stating  that he is unable to stand, difficulty with getting in and out of his scooter, normal ADLs (including going to the bathroom - resulting in urination/defecation on himself - resulting in decreased PO intake).  He also feels worsening of his dysphagia - where he gets food stuck and often chokes/coughs when he eats/drinks.       Wife works fulltime.  Daughter (10 yo).  Patient does not have social support/help for transportation to various appt.        Imaging:  CXR 1/2018:Patchy consolidation projected over the right lower lung zone, concerning for infection, correlation advised.       CT chest 12/2017:Peribronchial cuffing predominantly in the lower lung zones as well as retained secretions in the right middle lobe segmental bronchi consistent with bronchitis; such inflammation can occur with inhaled irritants or aspiration.  There is no evidence of interstitial lung disease such as NSIP or UIP.Upper lung zone predominant paraseptal emphysema and dispersed areas of centrilobular emphysema Stable 1.2 cm AP window lymph node.  Densely calcified granuloma in the posterobasal segment of the right lower lobe.Hepatosplenomegaly with associated splenic collateral vessels at the splenic hilum suggesting sequela of portal venous hypertension     CT chest 4/2019:   1. Mild peribronchial cuffing which may be seen with bronchitis.  Mild tubular bronchiectasis of the right lower lobe with several distal bronchi demonstrating retained secretions, finding may be seen with small airways infectious/noninfectious inflammation or aspiration.  2. Centrilobular and paraseptal emphysema with an upper lung zone predominance.  3. Diffuse muscular atrophy.  4. Splenomegaly..     CT pelvis 6/2017: Obturator internus edema or hematoma. No acute fracture. Stable splenomegaly.     Esophogram 4/2015: Spontaneous gastroesophageal reflux.  Otherwise, unremarkable esophagram.     PET scan 8/2014:AP window lymph node SUV max less than 2.5.  This is  most likely benign/reactive.  Surveillance is recommended at 3, 6, 12, and 24 months. Splenomegaly most likely from portal hypertension     He was admitted in 4/2019 worsening generalized muscle weakness and dysphagia. Labs on admission showed Normal and stable CBC and CMP.  Normal CK/aldolase.  Normal inflammatory markers.  Elevated IgG (1754). HMGCoA ab negative. Vit D: 15.    MRI femurs 4/6/19:  1.  Redemonstration of advanced diffuse atrophy and fatty replacement of the bilateral thigh musculature involving all compartments.  There is mild residual edema signal present within the bilateral obturator musculature and residual posterior compartment musculature, similar to prior MRI examination.  2.  Mild heterogeneity of the visualized bone marrow signal which may relate to red marrow reconversion.  Clinical correlation advised.  Patient will also benefit from PT/OT and rehab placement for muscle strengthening after completion of IVIG.      MRI humerus b/l 4/7:  Significant diffuse atrophy and fatty replacement of the bilateral proximal upper extremity musculature, including the rotator cuff musculature, right greater than left, in this patient with reported history of polymyositis.  There is some degree of sparing of the bilateral deltoid and triceps musculature with greater residual muscle bulk on the left compared to the right.  There is mild residual edema present within the remaining musculature, most pronounced within the triceps.     During that hospitalization he received IVIG x 5 days, He was started on cellcept 1g BID and prednisone 10mg daily. He went to inpatient rehab for about 2.5 weeks.      Last visit 9/2019: he was doing well. Plan to reduce prednisone from 15mg to 12.5mg then continue on 10mg daily.      Interval history:   12/04/2019:  Has stopped physical therapy 1 mos ago. Is doing exercises at home. He states he feels weak all over. His swallowing is also worsened.Things are getting stuck  more, using 2-3 bottles of water to have dinner.   He was able to lift himself off the toilet but not anymore.   Currently on Prednisone 10mg and cellcept 1500mg BID   Not having dark stools, no blood in stool.      8/26/20:   Currently on Prednisone 10mg and Cellcept 1500mg BID. He has not been able to get his IVIG infusions at home since June due to home living conditions that lead to home health refusing to come out to give his IVIG. Social work has been working for an alternative solution. We will be trying to give him the IVIG treatment here at [facility]. Patient has weakness but it states it is better since starting the IVIG. He is able to feed himself, dress himself, but is not able to lift his pants all the way up. He is in a motorized chair today.      3/4/21:  Patient is having weakness that has gradually gotten worse. He hasn't gotten his IVIG since November. Unsure why      10/12/21:  Taking Prednisone 10mg daily, Cellcept 1500mg BID  Patient has been stable in terms of his muscle disease. He has been having some dysphagia for which he has tests and GI follow up. His voice has also changed so he is meeting the ENT as well. He has gotten his IVIG last month.       1/19/21:  Taking IVIG monthly, last one was last week;  Prednisone 10mg and Cellcept 1500mg BID  Patient has not progressed but has not gotten better, he feels he is stuck; He did the imaging end of last year showing active myositis, discussed rituxan, will plan to start once approved      3/28/22:  Approved for Rituxan;   On monthly IVIG, last one was 1/13/22;  Taking Prednisone 10mg and Cellcept 1500mg BID  Vit d 50k, needs refill  Doing about the same; no new changes; needs COVID booster prior to rituxan    Review of Systems   Constitutional: Negative for chills and fever.   Eyes: Negative for visual disturbance.   Respiratory: Positive for shortness of breath (CLARKE).    Cardiovascular: Negative for chest pain.   Gastrointestinal: Negative for  abdominal pain.   Musculoskeletal: Negative for arthralgias and joint swelling.   Skin: Negative for rash.   Neurological: Positive for weakness. Negative for headaches.   Psychiatric/Behavioral: Negative for confusion.         Objective:   There were no vitals taken for this visit.     Physical Exam   Constitutional: He is oriented to person, place, and time. No distress.   In wheelchair   HENT:   Head: Normocephalic and atraumatic.   Eyes: Pupils are equal, round, and reactive to light. Conjunctivae are normal.   Cardiovascular: Normal rate, regular rhythm and normal heart sounds.   No murmur heard.  Pulmonary/Chest: Effort normal and breath sounds normal.   Abdominal: Soft.   Musculoskeletal:         General: No tenderness.      Comments: Limited ROM due to muscle weakness, right upper extremity worse than left, 2/5 muscle weakness in psoas/hip muscles, 2/5 in quads, 3/5 in lower extremity   Neurological: He is alert and oriented to person, place, and time.   Skin: Skin is warm and dry. No rash noted. He is not diaphoretic. No erythema.   Psychiatric: Mood, memory, affect and judgment normal.         Assessment:       1. Inclusion body myositis (IBM)            Plan:       Problem List Items Addressed This Visit        Orthopedic    Inclusion body myositis (IBM) - Primary            62yo M with PMH of overlap of polymyositis (dx 2009, mildly +ku, outside biopsy proven with elevated CPK and aldolase) and inclusion body myositis (+CN1A), osteopenia, Vit D insufficiency, Hemoglobulin c/beta-zero thalaseema, A-fib, dysphagia, portal HTN, emphysema here for follow up of polymyositis. Patient with many social issues that are impeding his health care at this time, but this seems to be improving. Overall his condition does seem to be improving. He has been on Imuran in the past, his treatment has been escalated to IVIG monthly and cellcept. Currently on prednisone but weaning. Patient with overlap syndrome. IVIG,  cellcept and steroids helping with polymyositis. He needs aggressive PT to help with inclusion body         #Overlap: polymyositis and inclusion body myositis   +Ku and +CN1A      -Continue monthly IVIG  -Continue Cellcept 1500mg BID  -Continue Prednisone 10mg  -Rituximab approved; will schedule, consent signed last visit  -continue vit D 50,000 units weekly  -Will help get another appointment with PM&R Dr. Celis       RTC 1-2 months with me and Dr. Huston

## 2022-03-29 ENCOUNTER — IMMUNIZATION (OUTPATIENT)
Dept: INTERNAL MEDICINE | Facility: CLINIC | Age: 67
End: 2022-03-29
Payer: MEDICARE

## 2022-03-29 DIAGNOSIS — Z23 NEED FOR VACCINATION: Primary | ICD-10-CM

## 2022-03-29 LAB — STFR SERPL-MCNC: 10.9 MG/L (ref 1.8–4.6)

## 2022-03-29 PROCEDURE — 91305 COVID-19, MRNA, LNP-S, PF, 30 MCG/0.3 ML DOSE VACCINE (PFIZER): CPT | Mod: S$GLB,,, | Performed by: INTERNAL MEDICINE

## 2022-03-29 PROCEDURE — 91305 COVID-19, MRNA, LNP-S, PF, 30 MCG/0.3 ML DOSE VACCINE (PFIZER): ICD-10-PCS | Mod: S$GLB,,, | Performed by: INTERNAL MEDICINE

## 2022-03-30 LAB — ALDOLASE SERPL-CCNC: 4.2 U/L (ref 1.2–7.6)

## 2022-04-04 ENCOUNTER — INFUSION (OUTPATIENT)
Dept: INFUSION THERAPY | Facility: HOSPITAL | Age: 67
End: 2022-04-04
Payer: MEDICARE

## 2022-04-04 VITALS
OXYGEN SATURATION: 99 % | RESPIRATION RATE: 16 BRPM | HEART RATE: 76 BPM | SYSTOLIC BLOOD PRESSURE: 153 MMHG | WEIGHT: 174.19 LBS | DIASTOLIC BLOOD PRESSURE: 78 MMHG | BODY MASS INDEX: 24.94 KG/M2 | TEMPERATURE: 98 F | HEIGHT: 70 IN

## 2022-04-04 DIAGNOSIS — M33.20 POLYMYOSITIS: ICD-10-CM

## 2022-04-04 DIAGNOSIS — G72.41 INCLUSION BODY MYOSITIS (IBM): Primary | ICD-10-CM

## 2022-04-04 PROCEDURE — 96413 CHEMO IV INFUSION 1 HR: CPT

## 2022-04-04 PROCEDURE — 25000003 PHARM REV CODE 250: Performed by: INTERNAL MEDICINE

## 2022-04-04 PROCEDURE — 96415 CHEMO IV INFUSION ADDL HR: CPT

## 2022-04-04 PROCEDURE — 96375 TX/PRO/DX INJ NEW DRUG ADDON: CPT

## 2022-04-04 PROCEDURE — 96367 TX/PROPH/DG ADDL SEQ IV INF: CPT

## 2022-04-04 PROCEDURE — 63600175 PHARM REV CODE 636 W HCPCS: Performed by: INTERNAL MEDICINE

## 2022-04-04 RX ORDER — FAMOTIDINE 10 MG/ML
20 INJECTION INTRAVENOUS
Status: COMPLETED | OUTPATIENT
Start: 2022-04-04 | End: 2022-04-04

## 2022-04-04 RX ORDER — SODIUM CHLORIDE 0.9 % (FLUSH) 0.9 %
10 SYRINGE (ML) INJECTION
Status: DISCONTINUED | OUTPATIENT
Start: 2022-04-04 | End: 2022-04-04 | Stop reason: HOSPADM

## 2022-04-04 RX ORDER — SODIUM CHLORIDE 0.9 % (FLUSH) 0.9 %
10 SYRINGE (ML) INJECTION
Status: CANCELLED | OUTPATIENT
Start: 2022-04-18

## 2022-04-04 RX ORDER — FAMOTIDINE 10 MG/ML
20 INJECTION INTRAVENOUS
Status: CANCELLED | OUTPATIENT
Start: 2022-04-18

## 2022-04-04 RX ORDER — ACETAMINOPHEN 325 MG/1
650 TABLET ORAL
Status: COMPLETED | OUTPATIENT
Start: 2022-04-04 | End: 2022-04-04

## 2022-04-04 RX ORDER — HEPARIN 100 UNIT/ML
500 SYRINGE INTRAVENOUS
Status: CANCELLED | OUTPATIENT
Start: 2022-04-18

## 2022-04-04 RX ORDER — ACETAMINOPHEN 325 MG/1
650 TABLET ORAL
Status: CANCELLED | OUTPATIENT
Start: 2022-04-18

## 2022-04-04 RX ADMIN — FAMOTIDINE 20 MG: 10 INJECTION INTRAVENOUS at 09:04

## 2022-04-04 RX ADMIN — DEXTROSE 100 MG: 50 INJECTION, SOLUTION INTRAVENOUS at 08:04

## 2022-04-04 RX ADMIN — DIPHENHYDRAMINE HYDROCHLORIDE 25 MG: 50 INJECTION, SOLUTION INTRAMUSCULAR; INTRAVENOUS at 08:04

## 2022-04-04 RX ADMIN — SODIUM CHLORIDE: 0.9 INJECTION, SOLUTION INTRAVENOUS at 08:04

## 2022-04-04 RX ADMIN — ACETAMINOPHEN 650 MG: 325 TABLET ORAL at 08:04

## 2022-04-04 RX ADMIN — RITUXIMAB 1000 MG: 10 INJECTION, SOLUTION INTRAVENOUS at 09:04

## 2022-04-04 NOTE — PLAN OF CARE
Pt received Rituxan today and tolerated well, without complications. VSS throughout infusion. Educated patient about Rituxan (indications, side effects, possible reactions, chemotherapy precautions) and verbalized understanding. PIV positive for blood return, saline locked and removed prior to DC, catheter tip intact. Pt DC with no distress noted, WC off unit, via self, w/o event, pleased.

## 2022-04-08 NOTE — TELEPHONE ENCOUNTER
Specialty Pharmacy - Medication/Referral Authorization  Specialty Pharmacy - Refill Coordination    Specialty Medication Orders Linked to Encounter    Flowsheet Row Most Recent Value   Medication #1 mycophenolate (CELLCEPT) 500 mg Tab (Order#[id number], Rx#[id number])        Pt has 2 days left of medication and may miss a few doses. Pt prefers to have medication delivered. Earliest delivery date is 4/11    Will route assigned [facility] as FYI that pt may miss doses    Refill Questions - Documented Responses    Flowsheet Row Most Recent Value   Patient Availability and HIPAA Verification    Does patient want to proceed with activity? Yes   HIPAA/medical authority confirmed? Yes   Relationship to patient of person spoken to? Self   Refill Screening Questions    Changes to allergies? No   Changes to medications? No   New conditions since last clinic visit? No   Unplanned office visit, urgent care, ED, or hospital admission in the last 4 weeks? No   How does patient/caregiver feel medication is working? Excellent   Financial problems or insurance changes? No   How many doses of your specialty medications were missed in the last 4 weeks? 0   Would patient like to speak to a pharmacist? No   When does the patient need to receive the medication? 04/11/22   Refill Delivery Questions    How will the patient receive the medication? Delivery Brittnee   When does the patient need to receive the medication? 04/11/22   Shipping Address Home   Address in [facility] confirmed and updated if neccessary? Yes   Expected Copay ($) 3.95   Is the patient able to afford the medication copay? Yes   Payment Method CC on file   Days supply of Refill 30   Supplies needed? No supplies needed   Refill activity completed? Yes   Refill activity plan Refill scheduled   Shipment/Pickup Date: 04/11/22          Current Outpatient Medications   Medication Sig    albuterol (PROVENTIL/VENTOLIN HFA) 90 mcg/actuation inhaler Inhale 2 puffs into the lungs  every 6 (six) hours as needed for Wheezing or Shortness of Breath.    amLODIPine (NORVASC) 10 MG tablet Take 1 tablet (10 mg total) by mouth once daily.    ammonium lactate 12 % Crea Apply 1 g topically once daily. Apply to areas of dry skin and nails daily    EPINEPHrine (EPIPEN) 0.3 mg/0.3 mL AtIn     ergocalciferol (ERGOCALCIFEROL) 50,000 unit Cap Take 1 capsule (50,000 Units total) by mouth every 7 days.    hydrOXYzine HCL (ATARAX) 10 MG Tab Take 1 tablet (10 mg total) by mouth 3 (three) times daily as needed (itching).    losartan (COZAAR) 25 MG tablet Take 1 tablet (25 mg total) by mouth once daily.    mycophenolate (CELLCEPT) 500 mg Tab Take 3 tablets (1,500 mg total) by mouth 2 (two) times daily.    nystatin-triamcinolone (MYCOLOG II) cream Apply topically 2 (two) times daily    predniSONE (DELTASONE) 1 MG tablet Take 9mg daily for 2 weeks, then 8mg daily for 2 weeks, then 7mg daily for 2 weeks, 6mg daily for 2 weeks, then 5mg daily until seen in clinic (start new prednisone 5 mg script after finishing prednisone 1 mg script) (Patient taking differently: Take 9mg daily for 2 weeks, then 8mg daily for 2 weeks, then 7mg daily for 2 weeks, 6mg daily for 2 weeks, then 5mg daily until seen in clinic (start new prednisone 5 mg script after finishing prednisone 1 mg script))    predniSONE (DELTASONE) 5 MG tablet Take 2 tablets (10 mg total) by mouth Daily.    traZODone (DESYREL) 50 MG tablet Take 1 tablet (50 mg total) by mouth nightly as needed for Insomnia.   Last reviewed on 4/4/2022  7:45 AM by Brady Cabello, RN    Review of patient's allergies indicates:  No Known Allergies Last reviewed on  4/4/2022 7:45 AM by Brady Cabello      Tasks added this encounter   5/4/2022 - Refill Call (Auto Added)   Tasks due within next 3 months   No tasks due.     Ivett Leahy, PharmD  [facility]  [address] [place] [id number]  Phone: [phone]  Fax:  [phone]

## 2022-04-12 ENCOUNTER — PATIENT MESSAGE (OUTPATIENT)
Dept: INTERNAL MEDICINE | Facility: CLINIC | Age: 67
End: 2022-04-12
Payer: MEDICARE

## 2022-04-12 DIAGNOSIS — L29.9 PRURITUS: ICD-10-CM

## 2022-04-12 RX ORDER — HYDROXYZINE HYDROCHLORIDE 10 MG/1
10 TABLET, FILM COATED ORAL 3 TIMES DAILY PRN
Qty: 90 TABLET | Refills: 5 | Status: SHIPPED | OUTPATIENT
Start: 2022-04-12 | End: 2022-05-04 | Stop reason: SDUPTHER

## 2022-04-13 ENCOUNTER — PATIENT MESSAGE (OUTPATIENT)
Dept: RHEUMATOLOGY | Facility: CLINIC | Age: 67
End: 2022-04-13
Payer: MEDICARE

## 2022-04-18 ENCOUNTER — PATIENT MESSAGE (OUTPATIENT)
Dept: ADMINISTRATIVE | Facility: OTHER | Age: 67
End: 2022-04-18
Payer: MEDICARE

## 2022-04-27 ENCOUNTER — PATIENT OUTREACH (OUTPATIENT)
Dept: ADMINISTRATIVE | Facility: OTHER | Age: 67
End: 2022-04-27
Payer: MEDICARE

## 2022-04-27 ENCOUNTER — OFFICE VISIT (OUTPATIENT)
Dept: RHEUMATOLOGY | Facility: CLINIC | Age: 67
End: 2022-04-27
Payer: MEDICARE

## 2022-04-27 ENCOUNTER — LAB VISIT (OUTPATIENT)
Dept: LAB | Facility: HOSPITAL | Age: 67
End: 2022-04-27
Payer: MEDICARE

## 2022-04-27 VITALS
WEIGHT: 174 LBS | HEART RATE: 78 BPM | TEMPERATURE: 97 F | SYSTOLIC BLOOD PRESSURE: 167 MMHG | DIASTOLIC BLOOD PRESSURE: 92 MMHG | BODY MASS INDEX: 24.36 KG/M2 | HEIGHT: 71 IN

## 2022-04-27 DIAGNOSIS — E55.9 VITAMIN D DEFICIENCY: ICD-10-CM

## 2022-04-27 DIAGNOSIS — M33.20 POLYMYOSITIS: ICD-10-CM

## 2022-04-27 DIAGNOSIS — G72.41 INCLUSION BODY MYOSITIS (IBM): ICD-10-CM

## 2022-04-27 DIAGNOSIS — M33.20 POLYMYOSITIS: Chronic | ICD-10-CM

## 2022-04-27 LAB
ALBUMIN SERPL BCP-MCNC: 4.5 G/DL (ref 3.5–5.2)
ALDOLASE SERPL-CCNC: 4.1 U/L (ref 1.2–7.6)
ALP SERPL-CCNC: 58 U/L (ref 55–135)
ALT SERPL W/O P-5'-P-CCNC: 5 U/L (ref 10–44)
ANION GAP SERPL CALC-SCNC: 9 MMOL/L (ref 8–16)
AST SERPL-CCNC: 7 U/L (ref 10–40)
BASOPHILS # BLD AUTO: 0.02 K/UL (ref 0–0.2)
BASOPHILS NFR BLD: 0.4 % (ref 0–1.9)
BILIRUB SERPL-MCNC: 1.9 MG/DL (ref 0.1–1)
BUN SERPL-MCNC: 8 MG/DL (ref 8–23)
CALCIUM SERPL-MCNC: 9.3 MG/DL (ref 8.7–10.5)
CHLORIDE SERPL-SCNC: 101 MMOL/L (ref 95–110)
CK SERPL-CCNC: 53 U/L (ref 20–200)
CO2 SERPL-SCNC: 28 MMOL/L (ref 23–29)
CREAT SERPL-MCNC: 0.4 MG/DL (ref 0.5–1.4)
CRP SERPL-MCNC: 1.2 MG/L (ref 0–8.2)
DIFFERENTIAL METHOD: ABNORMAL
EOSINOPHIL # BLD AUTO: 0 K/UL (ref 0–0.5)
EOSINOPHIL NFR BLD: 0.4 % (ref 0–8)
ERYTHROCYTE [DISTWIDTH] IN BLOOD BY AUTOMATED COUNT: 21.6 % (ref 11.5–14.5)
ERYTHROCYTE [SEDIMENTATION RATE] IN BLOOD BY WESTERGREN METHOD: <2 MM/HR (ref 0–23)
EST. GFR  (AFRICAN AMERICAN): >60 ML/MIN/1.73 M^2
EST. GFR  (NON AFRICAN AMERICAN): >60 ML/MIN/1.73 M^2
GLUCOSE SERPL-MCNC: 62 MG/DL (ref 70–110)
HCT VFR BLD AUTO: 37.1 % (ref 40–54)
HGB BLD-MCNC: 11.3 G/DL (ref 14–18)
IMM GRANULOCYTES # BLD AUTO: 0.03 K/UL (ref 0–0.04)
IMM GRANULOCYTES NFR BLD AUTO: 0.5 % (ref 0–0.5)
LYMPHOCYTES # BLD AUTO: 1.4 K/UL (ref 1–4.8)
LYMPHOCYTES NFR BLD: 24.4 % (ref 18–48)
MCH RBC QN AUTO: 17.4 PG (ref 27–31)
MCHC RBC AUTO-ENTMCNC: 30.5 G/DL (ref 32–36)
MCV RBC AUTO: 57 FL (ref 82–98)
MONOCYTES # BLD AUTO: 0.4 K/UL (ref 0.3–1)
MONOCYTES NFR BLD: 7.2 % (ref 4–15)
NEUTROPHILS # BLD AUTO: 3.7 K/UL (ref 1.8–7.7)
NEUTROPHILS NFR BLD: 67.1 % (ref 38–73)
NRBC BLD-RTO: 4 /100 WBC
PLATELET # BLD AUTO: 142 K/UL (ref 150–450)
PMV BLD AUTO: ABNORMAL FL (ref 9.2–12.9)
POTASSIUM SERPL-SCNC: 3.8 MMOL/L (ref 3.5–5.1)
PROT SERPL-MCNC: 7.4 G/DL (ref 6–8.4)
RBC # BLD AUTO: 6.48 M/UL (ref 4.6–6.2)
SODIUM SERPL-SCNC: 138 MMOL/L (ref 136–145)
WBC # BLD AUTO: 5.53 K/UL (ref 3.9–12.7)

## 2022-04-27 PROCEDURE — 99214 PR OFFICE/OUTPT VISIT, EST, LEVL IV, 30-39 MIN: ICD-10-PCS | Mod: GC,S$GLB,,

## 2022-04-27 PROCEDURE — 86140 C-REACTIVE PROTEIN: CPT

## 2022-04-27 PROCEDURE — 99999 PR PBB SHADOW E&M-EST. PATIENT-LVL III: ICD-10-PCS | Mod: PBBFAC,GC,,

## 2022-04-27 PROCEDURE — 82550 ASSAY OF CK (CPK): CPT

## 2022-04-27 PROCEDURE — 80053 COMPREHEN METABOLIC PANEL: CPT

## 2022-04-27 PROCEDURE — 99499 RISK ADDL DX/OHS AUDIT: ICD-10-PCS | Mod: S$GLB,,,

## 2022-04-27 PROCEDURE — 85652 RBC SED RATE AUTOMATED: CPT

## 2022-04-27 PROCEDURE — 99213 OFFICE O/P EST LOW 20 MIN: CPT | Mod: PBBFAC

## 2022-04-27 PROCEDURE — 85025 COMPLETE CBC W/AUTO DIFF WBC: CPT

## 2022-04-27 PROCEDURE — 99499 UNLISTED E&M SERVICE: CPT | Mod: S$GLB,,,

## 2022-04-27 PROCEDURE — 99999 PR PBB SHADOW E&M-EST. PATIENT-LVL III: CPT | Mod: PBBFAC,GC,,

## 2022-04-27 PROCEDURE — 99214 OFFICE O/P EST MOD 30 MIN: CPT | Mod: GC,S$GLB,,

## 2022-04-27 PROCEDURE — 82085 ASSAY OF ALDOLASE: CPT

## 2022-04-27 PROCEDURE — 36415 COLL VENOUS BLD VENIPUNCTURE: CPT

## 2022-04-27 RX ORDER — MYCOPHENOLATE MOFETIL 500 MG/1
1500 TABLET ORAL 2 TIMES DAILY
Qty: 540 TABLET | Refills: 3 | Status: SHIPPED | OUTPATIENT
Start: 2022-04-27 | End: 2023-01-01

## 2022-04-27 RX ORDER — PREDNISONE 2.5 MG/1
10 TABLET ORAL DAILY
Qty: 120 TABLET | Refills: 1 | Status: SHIPPED | OUTPATIENT
Start: 2022-04-27 | End: 2022-01-01

## 2022-04-27 NOTE — PROGRESS NOTES
Subjective:       Patient ID: Nura Kahn Jr. is a 66 y.o. male.    Chief Complaint: No chief complaint on file.    HPI   64yo M with PMH of overlap of polymyositis (dx 2009, mildly +ku, outside biopsy proven with elevated CPK and aldolase) and inclusion body myositis (+CN1A), osteopenia, Vit D insufficiency, Hemoglobulin c/beta-zero thalaseema, A-fib, dysphagia, portal HTN, emphysema here for follow up.     Per chart review it seems that he first established care here at [facility] in 8/2014. He was diagnosed in [id number] when he was feeling weak, falling. He was a  and was unable to use his legs. He had a biopsy in [id number] in [place] that per notes was consistent with polymyositis. He was on MTX in the past which was stopped due CT showing hepatosplenomegaly with portal venous hypertension and elevated T bili in 8/2014 He was at that time started on Imuran. He has been on Imuran and steroids since then.  In 2016, he was worked up again here at [facility]. MRI of the left lower extremities showed Severe atrophy with fatty replacement of the thigh musculature bilaterally.  There is mild increased edema signal within the remaining thigh musculature and left iliopsoas muscle without associated enhancement which could reflect a mild degree of residual myositis.  EMG Left upper Ext 5/2016 was a technically difficult study. Had moderate ulnar entrapment at elbow, perhaps on a background of mild polyneuropathy. 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 Pathology of left thigh showing minute potion of muscle with end stage myopathic changes and focal chronic inflammation   Patient remained on Imuran and prednisone.      In Feb 2019 - patient had been out of Imuran for ~1 month.  He also admits to medication non-compliance because he didn't think he was having any improvement.  He admits that his weakness has gotten worse - stating  that he is unable to stand, difficulty with getting in and out of his scooter, normal ADLs (including going to the bathroom - resulting in urination/defecation on himself - resulting in decreased PO intake).  He also feels worsening of his dysphagia - where he gets food stuck and often chokes/coughs when he eats/drinks.       Wife works fulltime.  Daughter (10 yo).  Patient does not have social support/help for transportation to various appt.        Imaging:  CXR 1/2018:Patchy consolidation projected over the right lower lung zone, concerning for infection, correlation advised.       CT chest 12/2017:Peribronchial cuffing predominantly in the lower lung zones as well as retained secretions in the right middle lobe segmental bronchi consistent with bronchitis; such inflammation can occur with inhaled irritants or aspiration.  There is no evidence of interstitial lung disease such as NSIP or UIP.Upper lung zone predominant paraseptal emphysema and dispersed areas of centrilobular emphysema Stable 1.2 cm AP window lymph node.  Densely calcified granuloma in the posterobasal segment of the right lower lobe.Hepatosplenomegaly with associated splenic collateral vessels at the splenic hilum suggesting sequela of portal venous hypertension     CT chest 4/2019:   1. Mild peribronchial cuffing which may be seen with bronchitis.  Mild tubular bronchiectasis of the right lower lobe with several distal bronchi demonstrating retained secretions, finding may be seen with small airways infectious/noninfectious inflammation or aspiration.  2. Centrilobular and paraseptal emphysema with an upper lung zone predominance.  3. Diffuse muscular atrophy.  4. Splenomegaly..     CT pelvis 6/2017: Obturator internus edema or hematoma. No acute fracture. Stable splenomegaly.     Esophogram 4/2015: Spontaneous gastroesophageal reflux.  Otherwise, unremarkable esophagram.     PET scan 8/2014:AP window lymph node SUV max less than 2.5.  This is  most likely benign/reactive.  Surveillance is recommended at 3, 6, 12, and 24 months. Splenomegaly most likely from portal hypertension     He was admitted in 4/2019 worsening generalized muscle weakness and dysphagia. Labs on admission showed Normal and stable CBC and CMP.  Normal CK/aldolase.  Normal inflammatory markers.  Elevated IgG (1754). HMGCoA ab negative. Vit D: 15.    MRI femurs 4/6/19:  1.  Redemonstration of advanced diffuse atrophy and fatty replacement of the bilateral thigh musculature involving all compartments.  There is mild residual edema signal present within the bilateral obturator musculature and residual posterior compartment musculature, similar to prior MRI examination.  2.  Mild heterogeneity of the visualized bone marrow signal which may relate to red marrow reconversion.  Clinical correlation advised.  Patient will also benefit from PT/OT and rehab placement for muscle strengthening after completion of IVIG.      MRI humerus b/l 4/7:  Significant diffuse atrophy and fatty replacement of the bilateral proximal upper extremity musculature, including the rotator cuff musculature, right greater than left, in this patient with reported history of polymyositis.  There is some degree of sparing of the bilateral deltoid and triceps musculature with greater residual muscle bulk on the left compared to the right.  There is mild residual edema present within the remaining musculature, most pronounced within the triceps.     During that hospitalization he received IVIG x 5 days, He was started on cellcept 1g BID and prednisone 10mg daily. He went to inpatient rehab for about 2.5 weeks.      Last visit 9/2019: he was doing well. Plan to reduce prednisone from 15mg to 12.5mg then continue on 10mg daily.      Interval history:   12/04/2019:  Has stopped physical therapy 1 mos ago. Is doing exercises at home. He states he feels weak all over. His swallowing is also worsened.Things are getting stuck  more, using 2-3 bottles of water to have dinner.   He was able to lift himself off the toilet but not anymore.   Currently on Prednisone 10mg and cellcept 1500mg BID   Not having dark stools, no blood in stool.      8/26/20:   Currently on Prednisone 10mg and Cellcept 1500mg BID. He has not been able to get his IVIG infusions at home since June due to home living conditions that lead to home health refusing to come out to give his IVIG. Social work has been working for an alternative solution. We will be trying to give him the IVIG treatment here at [facility]. Patient has weakness but it states it is better since starting the IVIG. He is able to feed himself, dress himself, but is not able to lift his pants all the way up. He is in a motorized chair today.      3/4/21:  Patient is having weakness that has gradually gotten worse. He hasn't gotten his IVIG since November. Unsure why      10/12/21:  Taking Prednisone 10mg daily, Cellcept 1500mg BID  Patient has been stable in terms of his muscle disease. He has been having some dysphagia for which he has tests and GI follow up. His voice has also changed so he is meeting the ENT as well. He has gotten his IVIG last month.       1/19/21:  Taking IVIG monthly, last one was last week;  Prednisone 10mg and Cellcept 1500mg BID  Patient has not progressed but has not gotten better, he feels he is stuck; He did the imaging end of last year showing active myositis, discussed rituxan, will plan to start once approved      3/28/22:  Approved for Rituxan;   On monthly IVIG, last one was 1/13/22;  Taking Prednisone 10mg and Cellcept 1500mg BID  Vit d 50k, needs refill  Doing about the same; no new changes; needs COVID booster prior to rituxan    4/27/22:  Got Rituxan 4/4/22;  Prednisone 10mg  Cellcept 1500mg BID;  He feels slight improvement since the rituxan infusion; increased strength and movement;       Review of Systems   Constitutional: Negative for chills and fever.   Eyes:  "Negative for visual disturbance.   Respiratory: Positive for shortness of breath (CLARKE).    Cardiovascular: Negative for chest pain.   Gastrointestinal: Negative for abdominal pain.   Musculoskeletal: Negative for arthralgias and joint swelling.   Skin: Negative for rash.   Neurological: Positive for weakness. Negative for headaches.   Psychiatric/Behavioral: Negative for confusion.         Objective:   BP (!) 167/92   Pulse 78   Temp 97 °F (36.1 °C)   Ht 5' 10.8" (1.798 m)   Wt 78.9 kg (174 lb)   BMI 24.41 kg/m²      Physical Exam   Constitutional: He is oriented to person, place, and time. No distress.   In wheelchair   HENT:   Head: Normocephalic and atraumatic.   Eyes: Pupils are equal, round, and reactive to light. Conjunctivae are normal.   Cardiovascular: Normal rate, regular rhythm and normal heart sounds.   No murmur heard.  Pulmonary/Chest: Effort normal and breath sounds normal.   Abdominal: Soft.   Musculoskeletal:         General: No tenderness.      Comments: Limited ROM due to muscle weakness, right upper extremity worse than left, 2/5 muscle weakness in psoas/hip muscles, 2/5 in quads, 3/5 in lower extremity   Neurological: He is alert and oriented to person, place, and time.   Skin: Skin is warm and dry. Capillary refill takes less than 2 seconds. No rash noted. He is not diaphoretic. No erythema.   Psychiatric: Mood, memory, affect and judgment normal.         Assessment:       1. Inclusion body myositis (IBM)    2. Polymyositis            Plan:       Problem List Items Addressed This Visit        Immunology/Multi System    Polymyositis (Chronic)    Relevant Medications    mycophenolate (CELLCEPT) 500 mg Tab    predniSONE (DELTASONE) 2.5 MG tablet       Orthopedic    Inclusion body myositis (IBM)    Relevant Medications    mycophenolate (CELLCEPT) 500 mg Tab    predniSONE (DELTASONE) 2.5 MG tablet            64yo M with PMH of overlap of polymyositis (dx 2009, mildly +ku, outside biopsy proven " with elevated CPK and aldolase) and inclusion body myositis (+CN1A), osteopenia, Vit D insufficiency, Hemoglobulin c/beta-zero thalaseema, A-fib, dysphagia, portal HTN, emphysema here for follow up of polymyositis. Patient with many social issues that are impeding his health care at this time, but this seems to be improving. Overall his condition does seem to be improving. He has been on Imuran in the past, his treatment has been escalated to IVIG monthly and cellcept. Currently on prednisone but weaning. Patient with overlap syndrome. IVIG, cellcept and steroids helping with polymyositis. He needs aggressive PT to help with inclusion body         #Overlap: polymyositis and inclusion body myositis   +Ku and +CN1A      -Continue monthly IVIG, needs to be scheduled, last one was January  -Continue Cellcept 1500mg BID  -Continue Prednisone 10mg   -Rituximab 2nd infusion asap;   -continue vit D 50,000 units weekly    RTC 1-2 months with me and Dr. Huston

## 2022-04-27 NOTE — PROGRESS NOTES
I have personally reviewed the history, confirmed exam findings, and discussed assessment and plan with fellow. Slight overall improvement proximal UE strength. Hands weak but stable. LE proximal muscles and ankles unchanged. Overdue for 2nd dose of rituximab as originally ordered and also IVIg, last dose in Jan. Rituximab and IVIg reordered this week or next. Continue prednisone 10mg daily and mycophenolate 1500mg twice daily for polymyositis, PT for IBM which has been re-ordered. ALEXSANDER

## 2022-04-27 NOTE — PROGRESS NOTES
Health Maintenance Due   Topic Date Due    Shingles Vaccine (1 of 2) Never done    High Dose Statin  Never done    PROSTATE-SPECIFIC ANTIGEN  12/12/2019     Updates were requested from care everywhere.  Chart was reviewed for overdue Proactive [facility] Encounters (DELROY) topics (CRS, Breast Cancer Screening, Eye exam)  Health Maintenance has been updated.  LINKS immunization registry triggered.  Immunizations were reconciled.

## 2022-04-29 ENCOUNTER — INFUSION (OUTPATIENT)
Dept: INFUSION THERAPY | Facility: HOSPITAL | Age: 67
End: 2022-04-29
Payer: MEDICARE

## 2022-04-29 VITALS
DIASTOLIC BLOOD PRESSURE: 82 MMHG | BODY MASS INDEX: 24.36 KG/M2 | SYSTOLIC BLOOD PRESSURE: 153 MMHG | HEART RATE: 76 BPM | RESPIRATION RATE: 16 BRPM | TEMPERATURE: 98 F | WEIGHT: 174 LBS | HEIGHT: 71 IN

## 2022-04-29 DIAGNOSIS — M33.20 POLYMYOSITIS: ICD-10-CM

## 2022-04-29 DIAGNOSIS — G72.41 INCLUSION BODY MYOSITIS (IBM): Primary | ICD-10-CM

## 2022-04-29 PROCEDURE — 63600175 PHARM REV CODE 636 W HCPCS: Performed by: INTERNAL MEDICINE

## 2022-04-29 PROCEDURE — 96375 TX/PRO/DX INJ NEW DRUG ADDON: CPT

## 2022-04-29 PROCEDURE — 96415 CHEMO IV INFUSION ADDL HR: CPT

## 2022-04-29 PROCEDURE — 96367 TX/PROPH/DG ADDL SEQ IV INF: CPT

## 2022-04-29 PROCEDURE — 96413 CHEMO IV INFUSION 1 HR: CPT

## 2022-04-29 PROCEDURE — 25000003 PHARM REV CODE 250: Performed by: INTERNAL MEDICINE

## 2022-04-29 RX ORDER — FAMOTIDINE 10 MG/ML
20 INJECTION INTRAVENOUS
Status: CANCELLED | OUTPATIENT
Start: 2022-05-02

## 2022-04-29 RX ORDER — ACETAMINOPHEN 325 MG/1
650 TABLET ORAL
Status: CANCELLED | OUTPATIENT
Start: 2022-05-02

## 2022-04-29 RX ORDER — HEPARIN 100 UNIT/ML
500 SYRINGE INTRAVENOUS
Status: CANCELLED | OUTPATIENT
Start: 2022-05-02

## 2022-04-29 RX ORDER — ACETAMINOPHEN 325 MG/1
650 TABLET ORAL
Status: COMPLETED | OUTPATIENT
Start: 2022-04-29 | End: 2022-04-29

## 2022-04-29 RX ORDER — SODIUM CHLORIDE 0.9 % (FLUSH) 0.9 %
10 SYRINGE (ML) INJECTION
Status: DISCONTINUED | OUTPATIENT
Start: 2022-04-29 | End: 2022-04-29 | Stop reason: HOSPADM

## 2022-04-29 RX ORDER — SODIUM CHLORIDE 0.9 % (FLUSH) 0.9 %
10 SYRINGE (ML) INJECTION
Status: CANCELLED | OUTPATIENT
Start: 2022-05-02

## 2022-04-29 RX ORDER — FAMOTIDINE 10 MG/ML
20 INJECTION INTRAVENOUS
Status: COMPLETED | OUTPATIENT
Start: 2022-04-29 | End: 2022-04-29

## 2022-04-29 RX ADMIN — DIPHENHYDRAMINE HYDROCHLORIDE 25 MG: 50 INJECTION INTRAMUSCULAR; INTRAVENOUS at 07:04

## 2022-04-29 RX ADMIN — FAMOTIDINE 20 MG: 10 INJECTION INTRAVENOUS at 07:04

## 2022-04-29 RX ADMIN — RITUXIMAB 1000 MG: 10 INJECTION, SOLUTION INTRAVENOUS at 08:04

## 2022-04-29 RX ADMIN — ACETAMINOPHEN 650 MG: 325 TABLET ORAL at 07:04

## 2022-04-29 RX ADMIN — DEXTROSE 100 MG: 50 INJECTION, SOLUTION INTRAVENOUS at 07:04

## 2022-04-29 RX ADMIN — SODIUM CHLORIDE: 0.9 INJECTION, SOLUTION INTRAVENOUS at 07:04

## 2022-04-29 NOTE — PLAN OF CARE
Patient tolerated Rituxan #2 well today. No signs/symptoms of reaction noted. PIV removed, catheter tip intact. Declined avs. Discharged home using personal motorized scooter.

## 2022-04-29 NOTE — PLAN OF CARE
Problem: Adult Inpatient Plan of Care  Goal: Optimal Comfort and Wellbeing  Intervention: Provide Person-Centered Care  Flowsheets (Taken 4/29/2022 0751)  Trust Relationship/Rapport:   care explained   questions answered   choices provided   questions encouraged   emotional support provided   empathic listening provided

## 2022-05-04 ENCOUNTER — OFFICE VISIT (OUTPATIENT)
Dept: INTERNAL MEDICINE | Facility: CLINIC | Age: 67
End: 2022-05-04
Payer: MEDICARE

## 2022-05-04 ENCOUNTER — PATIENT MESSAGE (OUTPATIENT)
Dept: PHARMACY | Facility: CLINIC | Age: 67
End: 2022-05-04
Payer: MEDICARE

## 2022-05-04 VITALS
OXYGEN SATURATION: 99 % | DIASTOLIC BLOOD PRESSURE: 70 MMHG | SYSTOLIC BLOOD PRESSURE: 130 MMHG | BODY MASS INDEX: 24.41 KG/M2 | HEIGHT: 71 IN | HEART RATE: 87 BPM

## 2022-05-04 DIAGNOSIS — I10 ESSENTIAL HYPERTENSION: ICD-10-CM

## 2022-05-04 DIAGNOSIS — G82.50 QUADRIPARESIS: Chronic | ICD-10-CM

## 2022-05-04 DIAGNOSIS — J44.9 CHRONIC OBSTRUCTIVE PULMONARY DISEASE, UNSPECIFIED COPD TYPE: ICD-10-CM

## 2022-05-04 DIAGNOSIS — R13.10 DYSPHAGIA, UNSPECIFIED TYPE: ICD-10-CM

## 2022-05-04 DIAGNOSIS — I48.91 ATRIAL FIBRILLATION, UNSPECIFIED TYPE: ICD-10-CM

## 2022-05-04 DIAGNOSIS — C80.1 MALIGNANT (PRIMARY) NEOPLASM, UNSPECIFIED: ICD-10-CM

## 2022-05-04 DIAGNOSIS — L29.9 PRURITUS: ICD-10-CM

## 2022-05-04 DIAGNOSIS — G82.50 QUADRIPLEGIA, UNSPECIFIED: Primary | ICD-10-CM

## 2022-05-04 DIAGNOSIS — D58.2 HEMOGLOBIN C DISEASE: ICD-10-CM

## 2022-05-04 DIAGNOSIS — D84.9 IMMUNOSUPPRESSION: ICD-10-CM

## 2022-05-04 DIAGNOSIS — I73.9 PAD (PERIPHERAL ARTERY DISEASE): ICD-10-CM

## 2022-05-04 DIAGNOSIS — E55.9 VITAMIN D DEFICIENCY: ICD-10-CM

## 2022-05-04 DIAGNOSIS — K76.6 PORTAL VENOUS HYPERTENSION: ICD-10-CM

## 2022-05-04 DIAGNOSIS — R13.12 OROPHARYNGEAL DYSPHAGIA: ICD-10-CM

## 2022-05-04 DIAGNOSIS — K76.6 PORTAL HYPERTENSION: ICD-10-CM

## 2022-05-04 DIAGNOSIS — R26.81 GAIT INSTABILITY: Chronic | ICD-10-CM

## 2022-05-04 DIAGNOSIS — D69.6 THROMBOCYTOPENIA: ICD-10-CM

## 2022-05-04 DIAGNOSIS — F32.0 CURRENT MILD EPISODE OF MAJOR DEPRESSIVE DISORDER WITHOUT PRIOR EPISODE: ICD-10-CM

## 2022-05-04 DIAGNOSIS — I70.0 AORTIC ATHEROSCLEROSIS: ICD-10-CM

## 2022-05-04 PROCEDURE — 3008F BODY MASS INDEX DOCD: CPT | Mod: CPTII,S$GLB,, | Performed by: NURSE PRACTITIONER

## 2022-05-04 PROCEDURE — 99214 OFFICE O/P EST MOD 30 MIN: CPT | Mod: S$GLB,,, | Performed by: NURSE PRACTITIONER

## 2022-05-04 PROCEDURE — 3078F PR MOST RECENT DIASTOLIC BLOOD PRESSURE < 80 MM HG: ICD-10-PCS | Mod: CPTII,S$GLB,, | Performed by: NURSE PRACTITIONER

## 2022-05-04 PROCEDURE — 99999 PR PBB SHADOW E&M-EST. PATIENT-LVL III: CPT | Mod: PBBFAC,,, | Performed by: NURSE PRACTITIONER

## 2022-05-04 PROCEDURE — 99214 PR OFFICE/OUTPT VISIT, EST, LEVL IV, 30-39 MIN: ICD-10-PCS | Mod: S$GLB,,, | Performed by: NURSE PRACTITIONER

## 2022-05-04 PROCEDURE — 3078F DIAST BP <80 MM HG: CPT | Mod: CPTII,S$GLB,, | Performed by: NURSE PRACTITIONER

## 2022-05-04 PROCEDURE — 4010F PR ACE/ARB THEARPY RXD/TAKEN: ICD-10-PCS | Mod: CPTII,S$GLB,, | Performed by: NURSE PRACTITIONER

## 2022-05-04 PROCEDURE — 3075F SYST BP GE 130 - 139MM HG: CPT | Mod: CPTII,S$GLB,, | Performed by: NURSE PRACTITIONER

## 2022-05-04 PROCEDURE — 3008F PR BODY MASS INDEX (BMI) DOCUMENTED: ICD-10-PCS | Mod: CPTII,S$GLB,, | Performed by: NURSE PRACTITIONER

## 2022-05-04 PROCEDURE — 4010F ACE/ARB THERAPY RXD/TAKEN: CPT | Mod: CPTII,S$GLB,, | Performed by: NURSE PRACTITIONER

## 2022-05-04 PROCEDURE — 99499 UNLISTED E&M SERVICE: CPT | Mod: S$GLB,,, | Performed by: NURSE PRACTITIONER

## 2022-05-04 PROCEDURE — 99499 RISK ADDL DX/OHS AUDIT: ICD-10-PCS | Mod: S$GLB,,, | Performed by: NURSE PRACTITIONER

## 2022-05-04 PROCEDURE — 99999 PR PBB SHADOW E&M-EST. PATIENT-LVL III: ICD-10-PCS | Mod: PBBFAC,,, | Performed by: NURSE PRACTITIONER

## 2022-05-04 PROCEDURE — 3075F PR MOST RECENT SYSTOLIC BLOOD PRESS GE 130-139MM HG: ICD-10-PCS | Mod: CPTII,S$GLB,, | Performed by: NURSE PRACTITIONER

## 2022-05-04 RX ORDER — HYDROXYZINE HYDROCHLORIDE 25 MG/1
25 TABLET, FILM COATED ORAL 3 TIMES DAILY PRN
Qty: 90 TABLET | Refills: 3 | Status: SHIPPED | OUTPATIENT
Start: 2022-05-04 | End: 2022-01-01 | Stop reason: SDUPTHER

## 2022-05-04 RX ORDER — AMMONIUM LACTATE 12 G/100G
1 CREAM TOPICAL DAILY
Qty: 385 G | Refills: 5 | Status: SHIPPED | OUTPATIENT
Start: 2022-05-04

## 2022-05-04 NOTE — PROGRESS NOTES
INTERNAL MEDICINE INITIAL VISIT NOTE      CHIEF COMPLAINT     Chief Complaint   Patient presents with    Establish Care       HPI     Nura Kahn Jr. is a 66 y.o.  male with with atrial fibrillation, Beta thalassemia, depression, dyslipidemia, dysphagia, hemoglobin C disease, history of aspiration pneumonia of the lung, microcytic anemia, osteopenia, polymyositis, portal venous hypertension, splenomegaly, thrombocytopenia, tobacco abuse, and vitamin D deficiency presents to establish care.     He is a previous pt of Dr Ambrosio      overlap of polymyositis (dx 2009, mildly +ku, outside biopsy proven with elevated CPK and aldolase) and inclusion body myositis (+CN1A)- followed by Rheum   4/27/22:  Got Rituxan 4/4/22;  Prednisone 10mg  Cellcept 1500mg BID;  He feels slight improvement since the rituxan infusion; increased strength and movement;     HTN- taking losartan and amlodipine     Portal HTN - found on CT scan. with splenomegaly, but Fibroscan showed only F1 fibrosis (minimal).  Portal vein had no thrombosis - followed by hepatology - last seen 1/20/2020  Prior note by me on 5/5/2015, seen for hepatomegaly:   CT scan 8/7/14:  1. Enlarged lymph node 1.4 cm in the aorticopulmonary window of uncertain clinical significance. Possibly reactive to recent infections process, but based on clinical suspicion, a PET/CT may be indicated. If not, follow-up CT of the chest in 6 months could be  considered.  2. Splenomegaly and hepatomegaly with associated portal vein enlargement and splenic collaterals suggesting a sequelae of portal venous hypertension.  3. Biapical subpleural blebs with hyperexpanded lungs bilaterally suggestive of COPD.     PET CT 8/26/14:  AP window lymph node SUV max less than 2.5. This is most likely benign/reactive. Surveillance is recommended at 3, 6, 12, and 24 months.  Splenomegaly most likely from portal hypertension. Lymphoproliferative disorder less likely. SUV values are low.     Fibrosure  3/15/15:  showed F1 portal fibrosis.  No evidence of cirrhosis.  Hepatitis viral markers neg, ceruloplasmin normal,        Dysphagia and GERD-   chronic dysphagia suspected to be due to muscular disorder.  He has both oropharyngeal and esophageal symptoms.  He has been seen by both ENT and speech therapy, both in the recent past.  Dr. Webb noted pharyngoesophageal dysphagia which he believes is due to the muscular disorder.  He performed a laryngoscopy noting saliva entering the vocal cord area resulting in dysphonia.  Dr. Webb offered him esophagoscopy with dilation of the upper esophageal sphincter, but the patient deferred that therapy.  A modified barium swallow study revealed aspiration of thin liquids.      Itching- having itching to the lower back around waist band and down the left outer leg. Rash located only to the left out leg near the leg belt of his wheel chair   Given atarax 10mg as needed tid.   Using ammonium lactate lotion     COPD- smoker in past - quit 3 years ago. Using albuterol as needed - every 1-2 weeks.          Past Medical History:  Past Medical History:   Diagnosis Date    Aspiration pneumonia of right lower lobe 1/15/2018    Atrial fibrillation 3/2/2015    Chronic obstructive pulmonary disease, unspecified COPD type 5/4/2022    Depression     Essential hypertension 2/27/2019    Malignant (primary) neoplasm, unspecified 5/4/2022    Microcytic anemia 9/25/2014    Neuromuscular disorder     Pneumonia     Polymyositis 2009    Tobacco abuse        Past Surgical History:  Past Surgical History:   Procedure Laterality Date    COLONOSCOPY N/A 8/6/2020    Procedure: COLONOSCOPY;  Surgeon: Brandan Meyer MD;  Location: [address]);  Service: Endoscopy;  Laterality: N/A;    ESOPHAGOGASTRODUODENOSCOPY N/A 8/6/2020    Procedure: EGD (ESOPHAGOGASTRODUODENOSCOPY);  Surgeon: Brandan Meyer MD;  Location: [facility] ([address] [place] [address]);  Service: Endoscopy;  Laterality: N/A;  multiple  co-morbidities. will have family member for assistance.  has neuromuscular issues-needs lift. in W/C-unable to transfer per self     COVID test at [facility] on 8/3-GT       Allergies:  Review of patient's allergies indicates:  No Known Allergies    Home Medications:  Prior to Admission medications    Medication Sig Start Date End Date Taking? Authorizing Provider   albuterol (PROVENTIL/VENTOLIN HFA) 90 mcg/actuation inhaler Inhale 2 puffs into the lungs every 6 (six) hours as needed for Wheezing or Shortness of Breath. 3/2/22   Socrates Ambrosio MD   amLODIPine (NORVASC) 10 MG tablet Take 1 tablet (10 mg total) by mouth once daily. 4/22/21 6/12/22  Socrates Ambrosio MD   ammonium lactate 12 % Crea Apply 1 g topically once daily. Apply to areas of dry skin and nails daily 11/19/21   Christen Hook DPM   EPINEPHrine (EPIPEN) 0.3 mg/0.3 mL AtIn  6/5/20   Historical Provider   ergocalciferol (ERGOCALCIFEROL) 50,000 unit Cap Take 1 capsule (50,000 Units total) by mouth every 7 days. 3/28/22   Salty Barry MD   hydrOXYzine HCL (ATARAX) 10 MG Tab Take 1 tablet (10 mg total) by mouth 3 (three) times daily as needed (itching). 4/12/22   Socrates Ambrosio MD   losartan (COZAAR) 25 MG tablet Take 1 tablet (25 mg total) by mouth once daily. 5/6/21 6/12/22  Socrates Ambrosio MD   mycophenolate (CELLCEPT) 500 mg Tab Take 3 tablets (1,500 mg total) by mouth 2 (two) times daily. 4/27/22 4/27/23  Salty Barry MD   nystatin-triamcinolone (MYCOLOG II) cream Apply topically 2 (two) times daily 9/30/21   Socrates Ambrosio MD   predniSONE (DELTASONE) 2.5 MG tablet Take 4 tablets (10 mg total) by mouth once daily. 4/27/22 6/26/22  Salty Barry MD   traZODone (DESYREL) 50 MG tablet Take 1 tablet (50 mg total) by mouth nightly as needed for Insomnia. 9/30/21 9/30/22  Socrates Ambrosio MD       Family History:  Family History   Problem Relation Age of Onset    Diabetes Mother     Hypertension Mother      Kidney disease Mother     Diabetes Father     Hypertension Father     Heart attack Neg Hx     Heart disease Neg Hx     Melanoma Neg Hx        Social History:  Social History     Tobacco Use    Smoking status: Current Some Day Smoker     Packs/day: 0.25     Years: 20.00     Pack years: 5.00     Types: Cigarettes     Last attempt to quit: 2018     Years since quittin.2    Smokeless tobacco: Never Used   Substance Use Topics    Alcohol use: Yes     Alcohol/week: 0.0 standard drinks    Drug use: Yes     Types: Marijuana     Comment: daily use       Review of Systems:    Constitutional: Negative for chills, fatigue and fever.   HENT: Positive for trouble swallowing and voice change. Negative for congestion, hearing loss, nosebleeds, rhinorrhea and sore throat.    Eyes: Negative for pain and visual disturbance.   Respiratory: Positive for cough. Negative for shortness of breath and wheezing.    Cardiovascular: Negative for chest pain and palpitations.   Gastrointestinal: Negative for abdominal distention, abdominal pain, constipation, diarrhea, nausea and vomiting.   Genitourinary: Negative for difficulty urinating, dysuria, frequency, hematuria and urgency.   Musculoskeletal: Negative for arthralgias, back pain and myalgias.   Skin: Positive for rash. Negative for color change.   Neurological: Positive for weakness. Negative for dizziness, syncope, speech difficulty, numbness and headaches.   Psychiatric/Behavioral: Positive for sleep disturbance. Negative for agitation, behavioral problems and confusion. The patient is not nervous/anxious.       Health Maintainence:   Health Maintenance       Date Due Completion Date    Shingles Vaccine (1 of 2) Never done ---    High Dose Statin Never done ---    PROSTATE-SPECIFIC ANTIGEN 2019    COVID-19 Vaccine (4 - Booster for Pfizer series) 2022 3/29/2022    Lipid Panel 2023    DEXA Scan 2024     "Colorectal Cancer Screening 08/06/2025 8/6/2020    TETANUS VACCINE 10/13/2031 10/13/2021            PHYSICAL EXAM     /70   Pulse 87   Ht 5' 10.8" (1.798 m)   SpO2 99%   BMI 24.41 kg/m²   Body mass index is 24.41 kg/m².      Physical Exam  Vitals and nursing note reviewed.   Constitutional:       Appearance: He is well-developed and well-nourished.   HENT:      Head: Normocephalic and atraumatic.      Right Ear: Hearing and external ear normal.      Left Ear: Hearing and external ear normal.      Nose: Nose normal. No rhinorrhea or epistaxis.      Mouth/Throat:      Mouth: Oropharynx is clear and moist and mucous membranes are normal.   Eyes:      General: Lids are normal.         Right eye: No discharge.         Left eye: No discharge.      Extraocular Movements: EOM normal.      Conjunctiva/sclera: Conjunctivae normal.   Neck:      Trachea: Trachea normal. No tracheal deviation.   Cardiovascular:      Rate and Rhythm: Normal rate and regular rhythm.      Heart sounds: Normal heart sounds. No murmur heard.  No friction rub. No gallop.    Pulmonary:      Effort: Pulmonary effort is normal. No respiratory distress.      Breath sounds: Normal breath sounds. No wheezing or rales.   Abdominal:      General: Bowel sounds are normal. There is no distension.      Palpations: Abdomen is soft.      Tenderness: There is no abdominal tenderness. There is no guarding or rebound.   Musculoskeletal:         General: No edema.   Skin:     General: Skin is warm and dry.     LABS     Lab Results   Component Value Date    HGBA1C <4.0 (A) 04/02/2019     CMP  Sodium   Date Value Ref Range Status   04/27/2022 138 136 - 145 mmol/L Final     Potassium   Date Value Ref Range Status   04/27/2022 3.8 3.5 - 5.1 mmol/L Final     Chloride   Date Value Ref Range Status   04/27/2022 101 95 - 110 mmol/L Final     CO2   Date Value Ref Range Status   04/27/2022 28 23 - 29 mmol/L Final     Glucose   Date Value Ref Range Status   04/27/2022 " 62 (L) 70 - 110 mg/dL Final     BUN   Date Value Ref Range Status   04/27/2022 8 8 - 23 mg/dL Final     Creatinine   Date Value Ref Range Status   04/27/2022 0.4 (L) 0.5 - 1.4 mg/dL Final     Calcium   Date Value Ref Range Status   04/27/2022 9.3 8.7 - 10.5 mg/dL Final     Total Protein   Date Value Ref Range Status   04/27/2022 7.4 6.0 - 8.4 g/dL Final     Albumin   Date Value Ref Range Status   04/27/2022 4.5 3.5 - 5.2 g/dL Final     Total Bilirubin   Date Value Ref Range Status   04/27/2022 1.9 (H) 0.1 - 1.0 mg/dL Final     Comment:     For infants and newborns, interpretation of results should be based  on gestational age, weight and in agreement with clinical  observations.    Premature Infant recommended reference ranges:  Up to 24 hours.............<8.0 mg/dL  Up to 48 hours............<12.0 mg/dL  3-5 days..................<15.0 mg/dL  6-29 days.................<15.0 mg/dL       Alkaline Phosphatase   Date Value Ref Range Status   04/27/2022 58 55 - 135 U/L Final     AST   Date Value Ref Range Status   04/27/2022 7 (L) 10 - 40 U/L Final     ALT   Date Value Ref Range Status   04/27/2022 5 (L) 10 - 44 U/L Final     Anion Gap   Date Value Ref Range Status   04/27/2022 9 8 - 16 mmol/L Final     eGFR if    Date Value Ref Range Status   04/27/2022 >60.0 >60 mL/min/1.73 m^2 Final     eGFR if non    Date Value Ref Range Status   04/27/2022 >60.0 >60 mL/min/1.73 m^2 Final     Comment:     Calculation used to obtain the estimated glomerular filtration  rate (eGFR) is the CKD-EPI equation.        Lab Results   Component Value Date    WBC 5.53 04/27/2022    HGB 11.3 (L) 04/27/2022    HCT 37.1 (L) 04/27/2022    MCV 57 (L) 04/27/2022     (L) 04/27/2022     Lab Results   Component Value Date    CHOL 129 12/12/2018    CHOL 138 03/23/2017     Lab Results   Component Value Date    HDL 38 (L) 12/12/2018    HDL 30 (L) 03/23/2017     Lab Results   Component Value Date    LDLCALC 74.0  12/12/2018    LDLCALC 75.4 03/23/2017     Lab Results   Component Value Date    TRIG 85 12/12/2018    TRIG 163 (H) 03/23/2017     Lab Results   Component Value Date    CHOLHDL 29.5 12/12/2018    CHOLHDL 21.7 03/23/2017     Lab Results   Component Value Date    TSH 1.523 04/02/2019       ASSESSMENT/PLAN     Nura Kahn Jr. is a 66 y.o. male    Quadriplegia, unspecified- Problem is stable. Will continue current medication and treatment regimen. Follow up with PCP     Quadriparesis (muscle weakness)- Problem is stable. Will continue current medication and treatment regimen. Follow up with PCP     Malignant (primary) neoplasm, unspecified- stable. Will f/u with oncology - monitor     Current mild episode of major depressive disorder without prior episode- stable. Will monitor     Chronic obstructive pulmonary disease, unspecified COPD type- stable. Will cont albuterol as needed     Portal hypertension- stable. Will f/u with hepatology and GI     Immunosuppression- stable. Will f/u with immunology     Hemoglobin C disease- stable. Will f/u with hepatology and GI     Atrial fibrillation, unspecified type- stable. Will cont to monitor     PAD (peripheral artery disease)- Problem is stable. Will continue current medication and treatment regimen. Follow up with PCP     Essential hypertension- Problem is stable. Will continue current medication and treatment regimen. Follow up with PCP     Aortic atherosclerosis- Problem is stable. Will continue current medication and treatment regimen. Follow up with PCP     Thrombocytopenia- stable. Will monitor platelets     Vitamin D deficiency- stable cont vit d     Portal venous hypertension- stable. Will f/u with hepatology and GI     Oropharyngeal dysphagia- f/u with ENT and GI     Dysphagia, unspecified type- f/u with ENT and GI     Gait instability    Pruritus  -     hydrOXYzine HCL (ATARAX) 25 MG tablet; Take 1 tablet (25 mg total) by mouth 3 (three) times daily as needed  (itching).  Dispense: 90 tablet; Refill: 3  -     ammonium lactate 12 % Crea; Apply 1 gram topically once daily. Apply to areas of dry skin and nails daily.  Dispense: 385 g; Refill: 5    Follow up with PCP     Edna MROGAN, CHRISTOPHER, FNP-c   Department of Internal Medicine - [facility]  9:25 AM

## 2022-05-06 ENCOUNTER — INFUSION (OUTPATIENT)
Dept: INFUSION THERAPY | Facility: HOSPITAL | Age: 67
End: 2022-05-06
Payer: MEDICARE

## 2022-05-06 VITALS
TEMPERATURE: 98 F | BODY MASS INDEX: 24.35 KG/M2 | RESPIRATION RATE: 18 BRPM | HEIGHT: 71 IN | DIASTOLIC BLOOD PRESSURE: 72 MMHG | HEART RATE: 77 BPM | WEIGHT: 173.94 LBS | SYSTOLIC BLOOD PRESSURE: 129 MMHG

## 2022-05-06 DIAGNOSIS — M33.20 POLYMYOSITIS: Primary | ICD-10-CM

## 2022-05-06 PROCEDURE — 25000003 PHARM REV CODE 250: Performed by: INTERNAL MEDICINE

## 2022-05-06 PROCEDURE — 96375 TX/PRO/DX INJ NEW DRUG ADDON: CPT

## 2022-05-06 PROCEDURE — 96365 THER/PROPH/DIAG IV INF INIT: CPT

## 2022-05-06 PROCEDURE — 63600175 PHARM REV CODE 636 W HCPCS: Performed by: INTERNAL MEDICINE

## 2022-05-06 PROCEDURE — 96366 THER/PROPH/DIAG IV INF ADDON: CPT

## 2022-05-06 RX ORDER — DIPHENHYDRAMINE HCL 25 MG
25 CAPSULE ORAL
Status: CANCELLED | OUTPATIENT
Start: 2022-01-01

## 2022-05-06 RX ORDER — ACETAMINOPHEN 500 MG
500 TABLET ORAL
Status: DISCONTINUED | OUTPATIENT
Start: 2022-05-06 | End: 2022-05-06 | Stop reason: HOSPADM

## 2022-05-06 RX ORDER — ACETAMINOPHEN 500 MG
500 TABLET ORAL
Status: CANCELLED | OUTPATIENT
Start: 2022-01-01

## 2022-05-06 RX ORDER — SODIUM CHLORIDE 0.9 % (FLUSH) 0.9 %
10 SYRINGE (ML) INJECTION
Status: DISCONTINUED | OUTPATIENT
Start: 2022-05-06 | End: 2022-05-06 | Stop reason: HOSPADM

## 2022-05-06 RX ORDER — DIPHENHYDRAMINE HYDROCHLORIDE 50 MG/ML
25 INJECTION INTRAMUSCULAR; INTRAVENOUS
Status: CANCELLED | OUTPATIENT
Start: 2022-01-01

## 2022-05-06 RX ORDER — HEPARIN 100 UNIT/ML
500 SYRINGE INTRAVENOUS
Status: CANCELLED | OUTPATIENT
Start: 2022-05-06

## 2022-05-06 RX ORDER — HEPARIN 100 UNIT/ML
500 SYRINGE INTRAVENOUS
Status: CANCELLED | OUTPATIENT
Start: 2022-01-01

## 2022-05-06 RX ORDER — DIPHENHYDRAMINE HYDROCHLORIDE 50 MG/ML
25 INJECTION INTRAMUSCULAR; INTRAVENOUS
Status: DISCONTINUED | OUTPATIENT
Start: 2022-05-06 | End: 2022-05-06 | Stop reason: HOSPADM

## 2022-05-06 RX ORDER — DIPHENHYDRAMINE HCL 25 MG
25 CAPSULE ORAL
Status: CANCELLED | OUTPATIENT
Start: 2022-05-06

## 2022-05-06 RX ORDER — SODIUM CHLORIDE 0.9 % (FLUSH) 0.9 %
10 SYRINGE (ML) INJECTION
Status: CANCELLED | OUTPATIENT
Start: 2022-05-06

## 2022-05-06 RX ORDER — SODIUM CHLORIDE 0.9 % (FLUSH) 0.9 %
10 SYRINGE (ML) INJECTION
Status: CANCELLED | OUTPATIENT
Start: 2022-01-01

## 2022-05-06 RX ORDER — ACETAMINOPHEN 500 MG
500 TABLET ORAL
Status: CANCELLED | OUTPATIENT
Start: 2022-05-06

## 2022-05-06 RX ORDER — DIPHENHYDRAMINE HCL 25 MG
25 CAPSULE ORAL
Status: DISCONTINUED | OUTPATIENT
Start: 2022-05-06 | End: 2022-05-06 | Stop reason: HOSPADM

## 2022-05-06 RX ORDER — DIPHENHYDRAMINE HYDROCHLORIDE 50 MG/ML
25 INJECTION INTRAMUSCULAR; INTRAVENOUS
Status: CANCELLED | OUTPATIENT
Start: 2022-05-06

## 2022-05-06 RX ORDER — HEPARIN 100 UNIT/ML
500 SYRINGE INTRAVENOUS
Status: DISCONTINUED | OUTPATIENT
Start: 2022-05-06 | End: 2022-05-06 | Stop reason: HOSPADM

## 2022-05-06 RX ADMIN — ACETAMINOPHEN 500 MG: 500 TABLET ORAL at 09:05

## 2022-05-06 RX ADMIN — HUMAN IMMUNOGLOBULIN G 80 G: 40 LIQUID INTRAVENOUS at 10:05

## 2022-05-06 RX ADMIN — DIPHENHYDRAMINE HYDROCHLORIDE 25 MG: 50 INJECTION, SOLUTION INTRAMUSCULAR; INTRAVENOUS at 09:05

## 2022-05-06 RX ADMIN — SODIUM CHLORIDE 25 ML: 0.9 INJECTION, SOLUTION INTRAVENOUS at 09:05

## 2022-05-06 NOTE — PLAN OF CARE
1406 pt tolerated IVIG, VSS throughout treatment. Pt voiced no new complaints or concerns at this time. NAD noted. Pt d/c home, left unit in electric scooter.

## 2022-05-10 ENCOUNTER — SPECIALTY PHARMACY (OUTPATIENT)
Dept: PHARMACY | Facility: CLINIC | Age: 67
End: 2022-05-10
Payer: MEDICARE

## 2022-05-10 NOTE — TELEPHONE ENCOUNTER
Outgoing call with pt regarding Cellcept refill. Pt said he will give [facility] a call back either next Monday or Tuesday.

## 2022-05-13 NOTE — TELEPHONE ENCOUNTER
Specialty Pharmacy - Refill Coordination    Specialty Medication Orders Linked to Encounter    Flowsheet Row Most Recent Value   Medication #1 mycophenolate (CELLCEPT) 500 mg Tab (Order#[id number], Rx#[id number])        Refill Questions - Documented Responses    Flowsheet Row Most Recent Value   Patient Availability and HIPAA Verification    Does patient want to proceed with activity? Yes   HIPAA/medical authority confirmed? Yes   Relationship to patient of person spoken to? Self   Refill Screening Questions    Changes to allergies? No   Changes to medications? No   New conditions since last clinic visit? No   Unplanned office visit, urgent care, ED, or hospital admission in the last 4 weeks? No   How does patient/caregiver feel medication is working? Good   Financial problems or insurance changes? No   How many doses of your specialty medications were missed in the last 4 weeks? 0   Would patient like to speak to a pharmacist? No   When does the patient need to receive the medication? 05/16/22   Refill Delivery Questions    How will the patient receive the medication? Delivery Brittnee   When does the patient need to receive the medication? 05/16/22   Shipping Address Home   Address in [facility] confirmed and updated if neccessary? Yes   Expected Copay ($) 3.95   Is the patient able to afford the medication copay? Yes   Payment Method CC on file   Days supply of Refill 30   Supplies needed? No supplies needed   Refill activity completed? Yes   Refill activity plan Refill scheduled   Shipment/Pickup Date: 05/16/22          Current Outpatient Medications   Medication Sig    albuterol (PROVENTIL/VENTOLIN HFA) 90 mcg/actuation inhaler Inhale 2 puffs into the lungs every 6 (six) hours as needed for Wheezing or Shortness of Breath.    amLODIPine (NORVASC) 10 MG tablet Take 1 tablet (10 mg total) by mouth once daily.    ammonium lactate 12 % Crea Apply 1 gram topically once daily. Apply to areas of dry skin and  nails daily.    EPINEPHrine (EPIPEN) 0.3 mg/0.3 mL AtIn     ergocalciferol (ERGOCALCIFEROL) 50,000 unit Cap Take 1 capsule (50,000 Units total) by mouth every 7 days.    hydrOXYzine HCL (ATARAX) 25 MG tablet Take 1 tablet (25 mg total) by mouth 3 (three) times daily as needed (itching).    losartan (COZAAR) 25 MG tablet Take 1 tablet (25 mg total) by mouth once daily.    mycophenolate (CELLCEPT) 500 mg Tab Take 3 tablets (1,500 mg total) by mouth 2 (two) times daily.    nystatin-triamcinolone (MYCOLOG II) cream Apply topically 2 (two) times daily    predniSONE (DELTASONE) 2.5 MG tablet Take 4 tablets (10 mg total) by mouth once daily.    traZODone (DESYREL) 50 MG tablet Take 1 tablet (50 mg total) by mouth nightly as needed for Insomnia.   Last reviewed on 5/6/2022  9:17 AM by Constantine Pendleton RN    Review of patient's allergies indicates:  No Known Allergies Last reviewed on  5/6/2022 9:21 AM by Constantine Pendleton      Tasks added this encounter   6/8/2022 - Refill Call (Auto Added)   Tasks due within next 3 months   No tasks due.     Ghassan Esqueda, PharmD  [facility]  [address] [place], [place] [address]jass[address]  [facility] [address]  Phone: [phone]  Fax: [phone]

## 2022-06-03 NOTE — PLAN OF CARE
Pt to clinic today via motorized wlc for IVIG infusion. Denies any sig complaints. PIV started without difficulty. Pt made comfortable. Tolerated infusion well. PIV removed with catheter intact. Pt from clinic via WLC. Transport van called by pt. In NAD.

## 2022-06-13 NOTE — TELEPHONE ENCOUNTER
Specialty Pharmacy - Refill Coordination    Specialty Medication Orders Linked to Encounter    Flowsheet Row Most Recent Value   Medication #1 mycophenolate (CELLCEPT) 500 mg Tab (Order#[id number], Rx#[id number])          Refill Questions - Documented Responses    Flowsheet Row Most Recent Value   Patient Availability and HIPAA Verification    Does patient want to proceed with activity? Yes   HIPAA/medical authority confirmed? Yes   Relationship to patient of person spoken to? Self   Refill Screening Questions    Changes to allergies? No   Changes to medications? No   New conditions since last clinic visit? No   Unplanned office visit, urgent care, ED, or hospital admission in the last 4 weeks? No   How does patient/caregiver feel medication is working? Good   Financial problems or insurance changes? No   How many doses of your specialty medications were missed in the last 4 weeks? 0   Would patient like to speak to a pharmacist? No   When does the patient need to receive the medication? 06/20/22   Refill Delivery Questions    How will the patient receive the medication? Delivery Brittnee   When does the patient need to receive the medication? 06/20/22   Shipping Address Home   Address in [facility] confirmed and updated if neccessary? Yes   Expected Copay ($) 3.95   Is the patient able to afford the medication copay? Yes   Payment Method CC on file   Days supply of Refill 30   Supplies needed? No supplies needed   Refill activity completed? Yes   Refill activity plan Refill scheduled   Shipment/Pickup Date: 06/17/22          Current Outpatient Medications   Medication Sig    albuterol (PROVENTIL/VENTOLIN HFA) 90 mcg/actuation inhaler Inhale 2 puffs into the lungs every 6 (six) hours as needed for Wheezing or Shortness of Breath.    amLODIPine (NORVASC) 10 MG tablet Take 1 tablet (10 mg total) by mouth once daily.    ammonium lactate 12 % Crea Apply 1 gram topically once daily. Apply to areas of dry skin and  nails daily.    EPINEPHrine (EPIPEN) 0.3 mg/0.3 mL AtIn     ergocalciferol (ERGOCALCIFEROL) 50,000 unit Cap Take 1 capsule (50,000 Units total) by mouth every 7 days.    hydrOXYzine HCL (ATARAX) 25 MG tablet Take 1 tablet (25 mg total) by mouth 3 (three) times daily as needed (itching).    losartan (COZAAR) 25 MG tablet Take 1 tablet (25 mg total) by mouth once daily.    mycophenolate (CELLCEPT) 500 mg Tab Take 3 tablets (1,500 mg total) by mouth 2 (two) times daily.    nystatin-triamcinolone (MYCOLOG II) cream Apply topically 2 (two) times daily    predniSONE (DELTASONE) 2.5 MG tablet Take 4 tablets (10 mg total) by mouth once daily.    traZODone (DESYREL) 50 MG tablet Take 1 tablet (50 mg total) by mouth nightly as needed for Insomnia.   Last reviewed on 5/6/2022  9:17 AM by Constantine Pendleton RN    Review of patient's allergies indicates:  No Known Allergies Last reviewed on  6/3/2022 8:18 AM by Jeff Medina      Tasks added this encounter   7/13/2022 - Refill Call (Auto Added)   Tasks due within next 3 months   No tasks due.     Kandy Bojorquez, PharmD  [facility]  [address] [place] [address]  Phone: [phone]  Fax: [phone]

## 2022-06-15 NOTE — TELEPHONE ENCOUNTER
Deb, please tell pt the CT chest shows generally stable changes. The previously noted nodule is unchanged and therefore benign. He has not changes to suggest any lung disease related to his polymyositis, but does have evidence of bronchitis and emphysema likely related to history of smoking which he should stop completely if he has not done so. He should follow up with Dr. Engle in Pulmonary on 1/5/18 for further information about his lung situation. Also he still does not have appt with fellow and myself as he is Krysta's former patient and hasn't been seen in > 1year. Please schedule ASAP. Thanks ALEXSANDER   No

## 2022-06-20 NOTE — TELEPHONE ENCOUNTER
Home health orders placed to [facility] for PT/OT twice weekly,  once weekly, and home health aide twice weekly.  Please process and notify patient once complete.  
PAIN SCALE 8 OF 10.

## 2022-07-28 NOTE — TELEPHONE ENCOUNTER
Specialty Pharmacy - Refill Coordination    Specialty Medication Orders Linked to Encounter    Flowsheet Row Most Recent Value   Medication #1 mycophenolate (CELLCEPT) 500 mg Tab (Order#[id number], Rx#[id number])          Refill Questions - Documented Responses    Flowsheet Row Most Recent Value   Patient Availability and HIPAA Verification    Does patient want to proceed with activity? Yes   HIPAA/medical authority confirmed? Yes   Relationship to patient of person spoken to? Self   Refill Screening Questions    Changes to allergies? No   Changes to medications? No   New conditions since last clinic visit? No   Unplanned office visit, urgent care, ED, or hospital admission in the last 4 weeks? No   How does patient/caregiver feel medication is working? Good   Financial problems or insurance changes? No   How many doses of your specialty medications were missed in the last 4 weeks? 0   Would patient like to speak to a pharmacist? No   When does the patient need to receive the medication? 07/31/22   Refill Delivery Questions    How will the patient receive the medication? Delivery Brittnee   When does the patient need to receive the medication? 07/31/22   Shipping Address Home   Address in [facility] confirmed and updated if neccessary? Yes   Expected Copay ($) 3.95   Is the patient able to afford the medication copay? Yes   Payment Method zero copay   Days supply of Refill 30   Supplies needed? No supplies needed   Refill activity completed? Yes   Refill activity plan Refill scheduled   Shipment/Pickup Date: 07/28/22          Current Outpatient Medications   Medication Sig    albuterol (PROVENTIL/VENTOLIN HFA) 90 mcg/actuation inhaler Inhale 2 puffs into the lungs every 6 (six) hours as needed for Wheezing or Shortness of Breath.    amLODIPine (NORVASC) 10 MG tablet Take 1 tablet (10 mg total) by mouth once daily.    ammonium lactate 12 % Crea Apply 1 gram topically once daily. Apply to areas of dry skin and  nails daily.    EPINEPHrine (EPIPEN) 0.3 mg/0.3 mL AtIn     ergocalciferol (ERGOCALCIFEROL) 50,000 unit Cap Take 1 capsule (50,000 Units total) by mouth every 7 days.    hydrOXYzine HCL (ATARAX) 25 MG tablet Take 1 tablet (25 mg total) by mouth 3 (three) times daily as needed (itching).    losartan (COZAAR) 25 MG tablet Take 1 tablet (25 mg total) by mouth once daily.    mycophenolate (CELLCEPT) 500 mg Tab Take 3 tablets (1,500 mg total) by mouth 2 (two) times daily.    nystatin-triamcinolone (MYCOLOG II) cream Apply topically 2 (two) times daily    predniSONE (DELTASONE) 10 MG tablet Take 1 tablet (10 mg total) by mouth once daily.    traZODone (DESYREL) 50 MG tablet Take 1 tablet (50 mg total) by mouth nightly as needed for Insomnia.   Last reviewed on 5/6/2022  9:17 AM by Constantine Pendleton RN    Review of patient's allergies indicates:  No Known Allergies Last reviewed on  7/14/2022 1:22 PM by Kaity Smith      Tasks added this encounter   8/23/2022 - Refill Call (Auto Added)   Tasks due within next 3 months   No tasks due.     [facility]  [address] [place] [id number]  Phone: [phone]  Fax: [phone]

## 2022-09-02 NOTE — TELEPHONE ENCOUNTER
Specialty Pharmacy - Refill Coordination    Specialty Medication Orders Linked to Encounter      Flowsheet Row Most Recent Value   Medication #1 mycophenolate (CELLCEPT) 500 mg Tab (Order#[id number], Rx#[id number])            Refill Questions - Documented Responses      Flowsheet Row Most Recent Value   Patient Availability and HIPAA Verification    Does patient want to proceed with activity? Yes   HIPAA/medical authority confirmed? Yes   Relationship to patient of person spoken to? Self   Refill Screening Questions    Changes to allergies? No   Changes to medications? No   New conditions since last clinic visit? No   Unplanned office visit, urgent care, ED, or hospital admission in the last 4 weeks? No   How does patient/caregiver feel medication is working? Good   Financial problems or insurance changes? No   How many doses of your specialty medications were missed in the last 4 weeks? 4   Why were doses missed? Other (comment)  [will run out on 9/3 and will not receive delivery until 9/6]   Would patient like to speak to a pharmacist? No   When does the patient need to receive the medication? 09/06/22   Refill Delivery Questions    How will the patient receive the medication? Delivery Brittnee   When does the patient need to receive the medication? 09/06/22   Shipping Address Home   Address in [facility] confirmed and updated if neccessary? Yes   Expected Copay ($) 3.95   Is the patient able to afford the medication copay? Yes   Payment Method CC on file   Days supply of Refill 30   Supplies needed? No supplies needed   Refill activity completed? Yes   Refill activity plan Refill scheduled   Shipment/Pickup Date: 09/02/22            Current Outpatient Medications   Medication Sig    albuterol (PROVENTIL/VENTOLIN HFA) 90 mcg/actuation inhaler Inhale 2 puffs into the lungs every 6 (six) hours as needed for Wheezing or Shortness of Breath.    amLODIPine (NORVASC) 10 MG tablet Take 1 tablet (10 mg total) by mouth  once daily.    ammonium lactate 12 % Crea Apply 1 gram topically once daily. Apply to areas of dry skin and nails daily.    EPINEPHrine (EPIPEN) 0.3 mg/0.3 mL AtIn     ergocalciferol (ERGOCALCIFEROL) 50,000 unit Cap Take 1 capsule (50,000 Units total) by mouth every 7 days.    hydrOXYzine HCL (ATARAX) 25 MG tablet Take 1 tablet (25 mg total) by mouth 3 (three) times daily as needed (itching).    losartan (COZAAR) 25 MG tablet Take 1 tablet (25 mg total) by mouth once daily.    mycophenolate (CELLCEPT) 500 mg Tab Take 3 tablets (1,500 mg total) by mouth 2 (two) times daily.    nystatin-triamcinolone (MYCOLOG II) cream Apply topically 2 (two) times daily    predniSONE (DELTASONE) 10 MG tablet Take 1 tablet (10 mg total) by mouth once daily.    traZODone (DESYREL) 50 MG tablet Take 1 tablet (50 mg total) by mouth nightly as needed for Insomnia.   Last reviewed on 5/6/2022  9:17 AM by Constantine Pendleton RN    Review of patient's allergies indicates:  No Known Allergies Last reviewed on  7/14/2022 1:22 PM by Kaity Smith      Tasks added this encounter   9/29/2022 - Refill Call (Auto Added)   Tasks due within next 3 months   No tasks due.     Irene Barnett, PharmD  [facility]  [address] [place] [address]  Phone: [phone]  Fax: [phone]

## 2022-09-12 NOTE — TELEPHONE ENCOUNTER
MA called patient inform him that we have to cancel his 12/3 appt due to MD have family emergency. He understood reschedule his appt he accepted 1/20 mailed new appt reminder to patient.   
no

## 2022-09-21 NOTE — PROGRESS NOTES
Subjective:       Patient ID: Nura Kahn Jr. is a 66 y.o. male.    Chief Complaint: Disease Management    66 year old male with pmh Inclusion body myositis/polymyositis, osteopenia presents for follow up. Patient's care has been complicated by occasional medication non compliance and difficult social situation. Patient is currently chair bound due to his disease. His medication regimen includes MMF 1500 bid, prednisone 10mg daily, IVIG monthly infusion, Rituximab (started in April of 2022 with administrations on 4/4 and 4/29). Patient has been compliant with MMF, prednisone, but has not had an IVIG infusion in several months. Patient is generally improving slightly over the last few months, reports he is transferring better and swallowing better though still with difficulties with both. He denies SOB. He is still significantly weak. He is able to feed himself, but needs help with bathing, dressing, cooking, etc. His social situation has improved recently, his wife is home much more frequently as is his daughter. He continues to do PT/OT exercises at home.   Review of Systems   Constitutional:  Negative for activity change, appetite change, chills, fatigue and fever.   Respiratory:  Positive for shortness of breath. Negative for apnea, cough, choking, chest tightness, wheezing and stridor.    Cardiovascular:  Negative for chest pain, palpitations and leg swelling.   Gastrointestinal:  Negative for abdominal distention, abdominal pain, anal bleeding, blood in stool, constipation, diarrhea, nausea, rectal pain and vomiting.   Musculoskeletal:  Negative for arthralgias and back pain.   Skin:  Negative for color change, pallor, rash and wound.   Neurological:  Positive for weakness. Negative for dizziness, seizures, facial asymmetry, speech difficulty, light-headedness, numbness and headaches.   Hematological:  Negative for adenopathy. Does not bruise/bleed easily.   Psychiatric/Behavioral:  Negative for agitation,  "behavioral problems, confusion, decreased concentration, dysphoric mood and hallucinations. The patient is not hyperactive.        Objective:   BP (!) 146/88   Pulse 82   Ht 5' 10" (1.778 m)   Wt 77.1 kg (170 lb)   BMI 24.39 kg/m²      Physical Exam   Constitutional:  Non-toxic appearance. He appears ill. No distress.   HENT:   Head: Normocephalic and atraumatic.   Nose: Nose normal. No rhinorrhea or nasal congestion.   Eyes: Conjunctivae are normal. Right eye exhibits no discharge. Left eye exhibits no discharge. No scleral icterus.   Cardiovascular: Normal rate, regular rhythm, normal heart sounds and normal pulses. Exam reveals no gallop and no friction rub.   No murmur heard.  Pulmonary/Chest: Effort normal and breath sounds normal. No stridor. No respiratory distress. He has no wheezes. He has no rhonchi. He has no rales. He exhibits no tenderness.   Abdominal: Soft. Normal appearance and bowel sounds are normal. He exhibits no distension and no mass. There is no abdominal tenderness. There is no rebound and no guarding. No hernia.   Musculoskeletal:         General: No swelling, tenderness, deformity or signs of injury.      Right lower leg: No edema.      Left lower leg: No edema.   Neurological: He is alert. He displays weakness.   Rt pointer and Lt pointer/middle finger unable to flex. Hand  and ankle plantar flexion mildly decreased. RUE> LUE proximal weakness. Patient reports he is unable to flex at hip or knees. 4/5 plantar flexion.    Skin: Skin is warm and dry. He is not diaphoretic.   Vitals reviewed.     No data to display     Assessment:       1. Polymyositis          63 year old male with PMH of polymyositis/inclusion body myositis overlap. Diagnosed in 2009 with mildly positive Ku and positive CN1A. Reportedly with biopsy proven myositis previously. Patient also with osteopenia on DXA in 2021. Patient with significant social issues affecting his health care, which are improving overall at " this time. Condition seems to be mildly improving at this time with no current SOB, mild dysphagia improvement, and better ability to transfer. He has previously been in imuran and MTX, currently on IVIG monthly, Prednisone 10mg, Rituxan with next dose due 10/4/22, and MMF 1500 bid. Patient has not received his IVIG in several months, will reorder and plan to continue this med.  -Continue monthly IVIG, needs to be scheduled  -Continue Cellcept 1500 bid  -Continue Rituxan with plan for next administration on 10/4/22 and then again in two weeks.   -Evusheld and rituximab labs ordered.   -Continue prednisone 10mg daily at this time. Would not go above 15mg daily prednisone.    Osteopenia: DXA in 2021 showing osteopenia with FRAX score 5.2% risk of major osteoporotic fracture and  1.4% risk of hip fracture. This qualifies for treatment in this patient who is on chronic steroids. Patient with dysphagia. Will initiate Prolia at this time.   -Prolia ordered                       Plan:       Problem List Items Addressed This Visit          Immunology/Multi System    Polymyositis - Primary (Chronic)    Relevant Orders    CBC W/ AUTO DIFFERENTIAL (Completed)    COMPREHENSIVE METABOLIC PANEL (Completed)    Sedimentation rate (Completed)    C-REACTIVE PROTEIN (Completed)    Aldolase    CK (Completed)    IMMUNOGLOBULINS (IGG, IGA, IGM) QUANTITATIVE (Completed)    IgG 1, 2, 3, and 4    Rituxan Sensitivity    HEPATITIS B CORE ANTIBODY, TOTAL (Completed)    HEPATITIS B SURFACE ANTIGEN (Completed)    QUANTIFERON GOLD TB    Ambulatory referral/consult Order for Naomi Connolly

## 2022-09-22 PROBLEM — M81.8 OTHER OSTEOPOROSIS WITHOUT CURRENT PATHOLOGICAL FRACTURE: Status: ACTIVE | Noted: 2022-01-01

## 2022-09-22 NOTE — PROGRESS NOTES
I have reviewed the notes, assessments, and/or procedures performed this visit, and I concur with the documentation. Possibly some improvement upper arm and hand strength and plantar flexors, others 1/ 5. Overdue for IVIg 1g/kg monthly, last dose June. Due for rituximab. Has moderate fracture risk by FRAX and remains on prednisone 10mg daily. Denosumab ordered. F/u 2 months. Evusheld ordered RJQ

## 2022-10-03 NOTE — TELEPHONE ENCOUNTER
Specialty Pharmacy - Refill Coordination    Specialty Medication Orders Linked to Encounter      Flowsheet Row Most Recent Value   Medication #1 mycophenolate (CELLCEPT) 500 mg Tab (Order#[id number], Rx#[id number])            Refill Questions - Documented Responses      Flowsheet Row Most Recent Value   Patient Availability and HIPAA Verification    Does patient want to proceed with activity? Yes   HIPAA/medical authority confirmed? Yes   Relationship to patient of person spoken to? Self   Refill Screening Questions    Changes to allergies? No   Changes to medications? No   New conditions since last clinic visit? No   Unplanned office visit, urgent care, ED, or hospital admission in the last 4 weeks? No   How does patient/caregiver feel medication is working? Good   Financial problems or insurance changes? No   How many doses of your specialty medications were missed in the last 4 weeks? 0   Would patient like to speak to a pharmacist? No   When does the patient need to receive the medication? 10/05/22   Refill Delivery Questions    How will the patient receive the medication? MEDRx   When does the patient need to receive the medication? 10/05/22   Shipping Address Home   Address in [facility] confirmed and updated if neccessary? Yes   Expected Copay ($) 3.95   Is the patient able to afford the medication copay? Yes   Payment Method CC on file   Days supply of Refill 30   Supplies needed? No supplies needed   Refill activity completed? Yes   Refill activity plan Refill scheduled   Shipment/Pickup Date: 10/04/22            Current Outpatient Medications   Medication Sig    albuterol (PROVENTIL/VENTOLIN HFA) 90 mcg/actuation inhaler Inhale 2 puffs into the lungs every 6 (six) hours as needed for Wheezing or Shortness of Breath.    amLODIPine (NORVASC) 10 MG tablet Take 1 tablet (10 mg total) by mouth once daily.    ammonium lactate 12 % Crea Apply 1 gram topically once daily. Apply to areas of dry skin and  nails daily.    EPINEPHrine (EPIPEN) 0.3 mg/0.3 mL AtIn     ergocalciferol (ERGOCALCIFEROL) 50,000 unit Cap Take 1 capsule (50,000 Units total) by mouth every 7 days.    hydrOXYzine HCL (ATARAX) 25 MG tablet Take 1 tablet (25 mg total) by mouth 3 (three) times daily as needed (itching).    losartan (COZAAR) 25 MG tablet Take 1 tablet (25 mg total) by mouth once daily.    mycophenolate (CELLCEPT) 500 mg Tab Take 3 tablets (1,500 mg total) by mouth 2 (two) times daily.    nystatin-triamcinolone (MYCOLOG II) cream Apply topically 2 (two) times daily    predniSONE (DELTASONE) 10 MG tablet Take 1 tablet (10 mg total) by mouth once daily.    traZODone (DESYREL) 50 MG tablet Take 1 tablet (50 mg total) by mouth nightly as needed for Insomnia.   Last reviewed on 9/21/2022  8:11 AM by Darlene Hubbard MA    Review of patient's allergies indicates:  No Known Allergies Last reviewed on  9/21/2022 8:10 AM by Gabriella Hubbard      Tasks added this encounter   10/28/2022 - Refill Call (Auto Added)   Tasks due within next 3 months   No tasks due.     [facility]  [address] [place] [id number]  Phone: [phone]  Fax: [phone]

## 2022-10-27 NOTE — TELEPHONE ENCOUNTER
Patient wants to know if he needs to come in so soon after his last appointment     He said nothing has changed he feels the same and he hasnt gotten any of his infusions or anything.     Privigen may need to be reordered if he still needs it.  Reclast isnt approved yet    Rituxan looks approved but I'm not sure if that's something he still needs or what.  Its too late today to call chemo infusion    Please advise.

## 2022-10-31 NOTE — TELEPHONE ENCOUNTER
Specialty Pharmacy - Refill Coordination    Specialty Medication Orders Linked to Encounter      Flowsheet Row Most Recent Value   Medication #1 mycophenolate (CELLCEPT) 500 mg Tab (Order#[id number], Rx#[id number])            Refill Questions - Documented Responses      Flowsheet Row Most Recent Value   Patient Availability and HIPAA Verification    Does patient want to proceed with activity? Yes   HIPAA/medical authority confirmed? Yes   Relationship to patient of person spoken to? Self   Refill Screening Questions    Changes to allergies? No   Changes to medications? No   New conditions since last clinic visit? No   Unplanned office visit, urgent care, ED, or hospital admission in the last 4 weeks? No   How does patient/caregiver feel medication is working? Good   Financial problems or insurance changes? No   How many doses of your specialty medications were missed in the last 4 weeks? 0   Would patient like to speak to a pharmacist? No   When does the patient need to receive the medication? 11/04/22   Refill Delivery Questions    How will the patient receive the medication? MEDRx   When does the patient need to receive the medication? 11/04/22   Shipping Address Home   Address in [facility] confirmed and updated if neccessary? Yes   Expected Copay ($) 3.95   Is the patient able to afford the medication copay? Yes   Payment Method CC on file   Days supply of Refill 30   Supplies needed? No supplies needed   Refill activity completed? Yes   Refill activity plan Refill scheduled   Shipment/Pickup Date: 11/01/22            Current Outpatient Medications   Medication Sig    albuterol (PROVENTIL/VENTOLIN HFA) 90 mcg/actuation inhaler Inhale 2 puffs into the lungs every 6 (six) hours as needed for Wheezing or Shortness of Breath.    amLODIPine (NORVASC) 10 MG tablet Take 1 tablet (10 mg total) by mouth once daily.    ammonium lactate 12 % Crea Apply 1 gram topically once daily. Apply to areas of dry skin and  nails daily.    EPINEPHrine (EPIPEN) 0.3 mg/0.3 mL AtIn     ergocalciferol (ERGOCALCIFEROL) 50,000 unit Cap Take 1 capsule (50,000 Units total) by mouth every 7 days.    hydrOXYzine HCL (ATARAX) 25 MG tablet Take 1 tablet (25 mg total) by mouth 3 (three) times daily as needed (itching).    losartan (COZAAR) 25 MG tablet Take 1 tablet (25 mg total) by mouth once daily.    mycophenolate (CELLCEPT) 500 mg Tab Take 3 tablets (1,500 mg total) by mouth 2 (two) times daily.    nystatin-triamcinolone (MYCOLOG II) cream Apply topically 2 (two) times daily    traZODone (DESYREL) 50 MG tablet Take 1 tablet (50 mg total) by mouth nightly as needed for Insomnia.   Last reviewed on 9/21/2022  8:11 AM by Darlene Hubbard MA    Review of patient's allergies indicates:  No Known Allergies Last reviewed on  10/31/2022 9:31 AM by Kaity Smith      Tasks added this encounter   11/27/2022 - Refill Call (Auto Added)   Tasks due within next 3 months   No tasks due.     [facility]  [address] [facility] [address]  Phone: [phone]  Fax: [phone]

## 2022-11-03 NOTE — TELEPHONE ENCOUNTER
----- Message from Mindy Stratton sent at 11/3/2022  9:22 AM CDT -----  Regarding: Prescription  Contact: [phone]  Calling to request prescription for Rx Prednisone be sent to [facility] mail order for immediate delivery. Patient has not had medication for the past three days. Please call to confirm.

## 2022-12-13 NOTE — TELEPHONE ENCOUNTER
Specialty Pharmacy - Refill Coordination    Specialty Medication Orders Linked to Encounter      Flowsheet Row Most Recent Value   Medication #1 mycophenolate (CELLCEPT) 500 mg Tab (Order#[id number], Rx#[id number])          Refill Questions - Documented Responses      Flowsheet Row Most Recent Value   Patient Availability and HIPAA Verification    Does patient want to proceed with activity? Unable to Reach          We have had multiple attempts to the patient and have been unsuccessful to reach the patient. We will stop reaching out to the patient but in the event that the patient needs the med and contacts us, we will communicate and begin dispensing for the patient. At your next visit with the patient, please review the importance of being in contact with our specialty pharmacy as a part of our care team.    Interventions added this encounter   Closed: [facility] Provider Intervention - Drug therapy adherence: mycophenolate (CELLCEPT) 500 mg Tab     Keke Mcneal, PharmD  [facility]  [address] [facility] [address]  Phone: [phone]  Fax: [phone]

## 2022-12-19 NOTE — TELEPHONE ENCOUNTER
Specialty Pharmacy - Refill Coordination    Specialty Medication Orders Linked to Encounter      Flowsheet Row Most Recent Value   Medication #1 mycophenolate (CELLCEPT) 500 mg Tab (Order#[id number], Rx#[id number])          Disenrolled due to no contact. Pt reports no missed doses.    Refill Questions - Documented Responses      Flowsheet Row Most Recent Value   Refill Screening Questions    Changes to allergies? No   Changes to medications? No   New conditions since last clinic visit? No   Unplanned office visit, urgent care, ED, or hospital admission in the last 4 weeks? No   How does patient/caregiver feel medication is working? Good   Financial problems or insurance changes? No   How many doses of your specialty medications were missed in the last 4 weeks? 0   Would patient like to speak to a pharmacist? No   When does the patient need to receive the medication? 12/21/22   Refill Delivery Questions    How will the patient receive the medication? MEDRx   When does the patient need to receive the medication? 12/21/22   Shipping Address Home   Address in [facility] confirmed and updated if neccessary? Yes   Expected Copay ($) 3.95   Is the patient able to afford the medication copay? Yes   Payment Method CC on file   Days supply of Refill 30   Supplies needed? No supplies needed   Refill activity completed? Yes   Refill activity plan Refill scheduled   Shipment/Pickup Date: 12/20/22            Current Outpatient Medications   Medication Sig    albuterol (PROVENTIL/VENTOLIN HFA) 90 mcg/actuation inhaler Inhale 2 puffs into the lungs every 6 (six) hours as needed for Wheezing or Shortness of Breath.    amLODIPine (NORVASC) 10 MG tablet Take 1 tablet (10 mg total) by mouth once daily.    ammonium lactate 12 % Crea Apply 1 gram topically once daily. Apply to areas of dry skin and nails daily.    EPINEPHrine (EPIPEN) 0.3 mg/0.3 mL AtIn     ergocalciferol (ERGOCALCIFEROL) 50,000 unit Cap Take 1 capsule (50,000  Units total) by mouth every 7 days.    hydrOXYzine HCL (ATARAX) 25 MG tablet Take 1 tablet (25 mg total) by mouth 3 (three) times daily as needed (itching).    losartan (COZAAR) 25 MG tablet Take 1 tablet (25 mg total) by mouth once daily.    mycophenolate (CELLCEPT) 500 mg Tab Take 3 tablets (1,500 mg total) by mouth 2 (two) times daily.    nystatin-triamcinolone (MYCOLOG II) cream Apply topically 2 (two) times daily    predniSONE (DELTASONE) 10 MG tablet Take 1 tablet (10 mg total) by mouth once daily.    predniSONE (DELTASONE) 10 MG tablet Take 1 tablet (10 mg total) by mouth once daily.    traZODone (DESYREL) 50 MG tablet Take 1 tablet (50 mg total) by mouth nightly as needed for Insomnia.   Last reviewed on 9/21/2022  8:11 AM by Darlene Hubbard MA    Review of patient's allergies indicates:  No Known Allergies Last reviewed on  10/31/2022 9:31 AM by Kaity Smith      Tasks added this encounter   1/13/2023 - Refill Call (Auto Added)   Tasks due within next 3 months   No tasks due.     Tasia Bran, PharmD  [facility]  [address]  [facility] [address]  Phone: [phone]  Fax: [phone]

## 2023-01-01 ENCOUNTER — SPECIALTY PHARMACY (OUTPATIENT)
Dept: PHARMACY | Facility: CLINIC | Age: 68
End: 2023-01-01
Payer: MEDICARE

## 2023-01-01 ENCOUNTER — TELEPHONE (OUTPATIENT)
Dept: RHEUMATOLOGY | Facility: CLINIC | Age: 68
End: 2023-01-01
Payer: MEDICARE

## 2023-01-01 ENCOUNTER — PATIENT MESSAGE (OUTPATIENT)
Dept: INTERNAL MEDICINE | Facility: CLINIC | Age: 68
End: 2023-01-01
Payer: MEDICARE

## 2023-01-01 ENCOUNTER — RESEARCH ENCOUNTER (OUTPATIENT)
Dept: INFECTIOUS DISEASES | Facility: CLINIC | Age: 68
End: 2023-01-01
Payer: MEDICARE

## 2023-01-01 ENCOUNTER — PES CALL (OUTPATIENT)
Dept: ADMINISTRATIVE | Facility: CLINIC | Age: 68
End: 2023-01-01
Payer: MEDICARE

## 2023-01-01 ENCOUNTER — HOSPITAL ENCOUNTER (EMERGENCY)
Facility: HOSPITAL | Age: 68
Discharge: HOME | End: 2023-04-20
Attending: EMERGENCY MEDICINE
Payer: MEDICARE

## 2023-01-01 ENCOUNTER — HOSPITAL ENCOUNTER (INPATIENT)
Facility: HOSPITAL | Age: 68
LOS: 5 days | Discharge: HOME | DRG: 177 | End: 2023-04-03
Attending: EMERGENCY MEDICINE | Admitting: HOSPITALIST
Payer: MEDICARE

## 2023-01-01 ENCOUNTER — OFFICE VISIT (OUTPATIENT)
Dept: INTERNAL MEDICINE | Facility: CLINIC | Age: 68
End: 2023-01-01
Payer: MEDICARE

## 2023-01-01 ENCOUNTER — NURSE TRIAGE (OUTPATIENT)
Dept: ADMINISTRATIVE | Facility: CLINIC | Age: 68
End: 2023-01-01
Payer: MEDICARE

## 2023-01-01 ENCOUNTER — PATIENT MESSAGE (OUTPATIENT)
Dept: PHARMACY | Facility: CLINIC | Age: 68
End: 2023-01-01
Payer: MEDICARE

## 2023-01-01 ENCOUNTER — PATIENT OUTREACH (OUTPATIENT)
Dept: ADMINISTRATIVE | Facility: CLINIC | Age: 68
End: 2023-01-01
Payer: MEDICARE

## 2023-01-01 ENCOUNTER — HOSPITAL ENCOUNTER (OUTPATIENT)
Dept: RADIOLOGY | Facility: HOSPITAL | Age: 68
Discharge: HOME | End: 2023-04-25
Attending: NURSE PRACTITIONER
Payer: MEDICARE

## 2023-01-01 ENCOUNTER — HOSPITAL ENCOUNTER (OUTPATIENT)
Facility: HOSPITAL | Age: 68
Discharge: HOME | End: 2023-04-16
Attending: EMERGENCY MEDICINE | Admitting: STUDENT IN AN ORGANIZED HEALTH CARE EDUCATION/TRAINING PROGRAM
Payer: MEDICARE

## 2023-01-01 ENCOUNTER — HOSPITAL ENCOUNTER (INPATIENT)
Facility: HOSPITAL | Age: 68
LOS: 8 days | DRG: 871 | End: 2023-05-22
Attending: EMERGENCY MEDICINE | Admitting: STUDENT IN AN ORGANIZED HEALTH CARE EDUCATION/TRAINING PROGRAM
Payer: MEDICARE

## 2023-01-01 ENCOUNTER — TELEPHONE (OUTPATIENT)
Dept: HEPATOLOGY | Facility: HOSPITAL | Age: 68
End: 2023-01-01
Payer: MEDICARE

## 2023-01-01 ENCOUNTER — TELEPHONE (OUTPATIENT)
Dept: INTERNAL MEDICINE | Facility: CLINIC | Age: 68
End: 2023-01-01
Payer: MEDICARE

## 2023-01-01 ENCOUNTER — PATIENT MESSAGE (OUTPATIENT)
Dept: RHEUMATOLOGY | Facility: CLINIC | Age: 68
End: 2023-01-01
Payer: MEDICARE

## 2023-01-01 ENCOUNTER — HOSPITAL ENCOUNTER (EMERGENCY)
Facility: HOSPITAL | Age: 68
Discharge: HOME | End: 2023-04-21
Attending: EMERGENCY MEDICINE
Payer: MEDICARE

## 2023-01-01 ENCOUNTER — TELEPHONE (OUTPATIENT)
Dept: PRIMARY CARE CLINIC | Facility: CLINIC | Age: 68
End: 2023-01-01
Payer: MEDICARE

## 2023-01-01 ENCOUNTER — OUTPATIENT CASE MANAGEMENT (OUTPATIENT)
Dept: ADMINISTRATIVE | Facility: OTHER | Age: 68
End: 2023-01-01
Payer: MEDICARE

## 2023-01-01 VITALS
WEIGHT: 130 LBS | SYSTOLIC BLOOD PRESSURE: 125 MMHG | SYSTOLIC BLOOD PRESSURE: 149 MMHG | RESPIRATION RATE: 20 BRPM | BODY MASS INDEX: 18.65 KG/M2 | RESPIRATION RATE: 18 BRPM | DIASTOLIC BLOOD PRESSURE: 90 MMHG | DIASTOLIC BLOOD PRESSURE: 77 MMHG | TEMPERATURE: 99 F | HEART RATE: 88 BPM | WEIGHT: 130 LBS | HEART RATE: 105 BPM | TEMPERATURE: 98 F | OXYGEN SATURATION: 96 % | OXYGEN SATURATION: 96 % | BODY MASS INDEX: 18.65 KG/M2

## 2023-01-01 VITALS
SYSTOLIC BLOOD PRESSURE: 90 MMHG | OXYGEN SATURATION: 96 % | HEART RATE: 86 BPM | DIASTOLIC BLOOD PRESSURE: 60 MMHG | BODY MASS INDEX: 22.96 KG/M2 | HEIGHT: 70 IN

## 2023-01-01 VITALS
SYSTOLIC BLOOD PRESSURE: 110 MMHG | RESPIRATION RATE: 10 BRPM | HEART RATE: 116 BPM | DIASTOLIC BLOOD PRESSURE: 70 MMHG | BODY MASS INDEX: 20.48 KG/M2 | WEIGHT: 143.06 LBS | HEIGHT: 70 IN | TEMPERATURE: 98 F | OXYGEN SATURATION: 91 %

## 2023-01-01 VITALS
TEMPERATURE: 98 F | HEART RATE: 86 BPM | SYSTOLIC BLOOD PRESSURE: 118 MMHG | BODY MASS INDEX: 24.34 KG/M2 | RESPIRATION RATE: 18 BRPM | DIASTOLIC BLOOD PRESSURE: 69 MMHG | HEIGHT: 70 IN | OXYGEN SATURATION: 96 % | WEIGHT: 170 LBS

## 2023-01-01 VITALS
HEART RATE: 121 BPM | DIASTOLIC BLOOD PRESSURE: 84 MMHG | OXYGEN SATURATION: 99 % | SYSTOLIC BLOOD PRESSURE: 121 MMHG | WEIGHT: 160 LBS | TEMPERATURE: 98 F | BODY MASS INDEX: 22.96 KG/M2 | RESPIRATION RATE: 24 BRPM

## 2023-01-01 DIAGNOSIS — T83.9XXA PROBLEM WITH FOLEY CATHETER, INITIAL ENCOUNTER: Primary | ICD-10-CM

## 2023-01-01 DIAGNOSIS — K59.01 SLOW TRANSIT CONSTIPATION: Primary | ICD-10-CM

## 2023-01-01 DIAGNOSIS — J96.01 ACUTE HYPOXEMIC RESPIRATORY FAILURE: Primary | ICD-10-CM

## 2023-01-01 DIAGNOSIS — G72.41 INCLUSION BODY MYOSITIS (IBM): Primary | ICD-10-CM

## 2023-01-01 DIAGNOSIS — D69.6 THROMBOCYTOPENIA: Chronic | ICD-10-CM

## 2023-01-01 DIAGNOSIS — R53.1 GENERALIZED WEAKNESS: ICD-10-CM

## 2023-01-01 DIAGNOSIS — J96.01 ACUTE HYPOXEMIC RESPIRATORY FAILURE: ICD-10-CM

## 2023-01-01 DIAGNOSIS — J18.9 PNEUMONIA OF RIGHT LOWER LOBE DUE TO INFECTIOUS ORGANISM: ICD-10-CM

## 2023-01-01 DIAGNOSIS — E55.9 VITAMIN D DEFICIENCY: ICD-10-CM

## 2023-01-01 DIAGNOSIS — G82.50 QUADRIPLEGIA, UNSPECIFIED: Primary | ICD-10-CM

## 2023-01-01 DIAGNOSIS — G82.50 QUADRIPARESIS: Chronic | ICD-10-CM

## 2023-01-01 DIAGNOSIS — J18.9 PNEUMONIA OF LEFT LOWER LOBE DUE TO INFECTIOUS ORGANISM: ICD-10-CM

## 2023-01-01 DIAGNOSIS — R09.02 HYPOXIA: ICD-10-CM

## 2023-01-01 DIAGNOSIS — Z72.0 TOBACCO ABUSE: ICD-10-CM

## 2023-01-01 DIAGNOSIS — J44.9 CHRONIC OBSTRUCTIVE PULMONARY DISEASE, UNSPECIFIED COPD TYPE: ICD-10-CM

## 2023-01-01 DIAGNOSIS — F32.0 CURRENT MILD EPISODE OF MAJOR DEPRESSIVE DISORDER WITHOUT PRIOR EPISODE: ICD-10-CM

## 2023-01-01 DIAGNOSIS — J84.9 INTERSTITIAL PULMONARY DISEASE, UNSPECIFIED: ICD-10-CM

## 2023-01-01 DIAGNOSIS — R26.81 GAIT INSTABILITY: Chronic | ICD-10-CM

## 2023-01-01 DIAGNOSIS — L24.A2 IRRITANT CONTACT DERMATITIS DUE TO FECAL, URINARY OR DUAL INCONTINENCE: ICD-10-CM

## 2023-01-01 DIAGNOSIS — I50.9 CHF (CONGESTIVE HEART FAILURE): ICD-10-CM

## 2023-01-01 DIAGNOSIS — J84.10 PULMONARY GRANULOMA: ICD-10-CM

## 2023-01-01 DIAGNOSIS — R05.9 COUGH: ICD-10-CM

## 2023-01-01 DIAGNOSIS — I48.91 ATRIAL FIBRILLATION, UNSPECIFIED TYPE: Chronic | ICD-10-CM

## 2023-01-01 DIAGNOSIS — G72.41 INCLUSION BODY MYOSITIS (IBM): ICD-10-CM

## 2023-01-01 DIAGNOSIS — J96.01 ACUTE HYPOXEMIC RESPIRATORY FAILURE DUE TO COVID-19: Primary | ICD-10-CM

## 2023-01-01 DIAGNOSIS — J44.9 CHRONIC OBSTRUCTIVE PULMONARY DISEASE, UNSPECIFIED COPD TYPE: Primary | Chronic | ICD-10-CM

## 2023-01-01 DIAGNOSIS — A41.9 SEPSIS, DUE TO UNSPECIFIED ORGANISM, UNSPECIFIED WHETHER ACUTE ORGAN DYSFUNCTION PRESENT: ICD-10-CM

## 2023-01-01 DIAGNOSIS — R07.9 CHEST PAIN: ICD-10-CM

## 2023-01-01 DIAGNOSIS — M33.20 POLYMYOSITIS: Primary | ICD-10-CM

## 2023-01-01 DIAGNOSIS — U07.1 COVID: Primary | ICD-10-CM

## 2023-01-01 DIAGNOSIS — R06.02 SOB (SHORTNESS OF BREATH): ICD-10-CM

## 2023-01-01 DIAGNOSIS — L29.9 PRURITUS: ICD-10-CM

## 2023-01-01 DIAGNOSIS — K59.00 CONSTIPATION, UNSPECIFIED CONSTIPATION TYPE: ICD-10-CM

## 2023-01-01 DIAGNOSIS — D84.9 IMMUNOSUPPRESSION: Chronic | ICD-10-CM

## 2023-01-01 DIAGNOSIS — R10.2 SUPRAPUBIC ABDOMINAL PAIN: ICD-10-CM

## 2023-01-01 DIAGNOSIS — U07.1 ACUTE HYPOXEMIC RESPIRATORY FAILURE DUE TO COVID-19: ICD-10-CM

## 2023-01-01 DIAGNOSIS — I70.0 AORTIC ATHEROSCLEROSIS: Chronic | ICD-10-CM

## 2023-01-01 DIAGNOSIS — M33.20 POLYMYOSITIS: Chronic | ICD-10-CM

## 2023-01-01 DIAGNOSIS — G47.00 INSOMNIA, UNSPECIFIED TYPE: ICD-10-CM

## 2023-01-01 DIAGNOSIS — G82.50 QUADRIPARESIS: ICD-10-CM

## 2023-01-01 DIAGNOSIS — J96.01 ACUTE RESPIRATORY FAILURE WITH HYPOXIA: ICD-10-CM

## 2023-01-01 DIAGNOSIS — J96.01 ACUTE HYPOXEMIC RESPIRATORY FAILURE DUE TO COVID-19: ICD-10-CM

## 2023-01-01 DIAGNOSIS — U07.1 COVID-19 VIRUS DETECTED: ICD-10-CM

## 2023-01-01 DIAGNOSIS — J90 PLEURAL EFFUSION ON RIGHT: ICD-10-CM

## 2023-01-01 DIAGNOSIS — S61.401A OPEN WOUND OF RIGHT HAND, FOREIGN BODY PRESENCE UNSPECIFIED, UNSPECIFIED WOUND TYPE, INITIAL ENCOUNTER: ICD-10-CM

## 2023-01-01 DIAGNOSIS — R13.10 DYSPHAGIA, UNSPECIFIED TYPE: ICD-10-CM

## 2023-01-01 DIAGNOSIS — J18.9 PNEUMONIA OF BOTH LUNGS DUE TO INFECTIOUS ORGANISM, UNSPECIFIED PART OF LUNG: ICD-10-CM

## 2023-01-01 DIAGNOSIS — R13.12 OROPHARYNGEAL DYSPHAGIA: Chronic | ICD-10-CM

## 2023-01-01 DIAGNOSIS — R00.0 TACHYCARDIA: ICD-10-CM

## 2023-01-01 DIAGNOSIS — U07.1 ACUTE HYPOXEMIC RESPIRATORY FAILURE DUE TO COVID-19: Primary | ICD-10-CM

## 2023-01-01 DIAGNOSIS — M85.80 OSTEOPENIA, UNSPECIFIED LOCATION: ICD-10-CM

## 2023-01-01 DIAGNOSIS — I10 ESSENTIAL HYPERTENSION: Chronic | ICD-10-CM

## 2023-01-01 DIAGNOSIS — L02.91 ABSCESS: ICD-10-CM

## 2023-01-01 DIAGNOSIS — R53.81 DEBILITY: ICD-10-CM

## 2023-01-01 LAB
ABO + RH BLD: NORMAL
ALBUMIN SERPL BCP-MCNC: 2.1 G/DL (ref 3.5–5.2)
ALBUMIN SERPL BCP-MCNC: 2.5 G/DL (ref 3.5–5.2)
ALBUMIN SERPL BCP-MCNC: 2.5 G/DL (ref 3.5–5.2)
ALBUMIN SERPL BCP-MCNC: 3 G/DL (ref 3.5–5.2)
ALBUMIN SERPL BCP-MCNC: 3.1 G/DL (ref 3.5–5.2)
ALBUMIN SERPL BCP-MCNC: 3.2 G/DL (ref 3.5–5.2)
ALBUMIN SERPL BCP-MCNC: 3.2 G/DL (ref 3.5–5.2)
ALBUMIN SERPL BCP-MCNC: 3.3 G/DL (ref 3.5–5.2)
ALBUMIN SERPL BCP-MCNC: 3.4 G/DL (ref 3.5–5.2)
ALBUMIN SERPL BCP-MCNC: 3.5 G/DL (ref 3.5–5.2)
ALBUMIN SERPL BCP-MCNC: 3.5 G/DL (ref 3.5–5.2)
ALBUMIN SERPL BCP-MCNC: 3.7 G/DL (ref 3.5–5.2)
ALDOLASE SERPL-CCNC: 6.9 U/L (ref 1.2–7.6)
ALLENS TEST: ABNORMAL
ALLENS TEST: NORMAL
ALP SERPL-CCNC: 49 U/L (ref 55–135)
ALP SERPL-CCNC: 54 U/L (ref 55–135)
ALP SERPL-CCNC: 54 U/L (ref 55–135)
ALP SERPL-CCNC: 55 U/L (ref 55–135)
ALP SERPL-CCNC: 57 U/L (ref 55–135)
ALP SERPL-CCNC: 60 U/L (ref 55–135)
ALP SERPL-CCNC: 61 U/L (ref 55–135)
ALP SERPL-CCNC: 64 U/L (ref 55–135)
ALP SERPL-CCNC: 64 U/L (ref 55–135)
ALP SERPL-CCNC: 69 U/L (ref 55–135)
ALP SERPL-CCNC: 73 U/L (ref 55–135)
ALP SERPL-CCNC: 75 U/L (ref 55–135)
ALP SERPL-CCNC: 78 U/L (ref 55–135)
ALP SERPL-CCNC: 92 U/L (ref 55–135)
ALT SERPL W/O P-5'-P-CCNC: 11 U/L (ref 10–44)
ALT SERPL W/O P-5'-P-CCNC: 12 U/L (ref 10–44)
ALT SERPL W/O P-5'-P-CCNC: 12 U/L (ref 10–44)
ALT SERPL W/O P-5'-P-CCNC: 13 U/L (ref 10–44)
ALT SERPL W/O P-5'-P-CCNC: 14 U/L (ref 10–44)
ALT SERPL W/O P-5'-P-CCNC: 15 U/L (ref 10–44)
ALT SERPL W/O P-5'-P-CCNC: 16 U/L (ref 10–44)
ALT SERPL W/O P-5'-P-CCNC: 19 U/L (ref 10–44)
ALT SERPL W/O P-5'-P-CCNC: 8 U/L (ref 10–44)
ALT SERPL W/O P-5'-P-CCNC: 9 U/L (ref 10–44)
ANION GAP SERPL CALC-SCNC: 10 MMOL/L (ref 8–16)
ANION GAP SERPL CALC-SCNC: 11 MMOL/L (ref 8–16)
ANION GAP SERPL CALC-SCNC: 12 MMOL/L (ref 8–16)
ANION GAP SERPL CALC-SCNC: 12 MMOL/L (ref 8–16)
ANION GAP SERPL CALC-SCNC: 13 MMOL/L (ref 8–16)
ANION GAP SERPL CALC-SCNC: 14 MMOL/L (ref 8–16)
ANION GAP SERPL CALC-SCNC: 14 MMOL/L (ref 8–16)
ANION GAP SERPL CALC-SCNC: 7 MMOL/L (ref 8–16)
ANION GAP SERPL CALC-SCNC: 8 MMOL/L (ref 8–16)
ANION GAP SERPL CALC-SCNC: 9 MMOL/L (ref 8–16)
ANISOCYTOSIS BLD QL SMEAR: ABNORMAL
ANISOCYTOSIS BLD QL SMEAR: SLIGHT
APTT PPP: 38.4 SEC (ref 21–32)
APTT PPP: 40 SEC (ref 21–32)
ASCENDING AORTA: 3.55 CM
AST SERPL-CCNC: 10 U/L (ref 10–40)
AST SERPL-CCNC: 10 U/L (ref 10–40)
AST SERPL-CCNC: 13 U/L (ref 10–40)
AST SERPL-CCNC: 16 U/L (ref 10–40)
AST SERPL-CCNC: 17 U/L (ref 10–40)
AST SERPL-CCNC: 20 U/L (ref 10–40)
AST SERPL-CCNC: 31 U/L (ref 10–40)
AST SERPL-CCNC: 46 U/L (ref 10–40)
AST SERPL-CCNC: 5 U/L (ref 10–40)
AST SERPL-CCNC: 5 U/L (ref 10–40)
AST SERPL-CCNC: 6 U/L (ref 10–40)
AST SERPL-CCNC: 7 U/L (ref 10–40)
AST SERPL-CCNC: 8 U/L (ref 10–40)
AST SERPL-CCNC: 9 U/L (ref 10–40)
AV INDEX (PROSTH): 1.12
AV MEAN GRADIENT: 2 MMHG
AV PEAK GRADIENT: 3 MMHG
AV VALVE AREA: 3.56 CM2
AV VELOCITY RATIO: 0.98
BACTERIA #/AREA URNS AUTO: ABNORMAL /HPF
BACTERIA BLD CULT: NORMAL
BACTERIA SPEC AEROBE CULT: NORMAL
BACTERIA SPEC AEROBE CULT: NORMAL
BASOPHILS # BLD AUTO: 0 K/UL (ref 0–0.2)
BASOPHILS # BLD AUTO: 0.01 K/UL (ref 0–0.2)
BASOPHILS # BLD AUTO: 0.02 K/UL (ref 0–0.2)
BASOPHILS # BLD AUTO: 0.03 K/UL (ref 0–0.2)
BASOPHILS # BLD AUTO: 0.03 K/UL (ref 0–0.2)
BASOPHILS # BLD AUTO: 0.05 K/UL (ref 0–0.2)
BASOPHILS NFR BLD: 0 % (ref 0–1.9)
BASOPHILS NFR BLD: 0.1 % (ref 0–1.9)
BASOPHILS NFR BLD: 0.2 % (ref 0–1.9)
BASOPHILS NFR BLD: 0.3 % (ref 0–1.9)
BASOPHILS NFR BLD: 0.3 % (ref 0–1.9)
BILIRUB DIRECT SERPL-MCNC: 0.7 MG/DL (ref 0.1–0.3)
BILIRUB SERPL-MCNC: 0.7 MG/DL (ref 0.1–1)
BILIRUB SERPL-MCNC: 1 MG/DL (ref 0.1–1)
BILIRUB SERPL-MCNC: 1.1 MG/DL (ref 0.1–1)
BILIRUB SERPL-MCNC: 1.4 MG/DL (ref 0.1–1)
BILIRUB SERPL-MCNC: 1.5 MG/DL (ref 0.1–1)
BILIRUB SERPL-MCNC: 1.6 MG/DL (ref 0.1–1)
BILIRUB SERPL-MCNC: 1.6 MG/DL (ref 0.1–1)
BILIRUB SERPL-MCNC: 1.8 MG/DL (ref 0.1–1)
BILIRUB SERPL-MCNC: 1.8 MG/DL (ref 0.1–1)
BILIRUB SERPL-MCNC: 1.9 MG/DL (ref 0.1–1)
BILIRUB SERPL-MCNC: 2 MG/DL (ref 0.1–1)
BILIRUB SERPL-MCNC: 2.3 MG/DL (ref 0.1–1)
BILIRUB SERPL-MCNC: 2.3 MG/DL (ref 0.1–1)
BILIRUB SERPL-MCNC: 3.3 MG/DL (ref 0.1–1)
BILIRUB UR QL STRIP: NEGATIVE
BLD GP AB SCN CELLS X3 SERPL QL: NORMAL
BLD PROD TYP BPU: NORMAL
BLD PROD TYP BPU: NORMAL
BLOOD UNIT EXPIRATION DATE: NORMAL
BLOOD UNIT EXPIRATION DATE: NORMAL
BLOOD UNIT TYPE CODE: 5100
BLOOD UNIT TYPE CODE: 5100
BLOOD UNIT TYPE: NORMAL
BLOOD UNIT TYPE: NORMAL
BNP SERPL-MCNC: 12 PG/ML (ref 0–99)
BNP SERPL-MCNC: 17 PG/ML (ref 0–99)
BNP SERPL-MCNC: 288 PG/ML (ref 0–99)
BNP SERPL-MCNC: 45 PG/ML (ref 0–99)
BSA FOR ECHO PROCEDURE: 1.79 M2
BUN SERPL-MCNC: 10 MG/DL (ref 8–23)
BUN SERPL-MCNC: 12 MG/DL (ref 8–23)
BUN SERPL-MCNC: 13 MG/DL (ref 8–23)
BUN SERPL-MCNC: 19 MG/DL (ref 8–23)
BUN SERPL-MCNC: 20 MG/DL (ref 8–23)
BUN SERPL-MCNC: 5 MG/DL (ref 8–23)
BUN SERPL-MCNC: 7 MG/DL (ref 8–23)
BUN SERPL-MCNC: 8 MG/DL (ref 8–23)
BUN SERPL-MCNC: 8 MG/DL (ref 8–23)
CALCIUM SERPL-MCNC: 8 MG/DL (ref 8.7–10.5)
CALCIUM SERPL-MCNC: 8.1 MG/DL (ref 8.7–10.5)
CALCIUM SERPL-MCNC: 8.1 MG/DL (ref 8.7–10.5)
CALCIUM SERPL-MCNC: 8.2 MG/DL (ref 8.7–10.5)
CALCIUM SERPL-MCNC: 8.4 MG/DL (ref 8.7–10.5)
CALCIUM SERPL-MCNC: 8.5 MG/DL (ref 8.7–10.5)
CALCIUM SERPL-MCNC: 8.7 MG/DL (ref 8.7–10.5)
CALCIUM SERPL-MCNC: 8.8 MG/DL (ref 8.7–10.5)
CALCIUM SERPL-MCNC: 8.8 MG/DL (ref 8.7–10.5)
CALCIUM SERPL-MCNC: 8.9 MG/DL (ref 8.7–10.5)
CHLORIDE SERPL-SCNC: 100 MMOL/L (ref 95–110)
CHLORIDE SERPL-SCNC: 101 MMOL/L (ref 95–110)
CHLORIDE SERPL-SCNC: 102 MMOL/L (ref 95–110)
CHLORIDE SERPL-SCNC: 103 MMOL/L (ref 95–110)
CHLORIDE SERPL-SCNC: 104 MMOL/L (ref 95–110)
CHLORIDE SERPL-SCNC: 92 MMOL/L (ref 95–110)
CHLORIDE SERPL-SCNC: 94 MMOL/L (ref 95–110)
CHLORIDE SERPL-SCNC: 97 MMOL/L (ref 95–110)
CHLORIDE SERPL-SCNC: 97 MMOL/L (ref 95–110)
CHLORIDE SERPL-SCNC: 98 MMOL/L (ref 95–110)
CHLORIDE SERPL-SCNC: 99 MMOL/L (ref 95–110)
CHLORIDE SERPL-SCNC: 99 MMOL/L (ref 95–110)
CK SERPL-CCNC: <7 U/L (ref 20–200)
CLARITY UR REFRACT.AUTO: ABNORMAL
CLARITY UR REFRACT.AUTO: CLEAR
CO2 SERPL-SCNC: 17 MMOL/L (ref 23–29)
CO2 SERPL-SCNC: 19 MMOL/L (ref 23–29)
CO2 SERPL-SCNC: 19 MMOL/L (ref 23–29)
CO2 SERPL-SCNC: 20 MMOL/L (ref 23–29)
CO2 SERPL-SCNC: 20 MMOL/L (ref 23–29)
CO2 SERPL-SCNC: 21 MMOL/L (ref 23–29)
CO2 SERPL-SCNC: 21 MMOL/L (ref 23–29)
CO2 SERPL-SCNC: 22 MMOL/L (ref 23–29)
CO2 SERPL-SCNC: 22 MMOL/L (ref 23–29)
CO2 SERPL-SCNC: 23 MMOL/L (ref 23–29)
CO2 SERPL-SCNC: 23 MMOL/L (ref 23–29)
CO2 SERPL-SCNC: 25 MMOL/L (ref 23–29)
CO2 SERPL-SCNC: 25 MMOL/L (ref 23–29)
CO2 SERPL-SCNC: 26 MMOL/L (ref 23–29)
CO2 SERPL-SCNC: 30 MMOL/L (ref 23–29)
CODING SYSTEM: NORMAL
CODING SYSTEM: NORMAL
COLOR UR AUTO: ABNORMAL
COLOR UR AUTO: YELLOW
CREAT SERPL-MCNC: 0.3 MG/DL (ref 0.5–1.4)
CREAT SERPL-MCNC: 0.4 MG/DL (ref 0.5–1.4)
CREAT UR-MCNC: 35 MG/DL (ref 23–375)
CROSSMATCH INTERPRETATION: NORMAL
CROSSMATCH INTERPRETATION: NORMAL
CRP SERPL-MCNC: 127.1 MG/L (ref 0–8.2)
CV ECHO LV RWT: 0.38 CM
D DIMER PPP IA.FEU-MCNC: 17.62 MG/L FEU
D DIMER PPP IA.FEU-MCNC: <0.19 MG/L FEU
D DIMER PPP IA.FEU-MCNC: <0.19 MG/L FEU
DACRYOCYTES BLD QL SMEAR: ABNORMAL
DELSYS: ABNORMAL
DELSYS: ABNORMAL
DELSYS: NORMAL
DIFFERENTIAL METHOD: ABNORMAL
DISPENSE STATUS: NORMAL
DISPENSE STATUS: NORMAL
DOHLE BOD BLD QL SMEAR: PRESENT
DOP CALC AO PEAK VEL: 0.91 M/S
DOP CALC AO VTI: 13.15 CM
DOP CALC LVOT AREA: 3.2 CM2
DOP CALC LVOT DIAMETER: 2.01 CM
DOP CALC LVOT PEAK VEL: 0.89 M/S
DOP CALC LVOT STROKE VOLUME: 46.81 CM3
DOP CALCLVOT PEAK VEL VTI: 14.76 CM
ECHO LV POSTERIOR WALL: 0.89 CM (ref 0.6–1.1)
EJECTION FRACTION: 65 %
ENTEROVIRUS/RHINOVIRUS: NOT DETECTED
EOSINOPHIL # BLD AUTO: 0 K/UL (ref 0–0.5)
EOSINOPHIL # BLD AUTO: 0.2 K/UL (ref 0–0.5)
EOSINOPHIL NFR BLD: 0 % (ref 0–8)
EOSINOPHIL NFR BLD: 0.1 % (ref 0–8)
EOSINOPHIL NFR BLD: 0.2 % (ref 0–8)
EOSINOPHIL NFR BLD: 2 % (ref 0–8)
EP: 5
EP: 5
ERYTHROCYTE [DISTWIDTH] IN BLOOD BY AUTOMATED COUNT: 19.5 % (ref 11.5–14.5)
ERYTHROCYTE [DISTWIDTH] IN BLOOD BY AUTOMATED COUNT: 19.9 % (ref 11.5–14.5)
ERYTHROCYTE [DISTWIDTH] IN BLOOD BY AUTOMATED COUNT: 20.4 % (ref 11.5–14.5)
ERYTHROCYTE [DISTWIDTH] IN BLOOD BY AUTOMATED COUNT: 20.6 % (ref 11.5–14.5)
ERYTHROCYTE [DISTWIDTH] IN BLOOD BY AUTOMATED COUNT: 20.8 % (ref 11.5–14.5)
ERYTHROCYTE [DISTWIDTH] IN BLOOD BY AUTOMATED COUNT: 21.5 % (ref 11.5–14.5)
ERYTHROCYTE [DISTWIDTH] IN BLOOD BY AUTOMATED COUNT: 22.5 % (ref 11.5–14.5)
ERYTHROCYTE [DISTWIDTH] IN BLOOD BY AUTOMATED COUNT: 22.7 % (ref 11.5–14.5)
ERYTHROCYTE [DISTWIDTH] IN BLOOD BY AUTOMATED COUNT: 23.1 % (ref 11.5–14.5)
ERYTHROCYTE [DISTWIDTH] IN BLOOD BY AUTOMATED COUNT: 23.3 % (ref 11.5–14.5)
ERYTHROCYTE [DISTWIDTH] IN BLOOD BY AUTOMATED COUNT: 23.4 % (ref 11.5–14.5)
ERYTHROCYTE [DISTWIDTH] IN BLOOD BY AUTOMATED COUNT: 25.2 % (ref 11.5–14.5)
ERYTHROCYTE [DISTWIDTH] IN BLOOD BY AUTOMATED COUNT: 26.5 % (ref 11.5–14.5)
ERYTHROCYTE [DISTWIDTH] IN BLOOD BY AUTOMATED COUNT: 26.8 % (ref 11.5–14.5)
ERYTHROCYTE [DISTWIDTH] IN BLOOD BY AUTOMATED COUNT: 26.9 % (ref 11.5–14.5)
ERYTHROCYTE [DISTWIDTH] IN BLOOD BY AUTOMATED COUNT: 27.8 % (ref 11.5–14.5)
ERYTHROCYTE [DISTWIDTH] IN BLOOD BY AUTOMATED COUNT: 27.9 % (ref 11.5–14.5)
ERYTHROCYTE [DISTWIDTH] IN BLOOD BY AUTOMATED COUNT: 28.7 % (ref 11.5–14.5)
ERYTHROCYTE [DISTWIDTH] IN BLOOD BY AUTOMATED COUNT: 28.7 % (ref 11.5–14.5)
ERYTHROCYTE [DISTWIDTH] IN BLOOD BY AUTOMATED COUNT: 29.1 % (ref 11.5–14.5)
ERYTHROCYTE [SEDIMENTATION RATE] IN BLOOD BY PHOTOMETRIC METHOD: 5 MM/HR (ref 0–23)
ERYTHROCYTE [SEDIMENTATION RATE] IN BLOOD BY PHOTOMETRIC METHOD: <2 MM/HR (ref 0–23)
ERYTHROCYTE [SEDIMENTATION RATE] IN BLOOD BY WESTERGREN METHOD: 12 MM/H
ERYTHROCYTE [SEDIMENTATION RATE] IN BLOOD BY WESTERGREN METHOD: 18 MM/H
EST. GFR  (NO RACE VARIABLE): >60 ML/MIN/1.73 M^2
FERRITIN SERPL-MCNC: 2093 NG/ML (ref 20–300)
FERRITIN SERPL-MCNC: 2615 NG/ML (ref 20–300)
FERRITIN SERPL-MCNC: 2887 NG/ML (ref 20–300)
FIO2: 40
FIO2: 60
FRACTIONAL SHORTENING: 31 % (ref 28–44)
GAMMA INTERFERON BACKGROUND BLD IA-ACNC: 0 IU/ML
GLUCOSE SERPL-MCNC: 106 MG/DL (ref 70–110)
GLUCOSE SERPL-MCNC: 121 MG/DL (ref 70–110)
GLUCOSE SERPL-MCNC: 47 MG/DL (ref 70–110)
GLUCOSE SERPL-MCNC: 49 MG/DL (ref 70–110)
GLUCOSE SERPL-MCNC: 60 MG/DL (ref 70–110)
GLUCOSE SERPL-MCNC: 64 MG/DL (ref 70–110)
GLUCOSE SERPL-MCNC: 68 MG/DL (ref 70–110)
GLUCOSE SERPL-MCNC: 72 MG/DL (ref 70–110)
GLUCOSE SERPL-MCNC: 78 MG/DL (ref 70–110)
GLUCOSE SERPL-MCNC: 83 MG/DL (ref 70–110)
GLUCOSE SERPL-MCNC: 87 MG/DL (ref 70–110)
GLUCOSE SERPL-MCNC: 89 MG/DL (ref 70–110)
GLUCOSE SERPL-MCNC: 90 MG/DL (ref 70–110)
GLUCOSE SERPL-MCNC: 90 MG/DL (ref 70–110)
GLUCOSE SERPL-MCNC: 94 MG/DL (ref 70–110)
GLUCOSE SERPL-MCNC: 94 MG/DL (ref 70–110)
GLUCOSE SERPL-MCNC: 95 MG/DL (ref 70–110)
GLUCOSE UR QL STRIP: NEGATIVE
GRAM STN SPEC: NORMAL
HAPTOGLOB SERPL-MCNC: 185 MG/DL (ref 30–250)
HAV IGG SER QL IA: NORMAL
HBV CORE AB SERPL QL IA: NORMAL
HBV SURFACE AB SER-ACNC: <3 MIU/ML
HBV SURFACE AB SER-ACNC: NORMAL M[IU]/ML
HBV SURFACE AG SERPL QL IA: NORMAL
HCO3 UR-SCNC: 19.7 MMOL/L (ref 24–28)
HCO3 UR-SCNC: 20.9 MMOL/L (ref 24–28)
HCO3 UR-SCNC: 21.6 MMOL/L (ref 24–28)
HCO3 UR-SCNC: 22.3 MMOL/L (ref 24–28)
HCO3 UR-SCNC: 26.3 MMOL/L (ref 24–28)
HCT VFR BLD AUTO: 18.5 % (ref 40–54)
HCT VFR BLD AUTO: 22.5 % (ref 40–54)
HCT VFR BLD AUTO: 24.9 % (ref 40–54)
HCT VFR BLD AUTO: 25.1 % (ref 40–54)
HCT VFR BLD AUTO: 25.1 % (ref 40–54)
HCT VFR BLD AUTO: 25.5 % (ref 40–54)
HCT VFR BLD AUTO: 26.2 % (ref 40–54)
HCT VFR BLD AUTO: 26.5 % (ref 40–54)
HCT VFR BLD AUTO: 27.2 % (ref 40–54)
HCT VFR BLD AUTO: 27.4 % (ref 40–54)
HCT VFR BLD AUTO: 27.6 % (ref 40–54)
HCT VFR BLD AUTO: 27.8 % (ref 40–54)
HCT VFR BLD AUTO: 28.7 % (ref 40–54)
HCT VFR BLD AUTO: 29 % (ref 40–54)
HCT VFR BLD AUTO: 29.2 % (ref 40–54)
HCT VFR BLD AUTO: 31.1 % (ref 40–54)
HCT VFR BLD AUTO: 31.3 % (ref 40–54)
HCT VFR BLD AUTO: 31.5 % (ref 40–54)
HCT VFR BLD AUTO: 32.2 % (ref 40–54)
HCT VFR BLD AUTO: 33.5 % (ref 40–54)
HCV AB SERPL QL IA: NORMAL
HCV AB SERPL QL IA: NORMAL
HGB BLD-MCNC: 10.2 G/DL (ref 14–18)
HGB BLD-MCNC: 5.9 G/DL (ref 14–18)
HGB BLD-MCNC: 5.9 G/DL (ref 14–18)
HGB BLD-MCNC: 7.4 G/DL (ref 14–18)
HGB BLD-MCNC: 7.8 G/DL (ref 14–18)
HGB BLD-MCNC: 7.9 G/DL (ref 14–18)
HGB BLD-MCNC: 8 G/DL (ref 14–18)
HGB BLD-MCNC: 8.3 G/DL (ref 14–18)
HGB BLD-MCNC: 8.7 G/DL (ref 14–18)
HGB BLD-MCNC: 8.7 G/DL (ref 14–18)
HGB BLD-MCNC: 8.8 G/DL (ref 14–18)
HGB BLD-MCNC: 9 G/DL (ref 14–18)
HGB BLD-MCNC: 9.3 G/DL (ref 14–18)
HGB BLD-MCNC: 9.3 G/DL (ref 14–18)
HGB BLD-MCNC: 9.4 G/DL (ref 14–18)
HGB BLD-MCNC: 9.5 G/DL (ref 14–18)
HGB BLD-MCNC: 9.6 G/DL (ref 14–18)
HGB BLD-MCNC: 9.8 G/DL (ref 14–18)
HGB BLD-MCNC: 9.8 G/DL (ref 14–18)
HGB UR QL STRIP: ABNORMAL
HGB UR QL STRIP: NEGATIVE
HIV 1+2 AB+HIV1 P24 AG SERPL QL IA: NORMAL
HIV 1+2 AB+HIV1 P24 AG SERPL QL IA: NORMAL
HUMAN BOCAVIRUS: NOT DETECTED
HUMAN CORONAVIRUS, COMMON COLD VIRUS: NOT DETECTED
HYALINE CASTS UR QL AUTO: 7 /LPF
HYPOCHROMIA BLD QL SMEAR: ABNORMAL
IGA SERPL-MCNC: 239 MG/DL (ref 40–350)
IGG SERPL-MCNC: 1231 MG/DL (ref 650–1600)
IGM SERPL-MCNC: 27 MG/DL (ref 50–300)
IMM GRANULOCYTES # BLD AUTO: 0.02 K/UL (ref 0–0.04)
IMM GRANULOCYTES # BLD AUTO: 0.03 K/UL (ref 0–0.04)
IMM GRANULOCYTES # BLD AUTO: 0.04 K/UL (ref 0–0.04)
IMM GRANULOCYTES # BLD AUTO: 0.05 K/UL (ref 0–0.04)
IMM GRANULOCYTES # BLD AUTO: 0.05 K/UL (ref 0–0.04)
IMM GRANULOCYTES # BLD AUTO: 0.06 K/UL (ref 0–0.04)
IMM GRANULOCYTES # BLD AUTO: 0.06 K/UL (ref 0–0.04)
IMM GRANULOCYTES # BLD AUTO: 0.09 K/UL (ref 0–0.04)
IMM GRANULOCYTES # BLD AUTO: 0.09 K/UL (ref 0–0.04)
IMM GRANULOCYTES # BLD AUTO: 0.1 K/UL (ref 0–0.04)
IMM GRANULOCYTES # BLD AUTO: 0.1 K/UL (ref 0–0.04)
IMM GRANULOCYTES # BLD AUTO: 0.11 K/UL (ref 0–0.04)
IMM GRANULOCYTES # BLD AUTO: 0.12 K/UL (ref 0–0.04)
IMM GRANULOCYTES # BLD AUTO: 0.13 K/UL (ref 0–0.04)
IMM GRANULOCYTES # BLD AUTO: 0.15 K/UL (ref 0–0.04)
IMM GRANULOCYTES # BLD AUTO: 0.18 K/UL (ref 0–0.04)
IMM GRANULOCYTES # BLD AUTO: 0.24 K/UL (ref 0–0.04)
IMM GRANULOCYTES # BLD AUTO: 0.24 K/UL (ref 0–0.04)
IMM GRANULOCYTES # BLD AUTO: ABNORMAL K/UL (ref 0–0.04)
IMM GRANULOCYTES NFR BLD AUTO: 0.4 % (ref 0–0.5)
IMM GRANULOCYTES NFR BLD AUTO: 0.4 % (ref 0–0.5)
IMM GRANULOCYTES NFR BLD AUTO: 0.5 % (ref 0–0.5)
IMM GRANULOCYTES NFR BLD AUTO: 0.6 % (ref 0–0.5)
IMM GRANULOCYTES NFR BLD AUTO: 0.6 % (ref 0–0.5)
IMM GRANULOCYTES NFR BLD AUTO: 0.7 % (ref 0–0.5)
IMM GRANULOCYTES NFR BLD AUTO: 0.8 % (ref 0–0.5)
IMM GRANULOCYTES NFR BLD AUTO: 0.8 % (ref 0–0.5)
IMM GRANULOCYTES NFR BLD AUTO: 0.9 % (ref 0–0.5)
IMM GRANULOCYTES NFR BLD AUTO: 0.9 % (ref 0–0.5)
IMM GRANULOCYTES NFR BLD AUTO: 1 % (ref 0–0.5)
IMM GRANULOCYTES NFR BLD AUTO: 1.1 % (ref 0–0.5)
IMM GRANULOCYTES NFR BLD AUTO: 1.5 % (ref 0–0.5)
IMM GRANULOCYTES NFR BLD AUTO: 2.8 % (ref 0–0.5)
IMM GRANULOCYTES NFR BLD AUTO: ABNORMAL % (ref 0–0.5)
INFLUENZA A - H1N1-09: NOT DETECTED
INFLUENZA A, MOLECULAR: NEGATIVE
INFLUENZA B, MOLECULAR: NEGATIVE
INR PPP: 1.1 (ref 0.8–1.2)
INR PPP: 1.2 (ref 0.8–1.2)
INR PPP: 1.3 (ref 0.8–1.2)
INTERVENTRICULAR SEPTUM: 0.62 CM (ref 0.6–1.1)
IP: 10
IP: 15
KETONES UR QL STRIP: ABNORMAL
KETONES UR QL STRIP: ABNORMAL
KETONES UR QL STRIP: NEGATIVE
KETONES UR QL STRIP: NEGATIVE
L PNEUMO AG UR QL IA: NEGATIVE
LA MAJOR: 4.87 CM
LA MINOR: 5.16 CM
LA WIDTH: 3.48 CM
LACTATE SERPL-SCNC: 0.9 MMOL/L (ref 0.5–2.2)
LACTATE SERPL-SCNC: 1.3 MMOL/L (ref 0.5–2.2)
LDH SERPL L TO P-CCNC: 1.06 MMOL/L (ref 0.5–2.2)
LDH SERPL L TO P-CCNC: 1.26 MMOL/L (ref 0.5–2.2)
LDH SERPL L TO P-CCNC: 1.72 MMOL/L (ref 0.5–2.2)
LDH SERPL L TO P-CCNC: 1.97 MMOL/L (ref 0.5–2.2)
LDH SERPL L TO P-CCNC: 153 U/L (ref 110–260)
LDH SERPL L TO P-CCNC: 168 U/L (ref 110–260)
LDH SERPL L TO P-CCNC: 231 U/L (ref 110–260)
LDH SERPL L TO P-CCNC: 3.23 MMOL/L (ref 0.5–2.2)
LDH SERPL L TO P-CCNC: 98 U/L (ref 110–260)
LEFT ATRIUM SIZE: 3.74 CM
LEFT ATRIUM VOLUME INDEX: 30.6 ML/M2
LEFT ATRIUM VOLUME: 55.43 CM3
LEFT INTERNAL DIMENSION IN SYSTOLE: 3.27 CM (ref 2.1–4)
LEFT VENTRICLE DIASTOLIC VOLUME INDEX: 56.85 ML/M2
LEFT VENTRICLE DIASTOLIC VOLUME: 102.9 ML
LEFT VENTRICLE MASS INDEX: 63 G/M2
LEFT VENTRICLE SYSTOLIC VOLUME INDEX: 23.8 ML/M2
LEFT VENTRICLE SYSTOLIC VOLUME: 43.03 ML
LEFT VENTRICULAR INTERNAL DIMENSION IN DIASTOLE: 4.71 CM (ref 3.5–6)
LEFT VENTRICULAR MASS: 113.88 G
LEGIONELLA PNEUMOPHILA: NOT DETECTED
LEUKOCYTE ESTERASE UR QL STRIP: NEGATIVE
LYMPHOCYTES # BLD AUTO: 0.2 K/UL (ref 1–4.8)
LYMPHOCYTES # BLD AUTO: 0.3 K/UL (ref 1–4.8)
LYMPHOCYTES # BLD AUTO: 0.5 K/UL (ref 1–4.8)
LYMPHOCYTES # BLD AUTO: 0.5 K/UL (ref 1–4.8)
LYMPHOCYTES # BLD AUTO: 0.6 K/UL (ref 1–4.8)
LYMPHOCYTES # BLD AUTO: 0.7 K/UL (ref 1–4.8)
LYMPHOCYTES # BLD AUTO: 0.7 K/UL (ref 1–4.8)
LYMPHOCYTES # BLD AUTO: 0.8 K/UL (ref 1–4.8)
LYMPHOCYTES # BLD AUTO: 0.8 K/UL (ref 1–4.8)
LYMPHOCYTES # BLD AUTO: 1 K/UL (ref 1–4.8)
LYMPHOCYTES # BLD AUTO: 1.2 K/UL (ref 1–4.8)
LYMPHOCYTES # BLD AUTO: 1.4 K/UL (ref 1–4.8)
LYMPHOCYTES # BLD AUTO: 1.4 K/UL (ref 1–4.8)
LYMPHOCYTES # BLD AUTO: 1.7 K/UL (ref 1–4.8)
LYMPHOCYTES # BLD AUTO: 1.8 K/UL (ref 1–4.8)
LYMPHOCYTES # BLD AUTO: 1.9 K/UL (ref 1–4.8)
LYMPHOCYTES # BLD AUTO: 2 K/UL (ref 1–4.8)
LYMPHOCYTES # BLD AUTO: 2 K/UL (ref 1–4.8)
LYMPHOCYTES NFR BLD: 1.5 % (ref 18–48)
LYMPHOCYTES NFR BLD: 11.2 % (ref 18–48)
LYMPHOCYTES NFR BLD: 17.7 % (ref 18–48)
LYMPHOCYTES NFR BLD: 2.7 % (ref 18–48)
LYMPHOCYTES NFR BLD: 3.6 % (ref 18–48)
LYMPHOCYTES NFR BLD: 31.1 % (ref 18–48)
LYMPHOCYTES NFR BLD: 35.2 % (ref 18–48)
LYMPHOCYTES NFR BLD: 4.8 % (ref 18–48)
LYMPHOCYTES NFR BLD: 4.9 % (ref 18–48)
LYMPHOCYTES NFR BLD: 44.3 % (ref 18–48)
LYMPHOCYTES NFR BLD: 7 % (ref 18–48)
LYMPHOCYTES NFR BLD: 7.4 % (ref 18–48)
LYMPHOCYTES NFR BLD: 7.6 % (ref 18–48)
LYMPHOCYTES NFR BLD: 7.7 % (ref 18–48)
LYMPHOCYTES NFR BLD: 7.9 % (ref 18–48)
LYMPHOCYTES NFR BLD: 8.5 % (ref 18–48)
LYMPHOCYTES NFR BLD: 8.9 % (ref 18–48)
LYMPHOCYTES NFR BLD: 9.1 % (ref 18–48)
LYMPHOCYTES NFR BLD: 9.9 % (ref 18–48)
M TB IFN-G CD4+ BCKGRND COR BLD-ACNC: 0 IU/ML
MAGNESIUM SERPL-MCNC: 1.6 MG/DL (ref 1.6–2.6)
MAGNESIUM SERPL-MCNC: 1.8 MG/DL (ref 1.6–2.6)
MAGNESIUM SERPL-MCNC: 1.8 MG/DL (ref 1.6–2.6)
MAGNESIUM SERPL-MCNC: 2 MG/DL (ref 1.6–2.6)
MAGNESIUM SERPL-MCNC: 2.2 MG/DL (ref 1.6–2.6)
MAGNESIUM SERPL-MCNC: 2.4 MG/DL (ref 1.6–2.6)
MCH RBC QN AUTO: 17.2 PG (ref 27–31)
MCH RBC QN AUTO: 17.5 PG (ref 27–31)
MCH RBC QN AUTO: 17.7 PG (ref 27–31)
MCH RBC QN AUTO: 17.7 PG (ref 27–31)
MCH RBC QN AUTO: 17.8 PG (ref 27–31)
MCH RBC QN AUTO: 17.8 PG (ref 27–31)
MCH RBC QN AUTO: 17.9 PG (ref 27–31)
MCH RBC QN AUTO: 18 PG (ref 27–31)
MCH RBC QN AUTO: 18.1 PG (ref 27–31)
MCH RBC QN AUTO: 19 PG (ref 27–31)
MCH RBC QN AUTO: 19.6 PG (ref 27–31)
MCH RBC QN AUTO: 19.8 PG (ref 27–31)
MCH RBC QN AUTO: 19.8 PG (ref 27–31)
MCH RBC QN AUTO: 19.9 PG (ref 27–31)
MCH RBC QN AUTO: 20 PG (ref 27–31)
MCH RBC QN AUTO: 20 PG (ref 27–31)
MCH RBC QN AUTO: 20.3 PG (ref 27–31)
MCH RBC QN AUTO: 20.5 PG (ref 27–31)
MCHC RBC AUTO-ENTMCNC: 29.2 G/DL (ref 32–36)
MCHC RBC AUTO-ENTMCNC: 30.2 G/DL (ref 32–36)
MCHC RBC AUTO-ENTMCNC: 30.4 G/DL (ref 32–36)
MCHC RBC AUTO-ENTMCNC: 30.4 G/DL (ref 32–36)
MCHC RBC AUTO-ENTMCNC: 31.1 G/DL (ref 32–36)
MCHC RBC AUTO-ENTMCNC: 31.3 G/DL (ref 32–36)
MCHC RBC AUTO-ENTMCNC: 31.3 G/DL (ref 32–36)
MCHC RBC AUTO-ENTMCNC: 31.4 G/DL (ref 32–36)
MCHC RBC AUTO-ENTMCNC: 31.5 G/DL (ref 32–36)
MCHC RBC AUTO-ENTMCNC: 31.5 G/DL (ref 32–36)
MCHC RBC AUTO-ENTMCNC: 31.7 G/DL (ref 32–36)
MCHC RBC AUTO-ENTMCNC: 31.8 G/DL (ref 32–36)
MCHC RBC AUTO-ENTMCNC: 31.8 G/DL (ref 32–36)
MCHC RBC AUTO-ENTMCNC: 31.9 G/DL (ref 32–36)
MCHC RBC AUTO-ENTMCNC: 31.9 G/DL (ref 32–36)
MCHC RBC AUTO-ENTMCNC: 32.1 G/DL (ref 32–36)
MCHC RBC AUTO-ENTMCNC: 32.4 G/DL (ref 32–36)
MCHC RBC AUTO-ENTMCNC: 32.6 G/DL (ref 32–36)
MCHC RBC AUTO-ENTMCNC: 32.9 G/DL (ref 32–36)
MCHC RBC AUTO-ENTMCNC: 33.4 G/DL (ref 32–36)
MCV RBC AUTO: 56 FL (ref 82–98)
MCV RBC AUTO: 57 FL (ref 82–98)
MCV RBC AUTO: 58 FL (ref 82–98)
MCV RBC AUTO: 59 FL (ref 82–98)
MCV RBC AUTO: 61 FL (ref 82–98)
MCV RBC AUTO: 62 FL (ref 82–98)
MCV RBC AUTO: 63 FL (ref 82–98)
MICROSCOPIC COMMENT: ABNORMAL
MIN VOL: 16.8
MIN VOL: 17.8
MITOGEN IGNF BCKGRD COR BLD-ACNC: 8.69 IU/ML
MODE: ABNORMAL
MODE: ABNORMAL
MODE: NORMAL
MONOCYTES # BLD AUTO: 0.3 K/UL (ref 0.3–1)
MONOCYTES # BLD AUTO: 0.4 K/UL (ref 0.3–1)
MONOCYTES # BLD AUTO: 0.5 K/UL (ref 0.3–1)
MONOCYTES # BLD AUTO: 0.5 K/UL (ref 0.3–1)
MONOCYTES # BLD AUTO: 0.6 K/UL (ref 0.3–1)
MONOCYTES # BLD AUTO: 0.6 K/UL (ref 0.3–1)
MONOCYTES # BLD AUTO: 0.7 K/UL (ref 0.3–1)
MONOCYTES # BLD AUTO: 0.7 K/UL (ref 0.3–1)
MONOCYTES # BLD AUTO: 0.9 K/UL (ref 0.3–1)
MONOCYTES # BLD AUTO: 1 K/UL (ref 0.3–1)
MONOCYTES # BLD AUTO: 1.4 K/UL (ref 0.3–1)
MONOCYTES # BLD AUTO: 1.6 K/UL (ref 0.3–1)
MONOCYTES NFR BLD: 10 % (ref 4–15)
MONOCYTES NFR BLD: 2.1 % (ref 4–15)
MONOCYTES NFR BLD: 3.1 % (ref 4–15)
MONOCYTES NFR BLD: 3.2 % (ref 4–15)
MONOCYTES NFR BLD: 3.5 % (ref 4–15)
MONOCYTES NFR BLD: 3.6 % (ref 4–15)
MONOCYTES NFR BLD: 3.7 % (ref 4–15)
MONOCYTES NFR BLD: 4.1 % (ref 4–15)
MONOCYTES NFR BLD: 4.4 % (ref 4–15)
MONOCYTES NFR BLD: 4.5 % (ref 4–15)
MONOCYTES NFR BLD: 5 % (ref 4–15)
MONOCYTES NFR BLD: 5.2 % (ref 4–15)
MONOCYTES NFR BLD: 6.9 % (ref 4–15)
MONOCYTES NFR BLD: 8.3 % (ref 4–15)
MONOCYTES NFR BLD: 8.8 % (ref 4–15)
MONOCYTES NFR BLD: 8.9 % (ref 4–15)
MONOCYTES NFR BLD: 9.2 % (ref 4–15)
MORAXELLA CATARRHALIS: NOT DETECTED
MRSA SPEC QL CULT: NORMAL
NEUTROPHILS # BLD AUTO: 1.9 K/UL (ref 1.8–7.7)
NEUTROPHILS # BLD AUTO: 10.8 K/UL (ref 1.8–7.7)
NEUTROPHILS # BLD AUTO: 12.6 K/UL (ref 1.8–7.7)
NEUTROPHILS # BLD AUTO: 13.3 K/UL (ref 1.8–7.7)
NEUTROPHILS # BLD AUTO: 13.3 K/UL (ref 1.8–7.7)
NEUTROPHILS # BLD AUTO: 13.6 K/UL (ref 1.8–7.7)
NEUTROPHILS # BLD AUTO: 14 K/UL (ref 1.8–7.7)
NEUTROPHILS # BLD AUTO: 14.4 K/UL (ref 1.8–7.7)
NEUTROPHILS # BLD AUTO: 16.3 K/UL (ref 1.8–7.7)
NEUTROPHILS # BLD AUTO: 3.2 K/UL (ref 1.8–7.7)
NEUTROPHILS # BLD AUTO: 3.8 K/UL (ref 1.8–7.7)
NEUTROPHILS # BLD AUTO: 5 K/UL (ref 1.8–7.7)
NEUTROPHILS # BLD AUTO: 6.1 K/UL (ref 1.8–7.7)
NEUTROPHILS # BLD AUTO: 7.2 K/UL (ref 1.8–7.7)
NEUTROPHILS # BLD AUTO: 7.3 K/UL (ref 1.8–7.7)
NEUTROPHILS # BLD AUTO: 9.4 K/UL (ref 1.8–7.7)
NEUTROPHILS # BLD AUTO: 9.6 K/UL (ref 1.8–7.7)
NEUTROPHILS # BLD AUTO: 9.7 K/UL (ref 1.8–7.7)
NEUTROPHILS NFR BLD: 45.2 % (ref 38–73)
NEUTROPHILS NFR BLD: 55.3 % (ref 38–73)
NEUTROPHILS NFR BLD: 59.7 % (ref 38–73)
NEUTROPHILS NFR BLD: 74.6 % (ref 38–73)
NEUTROPHILS NFR BLD: 81.6 % (ref 38–73)
NEUTROPHILS NFR BLD: 83 % (ref 38–73)
NEUTROPHILS NFR BLD: 83.8 % (ref 38–73)
NEUTROPHILS NFR BLD: 84.1 % (ref 38–73)
NEUTROPHILS NFR BLD: 84.6 % (ref 38–73)
NEUTROPHILS NFR BLD: 85.8 % (ref 38–73)
NEUTROPHILS NFR BLD: 86 % (ref 38–73)
NEUTROPHILS NFR BLD: 86.3 % (ref 38–73)
NEUTROPHILS NFR BLD: 86.4 % (ref 38–73)
NEUTROPHILS NFR BLD: 86.6 % (ref 38–73)
NEUTROPHILS NFR BLD: 87.7 % (ref 38–73)
NEUTROPHILS NFR BLD: 89 % (ref 38–73)
NEUTROPHILS NFR BLD: 90.5 % (ref 38–73)
NEUTROPHILS NFR BLD: 93.5 % (ref 38–73)
NEUTROPHILS NFR BLD: 93.8 % (ref 38–73)
NEUTS BAND NFR BLD MANUAL: 5 %
NITRITE UR QL STRIP: NEGATIVE
NRBC BLD-RTO: 0 /100 WBC
NRBC BLD-RTO: 1 /100 WBC
NRBC BLD-RTO: 4 /100 WBC
NRBC BLD-RTO: 4 /100 WBC
NRBC BLD-RTO: 5 /100 WBC
NRBC BLD-RTO: 7 /100 WBC
NUM UNITS TRANS PACKED RBC: NORMAL
NUM UNITS TRANS PACKED RBC: NORMAL
OSMOLALITY UR: 594 MOSM/KG (ref 50–1200)
OVALOCYTES BLD QL SMEAR: ABNORMAL
PARAINFLUENZA: NOT DETECTED
PCO2 BLDA: 34 MMHG (ref 35–45)
PCO2 BLDA: 35.4 MMHG (ref 35–45)
PCO2 BLDA: 36 MMHG (ref 35–45)
PCO2 BLDA: 37 MMHG (ref 35–45)
PCO2 BLDA: 56.2 MMHG (ref 35–45)
PH SMN: 7.28 [PH] (ref 7.35–7.45)
PH SMN: 7.37 [PH] (ref 7.35–7.45)
PH SMN: 7.41 [PH] (ref 7.35–7.45)
PH UR STRIP: 5 [PH] (ref 5–8)
PH UR STRIP: 6 [PH] (ref 5–8)
PH UR STRIP: 8 [PH] (ref 5–8)
PH UR STRIP: 8 [PH] (ref 5–8)
PHOSPHATE SERPL-MCNC: 2.2 MG/DL (ref 2.7–4.5)
PHOSPHATE SERPL-MCNC: 2.5 MG/DL (ref 2.7–4.5)
PHOSPHATE SERPL-MCNC: 2.7 MG/DL (ref 2.7–4.5)
PHOSPHATE SERPL-MCNC: 2.8 MG/DL (ref 2.7–4.5)
PISA TR MAX VEL: 2.8 M/S
PLATELET # BLD AUTO: 107 K/UL (ref 150–450)
PLATELET # BLD AUTO: 109 K/UL (ref 150–450)
PLATELET # BLD AUTO: 111 K/UL (ref 150–450)
PLATELET # BLD AUTO: 149 K/UL (ref 150–450)
PLATELET # BLD AUTO: 156 K/UL (ref 150–450)
PLATELET # BLD AUTO: 158 K/UL (ref 150–450)
PLATELET # BLD AUTO: 158 K/UL (ref 150–450)
PLATELET # BLD AUTO: 159 K/UL (ref 150–450)
PLATELET # BLD AUTO: 159 K/UL (ref 150–450)
PLATELET # BLD AUTO: 161 K/UL (ref 150–450)
PLATELET # BLD AUTO: 163 K/UL (ref 150–450)
PLATELET # BLD AUTO: 165 K/UL (ref 150–450)
PLATELET # BLD AUTO: 171 K/UL (ref 150–450)
PLATELET # BLD AUTO: 175 K/UL (ref 150–450)
PLATELET # BLD AUTO: 176 K/UL (ref 150–450)
PLATELET # BLD AUTO: 180 K/UL (ref 150–450)
PLATELET # BLD AUTO: 183 K/UL (ref 150–450)
PLATELET # BLD AUTO: 185 K/UL (ref 150–450)
PLATELET # BLD AUTO: 191 K/UL (ref 150–450)
PLATELET # BLD AUTO: 98 K/UL (ref 150–450)
PLATELET BLD QL SMEAR: ABNORMAL
PMV BLD AUTO: ABNORMAL FL (ref 9.2–12.9)
PO2 BLDA: 30 MMHG (ref 40–60)
PO2 BLDA: 34 MMHG (ref 40–60)
PO2 BLDA: 35 MMHG (ref 40–60)
PO2 BLDA: 38 MMHG (ref 40–60)
PO2 BLDA: 44 MMHG (ref 40–60)
POC BE: -1 MMOL/L
POC BE: -2 MMOL/L
POC BE: -4 MMOL/L
POC BE: -4 MMOL/L
POC BE: -6 MMOL/L
POC SATURATED O2: 49 % (ref 95–100)
POC SATURATED O2: 65 % (ref 95–100)
POC SATURATED O2: 65 % (ref 95–100)
POC SATURATED O2: 73 % (ref 95–100)
POC SATURATED O2: 78 % (ref 95–100)
POC TCO2: 21 MMOL/L (ref 24–29)
POC TCO2: 22 MMOL/L (ref 24–29)
POC TCO2: 23 MMOL/L (ref 24–29)
POC TCO2: 23 MMOL/L (ref 24–29)
POC TCO2: 28 MMOL/L (ref 24–29)
POCT GLUCOSE: 105 MG/DL (ref 70–110)
POCT GLUCOSE: 105 MG/DL (ref 70–110)
POCT GLUCOSE: 108 MG/DL (ref 70–110)
POCT GLUCOSE: 109 MG/DL (ref 70–110)
POCT GLUCOSE: 110 MG/DL (ref 70–110)
POCT GLUCOSE: 113 MG/DL (ref 70–110)
POCT GLUCOSE: 115 MG/DL (ref 70–110)
POCT GLUCOSE: 120 MG/DL (ref 70–110)
POCT GLUCOSE: 124 MG/DL (ref 70–110)
POCT GLUCOSE: 124 MG/DL (ref 70–110)
POCT GLUCOSE: 145 MG/DL (ref 70–110)
POCT GLUCOSE: 169 MG/DL (ref 70–110)
POCT GLUCOSE: 195 MG/DL (ref 70–110)
POCT GLUCOSE: 213 MG/DL (ref 70–110)
POCT GLUCOSE: 219 MG/DL (ref 70–110)
POCT GLUCOSE: 231 MG/DL (ref 70–110)
POCT GLUCOSE: 239 MG/DL (ref 70–110)
POCT GLUCOSE: 241 MG/DL (ref 70–110)
POCT GLUCOSE: 50 MG/DL (ref 70–110)
POCT GLUCOSE: 73 MG/DL (ref 70–110)
POCT GLUCOSE: 74 MG/DL (ref 70–110)
POCT GLUCOSE: 76 MG/DL (ref 70–110)
POCT GLUCOSE: 77 MG/DL (ref 70–110)
POCT GLUCOSE: 84 MG/DL (ref 70–110)
POCT GLUCOSE: 91 MG/DL (ref 70–110)
POCT GLUCOSE: 94 MG/DL (ref 70–110)
POCT GLUCOSE: 94 MG/DL (ref 70–110)
POCT GLUCOSE: 97 MG/DL (ref 70–110)
POCT GLUCOSE: 97 MG/DL (ref 70–110)
POCT GLUCOSE: 98 MG/DL (ref 70–110)
POCT GLUCOSE: 99 MG/DL (ref 70–110)
POIKILOCYTOSIS BLD QL SMEAR: ABNORMAL
POIKILOCYTOSIS BLD QL SMEAR: SLIGHT
POLYCHROMASIA BLD QL SMEAR: ABNORMAL
POTASSIUM SERPL-SCNC: 3.3 MMOL/L (ref 3.5–5.1)
POTASSIUM SERPL-SCNC: 3.5 MMOL/L (ref 3.5–5.1)
POTASSIUM SERPL-SCNC: 3.6 MMOL/L (ref 3.5–5.1)
POTASSIUM SERPL-SCNC: 3.7 MMOL/L (ref 3.5–5.1)
POTASSIUM SERPL-SCNC: 3.7 MMOL/L (ref 3.5–5.1)
POTASSIUM SERPL-SCNC: 3.8 MMOL/L (ref 3.5–5.1)
POTASSIUM SERPL-SCNC: 3.8 MMOL/L (ref 3.5–5.1)
POTASSIUM SERPL-SCNC: 4 MMOL/L (ref 3.5–5.1)
POTASSIUM SERPL-SCNC: 4 MMOL/L (ref 3.5–5.1)
POTASSIUM SERPL-SCNC: 4.1 MMOL/L (ref 3.5–5.1)
POTASSIUM SERPL-SCNC: 4.1 MMOL/L (ref 3.5–5.1)
POTASSIUM SERPL-SCNC: 4.2 MMOL/L (ref 3.5–5.1)
POTASSIUM SERPL-SCNC: 4.2 MMOL/L (ref 3.5–5.1)
POTASSIUM SERPL-SCNC: 4.3 MMOL/L (ref 3.5–5.1)
POTASSIUM SERPL-SCNC: 4.5 MMOL/L (ref 3.5–5.1)
POTASSIUM SERPL-SCNC: 4.7 MMOL/L (ref 3.5–5.1)
POTASSIUM SERPL-SCNC: 4.8 MMOL/L (ref 3.5–5.1)
PROCALCITONIN SERPL IA-MCNC: 0.06 NG/ML
PROCALCITONIN SERPL IA-MCNC: 28.69 NG/ML
PROCALCITONIN SERPL IA-MCNC: 29.55 NG/ML
PROT SERPL-MCNC: 5.2 G/DL (ref 6–8.4)
PROT SERPL-MCNC: 5.3 G/DL (ref 6–8.4)
PROT SERPL-MCNC: 5.5 G/DL (ref 6–8.4)
PROT SERPL-MCNC: 5.9 G/DL (ref 6–8.4)
PROT SERPL-MCNC: 5.9 G/DL (ref 6–8.4)
PROT SERPL-MCNC: 6 G/DL (ref 6–8.4)
PROT SERPL-MCNC: 6.2 G/DL (ref 6–8.4)
PROT SERPL-MCNC: 6.3 G/DL (ref 6–8.4)
PROT SERPL-MCNC: 6.4 G/DL (ref 6–8.4)
PROT SERPL-MCNC: 6.6 G/DL (ref 6–8.4)
PROT SERPL-MCNC: 6.8 G/DL (ref 6–8.4)
PROT SERPL-MCNC: 6.9 G/DL (ref 6–8.4)
PROT UR QL STRIP: ABNORMAL
PROT UR QL STRIP: ABNORMAL
PROT UR QL STRIP: NEGATIVE
PROT UR QL STRIP: NEGATIVE
PROTHROMBIN TIME: 11.6 SEC (ref 9–12.5)
PROTHROMBIN TIME: 13 SEC (ref 9–12.5)
PROTHROMBIN TIME: 13.3 SEC (ref 9–12.5)
RA MAJOR: 5.18 CM
RA WIDTH: 2.91 CM
RBC # BLD AUTO: 3.28 M/UL (ref 4.6–6.2)
RBC # BLD AUTO: 3.89 M/UL (ref 4.6–6.2)
RBC # BLD AUTO: 4 M/UL (ref 4.6–6.2)
RBC # BLD AUTO: 4.18 M/UL (ref 4.6–6.2)
RBC # BLD AUTO: 4.3 M/UL (ref 4.6–6.2)
RBC # BLD AUTO: 4.36 M/UL (ref 4.6–6.2)
RBC # BLD AUTO: 4.4 M/UL (ref 4.6–6.2)
RBC # BLD AUTO: 4.4 M/UL (ref 4.6–6.2)
RBC # BLD AUTO: 4.45 M/UL (ref 4.6–6.2)
RBC # BLD AUTO: 4.49 M/UL (ref 4.6–6.2)
RBC # BLD AUTO: 4.53 M/UL (ref 4.6–6.2)
RBC # BLD AUTO: 4.69 M/UL (ref 4.6–6.2)
RBC # BLD AUTO: 4.72 M/UL (ref 4.6–6.2)
RBC # BLD AUTO: 4.75 M/UL (ref 4.6–6.2)
RBC # BLD AUTO: 4.9 M/UL (ref 4.6–6.2)
RBC # BLD AUTO: 5.42 M/UL (ref 4.6–6.2)
RBC # BLD AUTO: 5.43 M/UL (ref 4.6–6.2)
RBC # BLD AUTO: 5.46 M/UL (ref 4.6–6.2)
RBC # BLD AUTO: 5.47 M/UL (ref 4.6–6.2)
RBC # BLD AUTO: 5.72 M/UL (ref 4.6–6.2)
RBC #/AREA URNS AUTO: 2 /HPF (ref 0–4)
RIGHT VENTRICULAR END-DIASTOLIC DIMENSION: 3.57 CM
RPR SER QL: NORMAL
RVP - ADENOVIRUS: NOT DETECTED
RVP - HUMAN METAPNEUMOVIRUS (HMPV): NOT DETECTED
RVP - INFLUENZA A: NOT DETECTED
RVP - INFLUENZA B: NOT DETECTED
RVP - RESPIRATORY SYNCTIAL VIRUS (RSV) A: NOT DETECTED
RVP - RESPIRATORY VIRAL PANEL, SOURCE: NORMAL
SAMPLE: ABNORMAL
SAMPLE: NORMAL
SARS-COV-2 RDRP RESP QL NAA+PROBE: POSITIVE
SARS-COV-2 RNA RESP QL NAA+PROBE: NOT DETECTED
SCHISTOCYTES BLD QL SMEAR: ABNORMAL
SCHISTOCYTES BLD QL SMEAR: PRESENT
SINUS: 3.25 CM
SITE: ABNORMAL
SITE: NORMAL
SMUDGE CELLS BLD QL SMEAR: PRESENT
SMUDGE CELLS BLD QL SMEAR: PRESENT
SODIUM SERPL-SCNC: 127 MMOL/L (ref 136–145)
SODIUM SERPL-SCNC: 128 MMOL/L (ref 136–145)
SODIUM SERPL-SCNC: 130 MMOL/L (ref 136–145)
SODIUM SERPL-SCNC: 131 MMOL/L (ref 136–145)
SODIUM SERPL-SCNC: 132 MMOL/L (ref 136–145)
SODIUM SERPL-SCNC: 132 MMOL/L (ref 136–145)
SODIUM SERPL-SCNC: 133 MMOL/L (ref 136–145)
SODIUM SERPL-SCNC: 134 MMOL/L (ref 136–145)
SODIUM SERPL-SCNC: 134 MMOL/L (ref 136–145)
SODIUM SERPL-SCNC: 135 MMOL/L (ref 136–145)
SODIUM SERPL-SCNC: 135 MMOL/L (ref 136–145)
SODIUM SERPL-SCNC: 136 MMOL/L (ref 136–145)
SODIUM SERPL-SCNC: 137 MMOL/L (ref 136–145)
SODIUM UR-SCNC: 105 MMOL/L (ref 20–250)
SODIUM UR-SCNC: 12 MMOL/L (ref 20–250)
SP GR UR STRIP: 1 (ref 1–1.03)
SP GR UR STRIP: 1.01 (ref 1–1.03)
SP GR UR STRIP: 1.01 (ref 1–1.03)
SP GR UR STRIP: 1.02 (ref 1–1.03)
SP02: 100
SP02: 95
SPECIMEN OUTDATE: NORMAL
SPECIMEN SOURCE: NORMAL
SPHEROCYTES BLD QL SMEAR: ABNORMAL
SPONT RATE: 29
SPONT RATE: 32
SQUAMOUS #/AREA URNS AUTO: 1 /HPF
STJ: 3.73 CM
STRONGYLOIDES ANTIBODY IGG: NEGATIVE
TARGETS BLD QL SMEAR: ABNORMAL
TB GOLD PLUS: NEGATIVE
TB2 - NIL: 0 IU/ML
TDI LATERAL: 0.16 M/S
TDI SEPTAL: 0.1 M/S
TDI: 0.13 M/S
TEM - ACINETOBACTER BAUMANNII: NOT DETECTED
TEM - BORDETELLA PERTUSSIS: NOT DETECTED
TEM - CHLAMYDOPHILA PNEUMONIAE: NOT DETECTED
TEM - KLEBSIELLA PNEUMONIAE: NOT DETECTED
TEM - MRSA: NOT DETECTED
TEM - MYCOPLASMA PNEUMONIAE: NOT DETECTED
TEM - NEISSERIA MENINGITIDIS: NOT DETECTED
TEM - PANTON-VALENTINE: NOT DETECTED
TEM - PSEUDOMONAS AERUGINOSA: NOT DETECTED
TEM - STAPHYLOCOCCUS AUREUS: NOT DETECTED
TEM - STREPTOCOCCUS PNEUMONIAE: NOT DETECTED
TEM - STREPTOCOCCUS PYOGENES A: NOT DETECTED
TEM- HAEMOPHILUS INFLUENZAE B: NOT DETECTED
TEM- HAEMOPHILUS INFLUENZAE: NOT DETECTED
TOXIC GRANULES BLD QL SMEAR: PRESENT
TR MAX PG: 31 MMHG
TROPONIN I SERPL DL<=0.01 NG/ML-MCNC: 0.01 NG/ML (ref 0–0.03)
TROPONIN I SERPL DL<=0.01 NG/ML-MCNC: <0.006 NG/ML (ref 0–0.03)
TROPONIN I SERPL DL<=0.01 NG/ML-MCNC: <0.006 NG/ML (ref 0–0.03)
URN SPEC COLLECT METH UR: ABNORMAL
URN SPEC COLLECT METH UR: ABNORMAL
URN SPEC COLLECT METH UR: NORMAL
URN SPEC COLLECT METH UR: NORMAL
VANCOMYCIN TROUGH SERPL-MCNC: 4.6 UG/ML (ref 10–22)
VARICELLA INTERPRETATION: POSITIVE
VARICELLA ZOSTER IGG: 3455 AU/ML
WBC # BLD AUTO: 10.73 K/UL (ref 3.9–12.7)
WBC # BLD AUTO: 10.86 K/UL (ref 3.9–12.7)
WBC # BLD AUTO: 11.1 K/UL (ref 3.9–12.7)
WBC # BLD AUTO: 11.48 K/UL (ref 3.9–12.7)
WBC # BLD AUTO: 13.5 K/UL (ref 3.9–12.7)
WBC # BLD AUTO: 14.59 K/UL (ref 3.9–12.7)
WBC # BLD AUTO: 15.54 K/UL (ref 3.9–12.7)
WBC # BLD AUTO: 15.72 K/UL (ref 3.9–12.7)
WBC # BLD AUTO: 15.77 K/UL (ref 3.9–12.7)
WBC # BLD AUTO: 16.29 K/UL (ref 3.9–12.7)
WBC # BLD AUTO: 16.77 K/UL (ref 3.9–12.7)
WBC # BLD AUTO: 18.76 K/UL (ref 3.9–12.7)
WBC # BLD AUTO: 4.24 K/UL (ref 3.9–12.7)
WBC # BLD AUTO: 5.71 K/UL (ref 3.9–12.7)
WBC # BLD AUTO: 5.95 K/UL (ref 3.9–12.7)
WBC # BLD AUTO: 6.39 K/UL (ref 3.9–12.7)
WBC # BLD AUTO: 6.88 K/UL (ref 3.9–12.7)
WBC # BLD AUTO: 8.66 K/UL (ref 3.9–12.7)
WBC # BLD AUTO: 9.67 K/UL (ref 3.9–12.7)
WBC # BLD AUTO: 9.74 K/UL (ref 3.9–12.7)
WBC #/AREA URNS AUTO: 2 /HPF (ref 0–5)
WBC TOXIC VACUOLES BLD QL SMEAR: PRESENT
WBC TOXIC VACUOLES BLD QL SMEAR: PRESENT

## 2023-01-01 PROCEDURE — 96366 THER/PROPH/DIAG IV INF ADDON: CPT

## 2023-01-01 PROCEDURE — 97535 SELF CARE MNGMENT TRAINING: CPT | Mod: HCNC

## 2023-01-01 PROCEDURE — 1126F AMNT PAIN NOTED NONE PRSNT: CPT | Mod: HCNC,CPTII,S$GLB, | Performed by: NURSE PRACTITIONER

## 2023-01-01 PROCEDURE — C9113 INJ PANTOPRAZOLE SODIUM, VIA: HCPCS | Mod: HCNC

## 2023-01-01 PROCEDURE — 85027 COMPLETE CBC AUTOMATED: CPT | Mod: HCNC | Performed by: HOSPITALIST

## 2023-01-01 PROCEDURE — 82784 ASSAY IGA/IGD/IGG/IGM EACH: CPT | Mod: 59,HCNC | Performed by: INTERNAL MEDICINE

## 2023-01-01 PROCEDURE — 84145 PROCALCITONIN (PCT): CPT | Mod: HCNC

## 2023-01-01 PROCEDURE — 36415 COLL VENOUS BLD VENIPUNCTURE: CPT | Mod: HCNC

## 2023-01-01 PROCEDURE — 99900035 HC TECH TIME PER 15 MIN (STAT): Mod: HCNC

## 2023-01-01 PROCEDURE — 94799 UNLISTED PULMONARY SVC/PX: CPT | Mod: HCNC

## 2023-01-01 PROCEDURE — 86920 COMPATIBILITY TEST SPIN: CPT | Mod: HCNC

## 2023-01-01 PROCEDURE — 85025 COMPLETE CBC W/AUTO DIFF WBC: CPT | Mod: HCNC

## 2023-01-01 PROCEDURE — 63600175 PHARM REV CODE 636 W HCPCS: Mod: HCNC

## 2023-01-01 PROCEDURE — 99285 PR EMERGENCY DEPT VISIT,LEVEL V: ICD-10-PCS | Mod: CS,,, | Performed by: EMERGENCY MEDICINE

## 2023-01-01 PROCEDURE — 80053 COMPREHEN METABOLIC PANEL: CPT | Mod: HCNC | Performed by: HOSPITALIST

## 2023-01-01 PROCEDURE — 3074F SYST BP LT 130 MM HG: CPT | Mod: HCNC,CPTII,S$GLB, | Performed by: NURSE PRACTITIONER

## 2023-01-01 PROCEDURE — 99232 SBSQ HOSP IP/OBS MODERATE 35: CPT | Mod: HCNC,,, | Performed by: STUDENT IN AN ORGANIZED HEALTH CARE EDUCATION/TRAINING PROGRAM

## 2023-01-01 PROCEDURE — 99497 ADVNCD CARE PLAN 30 MIN: CPT | Mod: HCNC,,, | Performed by: STUDENT IN AN ORGANIZED HEALTH CARE EDUCATION/TRAINING PROGRAM

## 2023-01-01 PROCEDURE — 96366 THER/PROPH/DIAG IV INF ADDON: CPT | Mod: HCNC

## 2023-01-01 PROCEDURE — 97161 PT EVAL LOW COMPLEX 20 MIN: CPT | Mod: HCNC

## 2023-01-01 PROCEDURE — 99232 PR SUBSEQUENT HOSPITAL CARE,LEVL II: ICD-10-PCS | Mod: HCNC,,, | Performed by: STUDENT IN AN ORGANIZED HEALTH CARE EDUCATION/TRAINING PROGRAM

## 2023-01-01 PROCEDURE — 25000003 PHARM REV CODE 250: Mod: HCNC | Performed by: INTERNAL MEDICINE

## 2023-01-01 PROCEDURE — 3074F PR MOST RECENT SYSTOLIC BLOOD PRESSURE < 130 MM HG: ICD-10-PCS | Mod: HCNC,CPTII,S$GLB, | Performed by: NURSE PRACTITIONER

## 2023-01-01 PROCEDURE — 99283 EMERGENCY DEPT VISIT LOW MDM: CPT | Mod: HCNC,,, | Performed by: EMERGENCY MEDICINE

## 2023-01-01 PROCEDURE — 83605 ASSAY OF LACTIC ACID: CPT | Mod: HCNC

## 2023-01-01 PROCEDURE — 94664 DEMO&/EVAL PT USE INHALER: CPT | Mod: HCNC

## 2023-01-01 PROCEDURE — 99223 1ST HOSP IP/OBS HIGH 75: CPT | Mod: HCNC,GC,, | Performed by: STUDENT IN AN ORGANIZED HEALTH CARE EDUCATION/TRAINING PROGRAM

## 2023-01-01 PROCEDURE — 1157F PR ADVANCE CARE PLAN OR EQUIV PRESENT IN MEDICAL RECORD: ICD-10-PCS | Mod: HCNC,CPTII,S$GLB, | Performed by: NURSE PRACTITIONER

## 2023-01-01 PROCEDURE — 25000003 PHARM REV CODE 250: Mod: HCNC

## 2023-01-01 PROCEDURE — 27000207 HC ISOLATION: Mod: HCNC

## 2023-01-01 PROCEDURE — 83880 ASSAY OF NATRIURETIC PEPTIDE: CPT | Mod: HCNC | Performed by: HOSPITALIST

## 2023-01-01 PROCEDURE — 63600175 PHARM REV CODE 636 W HCPCS: Mod: JZ,TB,HCNC | Performed by: EMERGENCY MEDICINE

## 2023-01-01 PROCEDURE — 96365 THER/PROPH/DIAG IV INF INIT: CPT | Mod: HCNC

## 2023-01-01 PROCEDURE — 84100 ASSAY OF PHOSPHORUS: CPT | Mod: HCNC

## 2023-01-01 PROCEDURE — 63600175 PHARM REV CODE 636 W HCPCS: Mod: HCNC | Performed by: HOSPITALIST

## 2023-01-01 PROCEDURE — 36415 COLL VENOUS BLD VENIPUNCTURE: CPT | Mod: HCNC | Performed by: HOSPITALIST

## 2023-01-01 PROCEDURE — 25000003 PHARM REV CODE 250: Mod: HCNC | Performed by: EMERGENCY MEDICINE

## 2023-01-01 PROCEDURE — 99291 CRITICAL CARE FIRST HOUR: CPT | Mod: HCNC

## 2023-01-01 PROCEDURE — 20600001 HC STEP DOWN PRIVATE ROOM: Mod: HCNC

## 2023-01-01 PROCEDURE — 86480 TB TEST CELL IMMUN MEASURE: CPT | Mod: HCNC | Performed by: STUDENT IN AN ORGANIZED HEALTH CARE EDUCATION/TRAINING PROGRAM

## 2023-01-01 PROCEDURE — 94761 N-INVAS EAR/PLS OXIMETRY MLT: CPT | Mod: HCNC

## 2023-01-01 PROCEDURE — 63600175 PHARM REV CODE 636 W HCPCS: Mod: HCNC | Performed by: STUDENT IN AN ORGANIZED HEALTH CARE EDUCATION/TRAINING PROGRAM

## 2023-01-01 PROCEDURE — 27000221 HC OXYGEN, UP TO 24 HOURS: Mod: HCNC

## 2023-01-01 PROCEDURE — 99283 EMERGENCY DEPT VISIT LOW MDM: CPT | Mod: HCNC

## 2023-01-01 PROCEDURE — 99233 PR SUBSEQUENT HOSPITAL CARE,LEVL III: ICD-10-PCS | Mod: HCNC,GC,, | Performed by: INTERNAL MEDICINE

## 2023-01-01 PROCEDURE — 85025 COMPLETE CBC W/AUTO DIFF WBC: CPT | Mod: 91,HCNC

## 2023-01-01 PROCEDURE — 99291 PR CRITICAL CARE, E/M 30-74 MINUTES: ICD-10-PCS | Mod: HCNC,CS,, | Performed by: EMERGENCY MEDICINE

## 2023-01-01 PROCEDURE — 83735 ASSAY OF MAGNESIUM: CPT | Mod: HCNC

## 2023-01-01 PROCEDURE — 25000003 PHARM REV CODE 250: Mod: HCNC | Performed by: STUDENT IN AN ORGANIZED HEALTH CARE EDUCATION/TRAINING PROGRAM

## 2023-01-01 PROCEDURE — 1111F PR DISCHARGE MEDS RECONCILED W/ CURRENT OUTPATIENT MED LIST: ICD-10-PCS | Mod: HCNC,CPTII,, | Performed by: STUDENT IN AN ORGANIZED HEALTH CARE EDUCATION/TRAINING PROGRAM

## 2023-01-01 PROCEDURE — 25000242 PHARM REV CODE 250 ALT 637 W/ HCPCS: Mod: HCNC | Performed by: STUDENT IN AN ORGANIZED HEALTH CARE EDUCATION/TRAINING PROGRAM

## 2023-01-01 PROCEDURE — 99233 SBSQ HOSP IP/OBS HIGH 50: CPT | Mod: HCNC,GC,, | Performed by: INTERNAL MEDICINE

## 2023-01-01 PROCEDURE — 71046 XR CHEST PA AND LATERAL: ICD-10-PCS | Mod: 26,HCNC,, | Performed by: RADIOLOGY

## 2023-01-01 PROCEDURE — 82570 ASSAY OF URINE CREATININE: CPT | Mod: HCNC | Performed by: STUDENT IN AN ORGANIZED HEALTH CARE EDUCATION/TRAINING PROGRAM

## 2023-01-01 PROCEDURE — 99285 EMERGENCY DEPT VISIT HI MDM: CPT | Mod: CS,,, | Performed by: EMERGENCY MEDICINE

## 2023-01-01 PROCEDURE — 83935 ASSAY OF URINE OSMOLALITY: CPT | Mod: HCNC | Performed by: STUDENT IN AN ORGANIZED HEALTH CARE EDUCATION/TRAINING PROGRAM

## 2023-01-01 PROCEDURE — 97166 OT EVAL MOD COMPLEX 45 MIN: CPT | Mod: HCNC

## 2023-01-01 PROCEDURE — 99222 1ST HOSP IP/OBS MODERATE 55: CPT | Mod: HCNC,,, | Performed by: INTERNAL MEDICINE

## 2023-01-01 PROCEDURE — 96372 THER/PROPH/DIAG INJ SC/IM: CPT

## 2023-01-01 PROCEDURE — 99285 EMERGENCY DEPT VISIT HI MDM: CPT | Mod: HCNC,,, | Performed by: EMERGENCY MEDICINE

## 2023-01-01 PROCEDURE — 99233 PR SUBSEQUENT HOSPITAL CARE,LEVL III: ICD-10-PCS | Mod: HCNC,,, | Performed by: STUDENT IN AN ORGANIZED HEALTH CARE EDUCATION/TRAINING PROGRAM

## 2023-01-01 PROCEDURE — G0378 HOSPITAL OBSERVATION PER HR: HCPCS | Mod: HCNC

## 2023-01-01 PROCEDURE — 84484 ASSAY OF TROPONIN QUANT: CPT | Mod: HCNC | Performed by: EMERGENCY MEDICINE

## 2023-01-01 PROCEDURE — 99233 SBSQ HOSP IP/OBS HIGH 50: CPT | Mod: HCNC,95,, | Performed by: STUDENT IN AN ORGANIZED HEALTH CARE EDUCATION/TRAINING PROGRAM

## 2023-01-01 PROCEDURE — 99223 1ST HOSP IP/OBS HIGH 75: CPT | Mod: HCNC,25,, | Performed by: STUDENT IN AN ORGANIZED HEALTH CARE EDUCATION/TRAINING PROGRAM

## 2023-01-01 PROCEDURE — 99239 HOSP IP/OBS DSCHRG MGMT >30: CPT | Mod: HCNC,,, | Performed by: STUDENT IN AN ORGANIZED HEALTH CARE EDUCATION/TRAINING PROGRAM

## 2023-01-01 PROCEDURE — 92611 MOTION FLUOROSCOPY/SWALLOW: CPT | Mod: HCNC

## 2023-01-01 PROCEDURE — 94640 AIRWAY INHALATION TREATMENT: CPT | Mod: HCNC

## 2023-01-01 PROCEDURE — 99222 PR INITIAL HOSPITAL CARE,LEVL II: ICD-10-PCS | Mod: 25,HCNC,, | Performed by: STUDENT IN AN ORGANIZED HEALTH CARE EDUCATION/TRAINING PROGRAM

## 2023-01-01 PROCEDURE — 31720 CLEARANCE OF AIRWAYS: CPT | Mod: HCNC

## 2023-01-01 PROCEDURE — 86787 VARICELLA-ZOSTER ANTIBODY: CPT | Mod: HCNC | Performed by: INTERNAL MEDICINE

## 2023-01-01 PROCEDURE — 27000646 HC AEROBIKA DEVICE: Mod: HCNC

## 2023-01-01 PROCEDURE — 63700000 PHARM REV CODE 250 ALT 637 W/O HCPCS: Mod: HCNC | Performed by: HOSPITALIST

## 2023-01-01 PROCEDURE — 87040 BLOOD CULTURE FOR BACTERIA: CPT | Mod: HCNC | Performed by: EMERGENCY MEDICINE

## 2023-01-01 PROCEDURE — 94668 MNPJ CHEST WALL SBSQ: CPT | Mod: HCNC

## 2023-01-01 PROCEDURE — 99239 PR HOSPITAL DISCHARGE DAY,>30 MIN: ICD-10-PCS | Mod: HCNC,,, | Performed by: STUDENT IN AN ORGANIZED HEALTH CARE EDUCATION/TRAINING PROGRAM

## 2023-01-01 PROCEDURE — 93010 EKG 12-LEAD: ICD-10-PCS | Mod: HCNC,,, | Performed by: INTERNAL MEDICINE

## 2023-01-01 PROCEDURE — 27000190 HC CPAP FULL FACE MASK W/VALVE: Mod: HCNC

## 2023-01-01 PROCEDURE — 82728 ASSAY OF FERRITIN: CPT | Mod: HCNC | Performed by: HOSPITALIST

## 2023-01-01 PROCEDURE — 80053 COMPREHEN METABOLIC PANEL: CPT | Mod: HCNC | Performed by: EMERGENCY MEDICINE

## 2023-01-01 PROCEDURE — 82550 ASSAY OF CK (CPK): CPT | Mod: HCNC | Performed by: INTERNAL MEDICINE

## 2023-01-01 PROCEDURE — 99223 PR INITIAL HOSPITAL CARE,LEVL III: ICD-10-PCS | Mod: AI,HCNC,, | Performed by: STUDENT IN AN ORGANIZED HEALTH CARE EDUCATION/TRAINING PROGRAM

## 2023-01-01 PROCEDURE — 1111F PR DISCHARGE MEDS RECONCILED W/ CURRENT OUTPATIENT MED LIST: ICD-10-PCS | Mod: HCNC,CPTII,S$GLB, | Performed by: NURSE PRACTITIONER

## 2023-01-01 PROCEDURE — 84300 ASSAY OF URINE SODIUM: CPT | Mod: HCNC | Performed by: STUDENT IN AN ORGANIZED HEALTH CARE EDUCATION/TRAINING PROGRAM

## 2023-01-01 PROCEDURE — 99291 CRITICAL CARE FIRST HOUR: CPT | Mod: HCNC,CS,, | Performed by: EMERGENCY MEDICINE

## 2023-01-01 PROCEDURE — 25000242 PHARM REV CODE 250 ALT 637 W/ HCPCS: Mod: HCNC

## 2023-01-01 PROCEDURE — 87040 BLOOD CULTURE FOR BACTERIA: CPT | Mod: 59,HCNC | Performed by: EMERGENCY MEDICINE

## 2023-01-01 PROCEDURE — 63600175 PHARM REV CODE 636 W HCPCS: Mod: HCNC | Performed by: INTERNAL MEDICINE

## 2023-01-01 PROCEDURE — 25500020 PHARM REV CODE 255: Mod: HCNC | Performed by: HOSPITALIST

## 2023-01-01 PROCEDURE — 93010 ELECTROCARDIOGRAM REPORT: CPT | Mod: HCNC,,, | Performed by: INTERNAL MEDICINE

## 2023-01-01 PROCEDURE — 85025 COMPLETE CBC W/AUTO DIFF WBC: CPT | Mod: HCNC | Performed by: HOSPITALIST

## 2023-01-01 PROCEDURE — 31575 DIAGNOSTIC LARYNGOSCOPY: CPT

## 2023-01-01 PROCEDURE — 1159F PR MEDICATION LIST DOCUMENTED IN MEDICAL RECORD: ICD-10-PCS | Mod: HCNC,CPTII,S$GLB, | Performed by: NURSE PRACTITIONER

## 2023-01-01 PROCEDURE — 86803 HEPATITIS C AB TEST: CPT | Mod: HCNC | Performed by: INTERNAL MEDICINE

## 2023-01-01 PROCEDURE — 87641 MR-STAPH DNA AMP PROBE: CPT | Mod: HCNC

## 2023-01-01 PROCEDURE — 81001 URINALYSIS AUTO W/SCOPE: CPT | Mod: HCNC | Performed by: EMERGENCY MEDICINE

## 2023-01-01 PROCEDURE — 99285 EMERGENCY DEPT VISIT HI MDM: CPT | Mod: 25,HCNC

## 2023-01-01 PROCEDURE — 96367 TX/PROPH/DG ADDL SEQ IV INF: CPT

## 2023-01-01 PROCEDURE — 25500020 PHARM REV CODE 255: Mod: HCNC | Performed by: STUDENT IN AN ORGANIZED HEALTH CARE EDUCATION/TRAINING PROGRAM

## 2023-01-01 PROCEDURE — 87070 CULTURE OTHR SPECIMN AEROBIC: CPT | Mod: HCNC | Performed by: HOSPITALIST

## 2023-01-01 PROCEDURE — 99233 PR SUBSEQUENT HOSPITAL CARE,LEVL III: ICD-10-PCS | Mod: HCNC,,, | Performed by: HOSPITALIST

## 2023-01-01 PROCEDURE — 86480 TB TEST CELL IMMUN MEASURE: CPT | Mod: HCNC | Performed by: INTERNAL MEDICINE

## 2023-01-01 PROCEDURE — 99222 1ST HOSP IP/OBS MODERATE 55: CPT | Mod: 25,HCNC,, | Performed by: STUDENT IN AN ORGANIZED HEALTH CARE EDUCATION/TRAINING PROGRAM

## 2023-01-01 PROCEDURE — 93005 ELECTROCARDIOGRAM TRACING: CPT | Mod: HCNC

## 2023-01-01 PROCEDURE — 83615 LACTATE (LD) (LDH) ENZYME: CPT | Mod: HCNC | Performed by: HOSPITALIST

## 2023-01-01 PROCEDURE — 99291 CRITICAL CARE FIRST HOUR: CPT | Mod: ,,, | Performed by: EMERGENCY MEDICINE

## 2023-01-01 PROCEDURE — 99223 PR INITIAL HOSPITAL CARE,LEVL III: ICD-10-PCS | Mod: HCNC,GC,, | Performed by: STUDENT IN AN ORGANIZED HEALTH CARE EDUCATION/TRAINING PROGRAM

## 2023-01-01 PROCEDURE — 27000173 HC ACAPELLA DEVICE DH OR DM: Mod: HCNC

## 2023-01-01 PROCEDURE — 82085 ASSAY OF ALDOLASE: CPT | Mod: HCNC | Performed by: INTERNAL MEDICINE

## 2023-01-01 PROCEDURE — 99233 SBSQ HOSP IP/OBS HIGH 50: CPT | Mod: HCNC,,, | Performed by: STUDENT IN AN ORGANIZED HEALTH CARE EDUCATION/TRAINING PROGRAM

## 2023-01-01 PROCEDURE — 85025 COMPLETE CBC W/AUTO DIFF WBC: CPT | Mod: HCNC | Performed by: EMERGENCY MEDICINE

## 2023-01-01 PROCEDURE — 96361 HYDRATE IV INFUSION ADD-ON: CPT | Mod: HCNC

## 2023-01-01 PROCEDURE — 99223 1ST HOSP IP/OBS HIGH 75: CPT | Mod: HCNC,,, | Performed by: NURSE PRACTITIONER

## 2023-01-01 PROCEDURE — 92610 EVALUATE SWALLOWING FUNCTION: CPT | Mod: HCNC

## 2023-01-01 PROCEDURE — 99291 PR CRITICAL CARE, E/M 30-74 MINUTES: ICD-10-PCS | Mod: HCNC,GC,, | Performed by: INTERNAL MEDICINE

## 2023-01-01 PROCEDURE — 80053 COMPREHEN METABOLIC PANEL: CPT | Mod: HCNC

## 2023-01-01 PROCEDURE — 99999 PR PBB SHADOW E&M-EST. PATIENT-LVL V: ICD-10-PCS | Mod: PBBFAC,HCNC,, | Performed by: NURSE PRACTITIONER

## 2023-01-01 PROCEDURE — 96365 THER/PROPH/DIAG IV INF INIT: CPT | Mod: 59

## 2023-01-01 PROCEDURE — 94660 CPAP INITIATION&MGMT: CPT | Mod: HCNC

## 2023-01-01 PROCEDURE — 99223 1ST HOSP IP/OBS HIGH 75: CPT | Mod: AI,HCNC,, | Performed by: HOSPITALIST

## 2023-01-01 PROCEDURE — 25000003 PHARM REV CODE 250: Mod: HCNC | Performed by: HOSPITALIST

## 2023-01-01 PROCEDURE — 85379 FIBRIN DEGRADATION QUANT: CPT | Mod: HCNC | Performed by: HOSPITALIST

## 2023-01-01 PROCEDURE — 85610 PROTHROMBIN TIME: CPT | Mod: HCNC | Performed by: HOSPITALIST

## 2023-01-01 PROCEDURE — 1101F PT FALLS ASSESS-DOCD LE1/YR: CPT | Mod: HCNC,CPTII,S$GLB, | Performed by: NURSE PRACTITIONER

## 2023-01-01 PROCEDURE — 10060 INCISION AND DRAINAGE: ICD-10-PCS | Mod: ,,, | Performed by: STUDENT IN AN ORGANIZED HEALTH CARE EDUCATION/TRAINING PROGRAM

## 2023-01-01 PROCEDURE — 99233 SBSQ HOSP IP/OBS HIGH 50: CPT | Mod: HCNC,,, | Performed by: HOSPITALIST

## 2023-01-01 PROCEDURE — 81003 URINALYSIS AUTO W/O SCOPE: CPT | Mod: HCNC | Performed by: EMERGENCY MEDICINE

## 2023-01-01 PROCEDURE — 99231 PR SUBSEQUENT HOSPITAL CARE,LEVL I: ICD-10-PCS | Mod: HCNC,GC,, | Performed by: HOSPITALIST

## 2023-01-01 PROCEDURE — 99223 1ST HOSP IP/OBS HIGH 75: CPT | Mod: HCNC,GC,, | Performed by: HOSPITALIST

## 2023-01-01 PROCEDURE — 83615 LACTATE (LD) (LDH) ENZYME: CPT | Mod: HCNC

## 2023-01-01 PROCEDURE — 27100171 HC OXYGEN HIGH FLOW UP TO 24 HOURS: Mod: HCNC

## 2023-01-01 PROCEDURE — 82800 BLOOD PH: CPT | Mod: HCNC

## 2023-01-01 PROCEDURE — 86900 BLOOD TYPING SEROLOGIC ABO: CPT | Mod: HCNC

## 2023-01-01 PROCEDURE — 99285 PR EMERGENCY DEPT VISIT,LEVEL V: ICD-10-PCS | Mod: HCNC,,, | Performed by: EMERGENCY MEDICINE

## 2023-01-01 PROCEDURE — 83880 ASSAY OF NATRIURETIC PEPTIDE: CPT | Mod: HCNC | Performed by: EMERGENCY MEDICINE

## 2023-01-01 PROCEDURE — 86592 SYPHILIS TEST NON-TREP QUAL: CPT | Mod: HCNC | Performed by: INTERNAL MEDICINE

## 2023-01-01 PROCEDURE — 99233 PR SUBSEQUENT HOSPITAL CARE,LEVL III: ICD-10-PCS | Mod: HCNC,95,, | Performed by: STUDENT IN AN ORGANIZED HEALTH CARE EDUCATION/TRAINING PROGRAM

## 2023-01-01 PROCEDURE — 82803 BLOOD GASES ANY COMBINATION: CPT | Mod: HCNC

## 2023-01-01 PROCEDURE — 84484 ASSAY OF TROPONIN QUANT: CPT | Mod: HCNC

## 2023-01-01 PROCEDURE — 99232 PR SUBSEQUENT HOSPITAL CARE,LEVL II: ICD-10-PCS | Mod: HCNC,,,

## 2023-01-01 PROCEDURE — 87389 HIV-1 AG W/HIV-1&-2 AB AG IA: CPT | Mod: HCNC | Performed by: INTERNAL MEDICINE

## 2023-01-01 PROCEDURE — 1159F MED LIST DOCD IN RCRD: CPT | Mod: HCNC,CPTII,S$GLB, | Performed by: NURSE PRACTITIONER

## 2023-01-01 PROCEDURE — 1157F ADVNC CARE PLAN IN RCRD: CPT | Mod: HCNC,CPTII,S$GLB, | Performed by: NURSE PRACTITIONER

## 2023-01-01 PROCEDURE — 80048 BASIC METABOLIC PNL TOTAL CA: CPT | Mod: HCNC | Performed by: STUDENT IN AN ORGANIZED HEALTH CARE EDUCATION/TRAINING PROGRAM

## 2023-01-01 PROCEDURE — U0002 COVID-19 LAB TEST NON-CDC: HCPCS | Mod: HCNC | Performed by: EMERGENCY MEDICINE

## 2023-01-01 PROCEDURE — A9698 NON-RAD CONTRAST MATERIALNOC: HCPCS | Mod: HCNC | Performed by: HOSPITALIST

## 2023-01-01 PROCEDURE — P9612 CATHETERIZE FOR URINE SPEC: HCPCS | Mod: HCNC

## 2023-01-01 PROCEDURE — 99231 SBSQ HOSP IP/OBS SF/LOW 25: CPT | Mod: HCNC,GC,, | Performed by: HOSPITALIST

## 2023-01-01 PROCEDURE — 99999 PR PBB SHADOW E&M-EST. PATIENT-LVL V: CPT | Mod: PBBFAC,HCNC,, | Performed by: NURSE PRACTITIONER

## 2023-01-01 PROCEDURE — 86704 HEP B CORE ANTIBODY TOTAL: CPT | Mod: HCNC | Performed by: INTERNAL MEDICINE

## 2023-01-01 PROCEDURE — 3008F PR BODY MASS INDEX (BMI) DOCUMENTED: ICD-10-PCS | Mod: HCNC,CPTII,S$GLB, | Performed by: NURSE PRACTITIONER

## 2023-01-01 PROCEDURE — 87340 HEPATITIS B SURFACE AG IA: CPT | Mod: HCNC | Performed by: INTERNAL MEDICINE

## 2023-01-01 PROCEDURE — 3288F PR FALLS RISK ASSESSMENT DOCUMENTED: ICD-10-PCS | Mod: HCNC,CPTII,S$GLB, | Performed by: NURSE PRACTITIONER

## 2023-01-01 PROCEDURE — 1111F DSCHRG MED/CURRENT MED MERGE: CPT | Mod: HCNC,CPTII,, | Performed by: STUDENT IN AN ORGANIZED HEALTH CARE EDUCATION/TRAINING PROGRAM

## 2023-01-01 PROCEDURE — 80202 ASSAY OF VANCOMYCIN: CPT | Mod: HCNC | Performed by: INTERNAL MEDICINE

## 2023-01-01 PROCEDURE — 10060 I&D ABSCESS SIMPLE/SINGLE: CPT

## 2023-01-01 PROCEDURE — 4010F PR ACE/ARB THEARPY RXD/TAKEN: ICD-10-PCS | Mod: HCNC,CPTII,S$GLB, | Performed by: NURSE PRACTITIONER

## 2023-01-01 PROCEDURE — 36415 COLL VENOUS BLD VENIPUNCTURE: CPT | Mod: HCNC | Performed by: STUDENT IN AN ORGANIZED HEALTH CARE EDUCATION/TRAINING PROGRAM

## 2023-01-01 PROCEDURE — 99232 SBSQ HOSP IP/OBS MODERATE 35: CPT | Mod: HCNC,,,

## 2023-01-01 PROCEDURE — 3078F PR MOST RECENT DIASTOLIC BLOOD PRESSURE < 80 MM HG: ICD-10-PCS | Mod: HCNC,CPTII,S$GLB, | Performed by: NURSE PRACTITIONER

## 2023-01-01 PROCEDURE — 99283 PR EMERGENCY DEPT VISIT,LEVEL III: ICD-10-PCS | Mod: HCNC,,, | Performed by: EMERGENCY MEDICINE

## 2023-01-01 PROCEDURE — 25500020 PHARM REV CODE 255: Mod: HCNC | Performed by: EMERGENCY MEDICINE

## 2023-01-01 PROCEDURE — 3288F FALL RISK ASSESSMENT DOCD: CPT | Mod: HCNC,CPTII,S$GLB, | Performed by: NURSE PRACTITIONER

## 2023-01-01 PROCEDURE — 85610 PROTHROMBIN TIME: CPT | Mod: HCNC

## 2023-01-01 PROCEDURE — 86803 HEPATITIS C AB TEST: CPT | Mod: HCNC | Performed by: PHYSICIAN ASSISTANT

## 2023-01-01 PROCEDURE — 94640 AIRWAY INHALATION TREATMENT: CPT | Mod: HCNC,XB

## 2023-01-01 PROCEDURE — 86682 HELMINTH ANTIBODY: CPT | Mod: HCNC | Performed by: INTERNAL MEDICINE

## 2023-01-01 PROCEDURE — 20000000 HC ICU ROOM: Mod: HCNC

## 2023-01-01 PROCEDURE — 99223 PR INITIAL HOSPITAL CARE,LEVL III: ICD-10-PCS | Mod: AI,HCNC,, | Performed by: HOSPITALIST

## 2023-01-01 PROCEDURE — 99223 PR INITIAL HOSPITAL CARE,LEVL III: ICD-10-PCS | Mod: HCNC,GC,, | Performed by: HOSPITALIST

## 2023-01-01 PROCEDURE — 82248 BILIRUBIN DIRECT: CPT | Mod: HCNC

## 2023-01-01 PROCEDURE — 84300 ASSAY OF URINE SODIUM: CPT | Mod: HCNC

## 2023-01-01 PROCEDURE — 86706 HEP B SURFACE ANTIBODY: CPT | Mod: HCNC | Performed by: INTERNAL MEDICINE

## 2023-01-01 PROCEDURE — 85652 RBC SED RATE AUTOMATED: CPT | Mod: HCNC | Performed by: HOSPITALIST

## 2023-01-01 PROCEDURE — 87205 SMEAR GRAM STAIN: CPT | Mod: HCNC | Performed by: HOSPITALIST

## 2023-01-01 PROCEDURE — 94664 DEMO&/EVAL PT USE INHALER: CPT | Mod: HCNC,XB

## 2023-01-01 PROCEDURE — 87449 NOS EACH ORGANISM AG IA: CPT | Mod: HCNC | Performed by: STUDENT IN AN ORGANIZED HEALTH CARE EDUCATION/TRAINING PROGRAM

## 2023-01-01 PROCEDURE — 96367 TX/PROPH/DG ADDL SEQ IV INF: CPT | Mod: HCNC

## 2023-01-01 PROCEDURE — 99223 PR INITIAL HOSPITAL CARE,LEVL III: ICD-10-PCS | Mod: HCNC,25,, | Performed by: STUDENT IN AN ORGANIZED HEALTH CARE EDUCATION/TRAINING PROGRAM

## 2023-01-01 PROCEDURE — 97165 OT EVAL LOW COMPLEX 30 MIN: CPT | Mod: HCNC

## 2023-01-01 PROCEDURE — 10060 I&D ABSCESS SIMPLE/SINGLE: CPT | Mod: ,,, | Performed by: STUDENT IN AN ORGANIZED HEALTH CARE EDUCATION/TRAINING PROGRAM

## 2023-01-01 PROCEDURE — 85027 COMPLETE CBC AUTOMATED: CPT | Mod: HCNC | Performed by: EMERGENCY MEDICINE

## 2023-01-01 PROCEDURE — 3008F BODY MASS INDEX DOCD: CPT | Mod: HCNC,CPTII,S$GLB, | Performed by: NURSE PRACTITIONER

## 2023-01-01 PROCEDURE — 99291 PR CRITICAL CARE, E/M 30-74 MINUTES: ICD-10-PCS | Mod: ,,, | Performed by: EMERGENCY MEDICINE

## 2023-01-01 PROCEDURE — 99215 OFFICE O/P EST HI 40 MIN: CPT | Mod: HCNC,S$GLB,, | Performed by: NURSE PRACTITIONER

## 2023-01-01 PROCEDURE — P9016 RBC LEUKOCYTES REDUCED: HCPCS | Mod: HCNC

## 2023-01-01 PROCEDURE — 83010 ASSAY OF HAPTOGLOBIN QUANT: CPT | Mod: HCNC

## 2023-01-01 PROCEDURE — 99497 PR ADVNCD CARE PLAN 30 MIN: ICD-10-PCS | Mod: HCNC,,, | Performed by: STUDENT IN AN ORGANIZED HEALTH CARE EDUCATION/TRAINING PROGRAM

## 2023-01-01 PROCEDURE — 83880 ASSAY OF NATRIURETIC PEPTIDE: CPT | Mod: HCNC

## 2023-01-01 PROCEDURE — 84145 PROCALCITONIN (PCT): CPT | Mod: HCNC | Performed by: EMERGENCY MEDICINE

## 2023-01-01 PROCEDURE — 87502 INFLUENZA DNA AMP PROBE: CPT | Mod: HCNC | Performed by: EMERGENCY MEDICINE

## 2023-01-01 PROCEDURE — 85007 BL SMEAR W/DIFF WBC COUNT: CPT | Mod: HCNC | Performed by: HOSPITALIST

## 2023-01-01 PROCEDURE — 99291 CRITICAL CARE FIRST HOUR: CPT | Mod: HCNC,GC,, | Performed by: INTERNAL MEDICINE

## 2023-01-01 PROCEDURE — 85730 THROMBOPLASTIN TIME PARTIAL: CPT | Mod: HCNC | Performed by: HOSPITALIST

## 2023-01-01 PROCEDURE — 99215 PR OFFICE/OUTPT VISIT, EST, LEVL V, 40-54 MIN: ICD-10-PCS | Mod: HCNC,S$GLB,, | Performed by: NURSE PRACTITIONER

## 2023-01-01 PROCEDURE — 1126F PR PAIN SEVERITY QUANTIFIED, NO PAIN PRESENT: ICD-10-PCS | Mod: HCNC,CPTII,S$GLB, | Performed by: NURSE PRACTITIONER

## 2023-01-01 PROCEDURE — 71046 X-RAY EXAM CHEST 2 VIEWS: CPT | Mod: TC,HCNC

## 2023-01-01 PROCEDURE — 99499 RISK ADDL DX/OHS AUDIT: ICD-10-PCS | Mod: S$GLB,,, | Performed by: NURSE PRACTITIONER

## 2023-01-01 PROCEDURE — 97530 THERAPEUTIC ACTIVITIES: CPT | Mod: HCNC

## 2023-01-01 PROCEDURE — 85652 RBC SED RATE AUTOMATED: CPT | Mod: HCNC | Performed by: INTERNAL MEDICINE

## 2023-01-01 PROCEDURE — 4010F ACE/ARB THERAPY RXD/TAKEN: CPT | Mod: HCNC,CPTII,S$GLB, | Performed by: NURSE PRACTITIONER

## 2023-01-01 PROCEDURE — 84145 PROCALCITONIN (PCT): CPT | Mod: 91,HCNC | Performed by: EMERGENCY MEDICINE

## 2023-01-01 PROCEDURE — 87389 HIV-1 AG W/HIV-1&-2 AB AG IA: CPT | Mod: HCNC | Performed by: PHYSICIAN ASSISTANT

## 2023-01-01 PROCEDURE — 86790 VIRUS ANTIBODY NOS: CPT | Mod: HCNC | Performed by: INTERNAL MEDICINE

## 2023-01-01 PROCEDURE — 27100098 HC SPACER: Mod: HCNC

## 2023-01-01 PROCEDURE — 63600175 PHARM REV CODE 636 W HCPCS: Mod: HCNC | Performed by: EMERGENCY MEDICINE

## 2023-01-01 PROCEDURE — 96375 TX/PRO/DX INJ NEW DRUG ADDON: CPT | Mod: HCNC

## 2023-01-01 PROCEDURE — 97162 PT EVAL MOD COMPLEX 30 MIN: CPT | Mod: HCNC

## 2023-01-01 PROCEDURE — 99222 PR INITIAL HOSPITAL CARE,LEVL II: ICD-10-PCS | Mod: HCNC,GC,, | Performed by: INTERNAL MEDICINE

## 2023-01-01 PROCEDURE — 1101F PR PT FALLS ASSESS DOC 0-1 FALLS W/OUT INJ PAST YR: ICD-10-PCS | Mod: HCNC,CPTII,S$GLB, | Performed by: NURSE PRACTITIONER

## 2023-01-01 PROCEDURE — 99239 PR HOSPITAL DISCHARGE DAY,>30 MIN: ICD-10-PCS | Mod: HCNC,,, | Performed by: HOSPITALIST

## 2023-01-01 PROCEDURE — 99223 PR INITIAL HOSPITAL CARE,LEVL III: ICD-10-PCS | Mod: HCNC,,, | Performed by: NURSE PRACTITIONER

## 2023-01-01 PROCEDURE — 85025 COMPLETE CBC W/AUTO DIFF WBC: CPT | Mod: HCNC | Performed by: STUDENT IN AN ORGANIZED HEALTH CARE EDUCATION/TRAINING PROGRAM

## 2023-01-01 PROCEDURE — 71046 X-RAY EXAM CHEST 2 VIEWS: CPT | Mod: 26,HCNC,, | Performed by: RADIOLOGY

## 2023-01-01 PROCEDURE — 87081 CULTURE SCREEN ONLY: CPT | Mod: HCNC | Performed by: NURSE PRACTITIONER

## 2023-01-01 PROCEDURE — 99223 1ST HOSP IP/OBS HIGH 75: CPT | Mod: AI,HCNC,, | Performed by: STUDENT IN AN ORGANIZED HEALTH CARE EDUCATION/TRAINING PROGRAM

## 2023-01-01 PROCEDURE — 99222 PR INITIAL HOSPITAL CARE,LEVL II: ICD-10-PCS | Mod: HCNC,,, | Performed by: INTERNAL MEDICINE

## 2023-01-01 PROCEDURE — 36430 TRANSFUSION BLD/BLD COMPNT: CPT

## 2023-01-01 PROCEDURE — 80053 COMPREHEN METABOLIC PANEL: CPT | Mod: HCNC | Performed by: INTERNAL MEDICINE

## 2023-01-01 PROCEDURE — 3078F DIAST BP <80 MM HG: CPT | Mod: HCNC,CPTII,S$GLB, | Performed by: NURSE PRACTITIONER

## 2023-01-01 PROCEDURE — 86140 C-REACTIVE PROTEIN: CPT | Mod: HCNC | Performed by: INTERNAL MEDICINE

## 2023-01-01 PROCEDURE — 87635 SARS-COV-2 COVID-19 AMP PRB: CPT | Mod: HCNC

## 2023-01-01 PROCEDURE — 25000242 PHARM REV CODE 250 ALT 637 W/ HCPCS: Mod: HCNC | Performed by: HOSPITALIST

## 2023-01-01 PROCEDURE — 1111F DSCHRG MED/CURRENT MED MERGE: CPT | Mod: HCNC,CPTII,S$GLB, | Performed by: NURSE PRACTITIONER

## 2023-01-01 PROCEDURE — 99239 HOSP IP/OBS DSCHRG MGMT >30: CPT | Mod: HCNC,,, | Performed by: HOSPITALIST

## 2023-01-01 PROCEDURE — 99222 1ST HOSP IP/OBS MODERATE 55: CPT | Mod: HCNC,GC,, | Performed by: INTERNAL MEDICINE

## 2023-01-01 PROCEDURE — 85730 THROMBOPLASTIN TIME PARTIAL: CPT | Mod: HCNC

## 2023-01-01 PROCEDURE — 99499 UNLISTED E&M SERVICE: CPT | Mod: S$GLB,,, | Performed by: NURSE PRACTITIONER

## 2023-01-01 RX ORDER — GLUCAGON 1 MG
1 KIT INJECTION
Status: DISCONTINUED | OUTPATIENT
Start: 2023-01-01 | End: 2023-01-01 | Stop reason: HOSPADM

## 2023-01-01 RX ORDER — ALBUTEROL SULFATE 90 UG/1
2 AEROSOL, METERED RESPIRATORY (INHALATION) EVERY 4 HOURS PRN
Status: DISCONTINUED | OUTPATIENT
Start: 2023-01-01 | End: 2023-01-01 | Stop reason: HOSPADM

## 2023-01-01 RX ORDER — MORPHINE SULFATE 2 MG/ML
2 INJECTION, SOLUTION INTRAMUSCULAR; INTRAVENOUS ONCE
Status: COMPLETED | OUTPATIENT
Start: 2023-01-01 | End: 2023-01-01

## 2023-01-01 RX ORDER — MORPHINE SULFATE 2 MG/ML
1 INJECTION, SOLUTION INTRAMUSCULAR; INTRAVENOUS 2 TIMES DAILY PRN
Status: DISCONTINUED | OUTPATIENT
Start: 2023-01-01 | End: 2023-01-01

## 2023-01-01 RX ORDER — CALCIUM CARBONATE 200(500)MG
500 TABLET,CHEWABLE ORAL 3 TIMES DAILY PRN
Status: DISCONTINUED | OUTPATIENT
Start: 2023-01-01 | End: 2023-01-01 | Stop reason: HOSPADM

## 2023-01-01 RX ORDER — MORPHINE SULFATE 2 MG/ML
2 INJECTION, SOLUTION INTRAMUSCULAR; INTRAVENOUS 2 TIMES DAILY PRN
Status: DISCONTINUED | OUTPATIENT
Start: 2023-01-01 | End: 2023-01-01

## 2023-01-01 RX ORDER — PANTOPRAZOLE SODIUM 40 MG/10ML
80 INJECTION, POWDER, LYOPHILIZED, FOR SOLUTION INTRAVENOUS ONCE
Status: COMPLETED | OUTPATIENT
Start: 2023-01-01 | End: 2023-01-01

## 2023-01-01 RX ORDER — OXYCODONE HYDROCHLORIDE 5 MG/1
5 TABLET ORAL 2 TIMES DAILY PRN
Status: DISCONTINUED | OUTPATIENT
Start: 2023-01-01 | End: 2023-01-01

## 2023-01-01 RX ORDER — TALC
9 POWDER (GRAM) TOPICAL NIGHTLY PRN
Status: DISCONTINUED | OUTPATIENT
Start: 2023-01-01 | End: 2023-01-01 | Stop reason: HOSPADM

## 2023-01-01 RX ORDER — DEXTROSE 40 %
15 GEL (GRAM) ORAL
Status: DISCONTINUED | OUTPATIENT
Start: 2023-01-01 | End: 2023-01-01 | Stop reason: HOSPADM

## 2023-01-01 RX ORDER — LIDOCAINE HYDROCHLORIDE 10 MG/ML
10 INJECTION INFILTRATION; PERINEURAL ONCE
Status: COMPLETED | OUTPATIENT
Start: 2023-01-01 | End: 2023-01-01

## 2023-01-01 RX ORDER — MYCOPHENOLATE MOFETIL 500 MG/1
1500 TABLET ORAL 2 TIMES DAILY
Qty: 540 TABLET | Refills: 3 | Status: CANCELLED | OUTPATIENT
Start: 2023-01-01 | End: 2024-05-14

## 2023-01-01 RX ORDER — GLUCAGON 1 MG
1 KIT INJECTION
Status: DISCONTINUED | OUTPATIENT
Start: 2023-01-01 | End: 2023-01-01

## 2023-01-01 RX ORDER — AMOXICILLIN AND CLAVULANATE POTASSIUM 400; 57 MG/5ML; MG/5ML
875 POWDER, FOR SUSPENSION ORAL EVERY 12 HOURS
Qty: 110 ML | Refills: 0 | Status: SHIPPED | OUTPATIENT
Start: 2023-01-01 | End: 2023-01-01 | Stop reason: HOSPADM

## 2023-01-01 RX ORDER — ACETYLCYSTEINE 200 MG/ML
2 SOLUTION ORAL; RESPIRATORY (INHALATION) 2 TIMES DAILY
Status: DISCONTINUED | OUTPATIENT
Start: 2023-01-01 | End: 2023-01-01

## 2023-01-01 RX ORDER — HYDROXYZINE HYDROCHLORIDE 25 MG/1
25 TABLET, FILM COATED ORAL 3 TIMES DAILY PRN
Status: DISCONTINUED | OUTPATIENT
Start: 2023-01-01 | End: 2023-01-01 | Stop reason: HOSPADM

## 2023-01-01 RX ORDER — OXYCODONE AND ACETAMINOPHEN 5; 325 MG/1; MG/1
1 TABLET ORAL EVERY 12 HOURS PRN
Qty: 14 TABLET | Refills: 0 | OUTPATIENT
Start: 2023-01-01

## 2023-01-01 RX ORDER — LORAZEPAM 2 MG/ML
1 INJECTION INTRAMUSCULAR ONCE
Status: COMPLETED | OUTPATIENT
Start: 2023-01-01 | End: 2023-01-01

## 2023-01-01 RX ORDER — ACETAMINOPHEN 325 MG/1
650 TABLET ORAL EVERY 6 HOURS PRN
Status: CANCELLED | OUTPATIENT
Start: 2023-01-01

## 2023-01-01 RX ORDER — ONDANSETRON 2 MG/ML
8 INJECTION INTRAMUSCULAR; INTRAVENOUS ONCE
Status: DISCONTINUED | OUTPATIENT
Start: 2023-01-01 | End: 2023-01-01

## 2023-01-01 RX ORDER — INSULIN ASPART 100 [IU]/ML
0-5 INJECTION, SOLUTION INTRAVENOUS; SUBCUTANEOUS
Status: DISCONTINUED | OUTPATIENT
Start: 2023-01-01 | End: 2023-01-01

## 2023-01-01 RX ORDER — OXYCODONE AND ACETAMINOPHEN 5; 325 MG/1; MG/1
1 TABLET ORAL EVERY 12 HOURS PRN
Qty: 14 TABLET | Refills: 0 | Status: SHIPPED | OUTPATIENT
Start: 2023-01-01

## 2023-01-01 RX ORDER — OXYCODONE AND ACETAMINOPHEN 5; 325 MG/1; MG/1
1 TABLET ORAL EVERY 12 HOURS PRN
Status: DISCONTINUED | OUTPATIENT
Start: 2023-01-01 | End: 2023-01-01

## 2023-01-01 RX ORDER — LIDOCAINE HYDROCHLORIDE 10 MG/ML
1 INJECTION, SOLUTION EPIDURAL; INFILTRATION; INTRACAUDAL; PERINEURAL ONCE
Status: COMPLETED | OUTPATIENT
Start: 2023-01-01 | End: 2023-01-01

## 2023-01-01 RX ORDER — DEXTROSE 40 %
30 GEL (GRAM) ORAL
Status: DISCONTINUED | OUTPATIENT
Start: 2023-01-01 | End: 2023-01-01 | Stop reason: HOSPADM

## 2023-01-01 RX ORDER — INSULIN ASPART 100 [IU]/ML
0-5 INJECTION, SOLUTION INTRAVENOUS; SUBCUTANEOUS EVERY 4 HOURS PRN
Status: DISCONTINUED | OUTPATIENT
Start: 2023-01-01 | End: 2023-01-01

## 2023-01-01 RX ORDER — ENOXAPARIN SODIUM 100 MG/ML
40 INJECTION SUBCUTANEOUS EVERY 24 HOURS
Status: DISCONTINUED | OUTPATIENT
Start: 2023-01-01 | End: 2023-01-01

## 2023-01-01 RX ORDER — OXYCODONE AND ACETAMINOPHEN 5; 325 MG/1; MG/1
1 TABLET ORAL EVERY 12 HOURS PRN
Status: DISCONTINUED | OUTPATIENT
Start: 2023-01-01 | End: 2023-01-01 | Stop reason: HOSPADM

## 2023-01-01 RX ORDER — SODIUM CHLORIDE 0.9 % (FLUSH) 0.9 %
10 SYRINGE (ML) INJECTION
Status: DISCONTINUED | OUTPATIENT
Start: 2023-01-01 | End: 2023-01-01 | Stop reason: HOSPADM

## 2023-01-01 RX ORDER — ASPIRIN 325 MG
50000 TABLET, DELAYED RELEASE (ENTERIC COATED) ORAL WEEKLY
Qty: 12 CAPSULE | Refills: 0 | Status: SHIPPED | OUTPATIENT
Start: 2023-01-01 | End: 2023-07-10

## 2023-01-01 RX ORDER — IPRATROPIUM BROMIDE 0.5 MG/2.5ML
0.5 SOLUTION RESPIRATORY (INHALATION) EVERY 6 HOURS PRN
Status: DISCONTINUED | OUTPATIENT
Start: 2023-01-01 | End: 2023-01-01

## 2023-01-01 RX ORDER — INSULIN ASPART 100 [IU]/ML
0-5 INJECTION, SOLUTION INTRAVENOUS; SUBCUTANEOUS EVERY 6 HOURS PRN
Status: DISCONTINUED | OUTPATIENT
Start: 2023-01-01 | End: 2023-01-01

## 2023-01-01 RX ORDER — DEXTROSE 40 %
30 GEL (GRAM) ORAL
Status: DISCONTINUED | OUTPATIENT
Start: 2023-01-01 | End: 2023-01-01

## 2023-01-01 RX ORDER — PANTOPRAZOLE SODIUM 40 MG/10ML
40 INJECTION, POWDER, LYOPHILIZED, FOR SOLUTION INTRAVENOUS 2 TIMES DAILY
Status: DISCONTINUED | OUTPATIENT
Start: 2023-01-01 | End: 2023-01-01

## 2023-01-01 RX ORDER — POTASSIUM CHLORIDE 7.45 MG/ML
10 INJECTION INTRAVENOUS
Status: DISCONTINUED | OUTPATIENT
Start: 2023-01-01 | End: 2023-01-01

## 2023-01-01 RX ORDER — ACETAMINOPHEN 325 MG/1
650 TABLET ORAL EVERY 4 HOURS PRN
Status: DISCONTINUED | OUTPATIENT
Start: 2023-01-01 | End: 2023-01-01 | Stop reason: HOSPADM

## 2023-01-01 RX ORDER — LEVALBUTEROL INHALATION SOLUTION 0.63 MG/3ML
0.63 SOLUTION RESPIRATORY (INHALATION) EVERY 6 HOURS PRN
Status: DISCONTINUED | OUTPATIENT
Start: 2023-01-01 | End: 2023-01-01

## 2023-01-01 RX ORDER — NALOXONE HCL 0.4 MG/ML
0.02 VIAL (ML) INJECTION
Status: DISCONTINUED | OUTPATIENT
Start: 2023-01-01 | End: 2023-01-01

## 2023-01-01 RX ORDER — BENZONATATE 100 MG/1
200 CAPSULE ORAL 3 TIMES DAILY PRN
Status: DISCONTINUED | OUTPATIENT
Start: 2023-01-01 | End: 2023-01-01 | Stop reason: HOSPADM

## 2023-01-01 RX ORDER — HYDROXYZINE HYDROCHLORIDE 25 MG/1
25 TABLET, FILM COATED ORAL 3 TIMES DAILY PRN
Qty: 90 TABLET | Refills: 0 | Status: SHIPPED | OUTPATIENT
Start: 2023-01-01 | End: 2023-01-01 | Stop reason: SDUPTHER

## 2023-01-01 RX ORDER — DEXTROSE 40 %
15 GEL (GRAM) ORAL
Status: DISCONTINUED | OUTPATIENT
Start: 2023-01-01 | End: 2023-01-01

## 2023-01-01 RX ORDER — BISACODYL 10 MG
10 SUPPOSITORY, RECTAL RECTAL DAILY PRN
Status: DISCONTINUED | OUTPATIENT
Start: 2023-01-01 | End: 2023-01-01 | Stop reason: HOSPADM

## 2023-01-01 RX ORDER — AZITHROMYCIN 250 MG/1
500 TABLET, FILM COATED ORAL DAILY
Status: DISCONTINUED | OUTPATIENT
Start: 2023-01-01 | End: 2023-01-01

## 2023-01-01 RX ORDER — ACETAMINOPHEN 325 MG/1
650 TABLET ORAL EVERY 6 HOURS PRN
Status: DISCONTINUED | OUTPATIENT
Start: 2023-01-01 | End: 2023-01-01 | Stop reason: HOSPADM

## 2023-01-01 RX ORDER — DEXMEDETOMIDINE HYDROCHLORIDE 4 UG/ML
0-1.4 INJECTION, SOLUTION INTRAVENOUS CONTINUOUS
Status: DISCONTINUED | OUTPATIENT
Start: 2023-01-01 | End: 2023-01-01

## 2023-01-01 RX ORDER — DEXTROSE MONOHYDRATE 100 MG/ML
INJECTION, SOLUTION INTRAVENOUS CONTINUOUS PRN
Status: DISCONTINUED | OUTPATIENT
Start: 2023-01-01 | End: 2023-01-01

## 2023-01-01 RX ORDER — POTASSIUM CHLORIDE 7.45 MG/ML
10 INJECTION INTRAVENOUS
Status: COMPLETED | OUTPATIENT
Start: 2023-01-01 | End: 2023-01-01

## 2023-01-01 RX ORDER — PREDNISONE 20 MG/1
40 TABLET ORAL DAILY
Status: DISCONTINUED | OUTPATIENT
Start: 2023-01-01 | End: 2023-01-01 | Stop reason: HOSPADM

## 2023-01-01 RX ORDER — MORPHINE SULFATE 2 MG/ML
2 INJECTION, SOLUTION INTRAMUSCULAR; INTRAVENOUS EVERY 4 HOURS PRN
Status: DISCONTINUED | OUTPATIENT
Start: 2023-01-01 | End: 2023-01-01 | Stop reason: HOSPADM

## 2023-01-01 RX ORDER — LEVALBUTEROL 1.25 MG/.5ML
1.25 SOLUTION, CONCENTRATE RESPIRATORY (INHALATION) EVERY 6 HOURS PRN
Status: DISCONTINUED | OUTPATIENT
Start: 2023-01-01 | End: 2023-01-01

## 2023-01-01 RX ORDER — IPRATROPIUM BROMIDE 0.5 MG/2.5ML
0.5 SOLUTION RESPIRATORY (INHALATION)
Status: DISCONTINUED | OUTPATIENT
Start: 2023-01-01 | End: 2023-01-01

## 2023-01-01 RX ORDER — SODIUM CHLORIDE 0.9 % (FLUSH) 0.9 %
10 SYRINGE (ML) INJECTION EVERY 12 HOURS PRN
Status: DISCONTINUED | OUTPATIENT
Start: 2023-01-01 | End: 2023-01-01

## 2023-01-01 RX ORDER — SODIUM CHLORIDE FOR INHALATION 3 %
4 VIAL, NEBULIZER (ML) INHALATION EVERY 6 HOURS
Status: DISCONTINUED | OUTPATIENT
Start: 2023-01-01 | End: 2023-01-01

## 2023-01-01 RX ORDER — PANTOPRAZOLE SODIUM 40 MG/10ML
40 INJECTION, POWDER, LYOPHILIZED, FOR SOLUTION INTRAVENOUS DAILY
Status: DISCONTINUED | OUTPATIENT
Start: 2023-01-01 | End: 2023-01-01

## 2023-01-01 RX ORDER — SODIUM CHLORIDE 0.9 % (FLUSH) 0.9 %
10 SYRINGE (ML) INJECTION
Status: DISCONTINUED | OUTPATIENT
Start: 2023-01-01 | End: 2023-01-01

## 2023-01-01 RX ORDER — TRAZODONE HYDROCHLORIDE 50 MG/1
50 TABLET ORAL NIGHTLY PRN
Qty: 30 TABLET | Refills: 0 | Status: SHIPPED | OUTPATIENT
Start: 2023-01-01 | End: 2024-04-10

## 2023-01-01 RX ORDER — GLYCOPYRROLATE 0.2 MG/ML
0.1 INJECTION INTRAMUSCULAR; INTRAVENOUS 3 TIMES DAILY PRN
Status: DISCONTINUED | OUTPATIENT
Start: 2023-01-01 | End: 2023-01-01 | Stop reason: HOSPADM

## 2023-01-01 RX ORDER — GUAIFENESIN 100 MG/5ML
200 SOLUTION ORAL EVERY 4 HOURS PRN
Qty: 118 ML | Refills: 0 | Status: SHIPPED | OUTPATIENT
Start: 2023-01-01 | End: 2023-01-01 | Stop reason: CLARIF

## 2023-01-01 RX ORDER — TRAZODONE HYDROCHLORIDE 50 MG/1
50 TABLET ORAL NIGHTLY PRN
Status: DISCONTINUED | OUTPATIENT
Start: 2023-01-01 | End: 2023-01-01 | Stop reason: HOSPADM

## 2023-01-01 RX ORDER — AMOXICILLIN AND CLAVULANATE POTASSIUM 875; 125 MG/1; MG/1
1 TABLET, FILM COATED ORAL EVERY 12 HOURS
Status: DISCONTINUED | OUTPATIENT
Start: 2023-01-01 | End: 2023-01-01 | Stop reason: HOSPADM

## 2023-01-01 RX ORDER — ALBUTEROL SULFATE 90 UG/1
2 AEROSOL, METERED RESPIRATORY (INHALATION) EVERY 6 HOURS PRN
Status: DISCONTINUED | OUTPATIENT
Start: 2023-01-01 | End: 2023-01-01

## 2023-01-01 RX ORDER — TALC
6 POWDER (GRAM) TOPICAL NIGHTLY PRN
Status: DISCONTINUED | OUTPATIENT
Start: 2023-01-01 | End: 2023-01-01 | Stop reason: HOSPADM

## 2023-01-01 RX ORDER — LEVALBUTEROL INHALATION SOLUTION 0.63 MG/3ML
0.63 SOLUTION RESPIRATORY (INHALATION) EVERY 12 HOURS
Status: DISCONTINUED | OUTPATIENT
Start: 2023-01-01 | End: 2023-01-01

## 2023-01-01 RX ORDER — LOPERAMIDE HYDROCHLORIDE 2 MG/1
2 CAPSULE ORAL EVERY 6 HOURS PRN
Status: DISCONTINUED | OUTPATIENT
Start: 2023-01-01 | End: 2023-01-01 | Stop reason: HOSPADM

## 2023-01-01 RX ORDER — AZITHROMYCIN 250 MG/1
500 TABLET, FILM COATED ORAL DAILY
Status: COMPLETED | OUTPATIENT
Start: 2023-01-01 | End: 2023-01-01

## 2023-01-01 RX ORDER — SODIUM CHLORIDE 9 MG/ML
INJECTION, SOLUTION INTRAVENOUS CONTINUOUS
Status: DISCONTINUED | OUTPATIENT
Start: 2023-01-01 | End: 2023-01-01

## 2023-01-01 RX ORDER — LEVALBUTEROL 1.25 MG/.5ML
1.25 SOLUTION, CONCENTRATE RESPIRATORY (INHALATION)
Status: DISCONTINUED | OUTPATIENT
Start: 2023-01-01 | End: 2023-01-01

## 2023-01-01 RX ORDER — ALBUTEROL SULFATE 90 UG/1
2 AEROSOL, METERED RESPIRATORY (INHALATION)
Status: DISCONTINUED | OUTPATIENT
Start: 2023-01-01 | End: 2023-01-01 | Stop reason: HOSPADM

## 2023-01-01 RX ORDER — IPRATROPIUM BROMIDE AND ALBUTEROL SULFATE 2.5; .5 MG/3ML; MG/3ML
3 SOLUTION RESPIRATORY (INHALATION) EVERY 6 HOURS PRN
Qty: 90 ML | Refills: 0 | Status: SHIPPED | OUTPATIENT
Start: 2023-01-01 | End: 2024-04-16

## 2023-01-01 RX ORDER — ALBUTEROL SULFATE 90 UG/1
2 AEROSOL, METERED RESPIRATORY (INHALATION)
Status: DISCONTINUED | OUTPATIENT
Start: 2023-01-01 | End: 2023-01-01

## 2023-01-01 RX ORDER — LACTULOSE 10 G/15ML
10 SOLUTION ORAL; RECTAL EVERY 6 HOURS PRN
Qty: 150 ML | Refills: 0 | Status: SHIPPED | OUTPATIENT
Start: 2023-01-01

## 2023-01-01 RX ORDER — SODIUM CHLORIDE 0.9 % (FLUSH) 0.9 %
10 SYRINGE (ML) INJECTION EVERY 12 HOURS PRN
Status: DISCONTINUED | OUTPATIENT
Start: 2023-01-01 | End: 2023-01-01 | Stop reason: HOSPADM

## 2023-01-01 RX ORDER — GUAIFENESIN 600 MG/1
600 TABLET, EXTENDED RELEASE ORAL 2 TIMES DAILY
Status: DISCONTINUED | OUTPATIENT
Start: 2023-01-01 | End: 2023-01-01

## 2023-01-01 RX ORDER — ENOXAPARIN SODIUM 100 MG/ML
1 INJECTION SUBCUTANEOUS 2 TIMES DAILY
Status: DISCONTINUED | OUTPATIENT
Start: 2023-01-01 | End: 2023-01-01 | Stop reason: HOSPADM

## 2023-01-01 RX ORDER — POTASSIUM CHLORIDE 7.45 MG/ML
10 INJECTION INTRAVENOUS
Status: DISPENSED | OUTPATIENT
Start: 2023-01-01 | End: 2023-01-01

## 2023-01-01 RX ORDER — MORPHINE SULFATE 2 MG/ML
INJECTION, SOLUTION INTRAMUSCULAR; INTRAVENOUS
Status: COMPLETED
Start: 2023-01-01 | End: 2023-01-01

## 2023-01-01 RX ORDER — OXYCODONE AND ACETAMINOPHEN 5; 325 MG/1; MG/1
1 TABLET ORAL
Status: COMPLETED | OUTPATIENT
Start: 2023-01-01 | End: 2023-01-01

## 2023-01-01 RX ORDER — ASCORBIC ACID 500 MG
500 TABLET ORAL 2 TIMES DAILY
Status: DISCONTINUED | OUTPATIENT
Start: 2023-01-01 | End: 2023-01-01 | Stop reason: HOSPADM

## 2023-01-01 RX ORDER — IPRATROPIUM BROMIDE AND ALBUTEROL SULFATE 2.5; .5 MG/3ML; MG/3ML
3 SOLUTION RESPIRATORY (INHALATION) EVERY 4 HOURS PRN
Status: DISCONTINUED | OUTPATIENT
Start: 2023-01-01 | End: 2023-01-01 | Stop reason: HOSPADM

## 2023-01-01 RX ORDER — ACETAMINOPHEN 500 MG
1000 TABLET ORAL
Status: COMPLETED | OUTPATIENT
Start: 2023-01-01 | End: 2023-01-01

## 2023-01-01 RX ORDER — PREDNISONE 20 MG/1
40 TABLET ORAL DAILY
Status: DISCONTINUED | OUTPATIENT
Start: 2023-01-01 | End: 2023-01-01

## 2023-01-01 RX ORDER — ONDANSETRON 4 MG/1
8 TABLET, ORALLY DISINTEGRATING ORAL EVERY 8 HOURS PRN
Status: DISCONTINUED | OUTPATIENT
Start: 2023-01-01 | End: 2023-01-01 | Stop reason: HOSPADM

## 2023-01-01 RX ORDER — IBUPROFEN 200 MG
16 TABLET ORAL
Status: DISCONTINUED | OUTPATIENT
Start: 2023-01-01 | End: 2023-01-01

## 2023-01-01 RX ORDER — HYDROCODONE BITARTRATE AND ACETAMINOPHEN 500; 5 MG/1; MG/1
TABLET ORAL
Status: DISCONTINUED | OUTPATIENT
Start: 2023-01-01 | End: 2023-01-01

## 2023-01-01 RX ORDER — AZITHROMYCIN 250 MG/1
500 TABLET, FILM COATED ORAL
Status: DISCONTINUED | OUTPATIENT
Start: 2023-01-01 | End: 2023-01-01

## 2023-01-01 RX ORDER — ESCITALOPRAM OXALATE 5 MG/1
5 TABLET ORAL DAILY
Status: DISCONTINUED | OUTPATIENT
Start: 2023-01-01 | End: 2023-01-01

## 2023-01-01 RX ORDER — NALOXONE HCL 0.4 MG/ML
0.02 VIAL (ML) INJECTION
Status: DISCONTINUED | OUTPATIENT
Start: 2023-01-01 | End: 2023-01-01 | Stop reason: HOSPADM

## 2023-01-01 RX ORDER — INSULIN ASPART 100 [IU]/ML
0-5 INJECTION, SOLUTION INTRAVENOUS; SUBCUTANEOUS EVERY 8 HOURS PRN
Status: DISCONTINUED | OUTPATIENT
Start: 2023-01-01 | End: 2023-01-01

## 2023-01-01 RX ORDER — CEFDINIR 300 MG/1
300 CAPSULE ORAL 2 TIMES DAILY
Qty: 4 CAPSULE | Refills: 0 | Status: SHIPPED | OUTPATIENT
Start: 2023-01-01 | End: 2023-01-01

## 2023-01-01 RX ORDER — PREDNISONE 10 MG/1
10 TABLET ORAL DAILY
Status: DISCONTINUED | OUTPATIENT
Start: 2023-01-01 | End: 2023-01-01 | Stop reason: HOSPADM

## 2023-01-01 RX ORDER — PREDNISONE 20 MG/1
40 TABLET ORAL DAILY
Qty: 2 TABLET | Refills: 0 | Status: SHIPPED | OUTPATIENT
Start: 2023-01-01 | End: 2023-01-01

## 2023-01-01 RX ORDER — AMOXICILLIN AND CLAVULANATE POTASSIUM 875; 125 MG/1; MG/1
1 TABLET, FILM COATED ORAL EVERY 12 HOURS
Qty: 10 TABLET | Refills: 0 | OUTPATIENT
Start: 2023-01-01

## 2023-01-01 RX ORDER — FAMOTIDINE 20 MG/1
20 TABLET, FILM COATED ORAL DAILY
Status: DISCONTINUED | OUTPATIENT
Start: 2023-01-01 | End: 2023-01-01 | Stop reason: HOSPADM

## 2023-01-01 RX ORDER — TALC
6 POWDER (GRAM) TOPICAL NIGHTLY PRN
Status: DISCONTINUED | OUTPATIENT
Start: 2023-01-01 | End: 2023-01-01 | Stop reason: SDUPTHER

## 2023-01-01 RX ORDER — ALBUTEROL SULFATE 90 UG/1
2 AEROSOL, METERED RESPIRATORY (INHALATION) EVERY 6 HOURS PRN
Qty: 18 G | Refills: 11 | Status: SHIPPED | OUTPATIENT
Start: 2023-01-01

## 2023-01-01 RX ORDER — SODIUM,POTASSIUM PHOSPHATES 280-250MG
2 POWDER IN PACKET (EA) ORAL EVERY 4 HOURS
Status: COMPLETED | OUTPATIENT
Start: 2023-01-01 | End: 2023-01-01

## 2023-01-01 RX ORDER — IBUPROFEN 200 MG
24 TABLET ORAL
Status: DISCONTINUED | OUTPATIENT
Start: 2023-01-01 | End: 2023-01-01

## 2023-01-01 RX ORDER — AMOXICILLIN AND CLAVULANATE POTASSIUM 875; 125 MG/1; MG/1
1 TABLET, FILM COATED ORAL EVERY 12 HOURS
Qty: 10 TABLET | Refills: 0 | Status: ON HOLD | OUTPATIENT
Start: 2023-01-01 | End: 2023-01-01

## 2023-01-01 RX ORDER — ENOXAPARIN SODIUM 100 MG/ML
40 INJECTION SUBCUTANEOUS EVERY 24 HOURS
Status: DISCONTINUED | OUTPATIENT
Start: 2023-01-01 | End: 2023-01-01 | Stop reason: HOSPADM

## 2023-01-01 RX ORDER — DEXAMETHASONE SODIUM PHOSPHATE 4 MG/ML
6 INJECTION, SOLUTION INTRA-ARTICULAR; INTRALESIONAL; INTRAMUSCULAR; INTRAVENOUS; SOFT TISSUE
Status: COMPLETED | OUTPATIENT
Start: 2023-01-01 | End: 2023-01-01

## 2023-01-01 RX ORDER — ACETAMINOPHEN 325 MG/1
650 TABLET ORAL EVERY 8 HOURS PRN
Status: DISCONTINUED | OUTPATIENT
Start: 2023-01-01 | End: 2023-01-01 | Stop reason: HOSPADM

## 2023-01-01 RX ORDER — HYDROXYZINE HYDROCHLORIDE 25 MG/1
25 TABLET, FILM COATED ORAL 3 TIMES DAILY PRN
Qty: 90 TABLET | Refills: 0 | Status: SHIPPED | OUTPATIENT
Start: 2023-01-01

## 2023-01-01 RX ORDER — PREDNISONE 10 MG/1
10 TABLET ORAL DAILY
Status: CANCELLED | OUTPATIENT
Start: 2023-01-01

## 2023-01-01 RX ORDER — SODIUM CHLORIDE 1 G/1
2000 TABLET ORAL 2 TIMES DAILY
Status: COMPLETED | OUTPATIENT
Start: 2023-01-01 | End: 2023-01-01

## 2023-01-01 RX ORDER — FAMOTIDINE 10 MG/ML
20 INJECTION INTRAVENOUS 2 TIMES DAILY
Status: DISCONTINUED | OUTPATIENT
Start: 2023-01-01 | End: 2023-01-01

## 2023-01-01 RX ORDER — AMOXICILLIN AND CLAVULANATE POTASSIUM 400; 57 MG/5ML; MG/5ML
875 POWDER, FOR SUSPENSION ORAL EVERY 12 HOURS
Status: DISCONTINUED | OUTPATIENT
Start: 2023-01-01 | End: 2023-01-01

## 2023-01-01 RX ORDER — TRAZODONE HYDROCHLORIDE 50 MG/1
50 TABLET ORAL NIGHTLY PRN
Qty: 30 TABLET | Refills: 0 | Status: SHIPPED | OUTPATIENT
Start: 2023-01-01 | End: 2023-01-01 | Stop reason: SDUPTHER

## 2023-01-01 RX ORDER — GUAIFENESIN 100 MG/5ML
200 SOLUTION ORAL EVERY 4 HOURS PRN
Status: DISCONTINUED | OUTPATIENT
Start: 2023-01-01 | End: 2023-01-01 | Stop reason: HOSPADM

## 2023-01-01 RX ORDER — INSULIN ASPART 100 [IU]/ML
3 INJECTION, SOLUTION INTRAVENOUS; SUBCUTANEOUS ONCE
Status: COMPLETED | OUTPATIENT
Start: 2023-01-01 | End: 2023-01-01

## 2023-01-01 RX ORDER — MUPIROCIN 20 MG/G
OINTMENT TOPICAL 3 TIMES DAILY
Qty: 30 G | Refills: 1 | Status: SHIPPED | OUTPATIENT
Start: 2023-01-01

## 2023-01-01 RX ORDER — POLYETHYLENE GLYCOL 3350 17 G/17G
17 POWDER, FOR SOLUTION ORAL DAILY PRN
Status: DISCONTINUED | OUTPATIENT
Start: 2023-01-01 | End: 2023-01-01

## 2023-01-01 RX ORDER — IPRATROPIUM BROMIDE AND ALBUTEROL SULFATE 2.5; .5 MG/3ML; MG/3ML
3 SOLUTION RESPIRATORY (INHALATION) EVERY 4 HOURS PRN
Status: DISCONTINUED | OUTPATIENT
Start: 2023-01-01 | End: 2023-01-01 | Stop reason: SDUPTHER

## 2023-01-01 RX ORDER — LIDOCAINE 50 MG/G
1 PATCH TOPICAL
Status: DISCONTINUED | OUTPATIENT
Start: 2023-01-01 | End: 2023-01-01 | Stop reason: HOSPADM

## 2023-01-01 RX ORDER — MAGNESIUM SULFATE HEPTAHYDRATE 40 MG/ML
2 INJECTION, SOLUTION INTRAVENOUS ONCE
Status: COMPLETED | OUTPATIENT
Start: 2023-01-01 | End: 2023-01-01

## 2023-01-01 RX ORDER — LORAZEPAM 2 MG/ML
2 INJECTION INTRAMUSCULAR EVERY 30 MIN PRN
Status: DISCONTINUED | OUTPATIENT
Start: 2023-01-01 | End: 2023-01-01 | Stop reason: HOSPADM

## 2023-01-01 RX ORDER — LACTULOSE 10 G/15ML
10 SOLUTION ORAL; RECTAL EVERY 6 HOURS PRN
Qty: 150 ML | Refills: 0 | Status: SHIPPED | OUTPATIENT
Start: 2023-01-01 | End: 2023-01-01 | Stop reason: SDUPTHER

## 2023-01-01 RX ORDER — FUROSEMIDE 10 MG/ML
40 INJECTION INTRAMUSCULAR; INTRAVENOUS ONCE
Status: COMPLETED | OUTPATIENT
Start: 2023-01-01 | End: 2023-01-01

## 2023-01-01 RX ORDER — AMOXICILLIN AND CLAVULANATE POTASSIUM 875; 125 MG/1; MG/1
1 TABLET, FILM COATED ORAL EVERY 12 HOURS
Status: DISCONTINUED | OUTPATIENT
Start: 2023-01-01 | End: 2023-01-01

## 2023-01-01 RX ORDER — ACETAMINOPHEN 325 MG/1
650 TABLET ORAL EVERY 6 HOURS PRN
Status: DISCONTINUED | OUTPATIENT
Start: 2023-01-01 | End: 2023-01-01

## 2023-01-01 RX ORDER — IPRATROPIUM BROMIDE AND ALBUTEROL SULFATE 2.5; .5 MG/3ML; MG/3ML
3 SOLUTION RESPIRATORY (INHALATION) EVERY 6 HOURS
Status: DISCONTINUED | OUTPATIENT
Start: 2023-01-01 | End: 2023-01-01

## 2023-01-01 RX ADMIN — CEFEPIME 2 G: 2 INJECTION, POWDER, FOR SOLUTION INTRAVENOUS at 06:04

## 2023-01-01 RX ADMIN — ENOXAPARIN SODIUM 80 MG: 80 INJECTION SUBCUTANEOUS at 09:04

## 2023-01-01 RX ADMIN — CEFTRIAXONE 1 G: 1 INJECTION, POWDER, FOR SOLUTION INTRAMUSCULAR; INTRAVENOUS at 09:04

## 2023-01-01 RX ADMIN — AZITHROMYCIN MONOHYDRATE 500 MG: 500 INJECTION, POWDER, LYOPHILIZED, FOR SOLUTION INTRAVENOUS at 12:05

## 2023-01-01 RX ADMIN — POTASSIUM & SODIUM PHOSPHATES POWDER PACK 280-160-250 MG 2 PACKET: 280-160-250 PACK at 05:04

## 2023-01-01 RX ADMIN — Medication 6 MG: at 08:04

## 2023-01-01 RX ADMIN — PANTOPRAZOLE SODIUM 40 MG: 40 INJECTION, POWDER, FOR SOLUTION INTRAVENOUS at 08:05

## 2023-01-01 RX ADMIN — THERA TABS 1 TABLET: TAB at 09:04

## 2023-01-01 RX ADMIN — PIPERACILLIN AND TAZOBACTAM 4.5 G: 4; .5 INJECTION, POWDER, LYOPHILIZED, FOR SOLUTION INTRAVENOUS; PARENTERAL at 10:05

## 2023-01-01 RX ADMIN — SODIUM CHLORIDE SOLN NEBU 3% 4 ML: 3 NEBU SOLN at 08:05

## 2023-01-01 RX ADMIN — REMDESIVIR 100 MG: 100 INJECTION, POWDER, LYOPHILIZED, FOR SOLUTION INTRAVENOUS at 10:03

## 2023-01-01 RX ADMIN — PIPERACILLIN AND TAZOBACTAM 4.5 G: 4; .5 INJECTION, POWDER, LYOPHILIZED, FOR SOLUTION INTRAVENOUS; PARENTERAL at 11:05

## 2023-01-01 RX ADMIN — MORPHINE SULFATE 2 MG: 2 INJECTION, SOLUTION INTRAMUSCULAR; INTRAVENOUS at 08:05

## 2023-01-01 RX ADMIN — SODIUM CHLORIDE SOLN NEBU 3% 4 ML: 3 NEBU SOLN at 02:05

## 2023-01-01 RX ADMIN — OXYCODONE HYDROCHLORIDE AND ACETAMINOPHEN 500 MG: 500 TABLET ORAL at 09:03

## 2023-01-01 RX ADMIN — VANCOMYCIN HYDROCHLORIDE 1500 MG: 1.5 INJECTION, POWDER, LYOPHILIZED, FOR SOLUTION INTRAVENOUS at 01:05

## 2023-01-01 RX ADMIN — PANTOPRAZOLE SODIUM 40 MG: 40 INJECTION, POWDER, FOR SOLUTION INTRAVENOUS at 09:05

## 2023-01-01 RX ADMIN — IPRATROPIUM BROMIDE AND ALBUTEROL SULFATE 3 ML: 2.5; .5 SOLUTION RESPIRATORY (INHALATION) at 02:04

## 2023-01-01 RX ADMIN — FAMOTIDINE 20 MG: 20 TABLET ORAL at 09:03

## 2023-01-01 RX ADMIN — PREDNISONE 40 MG: 20 TABLET ORAL at 08:04

## 2023-01-01 RX ADMIN — CEFTRIAXONE 2 G: 2 INJECTION, POWDER, FOR SOLUTION INTRAMUSCULAR; INTRAVENOUS at 10:05

## 2023-01-01 RX ADMIN — POTASSIUM & SODIUM PHOSPHATES POWDER PACK 280-160-250 MG 2 PACKET: 280-160-250 PACK at 12:04

## 2023-01-01 RX ADMIN — VANCOMYCIN HYDROCHLORIDE 750 MG: 750 INJECTION, POWDER, LYOPHILIZED, FOR SOLUTION INTRAVENOUS at 10:05

## 2023-01-01 RX ADMIN — POTASSIUM & SODIUM PHOSPHATES POWDER PACK 280-160-250 MG 2 PACKET: 280-160-250 PACK at 09:04

## 2023-01-01 RX ADMIN — OXYCODONE HYDROCHLORIDE AND ACETAMINOPHEN 500 MG: 500 TABLET ORAL at 08:04

## 2023-01-01 RX ADMIN — SODIUM CHLORIDE, POTASSIUM CHLORIDE, SODIUM LACTATE AND CALCIUM CHLORIDE 2190 ML: 600; 310; 30; 20 INJECTION, SOLUTION INTRAVENOUS at 12:03

## 2023-01-01 RX ADMIN — PANTOPRAZOLE SODIUM 80 MG: 40 INJECTION, POWDER, FOR SOLUTION INTRAVENOUS at 03:05

## 2023-01-01 RX ADMIN — ONDANSETRON 8 MG: 4 TABLET, ORALLY DISINTEGRATING ORAL at 09:04

## 2023-01-01 RX ADMIN — ALBUTEROL SULFATE 2 PUFF: 108 INHALANT RESPIRATORY (INHALATION) at 08:03

## 2023-01-01 RX ADMIN — IPRATROPIUM BROMIDE 0.5 MG: 0.5 SOLUTION RESPIRATORY (INHALATION) at 04:05

## 2023-01-01 RX ADMIN — AMPICILLIN SODIUM AND SULBACTAM SODIUM 3 G: 2; 1 INJECTION, POWDER, FOR SOLUTION INTRAMUSCULAR; INTRAVENOUS at 09:04

## 2023-01-01 RX ADMIN — PIPERACILLIN AND TAZOBACTAM 4.5 G: 4; .5 INJECTION, POWDER, LYOPHILIZED, FOR SOLUTION INTRAVENOUS; PARENTERAL at 07:05

## 2023-01-01 RX ADMIN — Medication 1 CAPSULE: at 09:04

## 2023-01-01 RX ADMIN — CEFEPIME 2 G: 2 INJECTION, POWDER, FOR SOLUTION INTRAVENOUS at 01:03

## 2023-01-01 RX ADMIN — AMPICILLIN SODIUM AND SULBACTAM SODIUM 3 G: 2; 1 INJECTION, POWDER, FOR SOLUTION INTRAMUSCULAR; INTRAVENOUS at 10:04

## 2023-01-01 RX ADMIN — ENOXAPARIN SODIUM 40 MG: 40 INJECTION SUBCUTANEOUS at 05:04

## 2023-01-01 RX ADMIN — FAMOTIDINE 20 MG: 20 TABLET ORAL at 08:04

## 2023-01-01 RX ADMIN — AZITHROMYCIN MONOHYDRATE 500 MG: 250 TABLET ORAL at 09:04

## 2023-01-01 RX ADMIN — OXYCODONE HYDROCHLORIDE AND ACETAMINOPHEN 1 TABLET: 5; 325 TABLET ORAL at 02:04

## 2023-01-01 RX ADMIN — Medication 6 MG: at 01:05

## 2023-01-01 RX ADMIN — PIPERACILLIN AND TAZOBACTAM 4.5 G: 4; .5 INJECTION, POWDER, LYOPHILIZED, FOR SOLUTION INTRAVENOUS; PARENTERAL at 03:05

## 2023-01-01 RX ADMIN — POTASSIUM CHLORIDE 10 MEQ: 7.46 INJECTION, SOLUTION INTRAVENOUS at 12:05

## 2023-01-01 RX ADMIN — SODIUM CHLORIDE SOLN NEBU 3% 4 ML: 3 NEBU SOLN at 12:05

## 2023-01-01 RX ADMIN — REMDESIVIR 100 MG: 100 INJECTION, POWDER, LYOPHILIZED, FOR SOLUTION INTRAVENOUS at 09:04

## 2023-01-01 RX ADMIN — SODIUM CHLORIDE, SODIUM LACTATE, POTASSIUM CHLORIDE, AND CALCIUM CHLORIDE 2178 ML: .6; .31; .03; .02 INJECTION, SOLUTION INTRAVENOUS at 12:05

## 2023-01-01 RX ADMIN — PREDNISONE 10 MG: 10 TABLET ORAL at 08:05

## 2023-01-01 RX ADMIN — ENOXAPARIN SODIUM 80 MG: 80 INJECTION SUBCUTANEOUS at 08:03

## 2023-01-01 RX ADMIN — ALBUTEROL SULFATE 2 PUFF: 108 INHALANT RESPIRATORY (INHALATION) at 02:04

## 2023-01-01 RX ADMIN — MORPHINE SULFATE 1 MG: 2 INJECTION, SOLUTION INTRAMUSCULAR; INTRAVENOUS at 05:05

## 2023-01-01 RX ADMIN — DEXAMETHASONE SODIUM PHOSPHATE 6 MG: 4 INJECTION INTRA-ARTICULAR; INTRALESIONAL; INTRAMUSCULAR; INTRAVENOUS; SOFT TISSUE at 03:03

## 2023-01-01 RX ADMIN — VANCOMYCIN HYDROCHLORIDE 1500 MG: 1.5 INJECTION, POWDER, LYOPHILIZED, FOR SOLUTION INTRAVENOUS at 02:04

## 2023-01-01 RX ADMIN — ENOXAPARIN SODIUM 80 MG: 80 INJECTION SUBCUTANEOUS at 10:03

## 2023-01-01 RX ADMIN — Medication 1 CAPSULE: at 08:04

## 2023-01-01 RX ADMIN — CEFTRIAXONE 2 G: 2 INJECTION, POWDER, FOR SOLUTION INTRAMUSCULAR; INTRAVENOUS at 09:05

## 2023-01-01 RX ADMIN — INSULIN ASPART 3 UNITS: 100 INJECTION, SOLUTION INTRAVENOUS; SUBCUTANEOUS at 03:05

## 2023-01-01 RX ADMIN — Medication 6 MG: at 11:05

## 2023-01-01 RX ADMIN — IPRATROPIUM BROMIDE AND ALBUTEROL SULFATE 3 ML: 2.5; .5 SOLUTION RESPIRATORY (INHALATION) at 12:04

## 2023-01-01 RX ADMIN — SODIUM CHLORIDE SOLN NEBU 3% 4 ML: 3 NEBU SOLN at 07:05

## 2023-01-01 RX ADMIN — MORPHINE SULFATE 2 MG: 2 INJECTION, SOLUTION INTRAMUSCULAR; INTRAVENOUS at 04:05

## 2023-01-01 RX ADMIN — Medication 6 MG: at 09:04

## 2023-01-01 RX ADMIN — VANCOMYCIN HYDROCHLORIDE 1500 MG: 1.5 INJECTION, POWDER, LYOPHILIZED, FOR SOLUTION INTRAVENOUS at 11:05

## 2023-01-01 RX ADMIN — OXYCODONE HYDROCHLORIDE AND ACETAMINOPHEN 500 MG: 500 TABLET ORAL at 09:04

## 2023-01-01 RX ADMIN — POTASSIUM CHLORIDE 10 MEQ: 7.46 INJECTION, SOLUTION INTRAVENOUS at 10:05

## 2023-01-01 RX ADMIN — REMDESIVIR 100 MG: 100 INJECTION, POWDER, LYOPHILIZED, FOR SOLUTION INTRAVENOUS at 10:04

## 2023-01-01 RX ADMIN — SODIUM CHLORIDE 2000 MG: 1 TABLET ORAL at 09:04

## 2023-01-01 RX ADMIN — VANCOMYCIN HYDROCHLORIDE 1000 MG: 1 INJECTION, POWDER, LYOPHILIZED, FOR SOLUTION INTRAVENOUS at 10:05

## 2023-01-01 RX ADMIN — ALBUTEROL SULFATE 2 PUFF: 108 INHALANT RESPIRATORY (INHALATION) at 07:03

## 2023-01-01 RX ADMIN — GUAIFENESIN 600 MG: 600 TABLET, EXTENDED RELEASE ORAL at 08:05

## 2023-01-01 RX ADMIN — DEXAMETHASONE 6 MG: 4 TABLET ORAL at 09:04

## 2023-01-01 RX ADMIN — THERA TABS 1 TABLET: TAB at 09:03

## 2023-01-01 RX ADMIN — AMOXICILLIN AND CLAVULANATE POTASSIUM 1 TABLET: 875; 125 TABLET, FILM COATED ORAL at 09:03

## 2023-01-01 RX ADMIN — ALBUTEROL SULFATE 2 PUFF: 108 INHALANT RESPIRATORY (INHALATION) at 08:04

## 2023-01-01 RX ADMIN — ACETAMINOPHEN 650 MG: 325 TABLET ORAL at 09:04

## 2023-01-01 RX ADMIN — ESCITALOPRAM OXALATE 5 MG: 5 TABLET, FILM COATED ORAL at 03:05

## 2023-01-01 RX ADMIN — AZITHROMYCIN MONOHYDRATE 500 MG: 250 TABLET ORAL at 08:04

## 2023-01-01 RX ADMIN — OXYCODONE HYDROCHLORIDE AND ACETAMINOPHEN 500 MG: 500 TABLET ORAL at 08:03

## 2023-01-01 RX ADMIN — MORPHINE SULFATE 2 MG: 2 INJECTION, SOLUTION INTRAMUSCULAR; INTRAVENOUS at 03:05

## 2023-01-01 RX ADMIN — FAMOTIDINE 20 MG: 20 TABLET ORAL at 09:04

## 2023-01-01 RX ADMIN — POTASSIUM CHLORIDE 10 MEQ: 7.46 INJECTION, SOLUTION INTRAVENOUS at 05:05

## 2023-01-01 RX ADMIN — AMPICILLIN SODIUM AND SULBACTAM SODIUM 3 G: 2; 1 INJECTION, POWDER, FOR SOLUTION INTRAMUSCULAR; INTRAVENOUS at 03:04

## 2023-01-01 RX ADMIN — ENOXAPARIN SODIUM 80 MG: 80 INJECTION SUBCUTANEOUS at 09:03

## 2023-01-01 RX ADMIN — LIDOCAINE HYDROCHLORIDE 10 MG: 10 INJECTION, SOLUTION EPIDURAL; INFILTRATION; INTRACAUDAL; PERINEURAL at 06:05

## 2023-01-01 RX ADMIN — BARIUM SULFATE 20 ML: 0.81 POWDER, FOR SUSPENSION ORAL at 02:04

## 2023-01-01 RX ADMIN — ENOXAPARIN SODIUM 80 MG: 80 INJECTION SUBCUTANEOUS at 08:04

## 2023-01-01 RX ADMIN — ALBUTEROL SULFATE 2 PUFF: 108 INHALANT RESPIRATORY (INHALATION) at 03:03

## 2023-01-01 RX ADMIN — MORPHINE SULFATE 2 MG: 2 INJECTION, SOLUTION INTRAMUSCULAR; INTRAVENOUS at 09:05

## 2023-01-01 RX ADMIN — AMOXICILLIN AND CLAVULANATE POTASSIUM 1 TABLET: 875; 125 TABLET, FILM COATED ORAL at 08:03

## 2023-01-01 RX ADMIN — DEXAMETHASONE 6 MG: 4 TABLET ORAL at 09:03

## 2023-01-01 RX ADMIN — LEVALBUTEROL 1.25 MG: 1.25 SOLUTION, CONCENTRATE RESPIRATORY (INHALATION) at 07:05

## 2023-01-01 RX ADMIN — ENOXAPARIN SODIUM 40 MG: 40 INJECTION SUBCUTANEOUS at 07:05

## 2023-01-01 RX ADMIN — METHYLPREDNISOLONE SODIUM SUCCINATE 32 MG: 40 INJECTION, POWDER, FOR SOLUTION INTRAMUSCULAR; INTRAVENOUS at 08:05

## 2023-01-01 RX ADMIN — DEXAMETHASONE 6 MG: 4 TABLET ORAL at 08:04

## 2023-01-01 RX ADMIN — ENOXAPARIN SODIUM 80 MG: 80 INJECTION SUBCUTANEOUS at 10:04

## 2023-01-01 RX ADMIN — AMPICILLIN AND SULBACTAM 1.5 G: 1; .5 INJECTION, POWDER, FOR SOLUTION INTRAMUSCULAR; INTRAVENOUS at 08:04

## 2023-01-01 RX ADMIN — SODIUM CHLORIDE 250 ML: 0.9 INJECTION, SOLUTION INTRAVENOUS at 02:05

## 2023-01-01 RX ADMIN — POTASSIUM BICARBONATE 50 MEQ: 978 TABLET, EFFERVESCENT ORAL at 08:05

## 2023-01-01 RX ADMIN — OXYCODONE HYDROCHLORIDE AND ACETAMINOPHEN 1 TABLET: 5; 325 TABLET ORAL at 03:04

## 2023-01-01 RX ADMIN — Medication 1 CAPSULE: at 09:03

## 2023-01-01 RX ADMIN — CEFTRIAXONE 2 G: 2 INJECTION, POWDER, FOR SOLUTION INTRAMUSCULAR; INTRAVENOUS at 09:04

## 2023-01-01 RX ADMIN — IPRATROPIUM BROMIDE 0.5 MG: 0.5 SOLUTION RESPIRATORY (INHALATION) at 12:05

## 2023-01-01 RX ADMIN — AMPICILLIN SODIUM AND SULBACTAM SODIUM 3 G: 2; 1 INJECTION, POWDER, FOR SOLUTION INTRAMUSCULAR; INTRAVENOUS at 08:04

## 2023-01-01 RX ADMIN — IPRATROPIUM BROMIDE AND ALBUTEROL SULFATE 3 ML: 2.5; .5 SOLUTION RESPIRATORY (INHALATION) at 07:04

## 2023-01-01 RX ADMIN — ACETYLCYSTEINE 2 ML: 200 INHALANT RESPIRATORY (INHALATION) at 07:05

## 2023-01-01 RX ADMIN — POLYETHYLENE GLYCOL 3350 17 G: 17 POWDER, FOR SOLUTION ORAL at 11:05

## 2023-01-01 RX ADMIN — POTASSIUM CHLORIDE 10 MEQ: 7.46 INJECTION, SOLUTION INTRAVENOUS at 09:05

## 2023-01-01 RX ADMIN — OXYCODONE 5 MG: 5 TABLET ORAL at 02:05

## 2023-01-01 RX ADMIN — ALBUTEROL SULFATE 2 PUFF: 108 INHALANT RESPIRATORY (INHALATION) at 04:03

## 2023-01-01 RX ADMIN — POTASSIUM CHLORIDE 10 MEQ: 7.46 INJECTION, SOLUTION INTRAVENOUS at 08:05

## 2023-01-01 RX ADMIN — ALBUTEROL SULFATE 2 PUFF: 108 INHALANT RESPIRATORY (INHALATION) at 12:03

## 2023-01-01 RX ADMIN — OXYCODONE HYDROCHLORIDE AND ACETAMINOPHEN 500 MG: 500 TABLET ORAL at 10:04

## 2023-01-01 RX ADMIN — SODIUM CHLORIDE 250 ML: 0.9 INJECTION, SOLUTION INTRAVENOUS at 10:05

## 2023-01-01 RX ADMIN — Medication 6 MG: at 02:05

## 2023-01-01 RX ADMIN — IOHEXOL 100 ML: 350 INJECTION, SOLUTION INTRAVENOUS at 02:05

## 2023-01-01 RX ADMIN — PIPERACILLIN AND TAZOBACTAM 4.5 G: 4; .5 INJECTION, POWDER, LYOPHILIZED, FOR SOLUTION INTRAVENOUS; PARENTERAL at 02:05

## 2023-01-01 RX ADMIN — VANCOMYCIN HYDROCHLORIDE 1000 MG: 1 INJECTION, POWDER, LYOPHILIZED, FOR SOLUTION INTRAVENOUS at 11:05

## 2023-01-01 RX ADMIN — ACETYLCYSTEINE 4 ML: 200 INHALANT RESPIRATORY (INHALATION) at 07:05

## 2023-01-01 RX ADMIN — IPRATROPIUM BROMIDE 0.5 MG: 0.5 SOLUTION RESPIRATORY (INHALATION) at 08:05

## 2023-01-01 RX ADMIN — ALBUTEROL SULFATE 2 PUFF: 108 INHALANT RESPIRATORY (INHALATION) at 09:04

## 2023-01-01 RX ADMIN — LORAZEPAM 1 MG: 2 INJECTION INTRAMUSCULAR; INTRAVENOUS at 05:05

## 2023-01-01 RX ADMIN — MORPHINE SULFATE 2 MG: 2 INJECTION, SOLUTION INTRAMUSCULAR; INTRAVENOUS at 11:05

## 2023-01-01 RX ADMIN — Medication 9 MG: at 10:04

## 2023-01-01 RX ADMIN — OXYCODONE 5 MG: 5 TABLET ORAL at 08:05

## 2023-01-01 RX ADMIN — AZITHROMYCIN MONOHYDRATE 500 MG: 500 INJECTION, POWDER, LYOPHILIZED, FOR SOLUTION INTRAVENOUS at 09:05

## 2023-01-01 RX ADMIN — VANCOMYCIN HYDROCHLORIDE 2000 MG: 500 INJECTION, POWDER, LYOPHILIZED, FOR SOLUTION INTRAVENOUS at 01:03

## 2023-01-01 RX ADMIN — SODIUM CHLORIDE: 9 INJECTION, SOLUTION INTRAVENOUS at 08:05

## 2023-01-01 RX ADMIN — CEFTRIAXONE 2 G: 2 INJECTION, POWDER, FOR SOLUTION INTRAMUSCULAR; INTRAVENOUS at 10:04

## 2023-01-01 RX ADMIN — AZITHROMYCIN MONOHYDRATE 500 MG: 500 INJECTION, POWDER, LYOPHILIZED, FOR SOLUTION INTRAVENOUS at 07:05

## 2023-01-01 RX ADMIN — THERA TABS 1 TABLET: TAB at 08:04

## 2023-01-01 RX ADMIN — IPRATROPIUM BROMIDE AND ALBUTEROL SULFATE 3 ML: 2.5; .5 SOLUTION RESPIRATORY (INHALATION) at 08:04

## 2023-01-01 RX ADMIN — CEFTRIAXONE 2 G: 2 INJECTION, POWDER, FOR SOLUTION INTRAMUSCULAR; INTRAVENOUS at 11:04

## 2023-01-01 RX ADMIN — PIPERACILLIN AND TAZOBACTAM 4.5 G: 4; .5 INJECTION, POWDER, LYOPHILIZED, FOR SOLUTION INTRAVENOUS; PARENTERAL at 12:03

## 2023-01-01 RX ADMIN — FAMOTIDINE 20 MG: 10 INJECTION INTRAVENOUS at 12:05

## 2023-01-01 RX ADMIN — POTASSIUM CHLORIDE 10 MEQ: 7.46 INJECTION, SOLUTION INTRAVENOUS at 01:05

## 2023-01-01 RX ADMIN — LEVALBUTEROL 1.25 MG: 1.25 SOLUTION, CONCENTRATE RESPIRATORY (INHALATION) at 08:05

## 2023-01-01 RX ADMIN — ACETAMINOPHEN 650 MG: 325 TABLET ORAL at 06:05

## 2023-01-01 RX ADMIN — SODIUM CHLORIDE 2000 MG: 1 TABLET ORAL at 10:04

## 2023-01-01 RX ADMIN — CEFEPIME 2 G: 2 INJECTION, POWDER, FOR SOLUTION INTRAVENOUS at 09:03

## 2023-01-01 RX ADMIN — POTASSIUM & SODIUM PHOSPHATES POWDER PACK 280-160-250 MG 2 PACKET: 280-160-250 PACK at 03:04

## 2023-01-01 RX ADMIN — VANCOMYCIN HYDROCHLORIDE 1500 MG: 1.5 INJECTION, POWDER, LYOPHILIZED, FOR SOLUTION INTRAVENOUS at 02:03

## 2023-01-01 RX ADMIN — LEVALBUTEROL HYDROCHLORIDE 0.63 MG: 0.63 SOLUTION RESPIRATORY (INHALATION) at 04:05

## 2023-01-01 RX ADMIN — ESCITALOPRAM OXALATE 5 MG: 5 TABLET, FILM COATED ORAL at 08:05

## 2023-01-01 RX ADMIN — AMOXICILLIN AND CLAVULANATE POTASSIUM 1 TABLET: 875; 125 TABLET, FILM COATED ORAL at 10:03

## 2023-01-01 RX ADMIN — FUROSEMIDE 40 MG: 10 INJECTION, SOLUTION INTRAMUSCULAR; INTRAVENOUS at 05:05

## 2023-01-01 RX ADMIN — LIDOCAINE HYDROCHLORIDE 10 ML: 10 INJECTION, SOLUTION INFILTRATION; PERINEURAL at 05:05

## 2023-01-01 RX ADMIN — IPRATROPIUM BROMIDE 0.5 MG: 0.5 SOLUTION RESPIRATORY (INHALATION) at 07:05

## 2023-01-01 RX ADMIN — MAGNESIUM SULFATE 2 G: 2 INJECTION INTRAVENOUS at 12:05

## 2023-01-01 RX ADMIN — TRAZODONE HYDROCHLORIDE 50 MG: 50 TABLET ORAL at 09:04

## 2023-01-01 RX ADMIN — PREDNISONE 40 MG: 20 TABLET ORAL at 09:04

## 2023-01-01 RX ADMIN — GUAIFENESIN 200 MG: 200 SOLUTION ORAL at 09:04

## 2023-01-01 RX ADMIN — IOHEXOL 75 ML: 350 INJECTION, SOLUTION INTRAVENOUS at 10:04

## 2023-01-01 RX ADMIN — AMPICILLIN SODIUM AND SULBACTAM SODIUM 3 G: 2; 1 INJECTION, POWDER, FOR SOLUTION INTRAMUSCULAR; INTRAVENOUS at 02:04

## 2023-01-01 RX ADMIN — REMDESIVIR 200 MG: 100 INJECTION, POWDER, LYOPHILIZED, FOR SOLUTION INTRAVENOUS at 03:03

## 2023-01-01 RX ADMIN — ACETAMINOPHEN 1000 MG: 500 TABLET ORAL at 11:03

## 2023-01-01 RX ADMIN — LOPERAMIDE HYDROCHLORIDE 2 MG: 2 CAPSULE ORAL at 09:04

## 2023-01-01 RX ADMIN — VANCOMYCIN HYDROCHLORIDE 750 MG: 750 INJECTION, POWDER, LYOPHILIZED, FOR SOLUTION INTRAVENOUS at 09:05

## 2023-01-01 RX ADMIN — REMDESIVIR 100 MG: 100 INJECTION, POWDER, LYOPHILIZED, FOR SOLUTION INTRAVENOUS at 09:03

## 2023-01-01 RX ADMIN — OXYCODONE HYDROCHLORIDE AND ACETAMINOPHEN 1 TABLET: 5; 325 TABLET ORAL at 01:05

## 2023-01-01 RX ADMIN — AZITHROMYCIN MONOHYDRATE 500 MG: 250 TABLET ORAL at 10:04

## 2023-01-01 RX ADMIN — MORPHINE SULFATE 2 MG: 2 INJECTION, SOLUTION INTRAMUSCULAR; INTRAVENOUS at 02:05

## 2023-01-01 RX ADMIN — HYDROMORPHONE HYDROCHLORIDE 1 MG/HR: 10 INJECTION INTRAMUSCULAR; INTRAVENOUS; SUBCUTANEOUS at 04:05

## 2023-01-01 RX ADMIN — METHYLPREDNISOLONE SODIUM SUCCINATE 32 MG: 40 INJECTION, POWDER, FOR SOLUTION INTRAMUSCULAR; INTRAVENOUS at 12:05

## 2023-01-01 RX ADMIN — SODIUM CHLORIDE 500 ML: 9 INJECTION, SOLUTION INTRAVENOUS at 07:04

## 2023-01-01 RX ADMIN — CEFTRIAXONE 2 G: 2 INJECTION, POWDER, FOR SOLUTION INTRAMUSCULAR; INTRAVENOUS at 11:05

## 2023-01-01 RX ADMIN — OXYCODONE 5 MG: 5 TABLET ORAL at 03:05

## 2023-01-06 NOTE — TELEPHONE ENCOUNTER
Incoming call from pt regarding Cellcept.  Pt stated he received a bill for $3.95.  Informed pt that [facility] declined for refill on 12/20.  Pt gave new CC to add to [facility] and gave permission to charge balance to card.  CC added to [facility] and processed payment in [facility].

## 2023-01-23 NOTE — TELEPHONE ENCOUNTER
Specialty Pharmacy - Refill Coordination    Specialty Medication Orders Linked to Encounter      Flowsheet Row Most Recent Value   Medication #1 mycophenolate (CELLCEPT) 500 mg Tab (Order#[id number], Rx#[id number])            Refill Questions - Documented Responses      Flowsheet Row Most Recent Value   Patient Availability and HIPAA Verification    Does patient want to proceed with activity? Yes   HIPAA/medical authority confirmed? Yes   Relationship to patient of person spoken to? Self   Refill Screening Questions    Changes to allergies? No   Changes to medications? No   New conditions since last clinic visit? No   Unplanned office visit, urgent care, ED, or hospital admission in the last 4 weeks? No   How does patient/caregiver feel medication is working? Good   Financial problems or insurance changes? No   How many doses of your specialty medications were missed in the last 4 weeks? 0   Would patient like to speak to a pharmacist? No   When does the patient need to receive the medication? 01/25/23   Refill Delivery Questions    How will the patient receive the medication? MEDRx   When does the patient need to receive the medication? 01/25/23   Shipping Address Home   Address in [facility] confirmed and updated if neccessary? Yes   Expected Copay ($) 0   Is the patient able to afford the medication copay? Yes   Payment Method zero copay   Days supply of Refill 30   Supplies needed? No supplies needed   Refill activity completed? Yes   Refill activity plan Refill scheduled   Shipment/Pickup Date: 01/23/23            Current Outpatient Medications   Medication Sig    albuterol (PROVENTIL/VENTOLIN HFA) 90 mcg/actuation inhaler Inhale 2 puffs into the lungs every 6 (six) hours as needed for Wheezing or Shortness of Breath.    amLODIPine (NORVASC) 10 MG tablet Take 1 tablet (10 mg total) by mouth once daily.    ammonium lactate 12 % Crea Apply 1 gram topically once daily. Apply to areas of dry skin and nails  daily.    EPINEPHrine (EPIPEN) 0.3 mg/0.3 mL AtIn     ergocalciferol (ERGOCALCIFEROL) 50,000 unit Cap Take 1 capsule (50,000 Units total) by mouth every 7 days.    hydrOXYzine HCL (ATARAX) 25 MG tablet Take 1 tablet (25 mg total) by mouth 3 (three) times daily as needed (itching).    losartan (COZAAR) 25 MG tablet Take 1 tablet (25 mg total) by mouth once daily.    mycophenolate (CELLCEPT) 500 mg Tab Take 3 tablets (1,500 mg total) by mouth 2 (two) times daily.    nystatin-triamcinolone (MYCOLOG II) cream Apply topically 2 (two) times daily    predniSONE (DELTASONE) 10 MG tablet Take 1 tablet (10 mg total) by mouth once daily.    predniSONE (DELTASONE) 10 MG tablet Take 1 tablet (10 mg total) by mouth once daily.    traZODone (DESYREL) 50 MG tablet Take 1 tablet (50 mg total) by mouth nightly as needed for Insomnia.   Last reviewed on 9/21/2022  8:11 AM by Darlene Hubbard MA    Review of patient's allergies indicates:  No Known Allergies Last reviewed on  12/19/2022 9:42 AM by Lissett Lowe      Tasks added this encounter   No tasks added.   Tasks due within next 3 months   1/13/2023 - Refill Call (Auto Added)     Lisa Schulz, PharmD  [facility]  [address] [place] [id number]  Phone: [phone]  Fax: [phone]

## 2023-02-13 NOTE — TELEPHONE ENCOUNTER
Refill Routing Note   Medication(s) are not appropriate for processing by [facility] for the following reason(s):         Med OP  Responsible provider unclear    [facility] action(s):  Route              Medication Therapy Plan: MED OUTSIDE [facility] PROTOCOL    Appointments  past 12m or future 3m with PCP    Date Provider   Last Visit   Visit date not found Anneliese Bran MD   Next Visit   Visit date not found Anneliese Bran MD   ED visits in past 90 days: 0        Note composed:9:45 AM 02/13/2023

## 2023-02-17 NOTE — TELEPHONE ENCOUNTER
Outgoing call regarding cellcept refill; per pt, he has about a week on hand; informed him that his insurance rejected the claim, and once resolved [facility] will follow up to schedule delivery

## 2023-02-17 NOTE — TELEPHONE ENCOUNTER
Spoke to Rx help desk about rejection. Patient will need to call the plan at [phone] because right now his insurance information is correct, but for some reason the plan has his coverage lapsed? Calling to update the patient and routing assigned RP.

## 2023-02-22 NOTE — TELEPHONE ENCOUNTER
Specialty Pharmacy - Refill Coordination    Specialty Medication Orders Linked to Encounter      Flowsheet Row Most Recent Value   Medication #1 mycophenolate (CELLCEPT) 500 mg Tab (Order#[id number], Rx#[id number])            Refill Questions - Documented Responses      Flowsheet Row Most Recent Value   Patient Availability and HIPAA Verification    Does patient want to proceed with activity? Yes   HIPAA/medical authority confirmed? Yes   Relationship to patient of person spoken to? Self   Refill Screening Questions    Changes to allergies? No   Changes to medications? No   New conditions since last clinic visit? No   Unplanned office visit, urgent care, ED, or hospital admission in the last 4 weeks? No   How does patient/caregiver feel medication is working? Good   Financial problems or insurance changes? No   How many doses of your specialty medications were missed in the last 4 weeks? 0   Would patient like to speak to a pharmacist? No   When does the patient need to receive the medication? 02/25/23   Refill Delivery Questions    How will the patient receive the medication? MEDRx   When does the patient need to receive the medication? 02/25/23   Shipping Address Home   Address in [facility] confirmed and updated if neccessary? Yes   Expected Copay ($) 0   Is the patient able to afford the medication copay? Yes   Payment Method zero copay   Days supply of Refill 30   Supplies needed? No supplies needed   Refill activity completed? Yes   Refill activity plan Refill scheduled   Shipment/Pickup Date: 02/23/23            Current Outpatient Medications   Medication Sig    albuterol (PROVENTIL/VENTOLIN HFA) 90 mcg/actuation inhaler Inhale 2 puffs into the lungs every 6 (six) hours as needed for Wheezing or Shortness of Breath.    amLODIPine (NORVASC) 10 MG tablet Take 1 tablet (10 mg total) by mouth once daily.    ammonium lactate 12 % Crea Apply 1 gram topically once daily. Apply to areas of dry skin and nails  daily.    EPINEPHrine (EPIPEN) 0.3 mg/0.3 mL AtIn     ergocalciferol (ERGOCALCIFEROL) 50,000 unit Cap Take 1 capsule (50,000 Units total) by mouth every 7 days.    hydrOXYzine HCL (ATARAX) 25 MG tablet Take 1 tablet (25 mg total) by mouth 3 (three) times daily as needed (itching).    losartan (COZAAR) 25 MG tablet Take 1 tablet (25 mg total) by mouth once daily.    mycophenolate (CELLCEPT) 500 mg Tab Take 3 tablets (1,500 mg total) by mouth 2 (two) times daily.    nystatin-triamcinolone (MYCOLOG II) cream Apply topically 2 (two) times daily    predniSONE (DELTASONE) 10 MG tablet Take 1 tablet (10 mg total) by mouth once daily.    predniSONE (DELTASONE) 10 MG tablet Take 1 tablet (10 mg total) by mouth once daily.    traZODone (DESYREL) 50 MG tablet Take 1 tablet (50 mg total) by mouth nightly as needed for Insomnia.   Last reviewed on 9/21/2022  8:11 AM by Darlene Hubbard MA    Review of patient's allergies indicates:  No Known Allergies Last reviewed on  2/13/2023 12:06 PM by Kaity Smith      Tasks added this encounter   3/20/2023 - Refill Call (Auto Added)   Tasks due within next 3 months   No tasks due.     Jeff Chambers, PharmD  [facility]  [address] [place] [id number]  Phone: [phone]  Fax: [phone]

## 2023-03-29 PROBLEM — I10 ESSENTIAL HYPERTENSION: Chronic | Status: ACTIVE | Noted: 2019-02-27

## 2023-03-29 PROBLEM — J90 PLEURAL EFFUSION ON RIGHT: Status: ACTIVE | Noted: 2023-01-01

## 2023-03-29 PROBLEM — J44.9 CHRONIC OBSTRUCTIVE PULMONARY DISEASE, UNSPECIFIED COPD TYPE: Chronic | Status: ACTIVE | Noted: 2022-05-04

## 2023-03-29 PROBLEM — U07.1 ACUTE HYPOXEMIC RESPIRATORY FAILURE DUE TO COVID-19: Status: ACTIVE | Noted: 2023-01-01

## 2023-03-29 PROBLEM — D56.8: Chronic | Status: ACTIVE | Noted: 2017-12-21

## 2023-03-29 PROBLEM — J96.01 ACUTE HYPOXEMIC RESPIRATORY FAILURE DUE TO COVID-19: Status: ACTIVE | Noted: 2023-01-01

## 2023-03-29 PROBLEM — I70.0 AORTIC ATHEROSCLEROSIS: Chronic | Status: ACTIVE | Noted: 2020-01-21

## 2023-03-29 PROBLEM — L89.152 SACRAL DECUBITUS ULCER, STAGE II: Status: ACTIVE | Noted: 2023-01-01

## 2023-03-29 PROBLEM — J18.9 PNEUMONIA OF RIGHT LOWER LOBE DUE TO INFECTIOUS ORGANISM: Status: ACTIVE | Noted: 2023-01-01

## 2023-03-29 NOTE — ED PROVIDER NOTES
Encounter Date: 3/29/2023       History     Chief Complaint   Patient presents with    Fatigue    Shortness of Breath     Weakness and SOB x 3 days/ paraplegic     Nura Kahn Jr. Is a 67 M hx of atrial fibrillation, COPD, hypertension, neuromuscular disorder presenting today by EMS for fever, chills and cough.  Patient states he has been feeling unwell since Sunday.  Reports generalized body aches and fever up to 102 with associated productive cough.  He called EMS this morning because of increased weakness.  He denies dysuria, diarrhea.  No abdominal pain.  He is paraplegic due to his neuromuscular disorder.  Unknown sick contact.      Review of patient's allergies indicates:  No Known Allergies  Past Medical History:   Diagnosis Date    Aspiration pneumonia of right lower lobe 1/15/2018    Atrial fibrillation 3/2/2015    Chronic obstructive pulmonary disease, unspecified COPD type 5/4/2022    Depression     Essential hypertension 2/27/2019    Malignant (primary) neoplasm, unspecified 5/4/2022    Microcytic anemia 9/25/2014    Neuromuscular disorder     Other osteoporosis without current pathological fracture 9/22/2022    Pneumonia     Polymyositis 2009    Tobacco abuse      Past Surgical History:   Procedure Laterality Date    COLONOSCOPY N/A 8/6/2020    Procedure: COLONOSCOPY;  Surgeon: Brandan Meyer MD;  Location: [address]);  Service: Endoscopy;  Laterality: N/A;    ESOPHAGOGASTRODUODENOSCOPY N/A 8/6/2020    Procedure: EGD (ESOPHAGOGASTRODUODENOSCOPY);  Surgeon: Brandan Meyer MD;  Location: [address]);  Service: Endoscopy;  Laterality: N/A;  multiple co-morbidities. will have family member for assistance.  has neuromuscular issues-needs lift. in W/C-unable to transfer per self     COVID test at [facility] on 8/3-GT     Family History   Problem Relation Age of Onset    Diabetes Mother     Hypertension Mother     Kidney disease Mother     Diabetes Father     Hypertension Father      Heart attack Neg Hx     Heart disease Neg Hx     Melanoma Neg Hx      Social History     Tobacco Use    Smoking status: Some Days     Packs/day: 0.25     Years: 20.00     Pack years: 5.00     Types: Cigarettes     Last attempt to quit: 2018     Years since quittin.1    Smokeless tobacco: Never   Substance Use Topics    Alcohol use: Yes     Alcohol/week: 0.0 standard drinks    Drug use: Yes     Types: Marijuana     Comment: daily use     Review of Systems    Physical Exam     Initial Vitals [23 1125]   BP Pulse Resp Temp SpO2   132/80 (!) 124 (!) 30 (!) 101.9 °F (38.8 °C) 100 %      MAP       --         Physical Exam    Nursing note and vitals reviewed.  Constitutional: He appears well-developed and well-nourished. No distress.   HENT:   Dry mucous membrane   Eyes: Conjunctivae are normal.   Neck: Neck supple.   Cardiovascular:  Normal rate, regular rhythm and intact distal pulses.           Pulmonary/Chest: He has no wheezes. He has rhonchi. He has no rales.   Abdominal: Abdomen is soft. Bowel sounds are normal. There is no abdominal tenderness. There is no rebound.   Genitourinary:    Penis normal.      Genitourinary Comments: No scrotal tenderness or edema.  No skin breakdown in the perineum  Stage II sacral ulcer, no evidence of infection     Musculoskeletal:         General: No edema.      Cervical back: Neck supple.     Lymphadenopathy:     He has no cervical adenopathy.   Neurological: He is alert and oriented to person, place, and time. No sensory deficit.   Paraplegia, baseline.   Skin: No rash noted.   Psychiatric: He has a normal mood and affect.       ED Course   Procedures  Labs Reviewed   CBC W/ AUTO DIFFERENTIAL - Abnormal; Notable for the following components:       Result Value    WBC 15.72 (*)     Hemoglobin 10.2 (*)     Hematocrit 33.5 (*)     MCV 59 (*)     MCH 17.8 (*)     MCHC 30.4 (*)     RDW 20.4 (*)     Platelets 109 (*)     Immature Granulocytes 0.6 (*)     Gran # (ANC) 14.0  (*)     Immature Grans (Abs) 0.10 (*)     Lymph # 0.2 (*)     Mono # 1.4 (*)     nRBC 1 (*)     Gran % 89.0 (*)     Lymph % 1.5 (*)     All other components within normal limits   COMPREHENSIVE METABOLIC PANEL - Abnormal; Notable for the following components:    Sodium 130 (*)     CO2 17 (*)     Glucose 49 (*)     Creatinine 0.4 (*)     Calcium 8.1 (*)     Total Bilirubin 3.3 (*)     All other components within normal limits   URINALYSIS, REFLEX TO URINE CULTURE - Abnormal; Notable for the following components:    Appearance, UA Hazy (*)     Protein, UA 1+ (*)     Ketones, UA 1+ (*)     Occult Blood UA 2+ (*)     All other components within normal limits    Narrative:     Specimen Source->Urine   PROCALCITONIN - Abnormal; Notable for the following components:    Procalcitonin 29.55 (*)     All other components within normal limits   PROCALCITONIN - Abnormal; Notable for the following components:    Procalcitonin 28.69 (*)     All other components within normal limits   SARS-COV-2 RNA AMPLIFICATION, QUAL - Abnormal; Notable for the following components:    SARS-CoV-2 RNA, Amplification, Qual Positive (*)     All other components within normal limits   URINALYSIS MICROSCOPIC - Abnormal; Notable for the following components:    Bacteria Few (*)     Hyaline Casts, UA 7 (*)     All other components within normal limits    Narrative:     Specimen Source->Urine   ISTAT PROCEDURE - Abnormal; Notable for the following components:    POC PCO2 34.0 (*)     POC PO2 34 (*)     POC HCO3 19.7 (*)     POC SATURATED O2 65 (*)     POC TCO2 21 (*)     All other components within normal limits   ISTAT PROCEDURE - Abnormal; Notable for the following components:    POC PO2 35 (*)     POC HCO3 20.9 (*)     POC SATURATED O2 65 (*)     POC TCO2 22 (*)     All other components within normal limits   INFLUENZA A & B BY MOLECULAR   ISTAT LACTATE   ISTAT LACTATE     EKG Readings: (Independently Interpreted)   EKG: ST at 124, no ischemic  changes     ECG Results              EKG 12-lead (Final result)  Result time 03/29/23 16:58:08      Final result by Interface, Lab In [facility] (03/29/23 16:58:08)                   Narrative:    Test Reason : R00.0,    Vent. Rate : 124 BPM     Atrial Rate : 124 BPM     P-R Int : 130 ms          QRS Dur : 084 ms      QT Int : 302 ms       P-R-T Axes : 037 -09 035 degrees     QTc Int : 433 ms    Sinus tachycardia  Low voltage QRS  Nonspecific T wave abnormality  Abnormal ECG  When compared with ECG of 04-APR-2019 04:24,  Vent. rate has increased BY  58 BPM  Confirmed by Malvin Wallace MD ([id number]) on 3/29/2023 4:57:59 PM    Referred By: [email or web address]   SELF           Confirmed By:Malvin Wallace MD                                  Imaging Results              X-Ray Chest AP Portable (Final result)  Result time 03/29/23 13:44:16      Final result by Alexy Díaz MD (03/29/23 13:44:16)                   Impression:      1. Right pleural effusion, underlying infectious consolidation not excluded.  Correlation and follow-up is advised.      Electronically signed by: Alexy Díaz MD  Date:    03/29/2023  Time:    13:44               Narrative:    EXAMINATION:  XR CHEST AP PORTABLE    CLINICAL HISTORY:  Sepsis;    TECHNIQUE:  Single frontal view of the chest was performed.    COMPARISON:  09/30/2021    FINDINGS:  The cardiomediastinal silhouette is not enlarged noting calcification of the aorta.  There is elevation of the right hemidiaphragm..  There is obscuration of the right costophrenic angle suggesting effusion.  The trachea is midline.  The lungs are symmetrically expanded bilaterally with increased parenchymal attenuation projected over the right lower lung zone, may reflect a combination of pleural fluid and atelectasis however underlying infectious consolidation not excluded.  There is left basilar subsegmental atelectasis..  No large focal consolidation seen.  There is no pneumothorax.  The osseous structures  are remarkable for degenerative changes..                                       Medications   remdesivir 200 mg in sodium chloride 0.9% 250 mL infusion (0 mg Intravenous Stopped 3/29/23 1601)     Followed by   remdesivir 100 mg in sodium chloride 0.9% 250 mL infusion (100 mg Intravenous New Bag 3/30/23 0930)   albuterol inhaler 2 puff (has no administration in time range)   hydrOXYzine HCL tablet 25 mg (has no administration in time range)   traZODone tablet 50 mg (has no administration in time range)   acetaminophen tablet 650 mg (650 mg Oral Not Given 3/30/23 0511)   sodium chloride 0.9% flush 10 mL (has no administration in time range)   glucagon (human recombinant) injection 1 mg (has no administration in time range)   ascorbic acid (vitamin C) tablet 500 mg (500 mg Oral Given 3/30/23 0906)   multivitamin tablet (1 tablet Oral Given 3/30/23 0906)   dexAMETHasone tablet 6 mg (6 mg Oral Given 3/30/23 0905)   dextrose 10% bolus 125 mL 125 mL (has no administration in time range)   dextrose 10% bolus 250 mL 250 mL (has no administration in time range)   dextrose 40 % gel 15,000 mg (0 mg Oral Return to Cabinet 3/30/23 0826)   dextrose 40 % gel 30,000 mg (has no administration in time range)   enoxaparin injection 80 mg (80 mg Subcutaneous Given 3/30/23 0906)   ondansetron disintegrating tablet 8 mg (has no administration in time range)   loperamide capsule 2 mg (has no administration in time range)   bisacodyL suppository 10 mg (has no administration in time range)   calcium carbonate 200 mg calcium (500 mg) chewable tablet 500 mg (has no administration in time range)   famotidine tablet 20 mg (20 mg Oral Given 3/30/23 0905)   amoxicillin-clavulanate 875-125mg per tablet 1 tablet (1 tablet Oral Given 3/30/23 0906)   Lactobacillus rhamnosus GG capsule 1 capsule (1 capsule Oral Given 3/30/23 0905)   albuterol inhaler 2 puff (2 puffs Inhalation Given 3/30/23 0725)   lactated ringers bolus 2,190 mL (0 mLs Intravenous  Stopped 3/29/23 1420)   acetaminophen tablet 1,000 mg (1,000 mg Oral Given 3/29/23 1152)   vancomycin 2 g in dextrose 5 % 500 mL IVPB (0 mg Intravenous Stopped 3/29/23 1525)   piperacillin-tazobactam (ZOSYN) 4.5 g in dextrose 5 % in water (D5W) 5 % 100 mL IVPB (MB+) (0 g Intravenous Stopped 3/29/23 1322)   dexAMETHasone injection 6 mg (6 mg Intravenous Given 3/29/23 1500)     Medical Decision Making:   History:   Old Medical Records: I decided to obtain old medical records.  Initial Assessment:   Emergent evaluation a 67-year-old male presenting today for fever, chills, productive cough x4 days.  On arrival, febrile, tachycardic, tachypneic with oxygen saturation of 100% on room air.  He is normotensive.  Sepsis protocol ordered with broad-spectrum antibiotics.    Differential Diagnosis:   Sepsis secondary to lobar pneumonia, aspiration pneumonia, COPD exacerbation, UTI.  Also considered influenza, COVID  Low suspicion for soft tissue infection at this time  Independently Interpreted Test(s):   I have ordered and independently interpreted X-rays - see prior notes.  I have ordered and independently interpreted EKG Reading(s) - see prior notes  Clinical Tests:   Lab Tests: Reviewed and Ordered  Radiological Study: Ordered and Reviewed  Medical Tests: Ordered and Reviewed  ED Management:  - labs  - EKG  - CXR  - IVF, abx  - cardiac monitoring          Attending Attestation:         Attending Critical Care:   Critical Care Times:   ==============================================================  Total Critical Care Time - exclusive of procedural time: 35 minutes.  ==============================================================  Critical care reasons: sepsis, hypoxia.   Critical care was time spent personally by me on the following activities: ordering lab, x-rays, and/or EKG, ordering and performing treatments and interventions, evaluation of patient's response to treatment and re-evaluation of patient's conition.    Critical Care Condition: potentially life-threatening         ED Course as of 03/30/23 1039   Wed Mar 29, 2023   1313 POC Lactate: 1.97 [GM]   1328 WBC(!): 15.72 [GM]   1329 Chest x-ray reviewed, right lower lobe pneumonia noted. [GM]   1443 SARS-CoV-2 RNA, Amplification, Qual(!): Positive [GM]   1443 Procalcitonin(!): 29.55 [GM]   1443 Labs reviewed, there is a leukocytosis.  Hemoglobin slightly anemic otherwise stable.  CMP with mild hyponatremia, hypoglycemia.  Electrolytes otherwise unremarkable.  He does have a mild metabolic acidosis with a bicarb of 17.  He has been given IV fluids.  Chest x-ray does show questionable left lower lobe pneumonia.  He has been treated with broad-spectrum antibiotics for suspected sepsis.  His COVID did come back positive.  I did order remdesivir and dexamethasone as well.  He is currently on 3 L nasal cannula.  VBG reassuring, does not need BiPAP at this time.  I feel comfortable admitting the patient to step-down unit for continued monitoring.  Family called in updated with plan.  Questions answered. [GM]      ED Course User Index  [GM] Denice Salinas MD                 Clinical Impression:   Final diagnoses:  [R00.0] Tachycardia  [U07.1] COVID (Primary)  [J96.01] Acute respiratory failure with hypoxia  [J18.9] Pneumonia of left lower lobe due to infectious organism        ED Disposition Condition    Admit Stable                Denice Salinas MD  03/30/23 1040

## 2023-03-29 NOTE — TELEPHONE ENCOUNTER
Efren we saw this pt 9/21/22 and he was supposed to get 2nd course of rituximab, continue IVIg and also start denosumab for osteoporosis. Could you please find out why he hasn't received any of these and ask Leta to schedule all three. Please update in Therapy Plans and will need p.a. for each.  Also needs standing labs prior including hepatitis B serologies, immunoglobulins and QG-TB and he is way overdue for f/u with us. Needs to be seen in April after labs and the above issues worked out. ALEXSANDER

## 2023-03-29 NOTE — ED NOTES
Patient identifiers verified and correct for Nura Kahn   LOC: The patient is awake, alert and aware of environment with an appropriate affect, the patient is oriented x 3 and speaking appropriately.   APPEARANCE: Patient appears comfortable and in no acute distress, patient is clean and well groomed.  SKIN: The skin is warm and dry, color consistent with ethnicity, patient has normal skin turgor and moist mucus membranes, skin intact   MUSCULOSKELETAL: Patient is paraplegic  RESPIRATORY: Airway is open and patent, respirations are spontaneous, patient has a normal effort and rate, no accessory muscle use noted, pt placed on continuous pulse ox with O2 sats noted at 97% on room air.  CARDIAC: Pt placed on cardiac monitor. Patient has a tachy rate, no edema noted, capillary refill < 3 seconds.   GASTRO: Soft and non tender to palpation, no distention noted, normoactive bowel sounds present in all four quadrants. Pt states bowel movements have been regular.  : Pt denies any pain or frequency with urination.  NEURO: Pt opens eyes spontaneously, behavior appropriate to situation, follows commands, facial expression symmetrical. Pt c/o weakness

## 2023-03-29 NOTE — AI DETERIORATION ALERT
"RAPID RESPONSE NURSE AI ALERT       AI alert received.    Chart Reviewed: 03/29/2023, 2:52 PM    MRN: [id number]  Bed: ED 29/29    Dx: <principal problem not specified>    Nura Kahn Jr. has a past medical history of Aspiration pneumonia of right lower lobe, Atrial fibrillation, Chronic obstructive pulmonary disease, unspecified COPD type, Depression, Essential hypertension, Malignant (primary) neoplasm, unspecified, Microcytic anemia, Neuromuscular disorder, Other osteoporosis without current pathological fracture, Pneumonia, Polymyositis, and Tobacco abuse.    Last VS: /66   Pulse (!) 114   Temp 98.3 °F (36.8 °C)   Resp (!) 27   Ht 5' 10" (1.778 m)   Wt 77.1 kg (170 lb)   SpO2 (!) 94%   BMI 24.39 kg/m²     24H Vital Sign Range:  Temp:  [98.3 °F (36.8 °C)-101.9 °F (38.8 °C)]   Pulse:  [114-124]   Resp:  [25-32]   BP: (111-134)/(66-80)   SpO2:  [87 %-100 %]     Level of Consciousness (AVPU): alert    Recent Labs     03/29/23  1224   WBC 15.72*   HGB 10.2*   HCT 33.5*   *       Recent Labs     03/29/23  1224   *   K 4.1   CL 99   CO2 17*   CREATININE 0.4*   GLU 49*        Recent Labs     03/29/23  1307 03/29/23  1428   PH 7.371 7.372   PCO2 34.0* 36.0   PO2 34* 35*   HCO3 19.7* 20.9*   POCSATURATED 65* 65*   BE -6 -4        OXYGEN:  Flow (L/min): 2          MEWS score:      bedside RNEdna  contacted. No concerns verbalized at this time. Instructed to call [phone] for further concerns or assistance.    Kendrick Varghese RN        "

## 2023-03-29 NOTE — ASSESSMENT & PLAN NOTE
Has a history of aspiration pneumonia. Give amoxicillin-clavulanate, which would cover many bacterial pneumonias including aspiration pneumonia.

## 2023-03-29 NOTE — ED NOTES
Telemetry Confirmation    Box#: [id number]  Rhythm: Sinus Tach  Rate: 108      Awaiting escort for transport

## 2023-03-29 NOTE — SUBJECTIVE & OBJECTIVE
Past Medical History:   Diagnosis Date    Aspiration pneumonia of right lower lobe 1/15/2018    Atrial fibrillation 3/2/2015    Chronic obstructive pulmonary disease, unspecified COPD type 5/4/2022    Depression     Essential hypertension 2/27/2019    Malignant (primary) neoplasm, unspecified 5/4/2022    Microcytic anemia 9/25/2014    Neuromuscular disorder     Other osteoporosis without current pathological fracture 9/22/2022    Pneumonia     Polymyositis 2009    Tobacco abuse        Past Surgical History:   Procedure Laterality Date    COLONOSCOPY N/A 8/6/2020    Procedure: COLONOSCOPY;  Surgeon: Brandan Meyer MD;  Location: [facility] ([address] [place] [address]);  Service: Endoscopy;  Laterality: N/A;    ESOPHAGOGASTRODUODENOSCOPY N/A 8/6/2020    Procedure: EGD (ESOPHAGOGASTRODUODENOSCOPY);  Surgeon: Brandan Meyer MD;  Location: [facility] ([address] [place] [address]);  Service: Endoscopy;  Laterality: N/A;  multiple co-morbidities. will have family member for assistance.  has neuromuscular issues-needs lift. in W/C-unable to transfer per self     COVID test at [facility] on 8/3-       Review of patient's allergies indicates:  No Known Allergies    No current facility-administered medications on file prior to encounter.     Current Outpatient Medications on File Prior to Encounter   Medication Sig    albuterol (PROVENTIL/VENTOLIN HFA) 90 mcg/actuation inhaler Inhale 2 puffs into the lungs every 6 (six) hours as needed for Wheezing or Shortness of Breath.    amLODIPine (NORVASC) 10 MG tablet Take 1 tablet (10 mg total) by mouth once daily.    ammonium lactate 12 % Crea Apply 1 gram topically once daily. Apply to areas of dry skin and nails daily.    EPINEPHrine (EPIPEN) 0.3 mg/0.3 mL AtIn     ergocalciferol (ERGOCALCIFEROL) 50,000 unit Cap Take 1 capsule (50,000 Units total) by mouth every 7 days.    hydrOXYzine HCL (ATARAX) 25 MG tablet Take 1 tablet (25 mg total) by mouth 3 (three) times daily as needed (itching).    losartan (COZAAR) 25 MG  tablet Take 1 tablet (25 mg total) by mouth once daily.    mycophenolate (CELLCEPT) 500 mg Tab Take 3 tablets (1,500 mg total) by mouth 2 (two) times daily.    nystatin-triamcinolone (MYCOLOG II) cream Apply topically 2 (two) times daily    predniSONE (DELTASONE) 10 MG tablet Take 1 tablet (10 mg total) by mouth once daily.    predniSONE (DELTASONE) 10 MG tablet Take 1 tablet (10 mg total) by mouth once daily.    traZODone (DESYREL) 50 MG tablet Take 1 tablet (50 mg total) by mouth nightly as needed for Insomnia.     Family History       Problem Relation (Age of Onset)    Diabetes Mother, Father    Hypertension Mother, Father    Kidney disease Mother          Tobacco Use    Smoking status: Some Days     Packs/day: 0.25     Years: 20.00     Pack years: 5.00     Types: Cigarettes     Last attempt to quit: 2018     Years since quittin.1    Smokeless tobacco: Never   Substance and Sexual Activity    Alcohol use: Yes     Alcohol/week: 0.0 standard drinks    Drug use: Yes     Types: Marijuana     Comment: daily use    Sexual activity: Not on file     Review of Systems   Constitutional:  Positive for chills, fatigue and fever.   HENT:  Negative for hearing loss and voice change.    Eyes:  Negative for pain and redness.   Respiratory:  Positive for cough and shortness of breath.    Gastrointestinal:  Negative for blood in stool and vomiting.   Genitourinary:  Negative for dysuria and flank pain.   Musculoskeletal:  Positive for gait problem. Negative for neck stiffness.   Skin:  Negative for color change and rash.   Neurological:  Positive for weakness. Negative for syncope.   Objective:     Vital Signs (Most Recent):  Temp: 98.3 °F (36.8 °C) (23 1420)  Pulse: (!) 111 (23 1450)  Resp: (!) 26 (23 1450)  BP: 118/70 (23 1430)  SpO2: 97 % (23 1450)   Vital Signs (24h Range):  Temp:  [98.3 °F (36.8 °C)-101.9 °F (38.8 °C)] 98.3 °F (36.8 °C)  Pulse:  [111-124] 111  Resp:  [25-32] 26  SpO2:   [87 %-100 %] 97 %  BP: (111-134)/(66-80) 118/70     Weight: 77.1 kg (170 lb)  Body mass index is 24.39 kg/m².    Physical Exam  Vitals and nursing note reviewed.   Constitutional:       General: He is not in acute distress.     Appearance: He is well-developed. He is not diaphoretic.      Interventions: He is not intubated.Nasal cannula in place.   HENT:      Head: Normocephalic and atraumatic.   Eyes:      General: No scleral icterus.     Conjunctiva/sclera: Conjunctivae normal.   Cardiovascular:      Rate and Rhythm: Regular rhythm. Tachycardia present.      Heart sounds: Normal heart sounds. No murmur heard.  Pulmonary:      Effort: Pulmonary effort is normal. Tachypnea present. No respiratory distress. He is not intubated.      Breath sounds: Rhonchi present.   Abdominal:      General: There is no distension.      Palpations: Abdomen is soft.      Tenderness: There is no abdominal tenderness. There is no guarding.   Musculoskeletal:         General: No swelling or tenderness.      Right lower leg: No edema.      Left lower leg: No edema.   Skin:     General: Skin is warm and dry.      Coloration: Skin is not jaundiced or pale.   Neurological:      Mental Status: He is alert and oriented to person, place, and time.      Motor: Weakness and atrophy present.   Psychiatric:         Attention and Perception: Attention normal.         Mood and Affect: Mood and affect normal.         Behavior: Behavior is cooperative.           Significant Labs: All pertinent labs within the past 24 hours have been reviewed.    Significant Imaging: I have reviewed all pertinent imaging results/findings within the past 24 hours.  X-Ray Chest AP Portable 3/29/23: FINDINGS:   The cardiomediastinal silhouette is not enlarged noting calcification of the aorta.  There is elevation of the right hemidiaphragm..  There is obscuration of the right costophrenic angle suggesting effusion.  The trachea is midline.  The lungs are symmetrically  expanded bilaterally with increased parenchymal attenuation projected over the right lower lung zone, may reflect a combination of pleural fluid and atelectasis however underlying infectious consolidation not excluded.  There is left basilar subsegmental atelectasis..  No large focal consolidation seen.  There is no pneumothorax.  The osseous structures are remarkable for degenerative changes..   Impression:  Right pleural effusion, underlying infectious consolidation not excluded.  Correlation and follow-up is advised.

## 2023-03-29 NOTE — TELEPHONE ENCOUNTER
Specialty Pharmacy - Refill Coordination    Specialty Medication Orders Linked to Encounter      Flowsheet Row Most Recent Value   Medication #1 mycophenolate (CELLCEPT) 500 mg Tab (Order#[id number], Rx#[id number])          Refill Questions - Documented Responses      Flowsheet Row Most Recent Value   Patient Availability and HIPAA Verification    Does patient want to proceed with activity? Unable to Reach          We have had multiple attempts to the patient and have been unsuccessful to reach the patient. We will stop reaching out to the patient but in the event that the patient needs the med and contacts us, we will communicate and begin dispensing for the patient. At your next visit with the patient, please review the importance of being in contact with our specialty pharmacy as a part of our care team.    Interventions added this encounter   Closed: [facility] Provider Intervention - Drug therapy adherence: mycophenolate (CELLCEPT) 500 mg Tab     [facility]  [address]  [facility] [address]  Phone: [phone]  Fax: [phone]

## 2023-03-29 NOTE — CLINICAL REVIEW
IP Sepsis Screen (most recent)       Sepsis Screen (IP) - 03/29/23 1529       Is the patient's history or complaint suggestive of a possible infection? Yes  -LW    Are there at least two of the following signs and symptoms present? Yes  -LW    Sepsis signs/symptoms - Tachycardia Tachycardia     >90  -LW    Sepsis signs/symptoms - Tachypnea Tachypnea     >20  -LW    Sepsis signs/symptoms - WBC WBC < 4,000 or WBC > 12,000  -LW    Are any of the following organ dysfunction criteria present and not considered to be due to a chronic condition? Yes  -LW    Organ Dysfunction Criteria Total Bilirubin > 2.0 Platelet count < 100,000  -LW    Initiate Sepsis Protocol No  -LW    Reason sepsis not considered Pt. receiving appropriate management  -LW              User Key  (r) = Recorded By, (t) = Taken By, (c) = Cosigned By      Initials Name    LEIGHANN Ignacio RN

## 2023-03-29 NOTE — H&P
[facility] - Emergency Dept  [facility] Medicine  History & Physical    Patient Name: Nura Kahn Jr.  MRN: [id number]  Patient Class: IP- Inpatient  Admission Date: 3/29/2023  Attending Physician: Mark Burden MD   Primary Care Provider: No primary care provider on file.         Patient information was obtained from patient, past medical records and ER records.     Subjective:     Principal Problem:Acute hypoxemic respiratory failure due to COVID-19    Chief Complaint:   Chief Complaint   Patient presents with    Fatigue    Shortness of Breath     Weakness and SOB x 3 days/ paraplegic        HPI: Nura Kahn Jr. Is a 67 year old Black man with hemoglobin C disease, beta 0 thalassemia, former cigarette smoking (quit in 2018), atrial fibrillation, aortic atherosclerosis, hypertension, chronic obstructive pulmonary disease (COPD), inclusion body myositis/polymyositis (diagnosed in 2009), quadriparesis, dysphagia, osteopenia, portal venous hypertension, splenomegaly, thrombocytopenia, anemia. He is chair bound. He lives in [facility]. His rheumatologist is Dr. Nura Huston.    He presented to [facility] Emergency Department on 3/29/2023 for 3 days of fatigue, fevers, chills, cough productive of green sputum. Emergency medical services found him to be hypotensive and administered 1 liter of intravenous fluids. He had fever of 101.9° Fahrenheit. Heart rate was 124 bpm. Respiratory rate was 30 per minute. Oxygen saturation was 87% and he was put on 2 liters/minute of supplemental oxygen. COVID-19 was positive. Influenza was negative. Sodium was low at 130 mmol/L. Bicarbonate was low at 17 mmol/L. Glucose was low at 49 mg/dL. [place] [address]/uL with 89% granulocytes. Procalcitonin was elevated at 28.69 ng/mL. Chest X-ray showed a right pleural effusion with underlying consolidation not excluded. He was noted to have a stage 2 sacral decubitus ulcer. He had Pfizer COVID-19  vaccination on 5/14/2021, 6/4/2021, and 3/29/2022. He was given 1000 mg of acetaminophen, 6 mg of intravenous dexamethasone, 30 mL/kg of lactated Ringer's solution, piperacillin-tazobactam, vancomycin, remdesivir. He was admitted to Hospital Medicine Team B.       Past Medical History:   Diagnosis Date    Aspiration pneumonia of right lower lobe 1/15/2018    Atrial fibrillation 3/2/2015    Chronic obstructive pulmonary disease, unspecified COPD type 5/4/2022    Depression     Essential hypertension 2/27/2019    Malignant (primary) neoplasm, unspecified 5/4/2022    Microcytic anemia 9/25/2014    Neuromuscular disorder     Other osteoporosis without current pathological fracture 9/22/2022    Pneumonia     Polymyositis 2009    Tobacco abuse        Past Surgical History:   Procedure Laterality Date    COLONOSCOPY N/A 8/6/2020    Procedure: COLONOSCOPY;  Surgeon: Brandan Meyer MD;  Location: [facility] ([address] [place] [address]);  Service: Endoscopy;  Laterality: N/A;    ESOPHAGOGASTRODUODENOSCOPY N/A 8/6/2020    Procedure: EGD (ESOPHAGOGASTRODUODENOSCOPY);  Surgeon: Brandan Meyer MD;  Location: [facility] ([address] [place] [address]);  Service: Endoscopy;  Laterality: N/A;  multiple co-morbidities. will have family member for assistance.  has neuromuscular issues-needs lift. in [place]-unable to transfer per self     COVID test at [facility] on 8/3-GT       Review of patient's allergies indicates:  No Known Allergies    No current facility-administered medications on file prior to encounter.     Current Outpatient Medications on File Prior to Encounter   Medication Sig    albuterol (PROVENTIL/VENTOLIN HFA) 90 mcg/actuation inhaler Inhale 2 puffs into the lungs every 6 (six) hours as needed for Wheezing or Shortness of Breath.    amLODIPine (NORVASC) 10 MG tablet Take 1 tablet (10 mg total) by mouth once daily.    ammonium lactate 12 % Crea Apply 1 gram topically once daily. Apply to areas of dry skin and nails daily.    EPINEPHrine  (EPIPEN) 0.3 mg/0.3 mL AtIn     ergocalciferol (ERGOCALCIFEROL) 50,000 unit Cap Take 1 capsule (50,000 Units total) by mouth every 7 days.    hydrOXYzine HCL (ATARAX) 25 MG tablet Take 1 tablet (25 mg total) by mouth 3 (three) times daily as needed (itching).    losartan (COZAAR) 25 MG tablet Take 1 tablet (25 mg total) by mouth once daily.    mycophenolate (CELLCEPT) 500 mg Tab Take 3 tablets (1,500 mg total) by mouth 2 (two) times daily.    nystatin-triamcinolone (MYCOLOG II) cream Apply topically 2 (two) times daily    predniSONE (DELTASONE) 10 MG tablet Take 1 tablet (10 mg total) by mouth once daily.    predniSONE (DELTASONE) 10 MG tablet Take 1 tablet (10 mg total) by mouth once daily.    traZODone (DESYREL) 50 MG tablet Take 1 tablet (50 mg total) by mouth nightly as needed for Insomnia.     Family History       Problem Relation (Age of Onset)    Diabetes Mother, Father    Hypertension Mother, Father    Kidney disease Mother          Tobacco Use    Smoking status: Some Days     Packs/day: 0.25     Years: 20.00     Pack years: 5.00     Types: Cigarettes     Last attempt to quit: 2018     Years since quittin.1    Smokeless tobacco: Never   Substance and Sexual Activity    Alcohol use: Yes     Alcohol/week: 0.0 standard drinks    Drug use: Yes     Types: Marijuana     Comment: daily use    Sexual activity: Not on file     Review of Systems   Constitutional:  Positive for chills, fatigue and fever.   HENT:  Negative for hearing loss and voice change.    Eyes:  Negative for pain and redness.   Respiratory:  Positive for cough and shortness of breath.    Gastrointestinal:  Negative for blood in stool and vomiting.   Genitourinary:  Negative for dysuria and flank pain.   Musculoskeletal:  Positive for gait problem. Negative for neck stiffness.   Skin:  Negative for color change and rash.   Neurological:  Positive for weakness. Negative for syncope.   Objective:     Vital Signs (Most  Recent):  Temp: 98.3 °F (36.8 °C) (03/29/23 1420)  Pulse: (!) 111 (03/29/23 1450)  Resp: (!) 26 (03/29/23 1450)  BP: 118/70 (03/29/23 1430)  SpO2: 97 % (03/29/23 1450)   Vital Signs (24h Range):  Temp:  [98.3 °F (36.8 °C)-101.9 °F (38.8 °C)] 98.3 °F (36.8 °C)  Pulse:  [111-124] 111  Resp:  [25-32] 26  SpO2:  [87 %-100 %] 97 %  BP: (111-134)/(66-80) 118/70     Weight: 77.1 kg (170 lb)  Body mass index is 24.39 kg/m².    Physical Exam  Vitals and nursing note reviewed.   Constitutional:       General: He is not in acute distress.     Appearance: He is well-developed. He is not diaphoretic.      Interventions: He is not intubated.Nasal cannula in place.   HENT:      Head: Normocephalic and atraumatic.   Eyes:      General: No scleral icterus.     Conjunctiva/sclera: Conjunctivae normal.   Cardiovascular:      Rate and Rhythm: Regular rhythm. Tachycardia present.      Heart sounds: Normal heart sounds. No murmur heard.  Pulmonary:      Effort: Pulmonary effort is normal. Tachypnea present. No respiratory distress. He is not intubated.      Breath sounds: Rhonchi present.   Abdominal:      General: There is no distension.      Palpations: Abdomen is soft.      Tenderness: There is no abdominal tenderness. There is no guarding.   Musculoskeletal:         General: No swelling or tenderness.      Right lower leg: No edema.      Left lower leg: No edema.   Skin:     General: Skin is warm and dry.      Coloration: Skin is not jaundiced or pale.   Neurological:      Mental Status: He is alert and oriented to person, place, and time.      Motor: Weakness and atrophy present.   Psychiatric:         Attention and Perception: Attention normal.         Mood and Affect: Mood and affect normal.         Behavior: Behavior is cooperative.           Significant Labs: All pertinent labs within the past 24 hours have been reviewed.    Significant Imaging: I have reviewed all pertinent imaging results/findings within the past 24  hours.  X-Ray Chest AP Portable 3/29/23: FINDINGS:   The cardiomediastinal silhouette is not enlarged noting calcification of the aorta.  There is elevation of the right hemidiaphragm..  There is obscuration of the right costophrenic angle suggesting effusion.  The trachea is midline.  The lungs are symmetrically expanded bilaterally with increased parenchymal attenuation projected over the right lower lung zone, may reflect a combination of pleural fluid and atelectasis however underlying infectious consolidation not excluded.  There is left basilar subsegmental atelectasis..  No large focal consolidation seen.  There is no pneumothorax.  The osseous structures are remarkable for degenerative changes..   Impression:  Right pleural effusion, underlying infectious consolidation not excluded.  Correlation and follow-up is advised.     Assessment/Plan:     * Acute hypoxemic respiratory failure due to COVID-19  Dexamethasone 6 mg daily for 10 days, with famotidine. Remdesivir for 5 days. Ascorbic acid and multivitamin. Albuterol inhaler. Therapeutic enoxaparin. Supplemental oxygen. Isolation precautions. Check D-dimer, ferritin, LDH, sed rate.     Sacral decubitus ulcer, stage II  Consult Wound Care. Turn frequently.    Pneumonia of right lower lobe due to infectious organism  Has a history of aspiration pneumonia. Give amoxicillin-clavulanate, which would cover many bacterial pneumonias including aspiration pneumonia.     Chronic obstructive pulmonary disease, unspecified COPD type  He takes albuterol inhaler prn. Give scheduled and prn.    Essential hypertension  Hold home amlodipine and losartan for now. Resume if blood pressures increase.    Thrombocytopenia  Stable. Monitor.    Atrial fibrillation  Not in atrial fibrillation on admission. Not on chronic anticoagulation or rate-controlling medications. Telemetry.     Oropharyngeal dysphagia  Aspiration precautions. Soft diet.     Polymyositis  Hold home  mycophenolate. Give dexamethasone in place of home prednisone.      VTE Risk Mitigation (From admission, onward)         Ordered     enoxaparin injection 80 mg  2 times daily         03/29/23 1530                           Mark Burden MD  Department of Hospital Medicine  [facility] - Emergency Dept

## 2023-03-29 NOTE — ED TRIAGE NOTES
P arriving to ED via EMS with c/o weakness since Monday. EMS reports fever of 101.9 and tachycardic in 150s at home. Current . Denies chest pain.

## 2023-03-29 NOTE — HPI
Nura Kahn Jr. Is a 67 year old Black man with hemoglobin C disease, beta 0 thalassemia, former cigarette smoking (quit in 2018), atrial fibrillation, aortic atherosclerosis, hypertension, chronic obstructive pulmonary disease (COPD), inclusion body myositis/polymyositis (diagnosed in 2009), quadriparesis, dysphagia, osteopenia, portal venous hypertension, splenomegaly, thrombocytopenia, anemia. He is chair bound. He lives in [facility]. His rheumatologist is Dr. Nura Huston.    He presented to [facility] Emergency Department on 3/29/2023 for 3 days of fatigue, fevers, chills, cough productive of green sputum. Emergency medical services found him to be hypotensive and administered 1 liter of intravenous fluids. He had fever of 101.9° Fahrenheit. Heart rate was 124 bpm. Respiratory rate was 30 per minute. Oxygen saturation was 87% and he was put on 2 liters/minute of supplemental oxygen. COVID-19 was positive. Influenza was negative. Sodium was low at 130 mmol/L. Bicarbonate was low at 17 mmol/L. Glucose was low at 49 mg/dL. [place] at [address]/uL with 89% granulocytes. Procalcitonin was elevated at 28.69 ng/mL. Chest X-ray showed a right pleural effusion with underlying consolidation not excluded. He was noted to have a stage 2 sacral decubitus ulcer. He had Pfizer COVID-19 vaccination on 5/14/2021, 6/4/2021, and 3/29/2022. He was given 1000 mg of acetaminophen, 6 mg of intravenous dexamethasone, 30 mL/kg of lactated Ringer's solution, piperacillin-tazobactam, vancomycin, remdesivir. He was admitted to Hospital Medicine Team B.

## 2023-03-29 NOTE — FIRST PROVIDER EVALUATION
"Medical screening examination initiated.  I have conducted a focused provider triage encounter, findings are as follows:    Brief history of present illness:  67-year-old male with a history of neuromuscular disorder, hypertension, depression, atrial fibrillation, polymyositis presenting via EMS for sepsis workup.  He was picked up from home after he called for fatigue, fevers, chills, cough and green sputum.  He was brought in by EMS for evaluation.  He was hypotensive, received 1 L IV fluids prior to arrival.    Vitals:    03/29/23 1125   BP: 132/80   BP Location: Right arm   Patient Position: Sitting   Pulse: (!) 124   Resp: (!) 30   Temp: (!) 101.9 °F (38.8 °C)   TempSrc: Oral   SpO2: 100%  Comment: on non-rebreather   Weight: 77.1 kg (170 lb)   Height: 5' 10" (1.778 m)       Pertinent physical exam:  Fever, tachycardia, cough, occasional rhonchi    Brief workup plan:  Sepsis workup    Preliminary workup initiated; this workup will be continued and followed by the physician or advanced practice provider that is assigned to the patient when roomed.    Roni Dunbar DO, FAAEM  Emergency Staff Physician   Dept of Emergency Medicine   [facility]  Spectralink: [id number]        Disclaimer: This note has been generated using voice-recognition software. There may be typographical errors that have been missed during proof-reading.    "

## 2023-03-29 NOTE — ASSESSMENT & PLAN NOTE
Dexamethasone 6 mg daily for 10 days, with famotidine. Remdesivir for 5 days. Ascorbic acid and multivitamin. Albuterol inhaler. Therapeutic enoxaparin. Supplemental oxygen. Isolation precautions. Check D-dimer, ferritin, LDH, sed rate.

## 2023-03-29 NOTE — ASSESSMENT & PLAN NOTE
Not in atrial fibrillation on admission. Not on chronic anticoagulation or rate-controlling medications. Telemetry.

## 2023-03-30 NOTE — NURSING
IP Sepsis Screen (most recent)       Sepsis Screen (IP) - 03/30/23 1532       Is the patient's history or complaint suggestive of a possible infection? Yes  -    Are there at least two of the following signs and symptoms present? Yes  -    Sepsis signs/symptoms - Tachycardia Tachycardia     >90  -    Sepsis signs/symptoms - WBC WBC < 4,000 or WBC > 12,000  -    Are any of the following organ dysfunction criteria present and not considered to be due to a chronic condition? Yes  -    Organ Dysfunction Criteria Total Bilirubin > 2.0 Platelet count < 100,000  -    Initiate Sepsis Protocol No  -    Reason sepsis not considered Pt. receiving appropriate management  -              User Key  (r) = Recorded By, (t) = Taken By, (c) = Cosigned By      Initials Name     Amada Cottrell RN

## 2023-03-30 NOTE — PLAN OF CARE
Problem: Infection  Goal: Absence of Infection Signs and Symptoms  3/30/2023 0550 by Linda Weston RN  Outcome: Ongoing, Progressing  3/30/2023 0550 by Linda Weston RN  Outcome: Ongoing, Progressing     Problem: Adult Inpatient Plan of Care  Goal: Patient-Specific Goal (Individualized)  3/30/2023 0550 by Linda Weston RN  Outcome: Ongoing, Progressing  3/30/2023 0550 by Linda Weston RN  Outcome: Ongoing, Progressing     Problem: Adult Inpatient Plan of Care  Goal: Optimal Comfort and Wellbeing  Outcome: Ongoing, Progressing

## 2023-03-30 NOTE — CARE UPDATE
RAPID RESPONSE NURSE ROUND       Rounding completed with charge RNTammie. No concerns verbalized at this time. Instructed to call [phone] for further concerns or assistance.

## 2023-03-30 NOTE — SUBJECTIVE & OBJECTIVE
Interval History: Patient reports feeling better overall. Reports occasional cough but denies SOB. Hypoglycemic in 40's this am. Repeat glucose 94 now. Advised RN  to give patient a bedtime snack tonight .     Review of Systems  Objective:     Vital Signs (Most Recent):  Temp: 98.2 °F (36.8 °C) (03/30/23 0745)  Pulse: (!) 115 (03/30/23 1112)  Resp: 16 (03/30/23 0745)  BP: 133/75 (03/30/23 0745)  SpO2: 97 % (03/30/23 1050)   Vital Signs (24h Range):  Temp:  [97.7 °F (36.5 °C)-98.9 °F (37.2 °C)] 98.2 °F (36.8 °C)  Pulse:  [] 115  Resp:  [16-32] 16  SpO2:  [87 %-99 %] 97 %  BP: (106-134)/(63-77) 133/75     Weight: 77.1 kg (170 lb)  Body mass index is 24.39 kg/m².    Intake/Output Summary (Last 24 hours) at 3/30/2023 1208  Last data filed at 3/30/2023 0410  Gross per 24 hour   Intake --   Output 1675 ml   Net -1675 ml      Physical Exam  Vitals and nursing note reviewed.   Constitutional:       General: He is not in acute distress.     Appearance: He is well-developed. He is not diaphoretic.      Interventions: He is not intubated.Nasal cannula in place.   HENT:      Head: Normocephalic and atraumatic.   Eyes:      General: No scleral icterus.     Conjunctiva/sclera: Conjunctivae normal.   Cardiovascular:      Rate and Rhythm: Regular rhythm. Tachycardia present.      Heart sounds: Normal heart sounds. No murmur heard.  Pulmonary:      Effort: Pulmonary effort is normal. No respiratory distress. He is not intubated.      Breath sounds: Rhonchi present.   Abdominal:      General: There is no distension.      Palpations: Abdomen is soft.      Tenderness: There is no abdominal tenderness. There is no guarding.   Musculoskeletal:         General: No swelling or tenderness.      Right lower leg: No edema.      Left lower leg: No edema.   Skin:     General: Skin is warm and dry.      Coloration: Skin is not jaundiced or pale.   Neurological:      Mental Status: He is alert and oriented to person, place, and time.       Motor: Weakness and atrophy present.   Psychiatric:         Attention and Perception: Attention normal.         Mood and Affect: Mood and affect normal.         Behavior: Behavior is cooperative.

## 2023-03-30 NOTE — PROGRESS NOTES
[facility] Medicine  Progress Note    Patient Name: Nura Kahn Jr.  MRN: [id number]  Patient Class: IP- Inpatient   Admission Date: 3/29/2023  Length of Stay: 1 days  Attending Physician: Erma Fairchild MD  Primary Care Provider: Primary Doctor No        Subjective:     Principal Problem:Acute hypoxemic respiratory failure due to COVID-19        HPI:  Nura Kahn Jr. Is a 67 year old Black man with hemoglobin C disease, beta 0 thalassemia, former cigarette smoking (quit in 2018), atrial fibrillation, aortic atherosclerosis, hypertension, chronic obstructive pulmonary disease (COPD), inclusion body myositis/polymyositis (diagnosed in 2009), quadriparesis, dysphagia, osteopenia, portal venous hypertension, splenomegaly, thrombocytopenia, anemia. He is chair bound. He lives in [facility]. His rheumatologist is Dr. Nura Huston.    He presented to [facility] Emergency Department on 3/29/2023 for 3 days of fatigue, fevers, chills, cough productive of green sputum. Emergency medical services found him to be hypotensive and administered 1 liter of intravenous fluids. He had fever of 101.9° Fahrenheit. Heart rate was 124 bpm. Respiratory rate was 30 per minute. Oxygen saturation was 87% and he was put on 2 liters/minute of supplemental oxygen. COVID-19 was positive. Influenza was negative. Sodium was low at 130 mmol/L. Bicarbonate was low at 17 mmol/L. Glucose was low at 49 mg/dL. [place] [address]/uL with 89% granulocytes. Procalcitonin was elevated at 28.69 ng/mL. Chest X-ray showed a right pleural effusion with underlying consolidation not excluded. He was noted to have a stage 2 sacral decubitus ulcer. He had Pfizer COVID-19 vaccination on 5/14/2021, 6/4/2021, and 3/29/2022. He was given 1000 mg of acetaminophen, 6 mg of intravenous dexamethasone, 30 mL/kg of lactated Ringer's solution, piperacillin-tazobactam, vancomycin, remdesivir. He was  admitted to Hospital Medicine Team B.       Overview/Hospital Course:  No notes on file    Interval History: Patient reports feeling better overall. Reports occasional cough but denies SOB. Hypoglycemic in 40's this am. Repeat glucose 94 now. Advised RN  to give patient a bedtime snack tonight .     Review of Systems  Objective:     Vital Signs (Most Recent):  Temp: 98.2 °F (36.8 °C) (03/30/23 0745)  Pulse: (!) 115 (03/30/23 1112)  Resp: 16 (03/30/23 0745)  BP: 133/75 (03/30/23 0745)  SpO2: 97 % (03/30/23 1050)   Vital Signs (24h Range):  Temp:  [97.7 °F (36.5 °C)-98.9 °F (37.2 °C)] 98.2 °F (36.8 °C)  Pulse:  [] 115  Resp:  [16-32] 16  SpO2:  [87 %-99 %] 97 %  BP: (106-134)/(63-77) 133/75     Weight: 77.1 kg (170 lb)  Body mass index is 24.39 kg/m².    Intake/Output Summary (Last 24 hours) at 3/30/2023 1208  Last data filed at 3/30/2023 0410  Gross per 24 hour   Intake --   Output 1675 ml   Net -1675 ml      Physical Exam  Vitals and nursing note reviewed.   Constitutional:       General: He is not in acute distress.     Appearance: He is well-developed. He is not diaphoretic.      Interventions: He is not intubated.Nasal cannula in place.   HENT:      Head: Normocephalic and atraumatic.   Eyes:      General: No scleral icterus.     Conjunctiva/sclera: Conjunctivae normal.   Cardiovascular:      Rate and Rhythm: Regular rhythm. Tachycardia present.      Heart sounds: Normal heart sounds. No murmur heard.  Pulmonary:      Effort: Pulmonary effort is normal. No respiratory distress. He is not intubated.      Breath sounds: Rhonchi present.   Abdominal:      General: There is no distension.      Palpations: Abdomen is soft.      Tenderness: There is no abdominal tenderness. There is no guarding.   Musculoskeletal:         General: No swelling or tenderness.      Right lower leg: No edema.      Left lower leg: No edema.   Skin:     General: Skin is warm and dry.      Coloration: Skin is not jaundiced or pale.    Neurological:      Mental Status: He is alert and oriented to person, place, and time.      Motor: Weakness and atrophy present.   Psychiatric:         Attention and Perception: Attention normal.         Mood and Affect: Mood and affect normal.         Behavior: Behavior is cooperative.           Assessment/Plan:      * Acute hypoxemic respiratory failure due to COVID-19  Dexamethasone 6 mg daily for 10 days, with famotidine. Remdesivir for 5 days. Ascorbic acid and multivitamin. Albuterol inhaler. Therapeutic enoxaparin. Supplemental oxygen. Isolation precautions. Check D-dimer, ferritin, LDH, sed rate.     Sacral decubitus ulcer, stage II  Consult Wound Care. Turn frequently.    Pneumonia of right lower lobe due to infectious organism  Has a history of aspiration pneumonia. Give amoxicillin-clavulanate, which would cover many bacterial pneumonias including aspiration pneumonia.     Chronic obstructive pulmonary disease, unspecified COPD type  He takes albuterol inhaler prn. Give scheduled and prn.    Essential hypertension  Hold home amlodipine and losartan for now. Resume if blood pressures increase.    Thrombocytopenia  Stable. Monitor.    Atrial fibrillation  Not in atrial fibrillation on admission. Not on chronic anticoagulation or rate-controlling medications. Telemetry.     Oropharyngeal dysphagia  Aspiration precautions. Soft diet.     Polymyositis  Hold home mycophenolate. Give dexamethasone in place of home prednisone.      VTE Risk Mitigation (From admission, onward)         Ordered     enoxaparin injection 80 mg  2 times daily         03/29/23 1530                Discharge Planning   DERICK:      Code Status: Full Code   Is the patient medically ready for discharge?:     Reason for patient still in hospital (select all that apply): Patient trending condition                     Erma Fairchild MD  Department of Hospital Medicine   [facility] - Telemetry Stepdown

## 2023-03-31 NOTE — ASSESSMENT & PLAN NOTE
CXR on admission  1. Right pleural effusion, underlying infectious consolidation not excluded.  Correlation and follow-up is advised.  - Repeat CXR ordered on 3/31

## 2023-03-31 NOTE — SUBJECTIVE & OBJECTIVE
Interval History: Sputum cx with GPC, final results pending. Broaden abx to vanc and cefepime for now. Deescalate when appropriate. Ordering repeat CXR to fu on R pleural effusion.    Review of Systems  Objective:     Vital Signs (Most Recent):  Temp: 98.5 °F (36.9 °C) (03/31/23 1202)  Pulse: 106 (03/31/23 1221)  Resp: 18 (03/31/23 1221)  BP: 136/65 (03/31/23 1202)  SpO2: (!) 94 % (03/31/23 1221)   Vital Signs (24h Range):  Temp:  [97.8 °F (36.6 °C)-98.5 °F (36.9 °C)] 98.5 °F (36.9 °C)  Pulse:  [] 106  Resp:  [16-24] 18  SpO2:  [92 %-98 %] 94 %  BP: (114-140)/(65-77) 136/65     Weight: 77.1 kg (170 lb)  Body mass index is 24.39 kg/m².    Intake/Output Summary (Last 24 hours) at 3/31/2023 1247  Last data filed at 3/31/2023 0450  Gross per 24 hour   Intake --   Output 1050 ml   Net -1050 ml        Physical Exam  Vitals and nursing note reviewed.   Constitutional:       General: He is not in acute distress.     Appearance: He is well-developed. He is not diaphoretic.      Interventions: He is not intubated.Nasal cannula in place.   HENT:      Head: Normocephalic and atraumatic.   Eyes:      General: No scleral icterus.     Conjunctiva/sclera: Conjunctivae normal.   Cardiovascular:      Rate and Rhythm: Normal rate and regular rhythm.      Heart sounds: Normal heart sounds. No murmur heard.  Pulmonary:      Effort: Pulmonary effort is normal. No respiratory distress. He is not intubated.      Breath sounds: Rhonchi present.   Abdominal:      General: There is no distension.      Palpations: Abdomen is soft.      Tenderness: There is no abdominal tenderness. There is no guarding.   Musculoskeletal:         General: No swelling or tenderness.      Right lower leg: No edema.      Left lower leg: No edema.   Skin:     General: Skin is warm and dry.      Coloration: Skin is not jaundiced or pale.   Neurological:      Mental Status: He is alert and oriented to person, place, and time.      Motor: Weakness and atrophy  present.   Psychiatric:         Attention and Perception: Attention normal.         Mood and Affect: Mood and affect normal.         Behavior: Behavior is cooperative.

## 2023-03-31 NOTE — PROGRESS NOTES
Patient seen for wound care consultation for sacral decubitus.  Reviewed chart for this encounter.   See Flow Sheet for findings.    Pt sitting up in bed upon arrival, agreed to assessment. Pt had on condom cath and diaper. Diaper removed and condom cath came off when pt was turned for assessment.   Assessment revealed a partial thickness moisture associated wound with scarring and dark discoloration to periwound. Wound bed cleansed with NS and triad applied.   Orders placed.    RECOMMENDATIONS:  Waffle overlay  No diapers/no foam borders.   Triad BID and PRN     Discussed POC with patient and primary RN.   See EMR for orders & patient education.    Nursing to continue care.  Nursing to maintain pressure injury prevention interventions.    Heidi Ortega, RN notified     03/31/23 0830   WOCN Assessment   WOCN Total Time (mins) 30   Visit Date 03/31/23   Visit Time 0830   Consult Type New   WOCN Speciality Wound   Wound pressure;moisture   Intervention assessed;changed;applied;coordination of care;chart review;consult other service;team conference;orders   Teaching on-going        Altered Skin Integrity 03/29/23 1227 Right Coccyx #1 Partial thickness tissue loss. Shallow open ulcer with a red or pink wound bed, without slough. Intact or Open/Ruptured Serum-filled blister.   Date First Assessed/Time First Assessed: 03/29/23 1227   Altered Skin Integrity Present on Admission - Did Patient arrive to the hospital with altered skin?: yes  Side: Right  Location: Coccyx  Wound Number: #1  Is this injury device related?: No  Leon...   Wound Image     Description of Altered Skin Integrity Partial thickness tissue loss. Shallow open ulcer with a red or pink wound bed, without slough. Intact or Open/Ruptured Serum-filled blister.   Dressing Appearance Open to air   Drainage Amount None   Red (%), Wound Tissue Color 100 %   Periwound Area Intact;Other (see comments)  (Dark and discolored periwound)   Wound Edges Irregular   Wound  Length (cm) 2 cm   Wound Width (cm) 3 cm   Wound Depth (cm) 0.1 cm   Wound Volume (cm^3) 0.6 cm^3   Wound Surface Area (cm^2) 6 cm^2   Care Cleansed with:;Sterile normal saline   Dressing Applied;Other (comment)  (Triad)   Off Loading Other (see comments)  (waffle ordered)   Dressing Change Due 03/31/23

## 2023-03-31 NOTE — ASSESSMENT & PLAN NOTE
Has a history of aspiration pneumonia. Received amoxicillin-clavulanate  Sputum cx with GPC. Broaden abx to vanc and cefepime. Deescalate as appropriate

## 2023-03-31 NOTE — PLAN OF CARE
[facility] - Telemetry Stepdown  Initial Discharge Assessment       Primary Care Provider: Edna Jaramillo NP    Admission Diagnosis: Tachycardia [R00.0]  Acute respiratory failure with hypoxia [J96.01]  Pneumonia of left lower lobe due to infectious organism [J18.9]  COVID [U07.1]    Admission Date: 3/29/2023  Expected Discharge Date: 4/3/2023    Discharge Barriers Identified: None    Payor: [employer] MANAGED MEDICARE / Plan: HU[employer] MEDICARE HMO / Product Type: Capitation /     Extended Emergency Contact Information  Primary Emergency Contact: Nass[place]Mami   [facility]  Home Phone: [phone]  Relation: Daughter  Secondary Emergency Contact: Mitali Mata   [place]  Mobile Phone: [phone]  Relation: Spouse    Discharge Plan A: Home with family  Discharge Plan B: [facility]      [facility] [address]  [address] [facility] [address]  Phone: [phone] Fax: [phone]    [facility]  [address] [facility] [address]  Phone: [phone] Fax: [phone]    [facility]  [address] [facility] [address]  Phone: [phone] Fax: [phone]      Initial Assessment (most recent)       Adult Discharge Assessment - 03/31/23 1416          Discharge Assessment    Assessment Type Discharge Planning Assessment     Confirmed/corrected address, phone number and insurance Yes     Confirmed Demographics Correct on Facesheet     Source of Information patient     Communicated DERICK with patient/caregiver Yes     Reason For Admission Covid     People in Home spouse     Do you expect to return to your current living situation? Yes     Do you have help at home or someone to help you manage your care at home? Yes     Prior to hospitilization cognitive status: Alert/Oriented     Current cognitive status: Alert/Oriented     Walking or Climbing Stairs ambulation difficulty,  requires equipment     Equipment Currently Used at Home wheelchair     Readmission within 30 days? No     Patient currently being followed by outpatient case management? No     Do you currently have service(s) that help you manage your care at home? No     Do you take prescription medications? Yes     Do you have prescription coverage? Yes     Do you have any problems affording any of your prescribed medications? No     Is the patient taking medications as prescribed? yes     Who is going to help you get home at discharge? Patient's family will provide transportation home.     How do you get to doctors appointments? public transportation     Are you on dialysis? No     Do you take coumadin? No     Discharge Plan A Home with family     Discharge Plan B Home Health     DME Needed Upon Discharge  other (see comments)   TBD    Discharge Plan discussed with: Patient;Spouse/sig other     Discharge Barriers Identified None                   Marianne Spence RN  Ext [phone]

## 2023-03-31 NOTE — PLAN OF CARE
Problem: Infection  Goal: Absence of Infection Signs and Symptoms  Outcome: Ongoing, Progressing     Problem: Adult Inpatient Plan of Care  Goal: Plan of Care Review  Outcome: Ongoing, Progressing  Goal: Patient-Specific Goal (Individualized)  Outcome: Ongoing, Progressing  Goal: Absence of Hospital-Acquired Illness or Injury  Outcome: Ongoing, Progressing  Goal: Optimal Comfort and Wellbeing  Outcome: Ongoing, Progressing  Goal: Readiness for Transition of Care  Outcome: Ongoing, Progressing     Problem: Fluid Imbalance (Pneumonia)  Goal: Fluid Balance  Outcome: Ongoing, Progressing     Problem: Infection (Pneumonia)  Goal: Resolution of Infection Signs and Symptoms  Outcome: Ongoing, Progressing     Problem: Respiratory Compromise (Pneumonia)  Goal: Effective Oxygenation and Ventilation  Outcome: Ongoing, Progressing     Problem: Impaired Wound Healing  Goal: Optimal Wound Healing  Outcome: Ongoing, Progressing     Problem: Skin Injury Risk Increased  Goal: Skin Health and Integrity  Outcome: Ongoing, Progressing    Pt remained free of falls or injuries during shift.

## 2023-03-31 NOTE — CONSULTS
Pharmacokinetic Initial Assessment: IV Vancomycin    Assessment/Plan:    Vancomycin 1500 mg IV q12h  Trough @ 0200 4/2  Goal trough = 15-20 mcg/mL    Thank you for the consult, will continue to follow    Schuyler Randle.D., BCPS  [id number]         Patient brief summary:  Nura Kahn Jr. is a 67 y.o. male initiated on antimicrobial therapy with IV Vancomycin for treatment of suspected PNA    Drug Allergies:   Review of patient's allergies indicates:  No Known Allergies    Actual Body Weight:   77.1 kg    Renal Function:   Estimated Creatinine Clearance: 253.5 mL/min (A) (based on SCr of 0.3 mg/dL (L)).,     CBC (last 72 hours):  Recent Labs   Lab Result Units 03/29/23  1224 03/30/23  0543 03/31/23  0433   WBC K/uL 15.72* 15.77* 14.59*   Hemoglobin g/dL 10.2* 9.4* 8.7*   Hematocrit % 33.5* 32.2* 27.8*   Platelets K/uL 109* 98* 107*   Gran % % 89.0* 84.1* 83.0*   Lymph % % 1.5* 4.8* 7.0*   Mono % % 8.8 10.0 5.0   Eosinophil % % 0.0 0.0 0.0   Basophil % % 0.1 0.1 0.0   Differential Method  Automated Automated Manual       Metabolic Panel (last 72 hours):  Recent Labs   Lab Result Units 03/29/23  1224 03/29/23  1234 03/30/23  0543 03/31/23  0432   Sodium mmol/L 130*  --  132* 130*   Potassium mmol/L 4.1  --  4.0 3.6   Chloride mmol/L 99  --  101 100   CO2 mmol/L 17*  --  17* 19*   Glucose mg/dL 49*  --  47* 64*   Glucose, UA   --  Negative  --   --    BUN mg/dL 19  --  13 12   Creatinine mg/dL 0.4*  --  0.4* 0.3*   Albumin g/dL 3.7  --  3.4* 3.1*   Total Bilirubin mg/dL 3.3*  --  2.3* 2.0*   Alkaline Phosphatase U/L 57  --  49* 92   AST U/L 20  --  46* 31   ALT U/L 11  --  16 13       Drug levels (last 3 results):  No results for input(s): VANCOMYCINRA, VANCORANDOM, VANCOMYCINPE, VANCOPEAK, VANCOMYCINTR, VANCOTROUGH in the last 72 hours.    Microbiologic Results:  Microbiology Results (last 7 days)       Procedure Component Value Units Date/Time    Culture, Respiratory with Gram Stain [[id number]] Collected: 03/29/23  1751    Order Status: Completed Specimen: Respiratory from Sputum, Expectorated Updated: 03/31/23 1128     Respiratory Culture Normal respiratory naldo      No S aureus or Pseudomonas isolated.     Gram Stain (Respiratory) <10 epithelial cells per low power field.     Gram Stain (Respiratory) Rare WBC's     Gram Stain (Respiratory) Moderate Gram positive cocci     Gram Stain (Respiratory) Moderate Gram negative rods    Blood culture x two cultures. Draw prior to antibiotics. [[id number]] Collected: 03/29/23 1210    Order Status: Completed Specimen: Blood from Peripheral, Antecubital, Left Updated: 03/30/23 1412     Blood Culture, Routine No Growth to date      No Growth to date    Narrative:      Aerobic and anaerobic    Blood culture x two cultures. Draw prior to antibiotics. [[id number]] Collected: 03/29/23 1225    Order Status: Completed Specimen: Blood from Peripheral, Hand, Right Updated: 03/30/23 1412     Blood Culture, Routine No Growth to date      No Growth to date    Narrative:      Aerobic and anaerobic    Influenza A & B by Molecular [[id number]] Collected: 03/29/23 1327    Order Status: Completed Specimen: Nasopharyngeal Swab Updated: 03/29/23 1407     Influenza A, Molecular Negative     Influenza B, Molecular Negative     Flu A & B Source NP

## 2023-03-31 NOTE — PROGRESS NOTES
[facility] Medicine  Progress Note    Patient Name: Nura Kahn Jr.  MRN: [id number]  Patient Class: IP- Inpatient   Admission Date: 3/29/2023  Length of Stay: 2 days  Attending Physician: Erma Fairchild MD  Primary Care Provider: Primary Doctor No        Subjective:     Principal Problem:Acute hypoxemic respiratory failure due to COVID-19        HPI:  Nura Kahn Jr. Is a 67 year old Black man with hemoglobin C disease, beta 0 thalassemia, former cigarette smoking (quit in 2018), atrial fibrillation, aortic atherosclerosis, hypertension, chronic obstructive pulmonary disease (COPD), inclusion body myositis/polymyositis (diagnosed in 2009), quadriparesis, dysphagia, osteopenia, portal venous hypertension, splenomegaly, thrombocytopenia, anemia. He is chair bound. He lives in [facility]. His rheumatologist is Dr. Nura Huston.    He presented to [facility] Emergency Department on 3/29/2023 for 3 days of fatigue, fevers, chills, cough productive of green sputum. Emergency medical services found him to be hypotensive and administered 1 liter of intravenous fluids. He had fever of 101.9° Fahrenheit. Heart rate was 124 bpm. Respiratory rate was 30 per minute. Oxygen saturation was 87% and he was put on 2 liters/minute of supplemental oxygen. COVID-19 was positive. Influenza was negative. Sodium was low at 130 mmol/L. Bicarbonate was low at 17 mmol/L. Glucose was low at 49 mg/dL. [place] [address]/uL with 89% granulocytes. Procalcitonin was elevated at 28.69 ng/mL. Chest X-ray showed a right pleural effusion with underlying consolidation not excluded. He was noted to have a stage 2 sacral decubitus ulcer. He had Pfizer COVID-19 vaccination on 5/14/2021, 6/4/2021, and 3/29/2022. He was given 1000 mg of acetaminophen, 6 mg of intravenous dexamethasone, 30 mL/kg of lactated Ringer's solution, piperacillin-tazobactam, vancomycin, remdesivir. He was  admitted to Hospital Medicine Team B.       Overview/Hospital Course:  No notes on file    Interval History: Sputum cx with GPC, final results pending. Broaden abx to vanc and cefepime for now. Deescalate when appropriate. Ordering repeat CXR to fu on R pleural effusion.    Review of Systems  Objective:     Vital Signs (Most Recent):  Temp: 98.5 °F (36.9 °C) (03/31/23 1202)  Pulse: 106 (03/31/23 1221)  Resp: 18 (03/31/23 1221)  BP: 136/65 (03/31/23 1202)  SpO2: (!) 94 % (03/31/23 1221)   Vital Signs (24h Range):  Temp:  [97.8 °F (36.6 °C)-98.5 °F (36.9 °C)] 98.5 °F (36.9 °C)  Pulse:  [] 106  Resp:  [16-24] 18  SpO2:  [92 %-98 %] 94 %  BP: (114-140)/(65-77) 136/65     Weight: 77.1 kg (170 lb)  Body mass index is 24.39 kg/m².    Intake/Output Summary (Last 24 hours) at 3/31/2023 1247  Last data filed at 3/31/2023 0450  Gross per 24 hour   Intake --   Output 1050 ml   Net -1050 ml        Physical Exam  Vitals and nursing note reviewed.   Constitutional:       General: He is not in acute distress.     Appearance: He is well-developed. He is not diaphoretic.      Interventions: He is not intubated.Nasal cannula in place.   HENT:      Head: Normocephalic and atraumatic.   Eyes:      General: No scleral icterus.     Conjunctiva/sclera: Conjunctivae normal.   Cardiovascular:      Rate and Rhythm: Normal rate and regular rhythm.      Heart sounds: Normal heart sounds. No murmur heard.  Pulmonary:      Effort: Pulmonary effort is normal. No respiratory distress. He is not intubated.      Breath sounds: Rhonchi present.   Abdominal:      General: There is no distension.      Palpations: Abdomen is soft.      Tenderness: There is no abdominal tenderness. There is no guarding.   Musculoskeletal:         General: No swelling or tenderness.      Right lower leg: No edema.      Left lower leg: No edema.   Skin:     General: Skin is warm and dry.      Coloration: Skin is not jaundiced or pale.   Neurological:      Mental  Status: He is alert and oriented to person, place, and time.      Motor: Weakness and atrophy present.   Psychiatric:         Attention and Perception: Attention normal.         Mood and Affect: Mood and affect normal.         Behavior: Behavior is cooperative.           Assessment/Plan:      * Acute hypoxemic respiratory failure due to COVID-19  Dexamethasone 6 mg daily for 10 days, with famotidine. Remdesivir for 5 days. Ascorbic acid and multivitamin. Albuterol inhaler. Therapeutic enoxaparin. Supplemental oxygen. Isolation precautions. C      Pneumonia of right lower lobe due to infectious organism  Has a history of aspiration pneumonia. Received amoxicillin-clavulanate  Sputum cx with GPC. Broaden abx to vanc and cefepime. Deescalate as appropriate    Sacral decubitus ulcer, stage II  Consult Wound Care. Turn frequently.    Pleural effusion on right  CXR on admission  1. Right pleural effusion, underlying infectious consolidation not excluded.  Correlation and follow-up is advised.  - Repeat CXR ordered on 3/31    Chronic obstructive pulmonary disease, unspecified COPD type  He takes albuterol inhaler prn. Give scheduled and prn.    Essential hypertension  Hold home amlodipine and losartan for now. Resume if blood pressures increase.    Thrombocytopenia  Stable. Monitor.    Atrial fibrillation  Not in atrial fibrillation on admission. Not on chronic anticoagulation or rate-controlling medications. Telemetry.     Oropharyngeal dysphagia  Aspiration precautions. Soft diet.     Polymyositis  Hold home mycophenolate. Give dexamethasone in place of home prednisone.      VTE Risk Mitigation (From admission, onward)         Ordered     enoxaparin injection 80 mg  2 times daily         03/29/23 1530                Discharge Planning   DERICK: 4/3/2023     Code Status: Full Code   Is the patient medically ready for discharge?:     Reason for patient still in hospital (select all that apply): Patient trending condition                      Erma Fairchild MD  Department of Hospital Medicine   [facility] - Telemetry Stepdown

## 2023-03-31 NOTE — ASSESSMENT & PLAN NOTE
Dexamethasone 6 mg daily for 10 days, with famotidine. Remdesivir for 5 days. Ascorbic acid and multivitamin. Albuterol inhaler. Therapeutic enoxaparin. Supplemental oxygen. Isolation precautions. C

## 2023-03-31 NOTE — PLAN OF CARE
Problem: Infection  Goal: Absence of Infection Signs and Symptoms  Outcome: Ongoing, Progressing     Problem: Adult Inpatient Plan of Care  Goal: Plan of Care Review  Outcome: Ongoing, Progressing     Problem: Adult Inpatient Plan of Care  Goal: Absence of Hospital-Acquired Illness or Injury  Outcome: Ongoing, Progressing

## 2023-04-01 NOTE — PLAN OF CARE
Problem: Infection  Goal: Absence of Infection Signs and Symptoms  Outcome: Ongoing, Progressing     Problem: Adult Inpatient Plan of Care  Goal: Patient-Specific Goal (Individualized)  Outcome: Ongoing, Progressing     Problem: Adult Inpatient Plan of Care  Goal: Optimal Comfort and Wellbeing  Outcome: Ongoing, Progressing

## 2023-04-01 NOTE — ASSESSMENT & PLAN NOTE
Dexamethasone 6 mg daily for 10 days, with famotidine. Remdesivir for 5 days. Ascorbic acid and multivitamin. Albuterol inhaler. Therapeutic enoxaparin. Supplemental oxygen. Isolation precautions.

## 2023-04-01 NOTE — SUBJECTIVE & OBJECTIVE
Interval History: Patient reports progressive improvement in breathing. Sputum cx with normal resp naldo. Blood cx with NGTD    Review of Systems  Objective:     Vital Signs (Most Recent):  Temp: 97.8 °F (36.6 °C) (04/01/23 1217)  Pulse: 82 (04/01/23 1217)  Resp: 19 (04/01/23 1217)  BP: 133/75 (04/01/23 1217)  SpO2: 100 % (04/01/23 1217) Vital Signs (24h Range):  Temp:  [96.2 °F (35.7 °C)-98.3 °F (36.8 °C)] 97.8 °F (36.6 °C)  Pulse:  [68-90] 82  Resp:  [16-19] 19  SpO2:  [96 %-100 %] 100 %  BP: (105-160)/(59-75) 133/75     Weight: 77.1 kg (170 lb)  Body mass index is 24.39 kg/m².  No intake or output data in the 24 hours ending 04/01/23 1227   Physical Exam  Vitals and nursing note reviewed.   Constitutional:       General: He is not in acute distress.     Appearance: He is well-developed. He is not diaphoretic.      Interventions: He is not intubated.Nasal cannula in place.   HENT:      Head: Normocephalic and atraumatic.   Eyes:      General: No scleral icterus.     Conjunctiva/sclera: Conjunctivae normal.   Cardiovascular:      Rate and Rhythm: Normal rate and regular rhythm.      Heart sounds: Normal heart sounds. No murmur heard.  Pulmonary:      Effort: Pulmonary effort is normal. No respiratory distress. He is not intubated.      Breath sounds: Rhonchi present.   Abdominal:      General: There is no distension.      Palpations: Abdomen is soft.      Tenderness: There is no abdominal tenderness. There is no guarding.   Musculoskeletal:         General: No swelling or tenderness.      Right lower leg: No edema.      Left lower leg: No edema.   Skin:     General: Skin is warm and dry.      Coloration: Skin is not jaundiced or pale.   Neurological:      Mental Status: He is alert and oriented to person, place, and time.      Motor: Weakness and atrophy present.   Psychiatric:         Attention and Perception: Attention normal.         Mood and Affect: Mood and affect normal.         Behavior: Behavior is  cooperative.

## 2023-04-01 NOTE — PROGRESS NOTES
[facility] Medicine  Progress Note    Patient Name: Nura Kahn Jr.  MRN: [id number]  Patient Class: IP- Inpatient   Admission Date: 3/29/2023  Length of Stay: 3 days  Attending Physician: Erma Fairchild MD  Primary Care Provider: Edna Jaramillo NP        Subjective:     Principal Problem:Acute hypoxemic respiratory failure due to COVID-19        HPI:  Nura Kahn Jr. Is a 67 year old Black man with hemoglobin C disease, beta 0 thalassemia, former cigarette smoking (quit in 2018), atrial fibrillation, aortic atherosclerosis, hypertension, chronic obstructive pulmonary disease (COPD), inclusion body myositis/polymyositis (diagnosed in 2009), quadriparesis, dysphagia, osteopenia, portal venous hypertension, splenomegaly, thrombocytopenia, anemia. He is chair bound. He lives in [facility]. His rheumatologist is Dr. Nura Huston.    He presented to [facility] Emergency Department on 3/29/2023 for 3 days of fatigue, fevers, chills, cough productive of green sputum. Emergency medical services found him to be hypotensive and administered 1 liter of intravenous fluids. He had fever of 101.9° Fahrenheit. Heart rate was 124 bpm. Respiratory rate was 30 per minute. Oxygen saturation was 87% and he was put on 2 liters/minute of supplemental oxygen. COVID-19 was positive. Influenza was negative. Sodium was low at 130 mmol/L. Bicarbonate was low at 17 mmol/L. Glucose was low at 49 mg/dL. WBC was elevated at [address]/uL with 89% granulocytes. Procalcitonin was elevated at 28.69 ng/mL. Chest X-ray showed a right pleural effusion with underlying consolidation not excluded. He was noted to have a stage 2 sacral decubitus ulcer. He had Pfizer COVID-19 vaccination on 5/14/2021, 6/4/2021, and 3/29/2022. He was given 1000 mg of acetaminophen, 6 mg of intravenous dexamethasone, 30 mL/kg of lactated Ringer's solution, piperacillin-tazobactam, vancomycin, remdesivir. He  was admitted to Hospital Medicine Team B.       Overview/Hospital Course:  No notes on file    Interval History: Patient reports progressive improvement in breathing. Sputum cx with normal resp naldo. Blood cx with NGTD    Review of Systems  Objective:     Vital Signs (Most Recent):  Temp: 97.8 °F (36.6 °C) (04/01/23 1217)  Pulse: 82 (04/01/23 1217)  Resp: 19 (04/01/23 1217)  BP: 133/75 (04/01/23 1217)  SpO2: 100 % (04/01/23 1217) Vital Signs (24h Range):  Temp:  [96.2 °F (35.7 °C)-98.3 °F (36.8 °C)] 97.8 °F (36.6 °C)  Pulse:  [68-90] 82  Resp:  [16-19] 19  SpO2:  [96 %-100 %] 100 %  BP: (105-160)/(59-75) 133/75     Weight: 77.1 kg (170 lb)  Body mass index is 24.39 kg/m².  No intake or output data in the 24 hours ending 04/01/23 1227   Physical Exam  Vitals and nursing note reviewed.   Constitutional:       General: He is not in acute distress.     Appearance: He is well-developed. He is not diaphoretic.      Interventions: He is not intubated.Nasal cannula in place.   HENT:      Head: Normocephalic and atraumatic.   Eyes:      General: No scleral icterus.     Conjunctiva/sclera: Conjunctivae normal.   Cardiovascular:      Rate and Rhythm: Normal rate and regular rhythm.      Heart sounds: Normal heart sounds. No murmur heard.  Pulmonary:      Effort: Pulmonary effort is normal. No respiratory distress. He is not intubated.      Breath sounds: Rhonchi present.   Abdominal:      General: There is no distension.      Palpations: Abdomen is soft.      Tenderness: There is no abdominal tenderness. There is no guarding.   Musculoskeletal:         General: No swelling or tenderness.      Right lower leg: No edema.      Left lower leg: No edema.   Skin:     General: Skin is warm and dry.      Coloration: Skin is not jaundiced or pale.   Neurological:      Mental Status: He is alert and oriented to person, place, and time.      Motor: Weakness and atrophy present.   Psychiatric:         Attention and Perception: Attention  normal.         Mood and Affect: Mood and affect normal.         Behavior: Behavior is cooperative.           Assessment/Plan:      * Acute hypoxemic respiratory failure due to COVID-19  Dexamethasone 6 mg daily for 10 days, with famotidine. Remdesivir for 5 days. Ascorbic acid and multivitamin. Albuterol inhaler. Therapeutic enoxaparin. Supplemental oxygen. Isolation precautions.    Pneumonia of right lower lobe due to infectious organism  Has a history of aspiration pneumonia. Received amoxicillin-clavulanate  Sputum cx with normal resp naldo.  Deescalated abx to ceftriaxone.     Sacral decubitus ulcer, stage II  Consult Wound Care. Turn frequently.    Pleural effusion on right  CXR on admission  1. Right pleural effusion, underlying infectious consolidation not excluded.  Correlation and follow-up is advised.  - Repeat CXR ordered on 3/31    Chronic obstructive pulmonary disease, unspecified COPD type  He takes albuterol inhaler prn. Give scheduled and prn.    Essential hypertension  Hold home amlodipine and losartan for now. Resume if blood pressures increase.    Thrombocytopenia  Stable. Monitor.    Atrial fibrillation  Not in atrial fibrillation on admission. Not on chronic anticoagulation or rate-controlling medications. Telemetry.     Oropharyngeal dysphagia  Aspiration precautions. Soft diet.     Polymyositis  Hold home mycophenolate. Give dexamethasone in place of home prednisone.      VTE Risk Mitigation (From admission, onward)         Ordered     enoxaparin injection 80 mg  2 times daily         03/29/23 1530                Discharge Planning   DERICK: 4/3/2023     Code Status: Full Code   Is the patient medically ready for discharge?:     Reason for patient still in hospital (select all that apply): Patient trending condition  Discharge Plan A: Home with family                  Erma Fairchild MD  Department of Hospital Medicine   [facility] - Telemetry Stepdown

## 2023-04-01 NOTE — ASSESSMENT & PLAN NOTE
Has a history of aspiration pneumonia. Received amoxicillin-clavulanate  Sputum cx with normal resp naldo.  Deescalated abx to ceftriaxone.

## 2023-04-01 NOTE — PROGRESS NOTES
Therapy with vancomycin complete and/or consult discontinued by provider.  Pharmacy will sign off, please re-consult as needed.    Maryam Patterson, PharmD, BCPS  Cardiology Clinical Pharmacy Specialist  Ext. [phone]

## 2023-04-02 NOTE — PLAN OF CARE
Problem: Infection  Goal: Absence of Infection Signs and Symptoms  Outcome: Ongoing, Progressing     Problem: Adult Inpatient Plan of Care  Goal: Plan of Care Review  Outcome: Ongoing, Progressing  Goal: Patient-Specific Goal (Individualized)  Outcome: Ongoing, Progressing  Goal: Absence of Hospital-Acquired Illness or Injury  Outcome: Ongoing, Progressing  Goal: Optimal Comfort and Wellbeing  Outcome: Ongoing, Progressing  Goal: Readiness for Transition of Care  Outcome: Ongoing, Progressing     Problem: Fluid Imbalance (Pneumonia)  Goal: Fluid Balance  Outcome: Ongoing, Progressing     Problem: Infection (Pneumonia)  Goal: Resolution of Infection Signs and Symptoms  Outcome: Ongoing, Progressing     Problem: Impaired Wound Healing  Goal: Optimal Wound Healing  Outcome: Ongoing, Progressing

## 2023-04-02 NOTE — CONSULTS
"[facility]  Initial Consult Note      Chief Complaint/Reason for Consult      Pleural Effusion     History of Present Illness      67 year old with beta thalassemia, atrial fibrillation, hypertension, chronic obstructive pulmonary disease (COPD), and inclusion body myositis/polymyositis (diagnosed in 2009) who presented to [facility] with complaints of fatigue, fevers, and productive cough for 3 days. He was hypotensive and febrile on presentation and received broad spectrum abx and IV fluid resuscitation. Was subsequently found to be COVID-19 positive and was requiring supplemental O2. CXR was notable for a right sided pleural effusion. Pulmonology was consulted for further recommendations.      Past Medical History      Past Medical History:   Diagnosis Date    Aspiration pneumonia of right lower lobe 1/15/2018    Atrial fibrillation 3/2/2015    Chronic obstructive pulmonary disease, unspecified COPD type 5/4/2022    Depression     Essential hypertension 2/27/2019    Malignant (primary) neoplasm, unspecified 5/4/2022    Microcytic anemia 9/25/2014    Neuromuscular disorder     Other osteoporosis without current pathological fracture 9/22/2022    Pneumonia     Polymyositis 2009    Tobacco abuse          Allergies and Medications reviewed     Review of Systems      Other than those symptoms mentioned above, an extensive review of systems was unremarkable.         On Examination     Vital Signs   /78   Pulse 83   Temp 98 °F (36.7 °C)   Resp 18   Ht 5' 10" (1.778 m)   Wt 77.1 kg (170 lb)   SpO2 95%   BMI 24.39 kg/m²      Physical Exam   GENERAL: The patient is alert and oriented, in no apparent distress. @HE@ is pleasant and conversant in full sentences.    HEENT: Pupils are equally round and briskly reactive to light. Extraocular muscles are grossly intact. Oral mucous membranes are moist without lesions.    NECK: The patient has no noted JVD. No adenopathy is " appreciated.    CHEST/LUNGS: lungs are clear bilaterally without rhonchi, rales, or wheezes. There is no subcutaneous air appreciated. There is no tenderness to the chest wall.    HEART: The patient has a regular rate and rhythm. No murmurs, rubs, or gallops are appreciated. Distal pulses are 2+. Extremities are warm without evidence of cyanosis or poor perfusion.    ABDOMEN: The patients abdomen is completely soft, nontender, and nondistended. Bowel sounds are present. No organomegaly is appreciated. No masses are appreciated. There are no peritoneal signs.    EXTREMITIES: The patient has no peripheral edema. There is no focal long bone tenderness or deformity.    SKIN: The patients skin is warm and dry, without rashes or lesions.    PSYCHIATRIC: The patient has normal mental status and has an appropriate affect.    NEUROLOGIC: The patient has equal strength in both upper and lower extremities without focal deficit. There are no gross deficits to the cranial nerves.       All recent labs and imaging studies have been reviewed    Pertinent findings include:    Lab Results   Component Value Date    WBC 11.48 04/01/2023    HGB 7.8 (L) 04/01/2023    HCT 24.9 (L) 04/01/2023    MCV 57 (L) 04/01/2023     (L) 04/01/2023     CMP  Sodium   Date Value Ref Range Status   04/01/2023 130 (L) 136 - 145 mmol/L Final     Potassium   Date Value Ref Range Status   04/01/2023 3.8 3.5 - 5.1 mmol/L Final     Chloride   Date Value Ref Range Status   04/01/2023 101 95 - 110 mmol/L Final     CO2   Date Value Ref Range Status   04/01/2023 19 (L) 23 - 29 mmol/L Final     Glucose   Date Value Ref Range Status   04/01/2023 68 (L) 70 - 110 mg/dL Final     BUN   Date Value Ref Range Status   04/01/2023 13 8 - 23 mg/dL Final     Creatinine   Date Value Ref Range Status   04/01/2023 0.4 (L) 0.5 - 1.4 mg/dL Final     Calcium   Date Value Ref Range Status   04/01/2023 8.5 (L) 8.7 - 10.5 mg/dL Final     Total Protein   Date Value Ref Range  Status   04/01/2023 5.9 (L) 6.0 - 8.4 g/dL Final     Albumin   Date Value Ref Range Status   04/01/2023 3.0 (L) 3.5 - 5.2 g/dL Final     Total Bilirubin   Date Value Ref Range Status   04/01/2023 1.5 (H) 0.1 - 1.0 mg/dL Final     Comment:     For infants and newborns, interpretation of results should be based  on gestational age, weight and in agreement with clinical  observations.    Premature Infant recommended reference ranges:  Up to 24 hours.............<8.0 mg/dL  Up to 48 hours............<12.0 mg/dL  3-5 days..................<15.0 mg/dL  6-29 days.................<15.0 mg/dL       Alkaline Phosphatase   Date Value Ref Range Status   04/01/2023 73 55 - 135 U/L Final     AST   Date Value Ref Range Status   04/01/2023 17 10 - 40 U/L Final     ALT   Date Value Ref Range Status   04/01/2023 12 10 - 44 U/L Final     Anion Gap   Date Value Ref Range Status   04/01/2023 10 8 - 16 mmol/L Final     eGFR   Date Value Ref Range Status   04/01/2023 >60.0 >60 mL/min/1.73 m^2 Final         I have personally and independently interpretted the following tests:    CXR: 4/1/2023 - right sided pleural effusion, no parenchymal abnormality noted    Assessment and Plan / Recommendations     Pleural Effusion  Looking at previous imaging, patient appears to have a raised right hemidiaphragm at baseline. Bedside ultrasound with minimal fluid noted, no safe pocket to access. No loculations or septations. Given patient is on room air, and afebrile, with minimal fluid present do not suspect parapneumonic effusion. Raised hemidiaphragm is making it appear that there is more fluid than there actually is. Has received abx since presentation and felling better.    - Would recommend outpatient CXR in 1-2 weeks with PCP to continue to follow  - Patient counseled on when to present to ED  - completed course of Abx for CAP    Pulmonology will sign off, please do not hesitate to reach out if there are any further questions regarding the care of  this patient.      This plan was discussed with staff pulmonologist Dr. Oniel Branch M.D.  \[facility] [place] [facility]\"" Critical Care Fellow

## 2023-04-02 NOTE — PROGRESS NOTES
[facility] Medicine  Progress Note    Patient Name: Nura Kahn Jr.  MRN: [id number]  Patient Class: IP- Inpatient   Admission Date: 3/29/2023  Length of Stay: 4 days  Attending Physician: Erma Fairchild MD  Primary Care Provider: Edna Jaramillo NP        Subjective:     Principal Problem:Acute hypoxemic respiratory failure due to COVID-19        HPI:  Nura Kahn Jr. Is a 67 year old Black man with hemoglobin C disease, beta 0 thalassemia, former cigarette smoking (quit in 2018), atrial fibrillation, aortic atherosclerosis, hypertension, chronic obstructive pulmonary disease (COPD), inclusion body myositis/polymyositis (diagnosed in 2009), quadriparesis, dysphagia, osteopenia, portal venous hypertension, splenomegaly, thrombocytopenia, anemia. He is chair bound. He lives in [facility]. His rheumatologist is Dr. Nura Huston.    He presented to [facility] Emergency Department on 3/29/2023 for 3 days of fatigue, fevers, chills, cough productive of green sputum. Emergency medical services found him to be hypotensive and administered 1 liter of intravenous fluids. He had fever of 101.9° Fahrenheit. Heart rate was 124 bpm. Respiratory rate was 30 per minute. Oxygen saturation was 87% and he was put on 2 liters/minute of supplemental oxygen. COVID-19 was positive. Influenza was negative. Sodium was low at 130 mmol/L. Bicarbonate was low at 17 mmol/L. Glucose was low at 49 mg/dL. [place] [address]/uL with 89% granulocytes. Procalcitonin was elevated at 28.69 ng/mL. Chest X-ray showed a right pleural effusion with underlying consolidation not excluded. He was noted to have a stage 2 sacral decubitus ulcer. He had Pfizer COVID-19 vaccination on 5/14/2021, 6/4/2021, and 3/29/2022. He was given 1000 mg of acetaminophen, 6 mg of intravenous dexamethasone, 30 mL/kg of lactated Ringer's solution, piperacillin-tazobactam, vancomycin, remdesivir. He  was admitted to Hospital Medicine Team B.       Overview/Hospital Course:  No notes on file    Interval History: NAEON. Completed 5/5 dose of remdesivir today. Consulted pulm for R pleural effusion   Review of Systems  Objective:     Vital Signs (Most Recent):  Temp: 97.6 °F (36.4 °C) (04/02/23 0754)  Pulse: 83 (04/02/23 1226)  Resp: 18 (04/02/23 0904)  BP: 129/79 (04/02/23 0754)  SpO2: 95 % (04/02/23 0904) Vital Signs (24h Range):  Temp:  [96.5 °F (35.8 °C)-98.2 °F (36.8 °C)] 97.6 °F (36.4 °C)  Pulse:  [72-91] 83  Resp:  [16-22] 18  SpO2:  [95 %-99 %] 95 %  BP: (116-134)/(70-80) 129/79     Weight: 77.1 kg (170 lb)  Body mass index is 24.39 kg/m².    Intake/Output Summary (Last 24 hours) at 4/2/2023 1234  Last data filed at 4/1/2023 1332  Gross per 24 hour   Intake --   Output 1000 ml   Net -1000 ml      Physical Exam  Vitals and nursing note reviewed.   Constitutional:       General: He is not in acute distress.     Appearance: He is well-developed. He is not diaphoretic.      Interventions: He is not intubated.Nasal cannula in place.   HENT:      Head: Normocephalic and atraumatic.   Eyes:      General: No scleral icterus.     Conjunctiva/sclera: Conjunctivae normal.   Cardiovascular:      Rate and Rhythm: Normal rate and regular rhythm.      Heart sounds: Normal heart sounds. No murmur heard.  Pulmonary:      Effort: Pulmonary effort is normal. No respiratory distress. He is not intubated.      Breath sounds: Rhonchi present.   Abdominal:      General: There is no distension.      Palpations: Abdomen is soft.      Tenderness: There is no abdominal tenderness. There is no guarding.   Musculoskeletal:         General: No swelling or tenderness.      Right lower leg: No edema.      Left lower leg: No edema.   Skin:     General: Skin is warm and dry.      Coloration: Skin is not jaundiced or pale.   Neurological:      Mental Status: He is alert and oriented to person, place, and time.      Motor: Weakness and atrophy  present.   Psychiatric:         Attention and Perception: Attention normal.         Mood and Affect: Mood and affect normal.         Behavior: Behavior is cooperative.           Assessment/Plan:      * Acute hypoxemic respiratory failure due to COVID-19  Dexamethasone 6 mg daily for 10 days, with famotidine. Remdesivir for 5 days. Ascorbic acid and multivitamin. Albuterol inhaler. Therapeutic enoxaparin. Supplemental oxygen. Isolation precautions.    Pneumonia of right lower lobe due to infectious organism  Has a history of aspiration pneumonia. Received amoxicillin-clavulanate  Sputum cx with normal resp naldo.  Deescalated abx to ceftriaxone.     Sacral decubitus ulcer, stage II  Consult Wound Care. Turn frequently.    Pleural effusion on right  CXR on admission  1. Right pleural effusion, underlying infectious consolidation not excluded.  Correlation and follow-up is advised.  - Repeat CXR ordered on 3/31:  Heart size normal.  Continued opacification at the right lung base consistent with pleural effusion and a combination of atelectasis.  Subsegmental atelectatic changes noted at the left lung base.  The upper lung fields are clear.  No significant changes.    Consulted pulm     Chronic obstructive pulmonary disease, unspecified COPD type  He takes albuterol inhaler prn. Give scheduled and prn.    Essential hypertension  Hold home amlodipine and losartan for now. Resume if blood pressures increase.    Thrombocytopenia  Stable. Monitor.    Atrial fibrillation  Not in atrial fibrillation on admission. Not on chronic anticoagulation or rate-controlling medications. Telemetry.     Oropharyngeal dysphagia  Aspiration precautions. Soft diet.     Polymyositis  Hold home mycophenolate. Give dexamethasone in place of home prednisone.      VTE Risk Mitigation (From admission, onward)           Ordered     enoxaparin injection 80 mg  2 times daily         03/29/23 1530                    Discharge Planning   DERICK: 4/3/2023      Code Status: Full Code   Is the patient medically ready for discharge?:     Reason for patient still in hospital (select all that apply): Patient trending condition  Discharge Plan A: Home with family                  Erma Fairchild MD  Department of Hospital Medicine   [facility] - Telemetry Stepdown

## 2023-04-02 NOTE — PLAN OF CARE
Problem: Adult Inpatient Plan of Care  Goal: Absence of Hospital-Acquired Illness or Injury  Outcome: Ongoing, Progressing     Problem: Adult Inpatient Plan of Care  Goal: Optimal Comfort and Wellbeing  Outcome: Ongoing, Progressing     Problem: Adult Inpatient Plan of Care  Goal: Readiness for Transition of Care  Outcome: Ongoing, Progressing     Problem: Infection (Pneumonia)  Goal: Resolution of Infection Signs and Symptoms  Outcome: Ongoing, Progressing     Problem: Respiratory Compromise (Pneumonia)  Goal: Effective Oxygenation and Ventilation  Outcome: Ongoing, Progressing     Problem: Impaired Wound Healing  Goal: Optimal Wound Healing  Outcome: Ongoing, Progressing     Problem: Skin Injury Risk Increased  Goal: Skin Health and Integrity  Outcome: Ongoing, Progressing     Patient remained resting comfortably in bed throughout night. Only complaint is hacking non-productive cough. Denies sob/chest pain/discomfort. VSS and tele monitor shows NSR 80's. Patient weaned off of 02 nasal cannula and pox 96-98%.

## 2023-04-02 NOTE — SUBJECTIVE & OBJECTIVE
Interval History: NAEON. Completed 5/5 dose of remdesivir today. Tentative plan for d/c home tomorrow    Review of Systems  Objective:     Vital Signs (Most Recent):  Temp: 97.6 °F (36.4 °C) (04/02/23 0754)  Pulse: 83 (04/02/23 1226)  Resp: 18 (04/02/23 0904)  BP: 129/79 (04/02/23 0754)  SpO2: 95 % (04/02/23 0904) Vital Signs (24h Range):  Temp:  [96.5 °F (35.8 °C)-98.2 °F (36.8 °C)] 97.6 °F (36.4 °C)  Pulse:  [72-91] 83  Resp:  [16-22] 18  SpO2:  [95 %-99 %] 95 %  BP: (116-134)/(70-80) 129/79     Weight: 77.1 kg (170 lb)  Body mass index is 24.39 kg/m².    Intake/Output Summary (Last 24 hours) at 4/2/2023 1234  Last data filed at 4/1/2023 1332  Gross per 24 hour   Intake --   Output 1000 ml   Net -1000 ml      Physical Exam  Vitals and nursing note reviewed.   Constitutional:       General: He is not in acute distress.     Appearance: He is well-developed. He is not diaphoretic.      Interventions: He is not intubated.Nasal cannula in place.   HENT:      Head: Normocephalic and atraumatic.   Eyes:      General: No scleral icterus.     Conjunctiva/sclera: Conjunctivae normal.   Cardiovascular:      Rate and Rhythm: Normal rate and regular rhythm.      Heart sounds: Normal heart sounds. No murmur heard.  Pulmonary:      Effort: Pulmonary effort is normal. No respiratory distress. He is not intubated.      Breath sounds: Rhonchi present.   Abdominal:      General: There is no distension.      Palpations: Abdomen is soft.      Tenderness: There is no abdominal tenderness. There is no guarding.   Musculoskeletal:         General: No swelling or tenderness.      Right lower leg: No edema.      Left lower leg: No edema.   Skin:     General: Skin is warm and dry.      Coloration: Skin is not jaundiced or pale.   Neurological:      Mental Status: He is alert and oriented to person, place, and time.      Motor: Weakness and atrophy present.   Psychiatric:         Attention and Perception: Attention normal.         Mood and  Affect: Mood and affect normal.         Behavior: Behavior is cooperative.

## 2023-04-03 NOTE — TELEPHONE ENCOUNTER
----- Message from Sara Cardona MA sent at 4/3/2023 11:06 AM CDT -----  Contact: Nura @ [phone]  Patient is returning a phone call.    Who left a message for the patient: Joselyn Lockhart MA     Does patient know what this is regarding: Appointment scheduled on 04/05 at 3pm    Would you like a call back, or a response through your my [facility] portal: Nura at [phone]      Comments: the patient says he can not due afternoon appointments. Really need a morning appointment.

## 2023-04-03 NOTE — DISCHARGE SUMMARY
[facility]  Discharge Summary      Patient Name: Nura Kahn Jr.  MRN: [id number]  AURELIANO: [id number]  Patient Class: IP- Inpatient  Admission Date: 3/29/2023  Hospital Length of Stay: 5 days  Discharge Date and Time:  04/03/2023 12:10 PM  Attending Physician: Erma Fairchild MD   Discharging Provider: Erma Fairchild MD  Primary Care Provider: Edna Jaramillo NP  Hospital Medicine Team: [facility] MED B Erma Fairchild MD  Primary Care Team: [facility]    HPI:   Nura Kahn Jr. Is a 67 year old Black man with hemoglobin C disease, beta 0 thalassemia, former cigarette smoking (quit in 2018), atrial fibrillation, aortic atherosclerosis, hypertension, chronic obstructive pulmonary disease (COPD), inclusion body myositis/polymyositis (diagnosed in 2009), quadriparesis, dysphagia, osteopenia, portal venous hypertension, splenomegaly, thrombocytopenia, anemia. He is chair bound. He lives in [facility]. His rheumatologist is Dr. Nura Huston.    He presented to [facility] Emergency Department on 3/29/2023 for 3 days of fatigue, fevers, chills, cough productive of green sputum. Emergency medical services found him to be hypotensive and administered 1 liter of intravenous fluids. He had fever of 101.9° Fahrenheit. Heart rate was 124 bpm. Respiratory rate was 30 per minute. Oxygen saturation was 87% and he was put on 2 liters/minute of supplemental oxygen. COVID-19 was positive. Influenza was negative. Sodium was low at 130 mmol/L. Bicarbonate was low at 17 mmol/L. Glucose was low at 49 mg/dL. [place] elevated at [address]/uL with 89% granulocytes. Procalcitonin was elevated at 28.69 ng/mL. Chest X-ray showed a right pleural effusion with underlying consolidation not excluded. He was noted to have a stage 2 sacral decubitus ulcer. He had Pfizer COVID-19 vaccination on 5/14/2021, 6/4/2021, and 3/29/2022. He was given 1000 mg of acetaminophen, 6 mg of  intravenous dexamethasone, 30 mL/kg of lactated Ringer's solution, piperacillin-tazobactam, vancomycin, remdesivir. He was admitted to Hospital Medicine Team B.       * No surgery found *      Hospital Course:      * Acute hypoxemic respiratory failure due to COVID-19   Remdesivir for 5 days. Dexamethasone 6mg daily. Ascorbic acid and multivitamin. Albuterol inhaler. Therapeutic enoxaparin. Supplemental oxygen. Isolation precautions.     Pneumonia of right lower lobe due to infectious organism  Has a history of aspiration pneumonia. Sputum cx with normal resp naldo.Received ceftriaxone. Switched to cefdinir on d/c to complete total course of 10 days       Pleural effusion on right  CXR on admission  1. Right pleural effusion, underlying infectious consolidation not excluded.  Correlation and follow-up is advised.  - Repeat CXR ordered on 3/31:  Heart size normal.  Continued opacification at the right lung base consistent with pleural effusion and a combination of atelectasis.  Subsegmental atelectatic changes noted at the left lung base.  The upper lung fields are clear.  No significant changes.     Consulted pulm. Thinks will likely self resolve. Would recommend outpatient CXR in 1-2 weeks with PCP to continue to follow. Patient counseled by pulm on when to present to ED. Completed course of Abx for CAP.     Patient is currently medically and HDS. He is being d/c home. FU with PCP. Plan of care discussed with patient, verbalized understanding. All questions were answered.         Goals of Care Treatment Preferences:  Code Status: Full Code    Living Will: Yes              Consults:   Consults (From admission, onward)        Status Ordering Provider     Inpatient consult to Pulmonology  Once        Provider:  (Not yet assigned)    Completed MASON, SEEMAL          No new Assessment & Plan notes have been filed under this hospital service since the last note was generated.  Service: Hospital Medicine    Final Active  Diagnoses:    Diagnosis Date Noted POA    PRINCIPAL PROBLEM:  Acute hypoxemic respiratory failure due to COVID-19 [U07.1, J96.01] 03/29/2023 Yes    Pneumonia of right lower lobe due to infectious organism [J18.9] 03/29/2023 Yes    Pleural effusion on right [J90] 03/29/2023 Yes    Sacral decubitus ulcer, stage II [L89.152] 03/29/2023 Yes    Chronic obstructive pulmonary disease, unspecified COPD type [J44.9] 05/04/2022 Yes     Chronic    Essential hypertension [I10] 02/27/2019 Yes     Chronic    Thrombocytopenia [D69.6] 03/03/2015 Yes     Chronic    Atrial fibrillation [I48.91] 03/02/2015 Yes     Chronic    Quadriparesis (muscle weakness) [G82.50] 09/25/2014 Yes     Chronic    Polymyositis [M33.20] 08/01/2014 Yes     Chronic    Oropharyngeal dysphagia [R13.12] 08/01/2014 Yes     Chronic      Problems Resolved During this Admission:       Discharged Condition: stable    Disposition: Home or Self Care    Follow Up:   Follow-up Information     Edna Jaramillo NP Follow up.    Specialty: Family Medicine  Why:  sent a message to pt's primary care office to make contact with pt to schedule his hospital f/u visit in 3 days.  Contact information:  [address]  [facility] [address] [phone]                       Patient Instructions:      X-Ray Chest PA And Lateral   Standing Status: Future Standing Exp. Date: 04/03/24     Order Specific Question Answer Comments   Reason for Exam: R Pleural effusion follow up    May the Radiologist modify the order per protocol to meet the clinical needs of the patient? Yes    Release to patient Immediate        Significant Diagnostic Studies: N/A    Pending Diagnostic Studies:     None         Medications:  Reconciled Home Medications:      Medication List      START taking these medications    cefdinir 300 MG capsule  Commonly known as: OMNICEF  Take 1 capsule (300 mg total) by mouth 2 (two) times daily. for 2 days     guaiFENesin 100 mg/5 ml 100 mg/5  mL syrup  Commonly known as: ROBITUSSIN  Take 10 mLs (200 mg total) by mouth every 4 (four) hours as needed for Congestion.        CONTINUE taking these medications    albuterol 90 mcg/actuation inhaler  Commonly known as: PROVENTIL/VENTOLIN HFA  Inhale 2 puffs into the lungs every 6 (six) hours as needed for Wheezing or Shortness of Breath.     amLODIPine 10 MG tablet  Commonly known as: NORVASC  Take 1 tablet (10 mg total) by mouth once daily.     ammonium lactate 12 % Crea  Apply 1 gram topically once daily. Apply to areas of dry skin and nails daily.     EPINEPHrine 0.3 mg/0.3 mL Atin  Commonly known as: EPIPEN     ergocalciferol 50,000 unit Cap  Commonly known as: ERGOCALCIFEROL  Take 1 capsule (50,000 Units total) by mouth every 7 days.     hydrOXYzine HCL 25 MG tablet  Commonly known as: ATARAX  Take 1 tablet (25 mg total) by mouth 3 (three) times daily as needed (itching).     losartan 25 MG tablet  Commonly known as: COZAAR  Take 1 tablet (25 mg total) by mouth once daily.     mycophenolate 500 mg Tab  Commonly known as: CELLCEPT  Take 3 tablets (1,500 mg total) by mouth 2 (two) times daily.     nystatin-triamcinolone cream  Commonly known as: MYCOLOG II  Apply topically 2 (two) times daily     * predniSONE 10 MG tablet  Commonly known as: DELTASONE  Take 1 tablet (10 mg total) by mouth once daily.     * predniSONE 10 MG tablet  Commonly known as: DELTASONE  Take 1 tablet (10 mg total) by mouth once daily.     traZODone 50 MG tablet  Commonly known as: DESYREL  Take 1 tablet (50 mg total) by mouth nightly as needed for Insomnia.         * This list has 2 medication(s) that are the same as other medications prescribed for you. Read the directions carefully, and ask your doctor or other care provider to review them with you.                Indwelling Lines/Drains at time of discharge:   Lines/Drains/Airways     None                 Time spent on the discharge of patient: 35 minutes         Seemal Gurinder,  MD  Department of Hospital Medicine  [facility] - Telemetry Stepdown

## 2023-04-03 NOTE — PLAN OF CARE
[facility] - Telemetry Stepdown  Discharge Final Note    Primary Care Provider: Edna Jaramillo NP    Expected Discharge Date: 4/3/2023      Future Appointments   Date Time Provider Department Center   4/5/2023  3:00 PM Edna Jaramillo NP [facility]          Final Discharge Note (most recent)       Final Note - 04/03/23 1226          Final Note    Assessment Type Final Discharge Note     Anticipated Discharge Disposition Home or Self Care     What phone number can be called within the next 1-3 days to see how you are doing after discharge? [phone]     Hospital Resources/Appts/Education Provided Appointments scheduled and added to AVS        Post-Acute Status    Post-Acute Authorization Other     Other Status No Post-Acute Service Needs     Discharge Delays None known at this time                     Important Message from Medicare  Important Message from Medicare regarding Discharge Appeal Rights: Given to patient/caregiver, Explained to patient/caregiver, Signed/date by patient/caregiver     Date IMM was signed: 04/03/23  Time IMM was signed: 0839      Marianne Spence RN  Ext [phone]

## 2023-04-03 NOTE — PLAN OF CARE
CHW met with patient/family at bedside. Patient experience rounding completed and reviewed the following.     Do you know your discharge plan? Yes,    If yes, what is the plan? Home    If you are discharging home, do you have help at home? Yes    Do you think you will need help at home at discharge? Yes    Have you discussed your needs and preferences with your SW/CM? Yes     Assigned SW/CM notified of any patient/family needs or concerns.

## 2023-04-03 NOTE — HOSPITAL COURSE
* Acute hypoxemic respiratory failure due to COVID-19   Remdesivir for 5 days. Dexamethasone 6mg daily. Ascorbic acid and multivitamin. Albuterol inhaler. Therapeutic enoxaparin. Supplemental oxygen. Isolation precautions.     Pneumonia of right lower lobe due to infectious organism  Has a history of aspiration pneumonia. Sputum cx with normal resp naldo.Received ceftriaxone. Switched to cefdinir on d/c to complete total course of 10 days       Pleural effusion on right  CXR on admission  1. Right pleural effusion, underlying infectious consolidation not excluded.  Correlation and follow-up is advised.  - Repeat CXR ordered on 3/31:  Heart size normal.  Continued opacification at the right lung base consistent with pleural effusion and a combination of atelectasis.  Subsegmental atelectatic changes noted at the left lung base.  The upper lung fields are clear.  No significant changes.     Consulted pulm. Thinks will likely self resolve. Would recommend outpatient CXR in 1-2 weeks with PCP to continue to follow. Patient counseled by pulm on when to present to ED. Completed course of Abx for CAP.     Patient is currently medically and HDS. He is being d/c home. FU with PCP. Plan of care discussed with patient, verbalized understanding. All questions were answered.

## 2023-04-03 NOTE — PHYSICIAN QUERY
PT Name: Nura Kahn Jr.  MR #: [id number]     DOCUMENTATION CLARIFICATION     CDS/: Renae Antonio RN             Contact information: Joradn [email or web address]   This form is a permanent document in the medical record.    Query Date: April 3, 2023    By submitting this query, we are merely seeking further clarification of documentation.  Please utilize your independent clinical judgment when addressing the question(s) below.  The Medical Record contains the following:   Indicators   Supporting Clinical Findings Location in Medical Record   x Pneumonia documented Pneumonia of right lower lobe due to infectious organism  Give amoxicillin-clavulanate, which would cover many bacterial pneumonias including aspiration pneumonia.     H & P of 3/29    x Chest X-Ray/CT Scan Right pleural effusion, underlying infectious consolidation not excluded.    Heart size normal.  Continued opacification at the right lung base consistent with pleural effusion and a combination of atelectasis.  Subsegmental atelectatic changes noted at the left lung base.  The upper lung fields are clear.  No significant changes.   Chest xray of 3/29     Chest xray of 3/31        x PaO2    PaCO2     O2 sat   Os sat of 87 % on room air     O2 sat of 95 % on 2 L o2 NC     O2 sat of 97 % on 2 L o2 NC     O2 sat of 97 % on 2 L o2 NC     O2 sat of 92 % on 2 L o2 NC     O2 sat of 93 % on 1 L o2 NC      RT PCS flow sheet of 3/29     RT PCS flow sheet of 3/29     RT PCS flow sheet of 3/29     RT PCS flow sheet of 3/29     RT PCS flow sheet of 3/30    RT PCS flow sheet of 3/31    x WBC  03/29/23 12:24 03/30/23 05:43 03/31/23 04:33 04/01/23 10:17   WBC 15.72  15.77  14.59  11.48    Labs of 3/29 to 4/1            x Vital Signs   B/P 132/80    Pulse 124    Resp 30     Temp 101.9    Spo2 100 %     Vital Signs (24h Range):  Temp:  [98.3 °F (36.8 °C)-101.9 °F (38.8 °C)]   Pulse:  [111-124]   Resp:  [25-32]   SpO2:  [87 %-100 %]   BP: (111-134)/(66-80)      Vital Signs (24h Range):  Temp:  [97.7 °F (36.5 °C)-98.9 °F (37.2 °C)]   Pulse:  []  Resp:  [16-32]   SpO2:  [87 %-99 %]  BP: (106-134)/(63-77)     Vital Signs (24h Range):  Temp:  [97.8 °F (36.6 °C)-98.5 °F (36.9 °C)]   Pulse:  []   Resp:  [16-24]   SpO2:  [92 %-98 %]   BP: (114-140)/(65-77)      ED Provider Note of 3/29     H & P of 3/29               HM PN of 3/30               HM PN of 3/31    x Cultures  Respiratory Culture Normal respiratory naldo      Resp culture of 3/29    x Treatment    Albuterol Inhalation 2 Puffs     Vancomycin IV 2000 mg     Vancomycin 1500 mg every 12 hours IV     Zosyn 4.5 G IV once     Ceftriaxone 2 G IV every 24 hours     Cefepime 2 G IV every 8 hours     Amoxicillin-clavulanate one tablet every 12 hours oral     Remdesivir 200 mg IV one dose    Remdesivir 100 mg IV daily     Guaifenesin 200 mg oral PRN for congestion     Dexamethasone 6 mg IV ED one time     Dexamethasone 6 mg oral daily      MAR 3/29 to present     MAR 3/29 once     MAR 3/31 to 4/1     MAR 3/29 once     MAR 4/1 to present     MAR 3/31 to 4/1     MAR 3/29 to 3/31     MAR 3/29     MAR 3/30 to 4/2     MAAR 4/3     MAR 3/29     MAR 3/30 to 4/8    x Supplemental O2 Up to 2 L o2 NC    RT PCS flow sheet of 3/29 to 4/1    x Dysphagia/Swallow study Oropharyngeal dysphagia  Aspiration precautions. Soft diet.   H & P of 3/29    x Other COVID      03/29/23 13:27   SARS-CoV-2 RNA, Amplification, Qual Positive !       H & P of 3/29     Labs of 3/29      Provider, please clarify Pneumonia related to the above clinical indicators: Please Select ALL that apply .     [ x  ] Bacterial  Pneumonia      [   ] Aspiration Pneumonia/Pneumonitis     [   ] COVID-19 Pneumonia     [   ] Unspecified Pneumonia     [   ] Other type of pneumonia (please specify): _______________     [  ] Clinically undetermined       Please document in your progress notes daily for the duration of treatment, until resolved, and include in your  discharge summary.     Form No. [id number]

## 2023-04-03 NOTE — PHYSICIAN QUERY
PT Name: Nura Kahn Jr.  MR #: [id number]     DOCUMENTATION CLARIFICATION     CDS/: Renae Antonio RN           Contact information:[email or web address]     This form is a permanent document in the medical record.     Query Date: April 3, 2023    By submitting this query, we are merely seeking further clarification of documentation.  Please utilize your independent clinical judgment when addressing the question(s) below.  The Medical Record contains the following   Indicators   Supporting Clinical Findings Location in Medical Record   x Documentation of Respiratory Failure, ARDS Acute hypoxemic respiratory failure due to COVID-19   H & P of 3/29    x SOB, CLARKE, Wheezing, Productive Cough, Use of Accessory Muscles, etc.   RESPIRATORY: Airway is open and patent, respirations are spontaneous, patient has a normal effort and rate, no accessory muscle use noted, pt placed on continuous pulse ox with O2 sats noted at 97% on room air.    On arrival, febrile, tachycardic, tachypneic with oxygen saturation of 100% on room air    Respiratory rate was 30 per minute. Oxygen saturation was 87% and he was put on 2 liters/minute of supplemental oxygen. COVID-19 was positive  Pulmonary:      Effort: Pulmonary effort is normal. Tachypnea present. No respiratory distress. He is not intubated.      Breath sounds: Rhonchi present.      Reports occasional cough but denies SOB     ED Nursing Notes of 3/29         ED Provider Note of 3/29     H & P of 3/29             HM PN of 3/30        x RR     ABGs     O2 sat Os sat of 87 % on room air          RR 32       O2 sat of 95 % on 2 L o2 NC       RR 28     O2 sat of 97 % on 2 L o2 NC       RR 26     O2 sat of 97 % on 2 L o2 NC       RR 17     O2 sat of 92 % on 2 L o2 NC       RR 20     O2 sat of 93 % on 1 L o2 NC       RR 24    RT PCS flow sheet of 3/29      RT PCS flow sheet of 3/29      RT PCS flow sheet of 3/29      RT PCS flow sheet of 3/29      RT PCS flow sheet of 3/30      RT PCS flow sheet of 3/31     Hypoxia/Hypercapnia        BiPAP/Intubation/Mechanical Ventilation       x Supplemental O2 Up to 2 L o2 NC    RT PCS flow sheet of 3/29 to 4/1     Home O2, Oxygen Dependence       x Respiratory distress  No respiratory distress.   H & P of 3/29, HM PN of 3/30 , 3/31, 4/1    x Radiology findings Right pleural effusion, underlying infectious consolidation not excluded.     Heart size normal.  Continued opacification at the right lung base consistent with pleural effusion and a combination of atelectasis.  Subsegmental atelectatic changes noted at the left lung base.  The upper lung fields are clear.  No significant changes.   Chest xray of 3/29      Chest xray of 3/31    x Acute/Chronic Illness Chronic Illness: hemoglobin C disease, beta 0 thalassemia, former cigarette smoking (quit in 2018), atrial fibrillation, aortic atherosclerosis, hypertension, chronic obstructive pulmonary disease (COPD), inclusion body myositis/polymyositis (diagnosed in 2009), quadriparesis, dysphagia, osteopenia, portal venous hypertension, splenomegaly, thrombocytopenia, anemia    Acute Illness: acute hypoxemic respiratory failure due to COVID-19, Sacral decubitus ulcer, stage 2, Pneumoniae of right lower love due to infectious organism,. Chronic obstructive pulmonary disease, Essential Hypertension, Thrombocytopenia, atrial fibrillation, oropharyngeal dysphagia, polymyositis    H & P of 3/29          H & P of 3/29    x Treatment Albuterol Inhalation 2 Puffs   MAR 3/29 to present     Other         The noted clinical guidelines following a diagnosis are only system guidelines and do not replace the providers clinical judgment.    Due to the conflicting clinical picture, please clinically validate the diagnosis of Acute hypoxemic respiratory failure:    If validated, please provide additional clinical support for the diagnosis.     [    ] Acute hypoxemic respiratory failure is not confirmed and/or it has been  ruled out     [    ] Acute hypoxemic respiratory failure is not confirmed and/or it has been ruled out, other diagnosis ruled in (please specify):__________________________     [   x ] Acute hypoxemic respiratory failure (ABG pO2 < 60 mmHg or O2 sat of <91% on room air and respiratory symptoms documented) diagnosis is confirmed and additional clinical support/decision-making indicators for the diagnosis include (please specify): _______________________________________________     [    ] Other clarification (please specify): ___________________           Please document in your progress notes daily for the duration of treatment until resolved and include in your discharge summary.     Reference:    ESVIN Avalos MD. (2020, March 13). Acute respiratory distress syndrome: Clinical features, diagnosis, and complications in adults ([id number] GUILLE Appiah MD & [id number] RAMEZ Person MD, Eds.). Retrieved November 13, 2020, from [email or web address]-[email or web address]-[email or web address]-features-diagnosis-[email or web address]-[email or web address]?search=ards&source=search_result&selectedTitle=1~150&usage_type=default&display_rank=1  Form No. [id number]

## 2023-04-03 NOTE — PLAN OF CARE
Problem: Adult Inpatient Plan of Care  Goal: Absence of Hospital-Acquired Illness or Injury  Outcome: Ongoing, Progressing     Problem: Fluid Imbalance (Pneumonia)  Goal: Fluid Balance  Outcome: Ongoing, Progressing     Problem: Infection (Pneumonia)  Goal: Resolution of Infection Signs and Symptoms  Outcome: Ongoing, Progressing     Problem: Impaired Wound Healing  Goal: Optimal Wound Healing  Outcome: Ongoing, Progressing     Problem: Infection (Pneumonia)  Goal: Resolution of Infection Signs and Symptoms  Outcome: Ongoing, Progressing     Problem: Skin Injury Risk Increased  Goal: Skin Health and Integrity  Outcome: Ongoing, Progressing     Patient with no s/s of distress throughout night and no complaints except for insomnia. VSS and tele monitor shows NSR 80-90's.

## 2023-04-03 NOTE — PLAN OF CARE
This CM informed that patient will need transportation home. Ambulance transport arranged for an estimated pick-up time of 13:30PM.    Marianne Spence RN  Ext [phone]

## 2023-04-03 NOTE — TELEPHONE ENCOUNTER
----- Message from Sara Cardona MA sent at 4/3/2023 11:06 AM CDT -----  Contact: Nura @ [phone]  Patient is returning a phone call.    Who left a message for the patient: Joselyn Lockhart MA     Does patient know what this is regarding: Appointment scheduled on 04/05 at 3pm    Would you like a call back, or a response through your my [facility] portal: Nura at [phone]      Comments: the patient says he can not due afternoon appointments. Really need a morning appointment.

## 2023-04-03 NOTE — TELEPHONE ENCOUNTER
----- Message from Flori Royal sent at 4/3/2023  8:51 AM CDT -----  Contact: [phone] Patient  Patient needs a Hosp follow up appt with their PCP only.       When is the next available appointment:  04/11/2023    Symptoms:      Discharge date:04/03/2023    Needs to be seen by:04/06/2023    Would you prefer an answer via [email or web address]?: Please call patient back.      Comments:  Doctor is requesting the pt be seen in 3 days.

## 2023-04-03 NOTE — PLAN OF CARE
Problem: Infection  Goal: Absence of Infection Signs and Symptoms  Outcome: Met     Problem: Adult Inpatient Plan of Care  Goal: Plan of Care Review  Outcome: Met  Goal: Patient-Specific Goal (Individualized)  Outcome: Met  Goal: Absence of Hospital-Acquired Illness or Injury  Outcome: Met  Goal: Optimal Comfort and Wellbeing  Outcome: Met  Goal: Readiness for Transition of Care  Outcome: Met     Problem: Fluid Imbalance (Pneumonia)  Goal: Fluid Balance  Outcome: Met     Problem: Infection (Pneumonia)  Goal: Resolution of Infection Signs and Symptoms  Outcome: Met     Problem: Respiratory Compromise (Pneumonia)  Goal: Effective Oxygenation and Ventilation  Outcome: Met     Problem: Impaired Wound Healing  Goal: Optimal Wound Healing  Outcome: Met     Problem: Skin Injury Risk Increased  Goal: Skin Health and Integrity  Outcome: Met

## 2023-04-03 NOTE — TELEPHONE ENCOUNTER
Pt did not want 3pm appt on 4/5/23 pt wants mornign appt ...nothing else is available..the patient refused no other appt is available pt also has covid..

## 2023-04-04 NOTE — PROGRESS NOTES
C3 nurse attempted to contact Nura Kahn Jr.  for a TCC post hospital discharge follow up call. The patient is unable to conduct the call @ this time. The patient requested a callback.    The patient has a scheduled HOSFU appointment with Edna Jaramillo NP  on 4/5/23 @ 1500. Message sent to Physician staff.

## 2023-04-04 NOTE — PROGRESS NOTES
C3 nurse spoke with Nura Kahn Jr.  for a TCC post hospital discharge follow up call. The patient has a scheduled HOSFU appointment with Edna Jaramillo NP  on 4/5/23 @ 1500.

## 2023-04-05 NOTE — TELEPHONE ENCOUNTER
Pt need a script to repair bed. Pt stated one side of bed stays down and want go up and the other stays up and want go down. I spoke w/ DME they will need script to repair. Please, advise. Thanks. Pt is unable to come in at the time, due to recovering from Covid and Pneuma. Please, advise. Thanks. Please add description on script.

## 2023-04-06 NOTE — TELEPHONE ENCOUNTER
4/6 WWB   Incoming call to refill for cellcept Patient does not have any active Specialty Pharmacy programs pt states that he has been in the hospital

## 2023-04-06 NOTE — TELEPHONE ENCOUNTER
Pt was closed out due to unable to contact. Pt tested positive for Covid on 3/29 and was hospitalized. He was also found to have pneumonia. He was discharged on 4/3.  Pt was given cefdinir on discharge for 2 days. Per ED notes, is to get a CXR in 1-2 weeks with PCP to make sure no more infection. According to ACR, pt should not resume MMF until 10-17 days after positive Covid infection.     Outgoing call to pt regarding MMF. Pt said he is still coughing and weak. Advised for him to not resume MMF yet until symptoms have resolved. Pt agreed to a f/u call on Monday.

## 2023-04-10 NOTE — TELEPHONE ENCOUNTER
Specialty Pharmacy - Initial Clinical Assessment    Specialty Medication Orders Linked to Encounter      Flowsheet Row Most Recent Value   Medication #1 mycophenolate (CELLCEPT) 500 mg Tab (Order#[id number], Rx#[id number])          Patient Diagnosis   M33.20 - Polymyositis    Subjective    Nura Kahn Jr. is a 67 y.o. male, who is followed by the specialty pharmacy service for management and education.    Recent Encounters       Date Type Provider Description    04/10/2023 Specialty Pharmacy Monica Fonseca PharmD Initial Clinical Assessment    04/06/2023 Specialty Pharmacy Jenniffer Ying Referral Authorization    03/29/2023 Specialty Pharmacy Isaac BASS Resor Refill Coordination    02/17/2023 Specialty Pharmacy Malou Chavez Refill Coordination    01/23/2023 Specialty Pharmacy Lisa Schulz PharmD Refill Coordination          Clinical call attempts since last clinical assessment   4/6/2023  7:40 PM - Specialty Pharmacy - Clinical Assessment by Monica Fonseca PharmD     Current Outpatient Medications   Medication Sig    albuterol (PROVENTIL/VENTOLIN HFA) 90 mcg/actuation inhaler Inhale 2 puffs into the lungs every 6 (six) hours as needed for Wheezing or Shortness of Breath.    amLODIPine (NORVASC) 10 MG tablet Take 1 tablet (10 mg total) by mouth once daily.    ammonium lactate 12 % Crea Apply 1 gram topically once daily. Apply to areas of dry skin and nails daily.    EPINEPHrine (EPIPEN) 0.3 mg/0.3 mL AtIn     ergocalciferol (ERGOCALCIFEROL) 50,000 unit Cap Take 1 capsule (50,000 Units total) by mouth every 7 days. (Patient not taking: Reported on 4/4/2023)    guaiFENesin 100 mg/5 ml (ROBITUSSIN) 100 mg/5 mL syrup Take 10 mLs (200 mg total) by mouth every 4 (four) hours as needed for Congestion. (Patient not taking: Reported on 4/4/2023)    hydrOXYzine HCL (ATARAX) 25 MG tablet Take 1 tablet (25 mg total) by mouth 3 (three) times daily as needed (itching). (Patient not taking: Reported on 4/4/2023)     losartan (COZAAR) 25 MG tablet Take 1 tablet (25 mg total) by mouth once daily.    mycophenolate (CELLCEPT) 500 mg Tab Take 3 tablets (1,500 mg total) by mouth 2 (two) times daily.    nystatin-triamcinolone (MYCOLOG II) cream Apply topically 2 (two) times daily (Patient not taking: Reported on 4/4/2023)    predniSONE (DELTASONE) 10 MG tablet Take 1 tablet (10 mg total) by mouth once daily. (Patient not taking: Reported on 4/4/2023)    predniSONE (DELTASONE) 10 MG tablet Take 1 tablet (10 mg total) by mouth once daily.    traZODone (DESYREL) 50 MG tablet Take 1 tablet (50 mg total) by mouth nightly as needed for Insomnia. (Patient not taking: Reported on 4/4/2023)   Last reviewed on 4/4/2023  8:52 AM by Halle Jiménez LPN    Review of patient's allergies indicates:  No Known AllergiesLast reviewed on  3/29/2023 11:47 AM by Edna Vasquez    Drug Interactions    Drug interactions evaluated: yes  Clinically relevant drug interactions identified: no  Provided the patient with educational material regarding drug interactions: not applicable         Adverse Effects    *All other systems reviewed and are negative       Assessment Questions - Documented Responses      Flowsheet Row Most Recent Value   Assessment    Medication Reconciliation completed for patient Yes   During the past 4 weeks, has patient missed any activities due to condition or medication? No   During the past 4 weeks, did patient have any of the following urgent care visits? Hospital Admission  [Covid and Pneumonia]   Goals of Therapy Status Achieving   Status of the patients ability to self-administer: Is Able   All education points have been covered with patient? No, patient declined- printed education provided   Assesment completed? Yes   Plan Therapy continued   Do you need to open a clinical intervention (i-vent)? No   Do you want to schedule first shipment? Yes   Medication #1 Assessment Info    Patient status Existing medication, Exisiting to [facility]  "  Is this medication appropriate for the patient? Yes   Is this medication effective? Yes          Refill Questions - Documented Responses      Flowsheet Row Most Recent Value   Patient Availability and HIPAA Verification    Does patient want to proceed with activity? Yes   HIPAA/medical authority confirmed? Yes   Relationship to patient of person spoken to? Self   Refill Screening Questions    When does the patient need to receive the medication? 04/14/23   Refill Delivery Questions    How will the patient receive the medication? MEDRx   When does the patient need to receive the medication? 04/14/23   Shipping Address Home   Address in [facility] confirmed and updated if neccessary? Yes   Expected Copay ($) 0   Is the patient able to afford the medication copay? Yes   Payment Method zero copay   Days supply of Refill 30   Supplies needed? No supplies needed   Refill activity completed? Yes   Refill activity plan Refill scheduled   Shipment/Pickup Date: 04/12/23            Objective    He has a past medical history of Aspiration pneumonia of right lower lobe (1/15/2018), Atrial fibrillation (3/2/2015), Chronic obstructive pulmonary disease, unspecified COPD type (5/4/2022), Depression, Essential hypertension (2/27/2019), Malignant (primary) neoplasm, unspecified (5/4/2022), Microcytic anemia (9/25/2014), Neuromuscular disorder, Other osteoporosis without current pathological fracture (9/22/2022), Pneumonia, Polymyositis (2009), and Tobacco abuse.    Tried/failed medications: MMF, Rituxan, azathioprine, MTX    BP Readings from Last 4 Encounters:   04/03/23 118/69   09/21/22 (!) 146/88   06/03/22 120/74   05/06/22 129/72     Ht Readings from Last 4 Encounters:   03/30/23 5' 10" (1.778 m)   09/21/22 5' 10" (1.778 m)   06/03/22 5' 10" (1.778 m)   05/06/22 5' 10.8" (1.798 m)     Wt Readings from Last 4 Encounters:   03/29/23 77.1 kg (170 lb)   09/21/22 77.1 kg (170 lb)   06/03/22 77 kg (169 lb 12.1 oz)   05/06/22 " 78.9 kg (173 lb 15.1 oz)     Recent Labs   Lab Result Units 04/01/23  0609 03/31/23  0432 03/30/23  0543 03/29/23  1224   Creatinine mg/dL 0.4 L 0.3 L 0.4 L 0.4 L   ALT U/L 12 13 16 11   AST U/L 17 31 46 H 20     The goals of prescribed drug therapy management include:  Supporting patient to meet the prescriber's medical treatment objectives  Improving or maintaining quality of life  Maintaining optimal therapy adherence  Minimizing and managing side effects      Goals of Therapy Status: Achieving    Assessment/Plan  Patient plans to continue therapy without changes      Indication, dosage, appropriateness, effectiveness, safety and convenience of his specialty medication(s) were reviewed today.     Patient Education   Pharmacist offer to  patient was declined. Printed educational materials will be provided with medication.      Pt was in the hospital and was closed out due to unable to contact. Pt will resume MMF    Tasks added this encounter   No tasks added.   Tasks due within next 3 months   4/6/2023 - Welcome Call  4/6/2023 - Clinical - Initial Assessment (Manual Add)     Monica Fonseca, PharmD  [facility]  [address] [address]  Phone: [phone]  Fax: [phone]

## 2023-04-12 NOTE — TELEPHONE ENCOUNTER
Leta and Efren we last saw this pt 9/21/22 and he was supposed to get 2nd course of rituximab, continue IVIg and also start denosumab for osteoporosis. Could you please find out why he hasn't received any of these and ask Leta to schedule all three. Please update in Therapy Plans and will need p.a. for each.  Also needs standing labs prior including hepatitis B serologies, immunoglobulins and QG-TB and he is way overdue for f/u with us. Needs to be seen in April after labs and the above issues worked out. ALEXSANDER

## 2023-04-13 PROBLEM — T74.01XA: Status: ACTIVE | Noted: 2023-01-01

## 2023-04-13 NOTE — PLAN OF CARE
Problem: Adult Inpatient Plan of Care  Goal: Plan of Care Review  Outcome: Ongoing, Progressing  Goal: Patient-Specific Goal (Individualized)  Outcome: Ongoing, Progressing  Goal: Absence of Hospital-Acquired Illness or Injury  Outcome: Ongoing, Progressing  Goal: Optimal Comfort and Wellbeing  Outcome: Ongoing, Progressing  Goal: Readiness for Transition of Care  Outcome: Ongoing, Progressing     Problem: Fluid Imbalance (Pneumonia)  Goal: Fluid Balance  Outcome: Ongoing, Progressing     Problem: Infection (Pneumonia)  Goal: Resolution of Infection Signs and Symptoms  Outcome: Ongoing, Progressing     Problem: Respiratory Compromise (Pneumonia)  Goal: Effective Oxygenation and Ventilation  Outcome: Ongoing, Progressing     Problem: Impaired Wound Healing  Goal: Optimal Wound Healing  Outcome: Ongoing, Progressing     Arrived on unit at 1715, aao x4, 2L NC, bedrest, VS wnl.

## 2023-04-13 NOTE — PT/OT/SLP EVAL
"Physical Therapy Evaluation & Discharge    Patient Name:  Nura Kahn Jr.   MRN:  [id number]  Admit Date: 4/13/2023  Admitting Diagnosis:  Acute hypoxemic respiratory failure  Length of Stay: 0 days  Recent Surgery: * No surgery found *      Recommendations:     Discharge Recommendations:  other (see comments)   Discharge Equipment Recommendations: none   Barriers to discharge: None    Assessment:     Nura Kahn Jr. is a 67 y.o. male admitted with a medical diagnosis of Acute hypoxemic respiratory failure. Medical history includes polymyositis, COPD, and quadriplegia. At this time, patient is at their functional baseline and acute PT services are not indicated. PT will discontinue orders. Family is interested in home health services at discharge. They have all the DME they need at this time. See detailed evaluation below:    Problem List: weakness, impaired endurance, impaired self care skills, impaired functional mobility, gait instability, impaired balance, decreased coordination, decreased upper extremity function, decreased lower extremity function, decreased ROM    Plan:     Discharge PT orders. Please continue to encourage patient mobility.    Subjective   Communicated with RN prior to session.  Patient found HOB elevated upon PT entry to room, agreeable to evaluation. "Nope I'm not doing that"    Chief Complaint: Influenza (Pt was discharged recently after being diagnosed with the flu. Reports his weakness has not improved over the last couple of days. )    Patient/Family Comments/goals: to go home  Pain/Comfort:  Pain Rating 1: 0/10  Pain Rating Post-Intervention 1: 0/10    Living Environment:  Patient lives with his family in a single story home with ramped entrance     Prior Level of Function:   Patient requires assistance with mobility & with ADLs. Patient uses DME as follows: slide board, power chair, hospital bed, lift device, bedside commode, oxygen.     Patient reports they will have " assistance from family upon discharge.    Objective:   Patient found with: telemetry, pulse ox (continuous), peripheral IV, blood pressure cuff     General Precautions: Standard, fall   Orthopedic Precautions:N/A   Braces: N/A   Oxygen Device: Room Air  Vitals: /78 (BP Location: Left arm, Patient Position: Sitting)   Pulse 96   Temp 98.8 °F (37.1 °C) (Oral)   Resp 16   Wt 59 kg (130 lb)   SpO2 95%   BMI 18.65 kg/m²     Exams:  Cognition:   Alert  Command following: Follows one-step commands  Fluency: clear/fluent  Hearing: Intact  Vision:  Intact visual fields  Skin Integrity: pressure wound to sacrum  Sensation: intact  Coordination: impaired  LE Strength:  L Lower Extremity: grossly 1/5  R Lower Extremity: grossly 1/5  LE ROM:  L Lower Extremity: limited by tone  R Lower Extremity: limited by tone      Outcome Measures:  AM-PAC 6 CLICK MOBILITY  Turning over in bed (including adjusting bedclothes, sheets and blankets)?: 2  Sitting down on and standing up from a chair with arms (e.g., wheelchair, bedside commode, etc.): 1  Moving from lying on back to sitting on the side of the bed?: 2  Moving to and from a bed to a chair (including a wheelchair)?: 1  Need to walk in hospital room?: 1  Climbing 3-5 steps with a railing?: 1  Basic Mobility Total Score: 8     Functional Mobility:    Supine to long sitting: supervision  Refused any other mobility today.    Education:   Patient was educated on continued safe mobility throughout this admission.       Patient left HOB elevated with all lines intact, call button in reach, and RN notified.    GOALS:   Multidisciplinary Problems       Physical Therapy Goals       Not on file              Multidisciplinary Problems (Resolved)          Problem: Physical Therapy    Goal Priority Disciplines Outcome Goal Variances Interventions   Physical Therapy Goal   (Resolved)     PT, PT/OT Met     Description: PT evaluation complete - no acute needs at this time. Family  interested in home health services.                       History:     Past Medical History:   Diagnosis Date    Aspiration pneumonia of right lower lobe 1/15/2018    Atrial fibrillation 3/2/2015    Chronic obstructive pulmonary disease, unspecified COPD type 5/4/2022    Depression     Essential hypertension 2/27/2019    Malignant (primary) neoplasm, unspecified 5/4/2022    Microcytic anemia 9/25/2014    Neuromuscular disorder     Other osteoporosis without current pathological fracture 9/22/2022    Pneumonia     Polymyositis 2009    Tobacco abuse        Past Surgical History:   Procedure Laterality Date    COLONOSCOPY N/A 8/6/2020    Procedure: COLONOSCOPY;  Surgeon: Brandan Meyer MD;  Location: [facility] ([address] [place] [address]);  Service: Endoscopy;  Laterality: N/A;    ESOPHAGOGASTRODUODENOSCOPY N/A 8/6/2020    Procedure: EGD (ESOPHAGOGASTRODUODENOSCOPY);  Surgeon: Brandan Meyer MD;  Location: [facility] ([address] [place] [address]);  Service: Endoscopy;  Laterality: N/A;  multiple co-morbidities. will have family member for assistance.  has neuromuscular issues-needs lift. in W/C-unable to transfer per self     COVID test at [facility] on 8/3-GT       Time Tracking:     PT Received On: 04/13/23  PT Start Time: 1032     PT Stop Time: 1044  PT Total Time (min): 12 min     Billable Minutes: Evaluation 12    Kailey Allen, PT, DPT  4/13/2023

## 2023-04-13 NOTE — PLAN OF CARE
Problem: Physical Therapy  Goal: Physical Therapy Goal  Description: PT evaluation complete - no acute needs at this time. Family interested in home health services.  Outcome: Met

## 2023-04-13 NOTE — PHARMACY MED REC
"Admission Medication History     The home medication history was taken by Sydnee Granger.    You may go to "Admission" then "Reconcile Home Medications" tabs to review and/or act upon these items.     The home medication list has been updated by the Pharmacy department.   Please read ALL comments highlighted in yellow.   Please address this information as you see fit.    Feel free to contact us if you have any questions or require assistance.          Medications listed below were obtained from: Patient/family, Analytic software- [place], and Medical records  Current Outpatient Medications on File Prior to Encounter   Medication Sig    albuterol (PROVENTIL/VENTOLIN HFA) 90 mcg/actuation inhaler Inhale 2 puffs into the lungs every 6 (six) hours as needed for Wheezing or Shortness of Breath.    amLODIPine (NORVASC) 10 MG tablet Take 1 tablet (10 mg total) by mouth once daily.    ammonium lactate 12 % Crea Apply 1 gram topically once daily. Apply to areas of dry skin and nails daily.    cholecalciferol, vitamin D3, 1,250 mcg (50,000 unit) capsule Take 1 capsule (50,000 Units total) by mouth once a week.    EPINEPHrine (EPIPEN) 0.3 mg/0.3 mL AtIn     ergocalciferol (ERGOCALCIFEROL) 50,000 unit Cap Take 1 capsule (50,000 Units total) by mouth every 7 days. (Patient not taking: Reported on 4/4/2023)    guaiFENesin 100 mg/5 ml (ROBITUSSIN) 100 mg/5 mL syrup Take 10 mLs (200 mg total) by mouth every 4 (four) hours as needed for Congestion. (Patient not taking: Reported on 4/4/2023)    hydrOXYzine HCL (ATARAX) 25 MG tablet Take 1 tablet (25 mg total) by mouth 3 (three) times daily as needed (itching).    losartan (COZAAR) 25 MG tablet Take 1 tablet (25 mg total) by mouth once daily.    mycophenolate (CELLCEPT) 500 mg Tab Take 3 tablets (1,500 mg total) by mouth 2 (two) times daily.    nystatin-triamcinolone (MYCOLOG II) cream Apply topically 2 (two) times daily (Patient not taking: Reported on 4/4/2023)    predniSONE " (DELTASONE) 10 MG tablet Take 1 tablet (10 mg total) by mouth once daily.    traZODone (DESYREL) 50 MG tablet Take 1 tablet (50 mg total) by mouth nightly as needed for Insomnia.             Sydnee Granger  [phone]                  .

## 2023-04-13 NOTE — PLAN OF CARE
04/13/23 0856   Post-Acute Status   Post-Acute Authorization Home Health   Home Health Status Referrals Sent   Discharge Delays None known at this time   Discharge Plan   Discharge Plan A Home Health   Discharge Plan B Other     Referral sent via careport to [facility] as per pt and family request.  Will require Home Health orders prior to d/c       Tammie Yoon CD, MSW, LMSW, RSW   Case Management  [facility]  Email: [email or web address]

## 2023-04-13 NOTE — ASSESSMENT & PLAN NOTE
Patient reports difficulty with caregiver at home, and believes that they do not treat him well. Pt requesting assistance in finding new caregiver and other home support.    - Social work consulted, appreciate assistance

## 2023-04-13 NOTE — HPI
Nura Kahn Jr. 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 He was recently discharged from the hospital on 04/03/2023 after being found to have COVID-19 pneumonia and treated with remdesivir and dexamethasone, along with antibiotics for CAP. Since being discharged home, he reports he has been significantly more weak, unable to get out of bed to use the bathroom. He says he has a caregiver, but reports that they are not good to him and wants to change caregivers. Last night he states that he developed chills with worsened shortness of breath and called 911. He endorses a wet cough since he had COVID, though feels too weak to cough up any of the sputum. Per EMS, initial O2 saturation on room air was 100%, though dropped to 89% requiring nasal cannula. He also reports having significant intergluteal cleft pain and finds it difficult to sit comfortably. Pt denies any fevers, chest pain, palpitations, n/v/d, or abdominal pain.     In the ED, pt was hemodynamically stable, afebrile, and stable on 2L NC. Labs notable for Na 133, and Hb 9.8 with MCV 58 (baseline). CXR showed continued small volume Right sided pleural fluid with adjacent atelectasis and/or consolidation, similar to CXR from previous admission. Patient admitted to hospital medicine for further management of persistent SOB and chills.

## 2023-04-13 NOTE — PROGRESS NOTES
[facility] - Emergency Dept  Wound Care    Patient Name:  Nura Kahn Jr.   MRN:  [id number]  Date: 2023  Diagnosis: Acute hypoxemic respiratory failure    History:     Past Medical History:   Diagnosis Date    Aspiration pneumonia of right lower lobe 1/15/2018    Atrial fibrillation 3/2/2015    Chronic obstructive pulmonary disease, unspecified COPD type 2022    Depression     Essential hypertension 2019    Malignant (primary) neoplasm, unspecified 2022    Microcytic anemia 2014    Neuromuscular disorder     Other osteoporosis without current pathological fracture 2022    Pneumonia     Polymyositis 2009    Tobacco abuse        Social History     Socioeconomic History    Marital status: Single   Tobacco Use    Smoking status: Some Days     Packs/day: 0.25     Years: 20.00     Pack years: 5.00     Types: Cigarettes     Last attempt to quit: 2018     Years since quittin.2    Smokeless tobacco: Never   Substance and Sexual Activity    Alcohol use: Yes     Alcohol/week: 0.0 standard drinks    Drug use: Yes     Types: Marijuana     Comment: daily use       Precautions:     Allergies as of 2023    (No Known Allergies)       [facility] Assessment Details/Treatment     Patient seen for wound care consultation for sacrum.   Reviewed chart for this encounter.   See Flow Sheet for findings.    Pt found laying in bed, agreeable to care at this time. Pt turned w/ assistance from PCT for sacral assessment, foam dressing removed. To sacrum, buttocks, pt has scattered areas of partial thickness tissue loss present on arrival. Surrounding skin is moist and macerated. Applied Triad at this time. Wound care RN educated pt on waffle mattress use, agreed to let wound care RN place on bed. Will order heel lift boots for pt.     RECOMMENDATIONS  BID/PRN - Triad to sacrum, buttocks. Keep pt clean and dry, NO diapers, no foam dressing needed w/ Triad use.  Bedside RN to apply heel lift boots upon arrival  to floor.     Discussed POC with patient and primary RN.   See EMR for orders & patient education.      Nursing to continue care.  Nursing to maintain pressure injury prevention interventions.           04/13/23 1528   WOCN Assessment   WOCN Total Time (mins) 45   Visit Date 04/13/23   Visit Time 1528   Consult Type New   WOCN Speciality Wound   Intervention assessed;changed;applied;chart review;coordination of care;orders   Teaching on-going        Altered Skin Integrity 03/29/23 1227 Right Coccyx #1 Partial thickness tissue loss. Shallow open ulcer with a red or pink wound bed, without slough. Intact or Open/Ruptured Serum-filled blister.   Date First Assessed/Time First Assessed: 03/29/23 1227   Altered Skin Integrity Present on Admission - Did Patient arrive to the hospital with altered skin?: yes  Side: Right  Location: Coccyx  Wound Number: #1  Is this injury device related?: No  Leon...   Wound Image   (Picture did not save)   Dressing Appearance Dry;Clean;Intact   Drainage Amount None   Drainage Characteristics/Odor No odor   Appearance Pink;Red;Moist   Tissue loss description Partial thickness   Periwound Area Macerated;Moist   Care Applied:;Skin Barrier

## 2023-04-13 NOTE — ASSESSMENT & PLAN NOTE
Chronic obstructive pulmonary disease, COPD  Pneumonia of right lower lobe  Pleural effusion on right    67M w/hx of myositis, COPD, dysphagia, and recent COVID, p/w persistent and worsening SOB, chills, and weakness. Pt was hospitalized for COVID pna and received treatment with remdesivir and dexamethasone as well as CAP coverage with Ctx -> cefdinir. Pt also has COPD. Denies any recent COPD flares or exacerbations. Has duoneb inhalers at home and says that his wheezing is resolved with inhaler use. On exam, audible inspiratory and expiratory wheezing appreciated bilaterally.     Current differentials for pt's acute SOB and hypoxemia could be: continued COVID symptoms vs COPD exacerbation vs CAP vs aspiration pna 2/2 dysphagia, or possibly combination of some or all of these possibilities.    Plan:  - Daily CBC, CMP  - Sputum cultures  - For possible CAP and aspiration PNA:   - Antibiotics with IV ceftriaxone 1g qd and IV azithromycin 500mg qd x2, followed by PO azithromycin 500mg qd, for 7d total  - For possible COPD exacerbation:   - Duonebs 6h and PRN once wheezing improves   - PO prednisone 40mg qd x 5d, per GOLD guidelines  - Pulmonary rehabilitation with incentive spirometry, chest physiotherapy, and cough assist ventilation.  - Supplemental O2 to maintain O2 sats 88-92%  - Blood gases as needed to assess for acid-base derangements  - Continue to monitor respiratory status, if severe dyspnea or persistent hypoxemia, trial NPPV and consider MICU consult

## 2023-04-13 NOTE — PT/OT/SLP EVAL
Speech Language Pathology Evaluation  Bedside Swallow    Patient Name:  Nura Kahn Jr.   MRN:  [id number]  Admitting Diagnosis: Acute hypoxemic respiratory failure    Recommendations:                 General Recommendations:  Dysphagia therapy and Modified barium swallow study  Diet recommendations:  Puree, Thin   Aspiration Precautions: 1 bite/sip at a time, Chin tuck, Double swallow with each bite/sip, Eliminate distractions, Frequent oral care, HOB to 90 degrees, Meds crushed in puree, Monitor for s/s of aspiration, Remain upright 30 minutes post meal, Small bites/sips, and Standard aspiration precautions   General Precautions: Standard, aspiration, fall, pureed diet  Communication strategies:  none    History:     Past Medical History:   Diagnosis Date    Aspiration pneumonia of right lower lobe 1/15/2018    Atrial fibrillation 3/2/2015    Chronic obstructive pulmonary disease, unspecified COPD type 5/4/2022    Depression     Essential hypertension 2/27/2019    Malignant (primary) neoplasm, unspecified 5/4/2022    Microcytic anemia 9/25/2014    Neuromuscular disorder     Other osteoporosis without current pathological fracture 9/22/2022    Pneumonia     Polymyositis 2009    Tobacco abuse        Past Surgical History:   Procedure Laterality Date    COLONOSCOPY N/A 8/6/2020    Procedure: COLONOSCOPY;  Surgeon: Brandan Meyer MD;  Location: [address]);  Service: Endoscopy;  Laterality: N/A;    ESOPHAGOGASTRODUODENOSCOPY N/A 8/6/2020    Procedure: EGD (ESOPHAGOGASTRODUODENOSCOPY);  Surgeon: Brandan Meyer MD;  Location: [address]);  Service: Endoscopy;  Laterality: N/A;  multiple co-morbidities. will have family member for assistance.  has neuromuscular issues-needs lift. in W/C-unable to transfer per self     COVID test at [facility] on 8/3-GT     HPI: Nura Kahn Jr. 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 He was recently discharged from the hospital on 04/03/2023 after being found to have COVID-19 pneumonia and treated with remdesivir and dexamethasone, along with antibiotics for CAP. Since being discharged home, he reports he has been significantly more weak, unable to get out of bed to use the bathroom. He says he has a caregiver, but reports that they are not good to him and wants to change caregivers. Last night he states that he developed chills with worsened shortness of breath and called 911. He endorses a wet cough since he had COVID, though feels too weak to cough up any of the sputum. Per EMS, initial O2 saturation on room air was 100%, though dropped to 89% requiring nasal cannula. He also reports having significant intergluteal cleft pain and finds it difficult to sit comfortably. Pt denies any fevers, chest pain, palpitations, n/v/d, or abdominal pain.      In the ED, pt was hemodynamically stable, afebrile, and stable on 2L NC. Labs notable for Na 133, and Hb 9.8 with MCV 58 (baseline). CXR showed continued small volume Right sided pleural fluid with adjacent atelectasis and/or consolidation, similar to CXR from previous admission. Patient admitted to hospital medicine for further management of persistent SOB and chills.    Social History: Patient lives with spouse.    Prior Intubation HX:  none this admission    Modified Barium Swallow:     SWALLOWING HISTORY:  5/24/19: The results of this MBSS indicate that patient demonstrates moderate swallowing dysfunction c/b silent aspiration of thin liquid consistencies during/after the swallow with boluses 8mL+ in size or consecutive sips; also with moderate-severe pharyngeal residue in pyriforms.  Prognosis for improvement of swallowing with therapy is fair.       2/21/19: The results of this MBSS indicate that patient  demonstrates moderate swallowing dysfunction c/b overt aspiration of large (+ cough response), continuous sips of thin liquids after the swallow, as well as residue of all consistencies due to overall pharyngeal weakness.  Prognosis for improvement of swallowing with therapy is guarded.       10/15/21: The results of this MBSS indicate that patient demonstrates moderate swallowing dysfunction c/b silent aspiration of thin liquid consistencies during/after the swallow with large boluses sips or consecutive sips via straw; also with moderate-severe pharyngeal residue in pyriforms with reduced PE segment clearance.  Prognosis for improvement of swallowing with therapy is fair.    -Diet: recommend soft/mechanical soft diet as tolerated; thin liquids ok if taken in very small sips; crush large pills  -Therapeutic technique: recommend effortful swallow, chin tuck and multiple swallow per bolus   -Rec consult with Dr Webb in [facility], this study has been reviewed with him, in consideration of possible CP dilation in effort to allow improved bolus passage through PE segment and reduce pyriform sinus pooling which contributes to increased risk of aspiration      Chest X-Rays: 4/13- Patient is rotated limiting assessment.  Cardiac monitoring leads overlie the chest.  Cardiac silhouette is stable in size.  There is elevation of the right hemidiaphragm.  There is continued possible small volume of right-sided pleural fluid with adjacent atelectasis and/or consolidation, similar to prior examination.  There is mild left basilar atelectasis.  No new large confluent airspace consolidation appreciated.  No definite pneumothorax identified.    Prior diet: Puree/thin per patient; reports he is no longer able to tolerate solid foods.       Subjective     Spoke with RN prior to entering pt's room . Pt seen bedside for session with wife present. Alert and agreeable to ST.       Pain/Comfort:  Pain Rating 1: 0/10  Pain Rating  Post-Intervention 1: 0/10    Respiratory Status: Nasal cannula, flow 2 L/min    Objective:     Oral Musculature Evaluation  Oral Musculature: general weakness  Dentition: scattered dentition  Secretion Management: adequate  Mucosal Quality: adequate  Volitional Cough: weak  Volitional Swallow: elicited  Voice Prior to PO Intake: clear    Bedside Swallow Eval:   Consistencies Assessed:  Thin liquids via tsp and small straw sips  Puree via tsp      Oral Phase:   WFL    Pharyngeal Phase:   multiple spontaneous swallows  throat clearing    Compensatory Strategies  Chin tuck (as previously recommended per MBSS)    Treatment: Both patient and spouse report swallow is at baseline. However, pt with several instances of throat clearing with liquids. Also independently initiating multiple swallows per trial. Chin tuck appeared to reduce instances of throat clearing. Pt and spouse agreeable to recommendation of repeat MBSS to be completed tomorrow. Provided education re: role of ST, POC, recs to continue puree diet with thin liquids, safe swallow precautions, and plan for MBSS. They verbalized understanding. At end of session, pt remained bedside with call light and all needs in reach. White board updated.      Assessment:     Nura Kahn JrNas is a 67 y.o. male with an SLP diagnosis of Dysphagia.      Goals:   Multidisciplinary Problems       SLP Goals          Problem: SLP    Goal Priority Disciplines Outcome   SLP Goal     SLP    Description: Goals expected to be met by 4/20:  1.                        Plan:     Patient to be seen:  4 x/week   Plan of Care expires:  05/12/23  Plan of Care reviewed with:  patient, spouse   SLP Follow-Up:  Yes       Discharge recommendations:  other (see comments) (tbd)   Barriers to Discharge:  Level of Skilled Assistance Needed      Time Tracking:     SLP Treatment Date:   04/13/23  Speech Start Time:  1013  Speech Stop Time:  1031     Speech Total Time (min):  18 min    Billable  Minutes: Eval Swallow and Oral Function 10 and Self Care/Home Management Training 8    04/13/2023

## 2023-04-13 NOTE — ED NOTES
Refused waffle mattress and wedge at this time. Edu given with verbal understanding. Reposition q2h implemented.

## 2023-04-13 NOTE — ED NOTES
Telemetry Verification   Patient placed on Telemetry Box  Verified with War Room  Box # [id number]   Monitor Tech    Rate    Rhythm

## 2023-04-13 NOTE — SUBJECTIVE & OBJECTIVE
Past Medical History:   Diagnosis Date    Aspiration pneumonia of right lower lobe 1/15/2018    Atrial fibrillation 3/2/2015    Chronic obstructive pulmonary disease, unspecified COPD type 5/4/2022    Depression     Essential hypertension 2/27/2019    Malignant (primary) neoplasm, unspecified 5/4/2022    Microcytic anemia 9/25/2014    Neuromuscular disorder     Other osteoporosis without current pathological fracture 9/22/2022    Pneumonia     Polymyositis 2009    Tobacco abuse        Past Surgical History:   Procedure Laterality Date    COLONOSCOPY N/A 8/6/2020    Procedure: COLONOSCOPY;  Surgeon: Brandan Meyer MD;  Location: [facility] ([address] [place] [address]);  Service: Endoscopy;  Laterality: N/A;    ESOPHAGOGASTRODUODENOSCOPY N/A 8/6/2020    Procedure: EGD (ESOPHAGOGASTRODUODENOSCOPY);  Surgeon: Brandan Meyer MD;  Location: [facility] ([address] [place] [address]);  Service: Endoscopy;  Laterality: N/A;  multiple co-morbidities. will have family member for assistance.  has neuromuscular issues-needs lift. in W/C-unable to transfer per self     COVID test at [facility] on 8/3-GT       Review of patient's allergies indicates:  No Known Allergies    No current facility-administered medications on file prior to encounter.     Current Outpatient Medications on File Prior to Encounter   Medication Sig    albuterol (PROVENTIL/VENTOLIN HFA) 90 mcg/actuation inhaler Inhale 2 puffs into the lungs every 6 (six) hours as needed for Wheezing or Shortness of Breath.    amLODIPine (NORVASC) 10 MG tablet Take 1 tablet (10 mg total) by mouth once daily.    ammonium lactate 12 % Crea Apply 1 gram topically once daily. Apply to areas of dry skin and nails daily.    cholecalciferol, vitamin D3, 1,250 mcg (50,000 unit) capsule Take 1 capsule (50,000 Units total) by mouth once a week.    EPINEPHrine (EPIPEN) 0.3 mg/0.3 mL AtIn     ergocalciferol (ERGOCALCIFEROL) 50,000 unit Cap Take 1 capsule (50,000 Units total) by mouth every 7 days. (Patient not taking:  Reported on 2023)    guaiFENesin 100 mg/5 ml (ROBITUSSIN) 100 mg/5 mL syrup Take 10 mLs (200 mg total) by mouth every 4 (four) hours as needed for Congestion. (Patient not taking: Reported on 2023)    hydrOXYzine HCL (ATARAX) 25 MG tablet Take 1 tablet (25 mg total) by mouth 3 (three) times daily as needed (itching).    losartan (COZAAR) 25 MG tablet Take 1 tablet (25 mg total) by mouth once daily.    mycophenolate (CELLCEPT) 500 mg Tab Take 3 tablets (1,500 mg total) by mouth 2 (two) times daily.    nystatin-triamcinolone (MYCOLOG II) cream Apply topically 2 (two) times daily (Patient not taking: Reported on 2023)    predniSONE (DELTASONE) 10 MG tablet Take 1 tablet (10 mg total) by mouth once daily.    traZODone (DESYREL) 50 MG tablet Take 1 tablet (50 mg total) by mouth nightly as needed for Insomnia.    [DISCONTINUED] predniSONE (DELTASONE) 10 MG tablet Take 1 tablet (10 mg total) by mouth once daily. (Patient not taking: Reported on 2023)     Family History       Problem Relation (Age of Onset)    Diabetes Mother, Father    Hypertension Mother, Father    Kidney disease Mother          Tobacco Use    Smoking status: Some Days     Packs/day: 0.25     Years: 20.00     Pack years: 5.00     Types: Cigarettes     Last attempt to quit: 2018     Years since quittin.2    Smokeless tobacco: Never   Substance and Sexual Activity    Alcohol use: Yes     Alcohol/week: 0.0 standard drinks    Drug use: Yes     Types: Marijuana     Comment: daily use    Sexual activity: Not on file     Review of Systems   Constitutional:  Positive for activity change, chills and fatigue. Negative for fever.   HENT:  Positive for trouble swallowing. Negative for congestion.    Eyes:  Negative for photophobia and visual disturbance.   Respiratory:  Positive for cough, shortness of breath and wheezing.    Cardiovascular:  Negative for chest pain and palpitations.   Gastrointestinal:  Negative for abdominal distention and  abdominal pain.   Genitourinary:  Negative for dysuria and urgency.   Musculoskeletal:  Negative for arthralgias and myalgias.        Pain in his rear   Neurological:  Positive for weakness. Negative for headaches.   Psychiatric/Behavioral:  Negative for behavioral problems and confusion.    Objective:     Vital Signs (Most Recent):  Temp: 97.9 °F (36.6 °C) (04/13/23 0432)  Pulse: 92 (04/13/23 0743)  Resp: (!) 22 (04/13/23 0743)  BP: 123/72 (04/13/23 0632)  SpO2: 100 % (04/13/23 0743)   Vital Signs (24h Range):  Temp:  [97.7 °F (36.5 °C)-97.9 °F (36.6 °C)] 97.9 °F (36.6 °C)  Pulse:  [] 92  Resp:  [18-24] 22  SpO2:  [98 %-100 %] 100 %  BP: (113-142)/(65-87) 123/72        There is no height or weight on file to calculate BMI.    Physical Exam  Vitals reviewed.   Constitutional:       General: He is not in acute distress.     Appearance: Normal appearance. He is ill-appearing.   HENT:      Head: Normocephalic and atraumatic.      Nose: No congestion or rhinorrhea.      Mouth/Throat:      Mouth: Mucous membranes are moist.      Pharynx: Oropharynx is clear.   Eyes:      Extraocular Movements: Extraocular movements intact.      Pupils: Pupils are equal, round, and reactive to light.   Cardiovascular:      Rate and Rhythm: Normal rate and regular rhythm.      Pulses: Normal pulses.      Heart sounds: Normal heart sounds.   Pulmonary:      Effort: Pulmonary effort is normal. No respiratory distress.      Breath sounds: Wheezing present.      Comments: Decreased breath sounds at Right lower lobe  Abdominal:      General: Abdomen is flat.      Palpations: Abdomen is soft.      Tenderness: There is no abdominal tenderness.   Neurological:      Mental Status: He is alert and oriented to person, place, and time.      Motor: Weakness present.      Gait: Gait abnormal.      Comments: Significantly diminished strength in upper and lower bilateral extremities  Pt says that while he is normally weak at baseline, he has gotten  weaker   Psychiatric:         Mood and Affect: Mood normal.         Behavior: Behavior normal.         CRANIAL NERVES     CN III, IV, VI   Pupils are equal, round, and reactive to light.     Significant Labs: All pertinent labs within the past 24 hours have been reviewed.  CBC:   Recent Labs   Lab 04/13/23 0317   WBC 9.67   HGB 9.8*   HCT 31.5*        CMP:   Recent Labs   Lab 04/13/23 0317   *   K 4.5      CO2 20*   GLU 94   BUN 7*   CREATININE 0.4*   CALCIUM 8.5*   PROT 6.9   ALBUMIN 3.5   BILITOT 2.3*   ALKPHOS 78   AST 10   ALT 19   ANIONGAP 10       Significant Imaging: I have reviewed all pertinent imaging results/findings within the past 24 hours.

## 2023-04-13 NOTE — ED NOTES
Pt assisted to reposition in stretcher. VSS, NADN, remains on 2L NC O2. Bed locked & in the lowest position, rails up x2, call button in reach.

## 2023-04-13 NOTE — ASSESSMENT & PLAN NOTE
Patient was seen by wound care on previous admission who noted altered skin integrity of the Right Coccyx with partial thickness tissue loss. Pt reporting that he is having significant pain at that site now.    - Wound care consulted, appreciate assistance and recs

## 2023-04-13 NOTE — ASSESSMENT & PLAN NOTE
Polymyositis  Quadriparesis  Oropharyngeal dysphagia    History of polymyositis/inclusion body myositis overlap. Diagnosed in 2009 with mildly positive Ku and positive CN1A. Reportedly with biopsy proven myositis previously. Patient also with osteopenia on DXA in 2021. Patient with significant social issues affecting his health care, including difficult home situation and difficulty with caregiver. Follows with Rheumatology with Dr Huston. He has previously been in imuran and MTX, currently on IVIG monthly, Prednisone 10mg, Rituxan with next dose due 10/4/22, and MMF 1500 bid. Patient has not received his IVIG in several months.    - Hold Cellcept in setting of suspected active infection  - Pt receiving COPD dose of prednisone  - SLP to evaluate for swallow  - PT/OT consult

## 2023-04-13 NOTE — ASSESSMENT & PLAN NOTE
Chronic condition that is well controlled on current home regimen    - Continue home amlodipine and losartan

## 2023-04-13 NOTE — ED NOTES
Pt assisted to call family. Repositioned in stretcher. Bed locked & in the lowest position, rails up x2, call button in reach.

## 2023-04-13 NOTE — ED NOTES
Daughter Nanette would like to be notified when pt goes to room upstairs. Can be reached at ext. [phone] (works in [employer])

## 2023-04-13 NOTE — ED PROVIDER NOTES
History:  Nura Kahn Jr. is a 67 y.o. male who presents to the ED with Influenza (Pt was discharged recently after being diagnosed with the flu. Reports his weakness has not improved over the last couple of days. 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  He was recently discharged from the hospital on 04/03/2023 after being found to have COVID-19 with a secondary bacterial pneumonia and a right-sided pleural effusion.  Since being discharged home, he reports he has been significantly more weak, unable to get out of bed to use the bathroom.  He does have a caregiver, but reports that they are not good to him.  Tonight, he developed chills with worsened shortness of breath and activated 911.  He endorses a continued phlegmy cough since his COVID diagnosis though feels too weak to actually cough up any of the sputum.  Per EMS, initial O2 saturation was 100%, though dropped to 89% requiring 3 L nasal cannula.    Review of Systems: All systems reviewed and are negative except as per history of present illness.    Medications:   Previous Medications    ALBUTEROL (PROVENTIL/VENTOLIN HFA) 90 MCG/ACTUATION INHALER    Inhale 2 puffs into the lungs every 6 (six) hours as needed for Wheezing or Shortness of Breath.    AMLODIPINE (NORVASC) 10 MG TABLET    Take 1 tablet (10 mg total) by mouth once daily.    AMMONIUM LACTATE 12 % CREA    Apply 1 gram topically once daily. Apply to areas of dry skin and nails daily.    CHOLECALCIFEROL, VITAMIN D3, 1,250 MCG (50,000 UNIT) CAPSULE    Take 1 capsule (50,000 Units total) by mouth once a week.    EPINEPHRINE (EPIPEN) 0.3 MG/0.3 ML ATIN        ERGOCALCIFEROL (ERGOCALCIFEROL) 50,000 UNIT CAP    Take 1 capsule (50,000 Units  total) by mouth every 7 days.    GUAIFENESIN 100 MG/5 ML (ROBITUSSIN) 100 MG/5 ML SYRUP    Take 10 mLs (200 mg total) by mouth every 4 (four) hours as needed for Congestion.    HYDROXYZINE HCL (ATARAX) 25 MG TABLET    Take 1 tablet (25 mg total) by mouth 3 (three) times daily as needed (itching).    LOSARTAN (COZAAR) 25 MG TABLET    Take 1 tablet (25 mg total) by mouth once daily.    MYCOPHENOLATE (CELLCEPT) 500 MG TAB    Take 3 tablets (1,500 mg total) by mouth 2 (two) times daily.    NYSTATIN-TRIAMCINOLONE (MYCOLOG II) CREAM    Apply topically 2 (two) times daily    PREDNISONE (DELTASONE) 10 MG TABLET    Take 1 tablet (10 mg total) by mouth once daily.    PREDNISONE (DELTASONE) 10 MG TABLET    Take 1 tablet (10 mg total) by mouth once daily.    TRAZODONE (DESYREL) 50 MG TABLET    Take 1 tablet (50 mg total) by mouth nightly as needed for Insomnia.       PMH:   Past Medical History:   Diagnosis Date    Aspiration pneumonia of right lower lobe 1/15/2018    Atrial fibrillation 3/2/2015    Chronic obstructive pulmonary disease, unspecified COPD type 5/4/2022    Depression     Essential hypertension 2/27/2019    Malignant (primary) neoplasm, unspecified 5/4/2022    Microcytic anemia 9/25/2014    Neuromuscular disorder     Other osteoporosis without current pathological fracture 9/22/2022    Pneumonia     Polymyositis 2009    Tobacco abuse      PSH:   Past Surgical History:   Procedure Laterality Date    COLONOSCOPY N/A 8/6/2020    Procedure: COLONOSCOPY;  Surgeon: Brandan Meyer MD;  Location: [address]);  Service: Endoscopy;  Laterality: N/A;    ESOPHAGOGASTRODUODENOSCOPY N/A 8/6/2020    Procedure: EGD (ESOPHAGOGASTRODUODENOSCOPY);  Surgeon: Brandan Meyer MD;  Location: [facility] ([address] [place] [address]);  Service: Endoscopy;  Laterality: N/A;  multiple co-morbidities. will have family member for assistance.  has neuromuscular issues-needs lift. in W/C-unable to transfer per self     COVID test at [facility] on  8/3-GT     Allergies: He has No Known Allergies.  Social History: Marital Status: single. He  reports that he has been smoking cigarettes. He has a 5.00 pack-year smoking history. He has never used smokeless tobacco.. He  reports current alcohol use..       Exam:  VITAL SIGNS:   Vitals:    04/13/23 0315 04/13/23 0333 04/13/23 0347 04/13/23 0402   BP:  117/66 122/73 (!) 142/86   Pulse: 87 83 101 107   Resp: 20 (!) 22 18 20   Temp:       TempSrc:       SpO2: 100% 100% 98% 99%     Const: Awake and alert, NAD, chronically ill-appearing  Head: Atraumatic  Eyes: Normal Conjunctiva  ENT: Normal External Ears, Nose and Mouth.  Neck: Full range of motion. No meningismus.  Resp: Normal respiratory effort, No distress, clear to auscultation bilaterally.  Upper airway rattling of secretions  Cardio: Equal and intact distal pulses  Abd: Soft, non tender, non distended.   Skin: No petechiae or rashes  Ext: No cyanosis, or edema  Neur: Awake and alert, generally weak with limited arm/leg movement and contractures  Psych: Normal Mood and Affect    Data:  Results for orders placed or performed during the hospital encounter of 04/13/23   CBC auto differential   Result Value Ref Range    WBC 9.67 3.90 - 12.70 K/uL    RBC 5.46 4.60 - 6.20 M/uL    Hemoglobin 9.8 (L) 14.0 - 18.0 g/dL    Hematocrit 31.5 (L) 40.0 - 54.0 %    MCV 58 (L) 82 - 98 fL    MCH 17.9 (L) 27.0 - 31.0 pg    MCHC 31.1 (L) 32.0 - 36.0 g/dL    RDW 23.4 (H) 11.5 - 14.5 %    Platelets 183 150 - 450 K/uL    MPV SEE COMMENT 9.2 - 12.9 fL    Immature Granulocytes 0.6 (H) 0.0 - 0.5 %    Gran # (ANC) 7.2 1.8 - 7.7 K/uL    Immature Grans (Abs) 0.06 (H) 0.00 - 0.04 K/uL    Lymph # 1.7 1.0 - 4.8 K/uL    Mono # 0.7 0.3 - 1.0 K/uL    Eos # 0.0 0.0 - 0.5 K/uL    Baso # 0.02 0.00 - 0.20 K/uL    nRBC 4 (A) 0 /100 WBC    Gran % 74.6 (H) 38.0 - 73.0 %    Lymph % 17.7 (L) 18.0 - 48.0 %    Mono % 6.9 4.0 - 15.0 %    Eosinophil % 0.0 0.0 - 8.0 %    Basophil % 0.2 0.0 - 1.9 %     Differential Method Automated    Comprehensive metabolic panel   Result Value Ref Range    Sodium 133 (L) 136 - 145 mmol/L    Potassium 4.5 3.5 - 5.1 mmol/L    Chloride 103 95 - 110 mmol/L    CO2 20 (L) 23 - 29 mmol/L    Glucose 94 70 - 110 mg/dL    BUN 7 (L) 8 - 23 mg/dL    Creatinine 0.4 (L) 0.5 - 1.4 mg/dL    Calcium 8.5 (L) 8.7 - 10.5 mg/dL    Total Protein 6.9 6.0 - 8.4 g/dL    Albumin 3.5 3.5 - 5.2 g/dL    Total Bilirubin 2.3 (H) 0.1 - 1.0 mg/dL    Alkaline Phosphatase 78 55 - 135 U/L    AST 10 10 - 40 U/L    ALT 19 10 - 44 U/L    Anion Gap 10 8 - 16 mmol/L    eGFR >60.0 >60 mL/min/1.73 m^2   Urinalysis, Reflex to Urine Culture Urine, Clean Catch    Specimen: Urine   Result Value Ref Range    Specimen UA Urine, Clean Catch     Color, UA Yellow Yellow, Straw, Nella    Appearance, UA Clear Clear    pH, UA 8.0 5.0 - 8.0    Specific Gravity, UA 1.005 1.005 - 1.030    Protein, UA Negative Negative    Glucose, UA Negative Negative    Ketones, UA Negative Negative    Bilirubin (UA) Negative Negative    Occult Blood UA Negative Negative    Nitrite, UA Negative Negative    Leukocytes, UA Negative Negative   Troponin I   Result Value Ref Range    Troponin I <0.006 0.000 - 0.026 ng/mL   Brain natriuretic peptide   Result Value Ref Range    BNP 12 0 - 99 pg/mL   ISTAT PROCEDURE   Result Value Ref Range    POC PH 7.408 7.35 - 7.45    POC PCO2 35.4 35 - 45 mmHg    POC PO2 38 (L) 40 - 60 mmHg    POC HCO3 22.3 (L) 24 - 28 mmol/L    POC BE -2 -2 to 2 mmol/L    POC SATURATED O2 73 (L) 95 - 100 %    POC TCO2 23 (L) 24 - 29 mmol/L    Sample VENOUS     Site Other     Allens Test N/A    ISTAT Lactate   Result Value Ref Range    POC Lactate 1.06 0.5 - 2.2 mmol/L    Sample VENOUS     Site Other     Allens Test N/A      *Note: Due to a large number of results and/or encounters for the requested time period, some results have not been displayed. A complete set of results can be found in Results Review.     Imaging Results               X-Ray Chest AP Portable (Final result)  Result time 23 04:08:19      Final result by Brandan Hess MD (23 04:08:19)                   Impression:      As above.      Electronically signed by: Brandan Hess MD  Date:    2023  Time:    04:08               Narrative:    EXAMINATION:  XR CHEST AP PORTABLE    CLINICAL HISTORY:  Sepsis;    TECHNIQUE:  Single frontal view of the chest was performed.    COMPARISON:  2023    FINDINGS:  Patient is rotated limiting assessment.  Cardiac monitoring leads overlie the chest.  Cardiac silhouette is stable in size.  There is elevation of the right hemidiaphragm.  There is continued possible small volume of right-sided pleural fluid with adjacent atelectasis and/or consolidation, similar to prior examination.  There is mild left basilar atelectasis.  No new large confluent airspace consolidation appreciated.  No definite pneumothorax identified.                                    12-LEAD EKG INTERPRETATION BY ME:  Rate/Rhythm: Normal Sinus Rhythm with rate of 93 beats per minute  QRS, ST, T-waves: No changes consistent with acute ischemia  Impression: No evidence of ischemia or arrhythmia     Labs & Imaging studies were reviewed independently by me.     Medical Decision Makin-year-old male presenting to the emergency department with shortness of breath and generalized weakness in the setting of recent COVID-19 infection and bacterial pneumonia with a history of inclusion body myositis.  On examination, he does seem to have dysphagia with rattling of secretions in his upper airway, unable to clear them with a very weak cough.  He was hypoxemic at 89% on room air requiring nasal cannula.  Chest x-ray shows a stable small right-sided pleural effusion and elevated hemidiaphragm, no signs of worsening pneumonia, doubt empyema.  Low suspicion for pneumonia.  His symptoms seem more chronic in nature/insidious since COVID infection, lower suspicion for  PE.  His anemia is stable at his baseline.  VBG reveals no hypercapnia.  Troponin and BNP are negative, doubt ACS or acute heart failure.  Patient admitted to hospital medicine for further evaluation and treatment.    Clinical Impression:  1. Acute hypoxemic respiratory failure    2. SOB (shortness of breath)    3. Dysphagia, unspecified type    4. Generalized weakness                 Pura Wright MD  04/13/23 5555
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
## 2023-04-13 NOTE — PLAN OF CARE
Bedside swallow assessment completed. Pt with history of dysphagia and would benefit from repeat MBSS to further assess swallow function. Recommend puree diet with thin liquids. Order requested from team. ST to f/u. Maribel Helm CCC-SLP 4/13/2023 12:53 PM

## 2023-04-13 NOTE — PLAN OF CARE
PALMIRA sent referral/consult to [facility]     Tammie Yoon CD, MSW, LMSW, RSW   Case Management  [facility]  Email: [email or web address]

## 2023-04-13 NOTE — ASSESSMENT & PLAN NOTE
Pt reports previous formal diagnosis of COPD with PFTs. Denies any recent COPD flares or exacerbations. Has duoneb inhalers at home and says that his wheezing is resolved with inhaler use. On exam, audible inspiratory and expiratory wheezing appreciated bilaterally. Pt's acute SOB suggestive of possible    Plan:  - Daily CBC, CMP  - Duonebs 6h and PRN once wheezing improves  - PO prednisone 40mg qd x 5d, per GOLD guidelines  - Sputum cultures  - Supplemental O2 to maintain O2 sats 88-92%  - Blood gases as needed to assess for acid-base derangements   - If pH < 7.35 or PaCO2 > 45, trial BiPAP if able on the floor  - Continue to monitor respiratory status, if severe dyspnea or persistent hypoxemia, trial NPPV and consider MICU consult

## 2023-04-13 NOTE — PLAN OF CARE
[facility] - Emergency Dept  Initial Discharge Assessment       Primary Care Provider: Edna Jaramillo NP    Admission Diagnosis: Acute hypoxemic respiratory failure [J96.01]    Admission Date: 4/13/2023  Expected Discharge Date: 4/14/2023    Adult daughter Nanette was at bedside and assisted with assessment as pt was receiving breathing treatment from respiratory     Pt lives in a single story home with a ramp.  His spouse Mitali assists pt with his ADL's and he uses a power chair for ambulation.      As per daughter and pt, pt is having a hard time swallowing and would a SLP consult.  SW notified medical team via secure chat.    Pt and family requested home health on d/c     Discharge Barriers Identified: (P) None    Payor: [employer] MEDICARE / Plan: [employer] MEDICARE HMO / Product Type: Capitation /     Extended Emergency Contact Information  Primary Emergency Contact: Mami Vásquez   [facility]  Home Phone: [phone]  Relation: Daughter  Secondary Emergency Contact: Mitali Mata   [place]  Mobile Phone: [phone]  Relation: Spouse    Discharge Plan A: (P) Home Health  Discharge Plan B: (P) Other      [address]  [address] [facility] [address]  Phone: [phone] Fax: [phone]    [facility]  [address] [facility] [address]  Phone: [phone] Fax: [phone]    [facility]  [address] [facility] [address]  Phone: [phone] Fax: [phone]      Initial Assessment (most recent)       Adult Discharge Assessment - 04/13/23 0850          Discharge Assessment    Assessment Type Discharge Planning Assessment (P)      Confirmed/corrected address, phone number and insurance Yes (P)      Confirmed Demographics Correct on Facesheet (P)      Source of Information patient;family (P)      People in Home spouse (P)      Facility Arrived  From: home (P)      Do you expect to return to your current living situation? Yes (P)      Do you have help at home or someone to help you manage your care at home? No (P)      Prior to hospitilization cognitive status: Alert/Oriented;No Deficits (P)      Current cognitive status: No Deficits;Alert/Oriented (P)      Walking or Climbing Stairs ambulation difficulty, requires equipment (P)      Mobility Management power chair (P)      Dressing/Bathing bathing difficulty, assistance 1 person;dressing difficulty, assistance 1 person (P)      Dressing/Bathing Management spouse Mitali (P)      Home Accessibility wheelchair accessible (P)      Home Layout Able to live on 1st floor (P)      Equipment Currently Used at Home power chair;hospital bed;bedside commode;lift device;oxygen (P)      Patient currently being followed by outpatient case management? No (P)      Do you currently have service(s) that help you manage your care at home? No (P)      Do you have any problems affording any of your prescribed medications? No (P)      Is the patient taking medications as prescribed? yes (P)      Who is going to help you get home at discharge? family (P)      How do you get to doctors appointments? family or friend will provide (P)      Are you on dialysis? No (P)      Do you take coumadin? No (P)      Discharge Plan A Home Health (P)      Discharge Plan B Other (P)      DME Needed Upon Discharge  none (P)      Discharge Plan discussed with: Patient;Adult children (P)      Discharge Barriers Identified None (P)         OTHER    Name(s) of People in Home Mitali - spouse (P)                       Tammie Yoon CD, MSW, LMSW, RSW   Case Management  [facility]  Email: [email or web address]

## 2023-04-13 NOTE — ED NOTES
68 yo M BIB EMS c/o SOB, chills, and increased weakness. Per EMS pt was 100% on RA initially, decompensated to 89% placed on 2L NC O2 - supplemental O2 maintained. Pt was here 2 weeks ago, dx with Covid pneumonia. Reports no improvement in sx since discharge.

## 2023-04-14 NOTE — SUBJECTIVE & OBJECTIVE
Past Medical History:   Diagnosis Date    Aspiration pneumonia of right lower lobe 1/15/2018    Atrial fibrillation 3/2/2015    Chronic obstructive pulmonary disease, unspecified COPD type 5/4/2022    Depression     Essential hypertension 2/27/2019    Malignant (primary) neoplasm, unspecified 5/4/2022    Microcytic anemia 9/25/2014    Neuromuscular disorder     Other osteoporosis without current pathological fracture 9/22/2022    Pneumonia     Polymyositis 2009    Tobacco abuse        Past Surgical History:   Procedure Laterality Date    COLONOSCOPY N/A 8/6/2020    Procedure: COLONOSCOPY;  Surgeon: Brandan Meyer MD;  Location: [facility] ([address] [place] [address]);  Service: Endoscopy;  Laterality: N/A;    ESOPHAGOGASTRODUODENOSCOPY N/A 8/6/2020    Procedure: EGD (ESOPHAGOGASTRODUODENOSCOPY);  Surgeon: Brandan Meyer MD;  Location: [facility] ([address] [place] [address]);  Service: Endoscopy;  Laterality: N/A;  multiple co-morbidities. will have family member for assistance.  has neuromuscular issues-needs lift. in W/C-unable to transfer per self     COVID test at [facility] on 8/3-GT       Immunization History   Administered Date(s) Administered    COVID-19, MRNA, LN-S, PF (Pfizer) (Gray Cap) 03/29/2022    COVID-19, MRNA, LN-S, PF (Pfizer) (Purple Cap) 05/14/2021, 06/04/2021    Influenza 10/06/2011    Influenza - Quadrivalent - PF *Preferred* (6 months and older) 12/12/2018, 10/07/2019, 11/18/2020, 11/18/2020, 10/12/2021    Influenza - Trivalent - PF (ADULT) 09/27/2012    Pneumococcal Conjugate - 13 Valent 05/09/2016    Pneumococcal Polysaccharide - 23 Valent 12/12/2018    Tdap 10/13/2021       Review of patient's allergies indicates:  No Known Allergies  Current Facility-Administered Medications   Medication Frequency    acetaminophen tablet 650 mg Q4H PRN    albuterol-ipratropium 2.5 mg-0.5 mg/3 mL nebulizer solution 3 mL Q4H PRN    ampicillin-sulbactam (UNASYN) 3 g in sodium chloride 0.9 % 100 mL IVPB (MB+) Q6H    azithromycin tablet 500 mg  Daily    dextrose 10% bolus 125 mL 125 mL PRN    dextrose 10% bolus 250 mL 250 mL PRN    dextrose 40 % gel 15,000 mg PRN    dextrose 40 % gel 30,000 mg PRN    enoxaparin injection 40 mg Daily    glucagon (human recombinant) injection 1 mg PRN    melatonin tablet 6 mg Nightly PRN    naloxone 0.4 mg/mL injection 0.02 mg PRN    potassium, sodium phosphates 280-160-250 mg packet 2 packet Q4H    predniSONE tablet 40 mg Daily    sodium chloride 0.9% flush 10 mL Q12H PRN     Family History       Problem Relation (Age of Onset)    Diabetes Mother, Father    Hypertension Mother, Father    Kidney disease Mother          Tobacco Use    Smoking status: Some Days     Packs/day: 0.25     Years: 20.00     Pack years: 5.00     Types: Cigarettes     Last attempt to quit: 2018     Years since quittin.2    Smokeless tobacco: Never   Substance and Sexual Activity    Alcohol use: Yes     Alcohol/week: 0.0 standard drinks    Drug use: Yes     Types: Marijuana     Comment: daily use    Sexual activity: Not on file     Review of Systems   Constitutional:  Negative for chills and fever.   HENT:  Negative for sore throat.    Eyes:  Negative for photophobia and visual disturbance.   Respiratory:  Positive for shortness of breath. Negative for wheezing.    Cardiovascular:  Negative for chest pain and leg swelling.   Gastrointestinal:  Negative for abdominal pain, diarrhea and vomiting.   Genitourinary:  Negative for dysuria and frequency.   Musculoskeletal:  Negative for arthralgias and back pain.   Skin:  Negative for rash.   Neurological:  Positive for weakness. Negative for syncope and headaches.   Objective:     Vital Signs (Most Recent):  Temp: 98 °F (36.7 °C) (23 1220)  Pulse: 79 (23 1611)  Resp: 18 (23 1611)  BP: 121/84 (23 1220)  SpO2: 95 % (23 1611) Vital Signs (24h Range):  Temp:  [97.4 °F (36.3 °C)-98 °F (36.7 °C)] 98 °F (36.7 °C)  Pulse:  [78-95] 79  Resp:  [16-18] 18  SpO2:  [94 %-99 %] 95  %  BP: (116-128)/(72-84) 121/84     Weight: 59 kg (130 lb) (04/13/23 1036)  Body mass index is 18.65 kg/m².  Body surface area is 1.71 meters squared.      Intake/Output Summary (Last 24 hours) at 4/14/2023 1707  Last data filed at 4/14/2023 0452  Gross per 24 hour   Intake --   Output 650 ml   Net -650 ml       Physical Exam   Constitutional: He is oriented to person, place, and time. No distress.   HENT:   Head: Normocephalic and atraumatic.   Eyes: Pupils are equal, round, and reactive to light.   Cardiovascular: Normal rate and regular rhythm.   No murmur heard.  Pulmonary/Chest: Effort normal. No respiratory distress. He has no wheezes. He has no rhonchi. He has no rales.   Pulmonary Comments: Absent breath sounds right lower lung zones.   Abdominal: Soft. Bowel sounds are normal. There is no abdominal tenderness.   Musculoskeletal:         General: No deformity. Normal range of motion.      Cervical back: Normal range of motion.      Comments: Global muscular atrophy. Unable to make fists. He sits hunched over and is unable to sit up straight without assistance. Neck flexion strength is 1/5. Neck extension strength is 2/5. Upper and lower extremity strength is 2/5.   Neurological: He is alert and oriented to person, place, and time.   Skin: Skin is warm and dry.   Psychiatric: Affect and judgment normal.     Significant Labs:  CBC:   Recent Labs   Lab 04/14/23  0327   WBC 6.39   HGB 8.0*   HCT 26.5*        CMP:   Recent Labs   Lab 04/14/23  0327   GLU 72   CALCIUM 8.4*   ALBUMIN 3.2*   PROT 6.3   *   K 4.3   CO2 21*      BUN 8   CREATININE 0.4*   ALKPHOS 64   ALT 14   AST 7*   BILITOT 1.6*       Significant Imaging:  Imaging results within the past 24 hours have been reviewed.

## 2023-04-14 NOTE — CONSULTS
[facility] - [facility] ([place] [address])  Rheumatology  Consult Note    Patient Name: Nura Kahn Jr.  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 He was recently discharged 4/3/23 after admission for COVID pneumonia with secondary bacterial pneumonia and a right sided pleural effusion. He reports his muscle weakness and atrophy had been stable until everything got worse with the COVID infection. He reports he returned to the hospital yesterday because he was having shortness of breath and generalized weakness and was concerned the pneumonia was coming back. He reports feeling better this admission after receiving nebulizers. Rheumatology was consulted because of right hemidiaphragm seen on XR this admission.    Patient was diagnosed with polymyositis at [facility] in 2009. He now follows with Rheumatologists, Dr. Connolly and Dr. Huston here. 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 He was getting IVIG and rituximab. Last IVIG was 11/2021. Last rituximab was 4/4/22 and 4/29/22. His rituximab was due 10/2022 and he was supposed to resume IVIG but he reports he did not get either of them. Therefore he did not follow up with his outpatient Rheumatologists. He continued to take MMF 1500 mg BID and prednisone 10 mg chronically. However the MMF was held when he got COVID and prednisone is now increased for his COPD. He thinks his muscle disease has been about the same since his last visit with Rheumatology in 9/2022. However, as mentioned, his generalized weakness has worsened since  getting COVID. He has been chair bound for some time.       Past Medical History:   Diagnosis Date    Aspiration pneumonia of right lower lobe 1/15/2018    Atrial fibrillation 3/2/2015    Chronic obstructive pulmonary disease, unspecified COPD type 5/4/2022    Depression     Essential hypertension 2/27/2019    Malignant (primary) neoplasm, unspecified 5/4/2022    Microcytic anemia 9/25/2014    Neuromuscular disorder     Other osteoporosis without current pathological fracture 9/22/2022    Pneumonia     Polymyositis 2009    Tobacco abuse        Past Surgical History:   Procedure Laterality Date    COLONOSCOPY N/A 8/6/2020    Procedure: COLONOSCOPY;  Surgeon: Brandan Meyer MD;  Location: [facility] ([address] [place] [address]);  Service: Endoscopy;  Laterality: N/A;    ESOPHAGOGASTRODUODENOSCOPY N/A 8/6/2020    Procedure: EGD (ESOPHAGOGASTRODUODENOSCOPY);  Surgeon: Brandan Meyer MD;  Location: [facility] ([address] [place] [address]);  Service: Endoscopy;  Laterality: N/A;  multiple co-morbidities. will have family member for assistance.  has neuromuscular issues-needs lift. in [place]/C-unable to transfer per self     COVID test at [facility] on 8/3-GT       Immunization History   Administered Date(s) Administered    COVID-19, MRNA, LN-S, PF (Pfizer) (Gray Cap) 03/29/2022    COVID-19, MRNA, LN-S, PF (Pfizer) (Purple Cap) 05/14/2021, 06/04/2021    Influenza 10/06/2011    Influenza - Quadrivalent - PF *Preferred* (6 months and older) 12/12/2018, 10/07/2019, 11/18/2020, 11/18/2020, 10/12/2021    Influenza - Trivalent - PF (ADULT) 09/27/2012    Pneumococcal Conjugate - 13 Valent 05/09/2016    Pneumococcal Polysaccharide - 23 Valent 12/12/2018    Tdap 10/13/2021       Review of patient's allergies indicates:  No Known Allergies  Current Facility-Administered Medications   Medication Frequency    acetaminophen tablet 650 mg Q4H PRN    albuterol-ipratropium 2.5 mg-0.5 mg/3 mL nebulizer solution 3 mL Q4H PRN    ampicillin-sulbactam  (UNASYN) 3 g in sodium chloride 0.9 % 100 mL IVPB (MB+) Q6H    azithromycin tablet 500 mg Daily    dextrose 10% bolus 125 mL 125 mL PRN    dextrose 10% bolus 250 mL 250 mL PRN    dextrose 40 % gel 15,000 mg PRN    dextrose 40 % gel 30,000 mg PRN    enoxaparin injection 40 mg Daily    glucagon (human recombinant) injection 1 mg PRN    melatonin tablet 6 mg Nightly PRN    naloxone 0.4 mg/mL injection 0.02 mg PRN    potassium, sodium phosphates 280-160-250 mg packet 2 packet Q4H    predniSONE tablet 40 mg Daily    sodium chloride 0.9% flush 10 mL Q12H PRN     Family History       Problem Relation (Age of Onset)    Diabetes Mother, Father    Hypertension Mother, Father    Kidney disease Mother          Tobacco Use    Smoking status: Some Days     Packs/day: 0.25     Years: 20.00     Pack years: 5.00     Types: Cigarettes     Last attempt to quit: 2018     Years since quittin.2    Smokeless tobacco: Never   Substance and Sexual Activity    Alcohol use: Yes     Alcohol/week: 0.0 standard drinks    Drug use: Yes     Types: Marijuana     Comment: daily use    Sexual activity: Not on file     Review of Systems   Constitutional:  Negative for chills and fever.   HENT:  Negative for sore throat.    Eyes:  Negative for photophobia and visual disturbance.   Respiratory:  Positive for shortness of breath. Negative for wheezing.    Cardiovascular:  Negative for chest pain and leg swelling.   Gastrointestinal:  Negative for abdominal pain, diarrhea and vomiting.   Genitourinary:  Negative for dysuria and frequency.   Musculoskeletal:  Negative for arthralgias and back pain.   Skin:  Negative for rash.   Neurological:  Positive for weakness. Negative for syncope and headaches.   Objective:     Vital Signs (Most Recent):  Temp: 98 °F (36.7 °C) (23 1220)  Pulse: 79 (23 1611)  Resp: 18 (23 1611)  BP: 121/84 (23 1220)  SpO2: 95 % (23 161) Vital Signs (24h Range):  Temp:  [97.4 °F  (36.3 °C)-98 °F (36.7 °C)] 98 °F (36.7 °C)  Pulse:  [78-95] 79  Resp:  [16-18] 18  SpO2:  [94 %-99 %] 95 %  BP: (116-128)/(72-84) 121/84     Weight: 59 kg (130 lb) (04/13/23 1036)  Body mass index is 18.65 kg/m².  Body surface area is 1.71 meters squared.      Intake/Output Summary (Last 24 hours) at 4/14/2023 1707  Last data filed at 4/14/2023 0452  Gross per 24 hour   Intake --   Output 650 ml   Net -650 ml       Physical Exam   Constitutional: He is oriented to person, place, and time. No distress.   HENT:   Head: Normocephalic and atraumatic.   Eyes: Pupils are equal, round, and reactive to light.   Cardiovascular: Normal rate and regular rhythm.   No murmur heard.  Pulmonary/Chest: Effort normal. No respiratory distress. He has no wheezes. He has no rhonchi. He has no rales.   Pulmonary Comments: Absent breath sounds right lower lung zones.   Abdominal: Soft. Bowel sounds are normal. There is no abdominal tenderness.   Musculoskeletal:         General: No deformity. Normal range of motion.      Cervical back: Normal range of motion.      Comments: Global muscular atrophy. Unable to make fists. He sits hunched over and is unable to sit up straight without assistance. Neck flexion strength is 1/5. Neck extension strength is 2/5. Upper and lower extremity strength is 2/5.   Neurological: He is alert and oriented to person, place, and time.   Skin: Skin is warm and dry.   Psychiatric: Affect and judgment normal.     Significant Labs:  CBC:   Recent Labs   Lab 04/14/23  0327   WBC 6.39   HGB 8.0*   HCT 26.5*        CMP:   Recent Labs   Lab 04/14/23  0327   GLU 72   CALCIUM 8.4*   ALBUMIN 3.2*   PROT 6.3   *   K 4.3   CO2 21*      BUN 8   CREATININE 0.4*   ALKPHOS 64   ALT 14   AST 7*   BILITOT 1.6*       Significant Imaging:  Imaging results within the past 24 hours have been reviewed.    Assessment/Plan:     Immunology/Multi System  Polymyositis    Patient is a 66 yo M with quadriparesis 2/2  inclusion body myositis/polymyositis, hemoglobin C disease, beta 0 thalassemia, HTN, COPD, and dysphagia, who was admitted for dyspnea. He was recently discharged 4/3/23 after admission for COVID pneumonia with secondary bacterial pneumonia and a right sided pleural effusion. Rheumatology was consulted because of right hemidiaphragm seen on XR this admission.      Assessment:  We reviewed his recent x-rays and CT chest. There is not much change recently in the right hemidiaphragm but it is difficult to interpret due to positioning. His CPK and aldolase have been normal since 2019. He reports his breathing improved with nebulizer treatment.      Plan:  1. We do not feel that he needs inpatient IVIG or rituximab  2. Prednisone taper for COPD per primary team until he's ready to transition back to chronic 10 mg daily  3. Please hold MMF while on antibiotics for infection  4. He should follow up outpatient for discussion of IVIG and rituximab with Dr. Connolly and Dr. Huston. Will arrange for follow up.  5. Rheumatology will sign off. Thank you for the consult.      Discussed with attending physician, Dr. Bell. Attending attestation to follow.    Surjit Ortega MD  Rheumatology Fellow  PGY-5        Thank you for your consult. I will sign off. Please contact us if you have any additional questions.    Surjit Ortega MD  Rheumatology  [facility] - Intensive Care ([place]

## 2023-04-14 NOTE — PLAN OF CARE
"POST ACUTE NEEDS    CM spoke with patient about requiring  services for skill nursing, medication management and wound care.  HH referrals sent via [facility] and [facility] is not willing to accept due to non admit at least 4 times, patient was not answering his phone.    Spoke Mami Vásquez [daughter] #[phone] who reported to CM the wife works and there is not one at the home with him.  The daughter asked about a sitter service and informed d that the service will be an out of pocket expense.   Mami further stated, " My dad needs anew mattress and could not identify the HME company that delivered the bed.  The patient has power chair that is not working. The daughter did not know the name of the [employer] company that delivered the power chair.  Patient will new mattress, but informed the daughter that he would need a bed, depending on what year the  bed was provided.   Patient needs a BSC as well.      CM called [facility] #[id number] and left a message to discuss possibly re assessing and calling the daughter instead of the patient.        referrals sent to the following  [facility]--unable to accept  [facility] -- unable to accept d/t staff shortage  [facility] - unable to accept  [facility]--reached  quota    215 PM  [facility] - [phone]- [facility] faxed [phone]    4/14/2023 2:44:37 PM Transmission Record          Sent to [phone] with remote ID "[id number]"          Result: (0/339;0/0) Success          Page record: 1 - 24          Elapsed time: 08:26 on channel 7    238 PM  CM emailed HH referral to [facility] and awaiting a reply from [facility] admission coordinator.    Nilam Vaca RN  Case Management  [facility]  [phone]        "

## 2023-04-14 NOTE — HOSPITAL COURSE
Admitted to hospital medicine for concerns of acute hypoxemic respiratory failure. Started on course of prednisone 40mg for COPD exacerbation concerns. Started on pneumonia coverage with Ceftriaxone/Azithromycin. Ceftriaxone broadened to Ampicillin-Sulbactam to cover for aspiration pneumonia. SLP evaluation with modified barium swallow study showed dysphagia concerns. Rheumatology consult for evaluation given history of polymyositis/inclusion body myositis did not note any acute rheumatologic issues.  Recommended resumption of chronic prednisone 10 mg daily after completion of COPD prednisone taper, and outpatient follow-up for IVIG/rituximab evaluation. Patient needs  setup, CM actively working. After discussing with patient that securing services where holding him here, he is amenable to return home while CM home health home health set up is in process.

## 2023-04-14 NOTE — HPI
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 He was recently discharged 4/3/23 after admission for COVID pneumonia with secondary bacterial pneumonia and a right sided pleural effusion. He reports his muscle weakness and atrophy had been stable until everything got worse with the COVID infection. He reports he returned to the hospital yesterday because he was having shortness of breath and generalized weakness and was concerned the pneumonia was coming back. He reports feeling better this admission after receiving nebulizers. Rheumatology was consulted because of right hemidiaphragm seen on XR this admission.    Patient was diagnosed with polymyositis at [facility] in 2009. He now follows with Rheumatologists, Dr. Connolly and Dr. Huston here. 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 He was getting IVIG and rituximab. Last IVIG was 11/2021. Last rituximab was 4/4/22 and 4/29/22. His rituximab was due 10/2022 and he was supposed to resume IVIG but he reports he did not get either of them. Therefore he did not follow up with his outpatient Rheumatologists. He continued to take MMF 1500 mg BID and prednisone 10 mg chronically. However the MMF was held when he got COVID and prednisone is now increased for his COPD. He thinks his muscle disease has been about the same since his last visit with Rheumatology in 9/2022. However, as mentioned, his generalized weakness has worsened since getting COVID. He has been chair bound for some time.

## 2023-04-14 NOTE — PLAN OF CARE
Problem: Occupational Therapy  Goal: Occupational Therapy Goal  Description: Pt is at functional baseline with no acute needs. OT to sign off. Please reconsult if status changes.  Outcome: Met

## 2023-04-14 NOTE — NURSING
Patient is AA)*4 .  Patient is in 2 liter O2 .Had bladder movement .  Medication reviewed . Call light within reach , safety precaution maintained. Will continue monitoring.

## 2023-04-14 NOTE — PROCEDURES
Modified Barium Swallow    Patient Name:  Nura Kahn Jr.   MRN:  [id number]      Recommendations:     Recommendations:                General Recommendations:  GI evaluation and Dysphagia therapy  Diet recommendations:  Puree, Thin   Aspiration Precautions: MUST PERFORM CHIN TUCK AND MULTIPLE SWALLOWS FOR EVERY BITE AND SIP; 1 bite/sip at a time, Alternating bites/sips, Avoid talking while eating, Frequent oral care, HOB to 90 degrees, Meds crushed in puree, Monitor for s/s of aspiration, Remain upright 30 minutes post meal, Small bites/sips, and Strict aspiration precautions   CONCERNS FOR PT'S ABILITY TO SAFELY MAINTAIN ADEQUATE NUTRITION/HYDRATION PRESENT .   General Precautions: Standard, aspiration, fall, pureed diet  Communication strategies:  none    Referral     Reason for Referral  Patient was referred for a Modified Barium Swallow Study to assess the efficiency of his/her swallow function, rule out aspiration and make recommendations regarding safe dietary consistencies, effective compensatory strategies, and safe eating environment.     Diagnosis: Acute hypoxemic respiratory failure       History:     Past Medical History:   Diagnosis Date    Aspiration pneumonia of right lower lobe 1/15/2018    Atrial fibrillation 3/2/2015    Chronic obstructive pulmonary disease, unspecified COPD type 5/4/2022    Depression     Essential hypertension 2/27/2019    Malignant (primary) neoplasm, unspecified 5/4/2022    Microcytic anemia 9/25/2014    Neuromuscular disorder     Other osteoporosis without current pathological fracture 9/22/2022    Pneumonia     Polymyositis 2009    Tobacco abuse        Objective:     Current Respiratory Status: 04/14/23    Alert: yes    Cooperative: yes    Follows Directions: yes    Visualization  Patient was seen in the lateral view    Oral Peripheral Examination  Oral Musculature: general weakness  Dentition: scattered dentition  Secretion Management: adequate  Mucosal Quality:  adequate  Volitional Cough: weak  Volitional Swallow: elicited  Voice Prior to PO Intake: clear    Consistencies Assessed  Thin tsp x 1, cup x 1, straw sip x 1 (chin tuck technique was utilized for all thin liquid trials)  Nectar thick tsp x 1 (chin tuck technique was not utilized)  Puree 1/2 tsp x 1 (chin tuck technique was utilized)  Solids deferred 2/2 degree of pyriform sinus pooling with previous trials given and inability to clear with multiple swallows    Oral Preparation/Oral Phase  WFL- Pt with adequate bolus acceptance, containment, control and timely A-P transfer across consistencies     Pharyngeal Phase   Thin liquids: teaspoon trial with chin tuck utilized: prespillage to the vallecula. Residuals in vallecula and pyriform sinus after the swallow. Pt spontaneously performed 3 swallows, which did reduce but not eliminate residuals.  Pt had difficulty performing cued volitional dry swallows to attempt to further clear residuals. Spontaneous cough was present in absence of penetration or aspiration.     - cup sip with chin tuck utilized: prespillage to the vallecular and pyriform sinuses.  Pt spontaneously producing multiple swallows. Penetration of pooled residuals in pyriform sinus into laryngeal vestibule observed as multiple swallows were produced.  Penetrated material remained lining the underbelly of the epiglottis.  Cued cough was not effective in fully clearing material from laryngeal vestibule.  Aspiration was not viewed. When fluoro was turned back on for subsequent trial, penetrated material was no longer visible.      -straw sip with chin tuck utilized: prespillage to the vallecular and pyriform sinuses.  Pt spontaneously producing multiple swallows. Penetration of pooled residuals in pyriform sinus into laryngeal vestibule observed as multiple swallows were produced.  Penetrated material remained lining the underbelly of the epiglottis and began to slowly move closer to vocal cords.  Cued cough  was not effective in fully clearing material from laryngeal vestibule, but did move material further away from vocal cords.  Aspiration was not viewed. When fluoro was turned back on for subsequent trial, penetrated material was no longer visible.  Pooled material from previous trials is getting greater in the pyriform sinuses with subsequent trials due to UES opening/relaxation appearing to impaired. Cued cough and additional swallows not effective in reducing pooling significantly.     Nectar thick liquid: teaspoon without chin tuck utilized: SILENT penetration to the vocal cords with eventual SILENT aspiration observed.  Cued cough was not effective in clearing aspiration material from trachea. Pooled material from previous trials is getting greater in the pyriform sinuses with subsequent trials due to UES opening/relaxation appearing to impaired. Cued cough and additional swallows not effective in reducing pooling significantly.     Puree: 1/2 teaspoon with chin tuck utilized: multiple spontaneous swallows performed. Vallecular residue mild, but pyriform sinus residue/pooling is severe.  Cued cough and additional swallows not effective in significantly reducing pooling.  No penetration or aspiration observed; however, pt at risk for aspiration with subsequent PO trials due to degree of pyriform sinus pooling (appearing to be due to impaired UES opening/relaxation ).    Cervical Esophageal Phase  Decreased UES opening resulting in severe pyriform sinus pooling worsening with subsequent trials    Assessment:     Impressions  Pt presents with moderate pharyngeal dysphagia and significant esophageal dysphagia c/b penetration of thin liquids, silent aspiration of nectar thick liquids, and severe pyriform sinus pooling 2/2 apparent UES dysfunction.  If UES function is not able to be improved with possible GI intervention, pt's ability to safely maintain nutrition/hydration appears poor.  SLP recommending that pt  continue pureed diet with thin liquids via SMALL bolus while performing chin tuck technique and multiple swallows per bite/sip.  GI consultation is recommended to assess UES function. If UES function is not able to be improved through GI intervention, alternative means of nutrition/hydration/medication should be considered due to continued risk of aspiration, recurrent aspiration pneumonia, and malnutrition.      Prognosis: Guarded    Barriers:  Long-standing history of dysphagia; UES dysfunction    Plan  SLP recommending that pt continue pureed diet with thin liquids via SMALL bolus while performing chin tuck technique and multiple swallows per bite/sip.  GI consultation is recommended to assess UES function.  If UES function is not able to be improved through GI intervention, alternative means of nutrition/hydration/medication should be considered due to continued risk of aspiration, recurrent aspiration pneumonia, and malnutrition.     Education  Results were discussed with patient. Results were discussed with Medical Team.     Goals:   Multidisciplinary Problems       SLP Goals          Problem: SLP    Goal Priority Disciplines Outcome   SLP Goal     SLP    Description: Goals expected to be met by 4/20:  1.                        Plan:   Patient to be seen:  Therapy Frequency: 4 x/week   Plan of Care expires:  05/12/23  Plan of Care reviewed with:  patient        Discharge recommendations:  home health speech therapy     Time Tracking:   SLP Treatment Date:   04/14/23  Speech Start Time:  1406  Speech Stop Time:  1433     Speech Total Time (min):  27 min    04/14/2023

## 2023-04-14 NOTE — CONSULTS
Rheumatology Attending Note:    I have seen the patient, reviewed the blood work, imaging and other studies.I have personally interviewed and examined the patient at bedside.    63 year old M with PMH of a-fib, COPD, polymyositis/inclusion body myositis and recent admission for COVID pneumonia and CAP that was readmitted due to worsening SOB.  He reports improvement with nebulizer treatments and abx.  From rheumatology perspective, no acute issues.  Agree on holding  cellcept while on abx. Once he is done with steroid taper for COPD, he can resume home prednisone 10mg po qday.  He can follow with  rheum as outpatient.

## 2023-04-14 NOTE — ASSESSMENT & PLAN NOTE
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 He was recently discharged 4/3/23 after admission for COVID pneumonia with secondary bacterial pneumonia and a right sided pleural effusion. Rheumatology was consulted because of right hemidiaphragm seen on XR this admission.      Assessment:  We reviewed his recent x-rays and CT chest. There is not much change recently in the right hemidiaphragm but it is difficult to interpret due to positioning. His CPK and aldolase have been normal since 2019. He reports his breathing improved with nebulizer treatment.      Plan:  1. We do not feel that he needs inpatient IVIG or rituximab  2. Prednisone taper for COPD per primary team until he's ready to transition back to chronic 10 mg daily  3. Please hold MMF while on antibiotics for infection  4. He should follow up outpatient for discussion of IVIG and rituximab with Dr. Connolly and Dr. Huston. Will arrange for follow up.  5. Rheumatology will sign off. Thank you for the consult.      Discussed with attending physician, Dr. Bell. Attending attestation to follow.    uSrjit Ortega MD  Rheumatology Fellow  PGY-5

## 2023-04-14 NOTE — ASSESSMENT & PLAN NOTE
Chronic obstructive pulmonary disease, COPD  Pneumonia of right lower lobe  Pleural effusion on right    67M w/hx of myositis, COPD, dysphagia, and recent COVID, p/w persistent and worsening SOB, chills, and weakness. Pt was hospitalized for COVID pna and received treatment with remdesivir and dexamethasone as well as CAP coverage with Ctx -> cefdinir. Pt also has COPD. Denies any recent COPD flares or exacerbations. Has duoneb inhalers at home and says that his wheezing is resolved with inhaler use. On exam, audible inspiratory and expiratory wheezing appreciated bilaterally.     Current differentials for pt's acute SOB and hypoxemia could be: continued COVID symptoms vs COPD exacerbation vs CAP vs aspiration pna 2/2 dysphagia, or possibly combination of some or all of these possibilities.    Microbiology:   Blood Culture   Lab Results   Component Value Date    LABBLOO No Growth to date 04/13/2023        PLAN  - Followup Sputum cultures  - For possible CAP and aspiration PNA:   -Continue Ampicillin-Sulbactam and Azithromycin  - For possible COPD exacerbation:   - Duonebs q4h wheezing/SOB   - PO prednisone 40mg qd x 5d, per GOLD guidelines  - Pulmonary rehabilitation with incentive spirometry, chest physiotherapy, and cough assist ventilation.  - Supplemental O2 to maintain O2 sats 88-92%  - Blood gases as needed to assess for acid-base derangements  - Continue to monitor respiratory status, if severe dyspnea or persistent hypoxemia, trial NPPV and consider MICU consult

## 2023-04-14 NOTE — SUBJECTIVE & OBJECTIVE
Interval History: No acute events overnight, afebrile, hemodynamically stable. Pending modified barium swallow study today by SLP due to dysphagia concerns. Patient reports improving respiratory status.    Review of Systems   Constitutional:  Positive for activity change and fatigue. Negative for chills and fever.   HENT:  Positive for trouble swallowing.    Respiratory:  Positive for cough, shortness of breath and wheezing.    Cardiovascular:  Negative for chest pain and leg swelling.   Gastrointestinal:  Negative for constipation, diarrhea, nausea and vomiting.   Genitourinary:  Negative for decreased urine volume and difficulty urinating.   Musculoskeletal:  Positive for arthralgias and back pain.   Neurological:  Positive for weakness.   Objective:     Vital Signs (Most Recent):  Temp: 98 °F (36.7 °C) (04/14/23 1220)  Pulse: 83 (04/14/23 1220)  Resp: 18 (04/14/23 0800)  BP: 121/84 (04/14/23 1220)  SpO2: (!) 94 % (04/14/23 1220) Vital Signs (24h Range):  Temp:  [97.4 °F (36.3 °C)-98 °F (36.7 °C)] 98 °F (36.7 °C)  Pulse:  [78-95] 83  Resp:  [16-18] 18  SpO2:  [94 %-99 %] 94 %  BP: (116-128)/(72-84) 121/84     Weight: 59 kg (130 lb)  Body mass index is 18.65 kg/m².    Intake/Output Summary (Last 24 hours) at 4/14/2023 1322  Last data filed at 4/14/2023 0452  Gross per 24 hour   Intake --   Output 650 ml   Net -650 ml      Physical Exam  Vitals reviewed.   Constitutional:       General: He is not in acute distress.     Appearance: Normal appearance. He is ill-appearing. He is not toxic-appearing or diaphoretic.   HENT:      Head: Normocephalic and atraumatic.   Eyes:      General: No scleral icterus.        Right eye: No discharge.         Left eye: No discharge.   Cardiovascular:      Rate and Rhythm: Normal rate and regular rhythm.      Heart sounds: Normal heart sounds.   Pulmonary:      Effort: Pulmonary effort is normal. No respiratory distress.      Breath sounds: No wheezing.   Abdominal:      General: Bowel  sounds are normal. There is no distension.      Palpations: Abdomen is soft.      Tenderness: There is no abdominal tenderness.   Musculoskeletal:      Right lower leg: No edema.      Left lower leg: No edema.   Skin:     General: Skin is warm and dry.      Coloration: Skin is not jaundiced.   Neurological:      Mental Status: He is alert and oriented to person, place, and time.      Motor: Weakness present.      Comments: Significantly diminished strength in upper and lower bilateral extremities     Psychiatric:         Mood and Affect: Mood normal.         Behavior: Behavior normal.     Significant Labs: All pertinent labs within the past 24 hours have been reviewed.    Recent Labs   Lab 04/13/23 0317 04/14/23 0327   * 134*   K 4.5 4.3    104   CO2 20* 21*   BUN 7* 8   CREATININE 0.4* 0.4*   GLU 94 72   ANIONGAP 10 9     Recent Labs   Lab 04/14/23 0327   MG 2.4   PHOS 2.2*     Recent Labs   Lab 04/13/23 0317 04/14/23 0327   AST 10 7*   ALT 19 14   ALKPHOS 78 64   BILITOT 2.3* 1.6*   ALBUMIN 3.5 3.2*      Recent Labs   Lab 04/13/23 0317 04/14/23 0327   WBC 9.67 6.39   HGB 9.8* 8.0*   HCT 31.5* 26.5*    176   GRAN 74.6*  7.2 59.7  3.8        Micro  Blood Cultures  Lab Results   Component Value Date    LABBLOO No Growth to date 04/13/2023    LABBLOO No Growth to date 04/13/2023    LABBLOO No Growth to date 04/13/2023    LABBLOO No Growth to date 04/13/2023    LABBLOO No growth after 5 days. 03/29/2023     Significant Imaging: I have reviewed all pertinent imaging results/findings within the past 24 hours.    Inpatient Medications:    Scheduled Meds:   ampicillin-sulbactim (UNASYN) IVPB  3 g Intravenous Q6H    azithromycin  500 mg Oral Daily    enoxaparin  40 mg Subcutaneous Daily    potassium, sodium phosphates  2 packet Oral Q4H    predniSONE  40 mg Oral Daily     PRN Meds:acetaminophen, albuterol-ipratropium, dextrose 10%, dextrose 10%, dextrose, dextrose, glucagon (human recombinant),  melatonin, naloxone, sodium chloride 0.9%

## 2023-04-14 NOTE — PT/OT/SLP EVAL
"Occupational Therapy   Evaluation and Discharge Note    Name: Nura Kahn Jr.  MRN: [id number]  Admitting Diagnosis: Acute hypoxemic respiratory failure  Recent Surgery: * No surgery found *      Recommendations:     Discharge Recommendations: other (see comments)  Discharge Equipment Recommendations: none  Barriers to discharge:  None    Assessment:     Nura Kahn Jr. is a 67 y.o. male with a medical diagnosis of Acute hypoxemic respiratory failure. At this time, patient is functioning at their prior level of function and does not require further acute OT services.     Plan:     During this hospitalization, patient does not require further acute OT services.  Please re-consult if situation changes.    Plan of Care Reviewed with: patient    Subjective     Chief Complaint: "I haven't been able to do much for my self for awhile."  Patient/Family Comments/goals: None    Occupational Profile:  Living Environment: Patient lives with his family in a single story home with ramped entrance   Previous level of function: Patient requires assistance with mobility & with ADLs.   Roles and Routines: Community dweller  Equipment Used at home: slide board, power chair, hospital bed, lift device, bedside commode, oxygen  Assistance upon Discharge: Family    Pain/Comfort:  Pain Rating 1: 0/10    Patients cultural, spiritual, Taoism conflicts given the current situation: no    Objective:     Communicated with: Nurse prior to session.  Patient found HOB elevated with telemetry, pulse ox (continuous), peripheral IV upon OT entry to room.    General Precautions: Standard, aspiration, fall  Orthopedic Precautions: N/A  Braces: N/A  Respiratory Status: Room air     Occupational Performance:    Bed Mobility:    Patient completed Rolling/Turning to Left with  total assistance  Patient completed Rolling/Turning to Right with total assistance  Patient completed Supine to Sit with total assistance    Functional " Mobility/Transfers:  Pt refused / unable    Activities of Daily Living:  Feeding:  moderate assistance Eating lunch    Cognitive/Visual Perceptual:  Cognitive/Psychosocial Skills:     -       Oriented to: Person, Place, Time, and Situation   -       Follows Commands/attention:Follows multistep  commands  -       Safety awareness/insight to disability: intact   -       Mood/Affect/Coping skills/emotional control: Appropriate to situation    Physical Exam:  Balance:    -       Poor   Postural examination/scapula alignment:    -       Rounded shoulders  -       Forward head  Upper Extremity Range of Motion / Upper Extremity Strength  -Patients baseline limited     AMPAC 6 Click ADL:  AMPAC Total Score: 12    Treatment & Education:  Pt educated on role of OT/POC  Pt. Educated on safety precautions   Pt provided self-care/ADL re-education  Pt reviewed bed mobility/functional mobility    Patient left HOB elevated with all lines intact and call button in reach    GOALS:   Multidisciplinary Problems       Occupational Therapy Goals       Not on file              Multidisciplinary Problems (Resolved)          Problem: Occupational Therapy    Goal Priority Disciplines Outcome Interventions   Occupational Therapy Goal   (Resolved)     OT, PT/OT Met    Description: Pt is at functional baseline with no acute needs. OT to sign off. Please reconsult if status changes.                       History:     Past Medical History:   Diagnosis Date    Aspiration pneumonia of right lower lobe 1/15/2018    Atrial fibrillation 3/2/2015    Chronic obstructive pulmonary disease, unspecified COPD type 5/4/2022    Depression     Essential hypertension 2/27/2019    Malignant (primary) neoplasm, unspecified 5/4/2022    Microcytic anemia 9/25/2014    Neuromuscular disorder     Other osteoporosis without current pathological fracture 9/22/2022    Pneumonia     Polymyositis 2009    Tobacco abuse          Past Surgical History:   Procedure Laterality  Date    COLONOSCOPY N/A 8/6/2020    Procedure: COLONOSCOPY;  Surgeon: Brandan Meyer MD;  Location: [address]);  Service: Endoscopy;  Laterality: N/A;    ESOPHAGOGASTRODUODENOSCOPY N/A 8/6/2020    Procedure: EGD (ESOPHAGOGASTRODUODENOSCOPY);  Surgeon: Brandan Meyer MD;  Location: [address]);  Service: Endoscopy;  Laterality: N/A;  multiple co-morbidities. will have family member for assistance.  has neuromuscular issues-needs lift. in W/C-unable to transfer per self     COVID test at [facility] on 8/3-GT       Time Tracking:     OT Date of Treatment: 04/14/23  OT Start Time: 1256  OT Stop Time: 1310  OT Total Time (min): 14 min    Billable Minutes:Evaluation 6  Self Care/Home Management 8    4/14/2023

## 2023-04-14 NOTE — ASSESSMENT & PLAN NOTE
Polymyositis  Quadriparesis  Oropharyngeal dysphagia    History of polymyositis/inclusion body myositis overlap. Diagnosed in 2009 with mildly positive Ku and positive CN1A. Reportedly with biopsy proven myositis previously. Patient also with osteopenia on DXA in 2021. Patient with significant social issues affecting his health care, including difficult home situation and difficulty with caregiver. Follows with Rheumatology with Dr Huston. He has previously been in imuran and MTX, currently on IVIG monthly, Prednisone 10mg, Rituxan with next dose due 10/4/22, and MMF 1500 bid. Patient has not received his IVIG in several months.    - Hold Cellcept in setting of suspected active infection  - Rheumatology consulted for further evaluation  - Pt receiving COPD dose of prednisone  - SLP to evaluate for swallow  - PT/OT consult
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
## 2023-04-15 PROBLEM — Z75.8 DISCHARGE PLANNING ISSUES: Status: ACTIVE | Noted: 2023-01-01
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
## 2023-04-15 NOTE — PLAN OF CARE
SUSANNE. Patient remained AAOx4 and VSS throughout shift. Patient removed nasal canula at 2300. Stated he didn't need it because he wasn't on O2 at home. Oxygen sats 97% - 100% on room air for remainder of shift. All needs met. Safety precautions in place. Call light in reach.     Problem: Adult Inpatient Plan of Care  Goal: Plan of Care Review  Outcome: Ongoing, Progressing  Goal: Patient-Specific Goal (Individualized)  Outcome: Ongoing, Progressing  Goal: Absence of Hospital-Acquired Illness or Injury  Outcome: Ongoing, Progressing  Goal: Optimal Comfort and Wellbeing  Outcome: Ongoing, Progressing  Goal: Readiness for Transition of Care  Outcome: Ongoing, Progressing     Problem: Fluid Imbalance (Pneumonia)  Goal: Fluid Balance  Outcome: Ongoing, Progressing     Problem: Infection (Pneumonia)  Goal: Resolution of Infection Signs and Symptoms  Outcome: Ongoing, Progressing     Problem: Respiratory Compromise (Pneumonia)  Goal: Effective Oxygenation and Ventilation  Outcome: Ongoing, Progressing     Problem: Impaired Wound Healing  Goal: Optimal Wound Healing  Outcome: Ongoing, Progressing     Problem: Skin Injury Risk Increased  Goal: Skin Health and Integrity  Outcome: Ongoing, Progressing

## 2023-04-15 NOTE — PLAN OF CARE
Due to PCP office closed will call Monday to make appointment.  Paper was also faxed to let them know pt.t need F/U         Renae Cortez W  Case Management   [phone]

## 2023-04-15 NOTE — DISCHARGE INSTRUCTIONS
-You were admitted to our hospital for treatment of possible pneumonia.   -Over the course of your hospitalization, we treated you with antibiotics.   -Your prednisone dose was changed to 40 mg daily while in patient.  You can resume your regular dose of 10mg on 4/18  -Keep following with Rheumatology     -Followup with your primary care physician for updated labwork, medication adjustments and any routine post-discharge care.

## 2023-04-15 NOTE — SUBJECTIVE & OBJECTIVE
Interval History: No acute events overnight, afebrile, hemodynamically stable.  Patient has been stable on room air with appropriate O2 sats.  Rheumatology evaluation yesterday did not note any acute rheumatologic concerns.  Patient complained of back pain this morning.  Hypophosphatemia on morning labs was repleted.    Review of Systems   Constitutional:  Positive for activity change and fatigue. Negative for chills and fever.   HENT:  Positive for trouble swallowing.    Respiratory:  Negative for cough, shortness of breath and wheezing.    Cardiovascular:  Negative for chest pain and leg swelling.   Gastrointestinal:  Negative for constipation, diarrhea, nausea and vomiting.   Genitourinary:  Negative for decreased urine volume and difficulty urinating.   Musculoskeletal:  Positive for arthralgias and back pain.   Neurological:  Positive for weakness.   Objective:     Vital Signs (Most Recent):  Temp: 98.1 °F (36.7 °C) (04/15/23 1119)  Pulse: 93 (04/15/23 1445)  Resp: 18 (04/15/23 1155)  BP: 136/78 (04/15/23 1119)  SpO2: 96 % (04/15/23 1155) Vital Signs (24h Range):  Temp:  [97.6 °F (36.4 °C)-98.1 °F (36.7 °C)] 98.1 °F (36.7 °C)  Pulse:  [78-93] 93  Resp:  [16-24] 18  SpO2:  [93 %-100 %] 96 %  BP: (130-136)/(74-85) 136/78     Weight: 59 kg (130 lb)  Body mass index is 18.65 kg/m².    Intake/Output Summary (Last 24 hours) at 4/15/2023 1455  Last data filed at 4/15/2023 1100  Gross per 24 hour   Intake 600 ml   Output 1975 ml   Net -1375 ml      Physical Exam  Vitals reviewed.   Constitutional:       General: He is not in acute distress.     Appearance: Normal appearance. He is ill-appearing. He is not toxic-appearing or diaphoretic.   HENT:      Head: Normocephalic and atraumatic.   Eyes:      General: No scleral icterus.        Right eye: No discharge.         Left eye: No discharge.   Cardiovascular:      Rate and Rhythm: Normal rate and regular rhythm.      Heart sounds: Normal heart sounds.   Pulmonary:       Effort: Pulmonary effort is normal. No respiratory distress.      Breath sounds: No wheezing.   Abdominal:      General: Bowel sounds are normal. There is no distension.      Palpations: Abdomen is soft.      Tenderness: There is no abdominal tenderness.   Musculoskeletal:      Right lower leg: No edema.      Left lower leg: No edema.   Skin:     General: Skin is warm and dry.      Coloration: Skin is not jaundiced.   Neurological:      Mental Status: He is alert and oriented to person, place, and time.      Motor: Weakness present.      Comments: Significantly diminished strength in upper and lower bilateral extremities     Psychiatric:         Mood and Affect: Mood normal.         Behavior: Behavior normal.     Significant Labs: All pertinent labs within the past 24 hours have been reviewed.    Recent Labs   Lab 04/13/23  0317 04/14/23  0327 04/15/23  0409   * 134* 132*   K 4.5 4.3 4.1    104 101   CO2 20* 21* 21*   BUN 7* 8 8   CREATININE 0.4* 0.4* 0.3*   GLU 94 72 60*   ANIONGAP 10 9 10     Recent Labs   Lab 04/14/23  0327 04/15/23  0409   MG 2.4 2.2   PHOS 2.2* 2.2*     Recent Labs   Lab 04/13/23  0317 04/14/23  0327 04/15/23  0409   AST 10 7* 10   ALT 19 14 15   ALKPHOS 78 64 64   BILITOT 2.3* 1.6* 1.9*   ALBUMIN 3.5 3.2* 3.2*      Recent Labs   Lab 04/13/23  0317 04/14/23  0327 04/15/23  0409   WBC 9.67 6.39 5.71   HGB 9.8* 8.0* 7.9*   HCT 31.5* 26.5* 25.1*    176 180   GRAN 74.6*  7.2 59.7  3.8 55.3  3.2          Significant Imaging: I have reviewed all pertinent imaging results/findings within the past 24 hours.    Inpatient Medications:      Scheduled Meds:   ampicillin-sulbactim (UNASYN) IVPB  3 g Intravenous Q6H    enoxaparin  40 mg Subcutaneous Daily    potassium, sodium phosphates  2 packet Oral Q4H    predniSONE  40 mg Oral Daily     PRN Meds:acetaminophen, albuterol-ipratropium, dextrose 10%, dextrose 10%, dextrose, dextrose, glucagon (human recombinant), melatonin, naloxone,  oxyCODONE-acetaminophen, sodium chloride 0.9%

## 2023-04-15 NOTE — ASSESSMENT & PLAN NOTE
Per initial evaluation, patient had reported difficulty with caregiver being able to take care of him at home,       - Social work consulted, appreciate assistance

## 2023-04-15 NOTE — NURSING
Patient is AAO*4 . Had Bowel and Bladder movement. Complains of pain , Prn Meds given. Call light within reach , safety precaution maintained. Will continue monitoring.

## 2023-04-15 NOTE — ASSESSMENT & PLAN NOTE
Chronic obstructive pulmonary disease, COPD  Pneumonia of right lower lobe  Pleural effusion on right    67M w/hx of myositis, COPD, dysphagia, and recent COVID, p/w persistent and worsening SOB, chills, and weakness. Pt was hospitalized for COVID pna and received treatment with remdesivir and dexamethasone as well as CAP coverage with Ctx -> cefdinir. Pt also has COPD. Denies any recent COPD flares or exacerbations. Has duoneb inhalers at home and says that his wheezing is resolved with inhaler use. On exam, audible inspiratory and expiratory wheezing appreciated bilaterally.     Current differentials for pt's acute SOB and hypoxemia could be: continued COVID symptoms vs COPD exacerbation vs CAP vs aspiration pna 2/2 dysphagia, or possibly combination of some or all of these possibilities.        PLAN  - For possible CAP, HAP and aspiration PNA:   -Continue Ampicillin-Sulbactam. Plan for 7 day course  - For possible COPD exacerbation:   - Duonebs q4h wheezing/SOB   - PO prednisone 40mg qd x 5d, per GOLD guidelines  - Pulmonary rehabilitation with incentive spirometry, chest physiotherapy, and cough assist ventilation.  - Supplemental O2 to maintain O2 sats 88-92%  - Blood gases as needed to assess for acid-base derangements  - Continue to monitor respiratory status, if severe dyspnea or persistent hypoxemia, trial NPPV and consider MICU consult

## 2023-04-15 NOTE — ASSESSMENT & PLAN NOTE
Polymyositis  Quadriparesis  Oropharyngeal dysphagia    History of polymyositis/inclusion body myositis overlap. Diagnosed in 2009 with mildly positive Ku and positive CN1A. Reportedly with biopsy proven myositis previously. Patient also with osteopenia on DXA in 2021. Patient with significant social issues affecting his health care, including difficult home situation and difficulty with caregiver. Follows with Rheumatology with Dr Huston. He has previously been in imuran and MTX, currently on IVIG monthly, Prednisone 10mg, Rituxan with next dose due 10/4/22, and MMF 1500 bid. Patient has not received his IVIG in several months.      Rheumatology consult for evaluation given history of polymyositis/inclusion body myositis did not note any acute rheumatologic issues.      PLAN:  - Per rheumatology recs, resumption of chronic prednisone 10 mg daily after completion of COPD prednisone taper, and outpatient Rheumatology follow-up for IVIG/rituximab evaluation.  - Hold Cellcept in setting of suspected active infection  - SLP following for dysphagia  - PT/OT consult

## 2023-04-15 NOTE — PLAN OF CARE
MD would like to discharge patient home.  In review of [facility] and assigned CM note, there are no accepting home health agencies at this time.  CM will attempt to follow up on referrals.

## 2023-04-16 NOTE — ASSESSMENT & PLAN NOTE
Patient was seen by wound care on previous admission who noted altered skin integrity of the Right Coccyx with partial thickness tissue loss. Pt reporting that he is having significant pain at that site now.    - Wound care consulted, appreciate assistance and recs  -will set up wound care with HH

## 2023-04-16 NOTE — ASSESSMENT & PLAN NOTE
Chronic obstructive pulmonary disease, COPD  Pneumonia of right lower lobe  Pleural effusion on right    67M w/hx of myositis, COPD, dysphagia, and recent COVID, p/w persistent and worsening SOB, chills, and weakness. Pt was hospitalized for COVID pna and received treatment with remdesivir and dexamethasone as well as CAP coverage with Ctx -> cefdinir. Pt also has COPD. Denies any recent COPD flares or exacerbations. Has duoneb inhalers at home and says that his wheezing is resolved with inhaler use. On exam, audible inspiratory and expiratory wheezing appreciated bilaterally.     Current differentials for pt's acute SOB and hypoxemia could be: continued COVID symptoms vs COPD exacerbation vs CAP vs aspiration pna 2/2 dysphagia, or possibly combination of some or all of these possibilities.        PLAN  - For possible CAP, HAP and aspiration PNA:   -Continue Ampicillin-Sulbactam. Plan for 7 day course  - For possible COPD exacerbation:   - Duonebs q4h wheezing/SOB   - PO prednisone 40mg qd x 5d, per GOLD guidelines  - Pulmonary rehabilitation with incentive spirometry, chest physiotherapy, and cough assist ventilation.  - Supplemental O2 to maintain O2 sats 88-92%  - Blood gases as needed to assess for acid-base derangements  - Continue to monitor respiratory status, if severe dyspnea or persistent hypoxemia, trial NPPV and consider MICU consult  D/C on oral antibiotic: Augmentin x 5 days
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
## 2023-04-16 NOTE — PLAN OF CARE
Safety precautions in place. Pt Aox4 and incontinent of bowel and bladder. Condom cath patent. Dysphagia diet in place. Pills crushed and given with apple sauce. Pt on room air. PIV unintentionally discontinued by pt. Charge nurse made aware and asked to start new PIV. Pain medication given. No reports of chest pain, SOB, or acute distress overnight. Bed in lowest position and call light  within reach.

## 2023-04-16 NOTE — ASSESSMENT & PLAN NOTE
Per initial evaluation, patient had reported difficulty with caregiver being able to take care of him at home,       - Social work consulted, appreciate assistance  -Pending placement  -Given that its the weekend and patient is medically ready for d/c patient agrees on going home while CM resumes setting up his Home Health.

## 2023-04-16 NOTE — SUBJECTIVE & OBJECTIVE
Interval History: NAEON. States that overall he is improving. Eating as instructed by LSP. Will transition to oral antibiotics. Attempted to call both wife and daughter to discuss discharge without successes. Discussed with RT, patient is able to cough on his own. Although discharge planning is on hold given the weekend, patient is amenable going home today,  while case management finalized and secures his needs.    Review of Systems   Constitutional:  Positive for appetite change. Negative for chills and fever.   HENT:  Positive for trouble swallowing. Negative for congestion, mouth sores, rhinorrhea and sore throat.    Respiratory:  Positive for cough. Negative for shortness of breath and wheezing.    Cardiovascular:  Negative for chest pain and leg swelling.   Gastrointestinal:  Negative for constipation, diarrhea, nausea and vomiting.   Genitourinary:  Negative for decreased urine volume and difficulty urinating.   Musculoskeletal:  Positive for arthralgias and back pain.   Skin:  Positive for pallor.   Neurological:  Positive for weakness.   Objective:     Vital Signs (Most Recent):  Temp: 97.6 °F (36.4 °C) (04/16/23 0730)  Pulse: 81 (04/16/23 0730)  Resp: 19 (04/16/23 0730)  BP: 137/81 (04/16/23 0730)  SpO2: 98 % (04/16/23 0730) Vital Signs (24h Range):  Temp:  [96.6 °F (35.9 °C)-98.1 °F (36.7 °C)] 97.6 °F (36.4 °C)  Pulse:  [79-93] 81  Resp:  [16-19] 19  SpO2:  [93 %-99 %] 98 %  BP: (121-139)/(78-87) 137/81     Weight: 59 kg (130 lb)  Body mass index is 18.65 kg/m².    Intake/Output Summary (Last 24 hours) at 4/16/2023 1045  Last data filed at 4/16/2023 0115  Gross per 24 hour   Intake 450 ml   Output 1450 ml   Net -1000 ml      Physical Exam  Vitals reviewed.   Constitutional:       General: He is not in acute distress.     Appearance: He is not toxic-appearing or diaphoretic.      Comments: Frail with impair mobility.   HENT:      Head: Normocephalic and atraumatic.   Eyes:      General: No scleral icterus.         Right eye: No discharge.         Left eye: No discharge.   Cardiovascular:      Rate and Rhythm: Normal rate and regular rhythm.      Heart sounds: Normal heart sounds.   Pulmonary:      Effort: Pulmonary effort is normal. No respiratory distress.      Breath sounds: No wheezing.   Abdominal:      General: Bowel sounds are normal. There is no distension.      Palpations: Abdomen is soft.      Tenderness: There is no abdominal tenderness.   Musculoskeletal:      Right lower leg: No edema.      Left lower leg: No edema.   Skin:     General: Skin is warm and dry.      Coloration: Skin is not jaundiced.   Neurological:      Mental Status: He is alert and oriented to person, place, and time.      Motor: Weakness present.      Comments: Significantly diminished strength in upper and lower bilateral extremities     Psychiatric:         Mood and Affect: Mood normal.         Behavior: Behavior normal.       Significant Labs: All pertinent labs within the past 24 hours have been reviewed.  Recent Lab Results         04/16/23  0652        Albumin 3.3       Alkaline Phosphatase 61       ALT 12       Anion Gap 10       AST 6       Baso # 0.01       Basophil % 0.2       BILIRUBIN TOTAL 1.6  Comment: For infants and newborns, interpretation of results should be based  on gestational age, weight and in agreement with clinical  observations.    Premature Infant recommended reference ranges:  Up to 24 hours.............<8.0 mg/dL  Up to 48 hours............<12.0 mg/dL  3-5 days..................<15.0 mg/dL  6-29 days.................<15.0 mg/dL         BUN 5       Calcium 8.4       Chloride 104       CO2 23       Creatinine 0.4       Differential Method Automated       eGFR >60.0       Eos # 0.0       Eosinophil % 0.2       Glucose 83       Gran # (ANC) 1.9       Gran % 45.2       Hematocrit 25.5       Hemoglobin 8.0       Immature Grans (Abs) 0.04  Comment: Mild elevation in immature granulocytes is non specific and   can be  seen in a variety of conditions including stress response,   acute inflammation, trauma and pregnancy. Correlation with other   laboratory and clinical findings is essential.         Immature Granulocytes 0.9       Lymph # 1.9       Lymph % 44.3       Magnesium 2.0       MCH 18.0       MCHC 31.4       MCV 57       Mono # 0.4       Mono % 9.2       MPV SEE COMMENT  Comment: Result not available.       nRBC 7       Phosphorus 2.8       Platelets 171       Potassium 3.7       PROTEIN TOTAL 6.2       RBC 4.45       RDW 21.5       Sodium 137       WBC 4.24               Significant Imaging: I have reviewed all pertinent imaging results/findings within the past 24 hours.

## 2023-04-16 NOTE — NURSING
AVS reviewed with patient at bedside, patient verbalized understanding. Belongings gathered. Bed bath offered, patient declined. Pending medication delivery to bedside.

## 2023-04-16 NOTE — ASSESSMENT & PLAN NOTE
Patient was seen by wound care on previous admission who noted altered skin integrity of the Right Coccyx with partial thickness tissue loss. Pt reporting that he is having significant pain at that site now.    - Wound care consulted, appreciate assistance and recs  -will set up wound care with HH  -requested wound care when d/c with HH

## 2023-04-16 NOTE — ASSESSMENT & PLAN NOTE
Chronic condition that is well controlled on current home regimen    - Resume home amlodipine and losartan as tolerated by BP

## 2023-04-16 NOTE — ASSESSMENT & PLAN NOTE
"See "Acute hypoxemic respiratory failure"   Initially started on UNASYN. NGTD   -No active signs of infection  -Changing unasyn to augmentin   "
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
## 2023-04-16 NOTE — PLAN OF CARE
Due to PCP office closed appointment will be made tomorrow fax was sent to make appointment also      Renae Cortez CHW  Case Management   [phone]

## 2023-04-16 NOTE — PLAN OF CARE
[facility] - Intensive Care ([facility])  Discharge Final Note    Primary Care Provider: Edna Jaramillo NP    Expected Discharge Date: 4/16/2023    Patient has had five home health referral denials.   Additional blast of home health referrals sent in [facility].   Ambulance stretcher requested with [facility] for 2:30 pm pickup time for transport to patient's home.   CM contacted patient's spouse and informed her of transportation setup.    Final Discharge Note (most recent)       Final Note - 04/16/23 1349          Final Note    Assessment Type Final Discharge Note     Anticipated Discharge Disposition Home-Health Care Svc        Post-Acute Status    Post-Acute Authorization Home Health     Home Health Status Referrals Sent                     Important Message from Medicare             Contact Info       Edna Jaramillo NP   Specialty: Family Medicine   Relationship: PCP - General    [address]  [facility] [id number]   Phone: [phone]       Next Steps: Follow up

## 2023-04-16 NOTE — PLAN OF CARE
Problem: Infection (Pneumonia)  Goal: Resolution of Infection Signs and Symptoms  4/16/2023 1508 by Alisha Lockhart RN  Outcome: Adequate for Care Transition  4/16/2023 1508 by Alisha Lockhatr RN  Outcome: Adequate for Care Transition     Problem: Skin Injury Risk Increased  Goal: Skin Health and Integrity  4/16/2023 1508 by Alisha Lockhart RN  Outcome: Adequate for Care Transition  4/16/2023 1508 by Alisha Lockhart RN  Outcome: Adequate for Care Transition     Problem: Adult Inpatient Plan of Care  Goal: Plan of Care Review  4/16/2023 1508 by Alisha Lockhart RN  Outcome: Met  4/16/2023 1508 by Alisha Lockhart RN  Outcome: Adequate for Care Transition  Goal: Patient-Specific Goal (Individualized)  4/16/2023 1508 by Alisha Lockhart RN  Outcome: Met  4/16/2023 1508 by Alisha Lockhart RN  Outcome: Adequate for Care Transition  Goal: Absence of Hospital-Acquired Illness or Injury  4/16/2023 1508 by Alisha Lockhart RN  Outcome: Met  4/16/2023 1508 by Alisha Lockhart RN  Outcome: Adequate for Care Transition  Goal: Optimal Comfort and Wellbeing  4/16/2023 1508 by Alisha Lockhart RN  Outcome: Met  4/16/2023 1508 by Alisha Lockhart RN  Outcome: Adequate for Care Transition  Goal: Readiness for Transition of Care  4/16/2023 1508 by Alisha Lockhart RN  Outcome: Met  4/16/2023 1508 by Alisha Lockhart RN  Outcome: Adequate for Care Transition     Problem: Fluid Imbalance (Pneumonia)  Goal: Fluid Balance  4/16/2023 1508 by Alisha Lockhart RN  Outcome: Met  4/16/2023 1508 by Alisha Lockhart RN  Outcome: Adequate for Care Transition     Problem: Respiratory Compromise (Pneumonia)  Goal: Effective Oxygenation and Ventilation  4/16/2023 1508 by Alisha Lcokhart RN  Outcome: Met  4/16/2023 1508 by Alisha Lockhart RN  Outcome: Adequate for Care Transition     Problem: Impaired Wound Healing  Goal: Optimal Wound Healing  4/16/2023 1508 by Alisha Lockhart RN  Outcome: Met  4/16/2023 1508 by Alisha Lockhart RN  Outcome: Adequate for Care Transition

## 2023-04-16 NOTE — ASSESSMENT & PLAN NOTE
Polymyositis  Quadriparesis  Oropharyngeal dysphagia    History of polymyositis/inclusion body myositis overlap. Diagnosed in 2009 with mildly positive Ku and positive CN1A. Reportedly with biopsy proven myositis previously. Patient also with osteopenia on DXA in 2021. Patient with significant social issues affecting his health care, including difficult home situation and difficulty with caregiver. Follows with Rheumatology with Dr Huston. He has previously been in imuran and MTX, currently on IVIG monthly, Prednisone 10mg, Rituxan with next dose due 10/4/22, and MMF 1500 bid. Patient has not received his IVIG in several months.      Rheumatology consult for evaluation given history of polymyositis/inclusion body myositis did not note any acute rheumatologic issues.      PLAN:  - Per rheumatology recs, resumption of chronic prednisone 10 mg daily after completion of COPD prednisone taper, and outpatient Rheumatology follow-up for IVIG/rituximab evaluation.  - Hold Cellcept in setting of suspected active infection  - SLP following for dysphagia  - PT/OT consult   Plan for possible d/c home today as cleared by ID.  Discussed meds with cards fellow Dr. Sabillon who recommends continuing with current dose of Toprol XL 50mg.  Continue to hold cardizem until patient follow up with cards as outpatient.    Krystin Lord, ANP-C  [phone]

## 2023-04-16 NOTE — ASSESSMENT & PLAN NOTE
-Concerns with home health placement and home equipment per family.  -Despite this patient rather go home while CM finalizes his Home Health setup.     Social Work following
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
## 2023-04-17 NOTE — PLAN OF CARE
04/17/23 1159   Post-Acute Status   Post-Acute Authorization Home Health   Home Health Status Set-up Complete/Auth obtained   Discharge Delays None known at this time   Discharge Plan   Discharge Plan A Home Health   Discharge Plan B Home with family       12:01 PM'  BENJAMIN spoke to Callie with [facility] and  referral and orders emailed to Callie at [facility].  Callie will review and respond via e mail.    Nilam Vaca RN  Case Management  [facility]  [phone]

## 2023-04-17 NOTE — TELEPHONE ENCOUNTER
Reached out to Mr. Kahn, spoke to his Wife Ms. Mitali Mata, discussed that we would place orders for duonebs so he could continue at home. Per case management referrals for HH have been sent. Wife states that patient is doing well at home.

## 2023-04-20 NOTE — ED TRIAGE NOTES
Nura Kahn Jr., a 67 y.o. male presents to the ED w/ complaint of constipation and decreased urination x 1 week. Patient reports only able to dribble urine without relief. Bladder scan volume 411. Recent admission for pneumonia.     Triage note:  Chief Complaint   Patient presents with    Constipation     Just discharged 4/16. Patient reports no BM x 1 week and decreased urine output. History quadriplegia     Review of patient's allergies indicates:  No Known Allergies  Past Medical History:   Diagnosis Date    Aspiration pneumonia of right lower lobe 1/15/2018    Atrial fibrillation 3/2/2015    Chronic obstructive pulmonary disease, unspecified COPD type 5/4/2022    Depression     Essential hypertension 2/27/2019    Malignant (primary) neoplasm, unspecified 5/4/2022    Microcytic anemia 9/25/2014    Neuromuscular disorder     Other osteoporosis without current pathological fracture 9/22/2022    Pneumonia     Polymyositis 2009    Tobacco abuse

## 2023-04-20 NOTE — ED NOTES
Pt states that he is still experiencing abd pain and says they enema did not work.  MD notified.

## 2023-04-20 NOTE — TELEPHONE ENCOUNTER
Patient's wife states patient was seen in the ED on yesterday, 04/19/23,  due to Constipation. Wife states a urinary catheter was placed at this time. Patient states c/o urine leakage around the insertion site of the catheter. Patient denies pain, abdominal distension and fever.    Patient advised to return to previous ED or to visit an Urgent Care Center for evaluation/treatment. Patient's wife states understanding of care advice.     Reason for Disposition   Leakage of urine around catheter    Additional Information   Negative: Shock suspected (e.g., cold/pale/clammy skin, too weak to stand, low BP, rapid pulse)   Negative: Sounds like a life-threatening emergency to the triager   Negative: Catheter was accidentally pulled-out and bright red continuous bleeding   Negative: SEVERE abdominal pain   Negative: Fever > 100.4 F (38.0 C)   Negative: Drinking very little and dehydration suspected (e.g., no urine > 12 hours, very dry mouth, very lightheaded)   Negative: Patient sounds very sick or weak to the triager   Negative: Catheter was accidentally pulled-out   Negative: Bleeding around catheter (e.g., from penis or female urethra)   Negative: Lower abdominal pain or distention   Negative: Tea-colored or slightly red urine lasts > 24 hours that is not cleared by increasing fluid intake, and no recent prostate or bladder surgery   Negative: Cloudy urine lasts > 24 hours and not cleared by increasing fluid intake   Negative: Bloody or red-colored urine and no recent prostate or bladder surgery (Exception: brief episode and urine now clear)   Negative: Bloody or red-colored urine and prostate or bladder surgery > 3 days (72 hours) ago   Negative: No urine in bag for > 4 hours and catheter is not kinked   Negative: Urine smells bad    Protocols used: Urinary Catheter Symptoms and [id number]

## 2023-04-20 NOTE — PROVIDER PROGRESS NOTES - EMERGENCY DEPT.
Encounter Date: 4/19/2023    ED Physician Progress Notes        Received sign-out from Dr. Merlos plan was follow-up CT scan reassess.  Labs reviewed, patient is constipated without any acute intra-abdominal pathology.  Patient had a bowel movement prior to enema , patient was given enema with appropriate response.  Will plan discharge with lactulose return precautions discussed patient will be discharged.

## 2023-04-20 NOTE — ED PROVIDER NOTES
"Emergency Department Encounter  Provider Note    Nura Kahn Jr.  [id number]  4/19/2023    Evaluation:    History:     Chief Complaint   Patient presents with    Constipation     Just discharged 4/16. Patient reports no BM x 1 week and decreased urine output. History quadriplegia     Nura Kahn Jr. is a 67 y.o. male who has a past medical history of Aspiration pneumonia of right lower lobe (1/15/2018), Atrial fibrillation (3/2/2015), Chronic obstructive pulmonary disease, unspecified COPD type (5/4/2022), Depression, Essential hypertension (2/27/2019), Malignant (primary) neoplasm, unspecified (5/4/2022), Microcytic anemia (9/25/2014), Neuromuscular disorder, Other osteoporosis without current pathological fracture (9/22/2022), Pneumonia, Polymyositis (2009), and Tobacco abuse.    The patient presents to the ED due to constipation.   Patient states "I can't crap."  He states he was just discharged from the hospital 2 days ago.  He states he had bowel movements while in the hospital, but has not had a bowel movement since he has returned home.  He states he tried a Dulcolax suppository 2 days ago, and also tried drinking milk of magnesia today without improvement.  He endorses suprapubic abdominal discomfort.  He denies any nausea/vomiting, fever, or diarrhea.  He wonders if taking pain medicine can make him constipated.        Past Medical History:   Diagnosis Date    Aspiration pneumonia of right lower lobe 1/15/2018    Atrial fibrillation 3/2/2015    Chronic obstructive pulmonary disease, unspecified COPD type 5/4/2022    Depression     Essential hypertension 2/27/2019    Malignant (primary) neoplasm, unspecified 5/4/2022    Microcytic anemia 9/25/2014    Neuromuscular disorder     Other osteoporosis without current pathological fracture 9/22/2022    Pneumonia     Polymyositis 2009    Tobacco abuse      Past Surgical History:   Procedure Laterality Date    COLONOSCOPY N/A 8/6/2020    Procedure: " COLONOSCOPY;  Surgeon: Brandan Meyer MD;  Location: [facility] ([address] [place] [address]);  Service: Endoscopy;  Laterality: N/A;    ESOPHAGOGASTRODUODENOSCOPY N/A 2020    Procedure: EGD (ESOPHAGOGASTRODUODENOSCOPY);  Surgeon: Brandan Meyer MD;  Location: [facility] ([address] [place] [address]);  Service: Endoscopy;  Laterality: N/A;  multiple co-morbidities. will have family member for assistance.  has neuromuscular issues-needs lift. in W/C-unable to transfer per self     COVID test at [facility] on 8/3-GT     Family History   Problem Relation Age of Onset    Diabetes Mother     Hypertension Mother     Kidney disease Mother     Diabetes Father     Hypertension Father     Heart attack Neg Hx     Heart disease Neg Hx     Melanoma Neg Hx      Social History     Socioeconomic History    Marital status: Single   Tobacco Use    Smoking status: Some Days     Packs/day: 0.25     Years: 20.00     Pack years: 5.00     Types: Cigarettes     Last attempt to quit: 2018     Years since quittin.2    Smokeless tobacco: Never   Substance and Sexual Activity    Alcohol use: Yes     Alcohol/week: 0.0 standard drinks    Drug use: Yes     Types: Marijuana     Comment: daily use     Review of patient's allergies indicates:  No Known Allergies    Review of Systems   Gastrointestinal:  Positive for abdominal pain and constipation.     Physical Exam:     Initial Vitals [23]   BP Pulse Resp Temp SpO2   136/88 108 18 97.7 °F (36.5 °C) 99 %      MAP       --         Physical Exam    Constitutional: He appears well-developed and well-nourished.   HENT:   Head: Normocephalic and atraumatic.   Eyes: EOM are normal.   Neck:   Normal phonation.    Normal range of motion.  Cardiovascular:  Normal rate and regular rhythm.           Pulmonary/Chest: No respiratory distress. He has decreased breath sounds (bilateral bases).   Abdominal: Abdomen is soft. There is abdominal tenderness in the suprapubic area.   Mild, suprapubic abdominal tenderness There is  no rebound and no guarding.   Genitourinary:    Genitourinary Comments: Pedro in place, draining clear/yellow urine     Musculoskeletal:      Cervical back: Normal range of motion.     Neurological: He is not disoriented. He displays atrophy. He displays no tremor. He exhibits abnormal muscle tone. GCS eye subscore is 4. GCS verbal subscore is 5. GCS motor subscore is 6.   Diffuse, global weakness, bilateral upper and lower extremities       ED Course:   Procedures    Medical Decision Making:    History Acquisition:   Additional historians utilized:  none    Prior medical records were reviewed:   Admitted 4/13-4/16 for SOB, weakness, respiratory failure  Admitted 3/29-4/3 for respiratory failure from COVID infection  Rheumatology visit 09/2022 for f/u polymyositis    The patient's list of active medical problems, social history, medications, and allergies as documented has been reviewed.     Differential Diagnoses:   Based on available information and initial assessment, Differential Diagnosis includes, but is not limited to:  AAA, aortic dissection, mesenteric ischemia, perforated viscous, MI/ACS, SBO/volvulus, incarcerated/strangulated hernia, intussusception, ileus, appendicitis, cholecystitis, cholangitis, diverticulitis, esophagitis, hepatitis, nephrolithiasis, pancreatitis, gastroenteritis, colitis, IBD/IBS, biliary colic, GERD, PUD, constipation, UTI/pyelonephritis,  disorder.        EKG:       Labs:     Labs Reviewed   CBC WITHOUT DIFFERENTIAL - Abnormal; Notable for the following components:       Result Value    Hemoglobin 9.8 (*)     Hematocrit 31.1 (*)     MCV 57 (*)     MCH 18.0 (*)     MCHC 31.5 (*)     RDW 23.3 (*)     Platelets 149 (*)     All other components within normal limits    Narrative:     Release to patient->Immediate   COMPREHENSIVE METABOLIC PANEL - Abnormal; Notable for the following components:    Sodium 131 (*)     CO2 22 (*)     Creatinine 0.4 (*)     Calcium 8.5 (*)     Total  Bilirubin 1.8 (*)     AST 9 (*)     All other components within normal limits    Narrative:     Release to patient->Immediate   HIV 1 / 2 ANTIBODY    Narrative:     Release to patient->Immediate   HEPATITIS C ANTIBODY    Narrative:     Release to patient->Immediate   URINALYSIS, REFLEX TO URINE CULTURE    Narrative:     Specimen Source->Urine     Independent review of the labs ordered include:   See ED course    Imaging:     Imaging Results              CT Abdomen Pelvis With Contrast (In process)  Result time 04/19/23 22:39:54                       Additional Consideration:   Additional testing considered during clinical course: none    Social determinants of health considered during development of treatment plan include: poor access to care, quadriplegia, total dependence on caregivers for ADLs    Current co-morbidities considered which impacted clinical decision making: polymyositis, a-fib, HTN, COPD    Case discussed with additional provider: none    Medications   iohexoL (OMNIPAQUE 350) injection 75 mL (75 mLs Intravenous Given 4/19/23 2223)             Medical Decision Making:   Initial Assessment:   66 yo M with constipation. History of polymyositis, quadriplegia.   Will obtain labs, UA, CT A/P, and reassess.   Clinical Tests:   Lab Tests: Ordered and Reviewed  Radiological Study: Ordered  ED Management:  Labs unremarkable, including CBC, CMP.  CT A/P pending at shift change. Oncoming physician to assume care, f/u CT results, dispo accordingly.              Clinical Impression:       ICD-10-CM ICD-9-CM   1. Slow transit constipation  K59.01 564.01   2. Suprapubic abdominal pain  R10.2 789.09           Follow-up Information    None                Trip Merlos MD  04/19/23 7911

## 2023-04-21 NOTE — ED TRIAGE NOTES
Nura RIKY Kahn Jr., a 67 y.o. male presents to the ED w/ complaint of reyes catheter in place. He tells me that there was never an issue with obstruction or leaking around his penis, but he came in because he does not want a reyes catheter. It was placed ~36h ago for urinary retention. Patient reports this is uncomfortable. He also reports that while he sees urine draining from the catheter in his penis he doesn't believe that it is his. Reports soft stools.    Triage note:  Chief Complaint   Patient presents with    Reyes catheter problem     Pt c/o pain in his penis and leaking from reyes catheter that began today.      Review of patient's allergies indicates:  No Known Allergies  Past Medical History:   Diagnosis Date    Aspiration pneumonia of right lower lobe 1/15/2018    Atrial fibrillation 3/2/2015    Chronic obstructive pulmonary disease, unspecified COPD type 5/4/2022    Depression     Essential hypertension 2/27/2019    Malignant (primary) neoplasm, unspecified 5/4/2022    Microcytic anemia 9/25/2014    Neuromuscular disorder     Other osteoporosis without current pathological fracture 9/22/2022    Pneumonia     Polymyositis 2009    Tobacco abuse

## 2023-04-21 NOTE — DISCHARGE INSTRUCTIONS
You requested your Pedro catheter be removed.  By removing it, there is a chance your bladder will become distended with urine and lead to kidney problems or bladder issues.  If you are unable to urinate, develop pain in your lower abdomen, or other concerns, return to the ER.

## 2023-04-21 NOTE — ED PROVIDER NOTES
Encounter Date: 4/21/2023       History     Chief Complaint   Patient presents with    Reyes catheter problem     Pt c/o pain in his penis and leaking from reyes catheter that began today.      66 yo M with extensive pmhx including inclusion body myositis, COPD, chronic dysphagia and quadriparesis, recently admitted and treated for COVID/CAP with hypoxia, afib, depression, HTN, sacral decub prevents with Reyes catheter problem.  Patient was in the ER yesterday for constipation.  He notes that a Reyes catheter was placed for urinary retention but he denies any issues with urinary retention.  He reports that since that time he has had difficulty urinating and pain from the catheter and requests it to be removed.      Review of patient's allergies indicates:  No Known Allergies  Past Medical History:   Diagnosis Date    Aspiration pneumonia of right lower lobe 1/15/2018    Atrial fibrillation 3/2/2015    Chronic obstructive pulmonary disease, unspecified COPD type 5/4/2022    Depression     Essential hypertension 2/27/2019    Malignant (primary) neoplasm, unspecified 5/4/2022    Microcytic anemia 9/25/2014    Neuromuscular disorder     Other osteoporosis without current pathological fracture 9/22/2022    Pneumonia     Polymyositis 2009    Tobacco abuse      Past Surgical History:   Procedure Laterality Date    COLONOSCOPY N/A 8/6/2020    Procedure: COLONOSCOPY;  Surgeon: Brandan Meyer MD;  Location: [address]);  Service: Endoscopy;  Laterality: N/A;    ESOPHAGOGASTRODUODENOSCOPY N/A 8/6/2020    Procedure: EGD (ESOPHAGOGASTRODUODENOSCOPY);  Surgeon: Brandan Meyer MD;  Location: [address]);  Service: Endoscopy;  Laterality: N/A;  multiple co-morbidities. will have family member for assistance.  has neuromuscular issues-needs lift. in W/C-unable to transfer per self     COVID test at [facility] on 8/3-GT     Family History   Problem Relation Age of Onset    Diabetes Mother     Hypertension Mother      Kidney disease Mother     Diabetes Father     Hypertension Father     Heart attack Neg Hx     Heart disease Neg Hx     Melanoma Neg Hx      Social History     Tobacco Use    Smoking status: Some Days     Packs/day: 0.25     Years: 20.00     Pack years: 5.00     Types: Cigarettes     Last attempt to quit: 2018     Years since quittin.2    Smokeless tobacco: Never   Substance Use Topics    Alcohol use: Yes     Alcohol/week: 0.0 standard drinks    Drug use: Yes     Types: Marijuana     Comment: daily use     Review of Systems    Physical Exam     Initial Vitals   BP Pulse Resp Temp SpO2   23 0256 23 0256 23 0256 23 0257 23 0256   (!) 141/75 (!) 114 16 98.7 °F (37.1 °C) 100 %      MAP       --                Physical Exam    Nursing note and vitals reviewed.  Constitutional: He appears well-developed and well-nourished. He is not diaphoretic. No distress.   HENT:   Head: Normocephalic.   Cardiovascular:    Tachycardia present.         Pulmonary/Chest: No stridor. No respiratory distress.   Abdominal: Abdomen is soft. There is no abdominal tenderness.   Genitourinary:    Genitourinary Comments: Ureteral catheter in place draining clear yellow urine       Neurological: He is alert.   Skin: Skin is warm.       ED Course   Procedures  Labs Reviewed   URINALYSIS, REFLEX TO URINE CULTURE          Imaging Results    None          Medications - No data to display  Medical Decision Making:   History:   Old Medical Records: I decided to obtain old medical records.  Initial Assessment:   66 yo M with extensive pmhx including inclusion body myositis, COPD, chronic dysphagia and quadriparesis, recently admitted and treated for COVID/CAP with hypoxia, afib, depression, HTN, sacral decub prevents with Pedro catheter problem.    Differential Diagnosis:   Pedro catheter, urinary retention, UTI   Clinical Tests:   Lab Tests: Ordered  ED Management:  Will remove catheter and send  UA.    Reassessment:  Patient reports he feels completely improved after removing Pedro catheter.  He did urinate a small amount.  He wishes to leave.  I explained that if he does not truly void, he could develop recurrent urinary retention which could lead to kidney problems.  Despite that, he wishes to leave.  Patient provided with extensive return precautions.  Of note, the nurse did document an elevated respiratory rate of 30.  Patient was seen and examined by me.  He is resting on room air comfortably in no acute distress.  He does have some oropharyngeal dysfunction/dysphagia but in no respiratory distress and not tachypneic.  Does not appear to be septic.  Provided with return precautions.                        Clinical Impression:   Final diagnoses:  [T83.9XXA] Problem with Pedro catheter, initial encounter (Primary)        ED Disposition Condition    Discharge Stable          ED Prescriptions    None       Follow-up Information       Follow up With Specialties Details Why Contact Info Additional Information    Jerrod Barrera - Emergency Dept Emergency Medicine  As needed, If symptoms worsen [address] [facility] [id number] [phone]     Jerrod Barrera - Urology [facility]rology   [address] [facility] [id number] [phone] Main Building, 4th Floor Please park in [facility] and take [facility] elevator             Lorenzo Romo MD  04/21/23 [id number]

## 2023-04-21 NOTE — ED NOTES
LOC: The patient is awake, alert, and oriented to self, place, time, and situation. Pt is calm and cooperative. Affect is appropriate.  Speech is appropriate and clear.     APPEARANCE: Patient resting comfortably in no acute distress.  Patient is clean and well groomed.    SKIN: The skin is warm and dry; color consistent with ethnicity.  Patient has normal skin turgor and moist mucus membranes.  Skin intact; no breakdown or bruising noted other than documented LDA wound    MUSCULOSKELETAL: x4 extremity weakness and deconditioning. Weak mild ROM in extremities. Denies pain in the extremities or back.  Denies weakness.     RESPIRATORY: Airway is open and patent. Respirations spontaneous, even, easy, and non-labored.  No audible wheeze. Patient has a normal effort and rate.  No accessory muscle use noted. Denies cough.     CARDIAC:  Normal rate noted.  No peripheral edema noted. 2+ radial pulses bilaterally. No complaints of chest pain.      ABDOMEN: Soft and non tender to palpation.  No distention noted. Pt denies abdominal pain; denies nausea, vomiting, diarrhea, or constipation.    NEUROLOGIC: x4 extremity weakness and deconditioning. Weak mild ROM in extremities. Eyes open spontaneously.  Behavior appropriate to situation.  Follows commands; facial expression symmetrical.  Purposeful motor response noted; normal sensation in all extremities. Pt denies headache; denies lightheadedness or dizziness; denies visual disturbances; denies loss of balance; denies unilateral weakness.

## 2023-04-25 NOTE — TELEPHONE ENCOUNTER
SW called and left a message for the pt to call me back to get him an appt to est care with u. I also left a phone number where he can reach me.

## 2023-04-25 NOTE — PROGRESS NOTES
INTERNAL MEDICINE PROGRESS NOTE    CHIEF COMPLAINT     Chief Complaint   Patient presents with    Hospital Follow Up       HPI     Nura Kahn Jr. is a 67 y.o. 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    Pt discharged from hospital 4/3/2023 after being found to have covid - treated with remdesivir and dexamethasone, along with antibiotics for CAP. Since being discharged home, he reports he has been significantly more weak, unable to get out of bed to use the bathroom. Returned to the ED - had desaturation in the ambulance.     In the ED, pt was hemodynamically stable, afebrile, and stable on 2L NC. Labs notable for Na 133, and Hb 9.8 with MCV 58 (baseline). CXR showed continued small volume Right sided pleural fluid with adjacent atelectasis and/or consolidation, similar to CXR from previous admission. Patient admitted to hospital medicine for further management of persistent SOB and chills.    COPD exacerbation with acute hypoxemic respiratory failure - Started on course of prednisone 40mg for COPD exacerbation concerns. Started on pneumonia coverage with Ceftriaxone/Azithromycin. Ceftriaxone broadened to Ampicillin-Sulbactam to cover for aspiration pneumonia  D/c'ed on Augmentin and prednisone     Dysphagia - SLP evaluation with modified barium swallow study showed dysphagia concerns.     Polymyositis/inclusion body myositis- Rheumatology consult for evaluation given history of polymyositis/inclusion body myositis did not note any acute rheumatologic issues.  Recommended resumption of chronic prednisone 10 mg daily after completion of COPD prednisone taper, and outpatient follow-up for IVIG/rituximab evaluation    Constipation- returned to the ED 4/19/2023 for this   He states he was just discharged from the hospital 2 days ago.  He states he had bowel movements while in the  hospital, but has not had a bowel movement since he has returned home.  He states he tried a Dulcolax suppository 2 days ago, and also tried drinking milk of magnesia today without improvement.  He endorses suprapubic abdominal discomfort.  He denies any nausea/vomiting, fever, or diarrhea.  He wonders if taking pain medicine can make him constipated.  Given lactulose in the ED   Still having trouble with constipation - Last BM 4 days ago   Is not using lactulose - did not  from pharmacy     Urinary retention - has reyes catheter - went to ED 4/21/2023    Patient was in the ER yesterday for constipation.  He notes that a Reyes catheter was placed for urinary retention but he denies any issues with urinary retention.  He reports that since that time he has had difficulty urinating and pain from the catheter and requests it to be removed.    Right hand wound- at site of IV       Transitional Care Note    Family and/or Caretaker present at visit?  No.  Diagnostic tests reviewed/disposition: No diagnosic tests pending after this hospitalization.  Disease/illness education: yes   Home health/community services discussion/referrals: Patient does not have home health established from hospital visit.  They do need home health.  If needed, we will set up home health for the patient.   Establishment or re-establishment of referral orders for community resources:  needs  and 65+ provider  .   Discussion with other health care providers: No discussion with other health care providers necessary.              Past Medical History:  Past Medical History:   Diagnosis Date    Aspiration pneumonia of right lower lobe 1/15/2018    Atrial fibrillation 3/2/2015    Chronic obstructive pulmonary disease, unspecified COPD type 5/4/2022    Depression     Essential hypertension 2/27/2019    Malignant (primary) neoplasm, unspecified 5/4/2022    Microcytic anemia 9/25/2014    Neuromuscular disorder     Other osteoporosis without  current pathological fracture 9/22/2022    Pneumonia     Polymyositis 2009    Tobacco abuse        Home Medications:  Prior to Admission medications    Medication Sig Start Date End Date Taking? Authorizing Provider   albuterol (PROVENTIL/VENTOLIN HFA) 90 mcg/actuation inhaler Inhale 2 puffs into the lungs every 6 (six) hours as needed for Wheezing or Shortness of Breath. 3/17/23  Yes Edna Jaramillo NP   albuterol-ipratropium (DUO-NEB) 2.5 mg-0.5 mg/3 mL nebulizer solution Take 3 mLs by nebulization every 6 (six) hours as needed for Wheezing. Rescue 4/17/23 4/16/24 Yes Yoan Morrison MD   amLODIPine (NORVASC) 10 MG tablet Take 1 tablet (10 mg total) by mouth once daily. 7/14/22 7/14/23 Yes Edna Jaramillo NP   ammonium lactate 12 % Crea Apply 1 gram topically once daily. Apply to areas of dry skin and nails daily. 5/4/22  Yes Edna Jaramillo NP   cholecalciferol, vitamin D3, 1,250 mcg (50,000 unit) capsule Take 1 capsule (50,000 Units total) by mouth once a week. 4/11/23 7/10/23 Yes Edna Jaramillo NP   hydrOXYzine HCL (ATARAX) 25 MG tablet Take 1 tablet (25 mg total) by mouth 3 (three) times daily as needed (itching). 4/11/23  Yes Edna Jaramillo NP   lactulose (CHRONULAC) 10 gram/15 mL solution Take 15 mLs (10 g total) by mouth every 6 (six) hours as needed (constipation). 4/20/23  Yes Sara Jj MD   losartan (COZAAR) 25 MG tablet Take 1 tablet (25 mg total) by mouth once daily. 7/14/22 7/14/23 Yes Edna Jaramillo NP   mycophenolate (CELLCEPT) 500 mg Tab Take 3 tablets (1,500 mg total) by mouth 2 (two) times daily. 4/27/22 5/12/23 Yes Salty Barry MD   oxyCODONE-acetaminophen (PERCOCET) 5-325 mg per tablet Take 1 tablet by mouth every 12 (twelve) hours as needed for Pain. 4/16/23  Yes Yoan Morrison MD   predniSONE (DELTASONE) 10 MG tablet Take 1 tablet (10 mg total) by mouth once daily. 11/3/22  Yes Nura Huston MD   traZODone  "(DESYREL) 50 MG tablet Take 1 tablet (50 mg total) by mouth nightly as needed for Insomnia. 4/11/23 4/10/24 Yes Edna Jaramillo NP   amoxicillin-clavulanate 875-125mg (AUGMENTIN) 875-125 mg per tablet Take 1 tablet by mouth every 12 (twelve) hours.  Patient not taking: Reported on 4/25/2023 4/16/23   Yoan Morrison MD       Review of Systems:  Review of Systems   Constitutional:  Positive for activity change. Negative for chills, diaphoresis and fever.   Respiratory:  Positive for cough. Negative for shortness of breath and wheezing.    Cardiovascular:  Negative for chest pain and palpitations.   Gastrointestinal:  Positive for constipation. Negative for abdominal pain, diarrhea, nausea and vomiting.   Genitourinary:  Negative for difficulty urinating and dysuria.   Musculoskeletal:  Negative for arthralgias and myalgias.   Skin:  Positive for wound.   Allergic/Immunologic: Positive for immunocompromised state.   Neurological:  Positive for weakness. Negative for dizziness, light-headedness and headaches.     Health Maintainence:   Immunizations:  Health Maintenance         Date Due Completion Date    Shingles Vaccine (1 of 2) Never done ---    High Dose Statin Never done ---    PROSTATE-SPECIFIC ANTIGEN 12/12/2019 12/12/2018    COVID-19 Vaccine (4 - Booster for Pfizer series) 05/24/2022 3/29/2022    Influenza Vaccine (1) 09/01/2022 10/12/2021    Abdominal Aortic Aneurysm Screening 12/20/2022 3/31/2015    Pneumococcal Vaccines (Age 65+) (3 - PPSV23 if available, else PCV20) 12/12/2023 12/12/2018    Lipid Panel 12/12/2023 12/12/2018    DEXA Scan 09/30/2024 9/30/2021    Colorectal Cancer Screening 08/06/2025 8/6/2020    TETANUS VACCINE 10/13/2031 10/13/2021             PHYSICAL EXAM     BP 90/60 (BP Location: Left arm, Patient Position: Sitting, BP Method: Medium (Manual))   Pulse 86   Ht 5' 10" (1.778 m)   SpO2 96%   BMI 22.96 kg/m²     Physical Exam  Vitals reviewed.   Constitutional:       " Appearance: He is well-developed.   HENT:      Head: Normocephalic.      Right Ear: External ear normal.      Left Ear: External ear normal.      Nose: Nose normal.      Mouth/Throat:      Pharynx: No oropharyngeal exudate.   Eyes:      Pupils: Pupils are equal, round, and reactive to light.   Neck:      Thyroid: No thyromegaly.      Vascular: No JVD.      Trachea: No tracheal deviation.   Cardiovascular:      Rate and Rhythm: Normal rate and regular rhythm.      Heart sounds: No murmur heard.    No friction rub. No gallop.   Pulmonary:      Effort: No respiratory distress.      Breath sounds: Examination of the right-lower field reveals rales. Rales present. No wheezing.   Chest:      Chest wall: No tenderness.   Abdominal:      General: Bowel sounds are normal. There is no distension.      Palpations: Abdomen is soft.      Tenderness: There is no abdominal tenderness.   Genitourinary:     Comments: Condom catheter in place - clear yellow urine in bag   Lymphadenopathy:      Cervical: No cervical adenopathy.   Skin:     General: Skin is warm and dry.      Findings: No rash.   Neurological:      Mental Status: He is alert and oriented to person, place, and time.      Motor: Weakness and abnormal muscle tone present.   Psychiatric:         Behavior: Behavior normal.         LABS     Lab Results   Component Value Date    HGBA1C <4.0 (A) 04/02/2019     CMP  Sodium   Date Value Ref Range Status   04/19/2023 131 (L) 136 - 145 mmol/L Final     Potassium   Date Value Ref Range Status   04/19/2023 4.8 3.5 - 5.1 mmol/L Final     Chloride   Date Value Ref Range Status   04/19/2023 97 95 - 110 mmol/L Final     CO2   Date Value Ref Range Status   04/19/2023 22 (L) 23 - 29 mmol/L Final     Glucose   Date Value Ref Range Status   04/19/2023 94 70 - 110 mg/dL Final     BUN   Date Value Ref Range Status   04/19/2023 10 8 - 23 mg/dL Final     Creatinine   Date Value Ref Range Status   04/19/2023 0.4 (L) 0.5 - 1.4 mg/dL Final      Calcium   Date Value Ref Range Status   04/19/2023 8.5 (L) 8.7 - 10.5 mg/dL Final     Total Protein   Date Value Ref Range Status   04/19/2023 6.8 6.0 - 8.4 g/dL Final     Albumin   Date Value Ref Range Status   04/19/2023 3.5 3.5 - 5.2 g/dL Final     Total Bilirubin   Date Value Ref Range Status   04/19/2023 1.8 (H) 0.1 - 1.0 mg/dL Final     Comment:     For infants and newborns, interpretation of results should be based  on gestational age, weight and in agreement with clinical  observations.    Premature Infant recommended reference ranges:  Up to 24 hours.............<8.0 mg/dL  Up to 48 hours............<12.0 mg/dL  3-5 days..................<15.0 mg/dL  6-29 days.................<15.0 mg/dL       Alkaline Phosphatase   Date Value Ref Range Status   04/19/2023 75 55 - 135 U/L Final     AST   Date Value Ref Range Status   04/19/2023 9 (L) 10 - 40 U/L Final     ALT   Date Value Ref Range Status   04/19/2023 13 10 - 44 U/L Final     Anion Gap   Date Value Ref Range Status   04/19/2023 12 8 - 16 mmol/L Final     eGFR if    Date Value Ref Range Status   04/27/2022 >60.0 >60 mL/min/1.73 m^2 Final     eGFR if non    Date Value Ref Range Status   04/27/2022 >60.0 >60 mL/min/1.73 m^2 Final     Comment:     Calculation used to obtain the estimated glomerular filtration  rate (eGFR) is the CKD-EPI equation.        Lab Results   Component Value Date    WBC 9.74 04/19/2023    HGB 9.8 (L) 04/19/2023    HCT 31.1 (L) 04/19/2023    MCV 57 (L) 04/19/2023     (L) 04/19/2023     Lab Results   Component Value Date    CHOL 129 12/12/2018    CHOL 138 03/23/2017     Lab Results   Component Value Date    HDL 38 (L) 12/12/2018    HDL 30 (L) 03/23/2017     Lab Results   Component Value Date    LDLCALC 74.0 12/12/2018    LDLCALC 75.4 03/23/2017     Lab Results   Component Value Date    TRIG 85 12/12/2018    TRIG 163 (H) 03/23/2017     Lab Results   Component Value Date    CHOLHDL 29.5 12/12/2018     CHOLHDL 21.7 03/23/2017     Lab Results   Component Value Date    TSH 1.523 04/02/2019       ASSESSMENT/PLAN     Nura Kahn Jr. is a 67 y.o. male     Chronic obstructive pulmonary disease, unspecified COPD type- repeat cxr. Will cont current meds. Refer to    -     Cancel: Ambulatory referral/consult to Home Health; Future; Expected date: 04/26/2023  -     X-Ray Chest PA And Lateral; Future; Expected date: 04/25/2023  -     Ambulatory referral/consult to Home Health; Future; Expected date: 04/26/2023    Acute hypoxemic respiratory failure- resolved. Will monitor. Repeat CXR   -     X-Ray Chest PA And Lateral; Future; Expected date: 04/25/2023    Pneumonia of right lower lobe due to infectious organism- resolving, will repeat CXR   -     X-Ray Chest PA And Lateral; Future; Expected date: 04/25/2023    Interstitial pulmonary disease, unspecified- stable. Will cont current meds.     Pulmonary granuloma    Current mild episode of major depressive disorder without prior episode- stable. Will cont current meds and monitor     Quadriparesis (muscle weakness)- refer to  [facility]   -     Cancel: Ambulatory referral/consult to Home Health; Future; Expected date: 04/26/2023  -     Ambulatory referral/consult to [facility]; Future; Expected date: 05/02/2023  -     Ambulatory referral/consult to Home Health; Future; Expected date: 04/26/2023    Essential hypertension- controlled. Will cont current meds.     Atrial fibrillation, unspecified type- rate controlled. Will cont current meds.     Aortic atherosclerosis- stable. Will cont current meds.     Polymyositis- stable. Will cont prednisone and monitor. F/u with rheum as needed     Immunosuppression    Thrombocytopenia    Oropharyngeal dysphagia- will refer to  with speech therapy     Dysphagia, unspecified type  -     Ambulatory referral/consult to [facility]; Future; Expected date: 05/02/2023    Tobacco abuse    Debility  -     Cancel:  Ambulatory referral/consult to Home Health; Future; Expected date: 04/26/2023  -     Ambulatory referral/consult to [facility]; Future; Expected date: 05/02/2023  -     Ambulatory referral/consult to Home Health; Future; Expected date: 04/26/2023    Gait instability    Open wound of right hand, foreign body presence unspecified, unspecified wound type, initial encounter- possible IV extravasation, start mupirocin and cover    -     mupirocin (BACTROBAN) 2 % ointment; Apply topically 3 (three) times daily.  Dispense: 30 g; Refill: 1    Constipation, unspecified constipation type- start lactulose as needed and decrease opiods   -     lactulose (CHRONULAC) 10 gram/15 mL solution; Take 15 mLs (10 g total) by mouth every 6 (six) hours as needed (constipation).  Dispense: 150 mL; Refill: 0           Follow up with PCP     Patient education provided from Last. Patient was counseled on when and how to seek emergent care.       Edna MORGAN, APRN, FNP-c   Department of Internal Medicine - [facility]  8:49 AM

## 2023-05-14 PROBLEM — E87.1 HYPONATREMIA: Status: ACTIVE | Noted: 2023-01-01

## 2023-05-14 PROBLEM — A41.9 SEVERE SEPSIS: Status: ACTIVE | Noted: 2023-01-01

## 2023-05-14 PROBLEM — J96.21 ACUTE ON CHRONIC RESPIRATORY FAILURE WITH HYPOXIA: Status: ACTIVE | Noted: 2023-01-01

## 2023-05-14 PROBLEM — R65.20 SEVERE SEPSIS: Status: ACTIVE | Noted: 2023-01-01

## 2023-05-14 PROBLEM — E80.6 HYPERBILIRUBINEMIA: Status: ACTIVE | Noted: 2023-01-01

## 2023-05-14 PROBLEM — C80.1 MALIGNANT (PRIMARY) NEOPLASM, UNSPECIFIED: Status: RESOLVED | Noted: 2022-05-04 | Resolved: 2023-01-01

## 2023-05-14 NOTE — ASSESSMENT & PLAN NOTE
Patient reports that he used to smoke heavily, though cannot specify packs-per day. Reports that he quit 4 years ago, but states that he still occasionally uses marijuana.

## 2023-05-14 NOTE — ASSESSMENT & PLAN NOTE
This patient does have evidence of infective focus, pneumonia.   My overall impression is sepsis.  Source: Respiratory  Antibiotics given-   Antibiotics (72h ago, onward)    Start     Stop Route Frequency Ordered    05/14/23 1200  piperacillin-tazobactam (ZOSYN) 4.5 g in dextrose 5 % in water (D5W) 5 % 100 mL IVPB (MB+)  (ED Adult Sepsis Treatment)         05/15 1159 IV Every 8 hours (non-standard times) 05/14/23 1159        Latest lactate reviewed-  No results for input(s): LACTATE in the last 72 hours.  Organ dysfunction indicated by Acute respiratory failure    Fluid challenge Ideal Body Weight- The patient's ideal body weight is Ideal body weight: 73 kg (160 lb 15 oz) which was used to calculate fluid bolus of 30 ml/kg for treatment of sepsis with hypotension. Pt received IVF bolus already.     MAP goal 65, BP on my exam 70. Hr improved to 105 with fluid bolus.   - Cr 0.4, LA 3, repeat lactic acid after fluid resuscitation   - if increasing O2 requirements would not given fluid resuscitation beyond 30cc bolus ordered   - follow up blood and respiratory cultures   - continue with broad spectrum abx

## 2023-05-14 NOTE — ASSESSMENT & PLAN NOTE
CXR with bilateral opacities worse in the RML concerning for recurrent aspiration pna. Increased oxygen requirements of 4-5L NC.   - continue IV abx (zosyn and vancomycin)   - obtain blood, sputum culture (induced sputum)   - respiratory toilet: cough assist, CPT to assist with weak cough   - can assess NIF if worsening weakness   - continue to wean oxygen as able with goal 92%  - continue neb treatments q6   - prednisone 40mg daily x 3-5d

## 2023-05-14 NOTE — ED NOTES
APPEARANCE: awake and alert in NAD. Pt seated in tripod position in stretcher.  SKIN: warm, dry and intact. Breakdown noted to sacral area, cyst to left side of back, wound to right hand.  MUSCULOSKELETAL: Patient moving all extremities spontaneously, no obvious swelling or deformities noted. Pt is bed bound.  RESPIRATORY: Reports shortness of breath. Respirations labored; on 3L NC. RR 26  CARDIAC: Denies CP, 2+ distal pulses; no peripheral edema noted. On cardiac monitoring.  ABDOMEN: S/ND/NT, Denies nausea/vomiting/diarrhea  : voids spontaneously, denies difficulty. Condom cath in place.  Neurologic: AAO x 4; follows commands partial strength in extremities; denies numbness/tingling. Denies dizziness/lightheadedness/HA.

## 2023-05-14 NOTE — HPI
"67yoM w inclusion body myositis/polymyositis (diagnosed 2009. Chair bound), HTN, HbC disease, beta thalessemia, recent hospitalization for aspiration pneumonia and COVID, COPD who presented to the ED for SOB and progressively worsening shortness of breath, nonproductive cough. and generalized weakness over the past 4 days. This has been associated with a nonproductive cough. He denies chest pain or fever.    Pt is followed by [facility], receiving IVIG and rituximab infusions. Last IVIG was 11/2021. Last rituximab was 4/4/22 and 4/29/22.  Takes MMF 1500 mg BID and prednisone 10 mg qd. Pt states his generalized weakness has worsened since getting COVID and subsequently missing his rituximab infusions.     In ED, tachycardia with MAP 64-65. He was given 30cc/kg IVF, vanc and zosyn for abx. CXR w bilateral lower lung field infiltrates, concerning for aspiration PNA. Pt placed on 4L NC, started broad spectrum antibiotics Hospital medicine consulted for admission with MICU consult to discuss ICU needs. Improved with fluid resuscitation, admitted to Hospital Medicine. Oxygen requirement improved with CAP coverage, cultures NGTD, covid negative. Required 2 units pRBC on 5/16. No evidence of bleeding, CTA negative, suspect 2/2 HbC/B Thalassemia. Hgb stable. Course c/b progressive dysphagia likely 2/2 worsening myositis while off rituxin, SLP evaluated w MBS demonstrating aspiration of thin liquids. ENT consulted, unremarkable larygnoscopy. no indication for UES dilation or cricopharyngeal myotomy. On 5/17, patient w increased WOB overnight and through the morning, worsening after he "overdid it" with his last meal. MICU consulted, on exam, pt w mild-mod accessory mm use, SpO2 95% on 2L NC, very weak cough, coarse upper lobes, diminished lower lobes. Tachypneic, feels SOB. Given myositis, known dysphagia, pt deemed to be high risk, concern for tiring. Transferred to MICU for airway watch, HFNC, hypertonic saline, " CPT, q4 NIF.

## 2023-05-14 NOTE — HPI
"Mr. 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 Huston) complicated by quadriplegia and dysphagia, COPD, HTN, aspiration pneumonia, hemoglobin c disease, beta-thalassemia, who presents with for 1 week of progressively worsening shortness of breath. Patient describes this as chest tightness. He denies associated fevers, chills, cough. He has recent hospitalization for aspiration pneumonia and COPD exacerbation (discharged on 4/16/23). Was given w/CAP coverage, then Unasyn, as well as prednisone. Was discharged on Augmentin to complete antibiotic course. Patient states that he lives in a "two bed" in "[facility]" with "a lady" who takes care of him. Describes her as his caregiver, though says that "she doesn't always give 100%." Reports sporadic adherence to medication, and states that he spends almost all of his time in bed. Unfortunately reports that he is quite often left to defecate and urinate on himself. Presented to the ED via EMS, was given Solu-Medrol and Duonebs en route and placed on 2L NC. Patient denies using supplemental oxygen at home. In the ED, septic work-up was started, given septic bolus, and started on broad-spectrum antibiotics. Admitted to hospital medicine for pneumonia and sepsis.   "

## 2023-05-14 NOTE — ED PROVIDER NOTES
Encounter Date: 5/14/2023       History     Chief Complaint   Patient presents with    Shortness of Breath     Pt from home, per EMS 82% on 2L via NC. Pt received duoneb and 125 solumedrol. Pt now 99% on 6L via NC. Pt recently dx with pneumonia, used inhaler at home without relief.      The patient is a 67-year-old male with recent aspiration pneumonia and COVID and a history of COPD amongst other significant comorbidities who presents to the emergency department via EMS for shortness of breath.  EMS reports that the patient had an oxygen saturation of 82% on 2 L via nasal cannula.  In route, he received IV Solu-Medrol and a DuoNeb.  The patient reports that he has had progressively worsening shortness of breath and significant generalized weakness over the past 4 days.  This has been associated with a nonproductive cough.  He denies chest pain or fever.  His only other complaint is pain on his buttock from a presumed bedsore.  He is unclear of the etiology to his symptoms.  He states that they have been constant and progressively worsening.  He has no other complaints.    Review of patient's allergies indicates:  No Known Allergies  Past Medical History:   Diagnosis Date    Aspiration pneumonia of right lower lobe 1/15/2018    Atrial fibrillation 3/2/2015    Chronic obstructive pulmonary disease, unspecified COPD type 5/4/2022    Depression     Essential hypertension 2/27/2019    Malignant (primary) neoplasm, unspecified 5/4/2022    Microcytic anemia 9/25/2014    Neuromuscular disorder     Other osteoporosis without current pathological fracture 9/22/2022    Pneumonia     Polymyositis 2009    Tobacco abuse      Past Surgical History:   Procedure Laterality Date    COLONOSCOPY N/A 8/6/2020    Procedure: COLONOSCOPY;  Surgeon: Brandan Meyer MD;  Location: [address];  Service: Endoscopy;  Laterality: N/A;    ESOPHAGOGASTRODUODENOSCOPY N/A 8/6/2020    Procedure: EGD (ESOPHAGOGASTRODUODENOSCOPY);  Surgeon:  Brandan Meyer MD;  Location: [facility] ([address] [place] [address]);  Service: Endoscopy;  Laterality: N/A;  multiple co-morbidities. will have family member for assistance.  has neuromuscular issues-needs lift. in W/C-unable to transfer per self     COVID test at [facility] on 8/3-GT     Family History   Problem Relation Age of Onset    Diabetes Mother     Hypertension Mother     Kidney disease Mother     Diabetes Father     Hypertension Father     Heart attack Neg Hx     Heart disease Neg Hx     Melanoma Neg Hx      Social History     Tobacco Use    Smoking status: Some Days     Packs/day: 0.25     Years: 20.00     Pack years: 5.00     Types: Cigarettes     Last attempt to quit: 2018     Years since quittin.3    Smokeless tobacco: Never   Substance Use Topics    Alcohol use: Yes     Alcohol/week: 0.0 standard drinks    Drug use: Yes     Types: Marijuana     Comment: daily use     Review of Systems  General: Denies fever.  Denies chills.  Reports significant generalized weakness.  HENT: Denies sore throat.  Denies earache.  Denies rhinorrhea.  Eyes: Denies visual changes.  Denies eye pain.  Denies drainage.  Cardiovascular: Denies chest pain.  Reports shortness of breath.  Denies orthopnea.  Denies dyspnea on exertion.  Respiratory:  Reports shortness of breath.  Denies wheezing.  Reports nonproductive coughing.  GI: Denies abdominal pain.  Denies nausea.  Denies vomiting.  Denies diarrhea.  Denies constipation.  Denies melena.  Denies hematochezia.  : Denies dysuria.  Denies hematuria.  Denies pelvic pain.  Denies swelling.  Skin: Denies rashes.  Denies lesions.  Denies pallor.  Reports buttock pain at the site of a bedsore.  Neuro: Denies headache.  Denies head trauma.  Denies numbness.  Denies new focal weakness but does have a history of quadriparesis.  Musculoskeletal: Denies neck pain.  Denies back pain.  Denies extremity pain.  Denies extremity swelling.  Reports buttock pain.      Physical Exam     Initial  Vitals [05/14/23 1126]   BP Pulse Resp Temp SpO2   109/83 (!) 120 (!) 26 98.3 °F (36.8 °C) 99 %      MAP       --         Physical Exam  General:  Moderate distress with shortness of breath.  Cachectic in appearance.  Alert and oriented x3.    HENT:  Dry mucous membranes.  Normocephalic atraumatic.  Oropharynx clear.   Eyes: Pupils equally round and reactive to light.  Extraocular movements intact.  No scleral icterus.  No conjunctival pallor.  Cardiovascular:  Regular rhythm.  Tachycardia noted with a rate of approximately 135 on my examination.  No murmurs, rubs, or gallops.  Brisk capillary fill.  2+ distal pulses.  Respiratory:  Coarse bilateral breath sounds.  No obvious wheezing auscultated.  Moderate respiratory distress with tachypnea.  Minimal active nonproductive coughing noted.  Accessory muscle use noted.  Abdomen: Soft.  Nontender.  Nondistended.  No guarding.  No rebound.  No masses.  No abdominal bruit auscultated.  Neuro: Cranial nerves II through XII grossly intact.  No movement of the extremities noted.  Musculoskeletal: Neck supple.  No extremity tenderness or swelling noted.      ED Course   Critical Care    Date/Time: 5/14/2023 2:13 PM  Performed by: Dez Mendez MD  Authorized by: Dez Mendez MD   Direct patient critical care time: 9 minutes  Additional history critical care time: 7 minutes  Ordering / reviewing critical care time: 10 minutes  Documentation critical care time: 13 minutes  Consulting other physicians critical care time: 7 minutes  Total critical care time (exclusive of procedural time) : 46 minutes      Labs Reviewed   CBC W/ AUTO DIFFERENTIAL - Abnormal; Notable for the following components:       Result Value    WBC 16.29 (*)     Hemoglobin 9.5 (*)     Hematocrit 31.3 (*)     MCV 58 (*)     MCH 17.5 (*)     MCHC 30.4 (*)     RDW 22.7 (*)     Immature Granulocytes 1.5 (*)     Gran # (ANC) 13.3 (*)     Immature Grans (Abs) 0.24 (*)     nRBC 1 (*)     Gran % 81.6 (*)      "Lymph % 11.2 (*)     All other components within normal limits   COMPREHENSIVE METABOLIC PANEL - Abnormal; Notable for the following components:    Sodium 127 (*)     CO2 17 (*)     Creatinine 0.4 (*)     Calcium 8.5 (*)     Albumin 2.5 (*)     Total Bilirubin 1.8 (*)     ALT 9 (*)     All other components within normal limits   URINALYSIS, REFLEX TO URINE CULTURE - Abnormal; Notable for the following components:    Protein, UA Trace (*)     Ketones, UA 1+ (*)     All other components within normal limits    Narrative:     Specimen Source->Urine   ISTAT LACTATE - Abnormal; Notable for the following components:    POC Lactate 3.23 (*)     All other components within normal limits   CULTURE, BLOOD   CULTURE, BLOOD   TROPONIN I   B-TYPE NATRIURETIC PEPTIDE     EKG Readings: (Independently Interpreted)   I independently interpreted this EKG.  Sinus tachycardia with a rate of 123.  Premature supraventricular complexes noted.  Otherwise, normal intervals.  Left axis deviation.  T-wave inversions are noted in the lateral, septal, and anterior leads.  Nonspecific T-wave abnormalities are noted in the inferior leads.       Imaging Results              X-Ray Chest AP Portable (Final result)  Result time 05/14/23 12:38:55      Final result by Edilson Murillo MD (05/14/23 12:38:55)                   Impression:      Worsening bibasilar airspace and consolidative opacities with ground-glass opacities in the right mid lung, suggestive of multifocal pneumonia versus aspiration in this patient with sepsis.  Asymmetric pulmonary edema felt less likely.      Electronically signed by: Edilson Murillo MD  Date:    05/14/2023  Time:    12:38               Narrative:    EXAMINATION:  XR CHEST AP PORTABLE    CLINICAL HISTORY:  Provided history is "Sepsis;  ".    TECHNIQUE:  One view of the chest.    COMPARISON:  04/25/2023.    FINDINGS:  Cardiac wires overlie the chest.  Exam quality is limited by exaggerated kyphotic positioning.  " Cardiac silhouette is stable and not significantly enlarged.  Significant bibasilar airspace disease versus consolidation or less likely subsegmental atelectasis.  Patchy ground-glass opacities in the right mid lung.  Coarsened bilateral interstitial lung markings.  There is a rounded opacity overlying the left lung apex which is likely exaggerated by overlapping costal cartilage as this finding was not seen on a prior exam dated 03/31/2023 which was obtained in a different position than the current study.  Blunting of the bilateral costophrenic angles raises the question of small pleural effusions versus airspace disease.  No distinct pneumothorax.                                       Medications   piperacillin-tazobactam (ZOSYN) 4.5 g in dextrose 5 % in water (D5W) 5 % 100 mL IVPB (MB+) (has no administration in time range)   vancomycin 1,500 mg in dextrose 5 % (D5W) 250 mL IVPB (Vial-Mate) (1,500 mg Intravenous New Bag 5/14/23 1325)   lactated ringers bolus 2,178 mL (2,178 mLs Intravenous New Bag 5/14/23 1200)   oxyCODONE-acetaminophen 5-325 mg per tablet 1 tablet (1 tablet Oral Given 5/14/23 1325)     Medical Decision Making:   Initial Assessment:   This is an emergent evaluation of a critically ill patient.  The patient is tachycardic and hypotensive with a systolic blood pressure in the 90s currently.  The etiology of his symptoms is unclear.  However, with his presenting symptoms and recent pneumonia, I will treat for sepsis with presumed pneumonia.  Laboratory studies, chest x-ray, EKG, broad-spectrum antibiotics, and large fluid volume resuscitation have been ordered.  I have significant concern for the patient's clinical status.  I will reassess.  ED Management:  12:06 p.m.   The patient remains considerably tachypneic.  On room air, his oxygen saturation drops into the high 80s.  He is currently on 2 L of oxygen via nasal cannula.  He states that BiPAP is unsuccessful in relieving his shortness of breath  normally.  He is refusing it at this time.  I will continue to reassess the patient.  I have significant concern for his clinical status.    12:54 p.m.   The patient's chest x-ray is consistent with multilobar pneumonia.  Broad-spectrum antibiotics have already been ordered.  Because the patient is also hypotensive and tachycardic, large fluid volume resuscitation has already been ordered as well.  Lactic acid is noted to be elevated.  I continue to have significant concern for the patient's clinical status.    1:40 p.m.   Current systolic blood pressure is 110.  Current heart rate is 114.  This is an improvement from the patient's initial presentation.  Hospital Medicine does have some concern for the patient's clinical status and is requesting that the MICU be consulted.      1:45 p.m.  I discussed this case with the MICU.  They are coming to see the patient.    2:12 p.m.  The MICU has completed their evaluation.  They feel that the patient is stable enough to go to the floor.  I relayed this message to Hospital Medicine.  They have agreed to evaluate and admit.    5:55 p.m.  The patient's blood pressure is trending downward.  I notified the hospital Medicine attending.  The team is coming back down to reassess and determine whether the patient should go to the MICU.                        Clinical Impression:   Final diagnoses:  [R00.0] Tachycardia  [A41.9] Sepsis, due to unspecified organism, unspecified whether acute organ dysfunction present (Primary)  [J18.9] Pneumonia of both lungs due to infectious organism, unspecified part of lung  [R09.02] Hypoxia        ED Disposition Condition    Admit Serious                Dez Mendez MD  05/14/23 [id number]       Dez Mendez MD  05/14/23 8334

## 2023-05-14 NOTE — SUBJECTIVE & OBJECTIVE
Past Medical History:   Diagnosis Date    Aspiration pneumonia of right lower lobe 1/15/2018    Atrial fibrillation 3/2/2015    Chronic obstructive pulmonary disease, unspecified COPD type 5/4/2022    Depression     Essential hypertension 2/27/2019    Malignant (primary) neoplasm, unspecified 5/4/2022    Microcytic anemia 9/25/2014    Neuromuscular disorder     Other osteoporosis without current pathological fracture 9/22/2022    Pneumonia     Polymyositis 2009    Tobacco abuse        Past Surgical History:   Procedure Laterality Date    COLONOSCOPY N/A 8/6/2020    Procedure: COLONOSCOPY;  Surgeon: Brandan Meyer MD;  Location: [facility] ([address] [place] [address]);  Service: Endoscopy;  Laterality: N/A;    ESOPHAGOGASTRODUODENOSCOPY N/A 8/6/2020    Procedure: EGD (ESOPHAGOGASTRODUODENOSCOPY);  Surgeon: Brandan Meyer MD;  Location: [facility] ([address] [place] [address]);  Service: Endoscopy;  Laterality: N/A;  multiple co-morbidities. will have family member for assistance.  has neuromuscular issues-needs lift. in W/C-unable to transfer per self     COVID test at [facility] on 8/3-GT       Review of patient's allergies indicates:  No Known Allergies    No current facility-administered medications on file prior to encounter.     Current Outpatient Medications on File Prior to Encounter   Medication Sig    albuterol (PROVENTIL/VENTOLIN HFA) 90 mcg/actuation inhaler Inhale 2 puffs into the lungs every 6 (six) hours as needed for Wheezing or Shortness of Breath.    albuterol-ipratropium (DUO-NEB) 2.5 mg-0.5 mg/3 mL nebulizer solution Take 3 mLs by nebulization every 6 (six) hours as needed for Wheezing. Rescue    amLODIPine (NORVASC) 10 MG tablet Take 1 tablet (10 mg total) by mouth once daily.    ammonium lactate 12 % Crea Apply 1 gram topically once daily. Apply to areas of dry skin and nails daily.    amoxicillin-clavulanate 875-125mg (AUGMENTIN) 875-125 mg per tablet Take 1 tablet by mouth every 12 (twelve) hours. (Patient not taking: Reported  on 2023)    cholecalciferol, vitamin D3, 1,250 mcg (50,000 unit) capsule Take 1 capsule (50,000 Units total) by mouth once a week.    hydrOXYzine HCL (ATARAX) 25 MG tablet Take 1 tablet (25 mg total) by mouth 3 (three) times daily as needed (itching).    lactulose (CHRONULAC) 10 gram/15 mL solution Take 15 mLs (10 g total) by mouth every 6 (six) hours as needed (constipation).    losartan (COZAAR) 25 MG tablet Take 1 tablet (25 mg total) by mouth once daily.    mupirocin (BACTROBAN) 2 % ointment Apply topically 3 (three) times daily.    mycophenolate (CELLCEPT) 500 mg Tab Take 3 tablets (1,500 mg total) by mouth 2 (two) times daily.    oxyCODONE-acetaminophen (PERCOCET) 5-325 mg per tablet Take 1 tablet by mouth every 12 (twelve) hours as needed for Pain.    predniSONE (DELTASONE) 10 MG tablet Take 1 tablet (10 mg total) by mouth once daily.    traZODone (DESYREL) 50 MG tablet Take 1 tablet (50 mg total) by mouth nightly as needed for Insomnia.     Family History       Problem Relation (Age of Onset)    Diabetes Mother, Father    Hypertension Mother, Father    Kidney disease Mother          Tobacco Use    Smoking status: Some Days     Packs/day: 0.25     Years: 20.00     Pack years: 5.00     Types: Cigarettes     Last attempt to quit: 2018     Years since quittin.3    Smokeless tobacco: Never   Substance and Sexual Activity    Alcohol use: Yes     Alcohol/week: 0.0 standard drinks    Drug use: Yes     Types: Marijuana     Comment: daily use    Sexual activity: Not on file     Review of Systems  Objective:     Vital Signs (Most Recent):  Temp: 98.3 °F (36.8 °C) (23 1126)  Pulse: 98 (23 1555)  Resp: (!) 27 (23 1513)  BP: 121/64 (23 1547)  SpO2: 99 % (23 1555) Vital Signs (24h Range):  Temp:  [98.3 °F (36.8 °C)] 98.3 °F (36.8 °C)  Pulse:  [] 98  Resp:  [20-37] 27  SpO2:  [96 %-99 %] 99 %  BP: ()/(56-83) 121/64     Weight: 72.6 kg (160 lb)  Body mass index is 22.96  kg/m².     Physical Exam  Constitutional:       Appearance: He is normal weight. He is ill-appearing.   HENT:      Head: Normocephalic and atraumatic.   Eyes:      Extraocular Movements: Extraocular movements intact.   Cardiovascular:      Rate and Rhythm: Regular rhythm. Tachycardia present.      Pulses: Normal pulses.      Heart sounds: Normal heart sounds.   Pulmonary:      Effort: Pulmonary effort is normal.      Breath sounds: Rales present.   Abdominal:      General: Abdomen is flat. Bowel sounds are normal.   Skin:     General: Skin is warm and dry.      Findings: Lesion (sacral decub ulcer, stage 2) present.      Comments: Fluctuant swelling over left scapular area.   Neurological:      Mental Status: He is alert.      Sensory: Sensory deficit present.      Motor: Weakness present.      Comments: Strength most preserved in far distal and proximal limbs. Otherwise 1/5.              Significant Labs: All pertinent labs within the past 24 hours have been reviewed.  CBC:   Recent Labs   Lab 05/14/23  1218   WBC 16.29*   HGB 9.5*   HCT 31.3*        CMP:   Recent Labs   Lab 05/14/23  1218   *   K 4.7   CL 98   CO2 17*      BUN 20   CREATININE 0.4*   CALCIUM 8.5*   PROT 6.6   ALBUMIN 2.5*   BILITOT 1.8*   ALKPHOS 69   AST 16   ALT 9*   ANIONGAP 12     Lactic Acid:   Recent Labs   Lab 05/14/23  1457   LACTATE 1.3     Lipase: No results for input(s): LIPASE in the last 48 hours.  Magnesium: No results for input(s): MG in the last 48 hours.    Significant Imaging: I have reviewed all pertinent imaging results/findings within the past 24 hours.  CXR: I have reviewed all pertinent results/findings within the past 24 hours and my personal findings are:  bilateral ground glass opacities

## 2023-05-14 NOTE — SUBJECTIVE & OBJECTIVE
Past Medical History:   Diagnosis Date    Aspiration pneumonia of right lower lobe 1/15/2018    Atrial fibrillation 3/2/2015    Chronic obstructive pulmonary disease, unspecified COPD type 2022    Depression     Essential hypertension 2019    Malignant (primary) neoplasm, unspecified 2022    Microcytic anemia 2014    Neuromuscular disorder     Other osteoporosis without current pathological fracture 2022    Pneumonia     Polymyositis 2009    Tobacco abuse        Past Surgical History:   Procedure Laterality Date    COLONOSCOPY N/A 2020    Procedure: COLONOSCOPY;  Surgeon: Brandan Meyer MD;  Location: [facility] ([address] [place] [address]);  Service: Endoscopy;  Laterality: N/A;    ESOPHAGOGASTRODUODENOSCOPY N/A 2020    Procedure: EGD (ESOPHAGOGASTRODUODENOSCOPY);  Surgeon: Brandan Meyer MD;  Location: [facility] ([address] [place] [address]);  Service: Endoscopy;  Laterality: N/A;  multiple co-morbidities. will have family member for assistance.  has neuromuscular issues-needs lift. in W/C-unable to transfer per self     COVID test at [facility] on 8/3-GT       Review of patient's allergies indicates:  No Known Allergies    Family History       Problem Relation (Age of Onset)    Diabetes Mother, Father    Hypertension Mother, Father    Kidney disease Mother          Tobacco Use    Smoking status: Some Days     Packs/day: 0.25     Years: 20.00     Pack years: 5.00     Types: Cigarettes     Last attempt to quit: 2018     Years since quittin.3    Smokeless tobacco: Never   Substance and Sexual Activity    Alcohol use: Yes     Alcohol/week: 0.0 standard drinks    Drug use: Yes     Types: Marijuana     Comment: daily use    Sexual activity: Not on file      Review of Systems   Constitutional:  Positive for fatigue. Negative for chills and fever.   HENT:  Negative for facial swelling, trouble swallowing and voice change.    Eyes:  Negative for pain and discharge.   Respiratory:  Positive for cough and shortness of  breath. Negative for apnea and wheezing.    Cardiovascular:  Negative for chest pain and leg swelling.   Gastrointestinal:  Negative for abdominal pain, diarrhea, nausea and vomiting.   Genitourinary:  Negative for dysuria, flank pain and hematuria.   Musculoskeletal:  Negative for arthralgias.   Skin:  Negative for pallor, rash and wound.   Neurological:  Positive for weakness. Negative for seizures and headaches.   Objective:     Vital Signs (Most Recent):  Temp: 98.3 °F (36.8 °C) (05/14/23 1126)  Pulse: 107 (05/14/23 1409)  Resp: 20 (05/14/23 1325)  BP: (!) 96/56 (05/14/23 1403)  SpO2: 98 % (05/14/23 1406) Vital Signs (24h Range):  Temp:  [98.3 °F (36.8 °C)] 98.3 °F (36.8 °C)  Pulse:  [107-124] 107  Resp:  [20-37] 20  SpO2:  [97 %-99 %] 98 %  BP: ()/(56-83) 96/56   Weight: 72.6 kg (160 lb)  Body mass index is 22.96 kg/m².    No intake or output data in the 24 hours ending 05/14/23 1451       Physical Exam  Vitals and nursing note reviewed.   Constitutional:       Comments: Chronically unwell appearing male. Not in acute respiratory distress.    HENT:      Head: Normocephalic and atraumatic.      Nose: No congestion.      Mouth/Throat:      Mouth: Mucous membranes are dry.      Comments: Dry MM  Eyes:      Extraocular Movements: Extraocular movements intact.      Pupils: Pupils are equal, round, and reactive to light.   Cardiovascular:      Rate and Rhythm: Regular rhythm. Tachycardia present.      Heart sounds: No murmur heard.  Pulmonary:      Breath sounds: No wheezing.      Comments: Anterior lung fields are clear. Crackles in the bilateral bases and mid lung fields on the R  Abdominal:      General: There is no distension.      Palpations: Abdomen is soft.      Tenderness: There is no abdominal tenderness. There is no guarding.   Musculoskeletal:         General: No swelling or tenderness.      Cervical back: Normal range of motion.      Comments: Healing wound on the R dorsal hand. Pt with limited  movement of the extremities. Able to move his shoulders and head and neck.   Skin:     General: Skin is warm.      Capillary Refill: Capillary refill takes less than 2 seconds.      Coloration: Skin is not pale.      Findings: No erythema or rash.   Neurological:      General: No focal deficit present.      Mental Status: He is oriented to person, place, and time.      Cranial Nerves: No cranial nerve deficit.          Vents:     Lines/Drains/Airways       Peripheral Intravenous Line  Duration                  Peripheral IV - Single Lumen 05/14/23 1313 20 G Left;Posterior Wrist <1 day                  Significant Labs:    CBC/Anemia Profile:  Recent Labs   Lab 05/14/23  1218   WBC 16.29*   HGB 9.5*   HCT 31.3*      MCV 58*   RDW 22.7*        Chemistries:  Recent Labs   Lab 05/14/23  1218   *   K 4.7   CL 98   CO2 17*   BUN 20   CREATININE 0.4*   CALCIUM 8.5*   ALBUMIN 2.5*   PROT 6.6   BILITOT 1.8*   ALKPHOS 69   ALT 9*   AST 16       Recent Lab Results         05/14/23  1225   05/14/23  1223   05/14/23  1218        Albumin     2.5       Alkaline Phosphatase     69       Allens Test   N/A         ALT     9       Anion Gap     12       Appearance, UA Clear           AST     16  Comment: *Result may be interfered by visible hemolysis       Baso #     0.05       Basophil %     0.3       Bilirubin (UA) Negative           BILIRUBIN TOTAL     1.8  Comment: For infants and newborns, interpretation of results should be based  on gestational age, weight and in agreement with clinical  observations.    Premature Infant recommended reference ranges:  Up to 24 hours.............<8.0 mg/dL  Up to 48 hours............<12.0 mg/dL  3-5 days..................<15.0 mg/dL  6-29 days.................<15.0 mg/dL         BNP     17  Comment: Values of less than 100 pg/ml are consistent with non-CHF populations.       Site   Other         BUN     20       Calcium     8.5       Chloride     98       CO2     17       Color, UA  Yellow           Creatinine     0.4       Differential Method     Automated       eGFR     >60.0       Eos #     0.0       Eosinophil %     0.2       Glucose     106       Glucose, UA Negative           Gran # (ANC)     13.3       Gran %     81.6       Hematocrit     31.3       Hemoglobin     9.5       Immature Grans (Abs)     0.24  Comment: Mild elevation in immature granulocytes is non specific and   can be seen in a variety of conditions including stress response,   acute inflammation, trauma and pregnancy. Correlation with other   laboratory and clinical findings is essential.         Immature Granulocytes     1.5       Ketones, UA 1+           Leukocytes, UA Negative           Lymph #     1.8       Lymph %     11.2       MCH     17.5       MCHC     30.4       MCV     58       Mono #     0.9       Mono %     5.2       MPV     SEE COMMENT  Comment: Result not available.       NITRITE UA Negative           nRBC     1       Occult Blood UA Negative           pH, UA 6.0           Platelets     191       POC Lactate   3.23         Potassium     4.7       PROTEIN TOTAL     6.6       Protein, UA Trace  Comment: Recommend a 24 hour urine protein or a urine   protein/creatinine ratio if globulin induced proteinuria is  clinically suspected.             RBC     5.42       RDW     22.7       Sample   VENOUS         Sodium     127       Specific [place], UA 1.020           Specimen UA Urine, Clean Catch           Troponin I     <0.006  Comment: The reference interval for Troponin I represents the 99th percentile   cutoff   for our facility and is consistent with 3rd generation assay   performance.         WBC     16.29               Significant Imaging: I have reviewed all pertinent imaging results/findings within the past 24 hours.  I have reviewed and interpreted all pertinent imaging results/findings within the past 24 hours.

## 2023-05-14 NOTE — ASSESSMENT & PLAN NOTE
Secondary to inclusion body myositis. Patient reports that he takes his meds crushed in puree at home.    -possible that recurrent aspiration is causing his presentation  -SLP eval

## 2023-05-14 NOTE — ASSESSMENT & PLAN NOTE
Due to inclusion body myositis. Appears to be complicated by poor care at home, evidenced by sacral decubitus ulcer.

## 2023-05-14 NOTE — ASSESSMENT & PLAN NOTE
"Patient with Hypoxic Respiratory failure which is Acute on chronic.  he is not on home oxygen. Supplemental oxygen was provided and noted-      .   Signs/symptoms of respiratory failure include- increased work of breathing. Contributing diagnoses includes - COPD and Pneumonia Labs and images were reviewed. Patient Has not had a recent ABG. Will treat underlying causes and adjust management of respiratory failure as follows.    See "COPD," "sepsis" and "aspiration pneumonia."   "

## 2023-05-14 NOTE — ASSESSMENT & PLAN NOTE
This patient does have evidence of infective focus  My overall impression is sepsis.  Source: Respiratory  Antibiotics given-   Antibiotics (72h ago, onward)    Start     Stop Route Frequency Ordered    05/14/23 2230  azithromycin (ZITHROMAX) 500 mg in dextrose 5 % (D5W) 250 mL IVPB (Vial-Mate)         05/17 0859 IV Daily 05/14/23 1619    05/14/23 2200  cefTRIAXone (ROCEPHIN) 2 g in dextrose 5 % in water (D5W) 5 % 50 mL IVPB (MB+)         05/19 2159 IV Every 24 hours (non-standard times) 05/14/23 1619        Latest lactate reviewed-  Recent Labs   Lab 05/14/23  1457   LACTATE 1.3     Organ dysfunction indicated by Acute respiratory failure    Fluid challenge Actual Body weight- Patient will receive 30ml/kg actual body weight to calculate fluid bolus for treatment of septic shock.     Post- resuscitation assessment No - Post resuscitation assessment not needed       Will Not start Pressors- Levophed for MAP of 65  Source control achieved by: antibiotics.    Lactate initially elevated trended down to WNL on repeat  Starting CAP coverage  ->Rocephin 5 day course  ->azithromycin 3 day course  Hemodynamically stable following septic fluid bolus  UA negative for UTI  Follow-up blood cultures  Follow-up respiratory culture  Follow up respiratory viral panel

## 2023-05-14 NOTE — Clinical Note
Diagnosis: Sepsis, due to unspecified organism, unspecified whether acute organ dysfunction present [[id number]]  Admitting Provider:: LEONORA BABB [[id number]]  Future Attending Provider: LEONORA BABB [[id number]]  Reason for IP Medical Treatment  (Clini kaleigh interventions that can only be accomplished in the IP setting? ) :: Sepsis  I certify that Inpatient services for greater than or equal to 2 midnights are medically necessary:: Yes  Plans for Post-Acute care--if anticipated (pick the single best  option):: D. Skilled Nursing Placement

## 2023-05-14 NOTE — HOSPITAL COURSE
Stepped up to MICU 5/17, marked respiratory acidosis, lethargy, suspected aspiration. Mentation and respiratory status improved overnight with BIPAP, diuresis, pulmonary toilet. Satting well on nasal cannula. WBC uptrending, improved with escalation to HAP coverage.      SLP evaluated, recommended diet. Following discussion, determined pt will likely not meet nutritional requirements with PO intake regardless of potential improvement with Rituximab; given aspiration risk, NGT was placed. Patient initially refused PEG evaluation, but is now agreeable.     Remains HDS, satting well on nasal cannula. Strict aspiration precautions with monitored PO intake. On 5/19: patient decompensated overnight, now requiring continuous bipap. Remains lethargic, severe dyspnea and panic within minutes of removal of BiPAP. Discussed with pt's daughter/healthcare proxy. Decision was made to make patient DNR/DNI with no further escalation of care.

## 2023-05-14 NOTE — CLINICAL REVIEW
IP Sepsis Screen (most recent)       Sepsis Screen (IP) - 05/14/23 1426       Is the patient's history or complaint suggestive of a possible infection? Yes  -JM    Are there at least two of the following signs and symptoms present? Yes  -    Sepsis signs/symptoms - Tachycardia Tachycardia     >90  -    Sepsis signs/symptoms - WBC WBC < 4,000 or WBC > 12,000  -    Are any of the following organ dysfunction criteria present and not considered to be due to a chronic condition? Yes  -    Organ Dysfunction Criteria Lactate > 2.0  -    Initiate Sepsis Protocol No  -JM    Reason sepsis not considered Pt. receiving appropriate management  -              User Key  (r) = Recorded By, (t) = Taken By, (c) = Cosigned By      Initials Name    LESLYE Adorno RN

## 2023-05-14 NOTE — ASSESSMENT & PLAN NOTE
History of polymyositis/inclusion body myositis. Follows with Dr. Huston. Currently on Cellcept and prednisone (10mg QD), with sporadic adherence.    -hold Cellcept  -will be given prednisone dosed to COPD exacerbation

## 2023-05-14 NOTE — H&P
"[facility] - Emergency Dept  [facility] Medicine  History & Physical    Patient Name: Nura Kahn Jr.  MRN: [id number]  Patient Class: IP- Inpatient  Admission Date: 5/14/2023  Attending Physician: Emre Molina DO   Primary Care Provider: Edna Jaramillo NP         Patient information was obtained from patient, past medical records and ER records.     Subjective:     Principal Problem:Acute on chronic respiratory failure with hypoxia    Chief Complaint:   Chief Complaint   Patient presents with    Shortness of Breath     Pt from home, per EMS 82% on 2L via NC. Pt received duoneb and 125 solumedrol. Pt now 99% on 6L via NC. Pt recently dx with pneumonia, used inhaler at home without relief.         HPI: Mr. 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 Huston) complicated by quadriplegia and dysphagia, COPD, HTN, aspiration pneumonia, hemoglobin c disease, beta-thalassemia, who presents with 1 week of progressively worsening shortness of breath. Patient describes this as chest tightness. He denies associated fevers, chills, cough. He has a recent hospitalization for aspiration pneumonia and COPD exacerbation (discharged on 4/16/23). Was given CAP coverage, then Unasyn, as well as prednisone. Was discharged on Augmentin to complete antibiotic course. Prior to this, had a hospital stay (discharged on 4/3/23) during which he was treated for COVID with remdesivir and dexamethasone. Patient states that he lives in a "two bed" in "[facility]" with "a lady" who takes care of him. Describes her as his caregiver, though says that "she doesn't always give 100%." Reports sporadic adherence to medication, and states that he spends almost all of his time in bed. Unfortunately reports that he is quite often left to defecate and urinate on himself. Presented to the ED via EMS, was given Solu-Medrol and Duonebs en route and placed " on 2L NC. Patient denies using supplemental oxygen at home. In the ED, septic work-up was started, given septic bolus, and started on broad-spectrum antibiotics. Admitted to hospital medicine for pneumonia and sepsis.       Past Medical History:   Diagnosis Date    Aspiration pneumonia of right lower lobe 1/15/2018    Atrial fibrillation 3/2/2015    Chronic obstructive pulmonary disease, unspecified COPD type 5/4/2022    Depression     Essential hypertension 2/27/2019    Malignant (primary) neoplasm, unspecified 5/4/2022    Microcytic anemia 9/25/2014    Neuromuscular disorder     Other osteoporosis without current pathological fracture 9/22/2022    Pneumonia     Polymyositis 2009    Tobacco abuse        Past Surgical History:   Procedure Laterality Date    COLONOSCOPY N/A 8/6/2020    Procedure: COLONOSCOPY;  Surgeon: Brandan Meyer MD;  [facility]  Service: Endoscopy;  Laterality: N/A;    ESOPHAGOGASTRODUODENOSCOPY N/A 8/6/2020    Procedure: EGD (ESOPHAGOGASTRODUODENOSCOPY);  Surgeon: Brandan Meyer MD;  Location: [facility]  Service: Endoscopy;  Laterality: N/A;  multiple co-morbidities. will have family member for assistance.  has neuromuscular issues-needs lift. in [place]-unable to transfer per self     COVID test at [facility] on 8/3-GT       Review of patient's allergies indicates:  No Known Allergies    No current facility-administered medications on file prior to encounter.     Current Outpatient Medications on File Prior to Encounter   Medication Sig    albuterol (PROVENTIL/VENTOLIN HFA) 90 mcg/actuation inhaler Inhale 2 puffs into the lungs every 6 (six) hours as needed for Wheezing or Shortness of Breath.    albuterol-ipratropium (DUO-NEB) 2.5 mg-0.5 mg/3 mL nebulizer solution Take 3 mLs by nebulization every 6 (six) hours as needed for Wheezing. Rescue    amLODIPine (NORVASC) 10 MG tablet Take 1 tablet (10 mg total) by mouth once daily.    ammonium lactate 12 % Crea Apply  1 gram topically once daily. Apply to areas of dry skin and nails daily.    amoxicillin-clavulanate 875-125mg (AUGMENTIN) 875-125 mg per tablet Take 1 tablet by mouth every 12 (twelve) hours. (Patient not taking: Reported on 2023)    cholecalciferol, vitamin D3, 1,250 mcg (50,000 unit) capsule Take 1 capsule (50,000 Units total) by mouth once a week.    hydrOXYzine HCL (ATARAX) 25 MG tablet Take 1 tablet (25 mg total) by mouth 3 (three) times daily as needed (itching).    lactulose (CHRONULAC) 10 gram/15 mL solution Take 15 mLs (10 g total) by mouth every 6 (six) hours as needed (constipation).    losartan (COZAAR) 25 MG tablet Take 1 tablet (25 mg total) by mouth once daily.    mupirocin (BACTROBAN) 2 % ointment Apply topically 3 (three) times daily.    mycophenolate (CELLCEPT) 500 mg Tab Take 3 tablets (1,500 mg total) by mouth 2 (two) times daily.    oxyCODONE-acetaminophen (PERCOCET) 5-325 mg per tablet Take 1 tablet by mouth every 12 (twelve) hours as needed for Pain.    predniSONE (DELTASONE) 10 MG tablet Take 1 tablet (10 mg total) by mouth once daily.    traZODone (DESYREL) 50 MG tablet Take 1 tablet (50 mg total) by mouth nightly as needed for Insomnia.     Family History       Problem Relation (Age of Onset)    Diabetes Mother, Father    Hypertension Mother, Father    Kidney disease Mother          Tobacco Use    Smoking status: Some Days     Packs/day: 0.25     Years: 20.00     Pack years: 5.00     Types: Cigarettes     Last attempt to quit: 2018     Years since quittin.3    Smokeless tobacco: Never   Substance and Sexual Activity    Alcohol use: Yes     Alcohol/week: 0.0 standard drinks    Drug use: Yes     Types: Marijuana     Comment: daily use    Sexual activity: Not on file     Review of Systems  Objective:     Vital Signs (Most Recent):  Temp: 98.3 °F (36.8 °C) (23 1126)  Pulse: 98 (23 1555)  Resp: (!) 27 (23 1513)  BP: 121/64 (23 1547)  SpO2: 99 % (23  1555) Vital Signs (24h Range):  Temp:  [98.3 °F (36.8 °C)] 98.3 °F (36.8 °C)  Pulse:  [] 98  Resp:  [20-37] 27  SpO2:  [96 %-99 %] 99 %  BP: ()/(56-83) 121/64     Weight: 72.6 kg (160 lb)  Body mass index is 22.96 kg/m².     Physical Exam  Constitutional:       Appearance: He is normal weight. He is ill-appearing.   HENT:      Head: Normocephalic and atraumatic.   Eyes:      Extraocular Movements: Extraocular movements intact.   Cardiovascular:      Rate and Rhythm: Regular rhythm. Tachycardia present.      Pulses: Normal pulses.      Heart sounds: Normal heart sounds.   Pulmonary:      Effort: Pulmonary effort is normal.      Breath sounds: Rales present.   Abdominal:      General: Abdomen is flat. Bowel sounds are normal.   Skin:     General: Skin is warm and dry.      Findings: Lesion (sacral decub ulcer, stage 2) present.      Comments: Fluctuant swelling over left scapular area.   Neurological:      Mental Status: He is alert.      Sensory: Sensory deficit present.      Motor: Weakness present.      Comments: Strength most preserved in far distal and proximal limbs. Otherwise 1/5.              Significant Labs: All pertinent labs within the past 24 hours have been reviewed.  CBC:   Recent Labs   Lab 05/14/23  1218   WBC 16.29*   HGB 9.5*   HCT 31.3*        CMP:   Recent Labs   Lab 05/14/23  1218   *   K 4.7   CL 98   CO2 17*      BUN 20   CREATININE 0.4*   CALCIUM 8.5*   PROT 6.6   ALBUMIN 2.5*   BILITOT 1.8*   ALKPHOS 69   AST 16   ALT 9*   ANIONGAP 12     Lactic Acid:   Recent Labs   Lab 05/14/23  1457   LACTATE 1.3     Lipase: No results for input(s): LIPASE in the last 48 hours.  Magnesium: No results for input(s): MG in the last 48 hours.    Significant Imaging: I have reviewed all pertinent imaging results/findings within the past 24 hours.  CXR: I have reviewed all pertinent results/findings within the past 24 hours and my personal findings are:  bilateral ground glass  "opacities    Assessment/Plan:     * Acute on chronic respiratory failure with hypoxia  Patient with Hypoxic Respiratory failure which is Acute on chronic.  he is not on home oxygen. Supplemental oxygen was provided and noted-      .   Signs/symptoms of respiratory failure include- increased work of breathing. Contributing diagnoses includes - COPD and Pneumonia Labs and images were reviewed. Patient Has not had a recent ABG. Will treat underlying causes and adjust management of respiratory failure as follows.    See "COPD," "sepsis" and "aspiration pneumonia."       Hyponatremia  Patient endorses poor oral intake recently. Suspect is due to this. Appears volume down on exam.    Follow up urine sodium.      Hyperbilirubinemia  See "anemia."      Severe sepsis  This patient does have evidence of infective focus  My overall impression is sepsis.  Source: Respiratory  Antibiotics given-   Antibiotics (72h ago, onward)      Start     Stop Route Frequency Ordered    05/14/23 2230  azithromycin (ZITHROMAX) 500 mg in dextrose 5 % (D5W) 250 mL IVPB (Vial-Mate)         05/17 0859 IV Daily 05/14/23 1619    05/14/23 2200  cefTRIAXone (ROCEPHIN) 2 g in dextrose 5 % in water (D5W) 5 % 50 mL IVPB (MB+)         05/19 2159 IV Every 24 hours (non-standard times) 05/14/23 1619          Latest lactate reviewed-  Recent Labs   Lab 05/14/23  1457   LACTATE 1.3     Organ dysfunction indicated by Acute respiratory failure    Fluid challenge Actual Body weight- Patient will receive 30ml/kg actual body weight to calculate fluid bolus for treatment of septic shock.     Post- resuscitation assessment No - Post resuscitation assessment not needed       Will Not start Pressors- Levophed for MAP of 65  Source control achieved by: antibiotics.    Lactate initially elevated, trended down to WNL on repeat  Starting CAP coverage  ->Rocephin 5 day course  ->azithromycin 3 day course  Hemodynamically stable following septic fluid bolus  UA negative for " "UTI  Follow-up blood cultures  Follow-up respiratory culture  Follow up respiratory viral panel    Sacral decubitus ulcer, stage II  -wound care consulted  -turn q2h    Chronic obstructive pulmonary disease, unspecified COPD type  Suspect COPD exacerbation, due to pneumonia (likely aspiration).    -duonebs q4h prn  -albuterol q4h prn  -prednisone 40mg po for 5 days      Inclusion body myositis (IBM)  See "polymyositis."      Essential hypertension  Takes amlodipine and losartan at home.    -hold home meds in setting of sepsis      Debility  Due to inclusion body myositis. Appears to be complicated by poor care at home, evidenced by sacral decubitus ulcer. Also appears to have carbuncle over left scapular area.    PT/OT/SLP      Aspiration pneumonia  See "sepsis" and "COPD exacerbation."    Aspiration precautions.  SLP luigi.    Beta 0 thalassemia  -follow CBC daily    Anemia  Prior iron studies suggest anemia of chronic disease.    -trend CBC daily  -due to elevated total bilirubin, will order direct bilirubin  ->if hyperbilirubinemia predominantly indirect, will order hemolysis labs      History of tobacco use  Patient reports that he used to smoke heavily, though cannot specify packs-per day. Reports that he quit 4 years ago, but states that he still occasionally uses marijuana.      Quadriparesis (muscle weakness)  Secondary to inclusion body myositis. See "polymyositis."      Oropharyngeal dysphagia  Secondary to inclusion body myositis. Patient reports that he takes his meds crushed in puree at home.    -possible that recurrent aspiration is causing his presentation  -SLP luigi        Polymyositis  History of polymyositis/inclusion body myositis. Follows with Dr. Huston. Currently on Cellcept and prednisone (10mg QD), with sporadic adherence.    -hold Cellcept  -will be given prednisone dosed to COPD exacerbation        VTE Risk Mitigation (From admission, onward)           Ordered     enoxaparin injection 40 mg  " Daily         05/14/23 1616     IP VTE HIGH RISK PATIENT  Once         05/14/23 1616     Place sequential compression device  Until discontinued         05/14/23 1616     Place MARTI hose  Until discontinued         05/14/23 1616     Place sequential compression device  Until discontinued         05/14/23 1616                               Trino Potts DO  Department of Hospital Medicine  [facility] - [facility] Dept

## 2023-05-14 NOTE — ASSESSMENT & PLAN NOTE
Patient endorses poor oral intake recently. Suspect is due to this. Appears volume down on exam.    Follow up urine sodium.

## 2023-05-14 NOTE — ED TRIAGE NOTES
Patient comes into the emergency department by EMS with complaints of SOB. Patient states that he feels like he cannot cath his breath, hx of pneumonia, not on O2 at home. Patient reports pain to back and buttocks. Pt currently on 3L NC.

## 2023-05-14 NOTE — ASSESSMENT & PLAN NOTE
Prior iron studies suggest anemia of chronic disease.    -trend CBC daily  -due to elevated total bilirubin, will order direct bilirubin  ->if hyperbilirubinemia predominantly indirect, will order hemolysis labs

## 2023-05-14 NOTE — ASSESSMENT & PLAN NOTE
Suspect COPD exacerbation, due to pneumonia (likely aspiration).    -duonebs q4h prn  -albuterol q4h prn  -prednisone 40mg po for 5 days

## 2023-05-14 NOTE — CONSULTS
Micu consulted for pneumonia with initially low BP.     Please see full consult note.    Magaly Lynn MD  Emergency Medicine Staff   Critical Care Fellow

## 2023-05-14 NOTE — CONSULTS
[facility] - Emergency Dept  Critical Care Medicine  Consult Note    Patient Name: Nura Kahn Jr.  MRN: [id number]  Admission Date: 5/14/2023  Hospital Length of Stay: 0 days  Code Status: Prior  Attending Physician: Emre Molina DO   Primary Care Provider: Edna Jaramillo NP   Principal Problem: <principal problem not specified>    Consults  Subjective:     HPI:  Mr. 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 Pt was hypoxic with EMS oxygen saturation of 82% on 2 L via nasal cannula.  In route, he received IV Solu-Medrol and a DuoNeb.  The patient reports that he has had progressively worsening shortness of breath and generalized weakness over the past 4 days.  This has been associated with a nonproductive cough.  He denies chest pain or fever.  His only other complaint is pain on his buttock from a presumed bedsore.  He is unclear of the etiology to his symptoms.  He states that they have been constant and progressively worsening.  He denies fevers/chills. Denies chest pain, abd pain, n/v/d. Urinates with condom catheter.         Neuromuscular disorder history.   Patient was diagnosed with polymyositis at [facility] in 2009. He now follows with Rheumatologists, Dr. Connolly and Dr. Huston here. 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 He was getting IVIG and rituximab. Last IVIG was 11/2021. Last rituximab was 4/4/22 and 4/29/22.  He continued to take MMF 1500 mg BID and prednisone 10 mg chronically.Pt states his generalized weakness has worsened since getting COVID. He has been chair bound for some time.       Last PFT (2016)  Component 7 yr ago   Pre FVC 4.75    Pre FEV1 3.41    Pre FEV1 FVC 72    Predicted FVC 4.27    Predicted FEV1 3.46    Predicted FEV1 FVC 80             Hospital/ICU Course:  In the ED the patient was found to be tachycardic with Map 64-65. He  was given 30cc/kg IVF, vanc and zosyn for abx. CXR shows bilateral lower lung field infiltrates. Pt placed on 4L NC.   Hospital medicine consulted for admission with MICU consult to discuss ICU needs.       Past Medical History:   Diagnosis Date    Aspiration pneumonia of right lower lobe 1/15/2018    Atrial fibrillation 3/2/2015    Chronic obstructive pulmonary disease, unspecified COPD type 2022    Depression     Essential hypertension 2019    Malignant (primary) neoplasm, unspecified 2022    Microcytic anemia 2014    Neuromuscular disorder     Other osteoporosis without current pathological fracture 2022    Pneumonia     Polymyositis 2009    Tobacco abuse        Past Surgical History:   Procedure Laterality Date    COLONOSCOPY N/A 2020    Procedure: COLONOSCOPY;  Surgeon: Brandan Meyer MD;  [facility]  Service: Endoscopy;  Laterality: N/A;    ESOPHAGOGASTRODUODENOSCOPY N/A 2020    Procedure: EGD (ESOPHAGOGASTRODUODENOSCOPY);  Surgeon: Brandan Meyer MD;  [facility]  Service: Endoscopy;  Laterality: N/A;  multiple co-morbidities. will have family member for assistance.  has neuromuscular issues-needs lift. in W/C-unable to transfer per self     COVID test at [facility] on 8/3-       Review of patient's allergies indicates:  No Known Allergies    Family History       Problem Relation (Age of Onset)    Diabetes Mother, Father    Hypertension Mother, Father    Kidney disease Mother          Tobacco Use    Smoking status: Some Days     Packs/day: 0.25     Years: 20.00     Pack years: 5.00     Types: Cigarettes     Last attempt to quit: 2018     Years since quittin.3    Smokeless tobacco: Never   Substance and Sexual Activity    Alcohol use: Yes     Alcohol/week: 0.0 standard drinks    Drug use: Yes     Types: Marijuana     Comment: daily use    Sexual activity: Not on file      Review of Systems    Constitutional:  Positive for fatigue. Negative for chills and fever.   HENT:  Negative for facial swelling, trouble swallowing and voice change.    Eyes:  Negative for pain and discharge.   Respiratory:  Positive for cough and shortness of breath. Negative for apnea and wheezing.    Cardiovascular:  Negative for chest pain and leg swelling.   Gastrointestinal:  Negative for abdominal pain, diarrhea, nausea and vomiting.   Genitourinary:  Negative for dysuria, flank pain and hematuria.   Musculoskeletal:  Negative for arthralgias.   Skin:  Negative for pallor, rash and wound.   Neurological:  Positive for weakness. Negative for seizures and headaches.   Objective:     Vital Signs (Most Recent):  Temp: 98.3 °F (36.8 °C) (05/14/23 1126)  Pulse: 107 (05/14/23 1409)  Resp: 20 (05/14/23 1325)  BP: (!) 96/56 (05/14/23 1403)  SpO2: 98 % (05/14/23 1406) Vital Signs (24h Range):  Temp:  [98.3 °F (36.8 °C)] 98.3 °F (36.8 °C)  Pulse:  [107-124] 107  Resp:  [20-37] 20  SpO2:  [97 %-99 %] 98 %  BP: ()/(56-83) 96/56   Weight: 72.6 kg (160 lb)  Body mass index is 22.96 kg/m².    No intake or output data in the 24 hours ending 05/14/23 1451       Physical Exam  Vitals and nursing note reviewed.   Constitutional:       Comments: Chronically unwell appearing male. Not in acute respiratory distress.    HENT:      Head: Normocephalic and atraumatic.      Nose: No congestion.      Mouth/Throat:      Mouth: Mucous membranes are dry.      Comments: Dry MM  Eyes:      Extraocular Movements: Extraocular movements intact.      Pupils: Pupils are equal, round, and reactive to light.   Cardiovascular:      Rate and Rhythm: Regular rhythm. Tachycardia present.      Heart sounds: No murmur heard.  Pulmonary:      Breath sounds: No wheezing.      Comments: Anterior lung fields are clear. Crackles in the bilateral bases and mid lung fields on the R  Abdominal:      General: There is no distension.      Palpations: Abdomen is soft.       Tenderness: There is no abdominal tenderness. There is no guarding.   Musculoskeletal:         General: No swelling or tenderness.      Cervical back: Normal range of motion.      Comments: Healing wound on the R dorsal hand. Pt with limited movement of the extremities. Able to move his shoulders and head and neck.   Skin:     General: Skin is warm.      Capillary Refill: Capillary refill takes less than 2 seconds.      Coloration: Skin is not pale.      Findings: No erythema or rash.   Neurological:      General: No focal deficit present.      Mental Status: He is oriented to person, place, and time.      Cranial Nerves: No cranial nerve deficit.          Vents:     Lines/Drains/Airways       Peripheral Intravenous Line  Duration                  Peripheral IV - Single Lumen 05/14/23 1313 20 G Left;Posterior Wrist <1 day                  Significant Labs:    CBC/Anemia Profile:  Recent Labs   Lab 05/14/23  1218   WBC 16.29*   HGB 9.5*   HCT 31.3*      MCV 58*   RDW 22.7*        Chemistries:  Recent Labs   Lab 05/14/23  1218   *   K 4.7   CL 98   CO2 17*   BUN 20   CREATININE 0.4*   CALCIUM 8.5*   ALBUMIN 2.5*   PROT 6.6   BILITOT 1.8*   ALKPHOS 69   ALT 9*   AST 16       Recent Lab Results         05/14/23  1225   05/14/23  1223   05/14/23  1218        Albumin     2.5       Alkaline Phosphatase     69       Allens Test   N/A         ALT     9       Anion Gap     12       Appearance, UA Clear           AST     16  Comment: *Result may be interfered by visible hemolysis       Baso #     0.05       Basophil %     0.3       Bilirubin (UA) Negative           BILIRUBIN TOTAL     1.8  Comment: For infants and newborns, interpretation of results should be based  on gestational age, weight and in agreement with clinical  observations.    Premature Infant recommended reference ranges:  Up to 24 hours.............<8.0 mg/dL  Up to 48 hours............<12.0 mg/dL  3-5 days..................<15.0 mg/dL  6-29  days.................<15.0 mg/dL         BNP     17  Comment: Values of less than 100 pg/ml are consistent with non-CHF populations.       Site   Other         BUN     20       Calcium     8.5       Chloride     98       CO2     17       Color, UA Yellow           Creatinine     0.4       Differential Method     Automated       eGFR     >60.0       Eos #     0.0       Eosinophil %     0.2       Glucose     106       Glucose, UA Negative           Gran # (ANC)     13.3       Gran %     81.6       Hematocrit     31.3       Hemoglobin     9.5       Immature Grans (Abs)     0.24  Comment: Mild elevation in immature granulocytes is non specific and   can be seen in a variety of conditions including stress response,   acute inflammation, trauma and pregnancy. Correlation with other   laboratory and clinical findings is essential.         Immature Granulocytes     1.5       Ketones, UA 1+           Leukocytes, UA Negative           Lymph #     1.8       Lymph %     11.2       MCH     17.5       MCHC     30.4       MCV     58       Mono #     0.9       Mono %     5.2       MPV     SEE COMMENT  Comment: Result not available.       NITRITE UA Negative           nRBC     1       Occult Blood UA Negative           pH, UA 6.0           Platelets     191       POC Lactate   3.23         Potassium     4.7       PROTEIN TOTAL     6.6       Protein, UA Trace  Comment: Recommend a 24 hour urine protein or a urine   protein/creatinine ratio if globulin induced proteinuria is  clinically suspected.             RBC     5.42       RDW     22.7       Sample   VENOUS         Sodium     127       Specific Malone, UA 1.020           Specimen UA Urine, Clean Catch           Troponin I     <0.006  Comment: The reference interval for Troponin I represents the 99th percentile   cutoff   for our facility and is consistent with 3rd generation assay   performance.         WBC     16.29               Significant Imaging: I have reviewed all  pertinent imaging results/findings within the past 24 hours.  I have reviewed and interpreted all pertinent imaging results/findings within the past 24 hours.      ABG  No results for input(s): PH, PO2, PCO2, HCO3, BE in the last 168 hours.  Assessment/Plan:     Pulmonary  Aspiration pneumonia  CXR with bilateral opacities worse in the RML concerning for recurrent aspiration pna. Increased oxygen requirements of 4-5L NC.   - continue IV abx (zosyn and vancomycin)   - obtain blood, sputum culture (induced sputum)   - respiratory toilet: cough assist, CPT to assist with weak cough   - can assess NIF if worsening weakness   - continue to wean oxygen as able with goal 92%  - continue neb treatments q6   - prednisone 40mg daily x 3-5d    ID  Severe sepsis  This patient does have evidence of infective focus, pneumonia.   My overall impression is sepsis.  Source: Respiratory  Antibiotics given-   Antibiotics (72h ago, onward)    Start     Stop Route Frequency Ordered    05/14/23 1200  piperacillin-tazobactam (ZOSYN) 4.5 g in dextrose 5 % in water (D5W) 5 % 100 mL IVPB (MB+)  (ED Adult Sepsis Treatment)         05/15 1159 IV Every 8 hours (non-standard times) 05/14/23 1159        Latest lactate reviewed-  No results for input(s): LACTATE in the last 72 hours.  Organ dysfunction indicated by Acute respiratory failure    Fluid challenge Ideal Body Weight- The patient's ideal body weight is Ideal body weight: 73 kg (160 lb 15 oz) which was used to calculate fluid bolus of 30 ml/kg for treatment of sepsis with hypotension. Pt received IVF bolus already.     MAP goal 65, BP on my exam 70. Hr improved to 105 with fluid bolus.   - Cr 0.4, LA 3, repeat lactic acid after fluid resuscitation   - if increasing O2 requirements would not given fluid resuscitation beyond 30cc bolus ordered   - follow up blood and respiratory cultures   - continue with broad spectrum abx        This patient is on 4L of NC without signs of worsening  respiratory distress. He ws receiving his last 500cc of his 30cc/kg bolus on my exam. HR has improved to 105 with Map >70. The patient does not currently meet criteria for the MICU.     Please call if you have any concerns or with any significant patient changes.     Thank you for your consult. I will sign off. Please contact us if you have any additional questions.     Magaly Lynn MD  Critical Care Medicine  [facility] - Emergency Dept

## 2023-05-15 PROBLEM — D62 ACUTE BLOOD LOSS ANEMIA: Status: ACTIVE | Noted: 2023-01-01

## 2023-05-15 PROBLEM — D62 ACUTE BLOOD LOSS ANEMIA: Status: RESOLVED | Noted: 2023-01-01 | Resolved: 2023-01-01

## 2023-05-15 NOTE — ASSESSMENT & PLAN NOTE
History of polymyositis/inclusion body myositis. Follows with Dr. Huston. Currently on Cellcept and prednisone (10mg QD), with sporadic adherence.    -hold Cellcept  -methylprednisolone dosed to COPD exacerbation

## 2023-05-15 NOTE — SUBJECTIVE & OBJECTIVE
Interval History: NAEON; supplemental oxygen back to baseline (room air) w/CAP coverage; significant drop in hemoglobin confirmed on repeat labs, giving 2 units prbcs, and obtaining CT abdomen/pelvis w/contrast; no blood on CIERA, concerned about retroperitoneal bleed; did start patient on GI bleed protocol in event that GI bleed is occurring; discontinued anticoagulants; discussed chronic aspiration w/ENT and GI today, however the plan regarding this may be worked out when patient is more stable.    Review of Systems   Constitutional:  Positive for fatigue. Negative for chills and fever.   HENT:  Negative for congestion, rhinorrhea and sore throat.    Respiratory:  Negative for cough, chest tightness and shortness of breath.    Cardiovascular:  Negative for chest pain, palpitations and leg swelling.   Gastrointestinal:  Negative for abdominal pain, anal bleeding, blood in stool, rectal pain and vomiting.   Genitourinary:  Negative for dysuria, hematuria and urgency.   Musculoskeletal:  Negative for arthralgias and myalgias.   Skin:  Positive for wound.   Neurological:  Positive for weakness.   Psychiatric/Behavioral:  Negative for agitation and confusion.    Objective:     Vital Signs (Most Recent):  Temp: 97.8 °F (36.6 °C) (05/15/23 1136)  Pulse: 79 (05/15/23 1500)  Resp: (!) 82 (05/15/23 1136)  BP: (!) 86/50 (05/15/23 1136)  SpO2: 95 % (05/15/23 1500) Vital Signs (24h Range):  Temp:  [96.5 °F (35.8 °C)-97.8 °F (36.6 °C)] 97.8 °F (36.6 °C)  Pulse:  [] 79  Resp:  [16-82] 82  SpO2:  [94 %-99 %] 95 %  BP: ()/(50-74) 86/50     Weight: 65.3 kg (143 lb 15.4 oz)  Body mass index is 20.66 kg/m².    Intake/Output Summary (Last 24 hours) at 5/15/2023 1509  Last data filed at 5/15/2023 0923  Gross per 24 hour   Intake 260 ml   Output 800 ml   Net -540 ml         Physical Exam  Vitals and nursing note reviewed.   Constitutional:       Appearance: He is ill-appearing.   HENT:      Head: Normocephalic and atraumatic.       Nose: Nose normal. No congestion or rhinorrhea.      Mouth/Throat:      Mouth: Mucous membranes are dry.   Eyes:      Extraocular Movements: Extraocular movements intact.      Pupils: Pupils are equal, round, and reactive to light.   Cardiovascular:      Rate and Rhythm: Normal rate and regular rhythm.   Pulmonary:      Effort: No respiratory distress.      Breath sounds: No wheezing, rhonchi or rales.   Abdominal:      Palpations: Abdomen is soft.      Tenderness: There is no abdominal tenderness. There is no guarding or rebound.      Comments: No blood on CIERA.   Musculoskeletal:      Right lower leg: No edema.      Left lower leg: No edema.   Skin:     Findings: Lesion (stage 2 sacral decubitus ulcer) present.      Comments: Fluctuant lesion over left scapula.   Neurological:      Mental Status: He is alert and oriented to person, place, and time.      Comments: 2/5 strength very distal upper and lower extremities. 0-1/5 strength elsewhere.   Psychiatric:         Mood and Affect: Mood normal.         Behavior: Behavior normal.           Significant Labs: All pertinent labs within the past 24 hours have been reviewed.    Significant Imaging: I have reviewed all pertinent imaging results/findings within the past 24 hours.

## 2023-05-15 NOTE — PLAN OF CARE
Problem: Infection  Goal: Absence of Infection Signs and Symptoms  Outcome: Ongoing, Progressing     Problem: Adjustment to Illness (Sepsis/Septic Shock)  Goal: Optimal Coping  Outcome: Ongoing, Progressing     Problem: Infection (Pneumonia)  Goal: Resolution of Infection Signs and Symptoms  Outcome: Ongoing, Progressing     Pt AAOx4. VSS. Afebrile. Weaned to 1L NC with O2 sats > 95%. 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 Max assit with pt. Pt has wound on sacral area. Refuses to be turned and states it feels worse when he's turned. Feet and arms elevated on pillows. Abx infusing. U/o adequate.  Pt has very flaky/dry skin on bilateral feet with concerning toe nail fungus. Pt also has large cystic abscess on L scapula.   Meds crushed and given to pt in applesauce. Pt tends to cough when given water. Made NPO and speech eval placed.   Pt also expresses concerns at home with caregiver, stating he needs to be in a facility somewhere where he can be better taken care of. Bed alarm in use. Safety maintained. Call bell in pts hands.

## 2023-05-15 NOTE — PT/OT/SLP EVAL
"Speech Language Pathology Evaluation  Bedside Swallow    Patient Name:  Nura Kahn Jr.   MRN:  [id number]  [id number]    Admitting Diagnosis: Acute on chronic respiratory failure with hypoxia    Recommendations:     General Recommendations: ST f/u; f/u with ENT/GI regarding impaired UES opening  Diet recommendations:  Puree, Thin  Aspiration Precautions:   MUST PERFORM CHIN TUCK AND MULTIPLE SWALLOWS FOR EVERY BITE AND SIP;   Assistance with meals  1 small bite/sip at a time,   Frequent oral care,   HOB to 90 degrees and remain upright for 30 minutes-1 hour following meals,   Meds crushed in puree,   Monitor for s/s of aspiration  *Continued concerns for patient's ability to safely maintain adequate nutrition/hydration/medication  *Continued aspiration risk with all po intake despite strict precautions/strategies  General Precautions: Standard, aspiration  Communication strategies:  none    Assessment:     Nura Kahn Jr. is a 67 y.o. male with an SLP diagnosis of Dysphagia per MBSS completed in April 2023. Per MBSS, "Pt presents with moderate pharyngeal dysphagia and significant esophageal dysphagia c/b penetration of thin liquids, silent aspiration of nectar thick liquids, and severe pyriform sinus pooling 2/2 apparent UES dysfunction.  If UES function is not able to be improved with possible GI intervention, pt's ability to safely maintain nutrition/hydration appears poor.  SLP recommending that pt continue pureed diet with thin liquids via SMALL bolus while performing chin tuck technique and multiple swallows per bite/sip.  GI consultation is recommended to assess UES function. If UES function is not able to be improved through GI intervention, alternative means of nutrition/hydration/medication should be considered due to continued risk of aspiration, recurrent aspiration pneumonia, and malnutrition."    History:     Past Medical History:   Diagnosis Date    Aspiration pneumonia of right lower lobe " 1/15/2018    Atrial fibrillation 3/2/2015    Chronic obstructive pulmonary disease, unspecified COPD type 5/4/2022    Depression     Essential hypertension 2/27/2019    Malignant (primary) neoplasm, unspecified 5/4/2022    Microcytic anemia 9/25/2014    Neuromuscular disorder     Other osteoporosis without current pathological fracture 9/22/2022    Pneumonia     Polymyositis 2009    Tobacco abuse        Past Surgical History:   Procedure Laterality Date    COLONOSCOPY N/A 8/6/2020    Procedure: COLONOSCOPY;  Surgeon: Brandan Meyer MD;  Location: [facility] ENDO ([facility] FLR);  Service: Endoscopy;  Laterality: N/A;    ESOPHAGOGASTRODUODENOSCOPY N/A 8/6/2020    Procedure: EGD (ESOPHAGOGASTRODUODENOSCOPY);  Surgeon: Brandan Myeer MD;  Location: [facility] ENDO ([facility] FLR);  Service: Endoscopy;  Laterality: N/A;  multiple co-morbidities. will have family member for assistance.  has neuromuscular issues-needs lift. in W/C-unable to transfer per self     COVID test at [facility] on 8/3-GT     GI Note 5/15/2023: Nura Kahn Jr. is a 67 y.o. 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 GI has been consulted for dysphagia. Patient had abnormal MBS in 4/23, normal esophogram in the past. EGD in 2020 showed dilation of lower esophagus. 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   Problem List:  Dysphagia (likely pharyngoesophageal) in a patient with inclusion body myositis  Lower esophageal dilation seen on EGD (2020)  Recommendations:  - Recent MBS showed aspiration of thin liquids. Please consult speech therapy, & ENT (for consideration of outpatient esophagoscopy with UES dilation v/s  cricopharyngeal myotomy).   - Would also have nutrition chime in on his nutritional status. If he is unable to meet his requirements, can consider EGD with PEG. However, a PEG will not prevent aspiration.  - To investigate  lower esophageal dilation seen on prior EGD, the next step would be to consider a Timed barium swallow with barium column at 0, 1, 3, 5 minutes and barium 13 mm tablet (Charles protocol). We acknowledge this may be challenging to perform given mobility restrictions.     ENT note 11/1/2021: Nura Kahn Jr. is a 65 y.o. 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 He has dysphonia due to intermittent penetration of salivary secretions.  Plan:      I had a discussion with the patient regarding his condition and the further workup and management options.    I offered to take him to the OR for esophagoscopy with UES dilation. I discussed with him the risks thereof, including but not limited to damage to oral structures, pharyngoesophageal perforation, persistent/progressive symptoms, risks of general anesthesia.  He defers on such treatment at this time. I recommended adherence to his SLP treatment plan.  He will follow up with me in the future on an as-needed basis.  All questions were answered, and the patient is in agreement with the above.     Modified Barium Swallow:   4/14/2023: Impressions: Pt presents with moderate pharyngeal dysphagia and significant esophageal dysphagia c/b penetration of thin liquids, silent aspiration of nectar thick liquids, and severe pyriform sinus pooling 2/2 apparent UES dysfunction.  If UES function is not able to be improved with possible GI intervention, pt's ability to safely maintain nutrition/hydration appears poor.  SLP recommending that pt continue pureed diet with thin liquids via SMALL bolus while performing chin tuck technique and multiple swallows per bite/sip.  GI consultation is recommended to assess UES function. If UES function is not able to be improved through GI intervention, alternative means of nutrition/hydration/medication should be considered due to continued risk of aspiration, recurrent aspiration  pneumonia, and malnutrition.  Plan: SLP recommending that pt continue pureed diet with thin liquids via SMALL bolus while performing chin tuck technique and multiple swallows per bite/sip.  GI consultation is recommended to assess UES function.  If UES function is not able to be improved through GI intervention, alternative means of nutrition/hydration/medication should be considered due to continued risk of aspiration, recurrent aspiration pneumonia, and malnutrition.   10/12/2021: Impressions: The results of this MBSS indicate that patient demonstrates moderate swallowing dysfunction c/b silent aspiration of thin liquid consistencies during/after the swallow with large boluses sips or consecutive sips via straw; also with moderate-severe pharyngeal residue in pyriforms with reduced PE segment clearance.  Prognosis for improvement of swallowing with therapy is fair.  Recommendations: -Diet: recommend soft/mechanical soft diet as tolerated; thin liquids ok if taken in very small sips; crush large pills-Therapeutic technique: recommend effortful swallow, chin tuck and multiple swallow per bolus -Rec consult with Dr Webb in [facility], this study has been reviewed with him, in consideration of possible CP dilation in effort to allow improved bolus passage through PE segment and reduce pyriform sinus pooling which contributes to increased risk of aspiration-Consider dysphagia therapy following any procedure which Dr Webb may be able to provide, or dysphagia therapy alone should pt not be candidate for surgical intervention. Dysphagia therapy to address oropharyngeal strengthening, enforcing safe swallow strategies-Contact clinician or referring provider for repeat MBSS if swallowing status changes or worsens     Chest X-Rays: Worsening bibasilar airspace and consolidative opacities with ground-glass opacities in the right mid lung, suggestive of multifocal pneumonia versus aspiration in this patient with sepsis.   "Asymmetric pulmonary edema felt less likely.    Prior diet: puree/thin; chin tuck; multiple swallows    Social hx: Reports having a 'lady' that is his caretaker at home.     Subjective     Patient awake, pleasant, and cooperative.   Patient goals: To be provided with all options needed to continue to eat and drink as safely as possible.     Pain/Comfort:  Pain Rating 1: 0/10    Objective:     Oral Musculature Evaluation  Oral Musculature: left weakness  Dentition: scattered dentition, teeth in poor condition  Mucosal Quality: adequate  Lingual Strength and Mobility: WFL  Volitional Cough: elicited  Volitional Swallow: timely  Voice Prior to PO Intake: clear    Bedside Swallow Eval:   Consistencies Assessed:  Thin liquids self regulated straw sips  Puree bites of apple sauce      Oral Phase:   WFL    Pharyngeal Phase:   Patient independently utilized chin tuck and multiple swallows with all trials    Compensatory Strategies  Chin tuck  Multiple swallows    Treatment: SLP provided extensive education regarding the following: prior MBSS results, SLP recommendations, importance of strict adherence to all safe swallowing precautions and strategies, importance of oral care, pillars of aspiration, risks of aspiration, current high aspiration risk, h/o silent aspiration, severe residue at UES noted in prior MBSS, noted prior recommendations for UES dilation, prior ENT visit and recommendations noted from 2021, safest diet options, and SLP role. MBSS images utilized to provide a visual for education. Patient reports he was told in 2021 by a MD there was a 50% chance of tearing his esophagus if he had the dilation, so he decided to not move forward with the procedure as he was told risks outweighed benefits. Patient reports recently having difficulty feeding himself resulting in poor positioning when feeding. He stated, "I think I need to start having someone feed me so I can sit up better. I just am the type of person that " "wants to try to do everything I can for myself." He also reports recently (~4 days ago) attempting to swallow medications whole without crushing them. He stated to SLP that his main goal is to continue to eat with the least amount of risks, and he will have any procedure that his physicians feel would aid him in continuing to eat. He stated to SLP that he would not want a PEG tube. All questions addressed within SLP scope and patient verbalized understanding of all discussed. Patient requesting SLP f/u x1 to ensure he has no further questions for SLP.     Goals:   Multidisciplinary Problems       SLP Goals          Problem: SLP    Goal Priority Disciplines Outcome   SLP Goal     SLP Ongoing, Progressing   Description: Short Term Goals:   1. ST will provide further education as warranted.   2. Pt will independently demonstrate 3+ safe swallowing precautions (upright for meals, small bites/sips, chin tuck, multiple swallows) given no cues.                        Plan:     Patient to be seen:  2 x/week   Plan of Care expires:     Plan of Care reviewed with:  patient   SLP Follow-Up:  Yes       Discharge recommendations:   (tbd)   Barriers to Discharge:  None per ST discipline    Time Tracking:     SLP Treatment Date:   05/15/23  Speech Start Time:  1255  Speech Stop Time:  1330     Speech Total Time (min):  35 min    Billable Minutes: Eval Swallow and Oral Function 10 and Self Care/Home Management Training 25    05/15/2023         "

## 2023-05-15 NOTE — ASSESSMENT & PLAN NOTE
"Patient with Hypoxic Respiratory failure which is Acute.  he is not on home oxygen. Supplemental oxygen was provided and noted-      .   Signs/symptoms of respiratory failure include- increased work of breathing. Contributing diagnoses includes - COPD and Pneumonia Labs and images were reviewed. Patient Has not had a recent ABG. Will treat underlying causes and adjust management of respiratory failure as follows.    See "COPD," "sepsis" and "aspiration pneumonia."     Patient now on room air, w/O2 sat at goal.  "

## 2023-05-15 NOTE — NURSING
Telemetry box 0741 sent down to ED, tube station 21.  Called the war room and gave box number, MRN, last name, and room patient is being transferred to, to the telemetry tech so patient can be placed on telebox.

## 2023-05-15 NOTE — CONSULTS
[facility]  Gastroenterology  Consult Note    Patient Name: Nura Kahn Jr.  MRN: [id number]  Admission Date: 5/14/2023  Hospital Length of Stay: 1 days  Code Status: Full Code   Attending Provider:  Dr. Ferrell   Consulting Provider: Quentin Avalos MD  Primary Care Physician: Edna Jaramillo NP  Principal Problem:Acute on chronic respiratory failure with hypoxia    Inpatient consult to Gastroenterology  Consult performed by: Quentin Avalos MD  Consult ordered by: Trino Potts DO      Subjective:     HPI: Nura Kahn Jr. is a 67 y.o. 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 GI has been consulted for dysphagia. Patient endorses difficulty initiating swallow (more with solids than liquids). He has coughing spells & choking when swallowing meats (like pork chop).  Denies odynophagia, unexplained weight loss, early satiety or rectal bleeding.      Patient was diagnosed with polymyositis at [facility] in 2009. 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 Follows up with Dr. Meyer in clinic (last seen in November 2021). MBS revealed aspiration of thin liquids, most recently in April 2023. Barium esophagram was limited by patient mobility but was otherwise unremarkable. Saw Dr. Webb (ENT) who advised esophagoscopy with dilation of the upper esophageal sphincter, but the patient deferred that therapy. The patient is a former smoker & endorses recreational marijuana use.     Endo Hx:  EGD (8/6/2020):                        - Dilation in the lower third of the esophagus.                         - Esophagogastric landmarks identified.                         - Normal gastric body and antrum.                         - Normal examined duodenum.                         - No specimens collected.     Colonoscopy  (8/6/2020):                        - One 4 mm polyp at the ileocecal valve, removed                         with a cold snare. Resected and retrieved.                         - One 5 mm polyp at the recto-sigmoid colon,                         removed with a cold snare. Resected and retrieved.                         - The examination was otherwise normal on direct                         and retroflexion views.     Review of Systems   Respiratory:  Positive for cough and shortness of breath.    Gastrointestinal:  Negative for abdominal pain, blood in stool, diarrhea and vomiting.        Dysphagia (worse with solids)   Neurological:  Positive for weakness.   Psychiatric/Behavioral:  Negative for memory loss.       Objective:     Vitals:    05/15/23 0851   BP:    Pulse: 84   Resp: 18   Temp:          Constitutional:  alert, ill appearing, but non-toxic, oriented to time, place, and person, and well developed  HENT: Head: Normal, normocephalic, atraumatic. No thrush.  Eyes: conjunctiva clear and sclera nonicteric  GI: soft, non-tender, without masses or organomegaly, nondistended, normal bowel sounds, without guarding, without rebound, and no masses palpated  Skin: normal color and no jaundice  Musculoskeletal: Generalized muscle wasting seen  Neurological: alert, oriented x3    Significant Labs:  Recent Labs   Lab 05/14/23  1218   HGB 9.5*       Lab Results   Component Value Date    WBC 16.29 (H) 05/14/2023    HGB 9.5 (L) 05/14/2023    HCT 31.3 (L) 05/14/2023    MCV 58 (L) 05/14/2023     05/14/2023       Lab Results   Component Value Date     (L) 05/15/2023    K 3.8 05/15/2023     05/15/2023    CO2 20 (L) 05/15/2023    BUN 13 05/15/2023    CREATININE 0.3 (L) 05/15/2023    CALCIUM 8.1 (L) 05/15/2023    ANIONGAP 10 05/15/2023    ESTGFRAFRICA >60.0 04/27/2022    EGFRNONAA >60.0 04/27/2022       Lab Results   Component Value Date    ALT 8 (L) 05/15/2023    AST 5 (L) 05/15/2023    GGT 16 03/31/2015     ALKPHOS 55 05/15/2023    BILITOT 0.7 05/15/2023       Lab Results   Component Value Date    INR 1.2 05/14/2023    INR 1.3 (H) 03/29/2023    INR 1.1 11/25/2019       Significant Imaging:  Reviewed pertinent radiology findings.       Assessment/Plan:     Nura Kahn Jr. is a 67 y.o. 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 GI has been consulted for dysphagia. Patient had abnormal MBS in 4/23, normal esophogram in the past. EGD in 2020 showed dilation of lower esophagus. 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     Problem List:  Dysphagia (likely pharyngoesophageal) in a patient with inclusion body myositis  Lower esophageal dilation seen on EGD (2020)        Recommendations:  - Recent MBS showed aspiration of thin liquids. Please consult speech therapy, & ENT (for consideration of outpatient esophagoscopy with UES dilation v/s  cricopharyngeal myotomy).     - Would also have nutrition chime in on his nutritional status. If he is unable to meet his requirements, can consider EGD with PEG. However, a PEG will not prevent aspiration.    - To investigate lower esophageal dilation seen on prior EGD, the next step would be to consider a Timed barium swallow with barium column at 0, 1, 3, 5 minutes and barium 13 mm tablet (Soto protocol). We acknowledge this may be challenging to perform given mobility restrictions.       Thank you for involving us in the care of Nura Kahn Jr.. Please call with any additional questions, concerns or changes in the patient's clinical status. We will continue to follow.       Quentin Avalos MD  Gastroenterology Fellow PGY IV  [facility]

## 2023-05-15 NOTE — NURSING
Pt allowing RN to turn pt now. Green protector boots applied. Pictures of wounds added to media. Pt also weaned to 1L NC. See flowsheets.

## 2023-05-15 NOTE — ASSESSMENT & PLAN NOTE
Patient endorses poor oral intake recently. Suspect is due to this. Appears volume down on exam.    Urine sodium 12. Indicates volume loss, likely due to poor oral intake in outpatient setting.

## 2023-05-15 NOTE — PHARMACY MED REC
"Admission Medication History     The home medication history was taken by William Delgadillo.    You may go to "Admission" then "Reconcile Home Medications" tabs to review and/or act upon these items.     The home medication list has been updated by the Pharmacy department.   Please read ALL comments highlighted in yellow.   Please address this information as you see fit.    Feel free to contact us if you have any questions or require assistance.      The medications listed below were removed from the home medication list. Please reorder if appropriate:  Patient reports no longer taking the following medication(s):  AMOXICILLIN-CLAVULANATE 875-125 MG    Medications listed below were obtained from: Patient  PTA Medications   Medication Sig    albuterol (PROVENTIL/VENTOLIN HFA) 90 mcg/actuation inhaler   Inhale 4 puffs into the lungs once daily.      amLODIPine (NORVASC) 10 MG tablet   Take 1 tablet (10 mg total) by mouth once daily.    ammonium lactate 12 % Crea Apply 1 gram topically once daily. Apply to areas of dry skin and nails daily.      cholecalciferol, vitamin D3, 1,250 mcg (50,000 unit) capsule   Take 1 capsule (50,000 Units total) by mouth once a week (patient states day of week varies).    hydrOXYzine HCL (ATARAX) 25 MG tablet Take 25 mg by mouth 2 (two) times daily as needed for Itching (itching).      lactulose (CHRONULAC) 10 gram/15 mL solution   Take 10 g by mouth every other day.    losartan (COZAAR) 25 MG tablet   Take 1 tablet (25 mg total) by mouth once daily.    mupirocin (BACTROBAN) 2 % ointment   Apply topically 3 (three) times daily.    mycophenolate (CELLCEPT) 500 mg Tab   Take 3 tablets (1,500 mg total) by mouth 2 (two) times daily.    predniSONE (DELTASONE) 10 MG tablet   Take 1 tablet (10 mg total) by mouth once daily.    traZODone (DESYREL) 50 MG tablet Take  mg by mouth nightly as needed for Insomnia.      albuterol-ipratropium (DUO-NEB) 2.5 mg-0.5 mg/3 mL nebulizer solution   Take 3 mLs by " nebulization every 6 (six) hours as needed for Wheezing. Rescue. Patient doesn't have machine at home.     Potential issues to be addressed PRIOR TO DISCHARGE  Patient reported not taking the following medications: (PERCOCET). These medications remain on the home medication list. Please address accordingly.     Patient requested refills for the following medications: (ATARAX)    Patient requested an alternative to Trazodone        William Delgadillo  EXT [phone]                  .

## 2023-05-15 NOTE — ASSESSMENT & PLAN NOTE
This patient does have evidence of infective focus  My overall impression is sepsis.  Source: Respiratory  Antibiotics given-   Antibiotics (72h ago, onward)    Start     Stop Route Frequency Ordered    05/14/23 2230  azithromycin (ZITHROMAX) 500 mg in dextrose 5 % (D5W) 250 mL IVPB (Vial-Mate)         05/17 0859 IV Daily 05/14/23 1619    05/14/23 2200  cefTRIAXone (ROCEPHIN) 2 g in dextrose 5 % in water (D5W) 5 % 50 mL IVPB (MB+)         05/19 2159 IV Every 24 hours (non-standard times) 05/14/23 1619        Latest lactate reviewed-  Recent Labs   Lab 05/14/23  1457   LACTATE 1.3     Organ dysfunction indicated by Acute respiratory failure    Fluid challenge Actual Body weight- Patient will receive 30ml/kg actual body weight to calculate fluid bolus for treatment of septic shock.     Post- resuscitation assessment No - Post resuscitation assessment not needed       Will Not start Pressors- Levophed for MAP of 65  Source control achieved by: antibiotics.    Lactate initially elevated trended down to WNL on repeat (5/14)  CAP coverage  ->Rocephin 5 day course  ->azithromycin 3 day course  Broaden to HAP coverage if worsening signs of infection  UA negative for UTI  Blood cultures NGTD  Respiratory culture pending collection  Respiratory viral panel in process

## 2023-05-15 NOTE — NURSING
"Notified med team 5 of critical labs. Pt hemoglobin 5.9 and pt hematocrit 18.5. Delroy DEL CASTILLO discontinued pt's lovenox injection. Sultan SHEBA stated,"okay, we will transfuse patient". Pt denies any pain. Pt AAOx4. Pt in bed with the call light in reach and the bed alarm on. Awaiting blood consent and orders to transfuse patient.   "

## 2023-05-15 NOTE — ASSESSMENT & PLAN NOTE
Suspect COPD exacerbation, due to pneumonia (likely aspiration).    -duonebs q6h prn  -methylprednisolone equivalent to prednisone 40mg QD as patient is NPO due to aspiration

## 2023-05-15 NOTE — ASSESSMENT & PLAN NOTE
Prior iron studies suggest anemia of chronic disease at baseline. Significant decline in hemoglobin this morning to 6.1 from 9.5 on presentation, 5.9 on stat repeat. Unclear source at this time. No melenic stool on CIERA. No melena, hematochezia, epistaxis reported, and patient denies as well. Abdomen and thighs are non-tender on exam. No obvious external source of bleeding. Hemolysis labs (LDH and haptoglobin) are negative. Concerned about GI vs retroperitoneal bleed. Gave small fluid bolus of 250cc this morning, as patient had JVD on presentation. Will repeat 250cc bolus this afternoon, but needs blood products. Concerned for development of shock.    -given precipitous drop in hemoglobin, though CIERA negative, started on GI bleed dosed PPI  ->80mg IV pantoprazole x1 followed by 40mg IV BID  -patient consented to receive blood products  -type and screen, ordered 2 units of prbcs  -CT abdomen pelvis w/contrast ordered to evaluate for intra-abdominal or retroperitoneal process  -PT 13, INR 1.2 on admit, will repeat  -PLTs 158  -follow up lactate  -follow up CBC after transfusion  -hold all anti-coagulants, anti-platelets  -may need to discuss with GI vs IR depending on findings

## 2023-05-15 NOTE — NURSING
Unable to collect Sputum samples. Pt has not coughed up any sputum thus far. Pt aware to call for collection if he needs to spit. TM.

## 2023-05-15 NOTE — ASSESSMENT & PLAN NOTE
Secondary to inclusion body myositis. Patient reports that he takes his meds crushed in puree at home. Did discuss G tube with patient, however he is adamantly opposed to this currently.    -possible that recurrent aspiration is causing his presentation  ->modified barium swallow on 4/14/23 demonstrated aspiration  -GI recommending repeat SLP eval and ENT consult for consideration of esophagoscopy with UES dilation v/s cricopharyngeal myotomy  ->discussed w/ENT today and will hold-off eval for now given acute anemia

## 2023-05-15 NOTE — CONSULTS
[facility] - Telemetry Stepdown  Wound Care    Patient Name:  Nura Kahn Jr.   MRN:  [id number]  Date: 5/15/2023  Diagnosis: Acute hypoxemic respiratory failure    History:     Past Medical History:   Diagnosis Date    Aspiration pneumonia of right lower lobe 1/15/2018    Atrial fibrillation 3/2/2015    Chronic obstructive pulmonary disease, unspecified COPD type 2022    Depression     Essential hypertension 2019    Malignant (primary) neoplasm, unspecified 2022    Microcytic anemia 2014    Neuromuscular disorder     Other osteoporosis without current pathological fracture 2022    Pneumonia     Polymyositis 2009    Tobacco abuse        Social History     Socioeconomic History    Marital status: Single   Tobacco Use    Smoking status: Some Days     Packs/day: 0.25     Years: 20.00     Pack years: 5.00     Types: Cigarettes     Last attempt to quit: 2018     Years since quittin.3    Smokeless tobacco: Never   Substance and Sexual Activity    Alcohol use: Yes     Alcohol/week: 0.0 standard drinks    Drug use: Yes     Types: Marijuana     Comment: daily use       Precautions:     Allergies as of 2023    (No Known Allergies)       WOC Assessment Details/Treatment     Wound care consulted for sacrum.  Patient is a 66 yo male admitted for acute resp failure with comorbidities of inclusion body myositis (IBM), debility, oropharyngeal dysphagia, beta 0 thalassemia, sepsis, hyperbilirubinemia, hyponatremia.  Patient is currently bed bound and high risk for pressure injury.    Johnny:  10  Bed: [facility] bed. Immerse bed ordered  Diet- potentially inadequate. Poor tugor. Muscle atrophy.  Dietary consult initiated.         05/15/23 1623   WOCN Assessment   WOCN Total Time (mins) 45   Visit Date 05/15/23   Visit Time 1623   Consult Type New   WOCN Speciality Wound   Wound moisture;shearing   Intervention assessed;team conference;orders   Teaching on-going   Skin Interventions    Device Skin Pressure Protection positioning supports utilized;pressure points protected;absorbent pad utilized/changed;adhesive use limited   Pressure Reduction Devices foam padding utilized;heel offloading device utilized;other (see comments)  (ordering immerse)   Skin Protection silicone foam dressing in place   Pressure Injury Prevention    Check Moisture Management Pad Done   Sacral Foam Dressing Peel back sacral foam dressing, assess skin and reapply   Heel protection technique Heel boot   Check Medical Devices Done        Altered Skin Integrity 05/15/23 1626 Left Sacral spine Shearing Partial thickness tissue loss. Shallow open ulcer with a red or pink wound bed, without slough. Intact or Open/Ruptured Serum-filled blister.   Date First Assessed/Time First Assessed: 05/15/23 1626   Altered Skin Integrity Present on Admission - Did Patient arrive to the hospital with altered skin?: yes  Side: Left  Location: Sacral spine  Is this injury device related?: No  Primary Wound Ty...   Wound Image    Dressing Appearance Intact;other (see comments)  (barrier cream under mepilex. educated on proper application)   Drainage Amount None   Drainage Characteristics/Odor No odor   Appearance Pink;Red;Moist   Periwound Area Redness;Moist;Macerated   Wound Edges Jagged;Irregular   Wound Length (cm) 10 cm   Wound Width (cm) 5 cm   Wound Depth (cm) 0.1 cm   Wound Volume (cm^3) 5 cm^3   Wound Surface Area (cm^2) 50 cm^2   Care Removed:;Skin Barrier;Moisturizing agent   Dressing Other (comment)  (unable to reapply new mepilex dressing d/t patient desaturation with turning.)     05/15/2023

## 2023-05-15 NOTE — PLAN OF CARE
Problem: SLP  Goal: SLP Goal  Description: Short Term Goals:   1. ST will provide further education as warranted.   2. Pt will independently demonstrate 3+ safe swallowing precautions (upright for meals, small bites/sips, chin tuck, multiple swallows) given no cues.   Outcome: Ongoing, Progressing   See full note.

## 2023-05-15 NOTE — ED NOTES
Tele box 0741 applied to pt. in war room states able to see pt on monitor, rhythm NSR with . Verified by tele tech.

## 2023-05-15 NOTE — PROGRESS NOTES
"[facility] - Telemetry [facility] Medicine  Progress Note    Patient Name: Nura Kahn Jr.  MRN: [id number]  Patient Class: IP- Inpatient   Admission Date: 5/14/2023  Length of Stay: 1 days  Attending Physician: Emre Molina DO  Primary Care Provider: Edna Jaramillo NP        Subjective:     Principal Problem:Acute hypoxemic respiratory failure        HPI:  Mr. 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 Huston) complicated by quadriplegia and dysphagia, COPD, HTN, aspiration pneumonia, hemoglobin c disease, beta-thalassemia, who presents with for 1 week of progressively worsening shortness of breath. Patient describes this as chest tightness. He denies associated fevers, chills, cough. He has recent hospitalization for aspiration pneumonia and COPD exacerbation (discharged on 4/16/23). Was given w/CAP coverage, then Unasyn, as well as prednisone. Was discharged on Augmentin to complete antibiotic course. Patient states that he lives in a "two bed" in "[facility]" with "a lady" who takes care of him. Describes her as his caregiver, though says that "she doesn't always give 100%." Reports sporadic adherence to medication, and states that he spends almost all of his time in bed. Unfortunately reports that he is quite often left to defecate and urinate on himself. Presented to the ED via EMS, was given Solu-Medrol and Duonebs en route and placed on 2L NC. Patient denies using supplemental oxygen at home. In the ED, septic work-up was started, given septic bolus, and started on broad-spectrum antibiotics. Admitted to hospital medicine for pneumonia and sepsis.       Overview/Hospital Course:  NAEON; supplemental oxygen back to baseline (room air) w/CAP coverage; significant drop in hemoglobin confirmed on repeat labs, giving 2 units prbcs, and obtaining CT abdomen/pelvis w/contrast; no blood on CIERA, " concerned about retroperitoneal bleed; did start patient on GI bleed protocol in event that GI bleed is occurring; discontinued anticoagulants; discussed chronic aspiration w/ENT and GI today, however the plan regarding this may be worked out when patient is more stable.    Interval History: NAEON; supplemental oxygen back to baseline (room air) w/CAP coverage; significant drop in hemoglobin confirmed on repeat labs, giving 2 units prbcs, and obtaining CT abdomen/pelvis w/contrast; no blood on CIERA, concerned about retroperitoneal bleed; did start patient on GI bleed protocol in event that GI bleed is occurring; discontinued anticoagulants; discussed chronic aspiration w/ENT and GI today, however the plan regarding this may be worked out when patient is more stable.    Review of Systems   Constitutional:  Positive for fatigue. Negative for chills and fever.   HENT:  Negative for congestion, rhinorrhea and sore throat.    Respiratory:  Negative for cough, chest tightness and shortness of breath.    Cardiovascular:  Negative for chest pain, palpitations and leg swelling.   Gastrointestinal:  Negative for abdominal pain, anal bleeding, blood in stool, rectal pain and vomiting.   Genitourinary:  Negative for dysuria, hematuria and urgency.   Musculoskeletal:  Negative for arthralgias and myalgias.   Skin:  Positive for wound.   Neurological:  Positive for weakness.   Psychiatric/Behavioral:  Negative for agitation and confusion.    Objective:     Vital Signs (Most Recent):  Temp: 97.8 °F (36.6 °C) (05/15/23 1136)  Pulse: 79 (05/15/23 1500)  Resp: (!) 82 (05/15/23 1136)  BP: (!) 86/50 (05/15/23 1136)  SpO2: 95 % (05/15/23 1500) Vital Signs (24h Range):  Temp:  [96.5 °F (35.8 °C)-97.8 °F (36.6 °C)] 97.8 °F (36.6 °C)  Pulse:  [] 79  Resp:  [16-82] 82  SpO2:  [94 %-99 %] 95 %  BP: ()/(50-74) 86/50     Weight: 65.3 kg (143 lb 15.4 oz)  Body mass index is 20.66 kg/m².    Intake/Output Summary (Last 24 hours) at  5/15/2023 1507  Last data filed at 5/15/2023 0923  Gross per 24 hour   Intake 260 ml   Output 800 ml   Net -540 ml         Physical Exam  Vitals and nursing note reviewed.   Constitutional:       Appearance: He is ill-appearing.   HENT:      Head: Normocephalic and atraumatic.      Nose: Nose normal. No congestion or rhinorrhea.      Mouth/Throat:      Mouth: Mucous membranes are dry.   Eyes:      Extraocular Movements: Extraocular movements intact.      Pupils: Pupils are equal, round, and reactive to light.   Cardiovascular:      Rate and Rhythm: Normal rate and regular rhythm.   Pulmonary:      Effort: No respiratory distress.      Breath sounds: No wheezing, rhonchi or rales.   Abdominal:      Palpations: Abdomen is soft.      Tenderness: There is no abdominal tenderness. There is no guarding or rebound.      Comments: No blood on CIERA.   Musculoskeletal:      Right lower leg: No edema.      Left lower leg: No edema.   Skin:     Findings: Lesion (stage 2 sacral decubitus ulcer) present.      Comments: Fluctuant lesion over left scapula.   Neurological:      Mental Status: He is alert and oriented to person, place, and time.      Comments: 2/5 strength very distal upper and lower extremities. 0-1/5 strength elsewhere.   Psychiatric:         Mood and Affect: Mood normal.         Behavior: Behavior normal.           Significant Labs: All pertinent labs within the past 24 hours have been reviewed.    Significant Imaging: I have reviewed all pertinent imaging results/findings within the past 24 hours.      Assessment/Plan:      * Acute hypoxemic respiratory failure  Patient with Hypoxic Respiratory failure which is Acute.  he is not on home oxygen. Supplemental oxygen was provided and noted-      .   Signs/symptoms of respiratory failure include- increased work of breathing. Contributing diagnoses includes - COPD and Pneumonia Labs and images were reviewed. Patient Has not had a recent ABG. Will treat underlying  "causes and adjust management of respiratory failure as follows.    See "COPD," "sepsis" and "aspiration pneumonia."     Patient now on room air, w/O2 sat at goal.    Hyponatremia  Patient endorses poor oral intake recently. Suspect is due to this. Appears volume down on exam.    Urine sodium 12. Indicates volume loss, likely due to poor oral intake in outpatient setting.    Hyperbilirubinemia  See "anemia."      Severe sepsis  This patient does have evidence of infective focus  My overall impression is sepsis.  Source: Respiratory  Antibiotics given-   Antibiotics (72h ago, onward)      Start     Stop Route Frequency Ordered    05/14/23 2230  azithromycin (ZITHROMAX) 500 mg in dextrose 5 % (D5W) 250 mL IVPB (Vial-Mate)         05/17 0859 IV Daily 05/14/23 1619    05/14/23 2200  cefTRIAXone (ROCEPHIN) 2 g in dextrose 5 % in water (D5W) 5 % 50 mL IVPB (MB+)         05/19 2159 IV Every 24 hours (non-standard times) 05/14/23 1619          Latest lactate reviewed-  Recent Labs   Lab 05/14/23  1457   LACTATE 1.3     Organ dysfunction indicated by Acute respiratory failure    Fluid challenge Actual Body weight- Patient will receive 30ml/kg actual body weight to calculate fluid bolus for treatment of septic shock.     Post- resuscitation assessment No - Post resuscitation assessment not needed       Will Not start Pressors- Levophed for MAP of 65  Source control achieved by: antibiotics.    Lactate initially elevated trended down to WNL on repeat (5/14)  CAP coverage  ->Rocephin 5 day course  ->azithromycin 3 day course  Broaden to HAP coverage if worsening signs of infection  UA negative for UTI  Blood cultures NGTD  Respiratory culture pending collection  Respiratory viral panel in process    Sacral decubitus ulcer, stage II  -wound care consulted      Chronic obstructive pulmonary disease, unspecified COPD type  Suspect COPD exacerbation, due to pneumonia (likely aspiration).    -duonebs q6h prn  -methylprednisolone " "equivalent to prednisone 40mg QD as patient is NPO due to aspiration    Inclusion body myositis (IBM)  See "polymyositis."      Essential hypertension  Takes amlodipine and losartan at home.    -hold home meds in setting of sepsis      Debility  Due to inclusion body myositis. Appears to be complicated by poor care at home, evidenced by sacral decubitus ulcer.      Aspiration pneumonia  See "sepsis" and "COPD exacerbation."      Beta 0 thalassemia  -monitor CBC    Acute on chronic anemia  Prior iron studies suggest anemia of chronic disease at baseline. Significant decline in hemoglobin this morning to 6.1 from 9.5 on presentation, 5.9 on stat repeat. Unclear source at this time. No melenic stool on CIERA. No melena, hematochezia, epistaxis reported, and patient denies as well. Abdomen and thighs are non-tender on exam. No obvious external source of bleeding. Hemolysis labs (LDH and haptoglobin) are negative. Concerned about GI vs retroperitoneal bleed. Gave small fluid bolus of 250cc this morning, as patient had JVD on presentation. Will repeat 250cc bolus this afternoon, but needs blood products. Concerned for development of shock.    -given precipitous drop in hemoglobin, though CIERA negative, started on GI bleed dosed PPI  ->80mg IV pantoprazole x1 followed by 40mg IV BID  -patient consented to receive blood products  -type and screen, ordered 2 units of prbcs  -CT abdomen pelvis w/contrast ordered to evaluate for intra-abdominal or retroperitoneal process  ->CT chest, as well as CT bilateral thigh (looking for hematoma)  -PT 13, INR 1.2 on admit, will repeat  -PLTs 158  -follow up lactate  -follow up CBC after transfusion  -hold all anti-coagulants, anti-platelets  -may need to discuss with GI vs IR depending on findings    History of tobacco use  Patient reports that he used to smoke heavily, though cannot specify packs-per day. Reports that he quit 4 years ago, but states that he still occasionally uses " "marijuana.      Quadriparesis (muscle weakness)  Secondary to inclusion body myositis. See "inclusion body myositis."      Oropharyngeal dysphagia  Secondary to inclusion body myositis. Patient reports that he takes his meds crushed in puree at home. Did discuss G tube with patient, however he is adamantly opposed to this currently.    -possible that recurrent aspiration is causing his presentation  ->modified barium swallow on 4/14/23 demonstrated aspiration  -GI recommending repeat SLP eval and ENT consult for consideration of esophagoscopy with UES dilation v/s cricopharyngeal myotomy  ->discussed w/ENT today and will hold-off eval for now given acute anemia    Polymyositis  History of polymyositis/inclusion body myositis. Follows with Dr. Huston. Currently on Cellcept and prednisone (10mg QD), with sporadic adherence.    -hold Cellcept  -methylprednisolone dosed to COPD exacerbation        VTE Risk Mitigation (From admission, onward)           Ordered     IP VTE HIGH RISK PATIENT  Once         05/14/23 1616     Place MARTI hose  Until discontinued         05/14/23 1616     Place sequential compression device  Until discontinued         05/14/23 1616                    Discharge Planning   DERICK: 5/17/2023     Code Status: Full Code   Is the patient medically ready for discharge?: No    Reason for patient still in hospital (select all that apply): Laboratory test, Treatment and Imaging                     Trino Potts DO  Department of Hospital Medicine   [facility] - Telemetry Stepdown    "

## 2023-05-16 PROBLEM — L02.91 ABSCESS: Status: ACTIVE | Noted: 2023-01-01

## 2023-05-16 PROBLEM — E43 SEVERE MALNUTRITION: Status: ACTIVE | Noted: 2023-01-01

## 2023-05-16 PROBLEM — L24.A2 IRRITANT CONTACT DERMATITIS DUE TO FECAL, URINARY OR DUAL INCONTINENCE: Status: ACTIVE | Noted: 2023-01-01

## 2023-05-16 NOTE — ASSESSMENT & PLAN NOTE
Patient without overt signs of bleeding. No Melena, hematochezia, hematemesis, BRBPR. Received transfusion yesterday and counts improved. Pending CTA this AM for further investigation.   - Continue to trend CBC as clinically indicated. Transfuse for Hgb <7.0   - Correct coagulopathy to keep platelets >50k and INR <2.0.  - Agree with Protonix BID   - Pending abdominal imaging this AM, will await results.

## 2023-05-16 NOTE — SUBJECTIVE & OBJECTIVE
No current facility-administered medications on file prior to encounter.     Current Outpatient Medications on File Prior to Encounter   Medication Sig    albuterol (PROVENTIL/VENTOLIN HFA) 90 mcg/actuation inhaler Inhale 2 puffs into the lungs every 6 (six) hours as needed for Wheezing or Shortness of Breath. (Patient taking differently: Inhale 4 puffs into the lungs once daily.)    amLODIPine (NORVASC) 10 MG tablet Take 1 tablet (10 mg total) by mouth once daily.    ammonium lactate 12 % Crea Apply 1 gram topically once daily. Apply to areas of dry skin and nails daily.    cholecalciferol, vitamin D3, 1,250 mcg (50,000 unit) capsule Take 1 capsule (50,000 Units total) by mouth once a week.    hydrOXYzine HCL (ATARAX) 25 MG tablet Take 1 tablet (25 mg total) by mouth 3 (three) times daily as needed (itching). (Patient taking differently: Take 25 mg by mouth 2 (two) times daily as needed for Itching (itching).)    lactulose (CHRONULAC) 10 gram/15 mL solution Take 15 mLs (10 g total) by mouth every 6 (six) hours as needed (constipation). (Patient taking differently: Take 10 g by mouth every other day.)    losartan (COZAAR) 25 MG tablet Take 1 tablet (25 mg total) by mouth once daily.    mupirocin (BACTROBAN) 2 % ointment Apply topically 3 (three) times daily.    mycophenolate (CELLCEPT) 500 mg Tab Take 3 tablets (1,500 mg total) by mouth 2 (two) times daily.    predniSONE (DELTASONE) 10 MG tablet Take 1 tablet (10 mg total) by mouth once daily.    traZODone (DESYREL) 50 MG tablet Take 1 tablet (50 mg total) by mouth nightly as needed for Insomnia. (Patient taking differently: Take  mg by mouth nightly as needed for Insomnia.)    albuterol-ipratropium (DUO-NEB) 2.5 mg-0.5 mg/3 mL nebulizer solution Take 3 mLs by nebulization every 6 (six) hours as needed for Wheezing. Rescue    oxyCODONE-acetaminophen (PERCOCET) 5-325 mg per tablet Take 1 tablet by mouth every 12 (twelve) hours as needed for Pain. (Patient not  taking: Reported on 5/15/2023)       Review of patient's allergies indicates:  No Known Allergies    Past Medical History:   Diagnosis Date    Aspiration pneumonia of right lower lobe 1/15/2018    Atrial fibrillation 3/2/2015    Chronic obstructive pulmonary disease, unspecified COPD type 2022    Depression     Essential hypertension 2019    Malignant (primary) neoplasm, unspecified 2022    Microcytic anemia 2014    Neuromuscular disorder     Other osteoporosis without current pathological fracture 2022    Pneumonia     Polymyositis 2009    Tobacco abuse      Past Surgical History:   Procedure Laterality Date    COLONOSCOPY N/A 2020    Procedure: COLONOSCOPY;  Surgeon: Brandan Meyer MD;  Location: [facility] ([address] [place] [address]);  Service: Endoscopy;  Laterality: N/A;    ESOPHAGOGASTRODUODENOSCOPY N/A 2020    Procedure: EGD (ESOPHAGOGASTRODUODENOSCOPY);  Surgeon: Brandan Meyer MD;  Location: [facility] ([address] [place] [address]);  Service: Endoscopy;  Laterality: N/A;  multiple co-morbidities. will have family member for assistance.  has neuromuscular issues-needs lift. in W/C-unable to transfer per self     COVID test at [facility] on 8/3-GT     Family History       Problem Relation (Age of Onset)    Diabetes Mother, Father    Hypertension Mother, Father    Kidney disease Mother          Tobacco Use    Smoking status: Some Days     Packs/day: 0.25     Years: 20.00     Pack years: 5.00     Types: Cigarettes     Last attempt to quit: 2018     Years since quittin.3    Smokeless tobacco: Never   Substance and Sexual Activity    Alcohol use: Yes     Alcohol/week: 0.0 standard drinks    Drug use: Yes     Types: Marijuana     Comment: daily use    Sexual activity: Not on file     Review of Systems   Constitutional:  Positive for fatigue.   HENT:  Positive for trouble swallowing.    Eyes: Negative.    Respiratory:  Positive for cough and shortness of breath.    Cardiovascular: Negative.     Gastrointestinal: Negative.    Endocrine: Negative.    Genitourinary: Negative.    Musculoskeletal: Negative.    Skin: Negative.    Allergic/Immunologic: Negative.    Neurological:  Positive for weakness.   Hematological: Negative.    Psychiatric/Behavioral: Negative.     All other systems reviewed and are negative.  Objective:     Vital Signs (Most Recent):  Temp: 97.5 °F (36.4 °C) (05/16/23 1542)  Pulse: 88 (05/16/23 1542)  Resp: 18 (05/16/23 1542)  BP: 118/66 (05/16/23 1542)  SpO2: 99 % (05/16/23 1542) Vital Signs (24h Range):  Temp:  [97.3 °F (36.3 °C)-98 °F (36.7 °C)] 97.5 °F (36.4 °C)  Pulse:  [67-97] 88  Resp:  [16-20] 18  SpO2:  [93 %-99 %] 99 %  BP: ()/(54-73) 118/66     Weight: 64.9 kg (143 lb 1.3 oz)  Body mass index is 20.53 kg/m².     Physical Exam  Vitals reviewed.   Constitutional:       Appearance: He is normal weight. He is ill-appearing.   HENT:      Head: Normocephalic and atraumatic.   Cardiovascular:      Rate and Rhythm: Normal rate and regular rhythm.      Pulses: Normal pulses.      Heart sounds: Normal heart sounds.   Pulmonary:      Effort: Pulmonary effort is normal.      Breath sounds: Wheezing present.   Abdominal:      General: Abdomen is flat. Bowel sounds are normal.      Palpations: Abdomen is soft.   Musculoskeletal:      Comments: Left shoulder - large abscess.    Skin:     General: Skin is warm and dry.   Neurological:      General: No focal deficit present.      Mental Status: He is alert and oriented to person, place, and time.   Psychiatric:         Thought Content: Thought content normal.          I have reviewed all pertinent lab results within the past 24 hours.  CBC:   Recent Labs   Lab 05/16/23  0557   WBC 6.88   RBC 4.00*   HGB 8.0*   HCT 25.1*      MCV 63*   MCH 20.0*   MCHC 31.9*     CMP:   Recent Labs   Lab 05/16/23  0557   GLU 78   CALCIUM 8.4*   ALBUMIN 2.1*   PROT 5.2*   *   K 4.0   CO2 22*      BUN 10   CREATININE 0.3*   ALKPHOS 54*   ALT  11   AST 13   BILITOT 1.1*       Significant Diagnostics:  I have reviewed all pertinent imaging results/findings within the past 24 hours.

## 2023-05-16 NOTE — PLAN OF CARE
Problem: Adult Inpatient Plan of Care  Goal: Plan of Care Review  Outcome: Ongoing, Progressing  Goal: Patient-Specific Goal (Individualized)  Outcome: Ongoing, Progressing  Goal: Absence of Hospital-Acquired Illness or Injury  Outcome: Ongoing, Progressing     Problem: Adjustment to Illness (Sepsis/Septic Shock)  Goal: Optimal Coping  Outcome: Ongoing, Progressing     Problem: Infection (Pneumonia)  Goal: Resolution of Infection Signs and Symptoms  Outcome: Ongoing, Progressing     Problem: Fluid Imbalance (Pneumonia)  Goal: Fluid Balance  Outcome: Ongoing, Progressing     Problem: Nutrition Impaired (Sepsis/Septic Shock)  Goal: Optimal Nutrition Intake  Outcome: Ongoing, Progressing     Problem: Skin Injury Risk Increased  Goal: Skin Health and Integrity  Outcome: Ongoing, Progressing     Problem: Fall Injury Risk  Goal: Absence of Fall and Fall-Related Injury  Outcome: Ongoing, Progressing     POC reviewed. Address questions and concerns. AAOX4. VVS. Oxygen @1L. Remain IV Abx. And NPO. Wound care performed. Encourage reposition and apply barrier cream. Prn Oxy for sacral pain. Call light in reach. Bed in lowest position. Frequent safety checks performed by staff.

## 2023-05-16 NOTE — PROGRESS NOTES
V1/Screening/Enrollment Visit  Sponsor: [employer]  Study: Vaccine Effectiveness of 20-Valent Pneumococcal Conjugate Vaccine Against Vaccine-Type CAP Using a Test-Negative Design (RECAP-20)  IRB/Protocol #: 2022.215/[id number]  Principle Investigator: Dr. Ralph Walsh   Site Number: [id number]     Subject Number: [id number]     Date of Visit: 16MAY2023        Mr. Nura Kahn was consented on 16MAY2023 to participate in the RECAP-20 study. Patient assessed and meets all of the inclusion and none of the exclusion criteria for enrollment in study. Chest x-ray performed on 14MAY2023 assessed to have radiographic finding that is consistent with pneumonia.

## 2023-05-16 NOTE — SUBJECTIVE & OBJECTIVE
Subjective:     Interval History: Followed up with patient for ongoing concerns of dysphagia. Working with speech therapy at this time. Recommended chin tuck, with each swallow/sip. Adamant that he does not have any problems swallowing. Last barium swallow completed in April showed aspiration of thin liquids. Vitals stable. No respiratory distress per patient. Afebrile overnight. Last transfusion was yesterday with appropriate response and no overt signs of bleeding noted.     Review of Systems   Constitutional:  Positive for unexpected weight change. Negative for activity change and appetite change.   HENT:  Positive for trouble swallowing. Negative for sore throat.    Gastrointestinal:  Negative for abdominal distention, abdominal pain, anal bleeding, blood in stool, constipation, diarrhea, nausea, rectal pain and vomiting.   Skin:  Negative for color change and pallor.   Neurological:  Positive for weakness. Negative for dizziness.   Objective:     Vital Signs (Most Recent):  Temp: 97.9 °F (36.6 °C) (05/16/23 0813)  Pulse: 97 (05/16/23 0813)  Resp: 16 (05/16/23 0813)  BP: 113/73 (05/16/23 0813)  SpO2: (!) 94 % (05/16/23 0813) Vital Signs (24h Range):  Temp:  [97.3 °F (36.3 °C)-98 °F (36.7 °C)] 97.9 °F (36.6 °C)  Pulse:  [67-97] 97  Resp:  [16-82] 16  SpO2:  [93 %-98 %] 94 %  BP: ()/(50-73) 113/73     Weight: 65.3 kg (143 lb 15.4 oz) (05/15/23 0312)  Body mass index is 20.66 kg/m².      Intake/Output Summary (Last 24 hours) at 5/16/2023 0931  Last data filed at 5/16/2023 0423  Gross per 24 hour   Intake 690 ml   Output 600 ml   Net 90 ml       Lines/Drains/Airways       Drain  Duration             Male External Urinary Catheter 05/14/23 2100 Small 1 day              Peripheral Intravenous Line  Duration                  Peripheral IV - Single Lumen 05/14/23 1313 20 G Left;Posterior Wrist 1 day         Peripheral IV - Single Lumen 05/15/23 1750 20 G;1 3/4 in Left Forearm <1 day                     Physical  Exam  Vitals reviewed.   Constitutional:       General: He is not in acute distress.     Appearance: He is ill-appearing.   HENT:      Mouth/Throat:      Mouth: Mucous membranes are moist.      Pharynx: Oropharynx is clear.   Eyes:      General: No scleral icterus.  Abdominal:      General: Abdomen is flat. Bowel sounds are normal. There is no distension.      Palpations: Abdomen is soft. There is no mass.      Tenderness: There is no abdominal tenderness.      Hernia: No hernia is present.   Skin:     General: Skin is warm and dry.      Capillary Refill: Capillary refill takes less than 2 seconds.      Coloration: Skin is not jaundiced or pale.      Findings: No bruising or erythema.   Neurological:      Mental Status: He is alert and oriented to person, place, and time.      Motor: Weakness present.        Significant Labs:  CBC:   Recent Labs   Lab 05/15/23  1158 05/15/23  2004 05/16/23  0557   WBC 5.95 8.66 6.88   HGB 5.9*  5.9* 7.4* 8.0*   HCT 18.5* 22.5* 25.1*    159 158     CMP:   Recent Labs   Lab 05/16/23  0557   GLU 78   CALCIUM 8.4*   ALBUMIN 2.1*   PROT 5.2*   *   K 4.0   CO2 22*      BUN 10   CREATININE 0.3*   ALKPHOS 54*   ALT 11   AST 13   BILITOT 1.1*         Significant Imaging:  Imaging results within the past 24 hours have been reviewed.    Barium swallow 4/13/2023  FINDINGS:  Pharyngeal Phase:     - Penetration/aspiration: Penetration with thin liquids as well as penetration and aspiration with nectar thickened liquids.     - Residual/static material: Vallecular and pyriform     - Strategies: Chin tuck     Esophageal Phase (if visualized): N/A     Anatomic Observations: Cervical spine degenerative changes.     Impression:     Episodes of penetration and aspiration with liquid barium products.     Please see speech pathology report for further details.     Electronically signed by resident: Emre Khan  Date:                                            04/14/2023  Time:                                            14:55

## 2023-05-16 NOTE — PROGRESS NOTES
V1/Screening/Enrollment Visit  Sponsor: [employer]  Study: Vaccine Effectiveness of 20-Valent Pneumococcal Conjugate Vaccine Against Vaccine-Type CAP Using a Test-Negative Design (RECAP-20)  IRB/Protocol #: 2022.215/[id number]  Principle Investigator: Dr. Ralph Walsh   Site Number: [id number]    Subject Number: [id number]    Date of Visit: 16May2023      Master Protocol Inclusion/Exclusion Criteria at Screening:    Inclusion Criteria (all must be YES to qualify):    1. Male or female participants ?65 years of age  Yes  2. Hospitalized participant with physician clinical suspicion of CAP with the presence  of ?2 of the following 10 clinical signs or symptoms:  fever (oral temperature >38.0°C/100.4°F or tympanic temperature  >38.5°C/101.2°F),  hypothermia (<35.5°C/95.9°F measured by a healthcare provider)  chills or rigors,  pleuritic chest pain,  new or worsening cough,  sputum production,  dyspnea (shortness of breath),  tachypnea (respiratory rate >20/min),  malaise, or  abnormal auscultatory findings suggestive of pneumonia (rales or evidence of  pulmonary consolidation including dullness on percussion, bronchial breath  sounds, or egophony).  Yes  3. Has a radiographic finding that is consistent with pneumonia   Yes  4. Capable of giving signed informed consent as described in protocol, which includes compliance with the requirements and restrictions listed in protocol.   Yes    Exclusion Criteria at Screening (all must be NO to qualify):    1. Any participant who develops signs and symptoms of pneumonia after being hospitalized for ?48 hours (either at the study site, another transferring hospital or a combination of these).   No  2. Received any pneumococcal vaccine ?30 days prior to enrollment.   No  3. Unable to provide urine specimen (e.g. anuric)   No  4. Previous enrollment in the study within the past 30 days   No    Were all eligibility criteria met?: Yes   Prior to approaching patient for consent and enrollment,  eligibility was first confirmed by Chin-I Eduarda Duran and PI Dr. Walsh.  If no, enter information about main criteria not satisfied/met: N/A    Demographic:  Date of Birth:[id number]  Age: 67 y.o.  Sex: Male  Ethnicity: Not  or   Race: Black or     Has patient previously participated in this study? No  If yes, what was the previous site and subject identifier?    Significant Medical History:  Does the patient have a history of any of the following, zehra all that apply?  NOTE: checking box means yes patient has history, blank box means no patient does not have  [x] COPD  [] Asthma  [] Emphysema  [] Other Chronic lung disease  [] CHF  [] Cardiomyopathies  [] Coronary Artery Disease  [x] Other Chronic heart disease (Patient reported to have hypertension and atrial fibrillation in EMR)  [] Diabetes mellitus with complications  [] Diabetes mellitus without complications  [] Diabetes mellitus that is diet controlled  [] Diabetes mellitus treated with medication  [] Other metabolic disease  [] Cirrhosis  [] Chronic liver disease without hepatic failure  [] Chronic liver disease with hepatic failure  [] Other chronic liver disease  [] Chronic renal failure  [] Nephrotic syndrome  [] Chronic kidney disease  [] Other Chronic kidney disease  [] Chronic neurologic disease  [] Cochlear implant  [] Cerebrospinal fluid leak  [] Functional or anatomic asplenia (including or and)  [] Sickle cell disease  [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    Chronic neurologic disease  Definition includes epilepsy, cerebral palsies, or cerebro-vascular disease    Chronic Kidney Disease  Chronic Kidney Disease defined as glomerular filtration rate < 60 mL/min or End-Stage Renal Disease (ESRD) and nephrotic syndrome.    Immunosuppressant drug therapy  Immunosuppressive medication including systemic corticosteroid use (e.g. in the context of autoimmune disorders, organ transplantation). Individuals on or likely to be on systemic steroids for more than a month at a dose equivalent to prednisolone at 20 mg or more per day. Participants receiving topical steroids are not considered immunosuppressive.      Signs and Symptoms of Pneumonia (can be signs and symptoms present at time of Enrollment and from assessment on admission):  Note: checking box means yes patient has, blank box means no patient does not have    Date performed (expected to be from assessment on admission to hospital): [id number]    [] fever (oral temperature >38.0°C/100.4°F or tympanic temperature  >38.5°C/101.2°F),  [] hypothermia (<35.5°C/95.9°F measured by a healthcare provider)  [] chills   [] rigors  [] pleuritic chest pain,  [x] new or worsening cough,  [] sputum production,  [x] dyspnea (shortness of breath),  [x] tachypnea (respiratory rate >20/min),  [x] malaise  [] abnormal auscultatory findings suggestive of pneumonia (rales or evidence of  pulmonary consolidation including dullness on percussion, bronchial breath  sounds, or egophony).      History of Smoking, alcohol use, illicit drug use:  Has the subject every used the following substances?    Cigarettes [] NEVER [x] CURRENT [] FORMER   E-Cigarettes [x] NEVER [] CURRENT [] FORMER   Tobacco [x] NEVER [] CURRENT [] FORMER   Alcohol [] NEVER [x] CURRENT [] FORMER   Recreational Marijuana [] NEVER [x] CURRENT [] FORMER   Illicit Drugs, not specified [x] NEVER [] CURRENT [] FORMER     NOTE: Definitions per study    Smoking  "status  Lifetime cigarette use  Current smoking status    Current  ?100 cigarettes in lifetime  smokes every day    Former  ?100 cigarettes in lifetime  has quit smoking    Never  never smoked or <100 cigarettes in lifetime  not currently smoking    Tobacco use summarizes all forms of use not captured by cigarettes/E-cigarettes that are associated with the combustion of tobacco, e.g. cigars, cigarillos, tobacco pipes, Vaporized tobacco or nicotine.    Definition of Alcohol Use:   Never: Never drank alcohol or consumed fewer than 12 alcoholic drinks in a lifetime   Former: Consumed more than 12 alcoholic drinks in a lifetime, but has not consumed any alcoholic drinks in the last year   Current: Consumed alcoholic drinks within the last year    Definitions of Recreational Marijuana and Illicit Drug Use:   Never: Never used   Former: Has not used in the last year   Current: Used within the last year    Oral antibiotic use in last ?14 days: No      Parenteral antibiotic use in last ? 90 days: Yes      Hospitalization ?24 hours in ?90 days prior to enrollment: Yes  If yes- what was date was the patient discharged:  [id number] to [id number] and also from [id number] to [id number]    Weekly exposure to children <5 years of age: Yes    Residential Setting:  What setting below best describes where patient currently resides?  [] At home without home hema care  [x] At home with home health care provided by family or friends (Patient reported having "a lady" that helps him at home currently at visit 1 date.)  [] At home with professional home health care  [] Skilled nursing facility/rehabilitation facility low level of care  [] Skilled nursing facility/rehabilitation facility high level of care  [] Other (Specify):    Zip code patient resides in: [facility]    Mental Status: [] Confused  [x] Not Confused    Concomitant Medications: Patient Reported Vaccination History  NOTE: At this time only vaccinations to be recorded are patient " reported. At Day 30 will report vaccination history from physician record, pharmacy records, LINKS, etc.    Date and Type(s) of pneumococcal vaccination in past 5 years: Patient thinks he has received all his vaccines. Patient does not use [facility], [facility], or [facility] when asked about pharmacies. Patient reports using [facility] and PCP for vaccines. Dates unknown.  Has patient received an Investigational pneumococcal vaccine as part of a study in the last 5 years? NO  23-Valent Polysaccharide: Unknown type  13-Valent Pneumococcal Conjugate Vaccine: Unknown type  15-Valent Pneumococcal Conjugate Vaccine: Unknown type  20-Valent Pneumococcal Conjugate Vaccine: Unknown type  Influenza Vaccine (in last 12 months): unknown  COVID Vaccines: yes      Body Weight and Height:  Date of collection: 16May2023  Height: 177.8 cm   Weight: 64.9kg    Vital Signs (from first taken at ED presentation/hospitalization):  Date of collection: 14May2023 at 1126  Position: unknown  Pulse: 120 beats/min  Systolic BP: 109 mmHg  Diastolic BP: 83 mmHg  Temperature: 98.3F  Temperature Anatomical Location: Oral  Respiratory Rate: 26 breaths/min  Oxygen Saturation: 99% on Nasal Cannula at 6 liters/min    Oxygen Saturation and Oxygen Rate:  (record the value associated with the highest level of respiratory distress since hospitalized)  Date of collection: 15May2023  Time of collection: 15:00  Oxygen Saturation: 93 %  Room Air:  Yes  If on oxygen therapy, note the flow at this time: N/A  Arterial Blood Gas PaO2 (if available): not available  FiO2- value should be 0.21-1.00 (if on room air enter 0.21): 0.21    Oxygen Administration: Yes  Start Date and time (24-hour clock):   Pt arrived on nasal cannula at 6L/min at 14May2023 at 1126  Patient switched to 3 L/min NC on 14May2023 at 1924; Then down to 2 L/min NC at 2150.  Switched again down to 1L/min on 15May2023 at 03:00 and various changes from 1L/min NC and Room Air.    Low Flow Oxygen  Delivery: Yes   If yes, Flow Rate: various flow rates during hospitalization  (if yes, check the one that applies)  [] Simple Face Mask [x] Nasal Prongs [] Partial Rebreather Mask [] Tracheostomy Mask [] Tracheostomy Home Connector [] Other (please specify):     High Flow Oxygen Delivery: No   If yes, Flow Rate:  (if yes, check the one that applies)  [] High Flow Nasal Cannula [] Transtracheal Catheters [] Venturi Mask [] Aerosol Mask [] Non Rebreather Face Mask [] Other (please specify):      Chest Imaging   Note: Imaging obtained between 48 hours prior to hospital admission and 48 hours after hospital admission may be used.  Type of Imaging Exam:  [x] CT Scan on [id number]  [x] X-Ray on [id number]  [] MRI on   [] Other (specify):     Assessment:  [x] Abnormal Confirmed by Eduarda Duran  [] Normal  [] Not Evaluable    If abnormal:  [] Pneumonia without Pleural Effusion  [x] Pneumonia with Pleural Effusion Confirmed by Sub-I Eduarda Duran  [] Other (specify):    XRAY [id number] Report: FINDINGS: Significant bibasilar airspace disease versus consolidation or less likely subsegmental atelectasis.  Patchy ground-glass opacities in the right mid lung.  Coarsened bilateral interstitial lung markings. There is a rounded opacity overlying the left lung apex which is likely exaggerated by overlapping costal cartilage as this finding was not seen on a prior exam dated 03/31/2023 which was obtained in a different position than the current study.  Blunting of the bilateral costophrenic angles raises the question of small pleural effusions versus airspace disease. No distinct pneumothorax.  Impression:Worsening bibasilar airspace and consolidative opacities with ground-glass opacities in the right mid lung, suggestive of multifocal pneumonia versus aspiration in this patient with sepsis.  Asymmetric pulmonary edema felt less likely.  CTA [id number] Report: Small volume right pleural effusion. Impression: #2. Worsening basilar  predominant consolidation and ground-glass opacities suggesting multifocal pneumonia, sequelae of aspiration, or other infectious/inflammatory process.    Microbiologic Testing  As standard medical care was microbiologic testing performed on any blood or respiratory samples: Yes   If yes, please complete below questions for each specimen collected    Date of Specimen Collection: 14May2023  Specimen Type:   [x] Blood  []Sputum (if sputum complete below)   Number of PNMs:   [] <10    [] 10-25   [] >25   [] UNKNOWN  Number of Squamous Epithelial Cells:  [] <10    [] 10-25   [] >25   [] UNKNOWN   Presence of Mucus:   [] NO    [] YES   [] UNKNOWN    [] Pleural Fluid  [] Bronchoalveolar Lavage  [] Tracheal Aspirate  [] Other (specify):    Where isolates identified: No growth to date. Will update with any changes.    NOTE: Do not document any sputum microbiology results for local naldo (colonizing naldo) or contaminants. Colonizing naldo includes viridans Streptococci, Neisseria spp., Corynebacteria spp. und Staphylococcus epidermidis, other coagulase-negative Staphylococci, Enterococcus spp., or Candida spp.    If Yes Complete, ISOLATE IDENTIFICATION  Isolate Name Other Isolate Specify Method of Test Sent to [facility] Accession Number          (only complete #4 and #5 if Isolate identified is STREPTOCOCCUS PNEUMONIAE)    If  STREPTOCOCCUS PNEUMONIAE identified complete Susceptibility Testing below    Isolate Name Other Isolate Specify Drug Name Susceptibility for Drug             PSI Score :   (refer to Protocol Appendix 5 for algorithm to calculate PSI score)  Date of Collection:16May2023  Calculated PSI Score: 105 Class IV  65 (age) + 20 (SBP) +10 (Hematocrit) +10 (Pulse oximetry) = 105  Calculated by: Amanda Duran    Urine Specimen Collection:  (collect as soon as possible after consent signed, preferred within 24 hours of study entry)  Was urine specimen?  yes  Date of collection: 16May2023  Time:13:00 (via  external catheter)  Sample ID (6-digit barcode#; 8 expected for each subject) for each urine aliquot:   4 send barcodes: [id number], [id number], [id number], [id number]; 4 retain barcodes: [id number], [id number], [id number], [id number]    AE and CAROLINE assessment  NOTE: Per study AEs and SAEs (active collection period) will begin when the first protocol-required procedure (urine sample) is performed at Visit 1 and will conclude 15 minutes after the procedure is performed.   Were any AE or CAROLINE assessed at this visit: No  No incidents reported to CRC. Patient was able to provide a urine sample via external catheter.     NOTE: Unless otherwise specified data above was collected on date of visit  Per study protocol subject will be follow, if hospitalized, until hospital Day #3. Additional data will be collected at Day 30 regarding hospital discharge. No other intervention or data collection will be performed for this study.

## 2023-05-16 NOTE — ASSESSMENT & PLAN NOTE
Secondary to inclusion body myositis. Patient reports that he takes his meds crushed in puree at home. Did discuss G tube with patient, however he is adamantly opposed to this currently.    -possible that recurrent aspiration is causing his presentation  ->modified barium swallow on 4/14/23 demonstrated aspiration  -GI recommending repeat SLP eval and ENT consult for consideration of esophagoscopy with UES dilation v/s cricopharyngeal myotomy  -consulted ENT

## 2023-05-16 NOTE — ASSESSMENT & PLAN NOTE
Nutrition Problem:  Severe Protein-Calorie Malnutrition  Malnutrition in the context of Chronic Illness/Injury    Related to (etiology):  Inability to consume sufficient energy     Signs and Symptoms (as evidenced by):  Energy Intake: <75% of estimated energy requirement for > 2 months  Body Fat Depletion: severe depletion of orbitals and triceps   Muscle Mass Depletion: severe depletion of temples, clavicle region, scapular region, and lower extremities   Weight Loss: 16% x 3 months    Interventions(treatment strategy):  Collaboration of nutrition care w/ other providers  EN?    Nutrition Diagnosis Status:  New

## 2023-05-16 NOTE — SUBJECTIVE & OBJECTIVE
Scheduled Meds:   azithromycin  500 mg Intravenous Daily    cefTRIAXone (ROCEPHIN) IVPB  2 g Intravenous Q24H    LIDOcaine HCL 10 mg/ml (1%)  10 mL Other Once    methylPREDNISolone sodium succinate injection  32 mg Intravenous Daily    [START ON 5/17/2023] pantoprazole  40 mg Intravenous Daily     Continuous Infusions:  PRN Meds:sodium chloride, acetaminophen, dextrose 10%, dextrose 10%, dextrose, dextrose, glucagon (human recombinant), levalbuterol **AND** ipratropium, melatonin, naloxone, oxyCODONE, sodium chloride 0.9%, sodium chloride 0.9%    Review of patient's allergies indicates:  No Known Allergies     Past Medical History:   Diagnosis Date    Aspiration pneumonia of right lower lobe 1/15/2018    Atrial fibrillation 3/2/2015    Chronic obstructive pulmonary disease, unspecified COPD type 5/4/2022    Depression     Essential hypertension 2/27/2019    Malignant (primary) neoplasm, unspecified 5/4/2022    Microcytic anemia 9/25/2014    Neuromuscular disorder     Other osteoporosis without current pathological fracture 9/22/2022    Pneumonia     Polymyositis 2009    Tobacco abuse      Past Surgical History:   Procedure Laterality Date    COLONOSCOPY N/A 8/6/2020    Procedure: COLONOSCOPY;  Surgeon: Brandan Meyer MD;  Location: [address]);  Service: Endoscopy;  Laterality: N/A;    ESOPHAGOGASTRODUODENOSCOPY N/A 8/6/2020    Procedure: EGD (ESOPHAGOGASTRODUODENOSCOPY);  Surgeon: Brandan Meyer MD;  Location: [address]);  Service: Endoscopy;  Laterality: N/A;  multiple co-morbidities. will have family member for assistance.  has neuromuscular issues-needs lift. in W/C-unable to transfer per self     COVID test at [facility] on 8/3-GT       Family History       Problem Relation (Age of Onset)    Diabetes Mother, Father    Hypertension Mother, Father    Kidney disease Mother          Tobacco Use    Smoking status: Some Days     Packs/day: 0.25     Years: 20.00     Pack years: 5.00     Types:  Cigarettes     Last attempt to quit: 2018     Years since quittin.3    Smokeless tobacco: Never   Substance and Sexual Activity    Alcohol use: Yes     Alcohol/week: 0.0 standard drinks    Drug use: Yes     Types: Marijuana     Comment: daily use    Sexual activity: Not on file     Review of Systems   Skin:  Positive for wound.     Objective:     Vital Signs (Most Recent):  Temp: 97.5 °F (36.4 °C) (23 1542)  Pulse: 88 (23 1542)  Resp: 18 (23 1542)  BP: 118/66 (23 1542)  SpO2: 99 % (23 1542) Vital Signs (24h Range):  Temp:  [97.3 °F (36.3 °C)-98 °F (36.7 °C)] 97.5 °F (36.4 °C)  Pulse:  [67-97] 88  Resp:  [16-20] 18  SpO2:  [93 %-99 %] 99 %  BP: ()/(54-73) 118/66     Weight: 64.9 kg (143 lb 1.3 oz)  Body mass index is 20.53 kg/m².     Physical Exam  Constitutional:       Appearance: Normal appearance.   Skin:     General: Skin is warm and dry.      Findings: Lesion present.   Neurological:      Mental Status: He is alert.        Laboratory:  All pertinent labs reviewed within the last 24 hours.    Diagnostic Results:  None

## 2023-05-16 NOTE — ASSESSMENT & PLAN NOTE
Mr. Kahn is a 67M, with a history of inclusion body myositis, quadriplegia and dysphagia, COPD, HTN, hemoglobin c disease, beta-thalassemia, who was admitted for aspiration pneumonia, now wit simple large back abscess.    - Plan for bedside I&D

## 2023-05-16 NOTE — HPI
Mr. Kahn is a 67M, with a history of inclusion body myositis, quadriplegia and dysphagia, COPD, HTN, hemoglobin c disease, beta-thalassemia, who was admitted for aspiration pneumonia. General surgery was consulted for I&D of back abscess. Patient states it has been present for several years and has grown. Denies previous I&D. Mildly tender.     Patient vitals stable, Not on anticoagulation. Being treated on antibiotics.

## 2023-05-16 NOTE — CONSULTS
"[facility] - Telemetry Stepdown  Skin Integrity LINDA  Consult Note    Patient Name: Nura Kahn Jr.  MRN: [id number]  Admission Date: 5/14/2023  Hospital Length of Stay: 2 days  Attending Physician: Emre Molina DO  Primary Care Provider: Edna Jaramillo NP     Consults  Subjective:     History of Present Illness:  Mr. 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 Huston) complicated by quadriplegia and dysphagia, COPD, HTN, aspiration pneumonia, hemoglobin c disease, beta-thalassemia, who presents with 1 week of progressively worsening shortness of breath. Patient describes this as chest tightness. He denies associated fevers, chills, cough. He has a recent hospitalization for aspiration pneumonia and COPD exacerbation (discharged on 4/16/23). Was given CAP coverage, then Unasyn, as well as prednisone. Was discharged on Augmentin to complete antibiotic course. Prior to this, had a hospital stay (discharged on 4/3/23) during which he was treated for COVID with remdesivir and dexamethasone. Patient states that he lives in a "two bed" in "[facility]" with "a lady" who takes care of him. Describes her as his caregiver, though says that "she doesn't always give 100%." Reports sporadic adherence to medication, and states that he spends almost all of his time in bed. Unfortunately reports that he is quite often left to defecate and urinate on himself. Presented to the ED via EMS, was given Solu-Medrol and Duonebs en route and placed on 2L NC. Patient denies using supplemental oxygen at home. In the ED, septic work-up was started, given septic bolus, and started on broad-spectrum antibiotics. Admitted to hospital medicine for pneumonia and sepsis. Skin integrity LINDA consulted for evaluation of skin breakdown.       Scheduled Meds:   azithromycin  500 mg Intravenous Daily    cefTRIAXone (ROCEPHIN) " IVPB  2 g Intravenous Q24H    LIDOcaine HCL 10 mg/ml (1%)  10 mL Other Once    methylPREDNISolone sodium succinate injection  32 mg Intravenous Daily    [START ON 5/17/2023] pantoprazole  40 mg Intravenous Daily     Continuous Infusions:  PRN Meds:sodium chloride, acetaminophen, dextrose 10%, dextrose 10%, dextrose, dextrose, glucagon (human recombinant), levalbuterol **AND** ipratropium, melatonin, naloxone, oxyCODONE, sodium chloride 0.9%, sodium chloride 0.9%    Review of patient's allergies indicates:  No Known Allergies     Past Medical History:   Diagnosis Date    Aspiration pneumonia of right lower lobe 1/15/2018    Atrial fibrillation 3/2/2015    Chronic obstructive pulmonary disease, unspecified COPD type 5/4/2022    Depression     Essential hypertension 2/27/2019    Malignant (primary) neoplasm, unspecified 5/4/2022    Microcytic anemia 9/25/2014    Neuromuscular disorder     Other osteoporosis without current pathological fracture 9/22/2022    Pneumonia     Polymyositis 2009    Tobacco abuse      Past Surgical History:   Procedure Laterality Date    COLONOSCOPY N/A 8/6/2020    Procedure: COLONOSCOPY;  Surgeon: Brandan Meyer MD;  Location: [facility] ([address] [place] [address]);  Service: Endoscopy;  Laterality: N/A;    ESOPHAGOGASTRODUODENOSCOPY N/A 8/6/2020    Procedure: EGD (ESOPHAGOGASTRODUODENOSCOPY);  Surgeon: Brandan Meyer MD;  Location: [address]);  Service: Endoscopy;  Laterality: N/A;  multiple co-morbidities. will have family member for assistance.  has neuromuscular issues-needs lift. in W/C-unable to transfer per self     COVID test at [facility] on 8/3-GT       Family History       Problem Relation (Age of Onset)    Diabetes Mother, Father    Hypertension Mother, Father    Kidney disease Mother          Tobacco Use    Smoking status: Some Days     Packs/day: 0.25     Years: 20.00     Pack years: 5.00     Types: Cigarettes     Last attempt to quit: 1/20/2018     Years since  quittin.3    Smokeless tobacco: Never   Substance and Sexual Activity    Alcohol use: Yes     Alcohol/week: 0.0 standard drinks    Drug use: Yes     Types: Marijuana     Comment: daily use    Sexual activity: Not on file     Review of Systems   Skin:  Positive for wound.     Objective:     Vital Signs (Most Recent):  Temp: 97.5 °F (36.4 °C) (23 1542)  Pulse: 88 (23 1542)  Resp: 18 (23 1542)  BP: 118/66 (23 1542)  SpO2: 99 % (23 1542) Vital Signs (24h Range):  Temp:  [97.3 °F (36.3 °C)-98 °F (36.7 °C)] 97.5 °F (36.4 °C)  Pulse:  [67-97] 88  Resp:  [16-20] 18  SpO2:  [93 %-99 %] 99 %  BP: ()/(54-73) 118/66     Weight: 64.9 kg (143 lb 1.3 oz)  Body mass index is 20.53 kg/m².     Physical Exam  Constitutional:       Appearance: Normal appearance.   Skin:     General: Skin is warm and dry.      Findings: Lesion present.   Neurological:      Mental Status: He is alert.        Laboratory:  All pertinent labs reviewed within the last 24 hours.    Diagnostic Results:  None        Assessment/Plan:         LINDA Skin Integrity Evaluation      Skin Integrity LINDA evaluation of patient as part of the comprehensive skin care team.     He has been admitted for 2 days. Skin injury was noted on 5/15/23. POA yes.    Sacrum            Derm  Irritant contact dermatitis due to fecal, urinary or dual incontinence  - consult received for evaluation of skin injury to sacrum.  - pt presents with 1 week of progressively worsening shortness of breath. Hx of quadriplegia. Pt is bed bound and reports being left in urine and stool often.  - partial thickness tissue loss to sacrum with pink, moist wound bed. Macerated wound edges and periwound. Wound likely due to moisture from incontinence and friction.   - pt well known to wound care dept and was seen with moisture related wounds recently in March and April.  - continue Triad bid/prn.  - phylicia surface utilized.   - pt declined a new bed surface at this  time.   - c/o pain to sacrum. Foam dressing in place.  - pt avoid briefs if possible and utilize moisture wicking pads instead.  - pillows/wedge for offloading.  - strict turning q2h.  - nursing to maintain pressure injury prevention measures and continue wound care per orders.         Thank you for your consult. I will follow-up with patient. Please contact us if you have any additional questions.       Edilia Lam NP  Skin Integrity [facility]DA  [facility] - Telemetry Stepdown

## 2023-05-16 NOTE — PROGRESS NOTES
[facility] - Telemetry Stepdown  Gastroenterology  Progress Note    Patient Name: Nura Kahn Jr.  MRN: [id number]  Admission Date: 5/14/2023  Hospital Length of Stay: 2 days  Code Status: Full Code   Attending Provider: Emre Molina DO  Consulting Provider: Mona Tyson NP  Primary Care Physician: Edna Jaramillo NP  Principal Problem: Acute hypoxemic respiratory failure    Subjective:     Interval History: Followed up with patient for ongoing concerns of dysphagia. Working with speech therapy at this time. Recommended chin tuck, with each swallow/sip. Adamant that he does not have any problems swallowing. Last barium swallow completed in April showed aspiration of thin liquids. Vitals stable. No respiratory distress per patient. Afebrile overnight. Last transfusion was yesterday with appropriate response and no overt signs of bleeding noted.     Review of Systems   Constitutional:  Positive for unexpected weight change. Negative for activity change and appetite change.   HENT:  Positive for trouble swallowing. Negative for sore throat.    Gastrointestinal:  Negative for abdominal distention, abdominal pain, anal bleeding, blood in stool, constipation, diarrhea, nausea, rectal pain and vomiting.   Skin:  Negative for color change and pallor.   Neurological:  Positive for weakness. Negative for dizziness.   Objective:     Vital Signs (Most Recent):  Temp: 97.9 °F (36.6 °C) (05/16/23 0813)  Pulse: 97 (05/16/23 0813)  Resp: 16 (05/16/23 0813)  BP: 113/73 (05/16/23 0813)  SpO2: (!) 94 % (05/16/23 0813) Vital Signs (24h Range):  Temp:  [97.3 °F (36.3 °C)-98 °F (36.7 °C)] 97.9 °F (36.6 °C)  Pulse:  [67-97] 97  Resp:  [16-82] 16  SpO2:  [93 %-98 %] 94 %  BP: ()/(50-73) 113/73     Weight: 65.3 kg (143 lb 15.4 oz) (05/15/23 0312)  Body mass index is 20.66 kg/m².      Intake/Output Summary (Last 24 hours) at 5/16/2023 0961  Last data filed at 5/16/2023 0423  Gross per 24 hour   Intake 690 ml   Output 600  ml   Net 90 ml       Lines/Drains/Airways       Drain  Duration             Male External Urinary Catheter 05/14/23 2100 Small 1 day              Peripheral Intravenous Line  Duration                  Peripheral IV - Single Lumen 05/14/23 1313 20 G Left;Posterior Wrist 1 day         Peripheral IV - Single Lumen 05/15/23 1750 20 G;1 3/4 in Left Forearm <1 day                     Physical Exam  Vitals reviewed.   Constitutional:       General: He is not in acute distress.     Appearance: He is ill-appearing.   HENT:      Mouth/Throat:      Mouth: Mucous membranes are moist.      Pharynx: Oropharynx is clear.   Eyes:      General: No scleral icterus.  Abdominal:      General: Abdomen is flat. Bowel sounds are normal. There is no distension.      Palpations: Abdomen is soft. There is no mass.      Tenderness: There is no abdominal tenderness.      Hernia: No hernia is present.   Skin:     General: Skin is warm and dry.      Capillary Refill: Capillary refill takes less than 2 seconds.      Coloration: Skin is not jaundiced or pale.      Findings: No bruising or erythema.   Neurological:      Mental Status: He is alert and oriented to person, place, and time.      Motor: Weakness present.        Significant Labs:  CBC:   Recent Labs   Lab 05/15/23  1158 05/15/23  2004 05/16/23  0557   WBC 5.95 8.66 6.88   HGB 5.9*  5.9* 7.4* 8.0*   HCT 18.5* 22.5* 25.1*    159 158     CMP:   Recent Labs   Lab 05/16/23  0557   GLU 78   CALCIUM 8.4*   ALBUMIN 2.1*   PROT 5.2*   *   K 4.0   CO2 22*      BUN 10   CREATININE 0.3*   ALKPHOS 54*   ALT 11   AST 13   BILITOT 1.1*         Significant Imaging:  Imaging results within the past 24 hours have been reviewed.    Barium swallow 4/13/2023  FINDINGS:  Pharyngeal Phase:     - Penetration/aspiration: Penetration with thin liquids as well as penetration and aspiration with nectar thickened liquids.     - Residual/static material: Vallecular and pyriform     - Strategies:  Chin tuck     Esophageal Phase (if visualized): N/A     Anatomic Observations: Cervical spine degenerative changes.     Impression:     Episodes of penetration and aspiration with liquid barium products.     Please see speech pathology report for further details.     Electronically signed by resident: Emre Khan  Date:                                            04/14/2023  Time:                                           14:55    Assessment/Plan:     Oncology  Acute on chronic anemia  Patient without overt signs of bleeding. No Melena, hematochezia, hematemesis, BRBPR. Received transfusion yesterday and counts improved. Pending CTA this AM for further investigation.   - Continue to trend CBC as clinically indicated. Transfuse for Hgb <7.0   - Correct coagulopathy to keep platelets >50k and INR <2.0.  - Agree with Protonix BID   - Pending abdominal imaging this AM, will await results.      GI  Oropharyngeal dysphagia  - Recent MBS showed aspiration of thin liquids. Please continue with speech therapy.   - Will await ENT consult for consideration of outpatient esophagoscopy with UES dilation vs cricopharyngeal myotomy.    - Would also have nutrition chime in on his nutritional status. If he is unable to meet his requirements, can consider EGD with PEG. However, a PEG will not prevent aspiration. Patient insisting that he does not want a PEG at this time.    - To investigate lower esophageal dilation seen on prior EGD, the next step would be to consider a Timed barium swallow with barium column at 0, 1, 3, 5 minutes and barium 13 mm tablet (Charles protocol). We acknowledge this may be challenging to perform given mobility restrictions.         Thank you for your consult. I will follow-up with patient. Please contact us if you have any additional questions.    Mona Tyson NP  Gastroenterology  [facility] - Telemetry Stepdown

## 2023-05-16 NOTE — SUBJECTIVE & OBJECTIVE
Interval History: Hb stable following transfusion of 2 units prbcs; on 1L NC; pending CTA chest to pelvis; remains on PPI; open to seeing ENT; would like to consider long-term options concerning nutrition (e.g. G tube) and discuss tomorrow. Consulted gen surg for I&D of abscess over left scapula.    Review of Systems   Constitutional:  Positive for fatigue. Negative for chills and fever.   HENT:  Negative for congestion, rhinorrhea and sore throat.    Respiratory:  Negative for cough, chest tightness and shortness of breath.    Cardiovascular:  Negative for chest pain, palpitations and leg swelling.   Gastrointestinal:  Negative for abdominal pain, anal bleeding, blood in stool, rectal pain and vomiting.   Genitourinary:  Negative for dysuria, hematuria and urgency.   Musculoskeletal:  Negative for arthralgias and myalgias.   Skin:  Positive for wound.   Neurological:  Positive for weakness.   Psychiatric/Behavioral:  Negative for agitation and confusion.    Objective:     Vital Signs (Most Recent):  Temp: 97.8 °F (36.6 °C) (05/16/23 1138)  Pulse: 86 (05/16/23 1138)  Resp: 20 (05/16/23 1138)  BP: 118/67 (05/16/23 1138)  SpO2: 96 % (05/16/23 1138) Vital Signs (24h Range):  Temp:  [97.3 °F (36.3 °C)-98 °F (36.7 °C)] 97.8 °F (36.6 °C)  Pulse:  [67-97] 86  Resp:  [16-20] 20  SpO2:  [93 %-98 %] 96 %  BP: ()/(52-73) 118/67     Weight: 64.9 kg (143 lb)  Body mass index is 20.52 kg/m².    Intake/Output Summary (Last 24 hours) at 5/16/2023 1341  Last data filed at 5/16/2023 0423  Gross per 24 hour   Intake 690 ml   Output 600 ml   Net 90 ml           Physical Exam  Vitals and nursing note reviewed.   Constitutional:       Appearance: He is ill-appearing.   HENT:      Head: Normocephalic and atraumatic.      Nose: Nose normal. No congestion or rhinorrhea.      Mouth/Throat:      Mouth: Mucous membranes are dry.   Eyes:      Extraocular Movements: Extraocular movements intact.      Pupils: Pupils are equal, round, and  reactive to light.   Cardiovascular:      Rate and Rhythm: Normal rate and regular rhythm.   Pulmonary:      Effort: No respiratory distress.      Breath sounds: No wheezing, rhonchi or rales.   Abdominal:      Palpations: Abdomen is soft.      Tenderness: There is no abdominal tenderness. There is no guarding or rebound.      Comments: No blood on CIERA.   Musculoskeletal:      Right lower leg: No edema.      Left lower leg: No edema.   Skin:     Findings: Lesion (stage 2 sacral decubitus ulcer) present.      Comments: Fluctuant lesion over left scapula.   Neurological:      Mental Status: He is alert and oriented to person, place, and time.      Comments: 2/5 strength very distal upper and lower extremities. 0-1/5 strength elsewhere.   Psychiatric:         Mood and Affect: Mood normal.         Behavior: Behavior normal.           Significant Labs: All pertinent labs within the past 24 hours have been reviewed.    Significant Imaging: I have reviewed all pertinent imaging results/findings within the past 24 hours.

## 2023-05-16 NOTE — ASSESSMENT & PLAN NOTE
- consult received for evaluation of skin injury to sacrum.  - pt presents with 1 week of progressively worsening shortness of breath. Hx of quadriplegia. Pt is bed bound and reports being left in urine and stool often.  - partial thickness tissue loss to sacrum with pink, moist wound bed. Macerated wound edges and periwound. Wound likely due to moisture from incontinence and friction.   - pt well known to wound care dept and was seen with moisture related wounds recently in March and April.  - continue Triad bid/prn.  - phylicia surface utilized.   - pt declined a new bed surface at this time.   - c/o pain to sacrum. Foam dressing in place.  - pt avoid briefs if possible and utilize moisture wicking pads instead.  - pillows/wedge for offloading.  - strict turning q2h.  - nursing to maintain pressure injury prevention measures and continue wound care per orders.

## 2023-05-16 NOTE — HOSPITAL COURSE
Supplemental oxygen requirement decreased w/CAP coverage; significant drop in hemoglobin on 5/16 confirmed on repeat labs, giving 2 units prbcs, and obtaining CT abdomen/pelvis w/contrast; no blood on CIERA, concerned about retroperitoneal bleed; did start patient on GI bleed protocol in event that GI bleed is occurring; discontinued anticoagulants; discussed chronic aspiration w/ENT and GI. Patient will see ENT, however states that he needs time to think about definitive management of nutrition, such as G tube. Laryngoscopy was without any obvious vocal cord issues.    On 5/17 he displayed increased WOB throughout the morning and day, with worsening cough effort.  MICU and Rheum were asked for recs: HFNC, hypertonic saline, chest physio were attempted and Rituxan x1 was rec'ed.  Unfortunately, his respiratory status did not improve so MICU evaluated at bedside and stepped him up to their service.

## 2023-05-16 NOTE — MEDICAL/APP STUDENT
[facility] Medicine Student   Progress Note  [facility] [facility]    Admit Date: 5/14/2023  Hospital Day: 2  05/16/2023  7:31 AM    SUBJECTIVE:   Mr. Nura Kahn Jr. is a 67 y.o. male with a relevant medical history of COPD, HTN, aspiration pneumonia of the right lower lobe, inclusion body myositis, beta-thalassemia, who is being followed up for pneumonia and sepsis.    Interval history: NAEON. Patient's HgB was 5.9 on 5/15. Asymptomatic. No evidence of melena or bright red blood per rectum on CIERA. Received 2 units of PRBC. This morning (5/16) Patient laying and breathing comfortably on bed. Saturation >94% on 1L NC. Patient asked for education via multimedia on PEG tube. Also agreed to see ENT. General surgery was consulted for I&D of abscess/carbuncle over left scapula.    Review of Systems   Constitutional: Negative.  Negative for fever.   HENT: Negative.  Negative for sore throat.    Eyes: Negative.    Respiratory:  Negative for cough, hemoptysis and shortness of breath.    Cardiovascular: Negative.  Negative for chest pain.   Gastrointestinal:  Negative for blood in stool, melena, nausea and vomiting.   Genitourinary: Negative.  Negative for dysuria.   Musculoskeletal:  Positive for joint pain.   Skin: Negative.    Neurological:  Positive for weakness. Negative for headaches.   Endo/Heme/Allergies: Negative.    Psychiatric/Behavioral: Negative.       Please refer to the H&P for past medical, family, and social history.    OBJECTIVE:     Vital Signs Recent:  Temp: 97.8 °F (36.6 °C) (05/16/23 1138)  Pulse: 86 (05/16/23 1138)  Resp: 20 (05/16/23 1138)  BP: 118/67 (05/16/23 1138)  SpO2: 96 % (05/16/23 1138)  Oxygen Documentation:    Flow (L/min): 1           Device (Oxygen Therapy): nasal cannula  $ Is the patient on Low Flow Oxygen?: Yes      Vital Signs Range (Last 24H):  Temp:  [97.3 °F (36.3 °C)-98 °F (36.7 °C)]   Pulse:  [67-97]   Resp:  [16-20]   BP: ()/(52-73)   SpO2:  [93 %-98 %]        I & O (Last  24H):  Intake/Output Summary (Last 24 hours) at 5/16/2023 1419  Last data filed at 5/16/2023 0423  Gross per 24 hour   Intake 690 ml   Output 600 ml   Net 90 ml        Physical Exam  Constitutional:       Appearance: He is ill-appearing.   HENT:      Head: Normocephalic.      Mouth/Throat:      Mouth: Mucous membranes are dry.   Eyes:      Extraocular Movements: Extraocular movements intact.      Pupils: Pupils are equal, round, and reactive to light.   Cardiovascular:      Rate and Rhythm: Normal rate.      Pulses: Normal pulses.      Heart sounds: Normal heart sounds.   Pulmonary:      Effort: Pulmonary effort is normal. No respiratory distress.   Abdominal:      General: Abdomen is flat. Bowel sounds are normal.      Palpations: Abdomen is soft.   Musculoskeletal:      Right lower leg: No edema.      Left lower leg: No edema.   Skin:     General: Skin is warm and dry.      Findings: Abscess and wound present.             Comments: Left scapula abscess/carbuncle  Stage 2 ulcer on sacral region   Neurological:      General: No focal deficit present.      Mental Status: He is alert and oriented to person, place, and time.      Motor: Weakness present.   Psychiatric:         Attention and Perception: Attention normal.         Mood and Affect: Affect is flat.         Behavior: Behavior normal.       Labs:   Recent Labs   Lab 05/14/23  1218 05/14/23  1919 05/15/23  0641 05/16/23  0557   *  --  131* 134*   K 4.7  --  3.8 4.0   CL 98  --  101 104   CO2 17*  --  20* 22*   BUN 20  --  13 10   CREATININE 0.4*  --  0.3* 0.3*     --  90 78   CALCIUM 8.5*  --  8.1* 8.4*   MG  --  1.6 1.8 1.8   PHOS  --  2.2* 2.7 2.5*     Recent Labs   Lab 05/14/23  1218 05/14/23  1919 05/15/23  0641 05/15/23  1425 05/16/23  0557   ALKPHOS 69  --  55  --  54*   ALT 9*  --  8*  --  11   AST 16  --  5*  --  13   ALBUMIN 2.5*  --  2.1*  --  2.1*   PROT 6.6  --  5.3*  --  5.2*   BILITOT 1.8*  --  0.7  --  1.1*   INR  --  1.2  --  1.1   --      Recent Labs   Lab 05/15/23  1158 05/15/23  2004 05/16/23  0557   WBC 5.95 8.66 6.88   HGB 5.9*  5.9* 7.4* 8.0*   HCT 18.5* 22.5* 25.1*    159 158       Diagnostic Results:  Patient to get CTA CAP today.    Scheduled Meds:   azithromycin  500 mg Intravenous Daily    cefTRIAXone (ROCEPHIN) IVPB  2 g Intravenous Q24H    magnesium sulfate IVPB  2 g Intravenous Once    methylPREDNISolone sodium succinate injection  32 mg Intravenous Daily    pantoprazole  40 mg Intravenous BID     Continuous Infusions:  PRN Meds:sodium chloride, acetaminophen, dextrose 10%, dextrose 10%, dextrose, dextrose, glucagon (human recombinant), levalbuterol **AND** ipratropium, melatonin, naloxone, oxyCODONE, sodium chloride 0.9%, sodium chloride 0.9%    ASSESSMENT/PLAN:   Mr. Nura Kahn Jr. is a 67 y.o. male with a relevant medical history of COPD, HTN, aspiration pneumonia of the right lower lobe, inclusion body myositis, beta-thalassemia who is being followed up for pneumonia and sepsis.    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]  Yes    Essential hypertension [I10]  Yes     Chronic    Debility [R53.81]  Yes    Aspiration pneumonia [J69.0]  Unknown    Beta 0 thalassemia [D56.8]  Yes     Chronic    Acute on chronic anemia [D64.9]  Unknown    History of tobacco use [Z87.891]  Not Applicable    Quadriparesis (muscle weakness) [G82.50]  Yes     Chronic     2021 IMO Regulatory Import        Oropharyngeal dysphagia [R13.12]  Yes     Chronic    Polymyositis [M33.20]  Yes     Chronic      Resolved Hospital Problems     Diagnosis Date Resolved POA    Acute blood loss anemia [D62] 05/15/2023 Unknown       Severe Sepsis/Acute Hypoxic Respiratory Failure/Aspiration Pneumonia  Cover with CAP coverage     azithromycin 500 mg Intravenous Daily   cefTRIAXone (ROCEPHIN) IVPB 2 g Intravenous Q24H     Broaden to HAP coverage if any further sign of clinical deterioration  Methylprednisolone sodium succinate injection for COPD exacerbation for 5 days. Resume home prednisone dose (10mg PO) after. Hold Cellcept.  Follow-up blood cultures (no growth to date) and respiratory cultures (pending collection)    Acute on chronic anemia  Hgb 5.9 on 5/14  No evidence of melena or bright blood on CIERA  Transfused 2 units of PRBC  Patient's HgB 8.0 on 5/15  CTA CAP to check of any bleeding, considering acute anemia and soft BP.  80mg pantoprazole IV, followed by 40mg BID IV  Follow up CBC  Hold all anti-coagulants and anti-platelets    Oropharyngeal dysphagia/Severe malnutrition  Likely due to inclusion body myositis  GI recommend ENT consult for consideration of esophagoscopy with UES dilation vs cricopharyngeal myotomy  SLP consulted and recommend puréed diet with thin liquids  Patient willing to learn about PEG tube    Hyponatremia  improving s/p IV fluids (Serum Na: 134 on 5/16)  Continue IV fluids    Discharge planning:   DERICK: 5/17~5/18/2023   Code Status: Full Code   Is the patient medically ready for discharge?: No  Reason for patient still in hospital (select all that apply): Lab result trending, treatment and imaging needed

## 2023-05-16 NOTE — PROCEDURES
"Nura Kahn Jr. is a 67 y.o. male patient.    Temp: 97.5 °F (36.4 °C) (05/16/23 1542)  Pulse: 88 (05/16/23 1542)  Resp: 18 (05/16/23 1542)  BP: 118/66 (05/16/23 1542)  SpO2: 99 % (05/16/23 1542)  Weight: 64.9 kg (143 lb 1.3 oz) (05/16/23 1342)  Height: 5' 10" (177.8 cm) (05/16/23 1342)       Incision and Drainage    Date/Time: 5/16/2023 5:00 PM  Location procedure was performed: [facility] CARDIAC MEDICAL ICU ([facility])  Performed by: Jose Smith MD  Authorized by: Jose Smith MD   Pre-operative diagnosis: Back Abscess  Post-operative diagnosis: Back Abscess  Consent Done: Yes  Consent: Verbal consent obtained.  Risks and benefits: risks, benefits and alternatives were discussed  Consent given by: patient  Type: abscess  Body area: upper extremity  Location details: left shoulder  Anesthesia: local infiltration    Anesthesia:  Local Anesthetic: lidocaine 1% without epinephrine  Anesthetic total: 5 mL  Description of findings: Large Abscess   Incision type: single straight  Incision depth: dermal  Complexity: simple  Drainage: pus and purulent  Drainage amount: copious  Wound treatment: wound packed  Complications: No  Specimens: No  Implants: No  Patient tolerance: Patient tolerated the procedure well with no immediate complications    Incision depth: dermal        5/16/2023    "

## 2023-05-16 NOTE — CONSULTS
ENT Brief Consult Note:    Patient interviewed at bedside with staff, Dr. Webb. Patient history of inclusion body myositis and states that his swallowing and weakness acutely worsened after ant COVID one month ago. MBSS around that time showed significant disordered swallowing and aspiration. Patient states that he used to get rituximab infusions per rheumatology as an outpatient but missed is next scheduled infusion due to COVID.     NAD  Normal WOB, no stridor or stertor  Weak voice    Assessment  Patient last seen by ENT in 2021. MBSS at that time showed abnormal pharyngoesophageal dysphagia. Repeat MBSS 4/2022 shows worsening disordered dysphagia. Possibly due to acute exacerbation of his underlying myositis due to COVID infection. No current indication for UES dilation or CP myotomy.    Plan:  -will plan to scope patient at bedside to rule out vocal fold paresis tomorrow  -recommend repeating MBSS   -recommend consulting rheumatology about restarting rituximab. Possible that patient's swallowing would improve when underlying myositis better controlled  -continue discussion regarding PEG per primary team. Discussed with patient that he could still potentially eat by mouth in addition to PEG    Eduardo Hernandez MD  ENT PGY2    Flexible Fiberoptic Laryngoscopy:    Oropharynx: Pharyngeal wall without edema, lesions, or masses. Bilateral palatine tonsils within normal limits. Base of tongue symmetric without masses or lesions. Lingual tonsil within normal limits.  Hypopharynx: No lesions or masses noted within the piriform sinuses or post cricoid area. No pooling of secretions.  Supraglottis: There is no edema of the arytenoids, interarytenoid space or epiglottis. Epiglottis is crisp. There are no masses or lesions noted. False vocal folds without masses or lesions.  Glottis: True vocal folds without masses or lesions. Normal mobility and airway patency.    Kem King MD  PGY-5

## 2023-05-16 NOTE — PLAN OF CARE
[facility] - Telemetry Stepdown  Initial Discharge Assessment       Primary Care Provider: Edna Jaramillo NP    Admission Diagnosis: CHF (congestive heart failure) [I50.9]  Tachycardia [R00.0]  Hypoxia [R09.02]  Chest pain [R07.9]  Pneumonia of both lungs due to infectious organism, unspecified part of lung [J18.9]  Sepsis, due to unspecified organism, unspecified whether acute organ dysfunction present [A41.9]    Admission Date: 5/14/2023  Expected Discharge Date: 5/17/2023    Transition of Care Barriers: None    Payor: HU[employer] MANAGED MEDICARE / Plan: HU[employer]NA MEDICARE HMO / Product Type: Capitation /     Extended Emergency Contact Information  Primary Emergency Contact: Mami Vásquez   [facility]  Home Phone: [phone]  Relation: Daughter  Secondary Emergency Contact: Mitali Mata   [place]  Mobile Phone: [phone]  Relation: Spouse    Discharge Plan A: Home with family, Home Health  Discharge Plan B: Home with family      [facility] [address]  [address] [facility] [address]  Phone: [phone] Fax: [phone]    [facility]  [address] [facility] [address]  Phone: [phone] Fax: [phone]    [facility]  [address] [facility] [address]  Phone: [phone] Fax: [phone]      Initial Assessment (most recent)       Adult Discharge Assessment - 05/15/23 1530          Discharge Assessment    Assessment Type Discharge Planning Assessment     Confirmed/corrected address, phone number and insurance Yes     Confirmed Demographics Correct on Facesheet     Source of Information patient     People in Home significant other     Do you expect to return to your current living situation? Yes     Do you have help at home or someone to help you manage your care at home? Yes     Who are your caregiver(s) and their phone number(s)? Patient reports his S/O,  Mitali, is home with him full-time and helps with all ADLs     Current cognitive status: Alert/Oriented     Walking or Climbing Stairs ambulation difficulty, dependent;stair climbing difficulty, dependent;transferring difficulty, dependent     Dressing/Bathing bathing difficulty, dependent;dressing difficulty, assistance 1 person;bathing difficulty, assistance 1 person     Equipment Currently Used at Home slide board;hospital bed;lift device;power chair     Readmission within 30 days? Yes     Patient currently being followed by outpatient case management? No     Do you currently have service(s) that help you manage your care at home? No     Do you take prescription medications? Yes     Do you have prescription coverage? Yes     Do you have any problems affording any of your prescribed medications? No     Is the patient taking medications as prescribed? yes     Who is going to help you get home at discharge? Patient may need ambulance transportation home     How do you get to doctors appointments? other (see comments)   Patient reports since having covid in April 2023, he has had great difficulty leaving the home to get to appts    Are you on dialysis? No     Do you take coumadin? No     Discharge Plan A Home with family;Home Health     Discharge Plan B Home with family     DME Needed Upon Discharge  none     Discharge Plan discussed with: Patient     Transition of Care Barriers None        Physical Activity    On average, how many days per week do you engage in moderate to strenuous exercise (like a brisk walk)? 0 days     On average, how many minutes do you engage in exercise at this level? 0 min        Financial Resource Strain    How hard is it for you to pay for the very basics like food, housing, medical care, and heating? Somewhat hard        Housing Stability    In the last 12 months, was there a time when you were not able to pay the mortgage or rent on time? No     In the last 12 months, was there a time  when you did not have a steady place to sleep or slept in a shelter (including now)? No        Transportation Needs    In the past 12 months, has lack of transportation kept you from medical appointments or from getting medications? No     In the past 12 months, has lack of transportation kept you from meetings, work, or from getting things needed for daily living? No        Food Insecurity    Within the past 12 months, you worried that your food would run out before you got the money to buy more. Never true     Within the past 12 months, the food you bought just didn't last and you didn't have money to get more. Never true        Stress    Do you feel stress - tense, restless, nervous, or anxious, or unable to sleep at night because your mind is troubled all the time - these days? To some extent        Social Connections    How often do you get together with friends or relatives? Once a week     How often do you attend Sabianism or Mu-ism services? Never     Do you belong to any clubs or organizations such as Sabianism groups, unions, fraternal or athletic groups, or school groups? No     How often do you attend meetings of the clubs or organizations you belong to? Never     Are you , , , , never , or living with a partner?         Alcohol Use    Q1: How often do you have a drink containing alcohol? Never     Q2: How many drinks containing alcohol do you have on a typical day when you are drinking? Patient does not drink     Q3: How often do you have six or more drinks on one occasion? Never                 Met with patient at bedside to discuss dc planning, charted above. Patient reports since April 2023 when he had covid, he has become weaker and more dependent for ADLs. Patient states prior to 4/23, he was able to transfer himself from the bed to his electric scooter using his slide board; was then able to operate his scooter around the house etc. Patient states he is  no longer able to transfer on his own, and depends on his S/O, Mitali, to help with all ADLs, including bathing and toileting. Patient is not on home O2. Patient inquired about how his caregiver can get paid for taking care of him. Offered and provided patient with the Waiver Program phone number to call and apply if eligible. Outpatient Case Management, [facility], and [facility] referrals were ordered.    Bonny Escobar LCSW  [facility]  Ext. [phone]

## 2023-05-16 NOTE — PT/OT/SLP EVAL
Occupational Therapy   Co-Evaluation, Co-Treatment and Discharge Note  Co-treatment with PT for maximal pt participation, safety, and activity tolerance     Name: Nura Kahn Jr.  MRN: [id number]  Admitting Diagnosis: Acute hypoxemic respiratory failure  Recent Surgery: * No surgery found *      Recommendations:     Discharge Recommendations: other (see comments), nursing facility, basic  Discharge Equipment Recommendations: none  Barriers to discharge:       Assessment:     Nura Kanh Jr. is a 67 y.o. male with a medical diagnosis of Acute hypoxemic respiratory failure. At this time, patient is functioning at their prior level of function and does not require further acute OT services.     Plan:     During this hospitalization, patient does not require further acute OT services.  Please re-consult if situation changes.    Plan of Care Reviewed with: patient    Subjective     Chief Complaint: feeling uncomfortable in bed   Patient/Family Comments/goals: return home     Occupational Profile:  Living Environment: Pt lives with Mitali (described as aide).   Previous level of function: A from Mitali to bathe, feed, clean self.   Roles and Routines: Pt enjoys watching TV   Equipment Used at home: slide board, hospital bed, lift device, power chair  Assistance upon Discharge: return home     Pain/Comfort:  Pain Rating 1: 0/10  Pain Rating Post-Intervention 1: 0/10    Patients cultural, spiritual, Alevism conflicts given the current situation: no    Objective:     Communicated with: RN prior to session.  Patient found HOB elevated with oxygen, peripheral IV, telemetry upon OT entry to room.    General Precautions: Standard, fall  Orthopedic Precautions: N/A  Braces: N/A  Respiratory Status: Room air     Occupational Performance:    Bed Mobility:    Patient completed Rolling/Turning to Right with total assistance and 2 persons  Patient completed Scooting/Bridging with total assistance and 2 persons      Functional  Mobility/Transfers:  Functional Mobility: Pt refused EOB despite encouragement     Activities of Daily Living:  Toileting: total assistance using condom cath     Cognitive/Visual Perceptual:  Cognitive/Psychosocial Skills:     -       Oriented to: Person   -       Follows Commands/attention:Inattentive  -       Communication: clear/fluent  -       Memory: WFL  -       Safety awareness/insight to disability: impaired   -       Mood/Affect/Coping skills/emotional control: Appropriate to situation    Physical Exam:  Dominant hand:    -       left  Upper Extremity strength    -       Right Upper Extremity: Deficits: 2/5  -       Left Upper Extremity: Deficits: 3/5   Strength:    -       Right Upper Extremity: WFL  -       Left Upper Extremity: WFL    AMPAC 6 Click ADL:  AMPAC Total Score: 7    Treatment & Education:  Pt educated on role of occupational therapy, POC, and safety during ADLs and functional mobility. Pt and OT discussed importance of safe, continued mobility to optimize daily living skills. Pt verbalized understanding.     White board updated during session. Pt given instruction to call for medical staff/nurse for assistance.       Patient left right sidelying with all lines intact, call button in reach, and RN notified    GOALS:   Multidisciplinary Problems       Occupational Therapy Goals       Not on file                    History:     Past Medical History:   Diagnosis Date    Aspiration pneumonia of right lower lobe 1/15/2018    Atrial fibrillation 3/2/2015    Chronic obstructive pulmonary disease, unspecified COPD type 5/4/2022    Depression     Essential hypertension 2/27/2019    Malignant (primary) neoplasm, unspecified 5/4/2022    Microcytic anemia 9/25/2014    Neuromuscular disorder     Other osteoporosis without current pathological fracture 9/22/2022    Pneumonia     Polymyositis 2009    Tobacco abuse          Past Surgical History:   Procedure Laterality Date    COLONOSCOPY N/A 8/6/2020     Procedure: COLONOSCOPY;  Surgeon: Brandan Meyer MD;  Location: [facility] ([address] [place] [address]);  Service: Endoscopy;  Laterality: N/A;    ESOPHAGOGASTRODUODENOSCOPY N/A 8/6/2020    Procedure: EGD (ESOPHAGOGASTRODUODENOSCOPY);  Surgeon: Brandan Meyer MD;  Location: [address]);  Service: Endoscopy;  Laterality: N/A;  multiple co-morbidities. will have family member for assistance.  has neuromuscular issues-needs lift. in W/C-unable to transfer per self     COVID test at [facility] on 8/3-GT       Time Tracking:     OT Date of Treatment: 05/16/23  OT Start Time: 1126  OT Stop Time: 1155  OT Total Time (min): 29 min    Billable Minutes:Evaluation 10 min  Self Care/Home Management 19 min    5/16/2023

## 2023-05-16 NOTE — ASSESSMENT & PLAN NOTE
- Recent MBS showed aspiration of thin liquids. Please continue with speech therapy.   - Will await ENT consult for consideration of outpatient esophagoscopy with UES dilation vs cricopharyngeal myotomy.    - Would also have nutrition chime in on his nutritional status. If he is unable to meet his requirements, can consider EGD with PEG. However, a PEG will not prevent aspiration. Patient insisting that he does not want a PEG at this time.    - To investigate lower esophageal dilation seen on prior EGD, the next step would be to consider a Timed barium swallow with barium column at 0, 1, 3, 5 minutes and barium 13 mm tablet (Charles protocol). We acknowledge this may be challenging to perform given mobility restrictions.

## 2023-05-16 NOTE — PROGRESS NOTES
Consent  Sponsor: [employer]  Study: Vaccine Effectiveness of 20-Valent Pneumococcal Conjugate Vaccine Against Vaccine-Type CAP Using a Test-Negative Design (RECAP-20)  IRB/Protocol #: 2022.215/[id number]  Principle Investigator: Dr. Ralph Walsh   Site Number: [id number]    Subject Number: [id number]    Date of Visit: [id number]    Informed Consent:   Consenting was completed in patient's room using the most current IRB approved version of the ICF by myself. Patient was given ample time to review consent prior to execution of ICF. Patient only consented to the main ICF and did not opt into the additional vaccine research addendum consent.     Time of Consent: 12:50    Prior to the Informed Consent (IC) being signed, or any study protocol required data collection, testing, procedure, or intervention being performed, the following was done and/or discussed:?   Patient was given a copy of the IC for review??   Purpose of the study and qualifications to participate??   Study design, any test or procedures done at each visit?   Confidentiality and HIPAA Authorization for Release of Medical Records for the research trial/ subject's rights/research related injury?   Risk, Benefits, Compensation and Costs?   Participation in the research trial is voluntary and patient may withdraw at anytime?   Contact information for study related questions?from patient and family  Participation in research study will not change, influence, or impede the care provided at [facility]     Patient verbalizes understanding of the above: Yes?   Contact information for CRC and PI given to patient: Yes?   Patient able to adequately summarize: the purpose of the study, the risks associated with the study, and all procedures associated with the study: Yes?     Nura Rodriguesliyah Krishna verbally gave consent to the IRB approved version of informed consent form for the RECAP-20 research study.? Mr. Kahn was physically unable to write due to quadriplegia but was  competent and very able to indicate approval or disapproval. No family member was present, but daughter did Facetime briefly during consent process, but Mr. Kahn instructed his daughter that he would call her back. Each page of the consent was reviewed with the patient and all questions answered satisfactorily. The 8th floor charge nurse Chase Garcia signed as an impartial witness on the paper consent form attesting that Mr. Kahn verbally gave consent for the RECAP-20 study. Patient received a copy of the same informed consent for their own records. The original consent was scanned into electronic medical records (EPIC). With the copy of consent, materials and contact information was left with patient. Patient was encouraged to reach out with any questions.

## 2023-05-16 NOTE — CONSULTS
[facility] - Telemetry Stepdown  Adult Nutrition  Consult Note    SUMMARY     Recommendations    Diet advancement per SLP. Rec'd Regular diet + Boost Plus ONS.  If unable to advance diet & TFs warranted, rec'd Isosource 1.5 @ 50 mL/hr = 1800 kcals, 82 g of protein, 917 mL fluid.  RD to monitor & follow-up.    Goals: Meet % EEN, EPN by RD f/u date  Nutrition Goal Status: new  Communication of RD Recs: reviewed with RN    Assessment and Plan    Severe malnutrition    Nutrition Problem:  Severe Protein-Calorie Malnutrition  Malnutrition in the context of Chronic Illness/Injury    Related to (etiology):  Inability to consume sufficient energy     Signs and Symptoms (as evidenced by):  Energy Intake: <75% of estimated energy requirement for > 2 months  Body Fat Depletion: severe depletion of orbitals and triceps   Muscle Mass Depletion: severe depletion of temples, clavicle region, scapular region, and lower extremities   Weight Loss: 16% x 3 months    Interventions(treatment strategy):  Collaboration of nutrition care w/ other providers  EN?    Nutrition Diagnosis Status:  New    Malnutrition Assessment    Malnutrition Context: chronic illness  Malnutrition Level: severe    Weight Loss (Malnutrition): greater than 7.5% in 3 months  Energy Intake (Malnutrition): less than 75% for greater than or equal to 1 month  Subcutaneous Fat (Malnutrition): severe depletion  Muscle Mass (Malnutrition): severe depletion     Reason for Assessment    Reason For Assessment: consult  Diagnosis: other (see comments) (Resp. fx)  Relevant Medical History: Dysphagia, Quadriplegia, HTN  Interdisciplinary Rounds: did not attend    General Information Comments: Currently NPO - SLP rec'd Pureed diet w/ thin liquids yesterday. PTA, pt reports inadequate energy intake since April 2/2 COVID. UBW: 170#. Pt meets criteria for severe malnutrition. Please see PES statement for details. NFPE complete today. PEG being discussed.   Nutrition  "Discharge Planning: Adequate nutrition    Nutrition/Diet History    Factors Affecting Nutritional Intake: NPO, difficulty/impaired swallowing    Anthropometrics    Temp: 97.8 °F (36.6 °C)  Height Method: Stated  Height: 5' 10" (177.8 cm)  Height (inches): 70 in  Weight Method: Bed Scale  Weight: 64.9 kg (143 lb 1.3 oz)  Weight (lb): 143.08 lb  Ideal Body Weight (IBW), Male: 166 lb  % Ideal Body Weight, Male (lb): 86.19 %  BMI (Calculated): 20.5  BMI Grade: 18.5-24.9 - normal  Usual Body Weight (UBW), k.3 kg  % Usual Body Weight: 84.13  % Weight Change From Usual Weight: -16.04 %    Lab/Procedures/Meds    Pertinent Labs Reviewed: reviewed  Pertinent Labs Comments: Na 134  Pertinent Medications Reviewed: reviewed    Estimated/Assessed Needs    Weight Used For Calorie Calculations: 65 kg (143 lb 4.8 oz)    Energy Calorie Requirements (kcal): 3189-2660 kcal/d  Energy Need Method: Kcal/kg (25-30 kcal/kg)    Protein Requirements: 85 g/d (1.3 g/kg)  Weight Used For Protein Calculations: 65 kg (143 lb 4.8 oz)    Estimated Fluid Requirement Method:  (Per MD or 1 mL/kcal)  RDA Method (mL): 1625    Nutrition Prescription Ordered    Current Diet Order: NPO    Evaluation of Received Nutrient/Fluid Intake    I/O: +1.8L since admit    Comments: LBM:     Nutrition Risk    Level of Risk/Frequency of Follow-up:  (1x/week)     Monitor and Evaluation    Food and Nutrient Intake: energy intake, food and beverage intake, enteral nutrition intake  Food and Nutrient Adminstration: diet order, enteral and parenteral nutrition administration  Physical Activity and Function: nutrition-related ADLs and IADLs  Anthropometric Measurements: weight, weight change  Biochemical Data, Medical Tests and Procedures: inflammatory profile, lipid profile, glucose/endocrine profile, gastrointestinal profile, electrolyte and renal panel  Nutrition-Focused Physical Findings: overall appearance     Nutrition Follow-Up    RD Follow-up?: Yes    "

## 2023-05-16 NOTE — PLAN OF CARE
Problem: Infection  Goal: Absence of Infection Signs and Symptoms  Outcome: Ongoing, Progressing     Problem: Adult Inpatient Plan of Care  Goal: Plan of Care Review  Outcome: Ongoing, Progressing  Goal: Patient-Specific Goal (Individualized)  Outcome: Ongoing, Progressing  Goal: Absence of Hospital-Acquired Illness or Injury  Outcome: Ongoing, Progressing  Goal: Optimal Comfort and Wellbeing  Outcome: Ongoing, Progressing  Goal: Readiness for Transition of Care  Outcome: Ongoing, Progressing     Problem: Adjustment to Illness (Sepsis/Septic Shock)  Goal: Optimal Coping  Outcome: Ongoing, Progressing     Problem: Bleeding (Sepsis/Septic Shock)  Goal: Absence of Bleeding  Outcome: Ongoing, Progressing     Problem: Glycemic Control Impaired (Sepsis/Septic Shock)  Goal: Blood Glucose Level Within Desired Range  Outcome: Ongoing, Progressing     Problem: Infection Progression (Sepsis/Septic Shock)  Goal: Absence of Infection Signs and Symptoms  Outcome: Ongoing, Progressing     Problem: Nutrition Impaired (Sepsis/Septic Shock)  Goal: Optimal Nutrition Intake  Outcome: Ongoing, Progressing     Problem: Fluid Imbalance (Pneumonia)  Goal: Fluid Balance  Outcome: Ongoing, Progressing     Problem: Infection (Pneumonia)  Goal: Resolution of Infection Signs and Symptoms  Outcome: Ongoing, Progressing     Problem: Respiratory Compromise (Pneumonia)  Goal: Effective Oxygenation and Ventilation  Outcome: Ongoing, Progressing     Problem: Impaired Wound Healing  Goal: Optimal Wound Healing  Outcome: Ongoing, Progressing     Problem: Skin Injury Risk Increased  Goal: Skin Health and Integrity  Outcome: Ongoing, Progressing     Problem: Fall Injury Risk  Goal: Absence of Fall and Fall-Related Injury  Outcome: Ongoing, Progressing     Pt refused bed recommended by wound care. Pt BP increased after receiving blood and saline boluses during shift. Pt in bed with the call light in reach and the bed alarm on.

## 2023-05-16 NOTE — ASSESSMENT & PLAN NOTE
Prior iron studies suggest anemia of chronic disease at baseline. Significant decline in hemoglobin this morning to 6.1 from 9.5 on presentation, 5.9 on stat repeat. Unclear source at this time. No melenic stool on CIERA. No melena, hematochezia, epistaxis reported, and patient denies as well. Abdomen and thighs are non-tender on exam. No obvious external source of bleeding. Hemolysis labs (LDH and haptoglobin) are negative. Concerned about GI vs retroperitoneal bleed. Gave small fluid bolus of 250cc this morning, as patient had JVD on presentation. Will repeat 250cc bolus this afternoon, but needs blood products. Concerned for development of shock.    -given precipitous drop in hemoglobin, though CIERA negative, started on GI bleed dosed PPI  ->80mg IV pantoprazole x1 followed by 40mg IV BID  -patient consented to receive blood products  -received 2 units of prbcs  -CTA chest, abdomen, pelvis pending  -PT 13, INR 1.2 on admit, will repeat  -PLTs 158  -lactate WNL  -repeat CBC stable  -follow CBC BID  -hold all anti-coagulants, anti-platelets  -may need to discuss with GI vs IR depending on findings

## 2023-05-16 NOTE — PLAN OF CARE
Recommendations     Diet advancement per SLP. Rec'd Regular diet + Boost Plus ONS.  If unable to advance diet & TFs warranted, rec'd Isosource 1.5 @ 50 mL/hr = 1800 kcals, 82 g of protein, 917 mL fluid.  RD to monitor & follow-up.     Goals: Meet % EEN, EPN by RD f/u date  Nutrition Goal Status: new  Communication of RD Recs: reviewed with RN

## 2023-05-16 NOTE — HPI
"Mr. 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 Huston) complicated by quadriplegia and dysphagia, COPD, HTN, aspiration pneumonia, hemoglobin c disease, beta-thalassemia, who presents with 1 week of progressively worsening shortness of breath. Patient describes this as chest tightness. He denies associated fevers, chills, cough. He has a recent hospitalization for aspiration pneumonia and COPD exacerbation (discharged on 4/16/23). Was given CAP coverage, then Unasyn, as well as prednisone. Was discharged on Augmentin to complete antibiotic course. Prior to this, had a hospital stay (discharged on 4/3/23) during which he was treated for COVID with remdesivir and dexamethasone. Patient states that he lives in a "two bed" in "[facility]" with "a lady" who takes care of him. Describes her as his caregiver, though says that "she doesn't always give 100%." Reports sporadic adherence to medication, and states that he spends almost all of his time in bed. Unfortunately reports that he is quite often left to defecate and urinate on himself. Presented to the ED via EMS, was given Solu-Medrol and Duonebs en route and placed on 2L NC. Patient denies using supplemental oxygen at home. In the ED, septic work-up was started, given septic bolus, and started on broad-spectrum antibiotics. Admitted to hospital medicine for pneumonia and sepsis. Skin integrity LINDA consulted for evaluation of skin breakdown.   "

## 2023-05-16 NOTE — PROGRESS NOTES
"[facility] - Telemetry [facility] Medicine  Progress Note    Patient Name: Nura Kahn Jr.  MRN: [id number]  Patient Class: IP- Inpatient   Admission Date: 5/14/2023  Length of Stay: 2 days  Attending Physician: Emre Molina DO  Primary Care Provider: Edna Jaramillo NP        Subjective:     Principal Problem:Acute hypoxemic respiratory failure        HPI:  Mr. 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 Huston) complicated by quadriplegia and dysphagia, COPD, HTN, aspiration pneumonia, hemoglobin c disease, beta-thalassemia, who presents with for 1 week of progressively worsening shortness of breath. Patient describes this as chest tightness. He denies associated fevers, chills, cough. He has recent hospitalization for aspiration pneumonia and COPD exacerbation (discharged on 4/16/23). Was given w/CAP coverage, then Unasyn, as well as prednisone. Was discharged on Augmentin to complete antibiotic course. Patient states that he lives in a "two bed" in "[facility]" with "a lady" who takes care of him. Describes her as his caregiver, though says that "she doesn't always give 100%." Reports sporadic adherence to medication, and states that he spends almost all of his time in bed. Unfortunately reports that he is quite often left to defecate and urinate on himself. Presented to the ED via EMS, was given Solu-Medrol and Duonebs en route and placed on 2L NC. Patient denies using supplemental oxygen at home. In the ED, septic work-up was started, given septic bolus, and started on broad-spectrum antibiotics. Admitted to hospital medicine for pneumonia and sepsis.       Overview/Hospital Course:  Supplemental oxygen requirement decreased w/CAP coverage; significant drop in hemoglobin on 5/16 confirmed on repeat labs, giving 2 units prbcs, and obtaining CT abdomen/pelvis w/contrast; no blood on CIERA, concerned " about retroperitoneal bleed; did start patient on GI bleed protocol in event that GI bleed is occurring; discontinued anticoagulants; discussed chronic aspiration w/ENT and GI. Patient will see ENT, however states that he needs time to think about definitive management of nutrition, such as G tube.      Interval History: Hb stable following transfusion of 2 units prbcs; on 1L NC; pending CTA chest to pelvis; remains on PPI; open to seeing ENT; would like to consider long-term options concerning nutrition (e.g. G tube) and discuss tomorrow. Consulted gen surg for I&D of abscess over left scapula.    Review of Systems   Constitutional:  Positive for fatigue. Negative for chills and fever.   HENT:  Negative for congestion, rhinorrhea and sore throat.    Respiratory:  Negative for cough, chest tightness and shortness of breath.    Cardiovascular:  Negative for chest pain, palpitations and leg swelling.   Gastrointestinal:  Negative for abdominal pain, anal bleeding, blood in stool, rectal pain and vomiting.   Genitourinary:  Negative for dysuria, hematuria and urgency.   Musculoskeletal:  Negative for arthralgias and myalgias.   Skin:  Positive for wound.   Neurological:  Positive for weakness.   Psychiatric/Behavioral:  Negative for agitation and confusion.    Objective:     Vital Signs (Most Recent):  Temp: 97.8 °F (36.6 °C) (05/16/23 1138)  Pulse: 86 (05/16/23 1138)  Resp: 20 (05/16/23 1138)  BP: 118/67 (05/16/23 1138)  SpO2: 96 % (05/16/23 1138) Vital Signs (24h Range):  Temp:  [97.3 °F (36.3 °C)-98 °F (36.7 °C)] 97.8 °F (36.6 °C)  Pulse:  [67-97] 86  Resp:  [16-20] 20  SpO2:  [93 %-98 %] 96 %  BP: ()/(52-73) 118/67     Weight: 64.9 kg (143 lb)  Body mass index is 20.52 kg/m².    Intake/Output Summary (Last 24 hours) at 5/16/2023 1341  Last data filed at 5/16/2023 0423  Gross per 24 hour   Intake 690 ml   Output 600 ml   Net 90 ml           Physical Exam  Vitals and nursing note reviewed.   Constitutional:      "  Appearance: He is ill-appearing.   HENT:      Head: Normocephalic and atraumatic.      Nose: Nose normal. No congestion or rhinorrhea.      Mouth/Throat:      Mouth: Mucous membranes are dry.   Eyes:      Extraocular Movements: Extraocular movements intact.      Pupils: Pupils are equal, round, and reactive to light.   Cardiovascular:      Rate and Rhythm: Normal rate and regular rhythm.   Pulmonary:      Effort: No respiratory distress.      Breath sounds: No wheezing, rhonchi or rales.   Abdominal:      Palpations: Abdomen is soft.      Tenderness: There is no abdominal tenderness. There is no guarding or rebound.      Comments: No blood on CIERA.   Musculoskeletal:      Right lower leg: No edema.      Left lower leg: No edema.   Skin:     Findings: Lesion (stage 2 sacral decubitus ulcer) present.      Comments: Fluctuant lesion over left scapula.   Neurological:      Mental Status: He is alert and oriented to person, place, and time.      Comments: 2/5 strength very distal upper and lower extremities. 0-1/5 strength elsewhere.   Psychiatric:         Mood and Affect: Mood normal.         Behavior: Behavior normal.           Significant Labs: All pertinent labs within the past 24 hours have been reviewed.    Significant Imaging: I have reviewed all pertinent imaging results/findings within the past 24 hours.      Assessment/Plan:      * Acute hypoxemic respiratory failure  Patient with Hypoxic Respiratory failure which is Acute.  he is not on home oxygen. Supplemental oxygen was provided and noted-      .   Signs/symptoms of respiratory failure include- increased work of breathing. Contributing diagnoses includes - COPD and Pneumonia Labs and images were reviewed. Patient Has not had a recent ABG. Will treat underlying causes and adjust management of respiratory failure as follows.    See "COPD," "sepsis" and "aspiration pneumonia."     Patient now on room air, w/O2 sat at goal.    Abscess  Over left scapula. " "Consulted gen surg for I&D.      Hyponatremia  Patient endorses poor oral intake recently. Suspect is due to this. Appears volume down on exam.    Urine sodium 12. Indicates volume loss, likely due to poor oral intake in outpatient setting.    Hyperbilirubinemia  See "anemia."      Severe sepsis  This patient does have evidence of infective focus  My overall impression is sepsis.  Source: Respiratory  Antibiotics given-   Antibiotics (72h ago, onward)    Start     Stop Route Frequency Ordered    05/14/23 2230  azithromycin (ZITHROMAX) 500 mg in dextrose 5 % (D5W) 250 mL IVPB (Vial-Mate)         05/17 0859 IV Daily 05/14/23 1619    05/14/23 2200  cefTRIAXone (ROCEPHIN) 2 g in dextrose 5 % in water (D5W) 5 % 50 mL IVPB (MB+)         05/19 2159 IV Every 24 hours (non-standard times) 05/14/23 1619        Latest lactate reviewed-  Recent Labs   Lab 05/14/23  1457 05/15/23  1713   LACTATE 1.3 0.9     Organ dysfunction indicated by Acute respiratory failure    Fluid challenge Actual Body weight- Patient will receive 30ml/kg actual body weight to calculate fluid bolus for treatment of septic shock.     Post- resuscitation assessment No - Post resuscitation assessment not needed       Will Not start Pressors- Levophed for MAP of 65  Source control achieved by: antibiotics.    Lactate initially elevated trended down to WNL on repeat (5/14)  CAP coverage  ->Rocephin 5 day course  ->azithromycin 3 day course  Broaden to HAP coverage if worsening signs of infection  UA negative for UTI  Blood cultures NGTD  Respiratory culture pending collection  Respiratory viral panel in process    Sacral decubitus ulcer, stage II  -wound care consulted      Chronic obstructive pulmonary disease, unspecified COPD type  Suspect COPD exacerbation, due to pneumonia (likely aspiration).    -duonebs q6h prn  -methylprednisolone equivalent to prednisone 40mg QD as patient is NPO due to aspiration    Inclusion body myositis (IBM)  See " ""polymyositis."      Essential hypertension  Takes amlodipine and losartan at home.    -hold home meds in setting of sepsis      Debility  Due to inclusion body myositis. Appears to be complicated by poor care at home, evidenced by sacral decubitus ulcer.      Aspiration pneumonia  See "sepsis" and "COPD exacerbation."      Beta 0 thalassemia  -monitor CBC    Acute on chronic anemia  Prior iron studies suggest anemia of chronic disease at baseline. Significant decline in hemoglobin this morning to 6.1 from 9.5 on presentation, 5.9 on stat repeat. Unclear source at this time. No melenic stool on CIERA. No melena, hematochezia, epistaxis reported, and patient denies as well. Abdomen and thighs are non-tender on exam. No obvious external source of bleeding. Hemolysis labs (LDH and haptoglobin) are negative. Concerned about GI vs retroperitoneal bleed. Gave small fluid bolus of 250cc this morning, as patient had JVD on presentation. Will repeat 250cc bolus this afternoon, but needs blood products. Concerned for development of shock.    -given precipitous drop in hemoglobin, though CIERA negative, started on GI bleed dosed PPI  ->80mg IV pantoprazole x1 followed by 40mg IV BID  -patient consented to receive blood products  -received 2 units of prbcs  -CTA chest, abdomen, pelvis pending  -PT 13, INR 1.2 on admit, will repeat  -PLTs 158  -lactate WNL  -repeat CBC stable  -follow CBC BID  -hold all anti-coagulants, anti-platelets  -may need to discuss with GI vs IR depending on findings    History of tobacco use  Patient reports that he used to smoke heavily, though cannot specify packs-per day. Reports that he quit 4 years ago, but states that he still occasionally uses marijuana.      Quadriparesis (muscle weakness)  Secondary to inclusion body myositis. See "inclusion body myositis."      Oropharyngeal dysphagia  Secondary to inclusion body myositis. Patient reports that he takes his meds crushed in puree at home. Did " discuss G tube with patient, however he is adamantly opposed to this currently.    -possible that recurrent aspiration is causing his presentation  ->modified barium swallow on 4/14/23 demonstrated aspiration  -GI recommending repeat SLP eval and ENT consult for consideration of esophagoscopy with UES dilation v/s cricopharyngeal myotomy  -consulted ENT    Polymyositis  History of polymyositis/inclusion body myositis. Follows with Dr. Huston. Currently on Cellcept and prednisone (10mg QD), with sporadic adherence.    -hold Cellcept  -methylprednisolone dosed to COPD exacerbation        VTE Risk Mitigation (From admission, onward)         Ordered     IP VTE HIGH RISK PATIENT  Once         05/14/23 1616     Place MARTI hose  Until discontinued         05/14/23 1616     Place sequential compression device  Until discontinued         05/14/23 1616                Discharge Planning   DERICK: 5/17/2023     Code Status: Full Code   Is the patient medically ready for discharge?: No    Reason for patient still in hospital (select all that apply): Treatment, Imaging and Consult recommendations  Discharge Plan A: Home with family, Home Health                  Trino Potts DO  Department of Hospital Medicine   [facility] - Telemetry Stepdown

## 2023-05-16 NOTE — PLAN OF CARE
Problem: Infection  Goal: Absence of Infection Signs and Symptoms  Outcome: Ongoing, Progressing     Problem: Adult Inpatient Plan of Care  Goal: Plan of Care Review  Outcome: Ongoing, Progressing  Goal: Patient-Specific Goal (Individualized)  Outcome: Ongoing, Progressing  Goal: Absence of Hospital-Acquired Illness or Injury  Outcome: Ongoing, Progressing  Goal: Optimal Comfort and Wellbeing  Outcome: Ongoing, Progressing  Goal: Readiness for Transition of Care  Outcome: Ongoing, Progressing     Problem: Adjustment to Illness (Sepsis/Septic Shock)  Goal: Optimal Coping  Outcome: Ongoing, Progressing     Problem: Bleeding (Sepsis/Septic Shock)  Goal: Absence of Bleeding  Outcome: Ongoing, Progressing     Problem: Glycemic Control Impaired (Sepsis/Septic Shock)  Goal: Blood Glucose Level Within Desired Range  Outcome: Ongoing, Progressing     Problem: Infection Progression (Sepsis/Septic Shock)  Goal: Absence of Infection Signs and Symptoms  Outcome: Ongoing, Progressing     Problem: Nutrition Impaired (Sepsis/Septic Shock)  Goal: Optimal Nutrition Intake  Outcome: Ongoing, Progressing   Aox4. Turned q2. Cyst drained. Condom catheter in place. Poc reviewed. Questions and concerns answered. Safety interventions maintained. 1 L of oxygen nasal cannula. Pt in no apparent distress. Care ongoing.

## 2023-05-16 NOTE — PT/OT/SLP EVAL
"Physical Therapy Evaluation and Discharge Note    Patient Name:  Nura Kahn Jr.   MRN:  [id number]    Recommendations:     Discharge Recommendations: nursing facility, basic, other (see comments)  Discharge Equipment Recommendations: none   Barriers to discharge: Decreased caregiver support    Assessment:     Nrua Kahn Jr. is a 67 y.o. male admitted with a medical diagnosis of Acute hypoxemic respiratory failure. At this time, patient is functioning at their prior level of function and does not require further acute PT services.     Recent Surgery: * No surgery found *      Plan:     During this hospitalization, patient does not require further acute PT services.  Please re-consult if situation changes.      Subjective     "She doesn't use the lift; we use the board"    Pain/Comfort:  Pain Rating 1: 0/10  Pain Rating Post-Intervention 1: 0/10    Patients cultural, spiritual, Restorationist conflicts given the current situation: no    Living Environment:  Per pt,  Pt lives with Mitali (described as aide).   Prior to admission, patients level of function was dependent, with Mitali's assist for all mobility and ADLs.  Equipment used at home: slide board, hospital bed, lift device, power chair.  DME owned (not currently used): none.  Upon discharge, patient will have assistance from Mitali, however pt also stated "it depends how she's feeling that day".    Objective:     Communicated with RN prior to session.  Patient found HOB elevated with oxygen, telemetry, peripheral IV upon PT entry to room.    General Precautions: Standard, fall    Orthopedic Precautions:N/A   Braces: N/A  Respiratory Status: Nasal cannula, flow 1 L/min    Exams:  RLE/LLE ROM: Stiffness with pain present  RLE Strength: 2/5 ankle dorsiflexion, did not assess further as pt reported pain with movement      Functional Mobility:  Bed Mobility:     Rolling Right: dependence  Scooting: dependence    AM-PAC 6 CLICK MOBILITY  Total " Score:6       Treatment and Education:  Pt is at functional baseline with no acute needs. PT to sign off. Please reconsult if status changes.      AM-PAC 6 CLICK MOBILITY  Total Score:6     Patient left right sidelying with all lines intact, call button in reach, RN notified, and OT present.    GOALS:   Multidisciplinary Problems       Physical Therapy Goals       Not on file              Multidisciplinary Problems (Resolved)          Problem: Physical Therapy    Goal Priority Disciplines Outcome Goal Variances Interventions   Physical Therapy Goal   (Resolved)     PT, PT/OT Met     Description: Pt is at functional baseline with no acute needs. PT to sign off. Please reconsult if status changes.                         History:     Past Medical History:   Diagnosis Date    Aspiration pneumonia of right lower lobe 1/15/2018    Atrial fibrillation 3/2/2015    Chronic obstructive pulmonary disease, unspecified COPD type 5/4/2022    Depression     Essential hypertension 2/27/2019    Malignant (primary) neoplasm, unspecified 5/4/2022    Microcytic anemia 9/25/2014    Neuromuscular disorder     Other osteoporosis without current pathological fracture 9/22/2022    Pneumonia     Polymyositis 2009    Tobacco abuse        Past Surgical History:   Procedure Laterality Date    COLONOSCOPY N/A 8/6/2020    Procedure: COLONOSCOPY;  Surgeon: Brandan Meyer MD;  Location: [address]);  Service: Endoscopy;  Laterality: N/A;    ESOPHAGOGASTRODUODENOSCOPY N/A 8/6/2020    Procedure: EGD (ESOPHAGOGASTRODUODENOSCOPY);  Surgeon: Brandan Meyer MD;  Location: [address]);  Service: Endoscopy;  Laterality: N/A;  multiple co-morbidities. will have family member for assistance.  has neuromuscular issues-needs lift. in W/C-unable to transfer per self     COVID test at [facility] on 8/3-GT       Time Tracking:     PT Received On: 05/16/23  PT Start Time: 1127     PT Stop Time: 1155  PT Total Time (min): 28 min     Billable  Minutes: Evaluation 10 and Therapeutic Activity 18    Co-treatment performed due to patient's multiple deficits requiring two skilled therapists to appropriately and safely assess patient's strength and endurance while facilitating functional tasks in addition to accommodating for patient's activity tolerance.       05/16/2023

## 2023-05-16 NOTE — ASSESSMENT & PLAN NOTE
This patient does have evidence of infective focus  My overall impression is sepsis.  Source: Respiratory  Antibiotics given-   Antibiotics (72h ago, onward)    Start     Stop Route Frequency Ordered    05/14/23 2230  azithromycin (ZITHROMAX) 500 mg in dextrose 5 % (D5W) 250 mL IVPB (Vial-Mate)         05/17 0859 IV Daily 05/14/23 1619    05/14/23 2200  cefTRIAXone (ROCEPHIN) 2 g in dextrose 5 % in water (D5W) 5 % 50 mL IVPB (MB+)         05/19 2159 IV Every 24 hours (non-standard times) 05/14/23 1619        Latest lactate reviewed-  Recent Labs   Lab 05/14/23  1457 05/15/23  1713   LACTATE 1.3 0.9     Organ dysfunction indicated by Acute respiratory failure    Fluid challenge Actual Body weight- Patient will receive 30ml/kg actual body weight to calculate fluid bolus for treatment of septic shock.     Post- resuscitation assessment No - Post resuscitation assessment not needed       Will Not start Pressors- Levophed for MAP of 65  Source control achieved by: antibiotics.    Lactate initially elevated trended down to WNL on repeat (5/14)  CAP coverage  ->Rocephin 5 day course  ->azithromycin 3 day course  Broaden to HAP coverage if worsening signs of infection  UA negative for UTI  Blood cultures NGTD  Respiratory culture pending collection  Respiratory viral panel in process

## 2023-05-16 NOTE — CONSULTS
[facility] - Tele[facility] Step[facility]  General Surgery  Consult Note    Patient Name: Nura Kahn Jr.  MRN: [id number]  Code Status: Full Code  Admission Date: 5/14/2023  Hospital Length of Stay: 2 days  Attending Physician: Emre Molina DO  Primary Care Provider: Edna Jaramillo NP    Patient information was obtained from patient, past medical records and primary team.     Inpatient consult to General Surgery  Consult performed by: Jose Smith MD  Consult ordered by: Trino Potts DO  Reason for consult: Back Abscess         Subjective:     Principal Problem: Acute hypoxemic respiratory failure    History of Present Illness: Mr. Kahn is a 67M, with a history of inclusion body myositis, quadriplegia and dysphagia, COPD, HTN, hemoglobin c disease, beta-thalassemia, who was admitted for aspiration pneumonia. General surgery was consulted for I&D of back abscess. Patient states it has been present for several years and has grown. Denies previous I&D. Mildly tender.     Patient vitals stable, Not on anticoagulation. Being treated on antibiotics.       No current facility-administered medications on file prior to encounter.     Current Outpatient Medications on File Prior to Encounter   Medication Sig    albuterol (PROVENTIL/VENTOLIN HFA) 90 mcg/actuation inhaler Inhale 2 puffs into the lungs every 6 (six) hours as needed for Wheezing or Shortness of Breath. (Patient taking differently: Inhale 4 puffs into the lungs once daily.)    amLODIPine (NORVASC) 10 MG tablet Take 1 tablet (10 mg total) by mouth once daily.    ammonium lactate 12 % Crea Apply 1 gram topically once daily. Apply to areas of dry skin and nails daily.    cholecalciferol, vitamin D3, 1,250 mcg (50,000 unit) capsule Take 1 capsule (50,000 Units total) by mouth once a week.    hydrOXYzine HCL (ATARAX) 25 MG tablet Take 1 tablet (25 mg total) by mouth 3 (three) times daily as needed (itching). (Patient taking differently: Take 25 mg by mouth  2 (two) times daily as needed for Itching (itching).)    lactulose (CHRONULAC) 10 gram/15 mL solution Take 15 mLs (10 g total) by mouth every 6 (six) hours as needed (constipation). (Patient taking differently: Take 10 g by mouth every other day.)    losartan (COZAAR) 25 MG tablet Take 1 tablet (25 mg total) by mouth once daily.    mupirocin (BACTROBAN) 2 % ointment Apply topically 3 (three) times daily.    mycophenolate (CELLCEPT) 500 mg Tab Take 3 tablets (1,500 mg total) by mouth 2 (two) times daily.    predniSONE (DELTASONE) 10 MG tablet Take 1 tablet (10 mg total) by mouth once daily.    traZODone (DESYREL) 50 MG tablet Take 1 tablet (50 mg total) by mouth nightly as needed for Insomnia. (Patient taking differently: Take  mg by mouth nightly as needed for Insomnia.)    albuterol-ipratropium (DUO-NEB) 2.5 mg-0.5 mg/3 mL nebulizer solution Take 3 mLs by nebulization every 6 (six) hours as needed for Wheezing. Rescue    oxyCODONE-acetaminophen (PERCOCET) 5-325 mg per tablet Take 1 tablet by mouth every 12 (twelve) hours as needed for Pain. (Patient not taking: Reported on 5/15/2023)       Review of patient's allergies indicates:  No Known Allergies    Past Medical History:   Diagnosis Date    Aspiration pneumonia of right lower lobe 1/15/2018    Atrial fibrillation 3/2/2015    Chronic obstructive pulmonary disease, unspecified COPD type 5/4/2022    Depression     Essential hypertension 2/27/2019    Malignant (primary) neoplasm, unspecified 5/4/2022    Microcytic anemia 9/25/2014    Neuromuscular disorder     Other osteoporosis without current pathological fracture 9/22/2022    Pneumonia     Polymyositis 2009    Tobacco abuse      Past Surgical History:   Procedure Laterality Date    COLONOSCOPY N/A 8/6/2020    Procedure: COLONOSCOPY;  Surgeon: Brandan Meyer MD;  Location: [address]);  Service: Endoscopy;  Laterality: N/A;    ESOPHAGOGASTRODUODENOSCOPY N/A 8/6/2020     Procedure: EGD (ESOPHAGOGASTRODUODENOSCOPY);  Surgeon: Brandan Meyer MD;  Location: [facility] ([address] [place] [address]);  Service: Endoscopy;  Laterality: N/A;  multiple co-morbidities. will have family member for assistance.  has neuromuscular issues-needs lift. in W/C-unable to transfer per self     COVID test at [facility] on 8/3-GT     Family History       Problem Relation (Age of Onset)    Diabetes Mother, Father    Hypertension Mother, Father    Kidney disease Mother          Tobacco Use    Smoking status: Some Days     Packs/day: 0.25     Years: 20.00     Pack years: 5.00     Types: Cigarettes     Last attempt to quit: 2018     Years since quittin.3    Smokeless tobacco: Never   Substance and Sexual Activity    Alcohol use: Yes     Alcohol/week: 0.0 standard drinks    Drug use: Yes     Types: Marijuana     Comment: daily use    Sexual activity: Not on file     Review of Systems   Constitutional:  Positive for fatigue.   HENT:  Positive for trouble swallowing.    Eyes: Negative.    Respiratory:  Positive for cough and shortness of breath.    Cardiovascular: Negative.    Gastrointestinal: Negative.    Endocrine: Negative.    Genitourinary: Negative.    Musculoskeletal: Negative.    Skin: Negative.    Allergic/Immunologic: Negative.    Neurological:  Positive for weakness.   Hematological: Negative.    Psychiatric/Behavioral: Negative.     All other systems reviewed and are negative.  Objective:     Vital Signs (Most Recent):  Temp: 97.5 °F (36.4 °C) (23 1542)  Pulse: 88 (23 1542)  Resp: 18 (23 1542)  BP: 118/66 (23 1542)  SpO2: 99 % (23 1542) Vital Signs (24h Range):  Temp:  [97.3 °F (36.3 °C)-98 °F (36.7 °C)] 97.5 °F (36.4 °C)  Pulse:  [67-97] 88  Resp:  [16-20] 18  SpO2:  [93 %-99 %] 99 %  BP: ()/(54-73) 118/66     Weight: 64.9 kg (143 lb 1.3 oz)  Body mass index is 20.53 kg/m².     Physical Exam  Vitals reviewed.   Constitutional:       Appearance: He is normal weight.  He is ill-appearing.   HENT:      Head: Normocephalic and atraumatic.   Cardiovascular:      Rate and Rhythm: Normal rate and regular rhythm.      Pulses: Normal pulses.      Heart sounds: Normal heart sounds.   Pulmonary:      Effort: Pulmonary effort is normal.      Breath sounds: Wheezing present.   Abdominal:      General: Abdomen is flat. Bowel sounds are normal.      Palpations: Abdomen is soft.   Musculoskeletal:      Comments: Left shoulder - large abscess.    Skin:     General: Skin is warm and dry.   Neurological:      General: No focal deficit present.      Mental Status: He is alert and oriented to person, place, and time.   Psychiatric:         Thought Content: Thought content normal.          I have reviewed all pertinent lab results within the past 24 hours.  CBC:   Recent Labs   Lab 05/16/23  0557   WBC 6.88   RBC 4.00*   HGB 8.0*   HCT 25.1*      MCV 63*   MCH 20.0*   MCHC 31.9*     CMP:   Recent Labs   Lab 05/16/23  0557   GLU 78   CALCIUM 8.4*   ALBUMIN 2.1*   PROT 5.2*   *   K 4.0   CO2 22*      BUN 10   CREATININE 0.3*   ALKPHOS 54*   ALT 11   AST 13   BILITOT 1.1*       Significant Diagnostics:  I have reviewed all pertinent imaging results/findings within the past 24 hours.      Assessment/Plan:     Abscess  Mr. Kahn is a 67M, with a history of inclusion body myositis, quadriplegia and dysphagia, COPD, HTN, hemoglobin c disease, beta-thalassemia, who was admitted for aspiration pneumonia, now wit simple large back abscess.    - Plan for bedside I&D      VTE Risk Mitigation (From admission, onward)         Ordered     IP VTE HIGH RISK PATIENT  Once         05/14/23 1616     Place MARTI hose  Until discontinued         05/14/23 1616     Place sequential compression device  Until discontinued         05/14/23 1616                Thank you for your consult. I will follow-up with patient. Please contact us if you have any additional questions.    Jose Smith MD  General  Surgery  [facility] - Telemetry Stepdown

## 2023-05-16 NOTE — NURSING
1000  Notified med team 5 pt's BP is 90/52. 250 mL bolus of normal saline was ordered by Delroy SCRUGGS. Pt in bed with the call light in reach and the bed alarm on. Pt AAOx4 and denies having any pain.    1330  Notified med team 5 that pt BP was 87/54 despite receiving normal saline bolus. Diane DEL CASTILLO ordered 250 bolus of normal saline. Pt in bed with the call light in reach and the bed alarm. Pt AAOx4 and denies having any pain.

## 2023-05-17 PROBLEM — L24.A2 IRRITANT CONTACT DERMATITIS DUE TO FECAL, URINARY OR DUAL INCONTINENCE: Status: RESOLVED | Noted: 2023-01-01 | Resolved: 2023-01-01

## 2023-05-17 NOTE — HPI
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 Admitted with concerns of aspiration pna. Rheumatology consulted with concerns of worsening dysphagia secondary to ongoing myositis.   He states stable muscle weakness and atrophy up until everything got worse with COVID infection 4/2023. He has a recent hospitalization for aspiration pneumonia and COPD exacerbation (discharged on 4/16/23). Was given CAP coverage, then Unasyn, as well as prednisone. Was discharged on Augmentin to complete antibiotic course. On presentation to ED patient was started on antibiotics, steroids and Duo nebs then admitted to hospital medicine for PNA/ Sepsis workup.  Currently patient main complaint is the dysphagia which has worsened over the past 2 months c/b recurrent aspiration and hospitalizations for pna. He attributes all of his symptoms to COVID infection end of march, prior to which he was at his baseline.      Of note,   He was diagnosed with polymyositis at [facility] in [facility]. Follows up with Rheumatologists, Dr. Connolly and Dr. Huston here. 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 He was getting IVIG and rituximab. Last IVIG was 11/2021. Last rituximab was 4/4/22 and 4/29/22. His rituximab was due 10/2022 and he was supposed to resume IVIG but he reports he did not get either of them. Therefore he did not follow up with his outpatient Rheumatologists. He continued to take MMF 1500 mg BID and prednisone 10 mg chronically. However the MMF was held when he got COVID and prednisone is now increased for his COPD. He thinks his muscle disease has been about the same since his last visit with Rheumatology in 9/2022. However, as mentioned, his generalized weakness has worsened since getting COVID. He has been chair bound for some time.

## 2023-05-17 NOTE — PROGRESS NOTES
"[facility] - Telemetry Stepdown  Gastroenterology  Progress Note    Patient Name: Nura Kahn Jr.  MRN: [id number]  Admission Date: 5/14/2023  Hospital Length of Stay: 3 days  Code Status: Full Code   Attending Provider: Emre Molina DO  Consulting Provider: Mona Tyson NP  Primary Care Physician: Edna Jaramillo NP  Principal Problem: Acute hypoxemic respiratory failure    Subjective:     Interval History: Followed up with patient for ongoing concerns of dysphagia. Working with speech therapy at this time. Recommended chin tuck, with each swallow/sip. Reports this is improving and even ate his dinner. Dis report that he was " choking" on the last three bites because he did not follow instructions. Last barium swallow completed in April showed aspiration of thin liquids. Vitals stable. Patient appears more short of breath this AM. Afebrile overnight. No overt signs of bleeding noted.     Review of Systems   Constitutional:  Positive for unexpected weight change. Negative for activity change and appetite change.   HENT:  Positive for trouble swallowing. Negative for sore throat.    Gastrointestinal:  Negative for abdominal distention, abdominal pain, anal bleeding, blood in stool, constipation, diarrhea, nausea, rectal pain and vomiting.   Skin:  Negative for color change and pallor.   Neurological:  Positive for weakness. Negative for dizziness.   Objective:     Vital Signs (Most Recent):  Temp: 97.9 °F (36.6 °C) (05/17/23 0725)  Pulse: 96 (05/17/23 0725)  Resp: 16 (05/17/23 0725)  BP: 131/73 (05/17/23 0725)  SpO2: (!) 93 % (05/17/23 0725) Vital Signs (24h Range):  Temp:  [97.5 °F (36.4 °C)-98.3 °F (36.8 °C)] 97.9 °F (36.6 °C)  Pulse:  [] 96  Resp:  [16-21] 16  SpO2:  [93 %-99 %] 93 %  BP: (115-131)/(64-78) 131/73     Weight: 64.9 kg (143 lb 1.3 oz) (05/16/23 1342)  Body mass index is 20.53 kg/m².      Intake/Output Summary (Last 24 hours) at 5/17/2023 3030  Last data filed at 5/17/2023 " 0600  Gross per 24 hour   Intake 150 ml   Output 600 ml   Net -450 ml         Lines/Drains/Airways       Drain  Duration             Male External Urinary Catheter 05/14/23 2100 Small 2 days              Peripheral Intravenous Line  Duration                  Peripheral IV - Single Lumen 05/14/23 1313 20 G Left;Posterior Wrist 2 days         Peripheral IV - Single Lumen 05/15/23 1750 20 G;1 3/4 in Left Forearm 1 day                     Physical Exam  Vitals reviewed.   Constitutional:       General: He is not in acute distress.     Appearance: He is ill-appearing.   HENT:      Mouth/Throat:      Mouth: Mucous membranes are moist.      Pharynx: Oropharynx is clear.   Eyes:      General: No scleral icterus.  Abdominal:      General: Abdomen is flat. Bowel sounds are normal. There is no distension.      Palpations: Abdomen is soft. There is no mass.      Tenderness: There is no abdominal tenderness.      Hernia: No hernia is present.   Skin:     General: Skin is warm and dry.      Capillary Refill: Capillary refill takes less than 2 seconds.      Coloration: Skin is not jaundiced or pale.      Findings: No bruising or erythema.   Neurological:      Mental Status: He is alert and oriented to person, place, and time.      Motor: Weakness present.        Significant Labs:  CBC:   Recent Labs   Lab 05/16/23  0557 05/16/23  1757 05/17/23  0844   WBC 6.88 10.73 10.86   HGB 8.0* 9.0* 9.3*   HCT 25.1* 27.6* 29.0*    163 161       CMP:   Recent Labs   Lab 05/17/23  0414   GLU 90   CALCIUM 8.7   ALBUMIN 2.5*   PROT 5.9*      K 3.3*   CO2 23      BUN 10   CREATININE 0.3*   ALKPHOS 60   ALT 11   AST 8*   BILITOT 1.0           Significant Imaging:  Imaging results within the past 24 hours have been reviewed.    Barium swallow 4/13/2023  FINDINGS:  Pharyngeal Phase:     - Penetration/aspiration: Penetration with thin liquids as well as penetration and aspiration with nectar thickened liquids.     - Residual/static  material: Vallecular and pyriform     - Strategies: Chin tuck     Esophageal Phase (if visualized): N/A     Anatomic Observations: Cervical spine degenerative changes.     Impression:     Episodes of penetration and aspiration with liquid barium products.     Please see speech pathology report for further details.     Electronically signed by resident: Emre Khan  Date:                                            04/14/2023  Time:                                           14:55    Laryngoscopy 5/17/2023  Flexible Fiberoptic Laryngoscopy:     Oropharynx: Pharyngeal wall without edema, lesions, or masses. Bilateral palatine tonsils within normal limits. Base of tongue symmetric without masses or lesions. Lingual tonsil within normal limits.  Hypopharynx: No lesions or masses noted within the piriform sinuses or post cricoid area. No pooling of secretions.  Supraglottis: There is no edema of the arytenoids, interarytenoid space or epiglottis. Epiglottis is crisp. There are no masses or lesions noted. False vocal folds without masses or lesions.  Glottis: True vocal folds without masses or lesions. Normal mobility and airway patency.     Kem King MD  PGY-5    Assessment/Plan:     GI  Oropharyngeal dysphagia  - Recent MBS showed aspiration of thin liquids. Please continue with speech therapy.   - ENT consult completed. No indication for UES dilation vs cricopharyngeal myotomy.    - Would also have nutrition chime in on his nutritional status. If he is unable to meet his requirements, can consider EGD with PEG. However, a PEG will not prevent aspiration. Continue conversations with patient if longterm nutrition warranted.    - Laryngoscopy without abnormalities. Continue with Speech.  - Can consider inpatient Rituxin to treat underlying Myositis, as this is most likely contributing to patient symptoms.        Thank you for your consult. I will follow-up with patient. Please contact us if you have any  additional questions..    Mona Tyson NP  Gastroenterology  [facility] - Telemetry Stepdown

## 2023-05-17 NOTE — ASSESSMENT & PLAN NOTE
"Patient with Hypoxic Respiratory failure which is Acute.  he is not on home oxygen. Supplemental oxygen was provided and noted- Oxygen Concentration (%):  [43] 43    .   Signs/symptoms of respiratory failure include- increased work of breathing. Contributing diagnoses includes - COPD and Pneumonia Labs and images were reviewed. Patient Has not had a recent ABG. Will treat underlying causes and adjust management of respiratory failure as follows.    See "COPD," "sepsis" and "aspiration pneumonia."     Patient now on room air, w/O2 sat at goal.  However, WOB increased markedly on 5/17 and was sustained.  Given pt's myositis and malnourished state, [facility] is stepping him up to their unit.  "

## 2023-05-17 NOTE — ASSESSMENT & PLAN NOTE
Secondary to inclusion body myositis. Patient reports that he takes his meds crushed in puree at home. Did discuss G tube with patient, however he is adamantly opposed to this currently.    -possible that recurrent aspiration is causing his presentation  ->modified barium swallow on 4/14/23 demonstrated aspiration; MBS reordered.  -ENT: no obvious pathology on laryngoscope.    5/17: worsening work of breathing due to likely aspiration of his dinner; to be stepped up to MICU

## 2023-05-17 NOTE — ASSESSMENT & PLAN NOTE
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 He was recently discharged 4/16 after admission for aspiration pneumonia and COPD exacerbation. Rheumatology was consulted for possible Rituximab therapy in setting of worsening dysphagia and recurrent aspiration.       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 Inclusion body myositis is generally less responsive to treatment ( corticosteroids or immunosuppressive therapy). Previously his disease was stable, hard to investigate whether Rituximab and IVIG had noticeable improvement, however he continued to be clinically stable after period of therapy.  Last IVIG was 11/2021. Last rituximab was 4/4/22 and 4/29/22.   Given his recent progressive worsening dysphagia and recurrent aspiration in past 2-3 months, it may be reasonable to administer the due sessions of rituximab and IVIG pending clearance from infection. Currently worked up for possible pna vs pneumonitis per primary.  He has been afebrile, BCX NGTD. CTA chest reviewed.         Plan:  1. Will order infectious screening ( Hep B, C, and TB) prior to rituximab  2. Will order CK, esr, CRP, Aldolase   3. He may benefit from Rituximab and IVIG treatment, we will assess after above workup   4. We will wait for infectious clearance of current possible PNA/ sepsis per primary prior to biologic therapy.   5. Continue methypred per primary   6. Please hold MMF while treating possible infection

## 2023-05-17 NOTE — ASSESSMENT & PLAN NOTE
This patient does have evidence of infective focus  My overall impression is sepsis.  Source: Respiratory  Antibiotics given-   Antibiotics (72h ago, onward)    Start     Stop Route Frequency Ordered    05/14/23 2200  cefTRIAXone (ROCEPHIN) 2 g in dextrose 5 % in water (D5W) 5 % 50 mL IVPB (MB+)         05/19 2159 IV Every 24 hours (non-standard times) 05/14/23 1619        Latest lactate reviewed-  Recent Labs   Lab 05/14/23  1457 05/15/23  1713   LACTATE 1.3 0.9     Organ dysfunction indicated by Acute respiratory failure    Fluid challenge Actual Body weight- Patient will receive 30ml/kg actual body weight to calculate fluid bolus for treatment of septic shock.     Post- resuscitation assessment No - Post resuscitation assessment not needed       Will Not start Pressors- Levophed for MAP of 65  Source control achieved by: antibiotics.    Lactate initially elevated trended down to WNL on repeat (5/14)  CAP coverage  ->Rocephin 5 day course  ->azithromycin 3 day course  Broaden to HAP coverage if worsening signs of infection  UA negative for UTI  Blood cultures NGTD  Respiratory culture pending collection  Respiratory viral panel in process

## 2023-05-17 NOTE — PROGRESS NOTES
"[facility] - Telemetry [facility] Medicine  Progress Note    Patient Name: Nura Kahn Jr.  MRN: [id number]  Patient Class: IP- Inpatient   Admission Date: 5/14/2023  Length of Stay: 3 days  Attending Physician: Emre Molina DO  Primary Care Provider: Edna Jaramillo NP        Subjective:     Principal Problem:Acute hypoxemic respiratory failure        HPI:  Mr. 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 Huston) complicated by quadriplegia and dysphagia, COPD, HTN, aspiration pneumonia, hemoglobin c disease, beta-thalassemia, who presents with for 1 week of progressively worsening shortness of breath. Patient describes this as chest tightness. He denies associated fevers, chills, cough. He has recent hospitalization for aspiration pneumonia and COPD exacerbation (discharged on 4/16/23). Was given w/CAP coverage, then Unasyn, as well as prednisone. Was discharged on Augmentin to complete antibiotic course. Patient states that he lives in a "two bed" in "[facility]" with "a lady" who takes care of him. Describes her as his caregiver, though says that "she doesn't always give 100%." Reports sporadic adherence to medication, and states that he spends almost all of his time in bed. Unfortunately reports that he is quite often left to defecate and urinate on himself. Presented to the ED via EMS, was given Solu-Medrol and Duonebs en route and placed on 2L NC. Patient denies using supplemental oxygen at home. In the ED, septic work-up was started, given septic bolus, and started on broad-spectrum antibiotics. Admitted to hospital medicine for pneumonia and sepsis.       Overview/Hospital Course:  Supplemental oxygen requirement decreased w/CAP coverage; significant drop in hemoglobin on 5/16 confirmed on repeat labs, giving 2 units prbcs, and obtaining CT abdomen/pelvis w/contrast; no blood on CIERA, concerned " about retroperitoneal bleed; did start patient on GI bleed protocol in event that GI bleed is occurring; discontinued anticoagulants; discussed chronic aspiration w/ENT and GI. Patient will see ENT, however states that he needs time to think about definitive management of nutrition, such as G tube. Laryngoscopy was without any obvious vocal cord issues.    On 5/17 he displayed increased WOB throughout the morning and day, with worsening cough effort.  MICU and Rheum were asked for recs: HFNC, hypertonic saline, chest physio were attempted and Rituxan x1 was rec'ed.  Unfortunately, his respiratory status did not improve so MICU evaluated at bedside and stepped him up to their service.      Interval History: Pt w increased WOB throughout the night and today.  Evaled by MICU: given his muscular weakness at baseline, he is at increased risk of poor outcome so because he is tiring, they will take him.    Objective:     Vital Signs (Most Recent):  Temp: 97.6 °F (36.4 °C) (05/17/23 1134)  Pulse: 108 (05/17/23 1407)  Resp: 20 (05/17/23 1407)  BP: 130/85 (05/17/23 1134)  SpO2: 95 % (05/17/23 1407) Vital Signs (24h Range):  Temp:  [97.5 °F (36.4 °C)-98.3 °F (36.8 °C)] 97.6 °F (36.4 °C)  Pulse:  [] 108  Resp:  [16-21] 20  SpO2:  [93 %-99 %] 95 %  BP: (115-131)/(64-85) 130/85     Weight: 64.9 kg (143 lb 1.3 oz)  Body mass index is 20.53 kg/m².    Intake/Output Summary (Last 24 hours) at 5/17/2023 1408  Last data filed at 5/17/2023 0600  Gross per 24 hour   Intake 150 ml   Output 600 ml   Net -450 ml         Physical Exam  Vitals and nursing note reviewed.   Constitutional:       General: He is in acute distress.      Appearance: He is ill-appearing and toxic-appearing.   HENT:      Head: Normocephalic and atraumatic.      Nose: Nose normal. No congestion or rhinorrhea.      Mouth/Throat:      Mouth: Mucous membranes are dry.   Eyes:      Extraocular Movements: Extraocular movements intact.      Pupils: Pupils are equal,  round, and reactive to light.   Cardiovascular:      Rate and Rhythm: Normal rate and regular rhythm.   Pulmonary:      Effort: Respiratory distress present.      Breath sounds: Rhonchi and rales present.   Abdominal:      Palpations: Abdomen is soft.      Tenderness: There is no abdominal tenderness. There is no guarding or rebound.      Comments: No blood on CIERA.   Musculoskeletal:      Right lower leg: No edema.      Left lower leg: No edema.   Skin:     Findings: Lesion (stage 2 sacral decubitus ulcer) present.      Comments: Fluctuant lesion over left scapula.   Neurological:      Mental Status: He is alert and oriented to person, place, and time.      Comments: 2/5 strength very distal upper and lower extremities. 0-1/5 strength elsewhere.   Psychiatric:         Mood and Affect: Mood normal.         Behavior: Behavior normal.           Significant Labs: All pertinent labs within the past 24 hours have been reviewed.  ABGs: No results for input(s): PH, PCO2, HCO3, POCSATURATED, BE, TOTALHB, COHB, METHB, O2HB, POCFIO2, PO2 in the last 48 hours.  CBC:   Recent Labs   Lab 05/16/23  0557 05/16/23  1757 05/17/23  0844   WBC 6.88 10.73 10.86   HGB 8.0* 9.0* 9.3*   HCT 25.1* 27.6* 29.0*    163 161     CMP:   Recent Labs   Lab 05/16/23  0557 05/17/23  0414   * 136   K 4.0 3.3*    102   CO2 22* 23   GLU 78 90   BUN 10 10   CREATININE 0.3* 0.3*   CALCIUM 8.4* 8.7   PROT 5.2* 5.9*   ALBUMIN 2.1* 2.5*   BILITOT 1.1* 1.0   ALKPHOS 54* 60   AST 13 8*   ALT 11 11   ANIONGAP 8 11     Magnesium:   Recent Labs   Lab 05/16/23  0557   MG 1.8       Significant Imaging: I have reviewed all pertinent imaging results/findings within the past 24 hours.  CXR: I have reviewed all pertinent results/findings within the past 24 hours and my personal findings are:  worsening bibasilar opacities      Assessment/Plan:      * Acute hypoxemic respiratory failure  Patient with Hypoxic Respiratory failure which is Acute.  he is  "not on home oxygen. Supplemental oxygen was provided and noted- Oxygen Concentration (%):  [43] 43    .   Signs/symptoms of respiratory failure include- increased work of breathing. Contributing diagnoses includes - COPD and Pneumonia Labs and images were reviewed. Patient Has not had a recent ABG. Will treat underlying causes and adjust management of respiratory failure as follows.    See "COPD," "sepsis" and "aspiration pneumonia."     Patient now on room air, w/O2 sat at goal.  However, WOB increased markedly on 5/17 and was sustained.  Given pt's myositis and malnourished state, MICU is stepping him up to their unit.    Abscess  Over left scapula. Consulted gen surg for I&D.      Severe malnutrition  Nutrition consulted. Most recent weight and BMI monitored-     Measurements:  Wt Readings from Last 1 Encounters:   05/16/23 64.9 kg (143 lb 1.3 oz)   Body mass index is 20.53 kg/m².    Patient has been screened and assessed by RD.    Malnutrition Type:  Context: chronic illness  Level: severe    Malnutrition Characteristic Summary:  Weight Loss (Malnutrition): greater than 7.5% in 3 months  Energy Intake (Malnutrition): less than 75% for greater than or equal to 1 month  Subcutaneous Fat (Malnutrition): severe depletion  Muscle Mass (Malnutrition): severe depletion    Interventions/Recommendations (treatment strategy):  1.    Hyponatremia  Patient endorses poor oral intake recently. Suspect is due to this. Appears volume down on exam.    Urine sodium 12. Indicates volume loss, likely due to poor oral intake in outpatient setting.    Hyperbilirubinemia  See "anemia."      Severe sepsis  This patient does have evidence of infective focus  My overall impression is sepsis.  Source: Respiratory  Antibiotics given-   Antibiotics (72h ago, onward)      Start     Stop Route Frequency Ordered    05/14/23 2200  cefTRIAXone (ROCEPHIN) 2 g in dextrose 5 % in water (D5W) 5 % 50 mL IVPB (MB+)         05/19 2159 IV Every 24 hours " "(non-standard times) 05/14/23 1619          Latest lactate reviewed-  Recent Labs   Lab 05/14/23  1457 05/15/23  1713   LACTATE 1.3 0.9     Organ dysfunction indicated by Acute respiratory failure    Fluid challenge Actual Body weight- Patient will receive 30ml/kg actual body weight to calculate fluid bolus for treatment of septic shock.     Post- resuscitation assessment No - Post resuscitation assessment not needed       Will Not start Pressors- Levophed for MAP of 65  Source control achieved by: antibiotics.    Lactate initially elevated trended down to WNL on repeat (5/14)  CAP coverage  ->Rocephin 5 day course  ->azithromycin 3 day course  Broaden to HAP coverage if worsening signs of infection  UA negative for UTI  Blood cultures NGTD  Respiratory culture pending collection  Respiratory viral panel in process    Chronic obstructive pulmonary disease, unspecified COPD type  Suspect COPD exacerbation, due to pneumonia (likely aspiration).    -duonebs q6h prn  -methylprednisolone equivalent to prednisone 40mg QD as patient is NPO due to aspiration    Inclusion body myositis (IBM)  See "polymyositis."      Essential hypertension  Takes amlodipine and losartan at home.    -hold home meds in setting of sepsis      Debility  Due to inclusion body myositis. Appears to be complicated by poor care at home, evidenced by sacral decubitus ulcer.      Aspiration pneumonia  See "sepsis" and "COPD exacerbation."      Beta 0 thalassemia  -monitor CBC    Acute on chronic anemia  Prior iron studies suggest anemia of chronic disease at baseline. Significant decline in hemoglobin this morning to 6.1 from 9.5 on presentation, 5.9 on stat repeat. Unclear source at this time. No melenic stool on CIERA. No melena, hematochezia, epistaxis reported, and patient denies as well. Abdomen and thighs are non-tender on exam. No obvious external source of bleeding. Hemolysis labs (LDH and haptoglobin) are negative. Concerned about GI vs " "retroperitoneal bleed. Gave small fluid bolus of 250cc this morning, as patient had JVD on presentation. Will repeat 250cc bolus this afternoon, but needs blood products. Concerned for development of shock.    -given precipitous drop in hemoglobin, though CIERA negative, started on GI bleed dosed PPI  ->80mg IV pantoprazole x1 followed by 40mg IV BID  -patient consented to receive blood products  -received 2 units of prbcs  -CTA chest, abdomen, pelvis pending  -PT 13, INR 1.2 on admit, will repeat  -PLTs 158  -lactate WNL  -repeat CBC stable  -follow CBC BID  -hold all anti-coagulants, anti-platelets  -may need to discuss with GI vs IR depending on findings    History of tobacco use  Patient reports that he used to smoke heavily, though cannot specify packs-per day. Reports that he quit 4 years ago, but states that he still occasionally uses marijuana.      Quadriparesis (muscle weakness)  Secondary to inclusion body myositis. See "inclusion body myositis."      Oropharyngeal dysphagia  Secondary to inclusion body myositis. Patient reports that he takes his meds crushed in puree at home. Did discuss G tube with patient, however he is adamantly opposed to this currently.    -possible that recurrent aspiration is causing his presentation  ->modified barium swallow on 4/14/23 demonstrated aspiration; MBS reordered.  -ENT: no obvious pathology on laryngoscope.    5/17: worsening work of breathing due to likely aspiration of his dinner; to be stepped up to MICU    Polymyositis  History of polymyositis/inclusion body myositis. Follows with Dr. Huston. Currently on Cellcept and prednisone (10mg QD), with sporadic adherence.    -hold Cellcept  -methylprednisolone dosed to COPD exacerbation  -Rituxan x 1 once he clears infection and pre-DMR labs are clear, per Rheum recs (they will order)        VTE Risk Mitigation (From admission, onward)           Ordered     IP VTE HIGH RISK PATIENT  Once         05/14/23 1616     Place MARTI " hose  Until discontinued         05/14/23 1616     Place sequential compression device  Until discontinued         05/14/23 1616                    Discharge Planning   DERICK: 5/20/2023     Code Status: Full Code   Is the patient medically ready for discharge?: No    Reason for patient still in hospital (select all that apply): Patient unstable, Patient new problem, Patient trending condition, Laboratory test, Treatment and Consult recommendations  Discharge Plan A: Home with family, Home Health                  Solis Fisher MD  Department of Hospital Medicine   [facility] - Telemetry Stepdown

## 2023-05-17 NOTE — ASSESSMENT & PLAN NOTE
"2/2 inclusion body myositis. Progressively worsened after Covid, missed rituximab infusions. PEG discussed with patient given malnutrition, still considering. Having recurrent aspirations, respiratory distress.    - MBSS 4/14 w aspiration of thin liquids  - ENT: laryngoscope, no structural pathology.   - 5/17: aspirated dinner, step up to MICU. NPO    SLP following. Rheum consulted, see "polymyositis"  "

## 2023-05-17 NOTE — CONSULTS
"Nutrition consult received stating "sacral wounds, dysphagia, muscle atrophy".  RD following, please see note from 5/16 for full assessment.    Recommendations  Diet advancement per SLP. Rec'd Regular diet + Boost Plus ONS.  If unable to advance diet & TFs warranted, rec'd Isosource 1.5 @ 50 mL/hr = 1800 kcals, 82 g of protein, 917 mL fluid.  RD to monitor & follow-up.     Goals: Meet % EEN, EPN by RD f/u date  Nutrition Goal Status: new  Communication of RD Recs: reviewed with RN    Thanks!  Mariaelena MS, RD, LDN     "

## 2023-05-17 NOTE — CARE UPDATE
RAPID RESPONSE NURSE PROACTIVE ROUNDING NOTE       Time of Visit: 13:55    Admit Date: 2023  LOS: 3  Code Status: Full Code   Date of Visit: 2023  : 1955  Age: 67 y.o.  Sex: male  Race: Black or   Bed: 8082/8082 A:   MRN: [id number]  Was the patient discharged from an ICU this admission? No   Was the patient discharged from a PACU within last 24 hours? No   Did the patient receive conscious sedation/general anesthesia in last 24 hours? No  Was the patient in the ED within the past 24 hours? No  Was the patient on NIPPV within the past 24 hours? No   Attending Physician: Emre Molina DO  Primary Service: [facility] MED 5   Time spent at the bedside: 15 -30 min    SITUATION    Notified by charge RN via phone call.  Reason for alert: increased work of breathing   Called to evaluate the patient for Respiratory    BACKGROUND     Why is the patient in the hospital?: Acute hypoxemic respiratory failure    Patient has a past medical history of Aspiration pneumonia of right lower lobe, Atrial fibrillation, Chronic obstructive pulmonary disease, unspecified COPD type, Depression, Essential hypertension, Malignant (primary) neoplasm, unspecified, Microcytic anemia, Neuromuscular disorder, Other osteoporosis without current pathological fracture, Pneumonia, Polymyositis, and Tobacco abuse.    Last Vitals:  Temp: 97.6 °F (36.4 °C) ( 1134)  Pulse: 103 ( 1203)  Resp: 18 ( 1203)  BP: 130/85 ( 1134)  SpO2: 94 % ( 1203)    24 Hours Vitals Range:  Temp:  [97.5 °F (36.4 °C)-98.3 °F (36.8 °C)]   Pulse:  []   Resp:  [16-21]   BP: (115-131)/(64-85)   SpO2:  [93 %-99 %]     Labs:  Recent Labs     23  0557 23  1757 23  0844   WBC 6.88 10.73 10.86   HGB 8.0* 9.0* 9.3*   HCT 25.1* 27.6* 29.0*    163 161       Recent Labs     23  1919 05/15/23  0641 23  0557 23  0414   NA  --  131* 134* 136   K  --  3.8 4.0 3.3*   CL  --  101 104 102    CO2  --  20* 22* 23   CREATININE  --  0.3* 0.3* 0.3*   GLU  --  90 78 90   PHOS 2.2* 2.7 2.5*  --    MG 1.6 1.8 1.8  --       ASSESSMENT    Called to bedside for increased work of breathing and patient c/o SOB.   Hx of myositis. Known dysphagia.   On bedside exam, patient is sitting up in bed, looks relatively comfortable with his breathing pattern though tachypneic. Mild accessory muscle use noted. He is complaining of being SOB.   Last CXR 12:40. Has been eating though he says he overdid it on his last meal and coughed. Currently on 2L NC, SpO2 95%.   Primary team consulted [facility], decision made to transfer patient to ICU.     INTERVENTIONS    The patient was seen for Respiratory problem. Staff concerns included new onset of difficulty breathing and increased WOB. The following interventions were performed: supplemental oxygen, continued pulse ox monitoring continued, and continued cardiac monitoring continued.    RECOMMENDATIONS    NPO until evaluated by SLP   Transfer to ICU     PROVIDER ESCALATION    Yes/No  Yes    Orders received and case discussed with Dr. Kaiser &  .    Disposition: Tx in ICU bed 6074.    FOLLOW-UP    Bedside Nandini DELACRUZ  updated on plan of care. Instructed to call the Rapid Response Nurse, Meagan Rowell RN at [phone] for additional questions or concerns.

## 2023-05-17 NOTE — SUBJECTIVE & OBJECTIVE
Past Medical History:   Diagnosis Date    Aspiration pneumonia of right lower lobe 1/15/2018    Atrial fibrillation 3/2/2015    Chronic obstructive pulmonary disease, unspecified COPD type 2022    Depression     Essential hypertension 2019    Malignant (primary) neoplasm, unspecified 2022    Microcytic anemia 2014    Neuromuscular disorder     Other osteoporosis without current pathological fracture 2022    Pneumonia     Polymyositis 2009    Tobacco abuse        Past Surgical History:   Procedure Laterality Date    COLONOSCOPY N/A 2020    Procedure: COLONOSCOPY;  Surgeon: Brandan Meyer MD;  Location: [facility] ([address] [place] [address]);  Service: Endoscopy;  Laterality: N/A;    ESOPHAGOGASTRODUODENOSCOPY N/A 2020    Procedure: EGD (ESOPHAGOGASTRODUODENOSCOPY);  Surgeon: Brandan Meyer MD;  Location: [facility] ([address] [place] [address]);  Service: Endoscopy;  Laterality: N/A;  multiple co-morbidities. will have family member for assistance.  has neuromuscular issues-needs lift. in W/C-unable to transfer per self     COVID test at [facility] on 8/3-       Review of patient's allergies indicates:  No Known Allergies    Family History       Problem Relation (Age of Onset)    Diabetes Mother, Father    Hypertension Mother, Father    Kidney disease Mother          Tobacco Use    Smoking status: Some Days     Packs/day: 0.25     Years: 20.00     Pack years: 5.00     Types: Cigarettes     Last attempt to quit: 2018     Years since quittin.3    Smokeless tobacco: Never   Substance and Sexual Activity    Alcohol use: Yes     Alcohol/week: 0.0 standard drinks    Drug use: Yes     Types: Marijuana     Comment: daily use    Sexual activity: Not on file      Review of Systems   Constitutional:  Positive for fatigue.   HENT:  Positive for trouble swallowing.    Eyes: Negative.    Respiratory:  Positive for cough, choking and shortness of breath.    Cardiovascular: Negative.    Gastrointestinal: Negative.     Endocrine: Negative.    Genitourinary: Negative.    Musculoskeletal: Negative.    Skin: Negative.    Allergic/Immunologic: Negative.    Neurological:  Positive for weakness.   Hematological: Negative.    Psychiatric/Behavioral: Negative.     All other systems reviewed and are negative.  Objective:     Vital Signs (Most Recent):  Temp: 97.6 °F (36.4 °C) (05/17/23 1134)  Pulse: 108 (05/17/23 1407)  Resp: 20 (05/17/23 1407)  BP: 130/85 (05/17/23 1134)  SpO2: 95 % (05/17/23 1407) Vital Signs (24h Range):  Temp:  [97.5 °F (36.4 °C)-98.3 °F (36.8 °C)] 97.6 °F (36.4 °C)  Pulse:  [] 108  Resp:  [16-21] 20  SpO2:  [93 %-99 %] 95 %  BP: (115-131)/(64-85) 130/85   Weight: 64.9 kg (143 lb 1.3 oz)  Body mass index is 20.53 kg/m².      Intake/Output Summary (Last 24 hours) at 5/17/2023 1457  Last data filed at 5/17/2023 0600  Gross per 24 hour   Intake 150 ml   Output 600 ml   Net -450 ml          Physical Exam  Vitals and nursing note reviewed.   Constitutional:       Appearance: He is cachectic. He is ill-appearing. He is not toxic-appearing.      Interventions: Nasal cannula in place.   HENT:      Head: Normocephalic and atraumatic.      Nose: Nose normal. No congestion or rhinorrhea.      Mouth/Throat:      Mouth: Mucous membranes are dry.   Eyes:      Extraocular Movements: Extraocular movements intact.      Pupils: Pupils are equal, round, and reactive to light.   Cardiovascular:      Rate and Rhythm: Normal rate and regular rhythm.   Pulmonary:      Effort: Respiratory distress present.      Breath sounds: Rhonchi and rales present.      Comments: Diminished sounds BL lower lobes  Abdominal:      Palpations: Abdomen is soft.      Tenderness: There is no abdominal tenderness. There is no guarding or rebound.   Musculoskeletal:      Right lower leg: No edema.      Left lower leg: No edema.   Skin:     Findings: Lesion (stage 2 sacral decubitus ulcer) present.      Comments: Fluctuant lesion over left scapula.    Neurological:      Mental Status: He is alert and oriented to person, place, and time.      Comments: 2/5 strength very distal upper and lower extremities. 0-1/5 strength elsewhere.   Psychiatric:         Mood and Affect: Mood normal.         Behavior: Behavior normal.          Vents:  Oxygen Concentration (%): 43 (05/17/23 1407)  Lines/Drains/Airways       Drain  Duration             Male External Urinary Catheter 05/14/23 2100 Small 2 days              Peripheral Intravenous Line  Duration                  Peripheral IV - Single Lumen 05/14/23 1313 20 G Left;Posterior Wrist 3 days         Peripheral IV - Single Lumen 05/15/23 1750 20 G;1 3/4 in Left Forearm 1 day                  Significant Labs:    CBC/Anemia Profile:  Recent Labs   Lab 05/16/23  0557 05/16/23  1757 05/17/23  0844   WBC 6.88 10.73 10.86   HGB 8.0* 9.0* 9.3*   HCT 25.1* 27.6* 29.0*    163 161   MCV 63* 62* 62*   RDW 26.9* 26.5* 26.8*        Chemistries:  Recent Labs   Lab 05/16/23  0557 05/17/23  0414   * 136   K 4.0 3.3*    102   CO2 22* 23   BUN 10 10   CREATININE 0.3* 0.3*   CALCIUM 8.4* 8.7   ALBUMIN 2.1* 2.5*   PROT 5.2* 5.9*   BILITOT 1.1* 1.0   ALKPHOS 54* 60   ALT 11 11   AST 13 8*   MG 1.8  --    PHOS 2.5*  --        Significant Imaging: CXR: I have reviewed all pertinent results/findings within the past 24 hours and my personal findings are:  interval  improvement

## 2023-05-17 NOTE — CONSULTS
[facility] - Cardiac Medical ICU  Rheumatology  Consult Note    Patient Name: Nura Kahn Jr.  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 Admitted with concerns of aspiration pna. Rheumatology consulted with concerns of worsening dysphagia secondary to ongoing myositis.   He states stable muscle weakness and atrophy up until everything got worse with COVID infection 4/2023. He has a recent hospitalization for aspiration pneumonia and COPD exacerbation (discharged on 4/16/23). Was given CAP coverage, then Unasyn, as well as prednisone. Was discharged on Augmentin to complete antibiotic course. On presentation to ED patient was started on antibiotics, steroids and Duo nebs then admitted to hospital medicine for PNA/ Sepsis workup.  Currently patient main complaint is the dysphagia which has worsened over the past 2 months c/b recurrent aspiration and hospitalizations for pna. He attributes all of his symptoms to COVID infection end of march, prior to which he was at his baseline.      Of note,   He was diagnosed with polymyositis at [facility] in 2009. Follows up with Rheumatologists, Dr. Connolly and Dr. Huston here. 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 He was getting IVIG and rituximab. Last IVIG was 11/2021. Last rituximab was 4/4/22 and 4/29/22. His rituximab was due 10/2022 and he was supposed to resume IVIG but he reports he did not get either  of them. Therefore he did not follow up with his outpatient Rheumatologists. He continued to take MMF 1500 mg BID and prednisone 10 mg chronically. However the MMF was held when he got COVID and prednisone is now increased for his COPD. He thinks his muscle disease has been about the same since his last visit with Rheumatology in 9/2022. However, as mentioned, his generalized weakness has worsened since getting COVID. He has been chair bound for some time.       Past Medical History:   Diagnosis Date    Aspiration pneumonia of right lower lobe 1/15/2018    Atrial fibrillation 3/2/2015    Chronic obstructive pulmonary disease, unspecified COPD type 5/4/2022    Depression     Essential hypertension 2/27/2019    Malignant (primary) neoplasm, unspecified 5/4/2022    Microcytic anemia 9/25/2014    Neuromuscular disorder     Other osteoporosis without current pathological fracture 9/22/2022    Pneumonia     Polymyositis 2009    Tobacco abuse        Past Surgical History:   Procedure Laterality Date    COLONOSCOPY N/A 8/6/2020    Procedure: COLONOSCOPY;  Surgeon: Brandan Meyer MD;  Location: [facility] ([address] [place] [address]);  Service: Endoscopy;  Laterality: N/A;    ESOPHAGOGASTRODUODENOSCOPY N/A 8/6/2020    Procedure: EGD (ESOPHAGOGASTRODUODENOSCOPY);  Surgeon: Brandan Meyer MD;  Location: [facility] ([address] [place] [address]);  Service: Endoscopy;  Laterality: N/A;  multiple co-morbidities. will have family member for assistance.  has neuromuscular issues-needs lift. in W/C-unable to transfer per self     COVID test at [facility] on 8/3-GT       Immunization History   Administered Date(s) Administered    COVID-19, MRNA, LN-S, PF (Pfizer) (Gray Cap) 03/29/2022    COVID-19, MRNA, LN-S, PF (Pfizer) (Purple Cap) 05/14/2021, 06/04/2021    Influenza 10/06/2011    Influenza - Quadrivalent - PF *Preferred* (6 months and older) 12/12/2018, 10/07/2019, 11/18/2020, 11/18/2020, 10/12/2021    Influenza - Trivalent - PF (ADULT) 09/27/2012     Pneumococcal Conjugate - 13 Valent 2016    Pneumococcal Polysaccharide - 23 Valent 2018    Tdap 10/13/2021       Review of patient's allergies indicates:  No Known Allergies  Current Facility-Administered Medications   Medication Frequency    0.9%  NaCl infusion (for blood administration) Q24H PRN    acetaminophen tablet 650 mg Q8H PRN    cefTRIAXone (ROCEPHIN) 2 g in dextrose 5 % in water (D5W) 5 % 50 mL IVPB (MB+) Q24H    dextrose 10% bolus 125 mL 125 mL PRN    dextrose 10% bolus 250 mL 250 mL PRN    dextrose 40 % gel 15,000 mg PRN    dextrose 40 % gel 30,000 mg PRN    glucagon (human recombinant) injection 1 mg PRN    guaiFENesin 12 hr tablet 600 mg BID    levalbuterol nebulizer solution 1.25 mg Q6H PRN    And    ipratropium 0.02 % nebulizer solution 0.5 mg Q6H PRN    melatonin tablet 6 mg Nightly PRN    methylPREDNISolone sodium succinate injection 32 mg Daily    naloxone 0.4 mg/mL injection 0.02 mg PRN    oxyCODONE immediate release tablet 5 mg BID PRN    pantoprazole injection 40 mg BID    sodium chloride 0.9% flush 10 mL PRN    sodium chloride 0.9% flush 10 mL Q12H PRN    sodium chloride 3% nebulizer solution 4 mL Q6H     Family History       Problem Relation (Age of Onset)    Diabetes Mother, Father    Hypertension Mother, Father    Kidney disease Mother          Tobacco Use    Smoking status: Some Days     Packs/day: 0.25     Years: 20.00     Pack years: 5.00     Types: Cigarettes     Last attempt to quit: 2018     Years since quittin.3    Smokeless tobacco: Never   Substance and Sexual Activity    Alcohol use: Yes     Alcohol/week: 0.0 standard drinks    Drug use: Yes     Types: Marijuana     Comment: daily use    Sexual activity: Not on file     Review of Systems   Constitutional:  Positive for activity change, fatigue and unexpected weight change. Negative for chills.   HENT:  Positive for trouble swallowing. Negative for congestion.    Eyes: Negative.     Respiratory:  Positive for cough, choking and shortness of breath.    Cardiovascular: Negative.    Gastrointestinal: Negative.    Endocrine: Negative.    Genitourinary: Negative.    Musculoskeletal: Negative.    Skin: Negative.    Allergic/Immunologic: Negative.    Neurological:  Positive for weakness.   Hematological: Negative.    Psychiatric/Behavioral: Negative.     All other systems reviewed and are negative.  Objective:     Vital Signs (Most Recent):  Temp: 97.6 °F (36.4 °C) (05/17/23 1134)  Pulse: (!) 114 (05/17/23 1527)  Resp: (!) 56 (05/17/23 1527)  BP: 130/85 (05/17/23 1134)  SpO2: 98 % (05/17/23 1527) Vital Signs (24h Range):  Temp:  [97.5 °F (36.4 °C)-98.3 °F (36.8 °C)] 97.6 °F (36.4 °C)  Pulse:  [] 114  Resp:  [16-56] 56  SpO2:  [93 %-99 %] 98 %  BP: (115-131)/(64-85) 130/85     Weight: 64.9 kg (143 lb 1.3 oz) (05/16/23 1342)  Body mass index is 20.53 kg/m².  Body surface area is 1.79 meters squared.      Intake/Output Summary (Last 24 hours) at 5/17/2023 1531  Last data filed at 5/17/2023 0600  Gross per 24 hour   Intake 150 ml   Output 600 ml   Net -450 ml         Physical Exam   Constitutional: He is oriented to person, place, and time. No distress.   HENT:   Head: Normocephalic and atraumatic.   Eyes: Pupils are equal, round, and reactive to light.   Cardiovascular: Normal rate and regular rhythm.   No murmur heard.  Pulmonary/Chest: He is in respiratory distress. He has no rhonchi.   Pulmonary Comments: Absent breath sounds right lower lung zones.   On 3 L this AM  Abdominal: Soft. Bowel sounds are normal. There is no abdominal tenderness.   Musculoskeletal:         General: No deformity. Normal range of motion.      Cervical back: Normal range of motion.      Comments: Global muscular atrophy. Unable to make fists. He sits hunched over and is unable to sit up straight without assistance. Neck flexion strength is 1/5. Neck extension strength is 2/5. Upper and lower extremity strength is 2/5.    Neurological: He is alert and oriented to person, place, and time. He displays weakness.   Skin: Skin is warm and dry.   Psychiatric: Affect and judgment normal.      Significant Labs:  Blood Culture: No results for input(s): LABBLOO in the last 24 hours.  BMP:   Recent Labs   Lab 05/17/23  0414   GLU 90      K 3.3*      CO2 23   BUN 10   CREATININE 0.3*   CALCIUM 8.7     CBC:   Recent Labs   Lab 05/16/23  1757 05/17/23  0844   WBC 10.73 10.86   HGB 9.0* 9.3*   HCT 27.6* 29.0*    161     CMP:   Recent Labs   Lab 05/17/23  0414   GLU 90   CALCIUM 8.7   ALBUMIN 2.5*   PROT 5.9*      K 3.3*   CO2 23      BUN 10   CREATININE 0.3*   ALKPHOS 60   ALT 11   AST 8*   BILITOT 1.0     Coagulation: No results for input(s): LABPROT, INR, APTT in the last 24 hours.  CPK: No results for input(s): CPK in the last 24 hours.  CRP: No results for input(s): CRP in the last 24 hours.  ESR: No results for input(s): SEDRATE in the last 24 hours.  Liver Function Test:   Recent Labs   Lab 05/17/23  0414   ALT 11   AST 8*   ALKPHOS 60   BILITOT 1.0   PROT 5.9*   ALBUMIN 2.5*     Peritoneal Fluid Cultures: No results for input(s): AEROBICCULTU, LABGRAM in the last 24 hours.  Uric Acid: No results for input(s): URICACID in the last 24 hours.  Urinalysis: No results for input(s): COLORU, CLARITYU, SPECGRAV, PHUR, PROTEINUA, GLUCOSEU, BILIRUBINCON, BLOODU, WBCU, RBCU, BACTERIA, MUCUS, NITRITE, LEUKOCYTESUR, UROBILINOGEN, HYALINECASTS in the last 24 hours.  Urine protein creatinine: No results for input(s): UTPCR in the last 24 hours.    Significant Imaging:  Imaging results within the past 24 hours have been reviewed.    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 He was recently discharged 4/16 after admission for  aspiration pneumonia and COPD exacerbation. Rheumatology was consulted for possible Rituximab therapy in setting of worsening dysphagia and recurrent aspiration.       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 Inclusion body myositis is generally less responsive to treatment ( corticosteroids or immunosuppressive therapy). Previously his disease was stable, hard to investigate whether Rituximab and IVIG had noticeable improvement, however he continued to be clinically stable after period of therapy.  Last IVIG was 11/2021. Last rituximab was 4/4/22 and 4/29/22.   Given his recent progressive worsening dysphagia and recurrent aspiration in past 2-3 months, it may be reasonable to administer the due sessions of rituximab and IVIG pending clearance from infection. Currently worked up for possible pna vs pneumonitis per primary.  He has been afebrile, BCX NGTD. CTA chest reviewed.         Plan:  1. Will order infectious screening ( Hep B, C, and TB) prior to rituximab  2. Will order CK, esr, CRP, Aldolase   3. He may benefit from Rituximab and IVIG treatment, we will assess after above workup   4. We will wait for infectious clearance of current possible PNA/ sepsis per primary prior to biologic therapy.   5. Continue methypred per primary   6. Please hold MMF while treating possible infection            Thank you for your consult. I will follow-up with patient. Please contact us if you have any additional questions.    Ortega Choudhury MD  Rheumatology  [facility] - Cardiac Medical ICU

## 2023-05-17 NOTE — ASSESSMENT & PLAN NOTE
Follows w Dr. Huston at Rheumatology. On Cellcept and prednisone, endorses intermittent compliance. Was receiving rituximab infusions, but unable to since April d/t COVID and recent admission for PNA.    - Continue holding cellcept  - will complete COPD methylprednisolone course and then resume home prednisone 10mg qd  - likely contributing to worsening dysphagia, aspiration, WOB  - Rheum consulted for initiation of rituximab while inpatient.

## 2023-05-17 NOTE — PHYSICIAN QUERY
PT Name: Nura Kahn Jr.  MR #: [id number]    DOCUMENTATION CLARIFICATION     CDS: Philipp Jones RN CCDS               Contact information: [email or web address]      This form is a permanent document in the medical record.     Query Date: May 17, 2023    By submitting this query, we are merely seeking further clarification of documentation.. Please utilize your independent clinical judgment when addressing the question(s) below.    The medical record contains the following:   Indicators  Supporting Clinical Findings Location in Medical Record     x Energy Intake <75% of estimated energy requirement for > 2 months 2023 RD consult     x Weight Loss Weight Loss: 16% x 3 months 2023 RD consult     x Fat Loss severe depletion of orbitals and triceps  2023 RD consult     x Muscle Loss severe depletion of temples, clavicle region, scapular region, and lower extremities  2023 RD consult    Edema/Fluid Accumulation      Reduced  Strength (by dynamometer)       x Weight, BMI, Usual Body Weight Weight: 64.9 kg (143 lb 1.3 oz)  BMI (Calculated): 20.5  Usual Body Weight (UBW), k.3 kg 2023 RD consult    Delayed Wound Healing       x Registered Dietician Diagnosis Severe Protein-Calorie Malnutrition  Malnutrition in the context of Chronic Illness/Injury 2023 RD consult     x Acute or Chronic Illness Septic Shock  Acute Hypoxic Respiratory Failure  Recurrent Aspiration PNA  COPD exacerbation   Hyponatremia  Abscess/Sebaceous Cyst  Oropharyngeal dysphagia  Quadriparesis (muscle weakness)  Secondary to inclusion body myositis  Sacral decubitus ulcer, stage II 2023 Hospital medicine progress notes    Social or Environmental Circumstances       x Treatment Recommendations  Diet advancement per SLP. Rec'd Regular diet + Boost Plus ONS.  If unable to advance diet & TFs warranted, rec'd Isosource 1.5 @ 50 mL/hr = 1800 kcals, 82 g of protein, 917 mL fluid.RD to monitor & follow-up.  5/16/2023 RD consult    Other       Academy of Nutrition and Dietetics (Academy) and the American Society for Parenteral and Enteral Nutrition (A.S.P.E.N.) Clinical Characteristics to support Malnutrition   Malnutrition in the Context of Acute Illness or Injury Malnutrition in the Context of Chronic Illness or Injury Malnutrition in the Context of Social or Environmental Circumstances   Malnutrition Level Moderate Severe Moderate Severe   Moderate   Severe   Energy Intake <75%                   >7 days <50%                 >5 days <75%           >1 month <75%                      >1 month   <75% for >3 months   <50% for >1 month   Weight Loss   1-2% in 1 week >2% in 1 week 5% in 1 month >5% in 1 month 5% in 1 month >5% in 1 month    5% in 1 month >5% in 1 month 7.5% in 3 months >7.5% in 3 months 7.5% in 3 months >7.5% in 3 months    7.5% in 3 months >7.5% in 3 months 10% in 6 months >10% in 6 months 10% in 6 months >10% in 6 months        20% in 1 year                    >20% in 1 year                                                                  20% in 1 year                            >20% in 1 year                                                  Subcutaneous Fat Loss Mild  Moderate  Mild  Severe    Mild   Severe   Muscle Loss Mild  Moderate  Mild  Severe    Mild   Severe   Edema/Fluid Accumulation Mild Moderate to severe  Mild  Severe   Mild   Severe   Reduced  Strength         (based on standards supplied by  of dynamometer) N/A Measurably reduced N/A Measurably reduced N/A Measurably reduced     Criteria for mild malnutrition is defined as 1 characteristic outlined above within the established moderate or severe parameters.  A minimum of 2 out of the 6 characteristics noted above are recommended for a diagnosis of moderate or severe malnutrition.  Chronic illness/injury is a disease/condition lasting 3 months or longer.    The noted clinical guidelines are only system guidelines and do not  replace the providers clinical judgment.    Provider, please specify diagnosis or diagnoses associated with above clinical findings.    [  x ] Severe Malnutrition - a minimum of 2 of the 6 severe malnutrition characteristics noted above    [   ] Other Nutritional Diagnosis (please specify): _______   [  ] Clinically Undetermined       Please document in your progress notes daily for the duration of treatment until resolved and  include in your discharge summary.      References:    NENO Parrish, & KRYSTEN Mandel (2022, April). Assessment and management of anorexia and cachexia in palliative care. Retrieved May 23, 2022, from [email or web address]-[email or web address]-management-[email or web address]-[email or web address]-cachexia-[email or web address]-[email or web address]-care?[email or web address]&source=see_link     NICOLE Medrano, PhD, RD, [place] ELIZABETH P., PhD, RN, RAMEZ Booker MD, PhD, Alejandra SANTIZO A., MS, RD, [facility], ESVIN Garcia, MS, RD, The Academy Malnutrition Work Group, The A.S.P.E.N. Board of Directors. (2012). Consensus Statement: Academy of Nutrition and Dietetics and American Society for Parenteral and Enteral Nutrition: Characteristics Recommended for the Identification and Documentation of Adult Malnutrition (Undernutrition). Journal of Parenteral and Enteral Nutrition, 36(3), 275-283. doi:10.1177/3326205188867888     Form No. [id number]

## 2023-05-17 NOTE — ASSESSMENT & PLAN NOTE
Patient with Hypoxic Respiratory failure which is Acute.  he is not on home oxygen. Supplemental oxygen was provided and noted- Oxygen Concentration (%):  [43] 43    .   Signs/symptoms of respiratory failure include- tachypnea, increased work of breathing and use of accessory muscles. Contributing diagnoses includes - Aspiration, COPD, Pneumonia and weakness 2/2 myositis Labs and images were reviewed. Patient Has not had a recent ABG. Will treat underlying causes and adjust management of respiratory failure as follows-     Suspect aspiration and worsening weakness d/t polymyositis.   - rheum consult to restart Rituximab  Suspect aspiration pneumonitis vs pneumonia given negative infectious workup.  Continue CAP coverage for now   - completed azithro 3 day course   - rocephin 5 day course   - viral panel negative   - legionella negative.  Comfort flow, q4h NIF  Pulm toilet: cough assist CPT, 3% NS nebulizer, DuoNebs.

## 2023-05-17 NOTE — ASSESSMENT & PLAN NOTE
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 Component of chronic disease on prior iron studies. Precipitious drop during this hospitalization to 5.9 from 9.5. No blood on CIERA, absent melena, hematochezia, hematemesis, epistaxis, or other overt signs of bleeding. Hemolysis workup and CTA CAP negative. Received 2 units of blood with improvement, remains table.     - 40mg IV PPI BID  - CBC BID  - holding chemical AC, antiplatelets.

## 2023-05-17 NOTE — RESPIRATORY THERAPY
RAPID RESPONSE RESPIRATORY THERAPY PROACTIVE NOTE           Time of visit: 1355     Code Status: Full Code   : 1955  Bed: 8082/8082 A:   MRN: [id number]  Time spent at the bedside: 15 -30 min    SITUATION    Evaluated patient for: Increased WOB, SOB    BACKGROUND    Why is the patient in the hospital?: Acute hypoxemic respiratory failure    Patient has a past medical history of Aspiration pneumonia of right lower lobe, Atrial fibrillation, Chronic obstructive pulmonary disease, unspecified COPD type, Depression, Essential hypertension, Malignant (primary) neoplasm, unspecified, Microcytic anemia, Neuromuscular disorder, Other osteoporosis without current pathological fracture, Pneumonia, Polymyositis, and Tobacco abuse.    24 Hours Vitals Range:  Temp:  [97.5 °F (36.4 °C)-98.3 °F (36.8 °C)]   Pulse:  []   Resp:  [16-21]   BP: (115-131)/(64-85)   SpO2:  [93 %-99 %]     Labs:    Recent Labs     23  1919 05/15/23  0641 23  0557 23  0414   NA  --  131* 134* 136   K  --  3.8 4.0 3.3*   CL  --  101 104 102   CO2  --  20* 22* 23   CREATININE  --  0.3* 0.3* 0.3*   GLU  --  90 78 90   PHOS 2.2* 2.7 2.5*  --    MG 1.6 1.8 1.8  --         No results for input(s): PH, PCO2, PO2, HCO3, POCSATURATED, BE in the last 72 hours.    ASSESSMENT/INTERVENTIONS  Accessory mm use, SpO2 95% on 2L NC, very weak cough, coarse in the upper lobes, diminished in the lower lobes. Team beside and decided to move patient to ICU. RT assigned bedside completing treatments will transport patient.    Last VS   Temp: 97.6 °F (36.4 °C) ( 113)  Pulse: 108 ( 140)  Resp: 20 ( 140)  BP: 130/85 ( 1134)  SpO2: 95 % (1407)    Level of Consciousness: Level of Consciousness (AVPU): alert  Respiratory Effort: Respiratory Effort: Normal, Unlabored Expansion/Accessory Muscle Usage: Expansion/Accessory Muscles/Retractions: expansion symmetric, no retractions, no use of accessory muscles  All Lung Field Breath  Sounds: All Lung Fields Breath Sounds: Anterior:, Posterior:, coarse, diminished  CHRIS Breath Sounds: coarse  LLL Breath Sounds: diminished, coarse  RUL Breath Sounds: coarse  RML Breath Sounds: diminished, coarse  RLL Breath Sounds: diminished, coarse  O2 Device/Concentration: 2L NC  NIPPV: No Surgical airway: No  ETCO2 monitored:    Ambu at bedside:      Active Orders   Respiratory Care    Chest physiotherapy Q4H     Frequency: Q4H     Number of Occurrences: Until Specified     Order Questions:      Indications: COPIOUS SPUTUM PRODUCTION    Incentive spirometry     Frequency: Q4H PRN     Number of Occurrences: Until Specified    Inhalation Treatment Q4H PRN     Frequency: Q4H PRN     Number of Occurrences: Until Specified    Inhalation Treatment Q6H     Frequency: Q6H     Number of Occurrences: Until Specified    Oxygen Continuous     Frequency: Continuous     Number of Occurrences: Until Specified     Order Questions:      Device type: High flow      Device: Comfort Flow      LPM: 40      Titrate O2 per Oxygen Titration Protocol: Yes      To maintain SpO2 goal of: >= 88%      Notify MD of: Inability to achieve desired SpO2; Sudden change in patient status and requires 20% increase in FiO2; Patient requires >60% FiO2    POCT ARTERIAL BLOOD GAS Blood Gas     Frequency: Once     Number of Occurrences: 1 Occurrences     Order Comments: Notify Physician if: see parameters below.       Order Questions:      Component: Blood Gas    Pulse Oximetry Q4H     Frequency: Q4H     Number of Occurrences: Until Specified    Respiratory mechanics Once     Frequency: Once     Number of Occurrences: 1 Occurrences    SMOKING CESSATION EDUCATION PER RESPIRATORY Once     Frequency: Once     Number of Occurrences: 1 Occurrences       RECOMMENDATIONS    We recommend: RRT Recs: Continue POC per primary team.    FOLLOW-UP    Please call back the Rapid Response RT, Sena Lockhart RRT at x [phone] for any questions or concerns.

## 2023-05-17 NOTE — ASSESSMENT & PLAN NOTE
History of polymyositis/inclusion body myositis. Follows with Dr. Huston. Currently on Cellcept and prednisone (10mg QD), with sporadic adherence.    -hold Cellcept  -methylprednisolone dosed to COPD exacerbation  -Rituxan x 1 per Rheum recs

## 2023-05-17 NOTE — ASSESSMENT & PLAN NOTE
- Recent MBS showed aspiration of thin liquids. Please continue with speech therapy.   - ENT consult completed. No indication for UES dilation vs cricopharyngeal myotomy.    - Would also have nutrition chime in on his nutritional status. If he is unable to meet his requirements, can consider EGD with PEG. However, a PEG will not prevent aspiration. Continue conversations with patient if longterm nutrition warranted.    - Laryngoscopy without abnormalities. Continue with Speech.  - Can consider inpatient Rituxin to treat underlying Myositis, as this is most likely contributing to patient symptoms.

## 2023-05-17 NOTE — ASSESSMENT & PLAN NOTE
Nutrition consulted. Most recent weight and BMI monitored-     Measurements:  Wt Readings from Last 1 Encounters:   05/16/23 64.9 kg (143 lb 1.3 oz)   Body mass index is 20.53 kg/m².    Patient has been screened and assessed by RD.    Malnutrition Type:  Context: chronic illness  Level: severe    Malnutrition Characteristic Summary:  Weight Loss (Malnutrition): greater than 7.5% in 3 months  Energy Intake (Malnutrition): less than 75% for greater than or equal to 1 month  Subcutaneous Fat (Malnutrition): severe depletion  Muscle Mass (Malnutrition): severe depletion    Interventions/Recommendations (treatment strategy):  1.

## 2023-05-17 NOTE — ASSESSMENT & PLAN NOTE
Nutrition consulted. Most recent weight and BMI monitored-     Measurements:  Wt Readings from Last 1 Encounters:   05/16/23 64.9 kg (143 lb 1.3 oz)   Body mass index is 20.53 kg/m².    Patient has been screened and assessed by RD.    Malnutrition Type:  Context: chronic illness  Level: severe    Malnutrition Characteristic Summary:  Weight Loss (Malnutrition): greater than 7.5% in 3 months  Energy Intake (Malnutrition): less than 75% for greater than or equal to 1 month  Subcutaneous Fat (Malnutrition): severe depletion  Muscle Mass (Malnutrition): severe depletion    Interventions/Recommendations (treatment strategy):  1. may warrant PEG. See Dysphagia.

## 2023-05-17 NOTE — PLAN OF CARE
Problem: Infection  Goal: Absence of Infection Signs and Symptoms  Outcome: Ongoing, Progressing     Problem: Adult Inpatient Plan of Care  Goal: Plan of Care Review  Outcome: Ongoing, Progressing  Goal: Patient-Specific Goal (Individualized)  Outcome: Ongoing, Progressing  Goal: Absence of Hospital-Acquired Illness or Injury  Outcome: Ongoing, Progressing  Goal: Optimal Comfort and Wellbeing  Outcome: Ongoing, Progressing  Goal: Readiness for Transition of Care  Outcome: Ongoing, Progressing     Problem: Adjustment to Illness (Sepsis/Septic Shock)  Goal: Optimal Coping  Outcome: Ongoing, Progressing     Problem: Bleeding (Sepsis/Septic Shock)  Goal: Absence of Bleeding  Outcome: Ongoing, Progressing     Problem: Glycemic Control Impaired (Sepsis/Septic Shock)  Goal: Blood Glucose Level Within Desired Range  Outcome: Ongoing, Progressing     Problem: Infection Progression (Sepsis/Septic Shock)  Goal: Absence of Infection Signs and Symptoms  Outcome: Ongoing, Progressing     Problem: Nutrition Impaired (Sepsis/Septic Shock)  Goal: Optimal Nutrition Intake  Outcome: Ongoing, Progressing     Problem: Fluid Imbalance (Pneumonia)  Goal: Fluid Balance  Outcome: Ongoing, Progressing     Problem: Infection (Pneumonia)  Goal: Resolution of Infection Signs and Symptoms  Outcome: Ongoing, Progressing     Problem: Respiratory Compromise (Pneumonia)  Goal: Effective Oxygenation and Ventilation  Outcome: Ongoing, Progressing     Problem: Impaired Wound Healing  Goal: Optimal Wound Healing  Outcome: Ongoing, Progressing     Problem: Skin Injury Risk Increased  Goal: Skin Health and Integrity  Outcome: Ongoing, Progressing     Problem: Fall Injury Risk  Goal: Absence of Fall and Fall-Related Injury  Outcome: Ongoing, Progressing     Pt verbalized stress r/t condition and loss of independence. Emotional support given. Tolerated diet ok.

## 2023-05-17 NOTE — SUBJECTIVE & OBJECTIVE
Interval History: Pt w increased WOB throughout the night and today.  Evaled by MICU: given his muscular weakness at baseline, he is at increased risk of poor outcome so because he is tiring, they will take him.    Objective:     Vital Signs (Most Recent):  Temp: 97.6 °F (36.4 °C) (05/17/23 1134)  Pulse: 108 (05/17/23 1407)  Resp: 20 (05/17/23 1407)  BP: 130/85 (05/17/23 1134)  SpO2: 95 % (05/17/23 1407) Vital Signs (24h Range):  Temp:  [97.5 °F (36.4 °C)-98.3 °F (36.8 °C)] 97.6 °F (36.4 °C)  Pulse:  [] 108  Resp:  [16-21] 20  SpO2:  [93 %-99 %] 95 %  BP: (115-131)/(64-85) 130/85     Weight: 64.9 kg (143 lb 1.3 oz)  Body mass index is 20.53 kg/m².    Intake/Output Summary (Last 24 hours) at 5/17/2023 1408  Last data filed at 5/17/2023 0600  Gross per 24 hour   Intake 150 ml   Output 600 ml   Net -450 ml         Physical Exam  Vitals and nursing note reviewed.   Constitutional:       General: He is in acute distress.      Appearance: He is ill-appearing and toxic-appearing.   HENT:      Head: Normocephalic and atraumatic.      Nose: Nose normal. No congestion or rhinorrhea.      Mouth/Throat:      Mouth: Mucous membranes are dry.   Eyes:      Extraocular Movements: Extraocular movements intact.      Pupils: Pupils are equal, round, and reactive to light.   Cardiovascular:      Rate and Rhythm: Normal rate and regular rhythm.   Pulmonary:      Effort: Respiratory distress present.      Breath sounds: Rhonchi and rales present.   Abdominal:      Palpations: Abdomen is soft.      Tenderness: There is no abdominal tenderness. There is no guarding or rebound.      Comments: No blood on CIERA.   Musculoskeletal:      Right lower leg: No edema.      Left lower leg: No edema.   Skin:     Findings: Lesion (stage 2 sacral decubitus ulcer) present.      Comments: Fluctuant lesion over left scapula.   Neurological:      Mental Status: He is alert and oriented to person, place, and time.      Comments: 2/5 strength very  distal upper and lower extremities. 0-1/5 strength elsewhere.   Psychiatric:         Mood and Affect: Mood normal.         Behavior: Behavior normal.           Significant Labs: All pertinent labs within the past 24 hours have been reviewed.  ABGs: No results for input(s): PH, PCO2, HCO3, POCSATURATED, BE, TOTALHB, COHB, METHB, O2HB, POCFIO2, PO2 in the last 48 hours.  CBC:   Recent Labs   Lab 05/16/23  0557 05/16/23  1757 05/17/23  0844   WBC 6.88 10.73 10.86   HGB 8.0* 9.0* 9.3*   HCT 25.1* 27.6* 29.0*    163 161     CMP:   Recent Labs   Lab 05/16/23  0557 05/17/23  0414   * 136   K 4.0 3.3*    102   CO2 22* 23   GLU 78 90   BUN 10 10   CREATININE 0.3* 0.3*   CALCIUM 8.4* 8.7   PROT 5.2* 5.9*   ALBUMIN 2.1* 2.5*   BILITOT 1.1* 1.0   ALKPHOS 54* 60   AST 13 8*   ALT 11 11   ANIONGAP 8 11     Magnesium:   Recent Labs   Lab 05/16/23  0557   MG 1.8       Significant Imaging: I have reviewed all pertinent imaging results/findings within the past 24 hours.  CXR: I have reviewed all pertinent results/findings within the past 24 hours and my personal findings are:  worsening bibasilar opacities

## 2023-05-17 NOTE — SUBJECTIVE & OBJECTIVE
Past Medical History:   Diagnosis Date    Aspiration pneumonia of right lower lobe 1/15/2018    Atrial fibrillation 3/2/2015    Chronic obstructive pulmonary disease, unspecified COPD type 5/4/2022    Depression     Essential hypertension 2/27/2019    Malignant (primary) neoplasm, unspecified 5/4/2022    Microcytic anemia 9/25/2014    Neuromuscular disorder     Other osteoporosis without current pathological fracture 9/22/2022    Pneumonia     Polymyositis 2009    Tobacco abuse        Past Surgical History:   Procedure Laterality Date    COLONOSCOPY N/A 8/6/2020    Procedure: COLONOSCOPY;  Surgeon: Brandan Meyer MD;  Location: [facility] ([address] [place] [address]);  Service: Endoscopy;  Laterality: N/A;    ESOPHAGOGASTRODUODENOSCOPY N/A 8/6/2020    Procedure: EGD (ESOPHAGOGASTRODUODENOSCOPY);  Surgeon: Brandan Meyer MD;  Location: [facility] ([address] [place] [address]);  Service: Endoscopy;  Laterality: N/A;  multiple co-morbidities. will have family member for assistance.  has neuromuscular issues-needs lift. in [place]/C-unable to transfer per self     COVID test at [facility] on 8/3-GT       Immunization History   Administered Date(s) Administered    COVID-19, MRNA, LN-S, PF (Pfizer) (Gray Cap) 03/29/2022    COVID-19, MRNA, LN-S, PF (Pfizer) (Purple Cap) 05/14/2021, 06/04/2021    Influenza 10/06/2011    Influenza - Quadrivalent - PF *Preferred* (6 months and older) 12/12/2018, 10/07/2019, 11/18/2020, 11/18/2020, 10/12/2021    Influenza - Trivalent - PF (ADULT) 09/27/2012    Pneumococcal Conjugate - 13 Valent 05/09/2016    Pneumococcal Polysaccharide - 23 Valent 12/12/2018    Tdap 10/13/2021       Review of patient's allergies indicates:  No Known Allergies  Current Facility-Administered Medications   Medication Frequency    0.9%  NaCl infusion (for blood administration) Q24H PRN    acetaminophen tablet 650 mg Q8H PRN    cefTRIAXone (ROCEPHIN) 2 g in dextrose 5 % in water (D5W) 5 % 50 mL IVPB (MB+) Q24H    dextrose 10% bolus 125 mL 125 mL PRN     dextrose 10% bolus 250 mL 250 mL PRN    dextrose 40 % gel 15,000 mg PRN    dextrose 40 % gel 30,000 mg PRN    glucagon (human recombinant) injection 1 mg PRN    guaiFENesin 12 hr tablet 600 mg BID    levalbuterol nebulizer solution 1.25 mg Q6H PRN    And    ipratropium 0.02 % nebulizer solution 0.5 mg Q6H PRN    melatonin tablet 6 mg Nightly PRN    methylPREDNISolone sodium succinate injection 32 mg Daily    naloxone 0.4 mg/mL injection 0.02 mg PRN    oxyCODONE immediate release tablet 5 mg BID PRN    pantoprazole injection 40 mg BID    sodium chloride 0.9% flush 10 mL PRN    sodium chloride 0.9% flush 10 mL Q12H PRN    sodium chloride 3% nebulizer solution 4 mL Q6H     Family History       Problem Relation (Age of Onset)    Diabetes Mother, Father    Hypertension Mother, Father    Kidney disease Mother          Tobacco Use    Smoking status: Some Days     Packs/day: 0.25     Years: 20.00     Pack years: 5.00     Types: Cigarettes     Last attempt to quit: 2018     Years since quittin.3    Smokeless tobacco: Never   Substance and Sexual Activity    Alcohol use: Yes     Alcohol/week: 0.0 standard drinks    Drug use: Yes     Types: Marijuana     Comment: daily use    Sexual activity: Not on file     Review of Systems   Constitutional:  Positive for activity change, fatigue and unexpected weight change. Negative for chills.   HENT:  Positive for trouble swallowing. Negative for congestion.    Eyes: Negative.    Respiratory:  Positive for cough, choking and shortness of breath.    Cardiovascular: Negative.    Gastrointestinal: Negative.    Endocrine: Negative.    Genitourinary: Negative.    Musculoskeletal: Negative.    Skin: Negative.    Allergic/Immunologic: Negative.    Neurological:  Positive for weakness.   Hematological: Negative.    Psychiatric/Behavioral: Negative.     All other systems reviewed and are negative.  Objective:     Vital Signs (Most Recent):  Temp: 97.6 °F (36.4 °C) (23 1134)  Pulse:  (!) 114 (05/17/23 1527)  Resp: (!) 56 (05/17/23 1527)  BP: 130/85 (05/17/23 1134)  SpO2: 98 % (05/17/23 1527) Vital Signs (24h Range):  Temp:  [97.5 °F (36.4 °C)-98.3 °F (36.8 °C)] 97.6 °F (36.4 °C)  Pulse:  [] 114  Resp:  [16-56] 56  SpO2:  [93 %-99 %] 98 %  BP: (115-131)/(64-85) 130/85     Weight: 64.9 kg (143 lb 1.3 oz) (05/16/23 1342)  Body mass index is 20.53 kg/m².  Body surface area is 1.79 meters squared.      Intake/Output Summary (Last 24 hours) at 5/17/2023 1531  Last data filed at 5/17/2023 0600  Gross per 24 hour   Intake 150 ml   Output 600 ml   Net -450 ml         Physical Exam   Constitutional: He is oriented to person, place, and time. No distress.   HENT:   Head: Normocephalic and atraumatic.   Eyes: Pupils are equal, round, and reactive to light.   Cardiovascular: Normal rate and regular rhythm.   No murmur heard.  Pulmonary/Chest: He is in respiratory distress. He has no rhonchi.   Pulmonary Comments: Absent breath sounds right lower lung zones.   On 3 L this AM  Abdominal: Soft. Bowel sounds are normal. There is no abdominal tenderness.   Musculoskeletal:         General: No deformity. Normal range of motion.      Cervical back: Normal range of motion.      Comments: Global muscular atrophy. Unable to make fists. He sits hunched over and is unable to sit up straight without assistance. Neck flexion strength is 1/5. Neck extension strength is 2/5. Upper and lower extremity strength is 2/5.   Neurological: He is alert and oriented to person, place, and time. He displays weakness.   Skin: Skin is warm and dry.   Psychiatric: Affect and judgment normal.      Significant Labs:  Blood Culture: No results for input(s): LABBLOO in the last 24 hours.  BMP:   Recent Labs   Lab 05/17/23  0414   GLU 90      K 3.3*      CO2 23   BUN 10   CREATININE 0.3*   CALCIUM 8.7     CBC:   Recent Labs   Lab 05/16/23  1757 05/17/23  0844   WBC 10.73 10.86   HGB 9.0* 9.3*   HCT 27.6* 29.0*    161      CMP:   Recent Labs   Lab 05/17/23 0414   GLU 90   CALCIUM 8.7   ALBUMIN 2.5*   PROT 5.9*      K 3.3*   CO2 23      BUN 10   CREATININE 0.3*   ALKPHOS 60   ALT 11   AST 8*   BILITOT 1.0     Coagulation: No results for input(s): LABPROT, INR, APTT in the last 24 hours.  CPK: No results for input(s): CPK in the last 24 hours.  CRP: No results for input(s): CRP in the last 24 hours.  ESR: No results for input(s): SEDRATE in the last 24 hours.  Liver Function Test:   Recent Labs   Lab 05/17/23 0414   ALT 11   AST 8*   ALKPHOS 60   BILITOT 1.0   PROT 5.9*   ALBUMIN 2.5*     Peritoneal Fluid Cultures: No results for input(s): AEROBICCULTU, LABGRAM in the last 24 hours.  Uric Acid: No results for input(s): URICACID in the last 24 hours.  Urinalysis: No results for input(s): COLORU, CLARITYU, SPECGRAV, PHUR, PROTEINUA, GLUCOSEU, BILIRUBINCON, BLOODU, WBCU, RBCU, BACTERIA, MUCUS, NITRITE, LEUKOCYTESUR, UROBILINOGEN, HYALINECASTS in the last 24 hours.  Urine protein creatinine: No results for input(s): UTPCR in the last 24 hours.    Significant Imaging:  Imaging results within the past 24 hours have been reviewed.

## 2023-05-17 NOTE — SUBJECTIVE & OBJECTIVE
"Subjective:     Interval History: Followed up with patient for ongoing concerns of dysphagia. Working with speech therapy at this time. Recommended chin tuck, with each swallow/sip. Reports this is improving and even ate his dinner. Dis report that he was " choking" on the last three bites because he did not follow instructions. Last barium swallow completed in April showed aspiration of thin liquids. Vitals stable. Patient appears more short of breath this AM. Afebrile overnight. No overt signs of bleeding noted.     Review of Systems   Constitutional:  Positive for unexpected weight change. Negative for activity change and appetite change.   HENT:  Positive for trouble swallowing. Negative for sore throat.    Gastrointestinal:  Negative for abdominal distention, abdominal pain, anal bleeding, blood in stool, constipation, diarrhea, nausea, rectal pain and vomiting.   Skin:  Negative for color change and pallor.   Neurological:  Positive for weakness. Negative for dizziness.   Objective:     Vital Signs (Most Recent):  Temp: 97.9 °F (36.6 °C) (05/17/23 0725)  Pulse: 96 (05/17/23 0725)  Resp: 16 (05/17/23 0725)  BP: 131/73 (05/17/23 0725)  SpO2: (!) 93 % (05/17/23 0725) Vital Signs (24h Range):  Temp:  [97.5 °F (36.4 °C)-98.3 °F (36.8 °C)] 97.9 °F (36.6 °C)  Pulse:  [] 96  Resp:  [16-21] 16  SpO2:  [93 %-99 %] 93 %  BP: (115-131)/(64-78) 131/73     Weight: 64.9 kg (143 lb 1.3 oz) (05/16/23 1342)  Body mass index is 20.53 kg/m².      Intake/Output Summary (Last 24 hours) at 5/17/2023 0956  Last data filed at 5/17/2023 0600  Gross per 24 hour   Intake 150 ml   Output 600 ml   Net -450 ml         Lines/Drains/Airways       Drain  Duration             Male External Urinary Catheter 05/14/23 2100 Small 2 days              Peripheral Intravenous Line  Duration                  Peripheral IV - Single Lumen 05/14/23 1313 20 G Left;Posterior Wrist 2 days         Peripheral IV - Single Lumen 05/15/23 1750 20 G;1 3/4 in " Left Forearm 1 day                     Physical Exam  Vitals reviewed.   Constitutional:       General: He is not in acute distress.     Appearance: He is ill-appearing.   HENT:      Mouth/Throat:      Mouth: Mucous membranes are moist.      Pharynx: Oropharynx is clear.   Eyes:      General: No scleral icterus.  Abdominal:      General: Abdomen is flat. Bowel sounds are normal. There is no distension.      Palpations: Abdomen is soft. There is no mass.      Tenderness: There is no abdominal tenderness.      Hernia: No hernia is present.   Skin:     General: Skin is warm and dry.      Capillary Refill: Capillary refill takes less than 2 seconds.      Coloration: Skin is not jaundiced or pale.      Findings: No bruising or erythema.   Neurological:      Mental Status: He is alert and oriented to person, place, and time.      Motor: Weakness present.        Significant Labs:  CBC:   Recent Labs   Lab 05/16/23  0557 05/16/23  1757 05/17/23  0844   WBC 6.88 10.73 10.86   HGB 8.0* 9.0* 9.3*   HCT 25.1* 27.6* 29.0*    163 161       CMP:   Recent Labs   Lab 05/17/23  0414   GLU 90   CALCIUM 8.7   ALBUMIN 2.5*   PROT 5.9*      K 3.3*   CO2 23      BUN 10   CREATININE 0.3*   ALKPHOS 60   ALT 11   AST 8*   BILITOT 1.0           Significant Imaging:  Imaging results within the past 24 hours have been reviewed.    Barium swallow 4/13/2023  FINDINGS:  Pharyngeal Phase:     - Penetration/aspiration: Penetration with thin liquids as well as penetration and aspiration with nectar thickened liquids.     - Residual/static material: Vallecular and pyriform     - Strategies: Chin tuck     Esophageal Phase (if visualized): N/A     Anatomic Observations: Cervical spine degenerative changes.     Impression:     Episodes of penetration and aspiration with liquid barium products.     Please see speech pathology report for further details.     Electronically signed by resident: Emre Khan  Date:                                             04/14/2023  Time:                                           14:55    Laryngoscopy 5/17/2023  Flexible Fiberoptic Laryngoscopy:     Oropharynx: Pharyngeal wall without edema, lesions, or masses. Bilateral palatine tonsils within normal limits. Base of tongue symmetric without masses or lesions. Lingual tonsil within normal limits.  Hypopharynx: No lesions or masses noted within the piriform sinuses or post cricoid area. No pooling of secretions.  Supraglottis: There is no edema of the arytenoids, interarytenoid space or epiglottis. Epiglottis is crisp. There are no masses or lesions noted. False vocal folds without masses or lesions.  Glottis: True vocal folds without masses or lesions. Normal mobility and airway patency.     Kem King MD  PGY-5

## 2023-05-18 PROBLEM — A41.9 SEVERE SEPSIS: Status: RESOLVED | Noted: 2023-01-01 | Resolved: 2023-01-01

## 2023-05-18 PROBLEM — R65.20 SEVERE SEPSIS: Status: RESOLVED | Noted: 2023-01-01 | Resolved: 2023-01-01

## 2023-05-18 NOTE — ASSESSMENT & PLAN NOTE
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 Component of chronic disease on prior iron studies. Precipitious drop during this hospitalization to 5.9 from 9.5. No blood on CIERA, absent melena, hematochezia, hematemesis, epistaxis, or other overt signs of bleeding. Hemolysis workup and CTA CAP negative. Received 2 units of blood with improvement, remains table.     - 40mg IV PPI BID   - 5/18: low suspicion of GIB. On daily ppi  - CBC BID  - holding chemical AC, antiplatelets.

## 2023-05-18 NOTE — PT/OT/SLP EVAL
"Speech Language Pathology Evaluation  Bedside Swallow    Patient Name:  Nura Kahn Jr.   MRN:  [id number]  Admitting Diagnosis: Acute hypoxemic respiratory failure    Recommendations:                 General Recommendations:  Follow-up not indicated  Diet recommendations:  Puree, Thin  Aspiration Precautions:   MUST PERFORM CHIN TUCK AND MULTIPLE SWALLOWS FOR EVERY BITE AND SIP  Assistance with meals  1 small bite/sip at a time,   Frequent oral care,   HOB to 90 degrees and remain upright for 30 minutes-1 hour following meals,   Meds crushed in puree,   Monitor for s/s of aspiration  *Continued concerns for patient's ability to safely maintain adequate nutrition/hydration/medication  *Continued aspiration risk with all po intake despite strict precautions/strategies  General Precautions: Standard, aspiration  Communication strategies:  none  Assessment:     Nura Kahn Jr. is a 67 y.o. male with an SLP diagnosis of Dysphagia per MBSS completed in April 2023. Per MBSS, "Pt presents with moderate pharyngeal dysphagia and significant esophageal dysphagia c/b penetration of thin liquids, silent aspiration of nectar thick liquids, and severe pyriform sinus pooling 2/2 apparent UES dysfunction.  If UES function is not able to be improved with possible GI intervention, pt's ability to safely maintain nutrition/hydration appears poor.  SLP recommending that pt continue pureed diet with thin liquids via SMALL bolus while performing chin tuck technique and multiple swallows per bite/sip.  GI consultation is recommended to assess UES function. If UES function is not able to be improved through GI intervention, alternative means of nutrition/hydration/medication should be considered due to continued risk of aspiration, recurrent aspiration pneumonia, and malnutrition."    At this time, pt presents with baseline dysphagia and ongoing management per GI. Continue with conversations regarding short vs long term means of " alternative hydration and nutrition as pt's PO intake appears to be more for pleasure/enjoyment vs eating for sustinance. No additional skilled speech services required at this time.      History:     Past Medical History:   Diagnosis Date    Aspiration pneumonia of right lower lobe 1/15/2018    Atrial fibrillation 3/2/2015    Chronic obstructive pulmonary disease, unspecified COPD type 5/4/2022    Depression     Essential hypertension 2/27/2019    Malignant (primary) neoplasm, unspecified 5/4/2022    Microcytic anemia 9/25/2014    Neuromuscular disorder     Other osteoporosis without current pathological fracture 9/22/2022    Pneumonia     Polymyositis 2009    Tobacco abuse        Past Surgical History:   Procedure Laterality Date    COLONOSCOPY N/A 8/6/2020    Procedure: COLONOSCOPY;  Surgeon: Brandan Meyer MD;  Location: [facility] ENDO (2ND FLR);  Service: Endoscopy;  Laterality: N/A;    ESOPHAGOGASTRODUODENOSCOPY N/A 8/6/2020    Procedure: EGD (ESOPHAGOGASTRODUODENOSCOPY);  Surgeon: Brandan Meyer MD;  Location: [facility] ENDO (2ND FLR);  Service: Endoscopy;  Laterality: N/A;  multiple co-morbidities. will have family member for assistance.  has neuromuscular issues-needs lift. in W/C-unable to transfer per self     COVID test at [facility] on 8/3-GT       GI Note 5/15/2023: Nura Kahn Jr. is a 67 y.o. 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 GI has been consulted for dysphagia. Patient had abnormal MBS in 4/23, normal esophogram in the past. EGD in 2020 showed dilation of lower esophagus. 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   Problem List:  Dysphagia (likely pharyngoesophageal) in a patient with inclusion body myositis  Lower esophageal dilation seen on EGD (2020)  Recommendations:  - Recent MBS showed aspiration of thin liquids. Please consult speech therapy, & ENT (for  consideration of outpatient esophagoscopy with UES dilation v/s  cricopharyngeal myotomy).   - Would also have nutrition chime in on his nutritional status. If he is unable to meet his requirements, can consider EGD with PEG. However, a PEG will not prevent aspiration.  - To investigate lower esophageal dilation seen on prior EGD, the next step would be to consider a Timed barium swallow with barium column at 0, 1, 3, 5 minutes and barium 13 mm tablet (Charles protocol). We acknowledge this may be challenging to perform given mobility restrictions.      ENT note 11/1/2021: Nura Kahn Jr. is a 65 y.o. 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 He has dysphonia due to intermittent penetration of salivary secretions.  Plan:      I had a discussion with the patient regarding his condition and the further workup and management options.    I offered to take him to the OR for esophagoscopy with UES dilation. I discussed with him the risks thereof, including but not limited to damage to oral structures, pharyngoesophageal perforation, persistent/progressive symptoms, risks of general anesthesia.  He defers on such treatment at this time. I recommended adherence to his SLP treatment plan.  He will follow up with me in the future on an as-needed basis.  All questions were answered, and the patient is in agreement with the above.      Modified Barium Swallow:   4/14/2023: Impressions: Pt presents with moderate pharyngeal dysphagia and significant esophageal dysphagia c/b penetration of thin liquids, silent aspiration of nectar thick liquids, and severe pyriform sinus pooling 2/2 apparent UES dysfunction.  If UES function is not able to be improved with possible GI intervention, pt's ability to safely maintain nutrition/hydration appears poor.  SLP recommending that pt continue pureed diet with thin liquids via SMALL bolus while performing chin tuck technique and  multiple swallows per bite/sip.  GI consultation is recommended to assess UES function. If UES function is not able to be improved through GI intervention, alternative means of nutrition/hydration/medication should be considered due to continued risk of aspiration, recurrent aspiration pneumonia, and malnutrition.  Plan: SLP recommending that pt continue pureed diet with thin liquids via SMALL bolus while performing chin tuck technique and multiple swallows per bite/sip.  GI consultation is recommended to assess UES function.  If UES function is not able to be improved through GI intervention, alternative means of nutrition/hydration/medication should be considered due to continued risk of aspiration, recurrent aspiration pneumonia, and malnutrition.   10/12/2021: Impressions: The results of this MBSS indicate that patient demonstrates moderate swallowing dysfunction c/b silent aspiration of thin liquid consistencies during/after the swallow with large boluses sips or consecutive sips via straw; also with moderate-severe pharyngeal residue in pyriforms with reduced PE segment clearance.  Prognosis for improvement of swallowing with therapy is fair.  Recommendations: -Diet: recommend soft/mechanical soft diet as tolerated; thin liquids ok if taken in very small sips; crush large pills-Therapeutic technique: recommend effortful swallow, chin tuck and multiple swallow per bolus -Rec consult with Dr Webb in [facility], this study has been reviewed with him, in consideration of possible CP dilation in effort to allow improved bolus passage through PE segment and reduce pyriform sinus pooling which contributes to increased risk of aspiration-Consider dysphagia therapy following any procedure which Dr Webb may be able to provide, or dysphagia therapy alone should pt not be candidate for surgical intervention. Dysphagia therapy to address oropharyngeal strengthening, enforcing safe swallow strategies-Contact clinician  or referring provider for repeat MBSS if swallowing status changes or worsens           Chest X-Rays: 5/17/23- Bilateral effusions and associated atelectasis/consolidation.    Prior diet: puree/thin    Occupation/hobbies/homemaking: none stated    Subjective     Pt found resting in bed upon SLP entry into room with caregiver at bedside.    Patient goals: none stated     Pain/Comfort:  Pain Rating 1: 0/10  Pain Rating Post-Intervention 1: 0/10    Respiratory Status: Room air    Objective:     Oral Musculature Evaluation  Oral Musculature: general weakness  Dentition: scattered dentition, teeth in poor condition  Secretion Management: adequate  Mucosal Quality: adequate  Oral Labial Strength and Mobility: functional seal  Lingual Strength and Mobility: functional protrusion  Volitional Cough: elicited  Volitional Swallow: timely  Voice Prior to PO Intake: clear, low volume    Bedside Swallow Eval:   Consistencies Assessed:  Thin liquids - caregiver-fed via 1/8 tsp bites of applesauce  Puree - pt-fed by single straw sips of water     Oral Phase:   WFL    Pharyngeal Phase:   Slight change in vocal quality without use of chin tuck- signs of airway compromise eliminated with ongoing education and implementation with chin tuck and multiple swallows    Compensatory Strategies  Chin tuck and multiple swallows    Treatment: SLP provided ongoing extensive education regarding findings from most recent MBSS findings, SLP recommendations, importance of strict adherence to all safe swallowing precautions and strategies, silent aspiration risk, severe pyriform sinus residue, and concerns for ability to maintain adequate nutrition/hydration via PO intake alone. Pt and caregiver actively involved in education and noted that he has deferred long term means of nutrition and hydration in the past. All questions answered and pt and caregiver ultimately verbalized understanding of all skilled speech recommendations. Spoke with MICU team  regarding impressions and recommendations and MD verbalized understanding.     Goals:   Multidisciplinary Problems       SLP Goals          Problem: SLP    Goal Priority Disciplines Outcome   SLP Goal     SLP Ongoing, Progressing   Description: Short Term Goals:   1. ST will provide further education as warranted.   2. Pt will independently demonstrate 3+ safe swallowing precautions (upright for meals, small bites/sips, chin tuck, multiple swallows) given no cues.                        Plan:     Patient to be seen:  2 x/week   Plan of Care expires:     Plan of Care reviewed with:  patient, caregiver   SLP Follow-Up:  No       Discharge recommendations:  other (see comments)   Barriers to Discharge:  None    Time Tracking:     SLP Treatment Date:   05/18/23  Speech Start Time:  1427  Speech Stop Time:  1454     Speech Total Time (min):  27 min    Billable Minutes: Eval Swallow and Oral Function 8 and Self Care/Home Management Training 19 05/18/2023

## 2023-05-18 NOTE — H&P
This patient was admitted to the ICU. Please refer to Dr. Mary Beth Crockett's consult note from 5/17.    Fernanda Lockhart MD, SHERLYN  05/17/2023 9:20 PM

## 2023-05-18 NOTE — PLAN OF CARE
Clinical swallow evaluation completed. Recommend a puree diet with thin liquids and meds crushed in pudding/applesauce. Pt must complete chin tuck and multiple swallows for all bites and sips to decrease risk for aspiration. Continue ongoing discussions regarding short vs long term means of nutrition as pt remains at high risk for inability to meet caloric/hydration needs with PO intake alone. No additional skilled speech services required at this time as pt exhibits baseline swallow function.

## 2023-05-18 NOTE — PROGRESS NOTES
[facility] - Cardiac Medical ICU  Critical Care Medicine  Progress Note    Patient Name: Nura Kahn Jr.  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 and generalized weakness over the past 4 days. This has been associated with a nonproductive cough. He denies chest pain or fever.    Pt is followed by [facility], receiving IVIG and rituximab infusions. Last IVIG was 11/2021. Last rituximab was 4/4/22 and 4/29/22.  Takes MMF 1500 mg BID and prednisone 10 mg qd. Pt states his generalized weakness has worsened since getting COVID and subsequently missing his rituximab infusions.     In ED, tachycardia with MAP 64-65. He was given 30cc/kg IVF, vanc and zosyn for abx. CXR w bilateral lower lung field infiltrates, concerning for aspiration PNA. Pt placed on 4L NC, started broad spectrum antibiotics Hospital medicine consulted for admission with MICU consult to discuss ICU needs. Improved with fluid resuscitation, admitted to Hospital Medicine. Oxygen requirement improved with CAP coverage, cultures NGTD, covid negative. Required 2 units pRBC on 5/16. No evidence of bleeding, CTA negative, suspect 2/2 HbC/B Thalassemia. Hgb stable. Course c/b progressive dysphagia likely 2/2 worsening myositis while off rituxin, SLP evaluated w MBS demonstrating aspiration of thin liquids. ENT consulted, unremarkable larygnoscopy. no indication for UES dilation or cricopharyngeal myotomy. On 5/17, patient w increased WOB  "overnight and through the morning, worsening after he "overdid it" with his last meal. MICU consulted, on exam, pt w mild-mod accessory mm use, SpO2 95% on 2L NC, very weak cough, coarse upper lobes, diminished lower lobes. Tachypneic, feels SOB. Given myositis, known dysphagia, pt deemed to be high risk, concern for tiring. Transferred to MICU for airway watch, HFNC, hypertonic saline, CPT, q4 NIF.      Hospital/ICU Course:        Interval History/Significant Events: received lasix with substantial UOP overnight. Mentation improving s/p bipap.    Discussed plan of care, NGT today. Undergoing Rheumatology workup for restarting Rituximab.    WBC uptrending, escalated to HAP coverage.    Review of Systems   Constitutional:  Positive for fatigue.   HENT:  Positive for trouble swallowing.    Eyes: Negative.    Respiratory:  Positive for cough, choking and shortness of breath.    Cardiovascular: Negative.    Gastrointestinal: Negative.    Endocrine: Negative.    Genitourinary: Negative.    Musculoskeletal: Negative.    Skin: Negative.    Allergic/Immunologic: Negative.    Neurological:  Positive for weakness.   Hematological: Negative.    Psychiatric/Behavioral: Negative.     All other systems reviewed and are negative.  Objective:     Vital Signs (Most Recent):  Temp: 98.3 °F (36.8 °C) (05/18/23 0701)  Pulse: 100 (05/18/23 1419)  Resp: (!) 37 (05/18/23 1419)  BP: 107/70 (05/18/23 1400)  SpO2: 95 % (05/18/23 1419) Vital Signs (24h Range):  Temp:  [97.9 °F (36.6 °C)-98.4 °F (36.9 °C)] 98.3 °F (36.8 °C)  Pulse:  [] 100  Resp:  [18-56] 37  SpO2:  [89 %-100 %] 95 %  BP: ()/(62-90) 107/70   Weight: 64.9 kg (143 lb 1.3 oz)  Body mass index is 20.53 kg/m².      Intake/Output Summary (Last 24 hours) at 5/18/2023 1424  Last data filed at 5/18/2023 1200  Gross per 24 hour   Intake 1010.04 ml   Output 1525 ml   Net -514.96 ml          Physical Exam  Vitals and nursing note reviewed.   Constitutional:       Appearance: " He is cachectic. He is ill-appearing. He is not toxic-appearing.      Interventions: Nasal cannula in place.   HENT:      Head: Normocephalic and atraumatic.      Nose: Nose normal. No congestion or rhinorrhea.      Mouth/Throat:      Mouth: Mucous membranes are dry.   Eyes:      Extraocular Movements: Extraocular movements intact.      Pupils: Pupils are equal, round, and reactive to light.   Cardiovascular:      Rate and Rhythm: Normal rate and regular rhythm.   Pulmonary:      Effort: Respiratory distress present.      Breath sounds: Rhonchi and rales present.      Comments: Diminished sounds BL lower lobes  Abdominal:      Palpations: Abdomen is soft.      Tenderness: There is no abdominal tenderness. There is no guarding or rebound.   Musculoskeletal:      Right lower leg: No edema.      Left lower leg: No edema.   Skin:     Findings: Lesion (stage 2 sacral decubitus ulcer) present.      Comments: Fluctuant lesion over left scapula.   Neurological:      Mental Status: He is alert and oriented to person, place, and time.      Comments: 2/5 strength very distal upper and lower extremities. 0-1/5 strength elsewhere.   Psychiatric:         Mood and Affect: Mood normal.         Behavior: Behavior normal.          Vents:  Oxygen Concentration (%): 100 (off wall) (05/18/23 1400)  Lines/Drains/Airways       Drain  Duration             Male External Urinary Catheter 05/14/23 2100 Small 3 days              Peripheral Intravenous Line  Duration                  Peripheral IV - Single Lumen 05/14/23 1313 20 G Left;Posterior Wrist 4 days         Peripheral IV - Single Lumen 05/17/23 1845 20 G Right Antecubital <1 day                  Significant Labs:    CBC/Anemia Profile:  Recent Labs   Lab 05/16/23  1757 05/17/23  0844 05/18/23  0819   WBC 10.73 10.86 18.76*   HGB 9.0* 9.3* 9.6*   HCT 27.6* 29.0* 28.7*    161 165   MCV 62* 62* 61*   RDW 26.5* 26.8* 27.8*        Chemistries:  Recent Labs   Lab 05/17/23  6831  05/18/23  0558    135*   K 3.3* 4.2    99   CO2 23 25   BUN 10 13   CREATININE 0.3* 0.4*   CALCIUM 8.7 8.8   ALBUMIN 2.5*  --    PROT 5.9*  --    BILITOT 1.0  --    ALKPHOS 60  --    ALT 11  --    AST 8*  --        Blood Culture: No results for input(s): LABBLOO in the last 48 hours.  Respiratory Culture: No results for input(s): GSRESP, RESPIRATORYC in the last 48 hours.    Significant Imaging:  I have reviewed all pertinent imaging results/findings within the past 24 hours.      ABG  Recent Labs   Lab 05/17/23  1729   PH 7.278*   PO2 30*   PCO2 56.2*   HCO3 26.3   BE -1     Assessment/Plan:     Neuro  Quadriparesis (muscle weakness)  Inclusion body myositis    Pulmonary  * Acute hypoxemic respiratory failure  Patient with Hypoxic Respiratory failure which is Acute.  he is not on home oxygen. Supplemental oxygen was provided and noted- Oxygen Concentration (%):  [] 100    .   Signs/symptoms of respiratory failure include- tachypnea, increased work of breathing and use of accessory muscles. Contributing diagnoses includes - Aspiration, COPD, Pneumonia and weakness 2/2 myositis Labs and images were reviewed. Patient Has not had a recent ABG. Will treat underlying causes and adjust management of respiratory failure as follows-     Suspect aspiration and worsening weakness d/t polymyositis.   - rheum consult to restart Rituximab  Suspect aspiration pneumonitis vs pneumonia given negative infectious workup.  Completed abx course for CAP   - leukocytosis and cough worsening   - 5/18: started HAP coverage with Vanc/Zosyn   - viral panel negative   - legionella negative.  Comfort flow, q4h NIF  Pulm toilet: cough assist CPT, 3% NS nebulizer, DuoNebs.       Chronic obstructive pulmonary disease, unspecified COPD type  See AHRF    Aspiration pneumonia  See AHRF    Cardiac/Vascular  Essential hypertension  Holding home amldopine and losartan given sepsis presentation, will restart as indicated.      Renal/  Hyponatremia  Resolved. Labs suggest volume depletion hyponatremia, improved with IVF resuscitation     ID  Abscess  Left scapular abscess, underwent bedside I&D.    - dressings clean, dry, intact.   Wound care following    Immunology/Multi System  Polymyositis  See inclusion body myositis    Oncology  Beta 0 thalassemia  See anemia    Acute on chronic anemia  Chronic anemia suspected 2/2 HbC disease and Beta thalessemia. Component of chronic disease on prior iron studies. Precipitious drop during this hospitalization to 5.9 from 9.5. No blood on CIERA, absent melena, hematochezia, hematemesis, epistaxis, or other overt signs of bleeding. Hemolysis workup and CTA CAP negative. Received 2 units of blood with improvement, remains table.     - 40mg IV PPI BID   - 5/18: low suspicion of GIB. On daily ppi  - CBC BID  - holding chemical AC, antiplatelets.    Endocrine  Severe malnutrition  Nutrition consulted. Most recent weight and BMI monitored-     Measurements:  Wt Readings from Last 1 Encounters:   05/16/23 64.9 kg (143 lb 1.3 oz)   Body mass index is 20.53 kg/m².    Patient has been screened and assessed by RD.    Malnutrition Type:  Context: chronic illness  Level: severe    Malnutrition Characteristic Summary:  Weight Loss (Malnutrition): greater than 7.5% in 3 months  Energy Intake (Malnutrition): less than 75% for greater than or equal to 1 month  Subcutaneous Fat (Malnutrition): severe depletion  Muscle Mass (Malnutrition): severe depletion    Interventions/Recommendations (treatment strategy):  1. may warrant PEG. See Dysphagia.   5/18: NGT today. SLP and RD eval    GI  Hyperbilirubinemia  Resolved, possibly 2/2 HbC. Beta thalassemia     Oropharyngeal dysphagia  2/2 inclusion body myositis. Progressively worsened after Covid, missed rituximab infusions. PEG discussed with patient given malnutrition, still considering. Having recurrent aspirations, respiratory distress.    - MBSS 4/14 w aspiration  "of thin liquids  - ENT: laryngoscope, no structural pathology.   - 5/17: aspirated dinner, step up to MICU. NPO  - 5/18: NGT. Pending SLP, SHIMON meyers.    SLP following. Rheum consulted, see "polymyositis"    Orthopedic  Inclusion body myositis (IBM)  Follows w Dr. Huston at Rheumatology. On Cellcept and prednisone, endorses intermittent compliance. Was receiving rituximab infusions, but unable to since April d/t COVID and recent admission for PNA.    - Continue holding cellcept  - will complete COPD methylprednisolone course and then resume home prednisone 10mg qd  - likely contributing to worsening dysphagia, aspiration, WOB  - Rheum consulted for initiation of rituximab while inpatient.    - undergoing workup.    Other  Debility  See inclusion body myositis     History of tobacco use  Former heavy smoker, quit 4 years ago.   Occasional marijuana use       Critical Care Daily Checklist:    A: Awake: RASS Goal/Actual Goal:    Actual:     B: Spontaneous Breathing Trial Performed?     C: SAT & SBT Coordinated?  n/a                      D: Delirium: CAM-ICU Overall CAM-ICU: Negative   E: Early Mobility Performed? No   F: Feeding Goal: Goals: Meet % EEN, EPN by RD f/u date  Status: Nutrition Goal Status: new   Current Diet Order   Procedures    Diet NPO      AS: Analgesia/Sedation Morphine prn    T: Thromboembolic Prophylaxis Holding d/t anemia   H: HOB > 300 Yes   U: Stress Ulcer Prophylaxis (if needed) n/a   G: Glucose Control y   B: Bowel Function Stool Occurrence: 0   I: Indwelling Catheter (Lines & Pedro) Necessity NGT today   D: De-escalation of Antimicrobials/Pharmacotherapies Escalated to Vanc/Zosyn for HAP    Plan for the day/ETD Rheum workup, SLP    Code Status:  Family/Goals of Care: Full Code         Critical secondary to Patient has a condition that poses threat to life and bodily function: Severe Respiratory Distress      Critical care was time spent personally by me on the following activities: " development of treatment plan with patient or surrogate and bedside caregivers, discussions with consultants, evaluation of patient's response to treatment, examination of patient, ordering and performing treatments and interventions, ordering and review of laboratory studies, ordering and review of radiographic studies, pulse oximetry, re-evaluation of patient's condition. This critical care time did not overlap with that of any other provider or involve time for any procedures.     Mary Beth Crockett MD  Critical Care Medicine  [facility] - Cardiac Medical [facility]

## 2023-05-18 NOTE — ASSESSMENT & PLAN NOTE
Nutrition consulted. Most recent weight and BMI monitored-     Measurements:  Wt Readings from Last 1 Encounters:   05/16/23 64.9 kg (143 lb 1.3 oz)   Body mass index is 20.53 kg/m².    Patient has been screened and assessed by RD.    Malnutrition Type:  Context: chronic illness  Level: severe    Malnutrition Characteristic Summary:  Weight Loss (Malnutrition): greater than 7.5% in 3 months  Energy Intake (Malnutrition): less than 75% for greater than or equal to 1 month  Subcutaneous Fat (Malnutrition): severe depletion  Muscle Mass (Malnutrition): severe depletion    Interventions/Recommendations (treatment strategy):  1. may warrant PEG. See Dysphagia.   5/18: NGT today. SLP and SHIMON meyers

## 2023-05-18 NOTE — ASSESSMENT & PLAN NOTE
"2/2 inclusion body myositis. Progressively worsened after Covid, missed rituximab infusions. PEG discussed with patient given malnutrition, still considering. Having recurrent aspirations, respiratory distress.    - MBSS 4/14 w aspiration of thin liquids  - ENT: laryngoscope, no structural pathology.   - 5/17: aspirated dinner, step up to MICU. NPO  - 5/18: NGT. Pending SLP, SHIMON meyers.    SLP following. Rheum consulted, see "polymyositis"  "

## 2023-05-18 NOTE — SUBJECTIVE & OBJECTIVE
Interval History:   Stepped up to MICU with worsening hypoxic resp failure concerning for HAP. On [employer] abx now.   All questions answered,       Current Facility-Administered Medications   Medication Frequency    acetaminophen tablet 650 mg Q8H PRN    dextrose 10% bolus 125 mL 125 mL PRN    dextrose 10% bolus 250 mL 250 mL PRN    dextrose 40 % gel 15,000 mg PRN    dextrose 40 % gel 30,000 mg PRN    glucagon (human recombinant) injection 1 mg PRN    ipratropium 0.02 % nebulizer solution 0.5 mg Q6H PRN    levalbuterol nebulizer solution 0.63 mg Q6H PRN    LIDOcaine (PF) 10 mg/ml (1%) injection 10 mg Once    melatonin tablet 6 mg Nightly PRN    methylPREDNISolone sodium succinate injection 32 mg Daily    morphine injection 2 mg BID PRN    morphine injection 2 mg Once    naloxone 0.4 mg/mL injection 0.02 mg PRN    ondansetron injection 8 mg Once    [START ON 5/19/2023] pantoprazole injection 40 mg Daily    piperacillin-tazobactam (ZOSYN) 4.5 g in dextrose 5 % in water (D5W) 5 % 100 mL IVPB (MB+) Q8H    sodium chloride 0.9% flush 10 mL PRN    sodium chloride 0.9% flush 10 mL Q12H PRN    sodium chloride 3% nebulizer solution 4 mL Q6H    [START ON 5/19/2023] vancomycin (VANCOCIN) 1,000 mg in dextrose 5 % (D5W) 250 mL IVPB (Vial-Mate) Q24H    vancomycin - pharmacy to dose pharmacy to manage frequency     Objective:     Vital Signs (Most Recent):  Temp: 98.3 °F (36.8 °C) (05/18/23 0701)  Pulse: 100 (05/18/23 1419)  Resp: (!) 37 (05/18/23 1419)  BP: 107/70 (05/18/23 1400)  SpO2: 95 % (05/18/23 1419) Vital Signs (24h Range):  Temp:  [97.9 °F (36.6 °C)-98.4 °F (36.9 °C)] 98.3 °F (36.8 °C)  Pulse:  [] 100  Resp:  [18-56] 37  SpO2:  [89 %-100 %] 95 %  BP: ()/(62-90) 107/70     Weight: 64.9 kg (143 lb 1.3 oz) (05/16/23 1342)  Body mass index is 20.53 kg/m².  Body surface area is 1.79 meters squared.      Intake/Output Summary (Last 24 hours) at 5/18/2023 1501  Last data filed at 5/18/2023 1200  Gross per 24  hour   Intake 1010.04 ml   Output 1525 ml   Net -514.96 ml        Physical Exam   Constitutional: He is oriented to person, place, and time. No distress.   HENT:   Head: Normocephalic and atraumatic.   Eyes: Pupils are equal, round, and reactive to light.   Cardiovascular: Normal rate and regular rhythm.   No murmur heard.  Pulmonary/Chest: He is in respiratory distress. He has no rhonchi.   Pulmonary Comments: Absent breath sounds right lower lung zones.   On 3 L this AM  Abdominal: Soft. Bowel sounds are normal. There is no abdominal tenderness.   Musculoskeletal:         General: No deformity. Normal range of motion.      Cervical back: Normal range of motion.      Comments: Global muscular atrophy. Unable to make fists. He sits hunched over and is unable to sit up straight without assistance. Neck flexion strength is 1/5. Neck extension strength is 2/5. Upper and lower extremity strength is 2/5.   Neurological: He is alert and oriented to person, place, and time. He displays weakness.   Skin: Skin is warm and dry.   Psychiatric: Affect and judgment normal.      Significant Labs:  Blood Culture: No results for input(s): LABBLOO in the last 24 hours.  BMP:   Recent Labs   Lab 05/18/23  0558   GLU 87   *   K 4.2   CL 99   CO2 25   BUN 13   CREATININE 0.4*   CALCIUM 8.8     CBC:   Recent Labs   Lab 05/18/23  0819   WBC 18.76*   HGB 9.6*   HCT 28.7*        CMP:   Recent Labs   Lab 05/18/23  0558   GLU 87   CALCIUM 8.8   *   K 4.2   CO2 25   CL 99   BUN 13   CREATININE 0.4*     Coagulation: No results for input(s): LABPROT, INR, APTT in the last 24 hours.  CPK: No results for input(s): CPK in the last 24 hours.  CRP: No results for input(s): CRP in the last 24 hours.  ESR: No results for input(s): SEDRATE in the last 24 hours.  Liver Function Test: No results for input(s): ALT, AST, ALKPHOS, BILITOT, PROT, ALBUMIN in the last 24 hours.  Peritoneal Fluid Cultures: No results for input(s):  AEROBICCULTU, LABGRAM in the last 24 hours.  Uric Acid: No results for input(s): URICACID in the last 24 hours.  Urinalysis: No results for input(s): COLORU, CLARITYU, SPECGRAV, PHUR, PROTEINUA, GLUCOSEU, BILIRUBINCON, BLOODU, WBCU, RBCU, BACTERIA, MUCUS, NITRITE, LEUKOCYTESUR, UROBILINOGEN, HYALINECASTS in the last 24 hours.  Urine protein creatinine: No results for input(s): UTPCR in the last 24 hours.    Significant Imaging:  Imaging results within the past 24 hours have been reviewed.

## 2023-05-18 NOTE — ASSESSMENT & PLAN NOTE
Patient with Hypoxic Respiratory failure which is Acute.  he is not on home oxygen. Supplemental oxygen was provided and noted- Oxygen Concentration (%):  [] 100    .   Signs/symptoms of respiratory failure include- tachypnea, increased work of breathing and use of accessory muscles. Contributing diagnoses includes - Aspiration, COPD, Pneumonia and weakness 2/2 myositis Labs and images were reviewed. Patient Has not had a recent ABG. Will treat underlying causes and adjust management of respiratory failure as follows-     Suspect aspiration and worsening weakness d/t polymyositis.   - rheum consult to restart Rituximab  Suspect aspiration pneumonitis vs pneumonia given negative infectious workup.  Completed abx course for CAP   - leukocytosis and cough worsening   - 5/18: started HAP coverage with Vanc/Zosyn   - viral panel negative   - legionella negative.  Comfort flow, q4h NIF  Pulm toilet: cough assist CPT, 3% NS nebulizer, DuoNebs.

## 2023-05-18 NOTE — SUBJECTIVE & OBJECTIVE
Interval History/Significant Events: received lasix with substantial UOP overnight. Mentation improving s/p bipap.    Discussed plan of care, NGT today. Undergoing Rheumatology workup for restarting Rituximab.    WBC uptrending, escalated to HAP coverage.    Review of Systems   Constitutional:  Positive for fatigue.   HENT:  Positive for trouble swallowing.    Eyes: Negative.    Respiratory:  Positive for cough, choking and shortness of breath.    Cardiovascular: Negative.    Gastrointestinal: Negative.    Endocrine: Negative.    Genitourinary: Negative.    Musculoskeletal: Negative.    Skin: Negative.    Allergic/Immunologic: Negative.    Neurological:  Positive for weakness.   Hematological: Negative.    Psychiatric/Behavioral: Negative.     All other systems reviewed and are negative.  Objective:     Vital Signs (Most Recent):  Temp: 98.3 °F (36.8 °C) (05/18/23 0701)  Pulse: 100 (05/18/23 1419)  Resp: (!) 37 (05/18/23 1419)  BP: 107/70 (05/18/23 1400)  SpO2: 95 % (05/18/23 1419) Vital Signs (24h Range):  Temp:  [97.9 °F (36.6 °C)-98.4 °F (36.9 °C)] 98.3 °F (36.8 °C)  Pulse:  [] 100  Resp:  [18-56] 37  SpO2:  [89 %-100 %] 95 %  BP: ()/(62-90) 107/70   Weight: 64.9 kg (143 lb 1.3 oz)  Body mass index is 20.53 kg/m².      Intake/Output Summary (Last 24 hours) at 5/18/2023 1424  Last data filed at 5/18/2023 1200  Gross per 24 hour   Intake 1010.04 ml   Output 1525 ml   Net -514.96 ml          Physical Exam  Vitals and nursing note reviewed.   Constitutional:       Appearance: He is cachectic. He is ill-appearing. He is not toxic-appearing.      Interventions: Nasal cannula in place.   HENT:      Head: Normocephalic and atraumatic.      Nose: Nose normal. No congestion or rhinorrhea.      Mouth/Throat:      Mouth: Mucous membranes are dry.   Eyes:      Extraocular Movements: Extraocular movements intact.      Pupils: Pupils are equal, round, and reactive to light.   Cardiovascular:      Rate and Rhythm:  Normal rate and regular rhythm.   Pulmonary:      Effort: Respiratory distress present.      Breath sounds: Rhonchi and rales present.      Comments: Diminished sounds BL lower lobes  Abdominal:      Palpations: Abdomen is soft.      Tenderness: There is no abdominal tenderness. There is no guarding or rebound.   Musculoskeletal:      Right lower leg: No edema.      Left lower leg: No edema.   Skin:     Findings: Lesion (stage 2 sacral decubitus ulcer) present.      Comments: Fluctuant lesion over left scapula.   Neurological:      Mental Status: He is alert and oriented to person, place, and time.      Comments: 2/5 strength very distal upper and lower extremities. 0-1/5 strength elsewhere.   Psychiatric:         Mood and Affect: Mood normal.         Behavior: Behavior normal.          Vents:  Oxygen Concentration (%): 100 (off wall) (05/18/23 1400)  Lines/Drains/Airways       Drain  Duration             Male External Urinary Catheter 05/14/23 2100 Small 3 days              Peripheral Intravenous Line  Duration                  Peripheral IV - Single Lumen 05/14/23 1313 20 G Left;Posterior Wrist 4 days         Peripheral IV - Single Lumen 05/17/23 1845 20 G Right Antecubital <1 day                  Significant Labs:    CBC/Anemia Profile:  Recent Labs   Lab 05/16/23  1757 05/17/23  0844 05/18/23  0819   WBC 10.73 10.86 18.76*   HGB 9.0* 9.3* 9.6*   HCT 27.6* 29.0* 28.7*    161 165   MCV 62* 62* 61*   RDW 26.5* 26.8* 27.8*        Chemistries:  Recent Labs   Lab 05/17/23  0414 05/18/23  0558    135*   K 3.3* 4.2    99   CO2 23 25   BUN 10 13   CREATININE 0.3* 0.4*   CALCIUM 8.7 8.8   ALBUMIN 2.5*  --    PROT 5.9*  --    BILITOT 1.0  --    ALKPHOS 60  --    ALT 11  --    AST 8*  --        Blood Culture: No results for input(s): LABBLOO in the last 48 hours.  Respiratory Culture: No results for input(s): GSRESP, RESPIRATORYC in the last 48 hours.    Significant Imaging:  I have reviewed all pertinent  imaging results/findings within the past 24 hours.

## 2023-05-18 NOTE — CONSULTS
Pharmacokinetic Initial Assessment: IV Vancomycin    Assessment/Plan:    -Initiate vancomycin 1500 mg (23 mg/kg) IVPB x 1 followed by vancomycin 1g (15 mg/kg) IVPB Q24H  -Patient with quadriplegia/myositis, creatinine likely not a reliable indicator of renal function  -Trough 05/20 @ 1000 prior to 3rd dose    Please contact pharmacy at extension [phone] with any questions regarding this assessment.     Thank you for the consult,   Nura Corbin       Patient brief summary:  Nura Kahn Jr. is a 67 y.o. male initiated on antimicrobial therapy with IV Vancomycin for treatment of suspected lower respiratory infection    Drug Allergies:   Review of patient's allergies indicates:  No Known Allergies    Actual Body Weight:   65 kg    Renal Function:   Estimated Creatinine Clearance: 169 mL/min (A) (based on SCr of 0.4 mg/dL (L)).,     Dialysis Method (if applicable):  N/A    CBC (last 72 hours):  Recent Labs   Lab Result Units 05/15/23  2004 05/16/23  0557 05/16/23  1757 05/17/23  0844 05/18/23  0819   WBC K/uL 8.66 6.88 10.73 10.86 18.76*   Hemoglobin g/dL 7.4* 8.0* 9.0* 9.3* 9.6*   Hematocrit % 22.5* 25.1* 27.6* 29.0* 28.7*   Platelets K/uL 159 158 163 161 165   Gran % % 83.8* 85.8* 90.5* 86.3* 86.6*   Lymph % % 7.9* 9.1* 4.9* 7.6* 7.4*   Mono % % 3.2* 4.4 3.5* 5.2 5.2   Eosinophil % % 2.0 0.0 0.0 0.0 0.0   Basophil % % 0.3 0.0 0.2 0.1 0.1   Differential Method  Automated Automated Automated Automated Automated       Metabolic Panel (last 72 hours):  Recent Labs   Lab Result Units 05/16/23  0557 05/17/23  0414 05/18/23  0558   Sodium mmol/L 134* 136 135*   Potassium mmol/L 4.0 3.3* 4.2   Chloride mmol/L 104 102 99   CO2 mmol/L 22* 23 25   Glucose mg/dL 78 90 87   BUN mg/dL 10 10 13   Creatinine mg/dL 0.3* 0.3* 0.4*   Albumin g/dL 2.1* 2.5*  --    Total Bilirubin mg/dL 1.1* 1.0  --    Alkaline Phosphatase U/L 54* 60  --    AST U/L 13 8*  --    ALT U/L 11 11  --    Magnesium mg/dL 1.8  --   --    Phosphorus mg/dL 2.5*   --   --        Drug levels (last 3 results):  No results for input(s): VANCOMYCINRA, VANCORANDOM, VANCOMYCINPE, VANCOPEAK, VANCOMYCINTR, VANCOTROUGH in the last 72 hours.    Microbiologic Results:  Microbiology Results (last 7 days)       Procedure Component Value Units Date/Time    MRSA Screen by PCR [[id number]]     Order Status: No result Specimen: Nasopharyngeal Swab from Nasal     Blood culture x two cultures. Draw prior to antibiotics. [[id number]] Collected: 05/14/23 1219    Order Status: Completed Specimen: Blood from Peripheral, Forearm, Left Updated: 05/17/23 1412     Blood Culture, Routine No Growth to date      No Growth to date      No Growth to date      No Growth to date    Narrative:      Aerobic and anaerobic    Blood culture x two cultures. Draw prior to antibiotics. [[id number]] Collected: 05/14/23 1218    Order Status: Completed Specimen: Blood from Peripheral, Wrist, Left Updated: 05/17/23 1412     Blood Culture, Routine No Growth to date      No Growth to date      No Growth to date      No Growth to date    Narrative:      Aerobic and anaerobic    Culture, Respiratory with Gram Stain [[id number]]     Order Status: No result Specimen: Sputum, Expectorated

## 2023-05-18 NOTE — PLAN OF CARE
[facility] DAILY GOALS       A: Awake    RASS: Goal -    Actual - RASS (Falk Agitation-Sedation Scale): alert and calm   Restraint necessity:    B: Breathe   SBT: NA   C: Coordinate A & B, analgesics/sedatives   Pain: managed    SAT: NA  D: Delirium   CAM-ICU: Overall CAM-ICU: Negative  E: Early(intubated/ Progressive (non-intubated) Mobility   MOVE Screen: Pass   Activity: Activity Management: Rolling - L1  FAS: Feeding/Nutrition   Diet order: Diet/Nutrition Received: NPO, Specialty Diet/Nutrition Received: dysphagia pureed  T: Thrombus   DVT prophylaxis: VTE Required Core Measure: Pharmacological prophylaxis initiated/maintained  H: HOB Elevation   Head of Bed (HOB) Positioning: HOB at 20-30 degrees  U: Ulcer Prophylaxis   GI: yes  G: Glucose control   managed    S: Skin   Bathing/Skin Care: other (see comments), patient refused  Device Skin Pressure Protection: absorbent pad utilized/changed, adhesive use limited  Pressure Reduction Devices: pressure-redistributing mattress utilized, specialty bed utilized  Pressure Reduction Techniques: weight shift assistance provided, pressure points protected, positioned off wounds  Skin Protection: adhesive use limited, incontinence pads utilized, tubing/devices free from skin contact  B: Bowel Function   no issues   I: Indwelling Catheters   Pedro necessity:     CVC necessity: No  D: De-escalation Antibiotics   Yes    Family/Goals of care/Code Status   Code Status: Full Code    24H Vital Sign Range  Temp:  [98.1 °F (36.7 °C)-98.4 °F (36.9 °C)]   Pulse:  []   Resp:  [18-49]   BP: ()/(62-80)   SpO2:  [89 %-100 %]      Shift Events   No acute events throughout shift. Rheumatology workup in progress. NGT to be placed. Diet updated to include pureed foods and small sips of liquids.     VS and assessment per flow sheet, patient progressing towards goals as tolerated, plan of care reviewed with  patient and spouse , all concerns addressed, will continue to  monitor.    Greg Dutta

## 2023-05-18 NOTE — PROGRESS NOTES
[facility] - Cardiac Medical ICU  Rheumatology  Progress Note    Patient Name: Nura Kahn Jr.  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 Admitted with concerns of aspiration pna. Rheumatology consulted with concerns of worsening dysphagia secondary to ongoing myositis.   He states stable muscle weakness and atrophy up until everything got worse with COVID infection 4/2023. He has a recent hospitalization for aspiration pneumonia and COPD exacerbation (discharged on 4/16/23). Was given CAP coverage, then Unasyn, as well as prednisone. Was discharged on Augmentin to complete antibiotic course. On presentation to ED patient was started on antibiotics, steroids and Duo nebs then admitted to hospital medicine for PNA/ Sepsis workup.  Currently patient main complaint is the dysphagia which has worsened over the past 2 months c/b recurrent aspiration and hospitalizations for pna. He attributes all of his symptoms to COVID infection end of march, prior to which he was at his baseline.      Of note,   He was diagnosed with polymyositis at [facility] in 2009. Follows up with Rheumatologists, Dr. Connolly and Dr. Huston here. 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 He was getting IVIG and rituximab. Last IVIG was 11/2021. Last rituximab was 4/4/22 and 4/29/22. His rituximab was due 10/2022 and he was supposed to resume IVIG but he reports he did not get either of them. Therefore he did not follow up with his outpatient Rheumatologists. He continued to take MMF 1500 mg BID and  prednisone 10 mg chronically. However the MMF was held when he got COVID and prednisone is now increased for his COPD. He thinks his muscle disease has been about the same since his last visit with Rheumatology in 9/2022. However, as mentioned, his generalized weakness has worsened since getting COVID. He has been chair bound for some time.       Interval History:   Stepped up to MICU with worsening hypoxic resp failure concerning for HAP. On Broad spectrum abx now.   All questions answered,       Current Facility-Administered Medications   Medication Frequency    acetaminophen tablet 650 mg Q8H PRN    dextrose 10% bolus 125 mL 125 mL PRN    dextrose 10% bolus 250 mL 250 mL PRN    dextrose 40 % gel 15,000 mg PRN    dextrose 40 % gel 30,000 mg PRN    glucagon (human recombinant) injection 1 mg PRN    ipratropium 0.02 % nebulizer solution 0.5 mg Q6H PRN    levalbuterol nebulizer solution 0.63 mg Q6H PRN    LIDOcaine (PF) 10 mg/ml (1%) injection 10 mg Once    melatonin tablet 6 mg Nightly PRN    methylPREDNISolone sodium succinate injection 32 mg Daily    morphine injection 2 mg BID PRN    morphine injection 2 mg Once    naloxone 0.4 mg/mL injection 0.02 mg PRN    ondansetron injection 8 mg Once    [START ON 5/19/2023] pantoprazole injection 40 mg Daily    piperacillin-tazobactam (ZOSYN) 4.5 g in dextrose 5 % in water (D5W) 5 % 100 mL IVPB (MB+) Q8H    sodium chloride 0.9% flush 10 mL PRN    sodium chloride 0.9% flush 10 mL Q12H PRN    sodium chloride 3% nebulizer solution 4 mL Q6H    [START ON 5/19/2023] vancomycin (VANCOCIN) 1,000 mg in dextrose 5 % (D5W) 250 mL IVPB (Vial-Mate) Q24H    vancomycin - pharmacy to dose pharmacy to manage frequency     Objective:     Vital Signs (Most Recent):  Temp: 98.3 °F (36.8 °C) (05/18/23 0701)  Pulse: 100 (05/18/23 1419)  Resp: (!) 37 (05/18/23 1419)  BP: 107/70 (05/18/23 1400)  SpO2: 95 % (05/18/23 1419) Vital Signs (24h Range):  Temp:  [97.9 °F (36.6  °C)-98.4 °F (36.9 °C)] 98.3 °F (36.8 °C)  Pulse:  [] 100  Resp:  [18-56] 37  SpO2:  [89 %-100 %] 95 %  BP: ()/(62-90) 107/70     Weight: 64.9 kg (143 lb 1.3 oz) (05/16/23 1342)  Body mass index is 20.53 kg/m².  Body surface area is 1.79 meters squared.      Intake/Output Summary (Last 24 hours) at 5/18/2023 1501  Last data filed at 5/18/2023 1200  Gross per 24 hour   Intake 1010.04 ml   Output 1525 ml   Net -514.96 ml        Physical Exam   Constitutional: He is oriented to person, place, and time. No distress.   HENT:   Head: Normocephalic and atraumatic.   Eyes: Pupils are equal, round, and reactive to light.   Cardiovascular: Normal rate and regular rhythm.   No murmur heard.  Pulmonary/Chest: He is in respiratory distress. He has no rhonchi.   Pulmonary Comments: Absent breath sounds right lower lung zones.   On 3 L this AM  Abdominal: Soft. Bowel sounds are normal. There is no abdominal tenderness.   Musculoskeletal:         General: No deformity. Normal range of motion.      Cervical back: Normal range of motion.      Comments: Global muscular atrophy. Unable to make fists. He sits hunched over and is unable to sit up straight without assistance. Neck flexion strength is 1/5. Neck extension strength is 2/5. Upper and lower extremity strength is 2/5.   Neurological: He is alert and oriented to person, place, and time. He displays weakness.   Skin: Skin is warm and dry.   Psychiatric: Affect and judgment normal.      Significant Labs:  Blood Culture: No results for input(s): LABBLOO in the last 24 hours.  BMP:   Recent Labs   Lab 05/18/23  0558   GLU 87   *   K 4.2   CL 99   CO2 25   BUN 13   CREATININE 0.4*   CALCIUM 8.8     CBC:   Recent Labs   Lab 05/18/23  0819   WBC 18.76*   HGB 9.6*   HCT 28.7*        CMP:   Recent Labs   Lab 05/18/23  0558   GLU 87   CALCIUM 8.8   *   K 4.2   CO2 25   CL 99   BUN 13   CREATININE 0.4*     Coagulation: No results for input(s): LABPROT, INR,  APTT in the last 24 hours.  CPK: No results for input(s): CPK in the last 24 hours.  CRP: No results for input(s): CRP in the last 24 hours.  ESR: No results for input(s): SEDRATE in the last 24 hours.  Liver Function Test: No results for input(s): ALT, AST, ALKPHOS, BILITOT, PROT, ALBUMIN in the last 24 hours.  Peritoneal Fluid Cultures: No results for input(s): AEROBICCULTU, LABGRAM in the last 24 hours.  Uric Acid: No results for input(s): URICACID in the last 24 hours.  Urinalysis: No results for input(s): COLORU, CLARITYU, SPECGRAV, PHUR, PROTEINUA, GLUCOSEU, BILIRUBINCON, BLOODU, WBCU, RBCU, BACTERIA, MUCUS, NITRITE, LEUKOCYTESUR, UROBILINOGEN, HYALINECASTS in the last 24 hours.  Urine protein creatinine: No results for input(s): UTPCR in the last 24 hours.    Significant Imaging:  Imaging results within the past 24 hours have been reviewed.    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 He was recently discharged 4/16 after admission for aspiration pneumonia and COPD exacerbation. Rheumatology was consulted for possible Rituximab therapy in setting of worsening dysphagia and recurrent aspiration.        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 Inclusion body myositis is generally less responsive to treatment ( corticosteroids or immunosuppressive therapy). Previously his disease was stable, hard to investigate whether Rituximab and IVIG had noticeable improvement, however he continued to be clinically stable after period of therapy.  Last IVIG was 11/2021. Last rituximab was 4/4/22 and 4/29/22.   Given his recent progressive worsening dysphagia and recurrent aspiration in past 2-3 months, it may be reasonable to administer the due sessions of rituximab  and IVIG pending clearance from infection. Currently worked up for possible pna vs pneumonitis per primary.  He has been afebrile, BCX NGTD. CTA chest reviewed.         Plan:  1. We will wait for infectious clearance of current possible PNA/ sepsis per primary prior to biologic therapy. On BCABX, possible HAP per primary.   2. Pending  infectious screening ( Hep B, C, and TB) prior to rituximab  3. Pending CK, esr, CRP, Aldolase   4. He may benefit from Rituximab and IVIG treatment, we will assess after above workup   5. Continue methypred per primary   6. Please hold MMF while treating possible infection              Ortega Choudhury MD  Rheumatology  [facility] - Cardiac Medical ICU

## 2023-05-18 NOTE — PT/OT/SLP PROGRESS
Speech Language Pathology  Discharge    Nura Kahn Jr.  MRN: [id number]    Patient not seen today secondary to pt t/f to ICU 5/17. Please place new orders if further ST warranted when pt medically appropriate.     5/18/2023

## 2023-05-18 NOTE — ASSESSMENT & PLAN NOTE
Left scapular abscess, underwent bedside I&D.    - dressings clean, dry, intact.   Wound care following

## 2023-05-18 NOTE — ASSESSMENT & PLAN NOTE
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 He was recently discharged 4/16 after admission for aspiration pneumonia and COPD exacerbation. Rheumatology was consulted for possible Rituximab therapy in setting of worsening dysphagia and recurrent aspiration.        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 Inclusion body myositis is generally less responsive to treatment ( corticosteroids or immunosuppressive therapy). Previously his disease was stable, hard to investigate whether Rituximab and IVIG had noticeable improvement, however he continued to be clinically stable after period of therapy.  Last IVIG was 11/2021. Last rituximab was 4/4/22 and 4/29/22.   Given his recent progressive worsening dysphagia and recurrent aspiration in past 2-3 months, it may be reasonable to administer the due sessions of rituximab and IVIG pending clearance from infection. Currently worked up for possible pna vs pneumonitis per primary.  He has been afebrile, BCX NGTD. CTA chest reviewed.         Plan:  1. We will wait for infectious clearance of current possible PNA/ sepsis per primary prior to biologic therapy. On BCABX, possible HAP per primary.   2. Pending  infectious screening ( Hep B, C, and TB) prior to rituximab  3. Pending CK, esr, CRP, Aldolase   4. He may benefit from Rituximab and IVIG treatment, we will assess after above workup   5. Continue methypred per primary   6. Please hold MMF while treating possible infection

## 2023-05-18 NOTE — CONSULTS
"Nutrition consult received stating "malnourished, NGT".  RD following, please see note from 5/16 for full assessment - pt diagnosed w/ severe malnutrition.     Recommendations  Diet advancement per SLP. Rec'd Regular diet + Boost Plus ONS.  If unable to advance diet & TFs warranted, rec'd Isosource 1.5 @ 50 mL/hr = 1800 kcals, 82 g of protein, 917 mL fluid.  RD to monitor & follow-up.     Goals: Meet % EEN, EPN by RD f/u date  Nutrition Goal Status: new  Communication of RD Recs: reviewed with RN    Thanks!  Mariaelena MS, RD, LDN   "

## 2023-05-19 NOTE — ASSESSMENT & PLAN NOTE
Nutrition consulted. Most recent weight and BMI monitored-     Measurements:  Wt Readings from Last 1 Encounters:   05/16/23 64.9 kg (143 lb 1.3 oz)   Body mass index is 20.53 kg/m².    Patient has been screened and assessed by RD.    Malnutrition Type:  Context: chronic illness  Level: severe    Malnutrition Characteristic Summary:  Weight Loss (Malnutrition): greater than 7.5% in 3 months  Energy Intake (Malnutrition): less than 75% for greater than or equal to 1 month  Subcutaneous Fat (Malnutrition): severe depletion  Muscle Mass (Malnutrition): severe depletion    Interventions/Recommendations (treatment strategy):  1. may warrant PEG. See Dysphagia.   5/18: NGT today. SLP and RD eval  5/19: hyperglycemic following TF initation and restarting PO intake, recent steroid course. Started LDSSI

## 2023-05-19 NOTE — SUBJECTIVE & OBJECTIVE
Interval History/Significant Events: Refused Bipap overnight. Subjectively feeling much better. WBC downtrending after initiating Vanc/Zosyn yesterday. Deescalated steroids from COPD dosing to home prednisone dosage for myositis.     Discussed GOC, pt would like to pursue PEG eval and placement given uncertain improvement with potential Rituximab. Will discuss with IR.     Continuing TF with pleasure feeds PO. Discussed strict aspiration precautions with patient.     Will discuss scapular abscess debridement site with wound care, gen surg. Healing well.    Review of Systems   Constitutional:  Negative for fatigue.   HENT:  Positive for trouble swallowing.    Eyes: Negative.    Respiratory:  Positive for cough. Negative for choking and shortness of breath.    Cardiovascular: Negative.    Gastrointestinal: Negative.    Endocrine: Negative.    Genitourinary: Negative.    Musculoskeletal: Negative.    Skin: Negative.    Allergic/Immunologic: Negative.    Neurological:  Positive for weakness.   Hematological: Negative.    Psychiatric/Behavioral: Negative.     All other systems reviewed and are negative.  Objective:     Vital Signs (Most Recent):  Temp: 97.5 °F (36.4 °C) (05/19/23 0715)  Pulse: 93 (05/19/23 1400)  Resp: (!) 31 (05/19/23 1400)  BP: 112/69 (05/19/23 1400)  SpO2: 97 % (05/19/23 1400) Vital Signs (24h Range):  Temp:  [97.2 °F (36.2 °C)-97.9 °F (36.6 °C)] 97.5 °F (36.4 °C)  Pulse:  [] 93  Resp:  [20-43] 31  SpO2:  [90 %-99 %] 97 %  BP: ()/(60-81) 112/69   Weight: 64.9 kg (143 lb 1.3 oz)  Body mass index is 20.53 kg/m².      Intake/Output Summary (Last 24 hours) at 5/19/2023 1454  Last data filed at 5/19/2023 1223  Gross per 24 hour   Intake 510.88 ml   Output 865 ml   Net -354.12 ml          Physical Exam  Vitals and nursing note reviewed.   Constitutional:       Appearance: He is cachectic. He is not ill-appearing or toxic-appearing.      Interventions: Nasal cannula in place.   HENT:      Head:  Normocephalic and atraumatic.      Nose: Nose normal. No congestion or rhinorrhea.      Mouth/Throat:      Mouth: Mucous membranes are dry.   Eyes:      Extraocular Movements: Extraocular movements intact.      Pupils: Pupils are equal, round, and reactive to light.   Cardiovascular:      Rate and Rhythm: Normal rate and regular rhythm.   Pulmonary:      Effort: No respiratory distress.      Breath sounds: Rhonchi present. No rales.      Comments: Diminished sounds BL lower lobes  Abdominal:      Palpations: Abdomen is soft.      Tenderness: There is no abdominal tenderness. There is no guarding or rebound.   Musculoskeletal:      Right lower leg: No edema.      Left lower leg: No edema.   Skin:     Findings: Lesion (stage 2 sacral decubitus ulcer) present.      Comments: Fluctuant lesion over left scapula.   Neurological:      Mental Status: He is alert and oriented to person, place, and time.      Motor: Weakness (1/5 strength distal upper extremities. otherwise 0/5 strength) present.   Psychiatric:         Mood and Affect: Mood normal.         Behavior: Behavior normal.          Vents:  Oxygen Concentration (%): 100 (05/18/23 2200)  Lines/Drains/Airways       Drain  Duration             Male External Urinary Catheter 05/14/23 2100 Small 4 days         NG/OG Tube 05/18/23 1800 Right nostril <1 day              Peripheral Intravenous Line  Duration                  Peripheral IV - Single Lumen 05/14/23 1313 20 G Left;Posterior Wrist 5 days         Peripheral IV - Single Lumen 05/17/23 1845 20 G Right Antecubital 1 day                  Significant Labs:    CBC/Anemia Profile:  Recent Labs   Lab 05/18/23  0819 05/18/23  1634 05/19/23  0746   WBC 18.76* 13.50* 11.10   HGB 9.6* 8.8* 8.3*   HCT 28.7* 27.2* 26.2*    156 159   MCV 61* 63* 63*   RDW 27.8* 27.9* 28.7*        Chemistries:  Recent Labs   Lab 05/18/23  0558 05/19/23  0315   * 135*   K 4.2 3.7   CL 99 97   CO2 25 25   BUN 13 13   CREATININE 0.4*  0.3*   CALCIUM 8.8 8.4*       Significant Imaging:  I have reviewed all pertinent imaging results/findings within the past 24 hours.

## 2023-05-19 NOTE — ASSESSMENT & PLAN NOTE
Patient with Hypoxic Respiratory failure which is Acute.  he is not on home oxygen. Supplemental oxygen was provided and noted- Oxygen Concentration (%):  [100] 100    .   Signs/symptoms of respiratory failure include- tachypnea, increased work of breathing and use of accessory muscles. Contributing diagnoses includes - Aspiration, COPD, Pneumonia and weakness 2/2 myositis Labs and images were reviewed. Patient Has not had a recent ABG. Will treat underlying causes and adjust management of respiratory failure as follows-     Suspect aspiration and worsening weakness d/t polymyositis.   - rheum consult to restart Rituximab  Suspect aspiration pneumonitis vs pneumonia given negative infectious workup.  Completed abx course for CAP   - leukocytosis and cough worsening   - 5/18: started HAP coverage with Vanc/Zosyn    - improving. Will continue for now.   - viral panel negative   - legionella negative.  Comfort flow, q4h NIF  Pulm toilet: cough assist CPT, 3% NS nebulizer, Joo.

## 2023-05-19 NOTE — PLAN OF CARE
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 He was recently discharged 4/16 after admission for aspiration pneumonia and COPD exacerbation. Rheumatology was consulted for possible Rituximab therapy in setting of worsening dysphagia and recurrent aspiration.        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 Inclusion body myositis is generally less responsive to treatment ( corticosteroids or immunosuppressive therapy). Previously his disease was stable, hard to investigate whether Rituximab and IVIG had noticeable improvement, however he continued to be clinically stable after period of therapy.  Last IVIG was 11/2021. Last rituximab was 4/4/22 and 4/29/22.   Given his recent progressive worsening dysphagia and recurrent aspiration in past 2-3 months, it may be reasonable to administer the due sessions of rituximab and IVIG pending clearance from infection. Currently worked up for possible pna vs pneumonitis per primary.  He has been afebrile, BCX NGTD. CTA chest reviewed.         Plan:  We will wait for infectious clearance of current possible PNA/ sepsis per primary prior to biologic therapy. On BCABX, possible HAP per primary.   Pending  infectious screening ( Hep B, C, and TB) prior to rituximab  He may benefit from Rituximab and IVIG treatment, we will assess after above workup   Continue methypred per primary   Please hold MMF while treating possible infection

## 2023-05-19 NOTE — ASSESSMENT & PLAN NOTE
Follows w Dr. Huston at Rheumatology. On Cellcept and prednisone, endorses intermittent compliance. Was receiving rituximab infusions, but unable to since April d/t COVID and recent admission for PNA.    - Continue holding cellcept  - will complete COPD methylprednisolone course and then resume home prednisone 10mg qd   - 5/19: transitioned back to home prednisone.  - likely contributing to worsening dysphagia, aspiration, WOB  - Rheum consulted for initiation of rituximab while inpatient.    - undergoing workup.

## 2023-05-19 NOTE — SUBJECTIVE & OBJECTIVE
Interval History:    No acute event overnight. Patient and family updated. All questions answered.       Current Facility-Administered Medications   Medication Frequency    acetaminophen tablet 650 mg Q8H PRN    dextrose 10% bolus 125 mL 125 mL PRN    dextrose 10% bolus 250 mL 250 mL PRN    dextrose 40 % gel 15,000 mg PRN    dextrose 40 % gel 30,000 mg PRN    glucagon (human recombinant) injection 1 mg PRN    ipratropium 0.02 % nebulizer solution 0.5 mg Q6H PRN    levalbuterol nebulizer solution 0.63 mg Q6H PRN    melatonin tablet 6 mg Nightly PRN    morphine injection 2 mg BID PRN    naloxone 0.4 mg/mL injection 0.02 mg PRN    pantoprazole injection 40 mg Daily    piperacillin-tazobactam (ZOSYN) 4.5 g in dextrose 5 % in water (D5W) 5 % 100 mL IVPB (MB+) Q8H    polyethylene glycol packet 17 g Daily PRN    sodium chloride 0.9% flush 10 mL PRN    sodium chloride 0.9% flush 10 mL Q12H PRN    sodium chloride 3% nebulizer solution 4 mL Q6H    vancomycin (VANCOCIN) 1,000 mg in dextrose 5 % (D5W) 250 mL IVPB (Vial-Mate) Q24H    vancomycin - pharmacy to dose pharmacy to manage frequency     Objective:     Vital Signs (Most Recent):  Temp: 97.5 °F (36.4 °C) (05/19/23 0715)  Pulse: 94 (05/19/23 1229)  Resp: (!) 31 (05/19/23 1229)  BP: 112/72 (05/19/23 1200)  SpO2: 98 % (05/19/23 1229) Vital Signs (24h Range):  Temp:  [97.2 °F (36.2 °C)-97.9 °F (36.6 °C)] 97.5 °F (36.4 °C)  Pulse:  [] 94  Resp:  [20-49] 31  SpO2:  [90 %-99 %] 98 %  BP: ()/(60-81) 112/72     Weight: 64.9 kg (143 lb 1.3 oz) (05/16/23 1342)  Body mass index is 20.53 kg/m².  Body surface area is 1.79 meters squared.      Intake/Output Summary (Last 24 hours) at 5/19/2023 1319  Last data filed at 5/19/2023 1223  Gross per 24 hour   Intake 510.88 ml   Output 865 ml   Net -354.12 ml        Physical Exam   Constitutional: He is oriented to person, place, and time. No distress.   HENT:   Head: Normocephalic and atraumatic.   Eyes: Pupils are equal, round,  and reactive to light.   Cardiovascular: Normal rate and regular rhythm.   No murmur heard.  Pulmonary/Chest: No respiratory distress. He has no rhonchi.   Pulmonary Comments: Absent breath sounds right lower lung zones.   On 3 L this AM  Abdominal: Soft. Bowel sounds are normal. There is no abdominal tenderness.   Musculoskeletal:         General: No deformity. Normal range of motion.      Cervical back: Normal range of motion.      Comments: Global muscular atrophy. Unable to make fists. He sits hunched over and is unable to sit up straight without assistance. Neck flexion strength is 1/5. Neck extension strength is 2/5. Upper and lower extremity strength is 2/5.   Neurological: He is alert and oriented to person, place, and time. He displays weakness.   Skin: Skin is warm and dry.   Psychiatric: Affect and judgment normal.      Significant Labs:  Blood Culture: No results for input(s): LABBLOO in the last 24 hours.  BMP:   Recent Labs   Lab 05/19/23  0315   *   *   K 3.7   CL 97   CO2 25   BUN 13   CREATININE 0.3*   CALCIUM 8.4*     CBC:   Recent Labs   Lab 05/18/23  1634 05/19/23  0746   WBC 13.50* 11.10   HGB 8.8* 8.3*   HCT 27.2* 26.2*    159     CMP:   Recent Labs   Lab 05/19/23  0315   *   CALCIUM 8.4*   *   K 3.7   CO2 25   CL 97   BUN 13   CREATININE 0.3*     Coagulation: No results for input(s): LABPROT, INR, APTT in the last 24 hours.  CPK:   Recent Labs   Lab 05/18/23  1634   CPK <7*     CRP:   Recent Labs   Lab 05/18/23  1634   .1*     ESR:   Recent Labs   Lab 05/18/23  1634   SEDRATE 5     Liver Function Test: No results for input(s): ALT, AST, ALKPHOS, BILITOT, PROT, ALBUMIN in the last 24 hours.  Peritoneal Fluid Cultures: No results for input(s): AEROBICCULTU, LABGRAM in the last 24 hours.  Uric Acid: No results for input(s): URICACID in the last 24 hours.  Urinalysis: No results for input(s): COLORU, CLARITYU, SPECGRAV, PHUR, PROTEINUA, GLUCOSEU,  BILIRUBINCON, BLOODU, WBCU, RBCU, BACTERIA, MUCUS, NITRITE, LEUKOCYTESUR, UROBILINOGEN, HYALINECASTS in the last 24 hours.  Urine protein creatinine: No results for input(s): UTPCR in the last 24 hours.  All pertinent lab results from the last 24 hours have been reviewed.    Significant Imaging:  Imaging results within the past 24 hours have been reviewed.

## 2023-05-19 NOTE — PLAN OF CARE
[facility] DAILY GOALS       A: Awake    RASS: Goal -    Actual - RASS (Falk Agitation-Sedation Scale): alert and calm   Restraint necessity:    B: Breathe   SBT: Not intubated   C: Coordinate A & B, analgesics/sedatives   Pain: managed    SAT: Not intubated  D: Delirium   CAM-ICU: Overall CAM-ICU: Negative  E: Early(intubated/ Progressive (non-intubated) Mobility   MOVE Screen: Fail   Activity: Activity Management: Rolling - L1  FAS: Feeding/Nutrition   Diet order: Diet/Nutrition Received: tube feeding, Specialty Diet/Nutrition Received: dysphagia pureed  T: Thrombus   DVT prophylaxis: VTE Required Core Measure: Pharmacological prophylaxis initiated/maintained  H: HOB Elevation   Head of Bed (HOB) Positioning: HOB at 30-45 degrees  U: Ulcer Prophylaxis   GI: yes  G: Glucose control   managed Glycemic Management: blood glucose monitored  S: Skin   Bathing/Skin Care: other (see comments), patient refused  Device Skin Pressure Protection: absorbent pad utilized/changed, adhesive use limited, skin-to-device areas padded, skin-to-skin areas padded  Pressure Reduction Devices: pressure-redistributing mattress utilized, specialty bed utilized  Pressure Reduction Techniques: weight shift assistance provided, heels elevated off bed, positioned off wounds, pressure points protected  Skin Protection: adhesive use limited, incontinence pads utilized, silicone foam dressing in place, skin-to-device areas padded, skin-to-skin areas padded, transparent dressing maintained, tubing/devices free from skin contact  B: Bowel Function   no issues   I: Indwelling Catheters   Pedro necessity:     CVC necessity: No  D: De-escalation Antibiotics   No    Family/Goals of care/Code Status   Code Status: Full Code    24H Vital Sign Range  Temp:  [97.4 °F (36.3 °C)-98.3 °F (36.8 °C)]   Pulse:  []   Resp:  [21-49]   BP: ()/(60-81)   SpO2:  [90 %-99 %]      Shift Events   No acute events throughout shift    VS and assessment per flow  sheet, patient progressing towards goals as tolerated, plan of care reviewed with patient, all concerns addressed, will continue to monitor.    Abigail Olmos

## 2023-05-19 NOTE — SUBJECTIVE & OBJECTIVE
Subjective:     Interval History: Followed up with patient for ongoing concerns of dysphagia. Upgraded to ICU yesterday, most likely due to aspiration pneumonia. Respirstory status improved. Currently on 3L NC with adequate sats. NGT in place with trickle feeds initiated. Working with speech therapy at this time. Concerned that because of patients dysphagia and increased aspiration risk, he will not be able to meet his nutritional needs. Last barium swallow completed in April showed aspiration of thin liquids. Vitals stable. Afebrile overnight. No overt signs of bleeding noted.     Review of Systems   Constitutional:  Positive for unexpected weight change. Negative for activity change and appetite change.   HENT:  Positive for trouble swallowing. Negative for sore throat.    Gastrointestinal:  Negative for abdominal distention, abdominal pain, anal bleeding, blood in stool, constipation, diarrhea, nausea, rectal pain and vomiting.   Skin:  Negative for color change and pallor.   Neurological:  Positive for weakness. Negative for dizziness.   Objective:     Vital Signs (Most Recent):  Temp: 97.5 °F (36.4 °C) (05/19/23 0715)  Pulse: 89 (05/19/23 0803)  Resp: (!) 27 (05/19/23 0803)  BP: 110/60 (05/19/23 0803)  SpO2: 96 % (05/19/23 0803) Vital Signs (24h Range):  Temp:  [97.2 °F (36.2 °C)-97.9 °F (36.6 °C)] 97.5 °F (36.4 °C)  Pulse:  [] 89  Resp:  [21-49] 27  SpO2:  [90 %-99 %] 96 %  BP: ()/(60-81) 110/60     Weight: 64.9 kg (143 lb 1.3 oz) (05/16/23 1342)  Body mass index is 20.53 kg/m².      Intake/Output Summary (Last 24 hours) at 5/19/2023 0833  Last data filed at 5/19/2023 0727  Gross per 24 hour   Intake 509.76 ml   Output 690 ml   Net -180.24 ml         Lines/Drains/Airways       Drain  Duration             Male External Urinary Catheter 05/14/23 2100 Small 4 days         NG/OG Tube 05/18/23 1800 Right nostril <1 day              Peripheral Intravenous Line  Duration                  Peripheral IV -  Single Lumen 05/14/23 1313 20 G Left;Posterior Wrist 4 days         Peripheral IV - Single Lumen 05/17/23 1845 20 G Right Antecubital 1 day                     Physical Exam  Vitals reviewed.   Constitutional:       General: He is not in acute distress.     Appearance: He is ill-appearing.   HENT:      Mouth/Throat:      Mouth: Mucous membranes are moist.      Pharynx: Oropharynx is clear.   Eyes:      General: No scleral icterus.  Abdominal:      General: Abdomen is flat. Bowel sounds are normal. There is no distension.      Palpations: Abdomen is soft. There is no mass.      Tenderness: There is no abdominal tenderness.      Hernia: No hernia is present.   Skin:     General: Skin is warm and dry.      Capillary Refill: Capillary refill takes less than 2 seconds.      Coloration: Skin is not jaundiced or pale.      Findings: No bruising or erythema.   Neurological:      Mental Status: He is alert and oriented to person, place, and time.      Motor: Weakness present.        Significant Labs:  CBC:   Recent Labs   Lab 05/17/23  0844 05/18/23  0819 05/18/23  1634   WBC 10.86 18.76* 13.50*   HGB 9.3* 9.6* 8.8*   HCT 29.0* 28.7* 27.2*    165 156       CMP:   Recent Labs   Lab 05/19/23  0315   *   CALCIUM 8.4*   *   K 3.7   CO2 25   CL 97   BUN 13   CREATININE 0.3*           Significant Imaging:  Imaging results within the past 24 hours have been reviewed.    Barium swallow 4/13/2023  FINDINGS:  Pharyngeal Phase:     - Penetration/aspiration: Penetration with thin liquids as well as penetration and aspiration with nectar thickened liquids.     - Residual/static material: Vallecular and pyriform     - Strategies: Chin tuck     Esophageal Phase (if visualized): N/A     Anatomic Observations: Cervical spine degenerative changes.     Impression:     Episodes of penetration and aspiration with liquid barium products.     Please see speech pathology report for further details.     Electronically signed by  resident: Emre Khan  Date:                                            04/14/2023  Time:                                           14:55    Laryngoscopy 5/17/2023  Flexible Fiberoptic Laryngoscopy:     Oropharynx: Pharyngeal wall without edema, lesions, or masses. Bilateral palatine tonsils within normal limits. Base of tongue symmetric without masses or lesions. Lingual tonsil within normal limits.  Hypopharynx: No lesions or masses noted within the piriform sinuses or post cricoid area. No pooling of secretions.  Supraglottis: There is no edema of the arytenoids, interarytenoid space or epiglottis. Epiglottis is crisp. There are no masses or lesions noted. False vocal folds without masses or lesions.  Glottis: True vocal folds without masses or lesions. Normal mobility and airway patency.     Kem King MD  PGY-5

## 2023-05-19 NOTE — PROGRESS NOTES
[facility] - [facility]  Gastroenterology  Progress Note    Patient Name: Nura Kahn Jr.  MRN: [id number]  Admission Date: 5/14/2023  Hospital Length of Stay: 5 days  Code Status: Full Code   Attending Provider: Otoniel Bazan*  Consulting Provider: Mona Tyson NP  Primary Care Physician: Edna Jaramillo NP  Principal Problem: Acute hypoxemic respiratory failure    Subjective:     Interval History: Followed up with patient for ongoing concerns of dysphagia. Upgraded to ICU yesterday, most likely due to aspiration pneumonia. Respirstory status improved. Currently on 3L NC with adequate sats. NGT in place with trickle feeds initiated. Working with speech therapy at this time. Concerned that because of patients dysphagia and increased aspiration risk, he will not be able to meet his nutritional needs. Last barium swallow completed in April showed aspiration of thin liquids. Vitals stable. Afebrile overnight. No overt signs of bleeding noted.     Review of Systems   Constitutional:  Positive for unexpected weight change. Negative for activity change and appetite change.   HENT:  Positive for trouble swallowing. Negative for sore throat.    Gastrointestinal:  Negative for abdominal distention, abdominal pain, anal bleeding, blood in stool, constipation, diarrhea, nausea, rectal pain and vomiting.   Skin:  Negative for color change and pallor.   Neurological:  Positive for weakness. Negative for dizziness.   Objective:     Vital Signs (Most Recent):  Temp: 97.5 °F (36.4 °C) (05/19/23 0715)  Pulse: 89 (05/19/23 0803)  Resp: (!) 27 (05/19/23 0803)  BP: 110/60 (05/19/23 0803)  SpO2: 96 % (05/19/23 0803) Vital Signs (24h Range):  Temp:  [97.2 °F (36.2 °C)-97.9 °F (36.6 °C)] 97.5 °F (36.4 °C)  Pulse:  [] 89  Resp:  [21-49] 27  SpO2:  [90 %-99 %] 96 %  BP: ()/(60-81) 110/60     Weight: 64.9 kg (143 lb 1.3 oz) (05/16/23 1342)  Body mass index is 20.53 kg/m².      Intake/Output Summary  (Last 24 hours) at 5/19/2023 0833  Last data filed at 5/19/2023 0727  Gross per 24 hour   Intake 509.76 ml   Output 690 ml   Net -180.24 ml         Lines/Drains/Airways       Drain  Duration             Male External Urinary Catheter 05/14/23 2100 Small 4 days         NG/OG Tube 05/18/23 1800 Right nostril <1 day              Peripheral Intravenous Line  Duration                  Peripheral IV - Single Lumen 05/14/23 1313 20 G Left;Posterior Wrist 4 days         Peripheral IV - Single Lumen 05/17/23 1845 20 G Right Antecubital 1 day                     Physical Exam  Vitals reviewed.   Constitutional:       General: He is not in acute distress.     Appearance: He is ill-appearing.   HENT:      Mouth/Throat:      Mouth: Mucous membranes are moist.      Pharynx: Oropharynx is clear.   Eyes:      General: No scleral icterus.  Abdominal:      General: Abdomen is flat. Bowel sounds are normal. There is no distension.      Palpations: Abdomen is soft. There is no mass.      Tenderness: There is no abdominal tenderness.      Hernia: No hernia is present.   Skin:     General: Skin is warm and dry.      Capillary Refill: Capillary refill takes less than 2 seconds.      Coloration: Skin is not jaundiced or pale.      Findings: No bruising or erythema.   Neurological:      Mental Status: He is alert and oriented to person, place, and time.      Motor: Weakness present.        Significant Labs:  CBC:   Recent Labs   Lab 05/17/23  0844 05/18/23  0819 05/18/23  1634   WBC 10.86 18.76* 13.50*   HGB 9.3* 9.6* 8.8*   HCT 29.0* 28.7* 27.2*    165 156       CMP:   Recent Labs   Lab 05/19/23  0315   *   CALCIUM 8.4*   *   K 3.7   CO2 25   CL 97   BUN 13   CREATININE 0.3*           Significant Imaging:  Imaging results within the past 24 hours have been reviewed.    Barium swallow 4/13/2023  FINDINGS:  Pharyngeal Phase:     - Penetration/aspiration: Penetration with thin liquids as well as penetration and aspiration  with nectar thickened liquids.     - Residual/static material: Vallecular and pyriform     - Strategies: Chin tuck     Esophageal Phase (if visualized): N/A     Anatomic Observations: Cervical spine degenerative changes.     Impression:     Episodes of penetration and aspiration with liquid barium products.     Please see speech pathology report for further details.     Electronically signed by resident: Emre Khan  Date:                                            04/14/2023  Time:                                           14:55    Laryngoscopy 5/17/2023  Flexible Fiberoptic Laryngoscopy:     Oropharynx: Pharyngeal wall without edema, lesions, or masses. Bilateral palatine tonsils within normal limits. Base of tongue symmetric without masses or lesions. Lingual tonsil within normal limits.  Hypopharynx: No lesions or masses noted within the piriform sinuses or post cricoid area. No pooling of secretions.  Supraglottis: There is no edema of the arytenoids, interarytenoid space or epiglottis. Epiglottis is crisp. There are no masses or lesions noted. False vocal folds without masses or lesions.  Glottis: True vocal folds without masses or lesions. Normal mobility and airway patency.     Kem King MD  PGY-5    Assessment/Plan:     GI  Oropharyngeal dysphagia  - Recent MBS showed aspiration of thin liquids. SLP working with patient. Most likely related to underlying Myositis.  - PEG discussed with patient. Patient adamently refuses at this time. Please continue ongoing discussions and reconsult if agreeable to PEG placement, however, PEG will not prevent aspiration. Continue conversations with patient if longterm nutrition warranted.    - Can consider Palliative care consult to discuss goals of care with patient if indicated.  - Rheum consulted and considering IVIG/RTX for his treatment of myositis, pending infectious workup. Elevated WBC count currently.         Thank you for your consult. After careful  review of labs and patient current status with the GI staff and fellow, it has been decided that I will sign off. Please contact us if you have any additional questions.    Mona Tyson NP  Gastroenterology  [facility] - Cardiac Medical ICU

## 2023-05-19 NOTE — ASSESSMENT & PLAN NOTE
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 Component of chronic disease on prior iron studies. Precipitious drop during this hospitalization to 5.9 from 9.5. No blood on CIERA, absent melena, hematochezia, hematemesis, epistaxis, or other overt signs of bleeding. Hemolysis workup and CTA CAP negative. Received 2 units of blood with improvement, remains table.     - 40mg IV PPI BID   - 5/18: low suspicion of GIB. On daily ppi  - CBC BID  - holding chemical AC, antiplatelets.  - 5/19: Hgb stable, CBC qd   Pyelonephritis

## 2023-05-19 NOTE — ASSESSMENT & PLAN NOTE
- Recent MBS showed aspiration of thin liquids. SLP working with patient. Most likely related to underlying Myositis.  - PEG discussed with patient. Patient adamently refuses at this time. Please continue ongoing discussions and reconsult if agreeable to PEG placement, however, PEG will not prevent aspiration. Continue conversations with patient if longterm nutrition warranted.    - Can consider Palliative care consult to discuss goals of care with patient if indicated.  - Rheum consulted and considering IVIG/RTX for his treatment of myositis, pending infectious workup. Elevated WBC count currently.

## 2023-05-19 NOTE — ASSESSMENT & PLAN NOTE
"2/2 inclusion body myositis. Progressively worsened after Covid, missed rituximab infusions. PEG discussed with patient given malnutrition, still considering. Having recurrent aspirations, respiratory distress.    - MBSS 4/14 w aspiration of thin liquids  - ENT: laryngoscope, no structural pathology.   - 5/17: aspirated dinner, step up to MICU. NPO  - 5/18: NGT. Pending SLP, SHIMON meyers.  - 5/19: advancing TF to goal. Pureed diet for pleasure feeds. Discussing PEG with IR   - started LDSSI    SLP following. Rheum consulted, see "polymyositis"  "

## 2023-05-19 NOTE — PLAN OF CARE
[facility] - Card[facility] Medical [facility]  Discharge Reassessment    Primary Care Provider: Edna Jaramillo NP    Expected Discharge Date: 5/23/2023    Reassessment (most recent)       Discharge Reassessment - 05/19/23 1245          Discharge Reassessment    Assessment Type Discharge Planning Reassessment     Did the patient's condition or plan change since previous assessment? Yes     Discharge Plan discussed with: Patient;Spouse/sig other     Name(s) and Number(s) Mitali BARMelyterrell, caregiver and fiance'/cp# [phone]     Communicated DERICK with patient/caregiver Yes     Discharge Plan A Home   Caregiver in home with patient    Discharge Plan B Other   Mitali BAR'terrell, s/o cp# [phone]    DME Needed Upon Discharge  other (see comments)   TBD    Transition of Care Barriers None     Why the patient remains in the hospital Requires continued medical care        Post-Acute Status    Discharge Delays None known at this time                   Patient not stable for discharge at this time.  SW will continue to follow patient's progress to discharge from MICU.  SW remains available for any family concerns or needs.    Yaritza Rocha LMSW  [facility]  X [phone]

## 2023-05-19 NOTE — ASSESSMENT & PLAN NOTE
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 He was recently discharged 4/16 after admission for aspiration pneumonia and COPD exacerbation. Rheumatology was consulted for possible Rituximab therapy in setting of worsening dysphagia and recurrent aspiration.         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 Inclusion body myositis is generally less responsive to treatment ( corticosteroids or immunosuppressive therapy). Previously his disease was stable, hard to investigate whether Rituximab and IVIG had noticeable improvement, however he continued to be clinically stable after period of therapy.  Last IVIG was 11/2021. Last rituximab was 4/4/22 and 4/29/22.   Given his recent progressive worsening dysphagia and recurrent aspiration in past 2-3 months, it may be reasonable to administer the due sessions of rituximab and IVIG pending clearance from infection. Currently worked up for possible pna vs pneumonitis per primary.  He has been afebrile, BCX NGTD. CTA chest reviewed.         Plan:  1. We will wait for infectious clearance of current possible PNA/ sepsis per primary prior to biologic therapy. On BCABX, possible HAP per primary.   2. infectious screening ( Hep B, C, and TB) prior to possible rituximab  3. CK < 7, esr 5, elevated , Aldolase wnl at 6.9  4. He may benefit from Rituximab and IVIG treatment, we will assess after above workup   5. Please hold MMF while treating possible infection

## 2023-05-19 NOTE — PROGRESS NOTES
[facility] - Cardiac Medical ICU  Critical Care Medicine  Progress Note    Patient Name: Nura Kahn Jr.  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 and generalized weakness over the past 4 days. This has been associated with a nonproductive cough. He denies chest pain or fever.    Pt is followed by [facility], receiving IVIG and rituximab infusions. Last IVIG was 11/2021. Last rituximab was 4/4/22 and 4/29/22.  Takes MMF 1500 mg BID and prednisone 10 mg qd. Pt states his generalized weakness has worsened since getting COVID and subsequently missing his rituximab infusions.     In ED, tachycardia with MAP 64-65. He was given 30cc/kg IVF, vanc and zosyn for abx. CXR w bilateral lower lung field infiltrates, concerning for aspiration PNA. Pt placed on 4L NC, started broad spectrum antibiotics Hospital medicine consulted for admission with MICU consult to discuss ICU needs. Improved with fluid resuscitation, admitted to Hospital Medicine. Oxygen requirement improved with CAP coverage, cultures NGTD, covid negative. Required 2 units pRBC on 5/16. No evidence of bleeding, CTA negative, suspect 2/2 HbC/B Thalassemia. Hgb stable. Course c/b progressive dysphagia likely 2/2 worsening myositis while off rituxin, SLP evaluated w MBS demonstrating aspiration of thin liquids. ENT consulted, unremarkable larygnoscopy. no indication for UES dilation or cricopharyngeal myotomy. On 5/17, patient w increased WOB  "overnight and through the morning, worsening after he "overdid it" with his last meal. MICU consulted, on exam, pt w mild-mod accessory mm use, SpO2 95% on 2L NC, very weak cough, coarse upper lobes, diminished lower lobes. Tachypneic, feels SOB. Given myositis, known dysphagia, pt deemed to be high risk, concern for tiring. Transferred to MICU for airway watch, HFNC, hypertonic saline, CPT, q4 NIF.      Hospital/ICU Course:  Stepped up to MICU 5/17, marked respiratory acidosis, lethargy, suspected aspiration. Mentation and respiratory status improved overnight with BIPAP, diuresis, pulmonary toilet. Satting well on nasal cannula. WBC uptrending, improved with escalation to HAP coverage.      SLP evaluated, recommended diet. Following discussion, determined pt will likely not meet nutritional requirements with PO intake regardless of potential improvement with Rituximab; given aspiration risk, NGT was placed. Patient initially refused PEG evaluation, but is now agreeable.     Remains HDS, satting well on nasal cannula. Strict aspiration precautions with monitored PO intake.       Interval History/Significant Events: Refused Bipap overnight. Subjectively feeling much better. WBC downtrending after initiating Vanc/Zosyn yesterday. Deescalated steroids from COPD dosing to home prednisone dosage for myositis.     Discussed GOC, pt would like to pursue PEG eval and placement given uncertain improvement with potential Rituximab. Will discuss with IR.     Continuing TF with pleasure feeds PO. Discussed strict aspiration precautions with patient.     Will discuss scapular abscess debridement site with wound care, gen surg. Healing well.    Review of Systems   Constitutional:  Negative for fatigue.   HENT:  Positive for trouble swallowing.    Eyes: Negative.    Respiratory:  Positive for cough. Negative for choking and shortness of breath.    Cardiovascular: Negative.    Gastrointestinal: Negative.    Endocrine: Negative.  "   Genitourinary: Negative.    Musculoskeletal: Negative.    Skin: Negative.    Allergic/Immunologic: Negative.    Neurological:  Positive for weakness.   Hematological: Negative.    Psychiatric/Behavioral: Negative.     All other systems reviewed and are negative.  Objective:     Vital Signs (Most Recent):  Temp: 97.5 °F (36.4 °C) (05/19/23 0715)  Pulse: 93 (05/19/23 1400)  Resp: (!) 31 (05/19/23 1400)  BP: 112/69 (05/19/23 1400)  SpO2: 97 % (05/19/23 1400) Vital Signs (24h Range):  Temp:  [97.2 °F (36.2 °C)-97.9 °F (36.6 °C)] 97.5 °F (36.4 °C)  Pulse:  [] 93  Resp:  [20-43] 31  SpO2:  [90 %-99 %] 97 %  BP: ()/(60-81) 112/69   Weight: 64.9 kg (143 lb 1.3 oz)  Body mass index is 20.53 kg/m².      Intake/Output Summary (Last 24 hours) at 5/19/2023 1454  Last data filed at 5/19/2023 1223  Gross per 24 hour   Intake 510.88 ml   Output 865 ml   Net -354.12 ml          Physical Exam  Vitals and nursing note reviewed.   Constitutional:       Appearance: He is cachectic. He is not ill-appearing or toxic-appearing.      Interventions: Nasal cannula in place.   HENT:      Head: Normocephalic and atraumatic.      Nose: Nose normal. No congestion or rhinorrhea.      Mouth/Throat:      Mouth: Mucous membranes are dry.   Eyes:      Extraocular Movements: Extraocular movements intact.      Pupils: Pupils are equal, round, and reactive to light.   Cardiovascular:      Rate and Rhythm: Normal rate and regular rhythm.   Pulmonary:      Effort: No respiratory distress.      Breath sounds: Rhonchi present. No rales.      Comments: Diminished sounds BL lower lobes  Abdominal:      Palpations: Abdomen is soft.      Tenderness: There is no abdominal tenderness. There is no guarding or rebound.   Musculoskeletal:      Right lower leg: No edema.      Left lower leg: No edema.   Skin:     Findings: Lesion (stage 2 sacral decubitus ulcer) present.      Comments: Fluctuant lesion over left scapula.   Neurological:      Mental  Status: He is alert and oriented to person, place, and time.      Motor: Weakness (1/5 strength distal upper extremities. otherwise 0/5 strength) present.   Psychiatric:         Mood and Affect: Mood normal.         Behavior: Behavior normal.          Vents:  Oxygen Concentration (%): 100 (05/18/23 2200)  Lines/Drains/Airways       Drain  Duration             Male External Urinary Catheter 05/14/23 2100 Small 4 days         NG/OG Tube 05/18/23 1800 Right nostril <1 day              Peripheral Intravenous Line  Duration                  Peripheral IV - Single Lumen 05/14/23 1313 20 G Left;Posterior Wrist 5 days         Peripheral IV - Single Lumen 05/17/23 1845 20 G Right Antecubital 1 day                  Significant Labs:    CBC/Anemia Profile:  Recent Labs   Lab 05/18/23  0819 05/18/23  1634 05/19/23  0746   WBC 18.76* 13.50* 11.10   HGB 9.6* 8.8* 8.3*   HCT 28.7* 27.2* 26.2*    156 159   MCV 61* 63* 63*   RDW 27.8* 27.9* 28.7*        Chemistries:  Recent Labs   Lab 05/18/23  0558 05/19/23  0315   * 135*   K 4.2 3.7   CL 99 97   CO2 25 25   BUN 13 13   CREATININE 0.4* 0.3*   CALCIUM 8.8 8.4*       Significant Imaging:  I have reviewed all pertinent imaging results/findings within the past 24 hours.      ABG  Recent Labs   Lab 05/17/23  1729   PH 7.278*   PO2 30*   PCO2 56.2*   HCO3 26.3   BE -1     Assessment/Plan:     Neuro  Quadriparesis (muscle weakness)  Inclusion body myositis    Pulmonary  * Acute hypoxemic respiratory failure  Patient with Hypoxic Respiratory failure which is Acute.  he is not on home oxygen. Supplemental oxygen was provided and noted- Oxygen Concentration (%):  [100] 100    .   Signs/symptoms of respiratory failure include- tachypnea, increased work of breathing and use of accessory muscles. Contributing diagnoses includes - Aspiration, COPD, Pneumonia and weakness 2/2 myositis Labs and images were reviewed. Patient Has not had a recent ABG. Will treat underlying causes and  adjust management of respiratory failure as follows-     Suspect aspiration and worsening weakness d/t polymyositis.   - rheum consult to restart Rituximab  Suspect aspiration pneumonitis vs pneumonia given negative infectious workup.  Completed abx course for CAP   - leukocytosis and cough worsening   - 5/18: started HAP coverage with Vanc/Zosyn    - improving. Will continue for now.   - viral panel negative   - legionella negative.  Comfort flow, q4h NIF  Pulm toilet: cough assist CPT, 3% NS nebulizer, DuoNebs.       Chronic obstructive pulmonary disease, unspecified COPD type  [facility]    Aspiration pneumonia  See AHRF    Cardiac/Vascular  Essential hypertension  Holding home amldopine and losartan given sepsis presentation, will restart as indicated.     Renal/  Hyponatremia  Resolved. Labs suggest volume depletion hyponatremia, improved with IVF resuscitation     ID  Abscess  Left scapular abscess, underwent bedside I&D.    - dressings clean, dry, intact.   Wound care following    Immunology/Multi System  Polymyositis  See inclusion body myositis    Oncology  Beta 0 thalassemia  See anemia    Acute on chronic anemia  Chronic anemia suspected 2/2 HbC disease and Beta thalessemia. Component of chronic disease on prior iron studies. Precipitious drop during this hospitalization to 5.9 from 9.5. No blood on CIERA, absent melena, hematochezia, hematemesis, epistaxis, or other overt signs of bleeding. Hemolysis workup and CTA CAP negative. Received 2 units of blood with improvement, remains table.     - 40mg IV PPI BID   - 5/18: low suspicion of GIB. On daily ppi  - CBC BID  - holding chemical AC, antiplatelets.  - 5/19: Hgb stable, CBC qd    Endocrine  Severe malnutrition  Nutrition consulted. Most recent weight and BMI monitored-     Measurements:  Wt Readings from Last 1 Encounters:   05/16/23 64.9 kg (143 lb 1.3 oz)   Body mass index is 20.53 kg/m².    Patient has been screened and assessed by RD.    Malnutrition  "Type:  Context: chronic illness  Level: severe    Malnutrition Characteristic Summary:  Weight Loss (Malnutrition): greater than 7.5% in 3 months  Energy Intake (Malnutrition): less than 75% for greater than or equal to 1 month  Subcutaneous Fat (Malnutrition): severe depletion  Muscle Mass (Malnutrition): severe depletion    Interventions/Recommendations (treatment strategy):  1. may warrant PEG. See Dysphagia.   5/18: NGT today. SLP and RD eval  5/19: hyperglycemic following TF initation and restarting PO intake, recent steroid course. Started LDSSI    GI  Hyperbilirubinemia  Resolved, possibly 2/2 HbC. Beta thalassemia     Oropharyngeal dysphagia  2/2 inclusion body myositis. Progressively worsened after Covid, missed rituximab infusions. PEG discussed with patient given malnutrition, still considering. Having recurrent aspirations, respiratory distress.    - MBSS 4/14 w aspiration of thin liquids  - ENT: laryngoscope, no structural pathology.   - 5/17: aspirated dinner, step up to MICU. NPO  - 5/18: NGT. Pending SLP, SHIMON meyers.  - 5/19: advancing TF to goal. Pureed diet for pleasure feeds. Discussing PEG with IR   - started LDSSI    SLP following. Rheum consulted, see "polymyositis"    Orthopedic  Inclusion body myositis (IBM)  Follows w Dr. Huston at Rheumatology. On Cellcept and prednisone, endorses intermittent compliance. Was receiving rituximab infusions, but unable to since April d/t COVID and recent admission for PNA.    - Continue holding cellcept  - will complete COPD methylprednisolone course and then resume home prednisone 10mg qd   - 5/19: transitioned back to home prednisone.  - likely contributing to worsening dysphagia, aspiration, WOB  - Rheum consulted for initiation of rituximab while inpatient.    - undergoing workup.    Other  Debility  See inclusion body myositis     History of tobacco use  Former heavy smoker, quit 4 years ago.   Occasional marijuana use       Critical Care Daily Checklist:  "   A: Awake: RASS Goal/Actual Goal:    Actual:     B: Spontaneous Breathing Trial Performed?     C: SAT & SBT Coordinated?  n/a                      D: Delirium: CAM-ICU Overall CAM-ICU: Negative   E: Early Mobility Performed? No   F: Feeding Goal: Goals: Meet % EEN, EPN by RD f/u date  Status: Nutrition Goal Status: new   Current Diet Order   Procedures    Diet Dysphagia Pureed (IDDSI Level 4) Fluid - 1000mL; Thin     For pleasure feeds only. Strict aspiration precautions. Pt must complete chin tuck and multiple swallows for all bites and sips to decrease risk for aspiration. Meds crushed in puree, applesauce     Order Specific Question:   Fluid restriction:     Answer:   Fluid - 1000mL     Order Specific Question:   Fluid consistency:     Answer:   Thin      AS: Analgesia/Sedation Morphine PRN.    T: Thromboembolic Prophylaxis SCD   H: HOB > 300 Yes   U: Stress Ulcer Prophylaxis (if needed) y   G: Glucose Control LDSSI   B: Bowel Function Stool Occurrence: 1   I: Indwelling Catheter (Lines & Pedro) Necessity NGT 5/18  2x pIV   D: De-escalation of Antimicrobials/Pharmacotherapies Completed CAP abx course. Escalated to Vanc/Zosyn 5/18    Plan for the day/ETD stepdown    Code Status:  Family/Goals of Care: Full Code       Critical care was time spent personally by me on the following activities: development of treatment plan with patient or surrogate and bedside caregivers, discussions with consultants, evaluation of patient's response to treatment, examination of patient, ordering and performing treatments and interventions, ordering and review of laboratory studies, ordering and review of radiographic studies, pulse oximetry, re-evaluation of patient's condition. This critical care time did not overlap with that of any other provider or involve time for any procedures.     Mary Beth Crockett MD  Critical Care Medicine  [facility] [address] [facility] - Cardiac Medical ICU

## 2023-05-19 NOTE — CONSULTS
IR consulted for G tube placement - CTA reviewed which revealed no window for G tube placement.     Recommend surgery evaluation for surgical placement.    Loretta Keller PA-C  Interventional Radiology  Spectra: [phone]  5/19/2023

## 2023-05-20 NOTE — SUBJECTIVE & OBJECTIVE
Interval History/Significant Events: Worsening dyspnea overnight, pt suspects he aspirated. Paused TF. restarted bipap, Received ativan in the late morning, remains lethargic. VBG this AM WNL. WBC uptrending.    Plan for family meeting, GOC discussion. Discussing prognosis w Rheumatology and likelihood of meaningful recovery in regards to pt's wishes for his quality of life.     Review of Systems   Reason unable to perform ROS: limited: lethargic, BIPAP.   Constitutional:  Negative for fatigue.   HENT:  Positive for trouble swallowing.    Eyes: Negative.    Respiratory:  Positive for cough. Negative for choking and shortness of breath.    Cardiovascular: Negative.    Gastrointestinal: Negative.    Endocrine: Negative.    Genitourinary: Negative.    Musculoskeletal: Negative.    Skin: Negative.    Allergic/Immunologic: Negative.    Neurological:  Positive for weakness.   Hematological: Negative.    Psychiatric/Behavioral: Negative.     All other systems reviewed and are negative.  Objective:     Vital Signs (Most Recent):  Temp: 98.2 °F (36.8 °C) (05/20/23 1000)  Pulse: 110 (05/20/23 1202)  Resp: (!) 30 (05/20/23 1202)  BP: 114/78 (05/20/23 1100)  SpO2: (!) 93 % (05/20/23 1202) Vital Signs (24h Range):  Temp:  [97.5 °F (36.4 °C)-98.2 °F (36.8 °C)] 98.2 °F (36.8 °C)  Pulse:  [] 110  Resp:  [17-37] 30  SpO2:  [90 %-100 %] 93 %  BP: ()/() 114/78   Weight: 64.9 kg (143 lb 1.3 oz)  Body mass index is 20.53 kg/m².      Intake/Output Summary (Last 24 hours) at 5/20/2023 1205  Last data filed at 5/20/2023 1122  Gross per 24 hour   Intake 1673.19 ml   Output 285 ml   Net 1388.19 ml          Physical Exam  Vitals and nursing note reviewed.   Constitutional:       Appearance: He is cachectic. He is ill-appearing. He is not toxic-appearing.      Interventions: Nasal cannula in place.   HENT:      Head: Normocephalic and atraumatic.      Nose: Nose normal. No congestion or rhinorrhea.      Mouth/Throat:       Mouth: Mucous membranes are dry.   Eyes:      Extraocular Movements: Extraocular movements intact.      Pupils: Pupils are equal, round, and reactive to light.   Cardiovascular:      Rate and Rhythm: Regular rhythm. Tachycardia present.      Pulses: Normal pulses.      Heart sounds: Normal heart sounds.   Pulmonary:      Effort: Respiratory distress present.      Breath sounds: Rhonchi present. No rales.      Comments: Diminished sounds BL lower lobes  Abdominal:      General: There is no distension.      Palpations: Abdomen is soft.      Tenderness: There is no abdominal tenderness. There is no guarding or rebound.   Musculoskeletal:      Right lower leg: No edema.      Left lower leg: No edema.   Skin:     Capillary Refill: Capillary refill takes less than 2 seconds.      Findings: Lesion (stage 2 sacral decubitus ulcer) present.      Comments: Fluctuant lesion over left scapula.   Neurological:      Mental Status: He is alert and oriented to person, place, and time.      Motor: Weakness (1/5 strength distal upper extremities. otherwise 0/5 strength) present.   Psychiatric:         Mood and Affect: Mood normal.         Behavior: Behavior normal.          Vents:  Oxygen Concentration (%): 35 (05/20/23 1113)  Lines/Drains/Airways       Drain  Duration                  NG/OG Tube 05/18/23 1800 Right nostril 1 day    Male External Urinary Catheter 05/20/23 0735 Small <1 day              Peripheral Intravenous Line  Duration                  Peripheral IV - Single Lumen 05/14/23 1313 20 G Left;Posterior Wrist 5 days         Peripheral IV - Single Lumen 05/17/23 1845 20 G Right Antecubital 2 days                  Significant Labs:    CBC/Anemia Profile:  Recent Labs   Lab 05/18/23  1634 05/19/23  0746 05/20/23  0257   WBC 13.50* 11.10 15.54*   HGB 8.8* 8.3* 9.3*   HCT 27.2* 26.2* 29.2*    159 185   MCV 63* 63* 62*   RDW 27.9* 28.7* 28.7*        Chemistries:  Recent Labs   Lab 05/19/23  0315 05/20/23  0257   NA  135* 131*   K 3.7 3.5   CL 97 94*   CO2 25 30*   BUN 13 10   CREATININE 0.3* 0.3*   CALCIUM 8.4* 8.2*       Blood Culture: No results for input(s): LABBLOO in the last 48 hours.    Significant Imaging:  I have reviewed all pertinent imaging results/findings within the past 24 hours.

## 2023-05-20 NOTE — PT/OT/SLP PROGRESS
Occupational Therapy      Patient Name:  Nura Kahn Jr.   MRN:  [id number]    Patient not seen today secondary to Nurse/ LINDA hold 2* respiratory distress. Will follow-up 5/21/23 if appropriate.    5/20/2023

## 2023-05-20 NOTE — ASSESSMENT & PLAN NOTE
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 Component of chronic disease on prior iron studies. Precipitious drop during this hospitalization to 5.9 from 9.5. No blood on CIERA, absent melena, hematochezia, hematemesis, epistaxis, or other overt signs of bleeding. Hemolysis workup and CTA CAP negative. Received 2 units of blood with improvement, remains table.     - 40mg IV PPI BID   - 5/18: low suspicion of GIB. On daily ppi  - CBC BID  - holding chemical AC, antiplatelets.  - 5/19: Hgb stable, CBC qd

## 2023-05-20 NOTE — ACP (ADVANCE CARE PLANNING)
ACP Note    Extensive discussion was held with several of patient's children, who agreed to appoint his daughter, Mami Vásquez, as patient's healthcare proxy. Assessed patient, who was able to answer questions appropriately with nods and shakes and had sufficient capacity to confirm that Mrs. Vásquez is to be his proxy.    Following further discussion, it was determined that it would be consistent with patient's known wishes regarding his quality of life and functional status to designate him as DNR/DNI, with the intention to medically treat as best as possible until family could gather to bedside and reevaluate.     I personally witnessed the above; code status to DNR/DNI.    Mary Beth Crockett MD  Medical ICU

## 2023-05-20 NOTE — ASSESSMENT & PLAN NOTE
"2/2 inclusion body myositis. Progressively worsened after Covid, missed rituximab infusions. PEG discussed with patient given malnutrition, still considering. Having recurrent aspirations, respiratory distress.    - MBSS 4/14 w aspiration of thin liquids  - ENT: laryngoscope, no structural pathology.   - 5/17: aspirated dinner, step up to MICU. NPO  - 5/18: NGT. Pending SLP, SHIMON meyers.  - 5/19: advancing TF to goal. Pureed diet for pleasure feeds. Discussing PEG with IR   - started LDSSI  - 5/20: will assess options after GOC discussion, prognostication discussion with Rheum    SLP following. Rheum consulted, see "polymyositis"  "

## 2023-05-20 NOTE — PLAN OF CARE
CMICU DAILY GOALS       A: Awake    RASS: Goal - RASS Goal: 0-->alert and calm  Actual - RASS (Falk Agitation-Sedation Scale): alert and calm   Restraint necessity:    B: Breathe   SBT: Not intubated   C: Coordinate A & B, analgesics/sedatives   Pain: managed    SAT: Not intubated  D: Delirium   CAM-ICU: Overall CAM-ICU: Negative  E: Early(intubated/ Progressive (non-intubated) Mobility   MOVE Screen: Pass   Activity: Activity Management: Patient unable to perform activities  FAS: Feeding/Nutrition   Diet order: Diet/Nutrition Received: NPO, Specialty Diet/Nutrition Received: dysphagia pureed  T: Thrombus   DVT prophylaxis: VTE Required Core Measure: Pharmacological prophylaxis initiated/maintained  H: HOB Elevation   Head of Bed (HOB) Positioning: HOB at 60-90 degrees  U: Ulcer Prophylaxis   GI: yes  G: Glucose control   managed Glycemic Management: blood glucose monitored  S: Skin   Bathing/Skin Care: other (see comments), patient refused  Device Skin Pressure Protection: absorbent pad utilized/changed, adhesive use limited, positioning supports utilized, pressure points protected, skin-to-device areas padded, skin-to-skin areas padded  Pressure Reduction Devices: specialty bed utilized, pressure-redistributing mattress utilized, positioning supports utilized, heel offloading device utilized, foam padding utilized, feet on footrest/footstool  Pressure Reduction Techniques: heels elevated off bed, positioned off wounds, pressure points protected, weight shift assistance provided  Skin Protection: adhesive use limited, incontinence pads utilized, silicone foam dressing in place, skin sealant/moisture barrier applied, skin-to-device areas padded, skin-to-skin areas padded, tubing/devices free from skin contact  B: Bowel Function   no issues - incontinence  I: Indwelling Catheters   Pedro necessity:     CVC necessity: No  D: De-escalation Antibiotics   Yes    Family/Goals of care/Code Status   Code Status: Full  Code    24H Vital Sign Range  Temp:  [97.5 °F (36.4 °C)]   Pulse:  []   Resp:  [17-35]   BP: (104-156)/()   SpO2:  [91 %-100 %]      Shift Events  Pt. Remains in ICU; transfer orders canceled per MD. Pt O2 requirements increased throughout shift; Pt work of breathing progressively increased throughout shift - team called to bedside and  BIPAP settings adjusted per MD.   PRN Ativan given x1 for anxiety and agitation per MD order.  K 3.5 - potassium replaced per orders.    VS and assessment per flow sheet, patient progressing towards goals as tolerated, plan of care reviewed with patient, all concerns addressed at this time.    Guerrero Tadeo RN

## 2023-05-20 NOTE — PROGRESS NOTES
[facility] - Cardiac Medical ICU  Critical Care Medicine  Progress Note    Patient Name: Nura Kahn Jr.  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 and generalized weakness over the past 4 days. This has been associated with a nonproductive cough. He denies chest pain or fever.    Pt is followed by [facility], receiving IVIG and rituximab infusions. Last IVIG was 11/2021. Last rituximab was 4/4/22 and 4/29/22.  Takes MMF 1500 mg BID and prednisone 10 mg qd. Pt states his generalized weakness has worsened since getting COVID and subsequently missing his rituximab infusions.     In ED, tachycardia with MAP 64-65. He was given 30cc/kg IVF, vanc and zosyn for abx. CXR w bilateral lower lung field infiltrates, concerning for aspiration PNA. Pt placed on 4L NC, started broad spectrum antibiotics Hospital medicine consulted for admission with MICU consult to discuss ICU needs. Improved with fluid resuscitation, admitted to Hospital Medicine. Oxygen requirement improved with CAP coverage, cultures NGTD, covid negative. Required 2 units pRBC on 5/16. No evidence of bleeding, CTA negative, suspect 2/2 HbC/B Thalassemia. Hgb stable. Course c/b progressive dysphagia likely 2/2 worsening myositis while off rituxin, SLP evaluated w MBS demonstrating aspiration of thin liquids. ENT consulted, unremarkable larygnoscopy. no indication for UES dilation or cricopharyngeal myotomy. On 5/17, patient w increased WOB  "overnight and through the morning, worsening after he "overdid it" with his last meal. MICU consulted, on exam, pt w mild-mod accessory mm use, SpO2 95% on 2L NC, very weak cough, coarse upper lobes, diminished lower lobes. Tachypneic, feels SOB. Given myositis, known dysphagia, pt deemed to be high risk, concern for tiring. Transferred to MICU for airway watch, HFNC, hypertonic saline, CPT, q4 NIF.      Hospital/ICU Course:  Stepped up to MICU 5/17, marked respiratory acidosis, lethargy, suspected aspiration. Mentation and respiratory status improved overnight with BIPAP, diuresis, pulmonary toilet. Satting well on nasal cannula. WBC uptrending, improved with escalation to HAP coverage.      SLP evaluated, recommended diet. Following discussion, determined pt will likely not meet nutritional requirements with PO intake regardless of potential improvement with Rituximab; given aspiration risk, NGT was placed. Patient initially refused PEG evaluation, but is now agreeable.     Remains HDS, satting well on nasal cannula. Strict aspiration precautions with monitored PO intake.       Interval History/Significant Events: Worsening dyspnea overnight, pt suspects he aspirated. Paused TF. restarted bipap, Received ativan in the late morning, remains lethargic. VBG this AM WNL. WBC uptrending.    Plan for family meeting, GOC discussion. Discussing prognosis w Rheumatology and likelihood of meaningful recovery in regards to pt's wishes for his quality of life.     Review of Systems   Reason unable to perform ROS: limited: lethargic, BIPAP.   Constitutional:  Negative for fatigue.   HENT:  Positive for trouble swallowing.    Eyes: Negative.    Respiratory:  Positive for cough. Negative for choking and shortness of breath.    Cardiovascular: Negative.    Gastrointestinal: Negative.    Endocrine: Negative.    Genitourinary: Negative.    Musculoskeletal: Negative.    Skin: Negative.    Allergic/Immunologic: Negative.  "   Neurological:  Positive for weakness.   Hematological: Negative.    Psychiatric/Behavioral: Negative.     All other systems reviewed and are negative.  Objective:     Vital Signs (Most Recent):  Temp: 98.2 °F (36.8 °C) (05/20/23 1000)  Pulse: 110 (05/20/23 1202)  Resp: (!) 30 (05/20/23 1202)  BP: 114/78 (05/20/23 1100)  SpO2: (!) 93 % (05/20/23 1202) Vital Signs (24h Range):  Temp:  [97.5 °F (36.4 °C)-98.2 °F (36.8 °C)] 98.2 °F (36.8 °C)  Pulse:  [] 110  Resp:  [17-37] 30  SpO2:  [90 %-100 %] 93 %  BP: ()/() 114/78   Weight: 64.9 kg (143 lb 1.3 oz)  Body mass index is 20.53 kg/m².      Intake/Output Summary (Last 24 hours) at 5/20/2023 1205  Last data filed at 5/20/2023 1122  Gross per 24 hour   Intake 1673.19 ml   Output 285 ml   Net 1388.19 ml          Physical Exam  Vitals and nursing note reviewed.   Constitutional:       Appearance: He is cachectic. He is ill-appearing. He is not toxic-appearing.      Interventions: Nasal cannula in place.   HENT:      Head: Normocephalic and atraumatic.      Nose: Nose normal. No congestion or rhinorrhea.      Mouth/Throat:      Mouth: Mucous membranes are dry.   Eyes:      Extraocular Movements: Extraocular movements intact.      Pupils: Pupils are equal, round, and reactive to light.   Cardiovascular:      Rate and Rhythm: Regular rhythm. Tachycardia present.      Pulses: Normal pulses.      Heart sounds: Normal heart sounds.   Pulmonary:      Effort: Respiratory distress present.      Breath sounds: Rhonchi present. No rales.      Comments: Diminished sounds BL lower lobes  Abdominal:      General: There is no distension.      Palpations: Abdomen is soft.      Tenderness: There is no abdominal tenderness. There is no guarding or rebound.   Musculoskeletal:      Right lower leg: No edema.      Left lower leg: No edema.   Skin:     Capillary Refill: Capillary refill takes less than 2 seconds.      Findings: Lesion (stage 2 sacral decubitus ulcer) present.       Comments: Fluctuant lesion over left scapula.   Neurological:      Mental Status: He is alert and oriented to person, place, and time.      Motor: Weakness (1/5 strength distal upper extremities. otherwise 0/5 strength) present.   Psychiatric:         Mood and Affect: Mood normal.         Behavior: Behavior normal.          Vents:  Oxygen Concentration (%): 35 (05/20/23 1113)  Lines/Drains/Airways       Drain  Duration                  NG/OG Tube 05/18/23 1800 Right nostril 1 day    Male External Urinary Catheter 05/20/23 0735 Small <1 day              Peripheral Intravenous Line  Duration                  Peripheral IV - Single Lumen 05/14/23 1313 20 G Left;Posterior Wrist 5 days         Peripheral IV - Single Lumen 05/17/23 1845 20 G Right Antecubital 2 days                  Significant Labs:    CBC/Anemia Profile:  Recent Labs   Lab 05/18/23  1634 05/19/23  0746 05/20/23  0257   WBC 13.50* 11.10 15.54*   HGB 8.8* 8.3* 9.3*   HCT 27.2* 26.2* 29.2*    159 185   MCV 63* 63* 62*   RDW 27.9* 28.7* 28.7*        Chemistries:  Recent Labs   Lab 05/19/23  0315 05/20/23  0257   * 131*   K 3.7 3.5   CL 97 94*   CO2 25 30*   BUN 13 10   CREATININE 0.3* 0.3*   CALCIUM 8.4* 8.2*       Blood Culture: No results for input(s): LABBLOO in the last 48 hours.    Significant Imaging:  I have reviewed all pertinent imaging results/findings within the past 24 hours.      ABG  Recent Labs   Lab 05/20/23  0614   PH 7.374   PO2 44   PCO2 37.0   HCO3 21.6*   BE -4     Assessment/Plan:     Neuro  Quadriparesis (muscle weakness)  Inclusion body myositis    Pulmonary  * Acute hypoxemic respiratory failure  Patient with Hypoxic Respiratory failure which is Acute.  he is not on home oxygen. Supplemental oxygen was provided and noted- Oxygen Concentration (%):  [35-70] 35    .   Signs/symptoms of respiratory failure include- tachypnea, increased work of breathing and use of accessory muscles. Contributing diagnoses includes -  Aspiration, COPD, Pneumonia and weakness 2/2 myositis Labs and images were reviewed. Patient Has not had a recent ABG. Will treat underlying causes and adjust management of respiratory failure as follows-     Suspect aspiration and worsening weakness d/t polymyositis.   - rheum consult to restart Rituximab  Suspect aspiration pneumonitis vs pneumonia given negative infectious workup.  Completed abx course for CAP   - leukocytosis and cough worsening   - 5/18: started HAP coverage with Vanc/Zosyn    - improving. Will continue for now.   - viral panel negative   - legionella negative.  Comfort flow, q4h NIF  Pulm toilet: cough assist CPT, 3% NS nebulizer, DuoNebs.       Chronic [facility] pulmonary disease, unspecified COPD type  [facility]    Asp[facility] pneumonia  [facility]    Cardiac/Vascular  Essential hypertension  Holding home amldopine and losartan given sepsis presentation, will restart as indicated.     Renal/  Hyponatremia  Resolved. Labs suggest volume depletion hyponatremia, improved with IVF resuscitation     ID  Abscess  Left scapular abscess, underwent bedside I&D.    - dressings clean, dry, intact.   Wound care following    Immunology/Multi System  Polymyositis  See inclusion body myositis    Oncology  Beta 0 thalassemia  See anemia    Acute on chronic anemia  Chronic anemia suspected 2/2 HbC disease and Beta thalessemia. Component of chronic disease on prior iron studies. Precipitious drop during this hospitalization to 5.9 from 9.5. No blood on CIERA, absent melena, hematochezia, hematemesis, epistaxis, or other overt signs of bleeding. Hemolysis workup and CTA CAP negative. Received 2 units of blood with improvement, remains table.     - 40mg IV PPI BID   - 5/18: low suspicion of GIB. On daily ppi  - CBC BID  - holding chemical AC, antiplatelets.  - 5/19: Hgb stable, CBC qd    Endocrine  Severe malnutrition  Nutrition consulted. Most recent weight and BMI monitored-     Measurements:  Wt Readings from  "Last 1 Encounters:   05/16/23 64.9 kg (143 lb 1.3 oz)   Body mass index is 20.53 kg/m².    Patient has been screened and assessed by SHIMON.    Malnutrition Type:  Context: chronic illness  Level: severe    Malnutrition Characteristic Summary:  Weight Loss (Malnutrition): greater than 7.5% in 3 months  Energy Intake (Malnutrition): less than 75% for greater than or equal to 1 month  Subcutaneous Fat (Malnutrition): severe depletion  Muscle Mass (Malnutrition): severe depletion    Interventions/Recommendations (treatment strategy):  1. may warrant PEG. See Dysphagia.   5/18: NGT today. SLP and RD eval  5/19: hyperglycemic following TF initation and restarting PO intake, recent steroid course. Started LDSSI  5/20: recurrent aspiration events. Considering J tube. Family meeting to discuss GOC    GI  Hyperbilirubinemia  Resolved, possibly 2/2 HbC. Beta thalassemia     Oropharyngeal dysphagia  2/2 inclusion body myositis. Progressively worsened after Covid, missed rituximab infusions. PEG discussed with patient given malnutrition, still considering. Having recurrent aspirations, respiratory distress.    - MBSS 4/14 w aspiration of thin liquids  - ENT: laryngoscope, no structural pathology.   - 5/17: aspirated dinner, step up to MICU. NPO  - 5/18: NGT. Pending SLP, SHIMON meyers.  - 5/19: advancing TF to goal. Pureed diet for pleasure feeds. Discussing PEG with IR   - started LDSSI  - 5/20: will assess options after GOC discussion, prognostication discussion with Rheum    SLP following. Rheum consulted, see "polymyositis"    Orthopedic  Inclusion body myositis (IBM)  Follows w Dr. Huston at Rheumatology. On Cellcept and prednisone, endorses intermittent compliance. Was receiving rituximab infusions, but unable to since April d/t COVID and recent admission for PNA.    - Continue holding cellcept  - will complete COPD methylprednisolone course and then resume home prednisone 10mg qd   - 5/19: transitioned back to home prednisone.  - " likely contributing to worsening dysphagia, aspiration, WOB  - Rheum consulted for initiation of rituximab while inpatient.    - undergoing workup.  5/20: discussing prognosis. Family meeting     Other  Debility  See inclusion body myositis     History of tobacco use  Former heavy smoker, quit 4 years ago.   Occasional marijuana use       Critical Care Daily Checklist:    A: Awake: RASS Goal/Actual Goal: RASS Goal: 0-->alert and calm  Actual:     B: Spontaneous Breathing Trial Performed?     C: SAT & SBT Coordinated?  n/a                      D: Delirium: CAM-ICU Overall CAM-ICU: Positive   E: Early Mobility Performed? n/a   F: Feeding Goal: Goals: Meet % EEN, EPN by RD f/u date  Status: Nutrition Goal Status: new   Current Diet Order   No orders of the defined types were placed in this encounter.      AS: Analgesia/Sedation Morphine prn dyspnea   T: Thromboembolic Prophylaxis SCD   H: HOB > 300 Yes   U: Stress Ulcer Prophylaxis (if needed) n/a   G: Glucose Control y   B: Bowel Function Stool Occurrence: 1   I: Indwelling Catheter (Lines & Pedro) Necessity NGT   D: De-escalation of Antimicrobials/Pharmacotherapies HAP coverage    Plan for the day/ETD GOC, family meeting    Code Status:  Family/Goals of Care: Full Code         Critical secondary to Patient has a condition that poses threat to life and bodily function: Severe Respiratory Distress      Critical care was time spent personally by me on the following activities: development of treatment plan with patient or surrogate and bedside caregivers, discussions with consultants, evaluation of patient's response to treatment, examination of patient, ordering and performing treatments and interventions, ordering and review of laboratory studies, ordering and review of radiographic studies, pulse oximetry, re-evaluation of patient's condition. This critical care time did not overlap with that of any other provider or involve time for any procedures.     Mary Beth  MD [facility]  Critical Care Medicine  [facility] - Card[facility]

## 2023-05-20 NOTE — ASSESSMENT & PLAN NOTE
Nutrition consulted. Most recent weight and BMI monitored-     Measurements:  Wt Readings from Last 1 Encounters:   05/16/23 64.9 kg (143 lb 1.3 oz)   Body mass index is 20.53 kg/m².    Patient has been screened and assessed by RD.    Malnutrition Type:  Context: chronic illness  Level: severe    Malnutrition Characteristic Summary:  Weight Loss (Malnutrition): greater than 7.5% in 3 months  Energy Intake (Malnutrition): less than 75% for greater than or equal to 1 month  Subcutaneous Fat (Malnutrition): severe depletion  Muscle Mass (Malnutrition): severe depletion    Interventions/Recommendations (treatment strategy):  1. may warrant PEG. See Dysphagia.   5/18: NGT today. SLP and RD eval  5/19: hyperglycemic following TF initation and restarting PO intake, recent steroid course. Started LDSSI  5/20: recurrent aspiration events. Considering J tube. Family meeting to discuss GOC

## 2023-05-20 NOTE — NURSING
CMICU DAILY GOALS       A: Awake    RASS: Goal - RASS Goal: 0-->alert and calm  Actual - RASS (Falk Agitation-Sedation Scale): alert and calm   Restraint necessity:    B: Breathe   SBT: Not intubated   C: Coordinate A & B, analgesics/sedatives   Pain: managed    SAT: Not intubated  D: Delirium   CAM-ICU: Overall CAM-ICU: Positive  E: Early(intubated/ Progressive (non-intubated) Mobility   MOVE Screen: Fail   Activity: Activity Management: Patient unable to perform activities  FAS: Feeding/Nutrition   Diet order: Diet/Nutrition Received: NPO, Specialty Diet/Nutrition Received: dysphagia pureed  T: Thrombus   DVT prophylaxis: VTE Required Core Measure: Pharmacological prophylaxis initiated/maintained  H: HOB Elevation   Head of Bed (HOB) Positioning: HOB at 60 degrees  U: Ulcer Prophylaxis   GI: yes  G: Glucose control   managed Glycemic Management: blood glucose monitored  S: Skin   Bathing/Skin Care: bath, partial, incontinence care, linen changed  Device Skin Pressure Protection: absorbent pad utilized/changed  Pressure Reduction Devices: heel offloading device utilized  Pressure Reduction Techniques: heels elevated off bed  Skin Protection: adhesive use limited  B: Bowel Function   no issues   I: Indwelling Catheters   Pedro necessity:     CVC necessity: No  D: De-escalation Antibiotics   No    Family/Goals of care/Code Status   Code Status: DNR    24H Vital Sign Range  Temp:  [97.5 °F (36.4 °C)-98.2 °F (36.8 °C)]   Pulse:  []   Resp:  [17-38]   BP: ()/()   SpO2:  [90 %-99 %]      Shift Events   Pt remained on BIPAP since start of shift. Successfully weaned FiO2 to 35% to maintain goal sats of 88-92%. Pt lethargic all throughout shift; arousable most times only to repeated minor stimulation. Trialed 3LNC twice when pt was more awake. Pt lasted only a few minutes before      VS and assessment per flow sheet, patient progressing towards goals as tolerated, plan of care reviewed with {POC  Review:48518}, all concerns addressed, will continue to monitor.    Adina Hernandez

## 2023-05-20 NOTE — ASSESSMENT & PLAN NOTE
Patient with Hypoxic Respiratory failure which is Acute.  he is not on home oxygen. Supplemental oxygen was provided and noted- Oxygen Concentration (%):  [35-70] 35    .   Signs/symptoms of respiratory failure include- tachypnea, increased work of breathing and use of accessory muscles. Contributing diagnoses includes - Aspiration, COPD, Pneumonia and weakness 2/2 myositis Labs and images were reviewed. Patient Has not had a recent ABG. Will treat underlying causes and adjust management of respiratory failure as follows-     Suspect aspiration and worsening weakness d/t polymyositis.   - rheum consult to restart Rituximab  Suspect aspiration pneumonitis vs pneumonia given negative infectious workup.  Completed abx course for CAP   - leukocytosis and cough worsening   - 5/18: started HAP coverage with Vanc/Zosyn    - improving. Will continue for now.   - viral panel negative   - legionella negative.  Comfort flow, q4h NIF  Pulm toilet: cough assist CPT, 3% NS nebulizer, Joo.

## 2023-05-20 NOTE — PROGRESS NOTES
Pharmacokinetic Assessment: IV Vancomycin    Assessment/Plan:    - Trough level is subtherapeutic at 4.6 mcg/mL  - Goal trough  10-20  mcg/mL  - Patient with quadriplegia/myositis, creatinine likely not a reliable indicator of renal function.   - With good UOP, I will increase dosing from 1000 mg q24h to 750 mg q12h  - Collect trough on 5/22 at 0900, or sooner if clinically indicated  - MRSA PCR nares negative, consider discontinuation of vanc    Drug levels (last 3 results):  Recent Labs   Lab Result Units 05/20/23  0959   Vancomycin-Trough ug/mL 4.6*       Pharmacy will continue to follow and monitor vancomycin.    Please contact pharmacy at extension [phone] for questions regarding this assessment.      Inge Goldman, PharmD, BCCCP  Neurocritical Care Clinical Pharmacist  Ext. [phone]             Patient brief summary:  Nura Kahn Jr. is a 67 y.o. male initiated on antimicrobial therapy with IV Vancomycin for treatment of lower respiratory infection      Drug Allergies:   Review of patient's allergies indicates:  No Known Allergies      Renal Function:   Estimated Creatinine Clearance: 225.3 mL/min (A) (based on SCr of 0.3 mg/dL (L)).,     Dialysis Method (if applicable):  N/A

## 2023-05-21 PROBLEM — Z51.5 PALLIATIVE CARE ENCOUNTER: Status: ACTIVE | Noted: 2023-01-01

## 2023-05-21 PROBLEM — Z51.5 COMFORT MEASURES ONLY STATUS: Status: ACTIVE | Noted: 2023-01-01

## 2023-05-21 NOTE — ASSESSMENT & PLAN NOTE
Patient with Hypercapnic and Hypoxic   Requiring BiPAP around the clock  Discussed that at some point he will need to be intubated and a try will need to be placed this would not be in line with patient's wishes and goals of care.  Family decided on transitioning to comfort care only.  see recommendations for dyspnea below

## 2023-05-21 NOTE — PROGRESS NOTES
[facility] - [facility]  Critical Care Medicine  Progress Note    Patient Name: Nura Kahn Jr.  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 and generalized weakness over the past 4 days. This has been associated with a nonproductive cough. He denies chest pain or fever.    Pt is followed by [facility], receiving IVIG and rituximab infusions. Last IVIG was 11/2021. Last rituximab was 4/4/22 and 4/29/22.  Takes MMF 1500 mg BID and prednisone 10 mg qd. Pt states his generalized weakness has worsened since getting COVID and subsequently missing his rituximab infusions.     In ED, tachycardia with MAP 64-65. He was given 30cc/kg IVF, vanc and zosyn for abx. CXR w bilateral lower lung field infiltrates, concerning for aspiration PNA. Pt placed on 4L NC, started broad spectrum antibiotics Hospital medicine consulted for admission with MICU consult to discuss ICU needs. Improved with fluid resuscitation, admitted to Hospital Medicine. Oxygen requirement improved with CAP coverage, cultures NGTD, covid negative. Required 2 units pRBC on 5/16. No evidence of bleeding, CTA negative, suspect 2/2 HbC/B Thalassemia. Hgb stable. Course c/b progressive dysphagia likely 2/2 worsening myositis while off rituxin, SLP evaluated w MBS demonstrating aspiration of thin liquids. ENT consulted, unremarkable larygnoscopy. no indication for UES dilation or cricopharyngeal myotomy. On 5/17, patient w increased WOB overnight  "and through the morning, worsening after he "overdid it" with his last meal. MICU consulted, on exam, pt w mild-mod accessory mm use, SpO2 95% on 2L NC, very weak cough, coarse upper lobes, diminished lower lobes. Tachypneic, feels SOB. Given myositis, known dysphagia, pt deemed to be high risk, concern for tiring. Transferred to MICU for airway watch, HFNC, hypertonic saline, CPT, q4 NIF.      Hospital/ICU Course:  Stepped up to MICU 5/17, marked respiratory acidosis, lethargy, suspected aspiration. Mentation and respiratory status improved overnight with BIPAP, diuresis, pulmonary toilet. Satting well on nasal cannula. WBC uptrending, improved with escalation to HAP coverage.      SLP evaluated, recommended diet. Following discussion, determined pt will likely not meet nutritional requirements with PO intake regardless of potential improvement with Rituximab; given aspiration risk, NGT was placed. Patient initially refused PEG evaluation, but is now agreeable.     Remains HDS, satting well on nasal cannula. Strict aspiration precautions with monitored PO intake. On 5/19: patient decompensated overnight, now requiring continuous bipap. Remains lethargic, severe dyspnea and panic within minutes of removal of BiPAP. Discussed with pt's daughter/healthcare proxy. Decision was made to make patient DNR/DNI with no further escalation of care.       Interval History/Significant Events: Pt slightly more alert today. Severe dyspnea, panic within minutes of removing bipap. Discussed with pt's healthcare proxy. No further escalation of care, is considering initiating comfort measures tonight/tomorrow.     Review of Systems   Unable to perform ROS: Severe respiratory distress   Objective:     Vital Signs (Most Recent):  Temp: 98.8 °F (37.1 °C) (05/20/23 2305)  Pulse: 102 (05/21/23 0710)  Resp: (!) 28 (05/21/23 0710)  BP: 104/72 (05/21/23 0700)  SpO2: 98 % (05/21/23 0710) Vital Signs (24h Range):  Temp:  [98.2 °F (36.8 °C)-99.5 " °F (37.5 °C)] 98.8 °F (37.1 °C)  Pulse:  [101-119] 102  Resp:  [23-38] 28  SpO2:  [89 %-98 %] 98 %  BP: ()/(62-83) 104/72   Weight: 64.9 kg (143 lb 1.3 oz)  Body mass index is 20.53 kg/m².      Intake/Output Summary (Last 24 hours) at 5/21/2023 0829  Last data filed at 5/21/2023 0600  Gross per 24 hour   Intake 1101.9 ml   Output 50 ml   Net 1051.9 ml          Physical Exam  Vitals and nursing note reviewed.   Constitutional:       Appearance: He is cachectic. He is ill-appearing. He is not toxic-appearing.      Interventions: Nasal cannula in place.   HENT:      Head: Normocephalic and atraumatic.      Nose: Nose normal. No congestion or rhinorrhea.      Mouth/Throat:      Mouth: Mucous membranes are dry.   Eyes:      Extraocular Movements: Extraocular movements intact.      Pupils: Pupils are equal, round, and reactive to light.   Cardiovascular:      Rate and Rhythm: Regular rhythm. Tachycardia present.      Pulses: Normal pulses.      Heart sounds: Normal heart sounds.   Pulmonary:      Effort: Respiratory distress present.      Breath sounds: Rhonchi present. No rales.      Comments: Diminished sounds BL lower lobes  Abdominal:      General: There is no distension.      Palpations: Abdomen is soft.      Tenderness: There is no abdominal tenderness. There is no guarding or rebound.   Musculoskeletal:      Right lower leg: No edema.      Left lower leg: No edema.   Skin:     Capillary Refill: Capillary refill takes less than 2 seconds.      Findings: Lesion (stage 2 sacral decubitus ulcer) present.      Comments: Fluctuant lesion over left scapula, s/p I&D. Dressings C/D/I   Neurological:      Mental Status: He is alert and oriented to person, place, and time.      Motor: Weakness (1/5 strength distal upper extremities. otherwise 0/5 strength) present.   Psychiatric:         Mood and Affect: Mood normal.         Behavior: Behavior normal.          Vents:  Oxygen Concentration (%): 21 (05/21/23  0710)  Lines/Drains/Airways       Drain  Duration                  NG/OG Tube 05/18/23 1800 Right nostril 2 days    Male External Urinary Catheter 05/20/23 0735 Small 1 day              Peripheral Intravenous Line  Duration                  Peripheral IV - Single Lumen 05/17/23 1845 20 G Right Antecubital 3 days         Peripheral IV - Single Lumen 05/20/23 1605 18 G Anterior;Left Upper Arm <1 day                  Significant Labs:    CBC/Anemia Profile:  Recent Labs   Lab 05/20/23 0257 05/21/23  0231   WBC 15.54* 16.77*   HGB 9.3* 8.7*   HCT 29.2* 27.4*    175   MCV 62* 62*   RDW 28.7* 29.1*        Chemistries:  Recent Labs   Lab 05/20/23 0257 05/21/23  0231   * 128*   K 3.5 4.2   CL 94* 92*   CO2 30* 26   BUN 10 10   CREATININE 0.3* 0.3*   CALCIUM 8.2* 8.8   ALBUMIN  --  2.1*   PROT  --  5.5*   BILITOT  --  1.4*   ALKPHOS  --  54*   ALT  --  13   AST  --  5*           Significant Imaging:  I have reviewed all pertinent imaging results/findings within the past 24 hours.      ABG  Recent Labs   Lab 05/20/23  0614   PH 7.374   PO2 44   PCO2 37.0   HCO3 21.6*   BE -4     Assessment/Plan:     Neuro  Quadriparesis (muscle weakness)  See Inclusion body myositis    Pulmonary  * Acute hypoxemic respiratory failure  Patient with Hypoxic Respiratory failure which is Acute.  he is not on home oxygen. Supplemental oxygen was provided and noted- Oxygen Concentration (%):  [21-70] 21    .   Signs/symptoms of respiratory failure include- tachypnea, increased work of breathing and use of accessory muscles. Contributing diagnoses includes - Aspiration, COPD, Pneumonia and weakness 2/2 myositis Labs and images were reviewed. Patient Has not had a recent ABG. Will treat underlying causes and adjust management of respiratory failure as follows-     Suspect aspiration and worsening weakness d/t polymyositis.   - rheum consult to restart Rituximab  Suspect aspiration pneumonitis vs pneumonia given negative infectious  workup.  Completed abx course for CAP   - leukocytosis and cough worsening   - 5/18: started HAP coverage with Vanc/Zosyn    - improving. Will continue for now.   - viral panel negative   - legionella negative.  Comfort flow, q4h NIF  Pulm toilet: cough assist CPT, 3% NS nebulizer, DuoNebs.       Chronic obstructive pulmonary disease, unspecified COPD type  [facility]    Aspiration pneumonia  See AHRF    Cardiac/Vascular  Essential hypertension  Holding home amldopine and losartan given sepsis presentation, will restart as indicated.   - 5/21: worsening hypotension. Discussed with family, will not start pressors.     Renal/  Hyponatremia  Resolved. Labs suggest volume depletion hyponatremia, improved with gentle IVF resuscitation  - stable, holding further IVF given pulm HTN    ID  Abscess  Left scapular abscess, underwent bedside I&D.    - dressings clean, dry, intact.   Wound care following    Immunology/Multi System  Polymyositis  See inclusion body myositis    Oncology  Beta 0 thalassemia  See anemia    Acute on chronic anemia  Chronic anemia suspected 2/2 HbC disease and Beta thalessemia. Component of chronic disease on prior iron studies. Precipitious drop during this hospitalization to 5.9 from 9.5. No blood on CIERA, absent melena, hematochezia, hematemesis, epistaxis, or other overt signs of bleeding. Hemolysis workup and CTA CAP negative. Received 2 units of blood with improvement, remains stable.     - 40mg IV PPI BID   - 5/18: low suspicion of GIB. On daily ppi  - CBC BID  - holding chemical AC, antiplatelets.  - 5/19: Hgb stable, CBC qd    Endocrine  Severe malnutrition  Nutrition consulted. Most recent weight and BMI monitored-     Measurements:  Wt Readings from Last 1 Encounters:   05/16/23 64.9 kg (143 lb 1.3 oz)   Body mass index is 20.53 kg/m².    Patient has been screened and assessed by RD.    Malnutrition Type:  Context: chronic illness  Level: severe    Malnutrition Characteristic  "Summary:  Weight Loss (Malnutrition): greater than 7.5% in 3 months  Energy Intake (Malnutrition): less than 75% for greater than or equal to 1 month  Subcutaneous Fat (Malnutrition): severe depletion  Muscle Mass (Malnutrition): severe depletion    Interventions/Recommendations (treatment strategy):  1. may warrant PEG. See Dysphagia.   5/18: NGT today. SLP and SHIMON meyers  5/19: hyperglycemic following TF initation and restarting PO intake, recent steroid course. Started LDSSI  5/20: recurrent aspiration events. Considering J tube. Family meeting to discuss GOC   - no escalation of care    GI  Hyperbilirubinemia  Resolved, possibly 2/2 HbC. Beta thalassemia     Oropharyngeal dysphagia  2/2 inclusion body myositis. Progressively worsened after Covid, missed rituximab infusions. PEG discussed with patient given malnutrition, still considering. Having recurrent aspirations, respiratory distress.    - MBSS 4/14 w aspiration of thin liquids  - ENT: laryngoscope, no structural pathology.   - 5/17: aspirated dinner, step up to MICU. NPO  - 5/18: NGT. Pending SLP, SHIMON meyers.  - 5/19: advancing TF to goal. Pureed diet for pleasure feeds. Discussing PEG with IR   - started LDSSI  - 5/20: will assess options after GOC discussion, prognostication discussion with Rheum    SLP following. Rheum consulted, see "polymyositis"    Orthopedic  Inclusion body myositis (IBM)  Follows w Dr. Huston at Rheumatology. On Cellcept and prednisone, endorses intermittent compliance. Was receiving rituximab infusions, but unable to since April d/t COVID and recent admission for PNA.    - Continue holding cellcept  - will complete COPD methylprednisolone course and then resume home prednisone 10mg qd   - 5/19: transitioned back to home prednisone.  - likely contributing to worsening dysphagia, aspiration, WOB  - Rheum consulted for initiation of rituximab while inpatient.    - undergoing workup.  5/20: discussing prognosis. Family meeting   5/21: " Rheumatology discussion: doubtful of meaningful recovery. Spoke with healthcare proxy. Patient DNR/DNI, no further escalation of care.     Other  Debility  See inclusion body myositis     History of tobacco use  Former heavy smoker, quit 4 years ago.   Occasional marijuana use       Critical Care Daily Checklist:    A: Awake: RASS Goal/Actual Goal: RASS Goal: 0-->alert and calm  Actual:     B: Spontaneous Breathing Trial Performed?     C: SAT & SBT Coordinated?  n/a                      D: Delirium: CAM-ICU Overall CAM-ICU: Negative   E: Early Mobility Performed? Yes   F: Feeding Goal: Goals: Meet % EEN, EPN by RD f/u date  Status: Nutrition Goal Status: new   Current Diet Order   No orders of the defined types were placed in this encounter.      AS: Analgesia/Sedation Morphine prn   T: Thromboembolic Prophylaxis holding d/t anemia   H: HOB > 300 Yes   U: Stress Ulcer Prophylaxis (if needed) n/a   G: Glucose Control y   B: Bowel Function Stool Occurrence: 2   I: Indwelling Catheter (Lines & Pedro) Necessity NGT   D: De-escalation of Antimicrobials/Pharmacotherapies vanc/zosyn    Plan for the day/ETD Bipap    Code Status:  Family/Goals of Care: DNR  No further escalation of care       Critical secondary to Patient has a condition that poses threat to life and bodily function: Severe Respiratory Distress      Critical care was time spent personally by me on the following activities: development of treatment plan with patient or surrogate and bedside caregivers, discussions with consultants, evaluation of patient's response to treatment, examination of patient, ordering and performing treatments and interventions, ordering and review of laboratory studies, ordering and review of radiographic studies, pulse oximetry, re-evaluation of patient's condition. This critical care time did not overlap with that of any other provider or involve time for any procedures.     Mary Beth Crockett MD  Critical Care Medicine  [facility]  - Cardiac Medical ICU

## 2023-05-21 NOTE — ASSESSMENT & PLAN NOTE
-longstanding problem, has been managed by Rheumatology as outpatient  -currently off immunosuppressants given infection  -most likely with progression of muscle weakness

## 2023-05-21 NOTE — ASSESSMENT & PLAN NOTE
Patient with Hypoxic Respiratory failure which is Acute.  he is not on home oxygen. Supplemental oxygen was provided and noted- Oxygen Concentration (%):  [21-70] 21    .   Signs/symptoms of respiratory failure include- tachypnea, increased work of breathing and use of accessory muscles. Contributing diagnoses includes - Aspiration, COPD, Pneumonia and weakness 2/2 myositis Labs and images were reviewed. Patient Has not had a recent ABG. Will treat underlying causes and adjust management of respiratory failure as follows-     Suspect aspiration and worsening weakness d/t polymyositis.   - rheum consult to restart Rituximab  Suspect aspiration pneumonitis vs pneumonia given negative infectious workup.  Completed abx course for CAP   - leukocytosis and cough worsening   - 5/18: started HAP coverage with Vanc/Zosyn    - improving. Will continue for now.   - viral panel negative   - legionella negative.  Comfort flow, q4h NIF  Pulm toilet: cough assist CPT, 3% NS nebulizer, Joo.

## 2023-05-21 NOTE — ASSESSMENT & PLAN NOTE
-chair bound at baseline wife needs to help him with ADLs  -discussed that in best case scenario he will be total care

## 2023-05-21 NOTE — PLAN OF CARE
CMICU DAILY GOALS       A: Awake    RASS: Goal - RASS Goal: 0-->alert and calm  Actual - RASS (Falk Agitation-Sedation Scale): restless   Restraint necessity:  N/A  B: Breathe   SBT: Not intubated   C: Coordinate A & B, analgesics/sedatives   Pain: managed    SAT: Not intubated  D: Delirium   CAM-ICU: Overall CAM-ICU: Negative  E: Early(intubated/ Progressive (non-intubated) Mobility   MOVE Screen: Pass   Activity: Activity Management: Patient unable to perform activities  FAS: Feeding/Nutrition   Diet order: Diet/Nutrition Received: NPO, Specialty Diet/Nutrition Received: dysphagia pureed  T: Thrombus   DVT prophylaxis: VTE Required Core Measure: Pharmacological prophylaxis initiated/maintained  H: HOB Elevation   Head of Bed (HOB) Positioning: HOB at 60-90 degrees  U: Ulcer Prophylaxis   GI: yes  G: Glucose control   managed Glycemic Management: blood glucose monitored  S: Skin   Bathing/Skin Care: bath, partial, incontinence care, linen changed  Device Skin Pressure Protection: absorbent pad utilized/changed, adhesive use limited, positioning supports utilized, pressure points protected, skin-to-device areas padded, skin-to-skin areas padded  Pressure Reduction Devices: specialty bed utilized, pressure-redistributing mattress utilized, positioning supports utilized, heel offloading device utilized, feet on footrest/footstool, foam padding utilized  Pressure Reduction Techniques: frequent weight shift encouraged, heels elevated off bed, positioned off wounds, pressure points protected, weight shift assistance provided  Skin Protection: adhesive use limited, incontinence pads utilized, silicone foam dressing in place, skin-to-device areas padded, skin-to-skin areas padded, tubing/devices free from skin contact  B: Bowel Function   no issues - bowel incontinence  I: Indwelling Catheters   Pedro necessity:     CVC necessity: No  D: De-escalation Antibiotics   Yes    Family/Goals of care/Code Status   Code Status:  DNR    24H Vital Sign Range  Temp:  [98.2 °F (36.8 °C)-99.5 °F (37.5 °C)]   Pulse:  [100-119]   Resp:  [23-38]   BP: ()/(62-83)   SpO2:  [89 %-97 %]      Shift Events   No acute events throughout shift. Pt remains in ICU on continuous BIPAP. Pt tolerated BIPAP well throughout the shift.   PRN Morphine given x1 for anxiety and increased work of breathing.    VS and assessment per flow sheet, patient progressing towards goals as tolerated, plan of care reviewed with patient and family, all concerns addressed at this time.    Guerrero Tadeo RN

## 2023-05-21 NOTE — ASSESSMENT & PLAN NOTE
Impression: 68 yo with inclusion body myositis/polymyositis, recent admission for COVID PNA, here again with Aspiration PNA and respiratory failure requiring BIPAP ATC    Medicolegal: No decision making capacity.  Per discussion with primary team yesterday patient named his daughter   as his healthcare power of .     Psychosocial:  support system consists of wife and daughter      Prognostication:  Patient with respiratory failure and quadriplegia muscle weakness prognosis is poor    Understanding of disease and Illness Trajectory: Family  has  good understanding of his illness, they can benefit from continued education on what to expect in the future.      Goals of care:    Advance Care Planning     Date: 05/21/2023    I engaged the wife and daughter/HCPOA in a conversation about advance care planning and we specifically addressed what the goals of care would be moving forward, in light of the patient's change in clinical status, specifically respiratory failure requiring BiPAP around the clock.  We did specifically address the patient's likely prognosis, which is poor.    We explored the patient's values and preferences for future care.  The family endorses that what is most important right now is to focus on comfort and QOL .  Daughter shared that this is not the quality of life that he would have wanted, the all of his children med yesterday and they agreed that this is not what he would want and that the main focus for his care should be comfort.  Discussed with comfort care means stopping all of non comfort oriented medications including antibiotics, stopping blood work, and focusing on his symptoms making sure that he is comfortable.  Daughter was agreeable with this plan    Accordingly, we have decided that the best plan to meet the patient's goals includes pivot to comfort-focused care.  did explain the role for inpatient hospice care at this stage of the patient's illness.  We will not be making a  hospice referral.  Daughter shared that they are open to comfort focused care here or on inpatient hospice however there is no support at home that can care for him there.     Wants daughter arrived to the hospital met with her and rest of the family in the room.  We stepped out of the room with daughter she shared that they are already to start comfort focused plan that they are all in agreement.  She just asked to talk outside room because she does not want to cause any anxiety to her father.             Code status:  DNR DNI    Advance directives:  None      Summary and recommendations:  -Discussed with family  -Stop all non-comfort oriented  Interventions including bloodwork and telemetry monitoring   -Stop non comfort oriented meds   -VS q shift  -frequent respositioning as needed   -comfort feeds     PRN meds:    Pain  -Daughter shared that he seems uncomfortable because of pain and immobility  -Consider starting with Morphine 1mg IV q 3h PRN or Dilaudid 0.2mg q 3h PRN    Dyspnea:  Continue 02 and BIPAP if needed for now, discussed with daughter that if we can control his dyspnea with medications we will removing his BIPAP  Consider glycopyrrolate 0.1mg IV q 8h for upper airway secretions     Nausea- Zofran 4mg IV 8h PRN     Anxiety- ativan 0.5mg q 6h PRN     Agitation -If severe agitation may use Haldol 0.5mg IV q 12h PRN     Constipation: Dulcolax suppository PRN (no BM in 2 days)    Fever Tylenol 650 mg PO or VT PRN        25min time was spent on advance care planning, goals of care discussion, emotional support, formulating and communicating prognosis and goals of care, exploring burden/benefit of various approaches of treatment.

## 2023-05-21 NOTE — CONSULTS
[facility] - Cardiac Medical ICU  Palliative Medicine  Consult Note    Patient Name: Nura Kahn Jr.  MRN: [id number]  Admission Date: 5/14/2023  Hospital Length of Stay: 7 days  Code Status: DNR   Attending Provider: Otoniel Bazan*  Consulting Provider: Lisa Landry MD  Primary Care Physician: Edna Jaramillo NP  Principal Problem:Acute hypoxemic respiratory failure    Patient information was obtained from spouse/SO, relative(s) and primary team.      Inpatient consult to Palliative Care  Consult performed by: Lisa Landry MD  Consult ordered by: Mary Beth Crockett MD        Assessment/Plan:     Neuro  Quadriparesis (muscle weakness)  -chair bound at baseline wife needs to help him with ADLs  -discussed that in best case scenario he will be total care    Pulmonary  * Acute hypoxemic respiratory failure  Patient with Hypercapnic and Hypoxic   Requiring BiPAP around the clock  Discussed that at some point he will need to be intubated and a try will need to be placed this would not be in line with patient's wishes and goals of care.  Family decided on transitioning to comfort care only.  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  Per discussion with primary team yesterday patient named his daughter   as his healthcare power of .     Psychosocial:  support system consists of wife and daughter      Prognostication:  Patient with respiratory failure and quadriplegia muscle weakness prognosis is poor    Understanding of  disease and Illness Trajectory: Family  has  good understanding of his illness, they can benefit from continued education on what to expect in the future.      Goals of care:    Advance Care Planning     Date: 05/21/2023    I engaged the wife and daughter/HCPOA in a conversation about advance care planning and we specifically addressed what the goals of care would be moving forward, in light of the patient's change in clinical status, specifically respiratory failure requiring BiPAP around the clock.  We did specifically address the patient's likely prognosis, which is poor.    We explored the patient's values and preferences for future care.  The family endorses that what is most important right now is to focus on comfort and QOL .  Daughter shared that this is not the quality of life that he would have wanted, the all of his children med yesterday and they agreed that this is not what he would want and that the main focus for his care should be comfort.  Discussed with comfort care means stopping all of non comfort oriented medications including antibiotics, stopping blood work, and focusing on his symptoms making sure that he is comfortable.  Daughter was agreeable with this plan    Accordingly, we have decided that the best plan to meet the patient's goals includes pivot to comfort-focused care.  did explain the role for inpatient hospice care at this stage of the patient's illness.  We will not be making a hospice referral.  Daughter shared that they are open to comfort focused care here or on inpatient hospice however there is no support at home that can care for him there.     Wants daughter arrived to the hospital met with her and rest of the family in the room.  We stepped out of the room with daughter she shared that they are already to start comfort focused plan that they are all in agreement.  She just asked to talk outside room because she does not want to cause any anxiety to her father.             Code status:  DNR DNI    Advance directives:  None      Summary and recommendations:  -Discussed with family  -Stop all non-comfort oriented  Interventions including bloodwork and telemetry monitoring   -Stop non comfort oriented meds   -VS q shift  -frequent respositioning as needed   -comfort feeds     PRN meds:    Pain  -Daughter shared that he seems uncomfortable because of pain and immobility  -Consider starting with Morphine 1mg IV q 3h PRN or Dilaudid 0.2mg q 3h PRN    Dyspnea:  Continue 02 and BIPAP if needed for now, discussed with daughter that if we can control his dyspnea with medications we will removing his BIPAP  Consider glycopyrrolate 0.1mg IV q 8h for upper airway secretions     Nausea- Zofran 4mg IV 8h PRN     Anxiety- ativan 0.5mg q 6h PRN     Agitation -If severe agitation may use Haldol 0.5mg IV q 12h PRN     Constipation: Dulcolax suppository PRN (no BM in 2 days)    Fever Tylenol 650 mg PO or [place] PRN        25min time was spent on advance care planning, goals of care discussion, emotional support, formulating and communicating prognosis and goals of care, exploring burden/benefit of various approaches of treatment.              Thank you for your consult. I will sign off. Please contact us if you have any additional questions.    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 He is off off RTX and IVIG given infection.  Patient had expressed in the past that quality of life and comfort or important to him, he currently has a poor prognosis.  Palliative Care consulted for goals of care clarification      Hospital Course:  No notes on file    Interval History:   Patient  was somnolent during my visit unable to engage in a conversation or answer yes or no questions.  Wife was at bedside, explained prognosis to her she understood.     Past Medical History:   Diagnosis Date    Aspiration pneumonia of right lower lobe 1/15/2018    Atrial fibrillation 3/2/2015    Chronic obstructive pulmonary disease, unspecified COPD type 5/4/2022    Depression     Essential hypertension 2/27/2019    Malignant (primary) neoplasm, unspecified 5/4/2022    Microcytic anemia 9/25/2014    Neuromuscular disorder     Other osteoporosis without current pathological fracture 9/22/2022    Pneumonia     Polymyositis 2009    Tobacco abuse        Past Surgical History:   Procedure Laterality Date    COLONOSCOPY N/A 8/6/2020    Procedure: COLONOSCOPY;  Surgeon: Brandan Meyer MD;  Location: [facility] ENDO (2ND FLR);  Service: Endoscopy;  Laterality: N/A;    ESOPHAGOGASTRODUODENOSCOPY N/A 8/6/2020    Procedure: EGD (ESOPHAGOGASTRODUODENOSCOPY);  Surgeon: Brandan Meyer MD;  [facility]  Service: Endoscopy;  Laterality: N/A;  multiple co-morbidities. will have family member for assistance.  has neuromuscular issues-needs lift. in W/C-unable to transfer per self     COVID test at [facility] on 8/3-GT       Review of patient's allergies indicates:  No Known Allergies    Medications:  Continuous Infusions:   dextrose 10 % in water (D10W)       Scheduled Meds:   acetylcysteine 200 mg/ml (20%)  2 mL Nebulization BID    LIDOcaine  1 patch Transdermal Q24H    pantoprazole  40 mg Intravenous Daily    piperacillin-tazobactam (ZOSYN) IVPB  4.5 g Intravenous Q8H    predniSONE  10 mg Per NG tube Daily    vancomycin (VANCOCIN) IVPB  750 mg Intravenous Q12H     PRN Meds:acetaminophen, dextrose 10 % in water (D10W), dextrose 10%, dextrose 10%, dextrose, dextrose, glucagon (human recombinant), insulin aspart U-100, [DISCONTINUED] levalbuterol **AND** ipratropium, levalbuterol, melatonin,  morphine, naloxone, polyethylene glycol, sodium chloride 0.9%, sodium chloride 0.9%, Pharmacy to dose Vancomycin consult **AND** vancomycin - pharmacy to dose    Family History       Problem Relation (Age of Onset)    Diabetes Mother, Father    Hypertension Mother, Father    Kidney disease Mother          Tobacco Use    Smoking status: Some Days     Packs/day: 0.25     Years: 20.00     Pack years: 5.00     Types: Cigarettes     Last attempt to quit: 2018     Years since quittin.3    Smokeless tobacco: Never   Substance and Sexual Activity    Alcohol use: Yes     Alcohol/week: 0.0 standard drinks    Drug use: Yes     Types: Marijuana     Comment: daily use    Sexual activity: Not on file       Review of Systems   Unable to perform ROS: Mental status change   Objective:     Vital Signs (Most Recent):  Temp: 98 °F (36.7 °C) (23 1000)  Pulse: 107 (23 1109)  Resp: (!) 31 (23 1109)  BP: 109/73 (23 1000)  SpO2: (!) 92 % (23 1109) Vital Signs (24h Range):  Temp:  [98 °F (36.7 °C)-99.5 °F (37.5 °C)] 98 °F (36.7 °C)  Pulse:  [101-117] 107  Resp:  [21-38] 31  SpO2:  [88 %-98 %] 92 %  BP: ()/(62-83) 109/73     Weight: 64.9 kg (143 lb 1.3 oz)  Body mass index is 20.53 kg/m².       Physical Exam  Vitals reviewed.   Constitutional:       General: He is sleeping. He is not in acute distress.     Appearance: He is ill-appearing. He is not toxic-appearing.   HENT:      Head: Normocephalic and atraumatic.      Nose:      Comments: Wearing BiPAP mask  Eyes:      Conjunctiva/sclera: Conjunctivae normal.   Pulmonary:      Effort: Respiratory distress present.      Comments: On BiPAP  Abdominal:      General: There is no distension.      Palpations: Abdomen is soft.   Musculoskeletal:         General: No swelling.      Cervical back: Neck supple.   Skin:     Coloration: Skin is not pale.   Neurological:      General: No focal deficit present.      Mental Status: He is lethargic.           Review of Symptoms      Symptom Assessment (ESAS 0-10 Scale)  Unable to complete assessment due to Mental status change     CAM / Delirium:  Positive      Pain Assessment in Advanced Demential Scale (PAINAD)   Breathing - Independent of vocalization:  0  Negative vocalization:  0  Facial expression:  1  Body language:  1  Consolability:  0  Total:  2    Performance Status:  50    Living Arrangements:  Lives with spouse    Psychosocial/Cultural:   See Palliative Psychosocial Note: Yes  Lives with wife. He has 5 sons, 8 daughters.youngest daughter is around 13y/o. Daughter Mami Vásquez has been close to him and helping manage his care  **Primary  to Follow**  Palliative Care  Consult: Yes      Advance Care Planning   Advance Directives:   Living Will: Yes        Copy on chart: Yes    LaPOST: No    Do Not Resuscitate Status: Yes        Oral Declaration: Yes   Witnesses:  Mary Beth Crockett MD   Agent's Name:  Mami Vásquez   Agent's Contact Number:  [phone]    Decision Making:  Family answered questions and Patient unable to communicate due to disease severity/cognitive impairment  Goals of Care: The family endorses that what is most important right now is to focus on comfort and QOL     Accordingly, we have decided that the best plan to meet the patient's goals includes pivot to comfort-focused care       Significant Labs: All pertinent labs within the past 24 hours have been reviewed.  CBC:   Recent Labs   Lab 05/21/23  0231   WBC 16.77*   HGB 8.7*   HCT 27.4*   MCV 62*        BMP:  Recent Labs   Lab 05/21/23  0231   GLU 89   *   K 4.2   CL 92*   CO2 26   BUN 10   CREATININE 0.3*   CALCIUM 8.8     LFT:  Lab Results   Component Value Date    AST 5 (L) 05/21/2023    GGT 16 03/31/2015    ALKPHOS 54 (L) 05/21/2023    BILITOT 1.4 (H) 05/21/2023     Albumin:   Albumin   Date Value Ref Range Status   05/21/2023 2.1 (L) 3.5 - 5.2 g/dL Final     Protein:   Total Protein   Date  Value Ref Range Status   05/21/2023 5.5 (L) 6.0 - 8.4 g/dL Final     Lactic acid:   Lab Results   Component Value Date    LACTATE 0.9 05/15/2023    LACTATE 1.3 05/14/2023       Significant Imaging: I have reviewed all pertinent imaging results/findings within the past 24 hours.    Results for orders placed or performed during the hospital encounter of 05/14/23 (from the past 2160 hour(s))   CTA Chest Abdomen Pelvis    Narrative    EXAMINATION:  CTA CHEST ABDOMEN PELVIS    CLINICAL HISTORY:  Aortic aneurysm, known or suspected;    TECHNIQUE:  Low dose axial images, sagittal and coronal reformations were obtained from the thoracic inlet to the knees following the IV administration of 100 mL of Omnipaque 350.  Oral contrast was not administered.  Arterial and venous phase images were obtained.  Coronal maximal intensity projection images submitted.    COMPARISON:  Chest radiograph 05/14/2023; CT abdomen pelvis 04/19/2023; CT chest 10/27/2021    FINDINGS:  SOFT TISSUES: No axillary or subpectoral lymphadenopathy. Calcification in the left thyroid lobe.  Well-defined air containing fluid collection in the superficial soft tissues of the left upper back measuring 3.5 x 2.4 cm.    HEART AND MEDIASTINUM: Multiple enlarged upper mediastinal lymph nodes, may be reactive.  Right atrium is enlarged.  Pericardium is within normal limits. Left-sided 4 vessel aortic arch with direct aortic origin of the left vertebral artery.  Pulmonary arteries distribute normally.  No central pulmonary artery filling defect to suggest pulmonary thromboembolism.    LUNGS AND AIRWAYS: Confluent regions of consolidation with patent air bronchograms and surrounding ground-glass opacities in a basilar prominent distribution, increased since the prior CT 04/19/2023.  Multiple endobronchial filling defects throughout both lungs including upper and lower lobes.  Small volume right pleural effusion.  No pneumothorax.  Mild centrilobular and paraseptal  emphysematous changes with apical predominance.    HEPATOBILIARY: No focal hepatic lesions. No biliary ductal dilatation.  Multiple layering gallstones within the gallbladder.  No gallbladder wall thickening or pericholecystic fluid..    SPLEEN: Markedly enlarged, measuring 20 cm craniocaudal.  Accessory splenule.    PANCREAS: No focal masses or ductal dilatation.    ADRENALS: No adrenal nodules.    KIDNEYS/URETERS: Bilateral renal cysts and additional subcentimeter renal hypoattenuating lesions are too small to characterize.  No hydronephrosis, stones, or solid mass lesions.    PELVIC ORGANS/BLADDER: Unremarkable.    PERITONEUM / RETROPERITONEUM: No free air or fluid.    LYMPH NODES: No lymphadenopathy.    VESSELS: The aorta is normal in course and caliber with a mild degree of scattered plaque.  No evidence of dissection..    GI TRACT: Prominent rectal stool without significant stool burden throughout the rest of the colon.  Small bowel loops are normal in caliber.  Stomach is decompressed.    BONES: Grade 1 retrolisthesis of L5 on S1.  Small volume right knee fusion.  Stable degenerative changes.  Widespread fatty atrophy of the thoracic and abdominopelvic wall musculature along with the thigh musculature.  No fractures or focal osseous lesions.      Impression    1. No aortic aneurysm or other acute aortic pathology.  2. Worsening basilar predominant consolidation and ground-glass opacities suggesting multifocal pneumonia, sequelae of aspiration, or other infectious/inflammatory process.  3. 3.5 cm air containing subcutaneous fluid collection in the left upper back, similar in size compared with prior exam dating back to 2021, may represent a complex sebaceous cyst.  4. Stable massive splenomegaly.  5. Diffuse muscular atrophy presumed related to history of inclusion body myositis.  6. Additional findings as described.    Electronically signed by resident: Pierre  Cristina  Date:    05/16/2023  Time:    14:12    Electronically signed by: Kory Antoine MD  Date:    05/16/2023  Time:    14:45   Results for orders placed or performed during the hospital encounter of 04/19/23 (from the past 2160 hour(s))   CT Abdomen Pelvis With Contrast    Narrative    EXAMINATION:  CT ABDOMEN PELVIS WITH CONTRAST    CLINICAL HISTORY:  Abdominal abscess/infection suspected;Bowel obstruction suspected;    TECHNIQUE:  Low dose axial images were obtained from the lung bases to the pubic symphysis following the intravenous administration of 75 cc of Omnipaque 350.  Sagittal and coronal reformats were provided.    COMPARISON:  Abdominal ultrasound 04/03/2019.  CT pelvis 06/06/2017.  CT abdomen pelvis 08/07/2014.    FINDINGS:  Heart: Normal in size.  No pericardial effusion.    Lung bases: Right basilar consolidative and micro nodular change.  Minimal left basilar subsegmental atelectasis.    Liver: Mildly enlarged.  Stable subcentimeter hypodensities, too small to characterize.  No suspicious focal hepatic lesion.  Portal vein is patent.    Gallbladder: Numerous layering small calcified gallstones.  No gallbladder wall thickening, distension, or pericholecystic fluid to suggest acute cholecystitis.    Bile Ducts: No intra or extrahepatic biliary ductal dilation.    Pancreas: No pancreatic mass lesion or peripancreatic inflammatory change.    Spleen: Enlarged, measuring 20.0 cm craniocaudal dimension.    Adrenals: Unremarkable.    Kidneys/ Ureters: Mass effect of the spleen upon the left kidney.  Kidneys enhance symmetrically.  Bilateral renal hypodensities, most too small to characterize, the largest compatible with simple cyst.  No solid renal mass or nephrolithiasis.  No hydroureteronephrosis.    Bladder: The bladder is decompressed around a Pedro catheter, limiting evaluation.    Reproductive organs: Unremarkable.    GI Tract/Mesentery: The stomach is unremarkable.  No evidence for bowel  obstruction.  Appendix appears unremarkable.  Large hyperdense rectal fecaloma.  Moderate colonic stool elsewhere.    Peritoneal Space: No intraperitoneal free fluid or free air. No pathologically enlarged lymph nodes.    Retroperitoneum: No significant adenopathy.    Abdominal wall/extraperitoneal soft tissues: Scattered body wall stranding.  Subcutaneous calcification overlies the right gluteal musculature.  Advanced fatty atrophy of the bilateral gluteus medius, posterior paraspinal and lower extremity musculature.    Vasculature: Fusiform ectasia of the ascending aorta up to 3.7 cm (series 2, image 12).  Abdominal aorta is normal in caliber, contour, and course .  Minimal aortoiliac calcific atherosclerosis.    Bones: Degenerative changes without acute fracture or bone destructive process.  Grade 1 retrolisthesis of L5 on S1.      Impression    1. Large hyperdense rectal fecaloma.  Consider correlation for fecal impaction.  Moderate colonic stool elsewhere.  2. Right basilar consolidative and micronodular change, suggestive of infectious/inflammatory process versus aspiration.  3. Cholelithiasis without evidence of acute cholecystitis.  4. Stable hepatosplenomegaly.  5. Fusiform ectasia of the ascending aorta up to 3.7 cm, similar.  6.  Additional findings as above.  This report was flagged in Epic as abnormal.    Electronically signed by resident: Mesfin Alves  Date:    04/19/2023  Time:    22:39    Electronically signed by: Brady Villatoro MD  Date:    04/19/2023  Time:    23:17     *Note: Due to a large number of results and/or encounters for the requested time period, some results have not been displayed. A complete set of results can be found in Results Review.                 Lisa Landry MD  Palliative Medicine  [facility] - Cardiac Medical ICU

## 2023-05-21 NOTE — ASSESSMENT & PLAN NOTE
Nutrition consulted. Most recent weight and BMI monitored-     Measurements:  Wt Readings from Last 1 Encounters:   05/16/23 64.9 kg (143 lb 1.3 oz)   Body mass index is 20.53 kg/m².    Patient has been screened and assessed by RD.    Malnutrition Type:  Context: chronic illness  Level: severe    Malnutrition Characteristic Summary:  Weight Loss (Malnutrition): greater than 7.5% in 3 months  Energy Intake (Malnutrition): less than 75% for greater than or equal to 1 month  Subcutaneous Fat (Malnutrition): severe depletion  Muscle Mass (Malnutrition): severe depletion    Interventions/Recommendations (treatment strategy):  1. may warrant PEG. See Dysphagia.   5/18: NGT today. SLP and RD eval  5/19: hyperglycemic following TF initation and restarting PO intake, recent steroid course. Started LDSSI  5/20: recurrent aspiration events. Considering J tube. Family meeting to discuss GOC   - no escalation of care

## 2023-05-21 NOTE — SUBJECTIVE & OBJECTIVE
Interval History/Significant Events: Pt slightly more alert today. Severe dyspnea, panic within minutes of removing bipap. Discussed with pt's healthcare proxy. No further escalation of care, is considering initiating comfort measures tonight/tomorrow.     Review of Systems   Unable to perform ROS: Severe respiratory distress   Objective:     Vital Signs (Most Recent):  Temp: 98.8 °F (37.1 °C) (05/20/23 2305)  Pulse: 102 (05/21/23 0710)  Resp: (!) 28 (05/21/23 0710)  BP: 104/72 (05/21/23 0700)  SpO2: 98 % (05/21/23 0710) Vital Signs (24h Range):  Temp:  [98.2 °F (36.8 °C)-99.5 °F (37.5 °C)] 98.8 °F (37.1 °C)  Pulse:  [101-119] 102  Resp:  [23-38] 28  SpO2:  [89 %-98 %] 98 %  BP: ()/(62-83) 104/72   Weight: 64.9 kg (143 lb 1.3 oz)  Body mass index is 20.53 kg/m².      Intake/Output Summary (Last 24 hours) at 5/21/2023 0829  Last data filed at 5/21/2023 0600  Gross per 24 hour   Intake 1101.9 ml   Output 50 ml   Net 1051.9 ml          Physical Exam  Vitals and nursing note reviewed.   Constitutional:       Appearance: He is cachectic. He is ill-appearing. He is not toxic-appearing.      Interventions: Nasal cannula in place.   HENT:      Head: Normocephalic and atraumatic.      Nose: Nose normal. No congestion or rhinorrhea.      Mouth/Throat:      Mouth: Mucous membranes are dry.   Eyes:      Extraocular Movements: Extraocular movements intact.      Pupils: Pupils are equal, round, and reactive to light.   Cardiovascular:      Rate and Rhythm: Regular rhythm. Tachycardia present.      Pulses: Normal pulses.      Heart sounds: Normal heart sounds.   Pulmonary:      Effort: Respiratory distress present.      Breath sounds: Rhonchi present. No rales.      Comments: Diminished sounds BL lower lobes  Abdominal:      General: There is no distension.      Palpations: Abdomen is soft.      Tenderness: There is no abdominal tenderness. There is no guarding or rebound.   Musculoskeletal:      Right lower leg: No edema.       Left lower leg: No edema.   Skin:     Capillary Refill: Capillary refill takes less than 2 seconds.      Findings: Lesion (stage 2 sacral decubitus ulcer) present.      Comments: Fluctuant lesion over left scapula, s/p I&D. Dressings C/D/I   Neurological:      Mental Status: He is alert and oriented to person, place, and time.      Motor: Weakness (1/5 strength distal upper extremities. otherwise 0/5 strength) present.   Psychiatric:         Mood and Affect: Mood normal.         Behavior: Behavior normal.          Vents:  Oxygen Concentration (%): 21 (05/21/23 0710)  Lines/Drains/Airways       Drain  Duration                  NG/OG Tube 05/18/23 1800 Right nostril 2 days    Male External Urinary Catheter 05/20/23 0735 Small 1 day              Peripheral Intravenous Line  Duration                  Peripheral IV - Single Lumen 05/17/23 1845 20 G Right Antecubital 3 days         Peripheral IV - Single Lumen 05/20/23 1605 18 G Anterior;Left Upper Arm <1 day                  Significant Labs:    CBC/Anemia Profile:  Recent Labs   Lab 05/20/23  0257 05/21/23  0231   WBC 15.54* 16.77*   HGB 9.3* 8.7*   HCT 29.2* 27.4*    175   MCV 62* 62*   RDW 28.7* 29.1*        Chemistries:  Recent Labs   Lab 05/20/23  0257 05/21/23  0231   * 128*   K 3.5 4.2   CL 94* 92*   CO2 30* 26   BUN 10 10   CREATININE 0.3* 0.3*   CALCIUM 8.2* 8.8   ALBUMIN  --  2.1*   PROT  --  5.5*   BILITOT  --  1.4*   ALKPHOS  --  54*   ALT  --  13   AST  --  5*           Significant Imaging:  I have reviewed all pertinent imaging results/findings within the past 24 hours.

## 2023-05-21 NOTE — HPI
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 He is off off RTX and IVIG given infection.  Patient had expressed in the past that quality of life and comfort or important to him, he currently has a poor prognosis.  Palliative Care consulted for goals of care clarification

## 2023-05-21 NOTE — ASSESSMENT & PLAN NOTE
Holding home amldopine and losartan given sepsis presentation, will restart as indicated.   - 5/21: worsening hypotension. Discussed with family, will not start pressors.

## 2023-05-21 NOTE — ASSESSMENT & PLAN NOTE
Follows w Dr. Huston at Rheumatology. On Cellcept and prednisone, endorses intermittent compliance. Was receiving rituximab infusions, but unable to since April d/t COVID and recent admission for PNA.    - Continue holding cellcept  - will complete COPD methylprednisolone course and then resume home prednisone 10mg qd   - 5/19: transitioned back to home prednisone.  - likely contributing to worsening dysphagia, aspiration, WOB  - Rheum consulted for initiation of rituximab while inpatient.    - undergoing workup.  5/20: discussing prognosis. Family meeting   5/21: Rheumatology discussion: doubtful of meaningful recovery. Spoke with healthcare proxy. Patient DNR/DNI, no further escalation of care.

## 2023-05-21 NOTE — SUBJECTIVE & OBJECTIVE
Interval History:   Patient was somnolent during my visit unable to engage in a conversation or answer yes or no questions.  Wife was at bedside, explained prognosis to her she understood.     Past Medical History:   Diagnosis Date    Aspiration pneumonia of right lower lobe 1/15/2018    Atrial fibrillation 3/2/2015    Chronic obstructive pulmonary disease, unspecified COPD type 5/4/2022    Depression     Essential hypertension 2/27/2019    Malignant (primary) neoplasm, unspecified 5/4/2022    Microcytic anemia 9/25/2014    Neuromuscular disorder     Other osteoporosis without current pathological fracture 9/22/2022    Pneumonia     Polymyositis 2009    Tobacco abuse        Past Surgical History:   Procedure Laterality Date    COLONOSCOPY N/A 8/6/2020    Procedure: COLONOSCOPY;  Surgeon: Brandan Meyer MD;  Location: [facility] (2ND FLR);  Service: Endoscopy;  Laterality: N/A;    ESOPHAGOGASTRODUODENOSCOPY N/A 8/6/2020    Procedure: EGD (ESOPHAGOGASTRODUODENOSCOPY);  Surgeon: Brandan Meyer MD;  [facility]  Service: Endoscopy;  Laterality: N/A;  multiple co-morbidities. will have family member for assistance.  has neuromuscular issues-needs lift. in W/C-unable to transfer per self     COVID test at [facility] on 8/3-GT       Review of patient's allergies indicates:  No Known Allergies    Medications:  Continuous Infusions:   dextrose 10 % in water (D10W)       Scheduled Meds:   acetylcysteine 200 mg/ml (20%)  2 mL Nebulization BID    LIDOcaine  1 patch Transdermal Q24H    pantoprazole  40 mg Intravenous Daily    piperacillin-tazobactam (ZOSYN) IVPB  4.5 g Intravenous Q8H    predniSONE  10 mg Per NG tube Daily    vancomycin (VANCOCIN) IVPB  750 mg Intravenous Q12H     PRN Meds:acetaminophen, dextrose 10 % in water (D10W), dextrose 10%, dextrose 10%, dextrose, dextrose, glucagon (human recombinant), insulin aspart U-100, [DISCONTINUED] levalbuterol **AND** ipratropium, levalbuterol, melatonin,  morphine, naloxone, polyethylene glycol, sodium chloride 0.9%, sodium chloride 0.9%, Pharmacy to dose Vancomycin consult **AND** vancomycin - pharmacy to dose    Family History       Problem Relation (Age of Onset)    Diabetes Mother, Father    Hypertension Mother, Father    Kidney disease Mother          Tobacco Use    Smoking status: Some Days     Packs/day: 0.25     Years: 20.00     Pack years: 5.00     Types: Cigarettes     Last attempt to quit: 2018     Years since quittin.3    Smokeless tobacco: Never   Substance and Sexual Activity    Alcohol use: Yes     Alcohol/week: 0.0 standard drinks    Drug use: Yes     Types: Marijuana     Comment: daily use    Sexual activity: Not on file       Review of Systems   Unable to perform ROS: Mental status change   Objective:     Vital Signs (Most Recent):  Temp: 98 °F (36.7 °C) (23 1000)  Pulse: 107 (23 1109)  Resp: (!) 31 (23 1109)  BP: 109/73 (23 1000)  SpO2: (!) 92 % (23 1109) Vital Signs (24h Range):  Temp:  [98 °F (36.7 °C)-99.5 °F (37.5 °C)] 98 °F (36.7 °C)  Pulse:  [101-117] 107  Resp:  [21-38] 31  SpO2:  [88 %-98 %] 92 %  BP: ()/(62-83) 109/73     Weight: 64.9 kg (143 lb 1.3 oz)  Body mass index is 20.53 kg/m².       Physical Exam  Vitals reviewed.   Constitutional:       General: He is sleeping. He is not in acute distress.     Appearance: He is ill-appearing. He is not toxic-appearing.   HENT:      Head: Normocephalic and atraumatic.      Nose:      Comments: Wearing BiPAP mask  Eyes:      Conjunctiva/sclera: Conjunctivae normal.   Pulmonary:      Effort: Respiratory distress present.      Comments: On BiPAP  Abdominal:      General: There is no distension.      Palpations: Abdomen is soft.   Musculoskeletal:         General: No swelling.      Cervical back: Neck supple.   Skin:     Coloration: Skin is not pale.   Neurological:      General: No focal deficit present.      Mental Status: He is lethargic.          Review  of Symptoms      Symptom Assessment (ESAS 0-10 Scale)  Unable to complete assessment due to Mental status change     CAM / Delirium:  Positive      Pain Assessment in Advanced Demential Scale (PAINAD)   Breathing - Independent of vocalization:  0  Negative vocalization:  0  Facial expression:  1  Body language:  1  Consolability:  0  Total:  2    Performance Status:  50    Living Arrangements:  Lives with spouse    Psychosocial/Cultural:   See Palliative Psychosocial Note: Yes  Lives with wife. He has 5 sons, 8 daughters.youngest daughter is around 15y/o. Daughter Mami Vásquez has been close to him and helping manage his care  **Primary  to Follow**  Palliative Care  Consult: Yes      Advance Care Planning   Advance Directives:   Living Will: Yes        Copy on chart: Yes    LaPOST: No    Do Not Resuscitate Status: Yes        Oral Declaration: Yes   Witnesses:  Mary Beth Crockett MD   Agent's Name:  Mami Vásquez   Agent's Contact Number:  [phone]    Decision Making:  Family answered questions and Patient unable to communicate due to disease severity/cognitive impairment  Goals of Care: The family endorses that what is most important right now is to focus on comfort and QOL     Accordingly, we have decided that the best plan to meet the patient's goals includes pivot to comfort-focused care       Significant Labs: All pertinent labs within the past 24 hours have been reviewed.  CBC:   Recent Labs   Lab 05/21/23  0231   WBC 16.77*   HGB 8.7*   HCT 27.4*   MCV 62*        BMP:  Recent Labs   Lab 05/21/23  0231   GLU 89   *   K 4.2   CL 92*   CO2 26   BUN 10   CREATININE 0.3*   CALCIUM 8.8     LFT:  Lab Results   Component Value Date    AST 5 (L) 05/21/2023    GGT 16 03/31/2015    ALKPHOS 54 (L) 05/21/2023    BILITOT 1.4 (H) 05/21/2023     Albumin:   Albumin   Date Value Ref Range Status   05/21/2023 2.1 (L) 3.5 - 5.2 g/dL Final     Protein:   Total Protein   Date Value Ref  Range Status   05/21/2023 5.5 (L) 6.0 - 8.4 g/dL Final     Lactic acid:   Lab Results   Component Value Date    LACTATE 0.9 05/15/2023    LACTATE 1.3 05/14/2023       Significant Imaging: I have reviewed all pertinent imaging results/findings within the past 24 hours.    Results for orders placed or performed during the hospital encounter of 05/14/23 (from the past 2160 hour(s))   CTA Chest Abdomen Pelvis    Narrative    EXAMINATION:  CTA CHEST ABDOMEN PELVIS    CLINICAL HISTORY:  Aortic aneurysm, known or suspected;    TECHNIQUE:  Low dose axial images, sagittal and coronal reformations were obtained from the thoracic inlet to the knees following the IV administration of 100 mL of Omnipaque 350.  Oral contrast was not administered.  Arterial and venous phase images were obtained.  Coronal maximal intensity projection images submitted.    COMPARISON:  Chest radiograph 05/14/2023; CT abdomen pelvis 04/19/2023; CT chest 10/27/2021    FINDINGS:  SOFT TISSUES: No axillary or subpectoral lymphadenopathy. Calcification in the left thyroid lobe.  Well-defined air containing fluid collection in the superficial soft tissues of the left upper back measuring 3.5 x 2.4 cm.    HEART AND MEDIASTINUM: Multiple enlarged upper mediastinal lymph nodes, may be reactive.  Right atrium is enlarged.  Pericardium is within normal limits. Left-sided 4 vessel aortic arch with direct aortic origin of the left vertebral artery.  Pulmonary arteries distribute normally.  No central pulmonary artery filling defect to suggest pulmonary thromboembolism.    LUNGS AND AIRWAYS: Confluent regions of consolidation with patent air bronchograms and surrounding ground-glass opacities in a basilar prominent distribution, increased since the prior CT 04/19/2023.  Multiple endobronchial filling defects throughout both lungs including upper and lower lobes.  Small volume right pleural effusion.  No pneumothorax.  Mild centrilobular and paraseptal  emphysematous changes with apical predominance.    HEPATOBILIARY: No focal hepatic lesions. No biliary ductal dilatation.  Multiple layering gallstones within the gallbladder.  No gallbladder wall thickening or pericholecystic fluid..    SPLEEN: Markedly enlarged, measuring 20 cm craniocaudal.  Accessory splenule.    PANCREAS: No focal masses or ductal dilatation.    ADRENALS: No adrenal nodules.    KIDNEYS/URETERS: Bilateral renal cysts and additional subcentimeter renal hypoattenuating lesions are too small to characterize.  No hydronephrosis, stones, or solid mass lesions.    PELVIC ORGANS/BLADDER: Unremarkable.    PERITONEUM / RETROPERITONEUM: No free air or fluid.    LYMPH NODES: No lymphadenopathy.    VESSELS: The aorta is normal in course and caliber with a mild degree of scattered plaque.  No evidence of dissection..    GI TRACT: Prominent rectal stool without significant stool burden throughout the rest of the colon.  Small bowel loops are normal in caliber.  Stomach is decompressed.    BONES: Grade 1 retrolisthesis of L5 on S1.  Small volume right knee fusion.  Stable degenerative changes.  Widespread fatty atrophy of the thoracic and abdominopelvic wall musculature along with the thigh musculature.  No fractures or focal osseous lesions.      Impression    1. No aortic aneurysm or other acute aortic pathology.  2. Worsening basilar predominant consolidation and ground-glass opacities suggesting multifocal pneumonia, sequelae of aspiration, or other infectious/inflammatory process.  3. 3.5 cm air containing subcutaneous fluid collection in the left upper back, similar in size compared with prior exam dating back to 2021, may represent a complex sebaceous cyst.  4. Stable massive splenomegaly.  5. Diffuse muscular atrophy presumed related to history of inclusion body myositis.  6. Additional findings as described.    Electronically signed by resident: Pierre  Cristina  Date:    05/16/2023  Time:    14:12    Electronically signed by: Kory Antoine MD  Date:    05/16/2023  Time:    14:45   Results for orders placed or performed during the hospital encounter of 04/19/23 (from the past 2160 hour(s))   CT Abdomen Pelvis With Contrast    Narrative    EXAMINATION:  CT ABDOMEN PELVIS WITH CONTRAST    CLINICAL HISTORY:  Abdominal abscess/infection suspected;Bowel obstruction suspected;    TECHNIQUE:  Low dose axial images were obtained from the lung bases to the pubic symphysis following the intravenous administration of 75 cc of Omnipaque 350.  Sagittal and coronal reformats were provided.    COMPARISON:  Abdominal ultrasound 04/03/2019.  CT pelvis 06/06/2017.  CT abdomen pelvis 08/07/2014.    FINDINGS:  Heart: Normal in size.  No pericardial effusion.    Lung bases: Right basilar consolidative and micro nodular change.  Minimal left basilar subsegmental atelectasis.    Liver: Mildly enlarged.  Stable subcentimeter hypodensities, too small to characterize.  No suspicious focal hepatic lesion.  Portal vein is patent.    Gallbladder: Numerous layering small calcified gallstones.  No gallbladder wall thickening, distension, or pericholecystic fluid to suggest acute cholecystitis.    Bile Ducts: No intra or extrahepatic biliary ductal dilation.    Pancreas: No pancreatic mass lesion or peripancreatic inflammatory change.    Spleen: Enlarged, measuring 20.0 cm craniocaudal dimension.    Adrenals: Unremarkable.    Kidneys/ Ureters: Mass effect of the spleen upon the left kidney.  Kidneys enhance symmetrically.  Bilateral renal hypodensities, most too small to characterize, the largest compatible with simple cyst.  No solid renal mass or nephrolithiasis.  No hydroureteronephrosis.    Bladder: The bladder is decompressed around a Pedro catheter, limiting evaluation.    Reproductive organs: Unremarkable.    GI Tract/Mesentery: The stomach is unremarkable.  No evidence for bowel  obstruction.  Appendix appears unremarkable.  Large hyperdense rectal fecaloma.  Moderate colonic stool elsewhere.    Peritoneal Space: No intraperitoneal free fluid or free air. No pathologically enlarged lymph nodes.    Retroperitoneum: No significant adenopathy.    Abdominal wall/extraperitoneal soft tissues: Scattered body wall stranding.  Subcutaneous calcification overlies the right gluteal musculature.  Advanced fatty atrophy of the bilateral gluteus medius, posterior paraspinal and lower extremity musculature.    Vasculature: Fusiform ectasia of the ascending aorta up to 3.7 cm (series 2, image 12).  Abdominal aorta is normal in caliber, contour, and course .  Minimal aortoiliac calcific atherosclerosis.    Bones: Degenerative changes without acute fracture or bone destructive process.  Grade 1 retrolisthesis of L5 on S1.      Impression    1. Large hyperdense rectal fecaloma.  Consider correlation for fecal impaction.  Moderate colonic stool elsewhere.  2. Right basilar consolidative and micronodular change, suggestive of infectious/inflammatory process versus aspiration.  3. Cholelithiasis without evidence of acute cholecystitis.  4. Stable hepatosplenomegaly.  5. Fusiform ectasia of the ascending aorta up to 3.7 cm, similar.  6.  Additional findings as above.  This report was flagged in Epic as abnormal.    Electronically signed by resident: Mesfin Alves  Date:    04/19/2023  Time:    22:39    Electronically signed by: Brady Villatoro MD  Date:    04/19/2023  Time:    23:17     *Note: Due to a large number of results and/or encounters for the requested time period, some results have not been displayed. A complete set of results can be found in Results Review.

## 2023-05-21 NOTE — ASSESSMENT & PLAN NOTE
Resolved. Labs suggest volume depletion hyponatremia, improved with gentle IVF resuscitation  - stable, holding further IVF given pulm HTN

## 2023-05-21 NOTE — PLAN OF CARE
Family meeting held at 1530 on 5/21/23. Following discussion with Palliative Care, primary team, family has elected to initiate comfort measures. Life sustaining therapies discontinued, comfort care orders placed.    Mary Beth Crockett MD  Medical ICU

## 2023-05-21 NOTE — ASSESSMENT & PLAN NOTE
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 Component of chronic disease on prior iron studies. Precipitious drop during this hospitalization to 5.9 from 9.5. No blood on CIERA, absent melena, hematochezia, hematemesis, epistaxis, or other overt signs of bleeding. Hemolysis workup and CTA CAP negative. Received 2 units of blood with improvement, remains stable.     - 40mg IV PPI BID   - 5/18: low suspicion of GIB. On daily ppi  - CBC BID  - holding chemical AC, antiplatelets.  - 5/19: Hgb stable, CBC qd

## 2023-05-22 NOTE — DISCHARGE SUMMARY
Death Note  Critical Care Medicine      Admit Date: 2023    Date of Death: 2023    Time of Death: 05:15    Attending Physician: Otoniel Bazan*    Principal Diagnoses: Inclusion body myositis ([employer])    Preliminary Cause of Death: Inclusion body myositis ([employer])    Secondary Diagnoses:   Active Hospital Problems    Diagnosis  POA    *Inclusion body myositis (IBM) [G72.41]  Yes    Palliative care encounter [Z51.5]  Not Applicable    Comfort measures only status [Z51.5]  Not Applicable    Severe malnutrition [E43]  Yes    Abscess [L02.91]  Yes    Hyperbilirubinemia [E80.6]  Yes    Hyponatremia [E87.1]  Yes    Acute hypoxemic respiratory failure [J96.01]  Yes    Chronic obstructive pulmonary disease, unspecified COPD type [J44.9]  Yes     Chronic    Essential hypertension [I10]  Yes     Chronic    Debility [R53.81]  Yes    Aspiration pneumonia [J69.0]  No    Beta 0 thalassemia [D56.8]  Yes     Chronic    Acute on chronic anemia [D64.9]  Yes    History of tobacco use [Z87.891]  Not Applicable    Quadriparesis (muscle weakness) [G82.50]  Yes     Chronic      IMO Regulatory Import        Oropharyngeal dysphagia [R13.12]  Yes     Chronic    Polymyositis [M33.20]  Yes     Chronic      Resolved Hospital Problems    Diagnosis Date Resolved POA    Irritant contact dermatitis due to fecal, urinary or dual incontinence [L24.A2] 2023 Yes    Acute blood loss anemia [D62] 05/15/2023 Unknown    Severe sepsis [A41.9, R65.20] 2023 Unknown        Discharged Condition:     HPI:  67yoM w inclusion body myositis/polymyositis (diagnosed . Chair bound), HTN, HbC disease, beta thalessemia, recent hospitalization for aspiration pneumonia and COVID, COPD who presented to the ED for SOB and progressively worsening shortness of breath, nonproductive cough. and generalized weakness over the past 4 days. This has been associated with a nonproductive cough. He denies chest pain or fever.    Pt is  "followed by [facility], receiving IVIG and rituximab infusions. Last IVIG was 11/2021. Last rituximab was 4/4/22 and 4/29/22.  Takes MMF 1500 mg BID and prednisone 10 mg qd. Pt states his generalized weakness has worsened since getting COVID and subsequently missing his rituximab infusions.     In ED, tachycardia with MAP 64-65. He was given 30cc/kg IVF, vanc and zosyn for abx. CXR w bilateral lower lung field infiltrates, concerning for aspiration PNA. Pt placed on 4L NC, started broad spectrum antibiotics Hospital medicine consulted for admission with MICU consult to discuss ICU needs. Improved with fluid resuscitation, admitted to Hospital Medicine. Oxygen requirement improved with CAP coverage, cultures NGTD, covid negative. Required 2 units pRBC on 5/16. No evidence of bleeding, CTA negative, suspect 2/2 HbC/B Thalassemia. Hgb stable. Course c/b progressive dysphagia likely 2/2 worsening myositis while off rituxin, SLP evaluated w MBS demonstrating aspiration of thin liquids. ENT consulted, unremarkable larygnoscopy. no indication for UES dilation or cricopharyngeal myotomy. On 5/17, patient w increased WOB overnight and through the morning, worsening after he "overdid it" with his last meal. MICU consulted, on exam, pt w mild-mod accessory mm use, SpO2 95% on 2L NC, very weak cough, coarse upper lobes, diminished lower lobes. Tachypneic, feels SOB. Given myositis, known dysphagia, pt deemed to be high risk, concern for tiring. Transferred to MICU for airway watch, HFNC, hypertonic saline, CPT, q4 NIF.    Hospital/ICU Course:  Stepped up to MICU 5/17, marked respiratory acidosis, lethargy, suspected aspiration. Mentation and respiratory status improved overnight with BIPAP, diuresis, pulmonary toilet. Satting well on nasal cannula. WBC uptrending, improved with escalation to HAP coverage.      SLP evaluated, recommended diet. Following discussion, determined pt will likely not meet nutritional " requirements with PO intake regardless of potential improvement with Rituximab; given aspiration risk, NGT was placed. Patient initially refused PEG evaluation, but is now agreeable.     Remains HDS, satting well on nasal cannula. Strict aspiration precautions with monitored PO intake. On : patient decompensated overnight, now requiring continuous bipap. Remains lethargic, severe dyspnea and panic within minutes of removal of BiPAP. Discussed with pt's daughter/healthcare proxy. Decision was made to make patient DNR/DNI with no further escalation of care.     Unfortunately the patient's condition continued to decline. Ongoing goals of care discussions were held with the family in conjunction with the Palliative Care service. The afternoon of  decision was made to transition the patient to full comfort measures. Measures to ensure the comfort of the patient including, but not limited to, dilaudid as needed for pain and air hunger as well as benzodiazepines as needed for agitation.The morning of  the patient  peacefully with family at bedside. Condolences given.  notified.       Glenny Ignacio NP  Critical Care Medicine

## 2023-05-22 NOTE — SIGNIFICANT EVENT
Death Note    Called to bedside by patient's nurse. Nursing supervisor notified. Family at bedside.  has been called and is also at bedside.    Patient is not responding to verbal or tactile stimuli. Patient does not have a papillary or corneal reflex. His pupils are fixed and dilated. No heart or breath sounds on auscultation. No respirations. No palpable pulses.      Time of death:  05/22/2023 0515     Cause of Death:  Inclusion Body Myositis    MD [email or web address] [email or web address]  Internal Medicine, PGY-3

## 2023-05-22 NOTE — PLAN OF CARE
[facility] - Cardiac Medical ICU  Discharge Final Note    Primary Care Provider: Edna Jaramillo NP    Expected Discharge Date: 5/22/2023  Date of Death: 5/22/2023  Time of Death: 5:15 am  Cause of Death: Inclusion Body Myositis      Final Discharge Note (most recent)       Final Note - 05/22/23 0920          Final Note    Assessment Type Final Discharge Note                   Yaritza Rocha LMSW  [facility]  X [phone]        Important Message from Medicare

## 2023-05-22 NOTE — CONSULTS
Visited briefly with Pt's 3 daughters and offered pray. They replied that they were praying all the time and declined 's offer. Expressed appreciation for the visit.    Pt was on bi-pap and non-verbal. Nurse explained that they were transitioning to comfort care. When  stopped by around 7:00 pm, Pt had been removed from bi-pap and was awake and talking a little.

## 2023-05-26 ENCOUNTER — RESEARCH ENCOUNTER (OUTPATIENT)
Dept: INFECTIOUS DISEASES | Facility: CLINIC | Age: 68
End: 2023-05-26
Payer: MEDICARE

## 2023-05-26 NOTE — PROGRESS NOTES
"Visit 2 / Final Assessment *Data Collection Visit Only   Sponsor: Pfizer  Study: Vaccine Effectiveness of 20-Valent Pneumococcal Conjugate Vaccine Against Vaccine-Type CAP Using a Test-Negative Design (RECAP-20)  IRB/Protocol #: 2022.215/[id number]  Principle Investigator: Dr. Ralph Walsh   Site Number: 1011    Subject Number: [id number]    "Visit 2" Data Collection Date: 2023  Expected Visit 2 Date (if death did not occur):     Hospitalization Details  Hospital Admission Date: 2023   Discharge Date: Patient  while in-hospital on 2023.    Discharge Disposition:  Participants will have discharge disposition recorded on the CRF if available. Discharge  disposition should be collected upon discharge up to Day 30. If a participant remains  hospitalized beyond the Day 30 visit, discharge disposition will be documented as Other,  'Not Discharged'.  [x] In-hospital death   [] Patient still in study site hospital  [] Discharged home with self care  [] Discharged home with health home care provided by family or friends  [] Discharged home with professional health home care  [] Transferred to another acute care hospital  [] Transferred to a skilled nursing facility/other rehab facility (low level care)  [] Transferred to a skilled nursing facility/other rehab facility (high level care)  [] Discharged against medical advice  [] Other, specify    Hospitalization Details - Mechanical Ventilation  Did Procedure Occur [] Yes     [x] No    Hospitalization Details - Intensive Care Unit: if Participant is admitted to and discharged multiple times from ICU, depending on admission, discharge and readmission dates, multiple records should be created.    MICU Admission Date: 2023  ICU Discharge Date: Patient  on 2023 while still in-hospital.    Did Procedure Occur? [] Yes [x] No  If yes, Extracorporeal Membrane Oxygenation (ECMO) will populate.     Patient was on NC and BIPAP while in " hospital. Patient placed on full comfort measures on 2023.        Survival / Vital Status:  Record vital status at Visit 2 at Day 30 (regardless of whether the patient was discharged before Day 30). Vital status should be determined using hospital data or information received from the Death [facility].    Survival Follow-Up Current Subject Status: [x]  or [] Living [] Unknown    Date of Collection/ Notification of Death (if available): Study staff aware of death on 2023 via Epic.  Date of Death:2023 at 05:15    Is this death within [place]OL[place] reporting requirement? 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 If known provide generic drug name (including salt form if applicable). If generic name unknown, provide trade name or propriety name. Include route, dose, lot number and date received if known. Record if received more than once. If vaccination history is available from different sources, the data from the most reliable source should be recorded.    Date and Type(s) of pneumococcal vaccination in past 5 years:  23-Valent Polysaccharide: Pneumovax-23  Date Received: 2018    Route: IM (0.5mL dose to RD)  :  [employer]    Lot #: [id number]    13-Valent Pneumococcal Conjugate Vaccine: No record found within 5-year window of data capturing. Last administered in 2016.  Date Received: N/A   Route: N/A  :  N/A   Lot #: N/[id number]    15-Valent Pneumococcal Conjugate Vaccine: No record found in sources searched  Date Received: N/A   Route: N/A  :  N/A   Lot #: N/A    20-Valent Pneumococcal Conjugate Vaccine: No record found in sources searched  Date Received: N/A   Route: N/A  :  N/A   Lot #: N/A    Influenza  Vaccine (in last 12 months): No record found within 12 months; Last administered 12Oct2021 outside data capturing window.  Date Received: N/A   Route: N/A  :  N/A   Lot #: [id number]    COVID Vaccinations: COVID-19, MRNA, LN-S, PF (Pfizer)  Source(s) of vaccine history information: Epic records/[facility] records, [place] LINKs  Dose/Booster # Date  Lot # Route   1 [id number] Pfizer [id number] IM (0.3mL to RD)   2 [id number] Pfizer [id number] IM (0.3mL to RD)   3 [id number] Pfizer [id number] IM (0.3mL to RD)   4         Completeness of Vaccine History Ascertainment  Select Sources of Vaccine History information searched (if applicable):  [x] Hospital Record (EMR)-EPIC  [] Non-hospital Electronic Medical Record (Not applicable)  [] Vaccine Card (Not Needed/applicable, Patient has lived only in [place])  [x] Vaccine Crum/State Registry ([place] LINKs)  [x] [facility] Record  [x] Primary Care or other medical provider (PCP notes reviewed)    CRC Notes: Patient stated he did not use [facility] nor [facility] for getting vaccines. Patient stated using [facility] for Pharmacy and PCP.

## 2023-06-14 NOTE — PROGRESS NOTES
Visit 2 / Day 30 / Final Assessment *Data Collection Visit Only   Sponsor: Pfizer  Study: Vaccine Effectiveness of 20-Valent Pneumococcal Conjugate Vaccine Against Vaccine-Type CAP Using a Test-Negative Design (RECAP-20)  IRB/Protocol #: 2022.215/[id number]  Principle Investigator: Dr. Ralph Walsh   Site Number: 1011    Subject Number: [id number]    Visit 2 Date: 26MAY2023      Final Diagnosis:  Date of Diagnosis: 26MAY2023     Final Diagnosis:  [] Community Acquired Pneumonia (CAP)  [] Other diagnosis (not pneumonia):  [] Acute Bronchitis  [] Exacerbation of COPD  [] Empyema/lung abscess  [x] Other acute lower respiratory tract infection (aspiration pneumonia)  [] Acute pulmonary exacerbation of CHF  [] Non-infectious process  [] Early-Onset HAP

## 2024-02-23 NOTE — ASSESSMENT & PLAN NOTE
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  Last full workup in 2016 with evidence of progression of disease  Direct admit for management by rheumatology with IVIG  Weakness in all extremities, uses scooter for mobilization  Does not have a good social support system at home - will require post acute care for this reason  Initial labs negative except IgG elevated at 1754  MRI femur do not show progression of disease compared to prior study 2016; MRI humerus with signs of diffuse atrophy and fatty replacement  - Rheumatology consulted - appreciate recs and guidance of therapy  - s/p initiation of IVIG treatment - day 3 of 5 today  - Continuing imuran and prednisone currently  - f/u initial labs HMG-CoA reductase antibody, CN1A Ab  - PT/OT and PM&R  - Completed 5/5 IVIG treatments     Brought in for left ear pain x1 day, no meds given. Patient is alert and cooperative in triage. No PMH, IUTD.

## 2024-12-09 NOTE — TELEPHONE ENCOUNTER
Noted.  Needs to return to clinic for BP check when taking his medication.  Recommend patient take his medication every day as prescribed.  Please notify.  
Pt is here for b/p check/ B/p is 144/80, p 74. Pt states that he has only started Amlodipine 5 mg yesterday and didn't take medication this morning.    
Spoke with pt and he stated verbalized understanding  Pt wanted to notify you that he is in the ED   
General

## 2024-12-11 NOTE — TELEPHONE ENCOUNTER
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 Please schedule Hematology consult this week or early next. Thanks ALEXSANDER        Note      GI evaluation for dysphagia   Pulmonary evaluation for GGO, pulmonary nodule after repeat CT chest     Deb, please schedule the above prior to his upcoming visit with us. Thanks ALEXSANDER          
See ASU profile.